# Patient Record
Sex: FEMALE | Race: WHITE
[De-identification: names, ages, dates, MRNs, and addresses within clinical notes are randomized per-mention and may not be internally consistent; named-entity substitution may affect disease eponyms.]

---

## 2023-06-13 ENCOUNTER — HOSPITAL ENCOUNTER (OUTPATIENT)
Dept: HOSPITAL 101 - ER | Age: 58
Setting detail: OBSERVATION
LOS: 1 days | Discharge: HOME | End: 2023-06-14
Payer: COMMERCIAL

## 2023-06-13 VITALS
TEMPERATURE: 98.2 F | DIASTOLIC BLOOD PRESSURE: 80 MMHG | RESPIRATION RATE: 24 BRPM | OXYGEN SATURATION: 93 % | SYSTOLIC BLOOD PRESSURE: 104 MMHG | HEART RATE: 106 BPM

## 2023-06-13 VITALS
TEMPERATURE: 99.32 F | HEART RATE: 110 BPM | DIASTOLIC BLOOD PRESSURE: 84 MMHG | SYSTOLIC BLOOD PRESSURE: 128 MMHG | RESPIRATION RATE: 22 BRPM | OXYGEN SATURATION: 89 %

## 2023-06-13 VITALS — OXYGEN SATURATION: 94 %

## 2023-06-13 VITALS — BODY MASS INDEX: 30 KG/M2 | BODY MASS INDEX: 29.6 KG/M2

## 2023-06-13 VITALS — OXYGEN SATURATION: 90 %

## 2023-06-13 DIAGNOSIS — G43.909: ICD-10-CM

## 2023-06-13 DIAGNOSIS — Z79.899: ICD-10-CM

## 2023-06-13 DIAGNOSIS — K21.9: ICD-10-CM

## 2023-06-13 DIAGNOSIS — J96.11: ICD-10-CM

## 2023-06-13 DIAGNOSIS — Z99.81: ICD-10-CM

## 2023-06-13 DIAGNOSIS — Z87.891: ICD-10-CM

## 2023-06-13 DIAGNOSIS — Z86.16: ICD-10-CM

## 2023-06-13 DIAGNOSIS — Z20.822: ICD-10-CM

## 2023-06-13 DIAGNOSIS — E11.9: ICD-10-CM

## 2023-06-13 DIAGNOSIS — J44.1: Primary | ICD-10-CM

## 2023-06-13 LAB
ADD MANUAL DIFF: NO
ALANINE AMINOTRANSFERASE: 22 U/L (ref 14–59)
ALBUMIN GLOBULIN RATIO: 0.8
ALBUMIN LEVEL: 3.3 G/DL (ref 3.4–5)
ALKALINE PHOSPHATASE: 95 U/L (ref 46–116)
ANION GAP: 8.4
ASPARTATE AMINO TRANSFERASE: 13 U/L (ref 15–37)
BLOOD UREA NITROGEN: 5 MG/DL (ref 7–18)
CALCIUM: 9.4 MG/DL (ref 8.5–10.1)
CARBON DIOXIDE: 32.2 MMOL/L (ref 21–32)
CHLORIDE: 102 MMOL/L (ref 98–107)
CO2 BLD-SCNC: 32.2 MMOL/L (ref 21–32)
ESTIMATED GFR (AFRICAN AMERICA: >60 (ref 60–?)
ESTIMATED GFR (NON-AFRICAN AME: >60 (ref 60–?)
GLOBULIN: 4.2 G/DL
GLUCOSE BLD-MCNC: 113 MG/DL (ref 74–106)
HCT VFR BLD CALC: 45 % (ref 36–48)
HEMATOCRIT: 45 % (ref 36–48)
HEMOGLOBIN: 15.5 G/DL (ref 12–16)
IMMATURE GRANULOCYTES ABS AUTO: 0.07 10^3/UL (ref 0–0.03)
IMMATURE GRANULOCYTES PCT AUTO: 0.4 % (ref 0–0.5)
LYMPHOCYTES  ABSOLUTE AUTO: 3.1 10^3/UL (ref 1.2–3.8)
MCV RBC: 92 FL (ref 81–99)
MEAN CORPUSCULAR HEMOGLOBIN: 31.7 PG (ref 26.7–34)
MEAN CORPUSCULAR HGB CONC: 34.4 G/DL (ref 29.9–35.2)
MEAN CORPUSCULAR VOLUME: 92 FL (ref 81–99)
NT PRO B TYPE NATRIURETIC PEPT: 90 PG/ML (ref ?–900)
PLATELET # BLD: 242 10^3/UL (ref 150–450)
PLATELET COUNT: 242 10^3/UL (ref 150–450)
POTASSIUM SERPLBLD-SCNC: 3.6 MMOL/L (ref 3.5–5.1)
POTASSIUM: 3.6 MMOL/L (ref 3.5–5.1)
RED BLOOD COUNT: 4.89 10^6/UL (ref 4.2–5.4)
SODIUM BLD-SCNC: 139 MMOL/L (ref 136–145)
SODIUM: 139 MMOL/L (ref 136–145)
TOTAL PROTEIN: 7.5 G/DL (ref 6.4–8.2)
WBC # BLD: 16.2 10^3/UL (ref 4–11)
WHITE BLOOD COUNT: 16.2 10^3/UL (ref 4–11)

## 2023-06-13 PROCEDURE — 82947 ASSAY GLUCOSE BLOOD QUANT: CPT

## 2023-06-13 PROCEDURE — 96376 TX/PRO/DX INJ SAME DRUG ADON: CPT

## 2023-06-13 PROCEDURE — G0378 HOSPITAL OBSERVATION PER HR: HCPCS

## 2023-06-13 PROCEDURE — 93005 ELECTROCARDIOGRAM TRACING: CPT

## 2023-06-13 PROCEDURE — 94640 AIRWAY INHALATION TREATMENT: CPT

## 2023-06-13 PROCEDURE — 83880 ASSAY OF NATRIURETIC PEPTIDE: CPT

## 2023-06-13 PROCEDURE — 96365 THER/PROPH/DIAG IV INF INIT: CPT

## 2023-06-13 PROCEDURE — 96375 TX/PRO/DX INJ NEW DRUG ADDON: CPT

## 2023-06-13 PROCEDURE — 96372 THER/PROPH/DIAG INJ SC/IM: CPT

## 2023-06-13 PROCEDURE — 87070 CULTURE OTHR SPECIMN AEROBIC: CPT

## 2023-06-13 PROCEDURE — 85025 COMPLETE CBC W/AUTO DIFF WBC: CPT

## 2023-06-13 PROCEDURE — 94761 N-INVAS EAR/PLS OXIMETRY MLT: CPT

## 2023-06-13 PROCEDURE — 80053 COMPREHEN METABOLIC PANEL: CPT

## 2023-06-13 PROCEDURE — 0202U NFCT DS 22 TRGT SARS-COV-2: CPT

## 2023-06-13 PROCEDURE — 87205 SMEAR GRAM STAIN: CPT

## 2023-06-13 PROCEDURE — 36415 COLL VENOUS BLD VENIPUNCTURE: CPT

## 2023-06-13 PROCEDURE — 87880 STREP A ASSAY W/OPTIC: CPT

## 2023-06-13 PROCEDURE — 99285 EMERGENCY DEPT VISIT HI MDM: CPT

## 2023-06-13 PROCEDURE — 84484 ASSAY OF TROPONIN QUANT: CPT

## 2023-06-13 PROCEDURE — 71045 X-RAY EXAM CHEST 1 VIEW: CPT

## 2023-06-13 PROCEDURE — 85027 COMPLETE CBC AUTOMATED: CPT

## 2023-06-13 RX ADMIN — KETOROLAC TROMETHAMINE 15 MG: 30 INJECTION, SOLUTION INTRAMUSCULAR; INTRAVENOUS at 18:15

## 2023-06-13 RX ADMIN — LEVOFLOXACIN 100 MG: 750 INJECTION, SOLUTION INTRAVENOUS at 19:16

## 2023-06-13 RX ADMIN — METHYLPREDNISOLONE SODIUM SUCCINATE 125 MG: 125 INJECTION, POWDER, FOR SOLUTION INTRAMUSCULAR; INTRAVENOUS at 18:15

## 2023-06-13 RX ADMIN — FAMOTIDINE 20 MG: 10 INJECTION INTRAVENOUS at 18:15

## 2023-06-13 RX ADMIN — IPRATROPIUM BROMIDE AND ALBUTEROL SULFATE 3 ML: .5; 2.5 SOLUTION RESPIRATORY (INHALATION) at 18:02

## 2023-06-13 RX ADMIN — IPRATROPIUM BROMIDE AND ALBUTEROL SULFATE 3 ML: .5; 2.5 SOLUTION RESPIRATORY (INHALATION) at 18:03

## 2023-06-13 RX ADMIN — KETOROLAC TROMETHAMINE 15 MG: 30 INJECTION, SOLUTION INTRAMUSCULAR; INTRAVENOUS at 20:06

## 2023-06-13 NOTE — XR_ITS
[address] [address] 
     [phone] 
  
  
Patient Name: 
JAN GAMING 
  
MRN: [id number]    YOB: 1965    Sex: F 
Assigned Patient Location: ER 
Current Patient Location: ER 
Accession/Order Number: [id number] 
Exam Date: 6/13/2023  17:50    Report Date: 6/13/2023  18:03 
  
At the request of: 
RAULITO ANDUJAR   
  
Procedure:  XR chest 1V 
  
EXAM: XR chest 1V at 1751 hours 
  
HISTORY: dyspnea 
  
COMPARISON: 4/20/2023  
  
TECHNIQUE: AP upright portable chest x-ray 
  
FINDINGS: The heart is not enlarged and the vasculature is not distended. No  
acute infiltrate, effusion or pneumothorax is identified. The osseous  
structures are grossly intact. Hardware projects over the lower cervical  
spine. 
  
IMPRESSION:  
  
No acute infiltrate or evidence of cardiac decompensation. The overall  
appearance of the chest is unchanged. 
  
  
Electronically authenticated by: BILLIE DE GUZMAN   Date: 6/13/2023  18:03

## 2023-06-13 NOTE — ED_ITS
HPI - SOB/Dyspnea    
General    
Chief Complaint: Shortness of Breath/Dyspnea    
Stated Complaint: SHORTNESS OF BREATH    
Time Seen by Provider: 06/13/23 17:40    
Source: patient    
Mode of arrival: walk-in    
Limitations: no limitations    
History of Present Illness    
HPI Narrative:     
The patient have history of asthma and COPD ,Is coming to the ER coming to the   
ER with a 4 days history of shortness of breath that is associated with greenish  
sputum cough .  The patient have no vomiting but some nausea she is denying   
diarrhea and chest pain and other complaints    
    
She was diagnosed with COVID-19 few months ago     
    
she uses 4 L NC as baseline all the time which didnt change over the last is   
that the patient mentioned that the coughing is causing her Back pain     
    
    
Review of systems otherwise negative    
    
Related Data    
                                Home Medications    
    
    
    
 Medication  Instructions  Recorded  Confirmed    
     
albuterol sulfate 90 mcg/actuation 2 inh inhalation Q4H PRN shortness 06/13/23 06/13/23    
    
aerosol inhaler of breath or wheezing      
     
budesonide 1 mg/2 mL suspension 1 mg inhalation Q12H PRN shortness 06/13/23 06/13/23    
    
for nebulization of breath      
     
budesonide-formoterol  1 inh inhalation Q12H 06/13/23 06/13/23    
    
mcg-4.5 mcg/actuation aerosol       
    
inhaler (Symbicort)       
     
butalbital-acetaminophen-caffeine 1 tab PO Q6H PRN pain 06/13/23 06/13/23    
    
50 mg-325 mg-40 mg tablet       
     
famotidine 40 mg tablet 40 mg PO BEDTIME 06/13/23 06/13/23    
     
galcanezumab-gnlm 120 mg/mL 120 mg subcut .month 06/13/23 06/13/23    
    
subcutaneous pen injector       
    
(Emgality Pen)       
     
lorazepam 0.5 mg tablet 0.5 mg PO Q12H PRN anxiety 06/13/23 06/13/23    
     
semaglutide 2 mg/dose (8 mg/3 mL) 2 mg subcut .friday 06/13/23 06/13/23    
    
subcutaneous pen injector (Ozempic)       
    
    
                                  Previous Rx's    
    
    
    
 Medication  Instructions  Recorded    
     
azithromycin 250 mg tablet See Rx Instructions PO .COMPLEX #6 06/14/23    
    
(Zithromax Z-Jose) tabs     
     
prednisone 20 mg tablet 20 mg PO BID 5 days #10 tabs 06/14/23    
     
promethazine-DM 6.25 mg-15 mg/5 mL 5 ml PO Q6H PRN cough #118 mL 06/14/23    
    
oral syrup      
    
    
    
                                    Allergies    
    
    
    
Allergy/AdvReac Type Severity Reaction Status Date / Time    
     
Penicillins Allergy Severe rash Verified 06/13/23 17:50    
     
Sulfa (Sulfonamide Allergy Severe Rash Verified 06/13/23 17:50    
    
Antibiotics)         
     
topiramate [From Topamax] AdvReac Severe thoughts Verified 06/13/23 17:50    
    
   of dieing      
     
reglan AdvReac Severe makes me Uncoded 06/13/23 17:50    
    
   mean      
    
    
    
    
Review of Systems    
    
    
ROS      
    
 Status of ROS 10 or more systems reviewed and unremarkable except as noted in   
history and below       
    
    
[facility]    
Medical History (Updated 06/18/23 @ 00:00 by )    
    
    
    
Surgical History (Updated 06/13/23 @ 17:58 by Juan Garcia)    
    
    
    
Social History    
Smoking status:  Former smoker     
Highest level of school completed/degree received:  high school graduate     
    
    
    
Exam    
Narrative    
Exam Narrative:     
Adult exam nurses notes and vital signs reviewed and patient is not hypoxic.    
    
General:  Well-appearing and in no apparent distress.      
Skin:  Warm, dry, no pallor noted.      
No rash.    
Head:  Normocephalic, atraumatic.    
Neck:  Supple, non-tender.    
Eye:  Pupils are equal, round and EOMI. No scleral icterus.    
Ears, Nose, Mouth, and Throat:  TM are clear, no nasal mucosal hypertrophy.    
Oral mucosa is moist, no posterior oropharynx erythema, uvula is mid-line    
Cardiovascular:  Regular Rate and Rhythm without murmur, gallop or rub.    
Respiratory:  No accessory muscle use or respiratory distress.    
Lungs biltaeral expiratory lung wheezes with decreased air entry at the bases .    
Chest Wall:  no tenderness    
Back: No midline thoracic or lumbar vertebral tenderness. No CVA tenderness    
Musculoskeletal:  normal ROM, no calf or popliteal tenderness, no lower   
extremity edema/swelling    
GI:  Abdomen is soft, non-distended.  Normal bowel sounds.    
No masses appreciated.    
No tenderness to palpation. No rebound, guarding, or rigidity noted.    
Neurological:  A&O x4.  No cranial nerve dysfunction observed.  No truncal   
ataxia. Moves all extremities. Sensation intact.    
Psychiatric:  Cooperative and interactive. Normal mood and affect.    
Constitutional    
    
                               Vital Signs - 24 hr    
    
    
    
 06/13/23    
17:42 06/13/23    
17:52 06/13/23    
18:05    
     
Temperature 98.2 F      
     
Pulse Rate [Monitor] 106 H      
     
Respiratory Rate 24      
     
Blood Pressure [Left Arm] 104/80 H      
     
Pulse Oximetry 93 L  94 L    
     
Oxygen Delivery Method Nasal Cannula Nasal Cannula Nasal Cannula    
     
Oxygen Delivery Flow Rate 4 4 4    
    
    
    
    
    
Course    
Vital Signs    
Vital signs:     
    
                                   Vital Signs    
    
    
    
Temperature  98.2 F   06/13/23 17:42    
     
Pulse Rate  106 H  06/13/23 17:42    
     
Respiratory Rate  24   06/13/23 17:42    
     
Blood Pressure  104/80 H  06/13/23 17:42    
     
Pulse Oximetry  93 L  06/13/23 17:42    
     
Oxygen Delivery Method  Nasal Cannula  06/13/23 17:42    
     
Oxygen Delivery Flow Rate  4   06/13/23 17:42    
    
    
                                            
    
    
    
Temperature  98.1 F   06/14/23 05:59    
     
Pulse Rate  94 H  06/14/23 11:01    
     
Respiratory Rate  20   06/14/23 05:59    
     
Blood Pressure  111/67   06/14/23 05:59    
     
Pulse Oximetry  94 L  06/14/23 11:01    
     
Oxygen Delivery Method  Nasal Cannula  06/14/23 11:32    
     
Oxygen Delivery Flow Rate  4   06/14/23 11:32    
    
    
    
    
    
MDM - SOB/Dyspnea    
MDM Narrative    
Medical decision making narrative:     
the patient have a baseline isn't 4 L of nasal cannula this time when she   
presented to us initially she was saturating ninety-four percent on her regular   
NC , After initial treatment with steroids and albuterol the patient was still   
wheezing and her clinical exam did not improve    
    
Chest x-ray showed no acute pathology but the patient  WBC is elevated and her   
BUN  elevated as well     
    
EKG sinus rhythm with no ST elevation of depression      
    
PT case discusssed with Dr Romo and will be admitted under Dr Voss     
    
    
Blnuno cx obatained and pt was started on IV levifloxacin     
    
Lab Data    
Labs:     
    
                                   Lab Results    
    
    
    
  06/13/23 Range/Units    
    
  17:45     
     
WBC  16.2 H  (4.0-11.0)  10^3/uL    
     
RBC  4.89  (4.20-5.40)  10^6/uL    
     
Hgb  15.5  (12.0-16.0)  g/dL    
     
Hct  45.0  (36.0-48.0)  %    
     
MCV  92.0  (81.0-99.0)  fL    
     
MCH  31.7  (26.7-34.0)  pg    
     
MCHC  34.4  (29.9-35.2)  g/dL    
     
RDW  12.6  (11.0-15.0)  %    
     
Plt Count  242  (150-450)  10^3/uL    
     
MPV  9.5  (9.5-13.5)  fL    
     
Neut % (Auto)  69.5  (43.0-75.0)  %    
     
Lymph % (Auto)  18.8 L  (20.5-60.0)  %    
     
Mono % (Auto)  10.1  (1.7-12.0)  %    
     
Eos % (Auto)  0.6 L  (0.9-7.0)  %    
     
Baso % (Auto)  0.6  (0.2-2.0)  %    
     
Neut # (Auto)  11.3 H  (1.4-6.5)  10^3/uL    
     
Lymph # (Auto)  3.1  (1.2-3.8)  10^3/uL    
     
Mono # (Auto)  1.6 H  (0.3-0.8)  10^3/uL    
     
Eos # (Auto)  0.1  (0.0-0.7)  10^3/uL    
     
Baso # (Auto)  0.1  (0.0-0.1)  10^3/uL    
     
Abs Immat Gran (auto)  0.07 H  (0.00-0.03)  10^3/uL    
     
Imm/Tot Granulo (auto)  0.4  (0.0-0.5)  %    
     
Sodium  139  (136-145)  mmol/L    
     
Potassium  3.6  (3.5-5.1)  mmol/L    
     
Chloride  102  ()  mmol/L    
     
Carbon Dioxide  32.2 H  (21.0-32.0)  mmol/L    
     
Anion Gap  8.4      
     
BUN  5.0 L  (7.0-18.0)  mg/dL    
     
Creatinine  0.52 L  (0.55-1.02)  mg/dL    
     
Est GFR ( Amer)  >60  (>=60)      
     
Est GFR (Non-Af Amer)  >60  (>=60)      
     
BUN/Creatinine Ratio  9.6      
     
Glucose  113 H  ()  mg/dL    
     
Calcium  9.4  (8.5-10.1)  mg/dL    
     
Total Bilirubin  0.5  (0.2-1.0)  mg/dL    
     
AST  13 L  (15-37)  U/L    
     
ALT  22  (14-59)  U/L    
     
Alkaline Phosphatase  95  ()  U/L    
     
Troponin I High Sens  <4.0 L  (4.0-51.3)  pg/mL    
     
NT-Pro-B Natriuret Pep  90.0  (<=900.0)  pg/mL    
     
Total Protein  7.5  (6.4-8.2)  g/dL    
     
Albumin  3.3 L  (3.4-5.0)  g/dL    
     
Globulin  4.2  g/dL    
     
Albumin/Globulin Ratio  0.8      
    
    
    
    
    
Discharge Plan    
Discharge    
Chief Complaint: Shortness of Breath/Dyspnea    
    
Clinical Impression:    
 Acute exacerbation of COPD with asthma, Pneumonia    
    
    
Patient Disposition: Admitted As Inpatient    
    
Time of Disposition Decision: 18:48    
    
Condition: Good    
    
Interventions:    
ED Discharge Assessment   Last Done: 06/13/23 20:26    
    
Discharge Date/Time: 06/13/23 20:11

## 2023-06-13 NOTE — ECG_ITS
[facility] 
                                        
                                       Test Date:    2023 
Pat Name:     Bev Castillo          Department:    
Patient ID:   [id number]               Room:         - 
Gender:       Female                   Technician:    
:          [id number]               Requested By: BRIGIDA VANN 
Order Number: [id number]              Reading MD:   BRIGIDA VANN 
                                 Measurements 
Intervals                              Axis           
Rate:         105                      P:            74 
MO:           152                      QRS:          77 
QRSD:         88                       T:            59 
QT:           334                                     
QTc:          395                                     
                           Interpretive Statements 
1120 Sinus tachycardia 
9140 **  abnormal rhythm ECG  ** 
No previous ECG available for comparison 
Electronically Signed On 2023 6:53:58 EDT by BRIGIDA VANN

## 2023-06-13 NOTE — ED.SOB1
HPI - SOB/Dyspnea
General
Chief Complaint: Shortness of Breath/Dyspnea
Stated Complaint: SHORTNESS OF BREATH
Time Seen by Provider: 06/13/23 17:40
Source: patient
Mode of arrival: walk-in
Limitations: no limitations
History of Present Illness
HPI Narrative: 
The patient have history of asthma and COPD ,Is coming to the ER coming to the ER with a 4 days history of shortness of breath that is associated with greenish sputum cough .  The patient have no vomiting but some nausea she is denying diarrhea and 
chest pain and other complaints

She was diagnosed with COVID-19 few months ago 

she uses 4 L NC as baseline all the time which didnt change over the last is that the patient mentioned that the coughing is causing her Back pain 


Review of systems otherwise negative

Related Data
Home Medications

 Medication  Instructions  Recorded  Confirmed
albuterol sulfate 90 mcg/actuation 2 inh inhalation Q4H PRN shortness 06/13/23 06/13/23
aerosol inhaler of breath or wheezing  
budesonide 1 mg/2 mL suspension 1 mg inhalation Q12H PRN shortness 06/13/23 06/13/23
for nebulization of breath  
budesonide-formoterol  1 inh inhalation Q12H 06/13/23 06/13/23
mcg-4.5 mcg/actuation aerosol   
inhaler (Symbicort)   
butalbital-acetaminophen-caffeine 1 tab PO Q6H PRN pain 06/13/23 06/13/23
50 mg-325 mg-40 mg tablet   
famotidine 40 mg tablet 40 mg PO BEDTIME 06/13/23 06/13/23
galcanezumab-gnlm 120 mg/mL 120 mg subcut .month 06/13/23 06/13/23
subcutaneous pen injector   
(Emgality Pen)   
lorazepam 0.5 mg tablet 0.5 mg PO Q12H PRN anxiety 06/13/23 06/13/23
semaglutide 2 mg/dose (8 mg/3 mL) 2 mg subcut .friday 06/13/23 06/13/23
subcutaneous pen injector (Ozempic)   

Previous Rx's

 Medication  Instructions  Recorded
azithromycin 250 mg tablet See Rx Instructions PO .COMPLEX #6 06/14/23
(Zithromax Z-Jose) tabs 
prednisone 20 mg tablet 20 mg PO BID 5 days #10 tabs 06/14/23
promethazine-DM 6.25 mg-15 mg/5 mL 5 ml PO Q6H PRN cough #118 mL 06/14/23
oral syrup  


Allergies

Allergy/AdvReac Type Severity Reaction Status Date / Time
Penicillins Allergy Severe rash Verified 06/13/23 17:50
Sulfa (Sulfonamide Allergy Severe Rash Verified 06/13/23 17:50
Antibiotics)     
topiramate [From Topamax] AdvReac Severe thoughts Verified 06/13/23 17:50
   of dieing  
reglan AdvReac Severe makes me Uncoded 06/13/23 17:50
   mean  



Review of Systems
ROS  
 Status of ROS 10 or more systems reviewed and unremarkable except as noted in history and below   

[facility]
Medical History (Updated 06/18/23 @ 00:00 by )



Surgical History (Updated 06/13/23 @ 17:58 by Juan Garcia)



Social History
Smoking status:  Former smoker 
Highest level of school completed/degree received:  high school graduate 



Exam
Narrative
Exam Narrative: 
Adult exam nurses notes and vital signs reviewed and patient is not hypoxic.

General:  Well-appearing and in no apparent distress.  
Skin:  Warm, dry, no pallor noted.  
No rash.
Head:  Normocephalic, atraumatic.
Neck:  Supple, non-tender.
Eye:  Pupils are equal, round and EOMI. No scleral icterus.
Ears, Nose, Mouth, and Throat:  TM are clear, no nasal mucosal hypertrophy.  Oral mucosa is moist, no posterior oropharynx erythema, uvula is mid-line
Cardiovascular:  Regular Rate and Rhythm without murmur, gallop or rub.
Respiratory:  No accessory muscle use or respiratory distress.
Lungs biltaeral expiratory lung wheezes with decreased air entry at the bases .
Chest Wall:  no tenderness
Back: No midline thoracic or lumbar vertebral tenderness. No CVA tenderness
Musculoskeletal:  normal ROM, no calf or popliteal tenderness, no lower extremity edema/swelling
GI:  Abdomen is soft, non-distended.  Normal bowel sounds.
No masses appreciated.
No tenderness to palpation. No rebound, guarding, or rigidity noted.
Neurological:  A&O x4.  No cranial nerve dysfunction observed.  No truncal ataxia. Moves all extremities. Sensation intact.
Psychiatric:  Cooperative and interactive. Normal mood and affect.
Constitutional

Vital Signs - 24 hr

 06/13/23
17:42 06/13/23
17:52 06/13/23
18:05
Temperature 98.2 F  
Pulse Rate [Monitor] 106 H  
Respiratory Rate 24  
Blood Pressure [Left Arm] 104/80 H  
Pulse Oximetry 93 L  94 L
Oxygen Delivery Method Nasal Cannula Nasal Cannula Nasal Cannula
Oxygen Delivery Flow Rate 4 4 4




Course
Vital Signs
Vital signs: 

Vital Signs

Temperature  98.2 F   06/13/23 17:42
Pulse Rate  106 H  06/13/23 17:42
Respiratory Rate  24   06/13/23 17:42
Blood Pressure  104/80 H  06/13/23 17:42
Pulse Oximetry  93 L  06/13/23 17:42
Oxygen Delivery Method  Nasal Cannula  06/13/23 17:42
Oxygen Delivery Flow Rate  4   06/13/23 17:42



Temperature  98.1 F   06/14/23 05:59
Pulse Rate  94 H  06/14/23 11:01
Respiratory Rate  20   06/14/23 05:59
Blood Pressure  111/67   06/14/23 05:59
Pulse Oximetry  94 L  06/14/23 11:01
Oxygen Delivery Method  Nasal Cannula  06/14/23 11:32
Oxygen Delivery Flow Rate  4   06/14/23 11:32




MDM - SOB/Dyspnea
MDM Narrative
Medical decision making narrative: 
the patient have a baseline isn't 4 L of nasal cannula this time when she presented to us initially she was saturating ninety-four percent on her regular NC , After initial treatment with steroids and albuterol the patient was still wheezing and her 
clinical exam did not improve

Chest x-ray showed no acute pathology but the patient  WBC is elevated and her BUN  elevated as well 

EKG sinus rhythm with no ST elevation of depression  

PT case discusssed with Dr Romo and will be admitted under Dr Voss 


Blnuno cx obatained and pt was started on IV levifloxacin 

Lab Data
Labs: 

Lab Results

  06/13/23 Range/Units
  17:45 
WBC  16.2 H  (4.0-11.0)  10^3/uL
RBC  4.89  (4.20-5.40)  10^6/uL
Hgb  15.5  (12.0-16.0)  g/dL
Hct  45.0  (36.0-48.0)  %
MCV  92.0  (81.0-99.0)  fL
MCH  31.7  (26.7-34.0)  pg
MCHC  34.4  (29.9-35.2)  g/dL
RDW  12.6  (11.0-15.0)  %
Plt Count  242  (150-450)  10^3/uL
MPV  9.5  (9.5-13.5)  fL
Neut % (Auto)  69.5  (43.0-75.0)  %
Lymph % (Auto)  18.8 L  (20.5-60.0)  %
Mono % (Auto)  10.1  (1.7-12.0)  %
Eos % (Auto)  0.6 L  (0.9-7.0)  %
Baso % (Auto)  0.6  (0.2-2.0)  %
Neut # (Auto)  11.3 H  (1.4-6.5)  10^3/uL
Lymph # (Auto)  3.1  (1.2-3.8)  10^3/uL
Mono # (Auto)  1.6 H  (0.3-0.8)  10^3/uL
Eos # (Auto)  0.1  (0.0-0.7)  10^3/uL
Baso # (Auto)  0.1  (0.0-0.1)  10^3/uL
Abs Immat Gran (auto)  0.07 H  (0.00-0.03)  10^3/uL
Imm/Tot Granulo (auto)  0.4  (0.0-0.5)  %
Sodium  139  (136-145)  mmol/L
Potassium  3.6  (3.5-5.1)  mmol/L
Chloride  102  ()  mmol/L
Carbon Dioxide  32.2 H  (21.0-32.0)  mmol/L
Anion Gap  8.4  
BUN  5.0 L  (7.0-18.0)  mg/dL
Creatinine  0.52 L  (0.55-1.02)  mg/dL
Est GFR ( Amer)  >60  (>=60)  
Est GFR (Non-Af Amer)  >60  (>=60)  
BUN/Creatinine Ratio  9.6  
Glucose  113 H  ()  mg/dL
Calcium  9.4  (8.5-10.1)  mg/dL
Total Bilirubin  0.5  (0.2-1.0)  mg/dL
AST  13 L  (15-37)  U/L
ALT  22  (14-59)  U/L
Alkaline Phosphatase  95  ()  U/L
Troponin I High Sens  <4.0 L  (4.0-51.3)  pg/mL
NT-Pro-B Natriuret Pep  90.0  (<=900.0)  pg/mL
Total Protein  7.5  (6.4-8.2)  g/dL
Albumin  3.3 L  (3.4-5.0)  g/dL
Globulin  4.2  g/dL
Albumin/Globulin Ratio  0.8  




Discharge Plan
Discharge
Chief Complaint: Shortness of Breath/Dyspnea

Clinical Impression:
 Acute exacerbation of COPD with asthma, Pneumonia


Patient Disposition: Admitted As Inpatient

Time of Disposition Decision: 18:48

Condition: Good

Interventions:
ED Discharge Assessment   Last Done: 06/13/23 20:26

Discharge Date/Time: 06/13/23 20:11

## 2023-06-14 VITALS — HEART RATE: 87 BPM | RESPIRATION RATE: 20 BRPM | OXYGEN SATURATION: 94 %

## 2023-06-14 VITALS
SYSTOLIC BLOOD PRESSURE: 111 MMHG | RESPIRATION RATE: 20 BRPM | TEMPERATURE: 98.1 F | DIASTOLIC BLOOD PRESSURE: 67 MMHG | OXYGEN SATURATION: 90 % | HEART RATE: 96 BPM

## 2023-06-14 VITALS — HEART RATE: 94 BPM | OXYGEN SATURATION: 94 %

## 2023-06-14 LAB
ADD MANUAL DIFF: NO
GLUCOSE BLD-MCNC: 288 MG/DL (ref 74–106)
HCT VFR BLD CALC: 38.3 % (ref 36–48)
HCT VFR BLD CALC: 43 % (ref 36–48)
HEMATOCRIT: 38.3 % (ref 36–48)
HEMATOCRIT: 43 % (ref 36–48)
HEMOGLOBIN: 13.7 G/DL (ref 12–16)
HEMOGLOBIN: 14.7 G/DL (ref 12–16)
IMMATURE GRANULOCYTES ABS AUTO: 0.1 10^3/UL (ref 0–0.03)
IMMATURE GRANULOCYTES PCT AUTO: 0.7 % (ref 0–0.5)
LYMPHOCYTES  ABSOLUTE AUTO: 1.3 10^3/UL (ref 1.2–3.8)
MCV RBC: 90.3 FL (ref 81–99)
MCV RBC: 92.7 FL (ref 81–99)
MEAN CORPUSCULAR HEMOGLOBIN: 31.7 PG (ref 26.7–34)
MEAN CORPUSCULAR HEMOGLOBIN: 32.3 PG (ref 26.7–34)
MEAN CORPUSCULAR HGB CONC: 34.2 G/DL (ref 29.9–35.2)
MEAN CORPUSCULAR HGB CONC: 35.8 G/DL (ref 29.9–35.2)
MEAN CORPUSCULAR VOLUME: 90.3 FL (ref 81–99)
MEAN CORPUSCULAR VOLUME: 92.7 FL (ref 81–99)
PLATELET # BLD: 214 10^3/UL (ref 150–450)
PLATELET # BLD: 231 10^3/UL (ref 150–450)
PLATELET COUNT: 214 10^3/UL (ref 150–450)
PLATELET COUNT: 231 10^3/UL (ref 150–450)
RED BLOOD COUNT: 4.24 10^6/UL (ref 4.2–5.4)
RED BLOOD COUNT: 4.64 10^6/UL (ref 4.2–5.4)
WBC # BLD: 13.7 10^3/UL (ref 4–11)
WBC # BLD: 13.9 10^3/UL (ref 4–11)
WHITE BLOOD COUNT: 13.7 10^3/UL (ref 4–11)
WHITE BLOOD COUNT: 13.9 10^3/UL (ref 4–11)

## 2023-06-14 RX ADMIN — INSULIN ASPART 0 UNIT: 100 INJECTION, SOLUTION INTRAVENOUS; SUBCUTANEOUS at 08:07

## 2023-06-14 RX ADMIN — KETOROLAC TROMETHAMINE 15 MG: 30 INJECTION, SOLUTION INTRAMUSCULAR; INTRAVENOUS at 11:37

## 2023-06-14 RX ADMIN — IPRATROPIUM BROMIDE AND ALBUTEROL SULFATE 3 ML: .5; 2.5 SOLUTION RESPIRATORY (INHALATION) at 04:40

## 2023-06-14 RX ADMIN — ACETAMINOPHEN 650 MG: 325 TABLET ORAL at 09:49

## 2023-06-14 RX ADMIN — ACETAMINOPHEN 650 MG: 325 TABLET ORAL at 03:14

## 2023-06-14 RX ADMIN — KETOROLAC TROMETHAMINE 15 MG: 30 INJECTION, SOLUTION INTRAMUSCULAR; INTRAVENOUS at 05:19

## 2023-06-14 RX ADMIN — SODIUM CHLORIDE 75 ML: 900 INJECTION, SOLUTION INTRAVENOUS at 06:46

## 2023-06-14 RX ADMIN — METHYLPREDNISOLONE SODIUM SUCCINATE 40 MG: 40 INJECTION, POWDER, FOR SOLUTION INTRAMUSCULAR; INTRAVENOUS at 09:53

## 2023-06-14 RX ADMIN — METHYLPREDNISOLONE SODIUM SUCCINATE 40 MG: 40 INJECTION, POWDER, FOR SOLUTION INTRAMUSCULAR; INTRAVENOUS at 05:19

## 2023-06-14 RX ADMIN — ENOXAPARIN SODIUM 40 MG: 40 INJECTION SUBCUTANEOUS at 08:07

## 2023-06-14 RX ADMIN — INSULIN ASPART 0 UNIT: 100 INJECTION, SOLUTION INTRAVENOUS; SUBCUTANEOUS at 11:37

## 2023-06-14 RX ADMIN — IPRATROPIUM BROMIDE AND ALBUTEROL SULFATE 3 ML: .5; 2.5 SOLUTION RESPIRATORY (INHALATION) at 10:58

## 2023-06-14 NOTE — PM.HP
H&P: HPI
History of Present Illness
Chief complaint: SHORTNESS OF BREATH
Narrative: 
HPI and hospital course:

57 y o female with hx of COPD on Home O2 4 L via NC presents with worsening SOB x 2 days with cough productive of yellow sputum. Denies fever, nausea, vomiting, sick contacts. She reports his O2 was dropping to low 80% on minimal exertion and she 
could not catch her breath last evening so came to ED for further evaluation. Patient just returned from [place] and thinks change in weather and high humidity might have caused her exacerbation She also had recent COVID about a month and needed hospital 
admission for it and thinks that has affected her lungs too. 
She received systemic steroids and inhaled bronchodilators during the course of admission along with Levaquin. She feels back to her baseline this morning and feels stable to go home. 

Admission Diagnosis
Chronic resp failure with hypoxia
COPD exacerbation
T2 DM
GERD
Migraine

Discharge diagnosis

as above

Discharge status

stable 

Review of Systems
ROS  
 Status of ROS 10 or more systems reviewed and unremarkable except as noted in history and below   

[facility]
Medical History (Updated 06/14/23 @ 12:16 by Shaikh Shanika MD)



Surgical History (Updated 06/13/23 @ 17:58 by Juan Garcia)



Social History
Smoking status:  Former smoker 
Highest level of school completed/degree received:  high school graduate 



Meds
Home Medications and Allergies
Home Medications

 Medication  Instructions  Recorded  Confirmed  Type
albuterol sulfate 90 mcg/actuation 2 inh inhalation Q4H PRN shortness 06/13/23 06/13/23 History
aerosol inhaler of breath or wheezing   
budesonide 1 mg/2 mL suspension 1 mg inhalation Q12H PRN shortness 06/13/23 06/13/23 History
for nebulization of breath   
budesonide-formoterol  1 inh inhalation Q12H 06/13/23 06/13/23 History
mcg-4.5 mcg/actuation aerosol    
inhaler (Symbicort)    
butalbital-acetaminophen-caffeine 1 tab PO Q6H PRN pain 06/13/23 06/13/23 History
50 mg-325 mg-40 mg tablet    
famotidine 40 mg tablet 40 mg PO BEDTIME 06/13/23 06/13/23 History
galcanezumab-gnlm 120 mg/mL 120 mg subcut .month 06/13/23 06/13/23 History
subcutaneous pen injector    
(Emgality Pen)    
lorazepam 0.5 mg tablet 0.5 mg PO Q12H PRN anxiety 06/13/23 06/13/23 History
semaglutide 2 mg/dose (8 mg/3 mL) 2 mg subcut .friday 06/13/23 06/13/23 History
subcutaneous pen injector (Ozempic)    
azithromycin 250 mg tablet See Rx Instructions PO .COMPLEX #6 06/14/23  Rx
(Zithromax Z-Jose) tabs   
prednisone 20 mg tablet 20 mg PO BID 5 days #10 tabs 06/14/23  Rx
promethazine-DM 6.25 mg-15 mg/5 mL 5 ml PO Q6H PRN cough #118 mL 06/14/23  Rx
oral syrup    


Allergies

Allergy/AdvReac Type Severity Reaction Status Date / Time
Penicillins Allergy Severe rash Verified 06/13/23 17:50
Sulfa (Sulfonamide Allergy Severe Rash Verified 06/13/23 17:50
Antibiotics)     
topiramate [From Topamax] AdvReac Severe thoughts Verified 06/13/23 17:50
   of dieing  
reglan AdvReac Severe makes me Uncoded 06/13/23 17:50
   mean  



Exam
Constitutional
Vital Signs - 24 hr

 06/13/23
17:42 06/13/23
17:52 06/13/23
18:05
Temperature 98.2 F  
Pulse Rate   
Pulse Rate [Monitor] 106 H  
Respiratory Rate 24  
Blood Pressure [Left Arm] 104/80 H  
Blood Pressure [Right Arm]   
Pulse Oximetry 93 L  94 L
Oxygen Delivery Method Nasal Cannula Nasal Cannula Nasal Cannula
Oxygen Delivery Flow Rate 4 4 4

 06/13/23
20:36 06/13/23
20:47 06/14/23
04:36
Temperature  99.4 F 
Pulse Rate  110 H 87
Pulse Rate [Monitor]   
Respiratory Rate  22 20
Blood Pressure [Left Arm]  128/84 H 
Blood Pressure [Right Arm]   
Pulse Oximetry 90 L 89 L 94 L
Oxygen Delivery Method Nasal Cannula Nasal Cannula Nasal Cannula
Oxygen Delivery Flow Rate 4 4 4

 06/14/23
05:59 06/14/23
11:01 06/14/23
11:32
Temperature 98.1 F  
Pulse Rate 96 H 94 H 
Pulse Rate [Monitor]   
Respiratory Rate 20  
Blood Pressure [Left Arm]   
Blood Pressure [Right Arm] 111/67  
Pulse Oximetry 90 L 94 L 
Oxygen Delivery Method Nasal Cannula Nasal Cannula Nasal Cannula
Oxygen Delivery Flow Rate 4 4 4


Documenting provider has reviewed patient's vital signs: yes
Common normals: no apparent distress and well nourished
General appearance: cooperative and comfortable
Eye
Common normals: conjunctivae normal and no scleral icterus
Respiratory
Common normals: normal respiratory effort and no use of accessory muscles
Other: 
No wheezing. Coarse breath sounds all lung fields
Cardio
Common normals: no JVD, regular rate, S1 normal heart sound and S2 normal heart sound
GI
Common normals: Normal to inspection, nondistended, normoactive bowel sounds present, soft to palpation and no hepatosplenomegaly
Extremity
Common normals: normal to inspection and full ROM
Neuro
Common normals: oriented x3, moves all extremities and no focal motor deficits
Psych
Common normals: mental status grossly normal, thought process normal, cooperative, denies homicidal ideation and denies suicidal ideation

Results
Labs
Labs: 
Short CBC

  06/13/23 06/14/23 06/14/23 Range/Units
  17:45 04:55 07:10 
WBC  16.2 H  13.9 H  13.7 H  (4.0-11.0)  10^3/uL
Hgb  15.5  14.7  13.7  (12.0-16.0)  g/dL
Hct  45.0  43.0  38.3  (36.0-48.0)  %
Plt Count  242  231  214  (150-450)  10^3/uL

BMP

  06/13/23 06/14/23
  17:45 04:55
Sodium  139 
Potassium  3.6 
Chloride  102 
Carbon Dioxide  32.2 H 
BUN  5.0 L 
Creatinine  0.52 L 
Glucose  113 H  288 H
Calcium  9.4 

Liver Function

  06/13/23 Range/Units
  17:45 
Total Bilirubin  0.5  (0.2-1.0)  mg/dL
AST  13 L  (15-37)  U/L
ALT  22  (14-59)  U/L
Alkaline Phosphatase  95  ()  U/L
Albumin  3.3 L  (3.4-5.0)  g/dL



Assessment and Plan
Assessment and Plan
(1) Acute exacerbation of COPD with asthma: 
      Assessment and Plan: 
Improved. No resp distress. On baseline O2. No PNA on CXR. 
Will d/c on oral prednisone and Azithromycin. 
(2) History of COPD: 
      Assessment and Plan: 
On Symbicort as outpatient. Outpatient f/u 
(3) History of diabetes mellitus: 
      Assessment and Plan: 
C/w Ozempic. Closely monitor FSBS while on prednisone 
(4) History of oxygen administration: 
(5) Migraine: 
      Assessment and Plan: 
On emgality. 
(6) Acute and chronic respiratory failure with hypoxia: 
      Assessment and Plan: 
Hypoxic with Pulse ox in low 80s at home.  Resolved now. Stable for discharge. 


Plan
Discharge home on oral predisone, Z pack
Patient educated on sigsn and symptoms that should prompt her to seek medial care.

## 2023-06-14 NOTE — CM.NOTE
Medicare Outpatient Observation Form discussed with pt, pt verbalizes understanding and signs paper. Original given to pt and copy placed on pt's chart.

## 2023-06-14 NOTE — P.HP_ITS
H&P: HPI    
History of Present Illness    
Chief complaint: SHORTNESS OF BREATH    
Narrative:     
HPI and hospital course:    
    
57 y o female with hx of COPD on Home O2 4 L via [place] presents with worsening SOB   
x 2 days with cough productive of yellow sputum. Denies fever, nausea, vomiting,  
sick contacts. She reports his O2 was dropping to low 80% on minimal exertion   
and she could not catch her breath last evening so came to ED for further   
evaluation. Patient just returned from [place] and thinks change in weather and high   
humidity might have caused her exacerbation She also had recent COVID about a   
month and needed hospital admission for it and thinks that has affected her   
lungs too.     
She received systemic steroids and inhaled bronchodilators during the course of   
admission along with Levaquin. She feels back to her baseline this morning and   
feels stable to go home.     
    
Admission Diagnosis    
Chronic resp failure with hypoxia    
COPD exacerbation    
T2 DM    
GERD    
Migraine    
    
Discharge diagnosis    
    
as above    
    
Discharge status    
    
stable     
    
Review of Systems    
    
    
ROS      
    
 Status of ROS 10 or more systems reviewed and unremarkable except as noted in   
history and below       
    
    
[facility]    
Medical History (Updated 06/14/23 @ 12:16 by Shaikh Shanika MD)    
    
    
    
Surgical History (Updated 06/13/23 @ 17:58 by Juan Garcia)    
    
    
    
Social History    
Smoking status:  Former smoker     
Highest level of school completed/degree received:  high school graduate     
    
    
    
Meds    
Home Medications and Allergies    
                                Home Medications    
    
    
    
 Medication  Instructions  Recorded  Confirmed  Type    
     
albuterol sulfate 90 mcg/actuation 2 inh inhalation Q4H PRN shortness 06/13/23 06/13/23 History    
    
aerosol inhaler of breath or wheezing       
     
budesonide 1 mg/2 mL suspension 1 mg inhalation Q12H PRN shortness 06/13/23 06/13/23 History    
    
for nebulization of breath       
     
budesonide-formoterol  1 inh inhalation Q12H 06/13/23 06/13/23 History    
    
mcg-4.5 mcg/actuation aerosol        
    
inhaler (Symbicort)        
     
butalbital-acetaminophen-caffeine 1 tab PO Q6H PRN pain 06/13/23 06/13/23   
History    
    
50 mg-325 mg-40 mg tablet        
     
famotidine 40 mg tablet 40 mg PO BEDTIME 06/13/23 06/13/23 History    
     
galcanezumab-gnlm 120 mg/mL 120 mg subcut .month 06/13/23 06/13/23 History    
    
subcutaneous pen injector        
    
(Emgality Pen)        
     
lorazepam 0.5 mg tablet 0.5 mg PO Q12H PRN anxiety 06/13/23 06/13/23 History    
     
semaglutide 2 mg/dose (8 mg/3 mL) 2 mg subcut .friday 06/13/23 06/13/23 History    
    
subcutaneous pen injector (Ozempic)        
     
azithromycin 250 mg tablet See Rx Instructions PO .COMPLEX #6 06/14/23  Rx    
    
(Zithromax Z-Jose) tabs       
     
prednisone 20 mg tablet 20 mg PO BID 5 days #10 tabs 06/14/23  Rx    
     
promethazine-DM 6.25 mg-15 mg/5 mL 5 ml PO Q6H PRN cough #118 mL 06/14/23  Rx    
    
oral syrup        
    
    
    
                                    Allergies    
    
    
    
Allergy/AdvReac Type Severity Reaction Status Date / Time    
     
Penicillins Allergy Severe rash Verified 06/13/23 17:50    
     
Sulfa (Sulfonamide Allergy Severe Rash Verified 06/13/23 17:50    
    
Antibiotics)         
     
topiramate [From Topamax] AdvReac Severe thoughts Verified 06/13/23 17:50    
    
   of dieing      
     
reglan AdvReac Severe makes me Uncoded 06/13/23 17:50    
    
   mean      
    
    
    
    
Exam    
Constitutional    
                               Vital Signs - 24 hr    
    
    
    
 06/13/23    
17:42 06/13/23    
17:52 06/13/23    
18:05    
     
Temperature 98.2 F      
     
Pulse Rate       
     
Pulse Rate [Monitor] 106 H      
     
Respiratory Rate 24      
     
Blood Pressure [Left Arm] 104/80 H      
     
Blood Pressure [Right Arm]       
     
Pulse Oximetry 93 L  94 L    
     
Oxygen Delivery Method Nasal Cannula Nasal Cannula Nasal Cannula    
     
Oxygen Delivery Flow Rate 4 4 4    
    
    
    
    
 06/13/23    
20:36 06/13/23    
20:47 06/14/23    
04:36    
     
Temperature  99.4 F     
     
Pulse Rate  110 H 87    
     
Pulse Rate [Monitor]       
     
Respiratory Rate  22 20    
     
Blood Pressure [Left Arm]  128/84 H     
     
Blood Pressure [Right Arm]       
     
Pulse Oximetry 90 L 89 L 94 L    
     
Oxygen Delivery Method Nasal Cannula Nasal Cannula Nasal Cannula    
     
Oxygen Delivery Flow Rate 4 4 4    
    
    
    
    
 06/14/23    
05:59 06/14/23    
11:01 06/14/23    
11:32    
     
Temperature 98.1 F      
     
Pulse Rate 96 H 94 H     
     
Pulse Rate [Monitor]       
     
Respiratory Rate 20      
     
Blood Pressure [Left Arm]       
     
Blood Pressure [Right Arm] 111/67      
     
Pulse Oximetry 90 L 94 L     
     
Oxygen Delivery Method Nasal Cannula Nasal Cannula Nasal Cannula    
     
Oxygen Delivery Flow Rate 4 4 4    
    
    
    
Documenting provider has reviewed patient's vital signs: yes    
Common normals: no apparent distress and well nourished    
General appearance: cooperative and comfortable    
Eye    
Common normals: conjunctivae normal and no scleral icterus    
Respiratory    
Common normals: normal respiratory effort and no use of accessory muscles    
Other:     
No wheezing. Coarse breath sounds all lung fields    
Cardio    
Common normals: no JVD, regular rate, S1 normal heart sound and S2 normal heart   
sound    
GI    
Common normals: Normal to inspection, nondistended, normoactive bowel sounds   
present, soft to palpation and no hepatosplenomegaly    
Extremity    
Common normals: normal to inspection and full ROM    
Neuro    
Common normals: oriented x3, moves all extremities and no focal motor deficits    
Psych    
Common normals: mental status grossly normal, thought process normal,   
cooperative, denies homicidal ideation and denies suicidal ideation    
    
Results    
Labs    
Labs:     
                                    Short CBC    
    
    
    
  06/13/23 06/14/23 06/14/23 Range/Units    
    
  17:45 04:55 07:10     
     
WBC  16.2 H  13.9 H  13.7 H  (4.0-11.0)  10^3/uL    
     
Hgb  15.5  14.7  13.7  (12.0-16.0)  g/dL    
     
Hct  45.0  43.0  38.3  (36.0-48.0)  %    
     
Plt Count  242  231  214  (150-450)  10^3/uL    
    
    
                                       BMP    
    
    
    
  06/13/23 06/14/23    
    
  17:45 04:55    
     
Sodium  139     
     
Potassium  3.6     
     
Chloride  102     
     
Carbon Dioxide  32.2 H     
     
BUN  5.0 L     
     
Creatinine  0.52 L     
     
Glucose  113 H  288 H    
     
Calcium  9.4     
    
    
                                 Liver Function    
    
    
    
  06/13/23 Range/Units    
    
  17:45     
     
Total Bilirubin  0.5  (0.2-1.0)  mg/dL    
     
AST  13 L  (15-37)  U/L    
     
ALT  22  (14-59)  U/L    
     
Alkaline Phosphatase  95  ()  U/L    
     
Albumin  3.3 L  (3.4-5.0)  g/dL    
    
    
    
    
Assessment and Plan    
Assessment and Plan    
(1) Acute exacerbation of COPD with asthma:     
      Assessment and Plan:     
Improved. No resp distress. On baseline O2. No PNA on CXR.     
Will d/c on oral prednisone and Azithromycin.     
(2) History of COPD:     
      Assessment and Plan:     
On Symbicort as outpatient. Outpatient f/u     
(3) History of diabetes mellitus:     
      Assessment and Plan:     
C/w Ozempic. Closely monitor FSBS while on prednisone     
(4) History of oxygen administration:     
(5) Migraine:     
      Assessment and Plan:     
On emgality.     
(6) Acute and chronic respiratory failure with hypoxia:     
      Assessment and Plan:     
Hypoxic with Pulse ox in low 80s at home.  Resolved now. Stable for discharge.     
    
    
Plan    
Discharge home on oral predisone, Z pack    
Patient educated on sigsn and symptoms that should prompt her to seek medial c  
are.

## 2023-06-14 NOTE — CM.NOTE
Rounds made with kelly Alaniz for discharge to home today.  Pt has home oxygen at this time and denies other home discharge needs.

## 2023-06-14 NOTE — P.PN_ITS
Progress Note: Subjective    
Subjective    
Interval history:     
pt is 57f with h/o copd, asthma,anti- trypsin ab,covid infx 2 mo ago,   
forbromyalgia, and dm ii as well as anxiety presenting with c/o dyspnea nd   
cough. reports couigh assoc with green sputum. sx presdnt 4 days. denies fever.   
uses 4L home O2 at baseline, prim at night as she reports she can typically go p  
to 5hrs w/o O2 during day. recently for past few days she has reqd O2 4L   
continuously. also using her nebs at home 2-3x/day for dyspnea ns wheezing. yest  
repoirts O 2 sat 82% on 4L after amb to bathroom therefore came to ed.  vitals   
with hr 110, 22 rr, 99.4, 89%4 L. albs noted for wbc 16.2. ekg sinus tachy ,   
105. no st or t changes. cxr nap. tx in ed with nebs, solumedrol, levaquin and   
toradol    
    
Exam    
Constitutional    
                               Vital Signs - 24 hr    
    
    
    
 06/13/23    
17:42 06/13/23    
17:52 06/13/23    
18:05    
     
Temperature 98.2 F      
     
Pulse Rate       
     
Pulse Rate [Monitor] 106 H      
     
Respiratory Rate 24      
     
Blood Pressure [Left Arm] 104/80 H      
     
Pulse Oximetry 93 L  94 L    
     
Oxygen Delivery Method Nasal Cannula Nasal Cannula Nasal Cannula    
     
Oxygen Delivery Flow Rate 4 4 4    
    
    
    
    
 06/13/23    
20:36 06/13/23    
20:47 06/14/23    
04:36    
     
Temperature  99.4 F     
     
Pulse Rate  110 H 87    
     
Pulse Rate [Monitor]       
     
Respiratory Rate  22 20    
     
Blood Pressure [Left Arm]  128/84 H     
     
Pulse Oximetry 90 L 89 L 94 L    
     
Oxygen Delivery Method Nasal Cannula Nasal Cannula Nasal Cannula    
     
Oxygen Delivery Flow Rate 4 4 4    
    
    
    
Common normals: no apparent distress, healthy appearing and well nourished    
General appearance: cooperative    
Nutritional appearance: obese    
Orientation/consciousness: Yes awake, Yes oriented to person, Yes oriented to   
place and Yes oriented to time    
HENMT    
Common normals: normocephalic and head/scalp atraumatic    
Head and scalp: normal to inspection    
Face and sinus: normal facial exam    
Eye    
Common normals: PERRL and EOMs intact bilaterally    
General eye: normal appearance of both eyes    
Conjunctiva: conjunctiva(e) normal    
Sclera: sclerae normal    
Chest    
Common normals: inspection of chest normal    
Respiratory    
Effort & inspection: tachypneic and decreased respiratory effort    
Auscultation: wheezes    
Cardio    
Common normals: regular rate and regular rhythm    
Peripheral pulses: pulses 2+ throughout    
GI    
Inspection: normal to inspection    
Auscultation: normoactive bowel sounds    
Palpation: soft    
Extremity    
Common normals: normal to inspection and full ROM    
Neuro    
Common normals: oriented x3 and CN's II-XII intact bilaterally    
Sensorium/orientation: awake and alert    
Speech: speech normal    
Psych    
Common normals: mental status grossly normal    
Appearance: grossly normal    
    
Progress Note: Objective    
Labs    
Labs:     
                                    Short CBC    
    
    
    
  06/13/23 Range/Units    
    
  17:45     
     
WBC  16.2 H  (4.0-11.0)  10^3/uL    
     
Hgb  15.5  (12.0-16.0)  g/dL    
     
Hct  45.0  (36.0-48.0)  %    
     
Plt Count  242  (150-450)  10^3/uL    
    
    
                                       BMP    
    
    
    
  06/13/23    
    
  17:45    
     
Sodium  139    
     
Potassium  3.6    
     
Chloride  102    
     
Carbon Dioxide  32.2 H    
     
BUN  5.0 L    
     
Creatinine  0.52 L    
     
Glucose  113 H    
     
Calcium  9.4    
    
    
                                 Liver Function    
    
    
    
  06/13/23 Range/Units    
    
  17:45     
     
Total Bilirubin  0.5  (0.2-1.0)  mg/dL    
     
AST  13 L  (15-37)  U/L    
     
ALT  22  (14-59)  U/L    
     
Alkaline Phosphatase  95  ()  U/L    
     
Albumin  3.3 L  (3.4-5.0)  g/dL    
    
    
    
ECG    
Prior ECG tracings: available for review    
    
Progress Note: A&P    
Assessment and Plan    
(1) Acute exacerbation of COPD with asthma:     
(2) History of COPD:     
(3) History of diabetes mellitus:     
(4) History of oxygen administration:     
(5) Rheumatoid arthritis:     
    
Plan    
copd exacerbation    
acute on chronic hypoxic resp failure    
sirs    
dm ii    
anxiety    
    
plan:    
admit m/s, inpt with tele    
cxr nap    
nebs     
resume respiratory home regimen    
iv solumedrol    
cont levaquin empirically    
prn antitussive    
chk sputum cx and urine antigens    
wean O2 as gracie    
prn tylenol    
ssi    
    
will need to reconcile home meds in am. will d/w day team    
    
full code     
lovenox for p/x    
    
Telemedicine Attestation    
Telemedicine Attestation    
I conducted this encounter from [MD] via secure live, face-to-face video   
conference with the patient, CHARGE TEST-CHARGES located at THE [facility]  
 with [rc].    
Prior to the interview, the risks and benefits of telemedicine were discussed   
with the patient and verbal consent was obtained.

## 2023-06-14 NOTE — P.PN_ITS
Exam    
Constitutional    
                               Vital Signs - 24 hr    
    
    
    
 06/13/23    
17:42 06/13/23    
17:52 06/13/23    
18:05    
     
Temperature 98.2 F      
     
Pulse Rate       
     
Pulse Rate [Monitor] 106 H      
     
Respiratory Rate 24      
     
Blood Pressure [Left Arm] 104/80 H      
     
Pulse Oximetry 93 L  94 L    
     
Oxygen Delivery Method Nasal Cannula Nasal Cannula Nasal Cannula    
     
Oxygen Delivery Flow Rate 4 4 4    
    
    
    
    
 06/13/23    
20:36 06/13/23    
20:47    
     
Temperature  99.4 F    
     
Pulse Rate  110 H    
     
Pulse Rate [Monitor]      
     
Respiratory Rate  22    
     
Blood Pressure [Left Arm]  128/84 H    
     
Pulse Oximetry 90 L 89 L    
     
Oxygen Delivery Method Nasal Cannula Nasal Cannula    
     
Oxygen Delivery Flow Rate 4 4    
    
    
    
    
Progress Note: Objective    
Labs    
Labs:     
                                    Short CBC    
    
    
    
  06/13/23 Range/Units    
    
  17:45     
     
WBC  16.2 H  (4.0-11.0)  10^3/uL    
     
Hgb  15.5  (12.0-16.0)  g/dL    
     
Hct  45.0  (36.0-48.0)  %    
     
Plt Count  242  (150-450)  10^3/uL    
    
    
                                       BMP    
    
    
    
  06/13/23    
    
  17:45    
     
Sodium  139    
     
Potassium  3.6    
     
Chloride  102    
     
Carbon Dioxide  32.2 H    
     
BUN  5.0 L    
     
Creatinine  0.52 L    
     
Glucose  113 H    
     
Calcium  9.4    
    
    
                                 Liver Function    
    
    
    
  06/13/23 Range/Units    
    
  17:45     
     
Total Bilirubin  0.5  (0.2-1.0)  mg/dL    
     
AST  13 L  (15-37)  U/L    
     
ALT  22  (14-59)  U/L    
     
Alkaline Phosphatase  95  ()  U/L    
     
Albumin  3.3 L  (3.4-5.0)  g/dL    
    
    
    
    
Telemedicine Attestation    
Telemedicine Attestation    
I conducted this encounter from [] via secure live, face-to-face video   
conference with the patient, CHARGE TEST-CHARGES located at THE [facility]  
 with [].    
Prior to the interview, the risks and benefits of telemedicine were discussed   
with the patient and verbal consent was obtained.

## 2023-06-14 NOTE — W.PM.TELEPN
Exam
Constitutional
Vital Signs - 24 hr

 06/13/23
17:42 06/13/23
17:52 06/13/23
18:05
Temperature 98.2 F  
Pulse Rate   
Pulse Rate [Monitor] 106 H  
Respiratory Rate 24  
Blood Pressure [Left Arm] 104/80 H  
Pulse Oximetry 93 L  94 L
Oxygen Delivery Method Nasal Cannula Nasal Cannula Nasal Cannula
Oxygen Delivery Flow Rate 4 4 4

 06/13/23
20:36 06/13/23
20:47
Temperature  99.4 F
Pulse Rate  110 H
Pulse Rate [Monitor]  
Respiratory Rate  22
Blood Pressure [Left Arm]  128/84 H
Pulse Oximetry 90 L 89 L
Oxygen Delivery Method Nasal Cannula Nasal Cannula
Oxygen Delivery Flow Rate 4 4



Progress Note: Objective
Labs
Labs: 
Short CBC

  06/13/23 Range/Units
  17:45 
WBC  16.2 H  (4.0-11.0)  10^3/uL
Hgb  15.5  (12.0-16.0)  g/dL
Hct  45.0  (36.0-48.0)  %
Plt Count  242  (150-450)  10^3/uL

BMP

  06/13/23
  17:45
Sodium  139
Potassium  3.6
Chloride  102
Carbon Dioxide  32.2 H
BUN  5.0 L
Creatinine  0.52 L
Glucose  113 H
Calcium  9.4

Liver Function

  06/13/23 Range/Units
  17:45 
Total Bilirubin  0.5  (0.2-1.0)  mg/dL
AST  13 L  (15-37)  U/L
ALT  22  (14-59)  U/L
Alkaline Phosphatase  95  ()  U/L
Albumin  3.3 L  (3.4-5.0)  g/dL



Telemedicine Attestation
Telemedicine Attestation
I conducted this encounter from [] via secure live, face-to-face video conference with the patient, CHARGE TEST-CHARGES located at THE [facility] with [].
Prior to the interview, the risks and benefits of telemedicine were discussed with the patient and verbal consent was obtained.

## 2023-06-14 NOTE — W.PM.TELEPN
Progress Note: Subjective
Subjective
Interval history: 
pt is 57f with h/o copd, asthma,anti- trypsin ab,covid infx 2 mo ago, forbromyalgia, and dm ii as well as anxiety presenting with c/o dyspnea nd cough. reports couigh assoc with green sputum. sx presdnt 4 days. denies fever. uses 4L home O2 at 
baseline, prim at night as she reports she can typically go p to 5hrs w/o O2 during day. recently for past few days she has reqd O2 4L continuously. also using her nebs at home 2-3x/day for dyspnea ns wheezing. yest repoirts O 2 sat 82% on 4L after 
amb to bathroom therefore came to ed.  vitals with hr 110, 22 rr, 99.4, 89%4 L. albs noted for wbc 16.2. ekg sinus tachy , 105. no st or t changes. cxr nap. tx in ed with nebs, solumedrol, levaquin and toradol

Exam
Constitutional
Vital Signs - 24 hr

 06/13/23
17:42 06/13/23
17:52 06/13/23
18:05
Temperature 98.2 F  
Pulse Rate   
Pulse Rate [Monitor] 106 H  
Respiratory Rate 24  
Blood Pressure [Left Arm] 104/80 H  
Pulse Oximetry 93 L  94 L
Oxygen Delivery Method Nasal Cannula Nasal Cannula Nasal Cannula
Oxygen Delivery Flow Rate 4 4 4

 06/13/23
20:36 06/13/23
20:47 06/14/23
04:36
Temperature  99.4 F 
Pulse Rate  110 H 87
Pulse Rate [Monitor]   
Respiratory Rate  22 20
Blood Pressure [Left Arm]  128/84 H 
Pulse Oximetry 90 L 89 L 94 L
Oxygen Delivery Method Nasal Cannula Nasal Cannula Nasal Cannula
Oxygen Delivery Flow Rate 4 4 4


Common normals: no apparent distress, healthy appearing and well nourished
General appearance: cooperative
Nutritional appearance: obese
Orientation/consciousness: Yes awake, Yes oriented to person, Yes oriented to place and Yes oriented to time
HENMT
Common normals: normocephalic and head/scalp atraumatic
Head and scalp: normal to inspection
Face and sinus: normal facial exam
Eye
Common normals: PERRL and EOMs intact bilaterally
General eye: normal appearance of both eyes
Conjunctiva: conjunctiva(e) normal
Sclera: sclerae normal
Chest
Common normals: inspection of chest normal
Respiratory
Effort & inspection: tachypneic and decreased respiratory effort
Auscultation: wheezes
Cardio
Common normals: regular rate and regular rhythm
Peripheral pulses: pulses 2+ throughout
GI
Inspection: normal to inspection
Auscultation: normoactive bowel sounds
Palpation: soft
Extremity
Common normals: normal to inspection and full ROM
Neuro
Common normals: oriented x3 and CN's II-XII intact bilaterally
Sensorium/orientation: awake and alert
Speech: speech normal
Psych
Common normals: mental status grossly normal
Appearance: grossly normal

Progress Note: Objective
Labs
Labs: 
Short CBC

  06/13/23 Range/Units
  17:45 
WBC  16.2 H  (4.0-11.0)  10^3/uL
Hgb  15.5  (12.0-16.0)  g/dL
Hct  45.0  (36.0-48.0)  %
Plt Count  242  (150-450)  10^3/uL

BMP

  06/13/23
  17:45
Sodium  139
Potassium  3.6
Chloride  102
Carbon Dioxide  32.2 H
BUN  5.0 L
Creatinine  0.52 L
Glucose  113 H
Calcium  9.4

Liver Function

  06/13/23 Range/Units
  17:45 
Total Bilirubin  0.5  (0.2-1.0)  mg/dL
AST  13 L  (15-37)  U/L
ALT  22  (14-59)  U/L
Alkaline Phosphatase  95  ()  U/L
Albumin  3.3 L  (3.4-5.0)  g/dL


ECG
Prior ECG tracings: available for review

Progress Note: A&P
Assessment and Plan
(1) Acute exacerbation of COPD with asthma: 
(2) History of COPD: 
(3) History of diabetes mellitus: 
(4) History of oxygen administration: 
(5) Rheumatoid arthritis: 

Plan
copd exacerbation
acute on chronic hypoxic resp failure
sirs
dm ii
anxiety

plan:
admit m/s, inpt with tele
cxr nap
nebs 
resume respiratory home regimen
iv solumedrol
cont levaquin empirically
prn antitussive
chk sputum cx and urine antigens
wean O2 as gracie
prn tylenol
ssi

will need to reconcile home meds in am. will d/w day team

full code 
lovenox for p/x

Telemedicine Attestation
Telemedicine Attestation
I conducted this encounter from [MD] via secure live, face-to-face video conference with the patient, CHAR[facility]GES located at [facility] with [rc].
Prior to the interview, the risks and benefits of telemedicine were discussed with the patient and verbal consent was obtained.

## 2023-06-15 NOTE — CM.DCFOLLOWU
Person spoke with: Bev

How are you feeling? Feeling much better

How is your pain? No pain

Did you understand your discharge instructions? yes

Do you have any questions about your discharge instructions? no

Were you given any prescriptions at discharge? yes

Were you able to get your prescriptions filled? yes

Do you understand how to take your medications as ordered? yes

Do you have any questions about your follow up appointment and do you plan to keep your follow up appointment? No it is already scheduled

Is there anything else that you would like to discuss? no

Questions/Comments/Concerns/Other:

## 2023-09-08 ENCOUNTER — HOSPITAL ENCOUNTER (EMERGENCY)
Age: 58
Discharge: HOME | End: 2023-09-08
Payer: COMMERCIAL

## 2023-09-08 VITALS
RESPIRATION RATE: 18 BRPM | TEMPERATURE: 97.7 F | OXYGEN SATURATION: 93 % | HEART RATE: 84 BPM | DIASTOLIC BLOOD PRESSURE: 95 MMHG | SYSTOLIC BLOOD PRESSURE: 124 MMHG

## 2023-09-08 VITALS — HEART RATE: 93 BPM | DIASTOLIC BLOOD PRESSURE: 109 MMHG | SYSTOLIC BLOOD PRESSURE: 147 MMHG

## 2023-09-08 VITALS — BODY MASS INDEX: 29.5 KG/M2

## 2023-09-08 DIAGNOSIS — R55: Primary | ICD-10-CM

## 2023-09-08 DIAGNOSIS — Z79.899: ICD-10-CM
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
## 2023-09-08 DIAGNOSIS — F17.210: ICD-10-CM

## 2023-09-08 DIAGNOSIS — T14.8XXA: ICD-10-CM

## 2023-09-08 LAB
ADD MANUAL DIFF: NO
ANION GAP: 9.7
BLOOD UREA NITROGEN: 6 MG/DL (ref 7–18)
CALCIUM: 8.8 MG/DL (ref 8.5–10.1)
CARBON DIOXIDE: 31.9 MMOL/L (ref 21–32)
CHLORIDE: 105 MMOL/L (ref 98–107)
CO2 BLD-SCNC: 31.9 MMOL/L (ref 21–32)
ESTIMATED GFR (AFRICAN AMERICA: >60 (ref 60–?)
ESTIMATED GFR (NON-AFRICAN AME: >60 (ref 60–?)
GLUCOSE BLD-MCNC: 117 MG/DL (ref 74–106)
HCT VFR BLD CALC: 42.6 % (ref 36–48)
HEMATOCRIT: 42.6 % (ref 36–48)
HEMOGLOBIN: 14.7 G/DL (ref 12–16)
IMMATURE GRANULOCYTES ABS AUTO: 0.04 10^3/UL (ref 0–0.03)
IMMATURE GRANULOCYTES PCT AUTO: 0.4 % (ref 0–0.5)
LYMPHOCYTES  ABSOLUTE AUTO: 3.9 10^3/UL (ref 1.2–3.8)
MCV RBC: 90.3 FL (ref 81–99)
MEAN CORPUSCULAR HEMOGLOBIN: 31.1 PG (ref 26.7–34)
MEAN CORPUSCULAR HGB CONC: 34.5 G/DL (ref 29.9–35.2)
MEAN CORPUSCULAR VOLUME: 90.3 FL (ref 81–99)
PLATELET # BLD: 201 10^3/UL (ref 150–450)
PLATELET COUNT: 201 10^3/UL (ref 150–450)
POTASSIUM SERPLBLD-SCNC: 3.6 MMOL/L (ref 3.5–5.1)
POTASSIUM: 3.6 MMOL/L (ref 3.5–5.1)
RED BLOOD COUNT: 4.72 10^6/UL (ref 4.2–5.4)
SODIUM BLD-SCNC: 143 MMOL/L (ref 136–145)
SODIUM: 143 MMOL/L (ref 136–145)
WBC # BLD: 10.1 10^3/UL (ref 4–11)
WHITE BLOOD COUNT: 10.1 10^3/UL (ref 4–11)

## 2023-09-08 PROCEDURE — 93005 ELECTROCARDIOGRAM TRACING: CPT

## 2023-09-08 PROCEDURE — 84484 ASSAY OF TROPONIN QUANT: CPT

## 2023-09-08 PROCEDURE — 85025 COMPLETE CBC W/AUTO DIFF WBC: CPT

## 2023-09-08 PROCEDURE — 99284 EMERGENCY DEPT VISIT MOD MDM: CPT

## 2023-09-08 PROCEDURE — 36415 COLL VENOUS BLD VENIPUNCTURE: CPT

## 2023-09-08 PROCEDURE — 80048 BASIC METABOLIC PNL TOTAL CA: CPT

## 2023-09-08 NOTE — ED.GENADUL1
HPI - General Adult
General
Chief complaint: Syncope
Stated complaint: fall chest pain
Time Seen by Provider: 09/08/23 19:18
Source: patient
Mode of arrival: walk-in
Limitations: no limitations
History of Present Illness
HPI narrative: 
the patient states that she had an episode in which she experienced some chest pressure and then passed out, falling down some stairs at home. She denied injury to the head, neck or back although on examination she does have some soft tissue 
tenderness. She complains of soft tissue tenderness to the posterior right thigh and says that some of her joints are  achy . She is able to get up and ambulate on her own at home.  she took an NSAID for pain at home around 3 PM.  No seizure 
activity or vomiting since the fall.  Blurred vision.
She has similar episode about a week ago and saw her primary care physician two days ago in the office for follow-up. She told me that she supposed to get outpatient lab testing and supposed get an outpatient Holter monitor placed but has yet to 
receive a phone call from her physician to schedule those things.
Related Data
Home Medications

 Medication  Instructions  Recorded  Confirmed
albuterol sulfate 90 mcg/actuation 2 inh inhalation Q4H PRN shortness 06/13/23 06/13/23
aerosol inhaler of breath or wheezing  
budesonide 1 mg/2 mL suspension 1 mg inhalation Q12H PRN shortness 06/13/23 06/13/23
for nebulization of breath  
budesonide-formoterol  1 inh inhalation Q12H 06/13/23 06/13/23
mcg-4.5 mcg/actuation aerosol   
inhaler (Symbicort)   
butalbital-acetaminophen-caffeine 1 tab PO Q6H PRN pain 06/13/23 06/13/23
50 mg-325 mg-40 mg tablet   
famotidine 40 mg tablet 40 mg PO BEDTIME 06/13/23 06/13/23
galcanezumab-gnlm 120 mg/mL 120 mg subcut .month 06/13/23 06/13/23
subcutaneous pen injector   
(Emgality Pen)   
lorazepam 0.5 mg tablet 0.5 mg PO Q12H PRN anxiety 06/13/23 06/13/23
semaglutide 2 mg/dose (8 mg/3 mL) 2 mg subcut .friday 06/13/23 06/13/23
subcutaneous pen injector (Ozempic)   

Previous Rx's

 Medication  Instructions  Recorded
azithromycin 250 mg tablet See Rx Instructions PO .COMPLEX #6 06/14/23
(Zithromax Z-Jose) tabs 
prednisone 20 mg tablet 20 mg PO BID 5 days #10 tabs 06/14/23
promethazine-DM 6.25 mg-15 mg/5 mL 5 ml PO Q6H PRN cough #118 mL 06/14/23
oral syrup  
methocarbamol 750 mg tablet 750 mg PO Q6H PRN pain #30 tabs 09/08/23
nabumetone 750 mg tablet 750 mg PO BID PRN pain #20 tabs 09/08/23


Allergies

Allergy/AdvReac Type Severity Reaction Status Date / Time
Penicillins Allergy Severe rash Verified 06/13/23 17:50
Sulfa (Sulfonamide Allergy Severe Rash Verified 06/13/23 17:50
Antibiotics)     
topiramate [From Topamax] AdvReac Severe thoughts Verified 06/13/23 17:50
   of dieing  
reglan AdvReac Severe makes me Uncoded 06/13/23 17:50
   mean  



PFSH
PFSH
Medical History (Updated 09/08/23 @ 20:52 by Seth Ramirez)



Surgical History (Updated 06/13/23 @ 17:58 by Juan Garcia)



Social History
Smoking status:  Current every day smoker 
Highest level of school completed/degree received:  high school graduate 



Exam
Narrative
Exam Narrative: 
Nurses notes and vital signs reviewed and patient is not hypoxic.
afebrile
General:  Well-appearing and in no apparent distress.  
Skin:  Warm, dry, no pallor noted.  
Head:  Normocephalic, atraumatic.
Neck:  Supple, no midline posterior bony cervical tenderness.
Eye:  Pupils are equal, round and EOMI. No scleral icterus.
Ears, Nose, Mouth, and Throat:  sign of facial or oral injury.
Cardiovascular:  Regular Rate and Rhythm without murmur, gallop or rub.
Respiratory:  No accessory muscle use or respiratory distress.
Lungs are clear to auscultation, no wheezing, rales or rhonchi
Chest Wall:  no tenderness
Back: No midline thoracic or lumbar vertebral tenderness. No CVA tenderness
Musculoskeletal:  normal ROM, no calf or popliteal tenderness, no lower extremity edema/swelling.  soft tissue tenderness noted to the posterior right thigh. She has some pain with active passive movement of the hips but does not have any pain with 
compression, distraction, internal or external rotation or palpation of the joint. She is able to bear weight normally. No sign of upper extremity or lower extremity  fracture
GI:  Abdomen is soft, non-distended.  Normal bowel sounds.
No masses appreciated.
No tenderness to palpation. No rebound, guarding, or rigidity noted.
Neurological:  A&O x4.  No cranial nerve dysfunction observed.  No truncal ataxia. Moves all extremities. Sensation intact.  GCS 15
Psychiatric:  Cooperative and interactive. Normal mood and affect.
Constitutional
Vital Signs, click to edit/add: 

Last Vital Signs

Temp  97.7 F   09/08/23 18:18
Pulse  86   09/08/23 19:46
Resp  18   09/08/23 18:18
BP  147/109 H  09/08/23 19:46
Pulse Ox  93 L  09/08/23 18:18
O2 Del Method  Room Air  09/08/23 18:18




Course
Vital Signs
Vital signs: 

Vital Signs

Temperature  97.7 F   09/08/23 18:18
Pulse Rate  84   09/08/23 18:18
Respiratory Rate  18   09/08/23 18:18
Blood Pressure  124/95 H  09/08/23 18:18
Pulse Oximetry  93 L  09/08/23 18:18
Oxygen Delivery Method  Room Air  09/08/23 18:18



Temperature  97.7 F   09/08/23 18:18
Pulse Rate  86   09/08/23 19:46
Respiratory Rate  18   09/08/23 18:18
Blood Pressure  147/109 H  09/08/23 19:46
Pulse Oximetry  93 L  09/08/23 18:18
Oxygen Delivery Method  Room Air  09/08/23 18:18




Medical Decision Making
[facility] Narrative
Medical decision making narrative: 
Patient was placed on cardiac monitor and EKG obtained. Blood drawn and sent for evaluation.  She was given Tylenol and Robaxin for her pain
Her injuries are all soft tissue. Blood tests were unremarkable including negative troponin. Patient is given reassurance and discharged home with recommendation to take Tylenol as needed for pain. I prescribed additional Robaxin for her.  I 
encouraged her to see her primary care physician for follow-up which she is supposed including a Holter monitor placement.

Lab Data
Lab results reviewed: Yes I reviewed the patient's lab results
Labs: 

Lab Results

  09/08/23 Range/Units
  19:44 
WBC  10.1  (4.0-11.0)  10^3/uL
RBC  4.72  (4.20-5.40)  10^6/uL
Hgb  14.7  (12.0-16.0)  g/dL
Hct  42.6  (36.0-48.0)  %
MCV  90.3  (81.0-99.0)  fL
MCH  31.1  (26.7-34.0)  pg
MCHC  34.5  (29.9-35.2)  g/dL
RDW  12.4  (11.0-15.0)  %
Plt Count  201  (150-450)  10^3/uL
MPV  9.4 L  (9.5-13.5)  fL
Neut % (Auto)  51.5  (43.0-75.0)  %
Lymph % (Auto)  38.9  (20.5-60.0)  %
Mono % (Auto)  6.8  (1.7-12.0)  %
Eos % (Auto)  1.5  (0.9-7.0)  %
Baso % (Auto)  0.9  (0.2-2.0)  %
Neut # (Auto)  5.2  (1.4-6.5)  10^3/uL
Lymph # (Auto)  3.9 H  (1.2-3.8)  10^3/uL
Mono # (Auto)  0.7  (0.3-0.8)  10^3/uL
Eos # (Auto)  0.2  (0.0-0.7)  10^3/uL
Baso # (Auto)  0.1  (0.0-0.1)  10^3/uL
Abs Immat Gran (auto)  0.04 H  (0.00-0.03)  10^3/uL
Imm/Tot Granulo (auto)  0.4  (0.0-0.5)  %
Sodium  143  (136-145)  mmol/L
Potassium  3.6  (3.5-5.1)  mmol/L
Chloride  105  ()  mmol/L
Carbon Dioxide  31.9  (21.0-32.0)  mmol/L
Anion Gap  9.7  
BUN  6.0 L  (7.0-18.0)  mg/dL
Creatinine  0.60  (0.55-1.02)  mg/dL
Est GFR ( Amer)  >60  (>=60)  
Est GFR (Non-Af Amer)  >60  (>=60)  
BUN/Creatinine Ratio  10.0  
Glucose  117 H  ()  mg/dL
Calcium  8.8  (8.5-10.1)  mg/dL
Troponin I High Sens  <4.0 L  (4.0-51.3)  pg/mL



ECG Data
Interpretation: 
EKG interpretation:  Emergency Department physician interpretation.  Normal sinus rhythm at 84bpm. Low QRS in chest leads. Normal axis, normal intervals and no ST segment elevation or depression.

Discharge Plan
Discharge
Chief Complaint: Syncope

Clinical Impression:
 Syncope, Contusion of multiple sites


Patient Disposition: Home, Self-Care

Time of Disposition Decision: 20:49

Prescriptions / Home Meds:
New
  nabumetone 750 mg tablet 
   750 mg PO BID PRN (Reason: pain) Qty: 20 0RF
  methocarbamol 750 mg tablet 
   750 mg PO Q6H PRN (Reason: pain) Qty: 30 0RF

No Action
  budesonide 1 mg/2 mL suspension for nebulization 
   1 mg inhalation Q12H PRN (Reason: shortness of breath) 
  budesonide-formoterol [Symbicort] 160-4.5 mcg/actuation HFA aerosol inhaler 
   1 inh INHALATION Q12H 
  Ozempic 2 mg/dose (8 mg/3 mL) pen injector 
   2 mg SUBCUT .friday 
  albuterol sulfate 90 mcg/actuation HFA aerosol inhaler 
   2 inh INHALATION Q4H PRN (Reason: shortness of breath or wheezing) 
  butalbital-acetaminophen-caff -40 mg tablet 
   1 tab PO Q6H PRN (Reason: pain) 
  famotidine 40 mg tablet 
   40 mg PO BEDTIME 
  Emgality Pen 120 mg/mL pen injector 
   120 mg SUBCUT .month 
  lorazepam 0.5 mg tablet 
   0.5 mg PO Q12H PRN (Reason: anxiety) 
  prednisone 20 mg tablet 
   20 mg PO BID 5 Days Qty: 10 0RF
  azithromycin [Zithromax Z-Jose] 250 mg tablet 
   See Rx Instructions  .ROUTE .COMPLEX Qty: 6 0RF
   Rx Instructions:
   For 250 mg dose pack: take 500 mg today (day 1), then 250 mg for 4 days (days 2-5)
  promethazine-DM 6.25-15 mg/5 mL syrup 
   5 ml PO Q6H PRN (Reason: cough) Qty: 118 0RF

Instructions:  Syncope (ED), Contusion in Adults (ED)

Stand Alone Forms:  Portal Instructions

Referrals:
NELIA MALONE [Primary Care Provider] - 1 week

Discharge Date/Time: 09/08/23 21:11

## 2023-09-08 NOTE — ED_ITS
HPI - General Adult    
General    
Chief complaint: Syncope    
Stated complaint: fall chest pain    
Time Seen by Provider: 09/08/23 19:18    
Source: patient    
Mode of arrival: walk-in    
Limitations: no limitations    
History of Present Illness    
HPI narrative:     
the patient states that she had an episode in which she experienced some chest   
pressure and then passed out, falling down some stairs at home. She denied   
injury to the head, neck or back although on examination she does have some soft  
tissue tenderness. She complains of soft tissue tenderness to the posterior   
right thigh and says that some of her joints are  achy . She is able to get up   
and ambulate on her own at home.  she took an NSAID for pain at home around 3   
PM.  No seizure activity or vomiting since the fall.  Blurred vision.    
She has similar episode about a week ago and saw her primary care physician two   
days ago in the office for follow-up. She told me that she supposed to get   
outpatient lab testing and supposed get an outpatient Holter monitor placed but   
has yet to receive a phone call from her physician to schedule those things.    
Related Data    
                                Home Medications    
    
    
    
 Medication  Instructions  Recorded  Confirmed    
     
albuterol sulfate 90 mcg/actuation 2 inh inhalation Q4H PRN shortness 06/13/23 06/13/23    
    
aerosol inhaler of breath or wheezing      
     
budesonide 1 mg/2 mL suspension 1 mg inhalation Q12H PRN shortness 06/13/23 06/13/23    
    
for nebulization of breath      
     
budesonide-formoterol  1 inh inhalation Q12H 06/13/23 06/13/23    
    
mcg-4.5 mcg/actuation aerosol       
    
inhaler (Symbicort)       
     
butalbital-acetaminophen-caffeine 1 tab PO Q6H PRN pain 06/13/23 06/13/23    
    
50 mg-325 mg-40 mg tablet       
     
famotidine 40 mg tablet 40 mg PO BEDTIME 06/13/23 06/13/23    
     
galcanezumab-gnlm 120 mg/mL 120 mg subcut .month 06/13/23 06/13/23    
    
subcutaneous pen injector       
    
(Emgality Pen)       
     
lorazepam 0.5 mg tablet 0.5 mg PO Q12H PRN anxiety 06/13/23 06/13/23    
     
semaglutide 2 mg/dose (8 mg/3 mL) 2 mg subcut .friday 06/13/23 06/13/23    
    
subcutaneous pen injector (Ozempic)       
    
    
                                  Previous Rx's    
    
    
    
 Medication  Instructions  Recorded    
     
azithromycin 250 mg tablet See Rx Instructions PO .COMPLEX #6 06/14/23    
    
(Zithromax Z-Jose) tabs     
     
prednisone 20 mg tablet 20 mg PO BID 5 days #10 tabs 06/14/23    
     
promethazine-DM 6.25 mg-15 mg/5 mL 5 ml PO Q6H PRN cough #118 mL 06/14/23    
    
oral syrup      
     
methocarbamol 750 mg tablet 750 mg PO Q6H PRN pain #30 tabs 09/08/23    
     
nabumetone 750 mg tablet 750 mg PO BID PRN pain #20 tabs 09/08/23    
    
    
    
                                    Allergies    
    
    
    
Allergy/AdvReac Type Severity Reaction Status Date / Time    
     
Penicillins Allergy Severe rash Verified 06/13/23 17:50    
     
Sulfa (Sulfonamide Allergy Severe Rash Verified 06/13/23 17:50    
    
Antibiotics)         
     
topiramate [From Topamax] AdvReac Severe thoughts Verified 06/13/23 17:50    
    
   of dieing      
     
reglan AdvReac Severe makes me Uncoded 06/13/23 17:50    
    
   mean      
    
    
    
    
PFSH    
PFSH    
Medical History (Updated 09/08/23 @ 20:52 by Seth Ramirez)    
    
    
    
Surgical History (Updated 06/13/23 @ 17:58 by Juan Garcia)    
    
    
    
Social History    
Smoking status:  Current every day smoker     
Highest level of school completed/degree received:  high school graduate     
    
    
    
Exam    
Narrative    
Exam Narrative:     
Nurses notes and vital signs reviewed and patient is not hypoxic.    
afebrile    
General:  Well-appearing and in no apparent distress.      
Skin:  Warm, dry, no pallor noted.      
Head:  Normocephalic, atraumatic.    
Neck:  Supple, no midline posterior bony cervical tenderness.    
Eye:  Pupils are equal, round and EOMI. No scleral icterus.    
Ears, Nose, Mouth, and Throat:  sign of facial or oral injury.    
Cardiovascular:  Regular Rate and Rhythm without murmur, gallop or rub.    
Respiratory:  No accessory muscle use or respiratory distress.    
Lungs are clear to auscultation, no wheezing, rales or rhonchi    
Chest Wall:  no tenderness    
Back: No midline thoracic or lumbar vertebral tenderness. No CVA tenderness    
Musculoskeletal:  normal ROM, no calf or popliteal tenderness, no lower   
extremity edema/swelling.  soft tissue tenderness noted to the posterior right   
thigh. She has some pain with active passive movement of the hips but does not   
have any pain with compression, distraction, internal or external rotation or   
palpation of the joint. She is able to bear weight normally. No sign of upper   
extremity or lower extremity  fracture    
GI:  Abdomen is soft, non-distended.  Normal bowel sounds.    
No masses appreciated.    
No tenderness to palpation. No rebound, guarding, or rigidity noted.    
Neurological:  A&O x4.  No cranial nerve dysfunction observed.  No truncal   
ataxia. Moves all extremities. Sensation intact.  GCS 15    
Psychiatric:  Cooperative and interactive. Normal mood and affect.    
Constitutional    
Vital Signs, click to edit/add:     
    
                                Last Vital Signs    
    
    
    
Temp  97.7 F   09/08/23 18:18    
     
Pulse  86   09/08/23 19:46    
     
Resp  18   09/08/23 18:18    
     
BP  147/109 H  09/08/23 19:46    
     
Pulse Ox  93 L  09/08/23 18:18    
     
O2 Del Method  Room Air  09/08/23 18:18    
    
    
    
    
    
Course    
Vital Signs    
Vital signs:     
    
                                   Vital Signs    
    
    
    
Temperature  97.7 F   09/08/23 18:18    
     
Pulse Rate  84   09/08/23 18:18    
     
Respiratory Rate  18   09/08/23 18:18    
     
Blood Pressure  124/95 H  09/08/23 18:18    
     
Pulse Oximetry  93 L  09/08/23 18:18    
     
Oxygen Delivery Method  Room Air  09/08/23 18:18    
    
    
                                            
    
    
    
Temperature  97.7 F   09/08/23 18:18    
     
Pulse Rate  86   09/08/23 19:46    
     
Respiratory Rate  18   09/08/23 18:18    
     
Blood Pressure  147/109 H  09/08/23 19:46    
     
Pulse Oximetry  93 L  09/08/23 18:18    
     
Oxygen Delivery Method  Room Air  09/08/23 18:18    
    
    
    
    
    
Medical Decision Making    
[facility] Narrative    
Medical decision making narrative:     
Patient was placed on cardiac monitor and EKG obtained. Blood drawn and sent for  
evaluation.  She was given Tylenol and Robaxin for her pain    
Her injuries are all soft tissue. Blood tests were unremarkable including   
negative troponin. Patient is given reassurance and discharged home with   
recommendation to take Tylenol as needed for pain. I prescribed additional   
Robaxin for her.  I encouraged her to see her primary care physician for follow-  
up which she is supposed including a Holter monitor placement.    
    
Lab Data    
Lab results reviewed: Yes I reviewed the patient's lab results    
Labs:     
    
                                   Lab Results    
    
    
    
  09/08/23 Range/Units    
    
  19:44     
     
WBC  10.1  (4.0-11.0)  10^3/uL    
     
RBC  4.72  (4.20-5.40)  10^6/uL    
     
Hgb  14.7  (12.0-16.0)  g/dL    
     
Hct  42.6  (36.0-48.0)  %    
     
MCV  90.3  (81.0-99.0)  fL    
     
MCH  31.1  (26.7-34.0)  pg    
     
MCHC  34.5  (29.9-35.2)  g/dL    
     
RDW  12.4  (11.0-15.0)  %    
     
Plt Count  201  (150-450)  10^3/uL    
     
MPV  9.4 L  (9.5-13.5)  fL    
     
Neut % (Auto)  51.5  (43.0-75.0)  %    
     
Lymph % (Auto)  38.9  (20.5-60.0)  %    
     
Mono % (Auto)  6.8  (1.7-12.0)  %    
     
Eos % (Auto)  1.5  (0.9-7.0)  %    
     
Baso % (Auto)  0.9  (0.2-2.0)  %    
     
Neut # (Auto)  5.2  (1.4-6.5)  10^3/uL    
     
Lymph # (Auto)  3.9 H  (1.2-3.8)  10^3/uL    
     
Mono # (Auto)  0.7  (0.3-0.8)  10^3/uL    
     
Eos # (Auto)  0.2  (0.0-0.7)  10^3/uL    
     
Baso # (Auto)  0.1  (0.0-0.1)  10^3/uL    
     
Abs Immat Gran (auto)  0.04 H  (0.00-0.03)  10^3/uL    
     
Imm/Tot Granulo (auto)  0.4  (0.0-0.5)  %    
     
Sodium  143  (136-145)  mmol/L    
     
Potassium  3.6  (3.5-5.1)  mmol/L    
     
Chloride  105  ()  mmol/L    
     
Carbon Dioxide  31.9  (21.0-32.0)  mmol/L    
     
Anion Gap  9.7      
     
BUN  6.0 L  (7.0-18.0)  mg/dL    
     
Creatinine  0.60  (0.55-1.02)  mg/dL    
     
Est GFR ( Amer)  >60  (>=60)      
     
Est GFR (Non-Af Amer)  >60  (>=60)      
     
BUN/Creatinine Ratio  10.0      
     
Glucose  117 H  ()  mg/dL    
     
Calcium  8.8  (8.5-10.1)  mg/dL    
     
Troponin I High Sens  <4.0 L  (4.0-51.3)  pg/mL    
    
    
    
    
ECG Data    
Interpretation:     
EKG interpretation:  Emergency Department physician interpretation.  Normal   
sinus rhythm at 84bpm. Low QRS in chest leads. Normal axis, normal intervals and  
no ST segment elevation or depression.    
    
Discharge Plan    
Discharge    
Chief Complaint: Syncope    
    
Clinical Impression:    
 Syncope, Contusion of multiple sites    
    
    
Patient Disposition: Home, Self-Care    
    
Time of Disposition Decision: 20:49    
    
Prescriptions / Home Meds:    
New    
  nabumetone 750 mg tablet     
   750 mg PO BID PRN (Reason: pain) Qty: 20 0RF    
  methocarbamol 750 mg tablet     
   750 mg PO Q6H PRN (Reason: pain) Qty: 30 0RF    
    
No Action    
  budesonide 1 mg/2 mL suspension for nebulization     
   1 mg inhalation Q12H PRN (Reason: shortness of breath)     
  budesonide-formoterol [Symbicort] 160-4.5 mcg/actuation HFA aerosol inhaler     
   1 inh INHALATION Q12H     
  Ozempic 2 mg/dose (8 mg/3 mL) pen injector     
   2 mg SUBCUT .friday     
  albuterol sulfate 90 mcg/actuation HFA aerosol inhaler     
   2 inh INHALATION Q4H PRN (Reason: shortness of breath or wheezing)     
  butalbital-acetaminophen-caff -40 mg tablet     
   1 tab PO Q6H PRN (Reason: pain)     
  famotidine 40 mg tablet     
   40 mg PO BEDTIME     
  Emgality Pen 120 mg/mL pen injector     
   120 mg SUBCUT .month     
  lorazepam 0.5 mg tablet     
   0.5 mg PO Q12H PRN (Reason: anxiety)     
  prednisone 20 mg tablet     
   20 mg PO BID 5 Days Qty: 10 0RF    
  azithromycin [Zithromax Z-Jose] 250 mg tablet     
   See Rx Instructions  .ROUTE .COMPLEX Qty: 6 0RF    
   Rx Instructions:    
   For 250 mg dose pack: take 500 mg today (day 1), then 250 mg for 4 days (days  
2-5)    
  promethazine-DM 6.25-15 mg/5 mL syrup     
   5 ml PO Q6H PRN (Reason: cough) Qty: 118 0RF    
    
Instructions:  Syncope (ED), Contusion in Adults (ED)    
    
Stand Alone Forms:  Portal Instructions    
    
Referrals:    
NELIA MALONE [Primary Care Provider] - 1 week    
    
Discharge Date/Time: 09/08/23 21:11

## 2023-09-08 NOTE — ECG_ITS
[facility] 
                                        
                                       Test Date:    2023 
Pat Name:     JAN GAMING          Department:    
Patient ID:   [id number]               Room:         - 
Gender:       Female                   Technician:    
:          [id number]               Requested By: [id number] 
Order Number: [id number]              Reading MD:   SHARON PEGUERO 
                                 Measurements 
Intervals                              Axis           
Rate:         84                       P:            60 
NV:           156                      QRS:          71 
QRSD:         86                       T:            27 
QT:           366                                     
QTc:          407                                     
                           Interpretive Statements 
1100 Sinus rhythm 
8102 Low QRS voltage in chest leads 
9120 **  atypical ECG  ** 
Compared to ECG 2023 19:02:14 
Low QRS voltage now present 
Electronically Signed On 2023 19:08:24 EDT by SHARON PEGUERO

## 2023-12-26 ENCOUNTER — HOSPITAL ENCOUNTER (EMERGENCY)
Age: 58
Discharge: HOME | End: 2023-12-26
Payer: COMMERCIAL

## 2023-12-26 VITALS
RESPIRATION RATE: 18 BRPM | TEMPERATURE: 97.52 F | SYSTOLIC BLOOD PRESSURE: 124 MMHG | DIASTOLIC BLOOD PRESSURE: 80 MMHG | OXYGEN SATURATION: 98 % | HEART RATE: 91 BPM

## 2023-12-26 VITALS — BODY MASS INDEX: 28.9 KG/M2

## 2023-12-26 VITALS — OXYGEN SATURATION: 98 %

## 2023-12-26 DIAGNOSIS — Z79.899: ICD-10-CM

## 2023-12-26 DIAGNOSIS — J44.9: ICD-10-CM

## 2023-12-26 DIAGNOSIS — Z90.49: ICD-10-CM

## 2023-12-26 DIAGNOSIS — Z98.890: ICD-10-CM

## 2023-12-26 DIAGNOSIS — M06.9: ICD-10-CM

## 2023-12-26 DIAGNOSIS — Z20.822: ICD-10-CM

## 2023-12-26 DIAGNOSIS — E88.01: ICD-10-CM

## 2023-12-26 DIAGNOSIS — Z90.710: ICD-10-CM

## 2023-12-26 DIAGNOSIS — J06.9: Primary | ICD-10-CM

## 2023-12-26 DIAGNOSIS — Z99.81: ICD-10-CM

## 2023-12-26 DIAGNOSIS — M79.7: ICD-10-CM

## 2023-12-26 DIAGNOSIS — Z86.39: ICD-10-CM

## 2023-12-26 LAB — SARS-COV-2 AG: NEGATIVE

## 2023-12-26 PROCEDURE — 87804 INFLUENZA ASSAY W/OPTIC: CPT

## 2023-12-26 PROCEDURE — 71045 X-RAY EXAM CHEST 1 VIEW: CPT

## 2023-12-26 PROCEDURE — 99284 EMERGENCY DEPT VISIT MOD MDM: CPT

## 2023-12-26 PROCEDURE — 87811 SARS-COV-2 COVID19 W/OPTIC: CPT

## 2023-12-26 PROCEDURE — 87635 SARS-COV-2 COVID-19 AMP PRB: CPT

## 2023-12-26 PROCEDURE — 94640 AIRWAY INHALATION TREATMENT: CPT

## 2023-12-26 NOTE — ED_ITS
HPI - URI/Sore Throat    
General    
Chief Complaint: Upper Respiratory Infection    
Stated Complaint: WHEEZING/ URTI    
Time Seen by Provider: 12/26/23 13:08    
Source: patient and family    
Limitations: no limitations    
History of Present Illness    
HPI Narrative:     
Patient is a 58-year-old female with a history of COPD presents to the ER for 4-  
day history of cough, congestion, sinus congestion and wheezing.  She states she  
has a history of alpha 1 antitrypsin and typically wears oxygen by nasal cannula  
at 4 L around-the-clock, she arrives to the emergency department without oxygen   
on.  She has normal oxygen saturation at time of presentation.  She denies chest  
pain.  She has had no hemoptysis or fevers.  She had a granddaughter this week   
that she was exposed to with upper respiratory illness.  She has been using   
Mucinex which seems to help produce sputum.  No vomiting or diarrhea.  Last   
breathing treatment was last night.  Patient states  I think I need antibiotics   
and steroids .    
Related Data    
                                Home Medications    
    
    
    
 Medication  Instructions  Recorded  Confirmed    
     
budesonide 1 mg/2 mL suspension 1 mg inhalation Q12H PRN shortness 06/13/23 12/26/23    
    
for nebulization of breath      
     
budesonide-formoterol  1 inh inhalation Q12H 06/13/23 12/26/23    
    
mcg-4.5 mcg/actuation aerosol       
    
inhaler (Symbicort)       
     
famotidine 40 mg tablet 40 mg PO BEDTIME 06/13/23 12/26/23    
     
galcanezumab-gnlm 120 mg/mL 120 mg subcut .month 06/13/23 12/26/23    
    
subcutaneous pen injector       
    
(Emgality Pen)       
     
semaglutide 2 mg/dose (8 mg/3 mL) 2 mg subcut .friday 06/13/23 12/26/23    
    
subcutaneous pen injector (Ozempic)       
     
rosuvastatin 10 mg tablet 10 mg PO DAILY 12/26/23 12/26/23    
    
    
                                  Previous Rx's    
    
    
    
 Medication  Instructions  Recorded    
     
promethazine-DM 6.25 mg-15 mg/5 mL 5 ml PO Q6H PRN cough #118 mL 06/14/23    
    
oral syrup      
     
doxycycline hyclate 100 mg tablet 100 mg PO BID 10 days #20 tabs 12/26/23    
     
methylprednisolone 4 mg tablets in See Rx Instructions .Route 12/26/23    
    
a dose pack (Medrol (Jose)) .COMPLEX #21 ea     
    
    
    
                                    Allergies    
    
    
    
Allergy/AdvReac Type Severity Reaction Status Date / Time    
     
Penicillins Allergy Severe rash Verified 06/13/23 17:50    
     
Sulfa (Sulfonamide Allergy Severe Rash Verified 06/13/23 17:50    
    
Antibiotics)         
     
metoclopramide [From Reglan] AdvReac Severe Irritable Verified 12/26/23 12:35    
     
topiramate [From Topamax] AdvReac Severe thoughts Verified 06/13/23 17:50    
    
   of dieing      
     
bupropion [From Wellbutrin] AdvReac Intermediate Agitated Verified 12/26/23   
12:29    
    
    
    
    
Review of Systems    
    
    
ROS      
    
 Constitutional Denies: fever or chills       
    
 Ears, nose, mouth, and throat Reports: nasal congestion; Denies: throat pain       
    
 Cardiovascular Denies: chest pain       
    
 Respiratory Reports: shortness of breath, cough and wheezing       
    
 Gastrointestinal Denies: nausea or vomiting       
    
 Musculoskeletal Denies: back pain       
    
 Integumentary/Breast Denies: rash       
    
 Neurological Denies: headache       
    
 Hematologic/Lymphatic Denies: easy bruising       
    
    
PFSH    
PFSH    
Medical History (Updated 12/26/23 @ 14:26 by ERIN Marcum)    
    
Migraine    
   ?G43.909 - Migraine, unspecified, not intractable, without status migrainosus  
   (ICD-10)    
Fibromyalgia    
   ?M79.7 - Fibromyalgia (ICD-10)    
Rheumatoid arthritis    
   ?M06.9 - Rheumatoid arthritis, unspecified (ICD-10)    
History of diabetes mellitus    
   ?Z86.39 - Personal history of other endocrine, nutritional and metabolic   
   disease (ICD-10)    
History of oxygen administration    
   ?Z99.81 - Dependence on supplemental oxygen (ICD-10)    
History of COPD    
   ?Z87.09 - Personal history of other diseases of the respiratory system (ICD-  
   10)    
    
    
Surgical History (Updated 06/13/23 @ 17:58 by Juan Garcia)    
    
History of appendectomy    
   ?Z90.49 - Acquired absence of other specified parts of digestive tract (ICD-  
   10)    
History of cholecystectomy    
   ?Z90.49 - Acquired absence of other specified parts of digestive tract (ICD-  
   10)    
History of hysterectomy    
   ?Z90.710 - Acquired absence of both cervix and uterus (ICD-10)    
History of cardiac catheterization    
   ?Z98.890 - Other specified postprocedural states (ICD-10)    
    
    
Social History (Reviewed 12/26/23 @ 13:31 by ERIN Marcum)    
Smoking status:  Current every day smoker     
Highest level of school completed/degree received:  high school graduate     
    
    
    
Exam    
Narrative    
Exam Narrative:     
Gen.: Awake, alert, in no distress    
Head: Normocephalic, atraumatic    
ENT: Moist mucous membranes, bilateral TMs bulging with no erythema or injection    
Respiratory: No respiratory distress, inspiratory and expiratory wheezing    
Cardio: Regular rate and rhythm    
Extremities: Moves extremities equally, no pedal edema    
Psych: Normal mood and affect    
Neuro: No focal neuro deficit    
Skin: Warm, dry, intact    
    
Constitutional    
Vital Signs, click to edit/add:     
    
                                Last Vital Signs    
    
    
    
Temp  97.6 F   12/26/23 12:26    
     
Pulse  91 H  12/26/23 12:26    
     
Resp  18   12/26/23 12:26    
     
BP  124/80   12/26/23 12:26    
     
Pulse Ox  98   12/26/23 13:29    
     
O2 Del Method  Room Air  12/26/23 13:29    
    
    
    
    
    
Course    
Vital Signs    
Vital signs:     
    
                                   Vital Signs    
    
    
    
Temperature  97.6 F   12/26/23 12:26    
     
Pulse Rate  91 H  12/26/23 12:26    
     
Respiratory Rate  18   12/26/23 12:26    
     
Blood Pressure  124/80   12/26/23 12:26    
     
Pulse Oximetry  98   12/26/23 12:26    
     
Oxygen Delivery Method  Room Air  12/26/23 12:26    
    
    
                                            
    
    
    
Temperature  97.6 F   12/26/23 12:26    
     
Pulse Rate  91 H  12/26/23 12:26    
     
Respiratory Rate  18   12/26/23 12:26    
     
Blood Pressure  124/80   12/26/23 12:26    
     
Pulse Oximetry  98   12/26/23 13:29    
     
Oxygen Delivery Method  Room Air  12/26/23 13:29    
    
    
    
    
    
MDM - URI/Sore Throat    
MDM Narrative    
Medical decision making narrative:     
Patient with stable vital signs, no complaints of chest pain or hemoptysis.  She  
is significantly wheezing in the ER and given breathing treatment, she maintains  
normal oxygen saturation.  Chest x-ray is negative for pneumonia and she is   
negative for COVID, negative for influenza.  Patient placed on doxycycline,   
prednisone.  She will watch her blood sugars at home.  She has albuterol   
nebulizers to continue breathing treatments.  Follow-up with PCP or   
pulmonologist and return to the ER if symptoms change or worsen.    
Medical Records    
Attestation: I reviewed the patient's medical records.    
Lab Data    
Attestation: I reviewed the patient's lab results.    
Labs:     
    
                                   Lab Results    
    
    
    
  12/26/23 12/26/23 Range/Units    
    
  13:42 13:47     
     
SARS-CoV-2 (PCR)  Negative   (NEGATIVE)      
     
Influenza Type A Ag   Negative      
     
Influenza Type B Ag   Negative      
    
    
    
    
Imaging Data    
Chest x-ray:     
      Attestation: I have reviewed the pertinent imaging results.    
    
Discharge Plan    
Discharge    
Chief Complaint: Upper Respiratory Infection    
    
Clinical Impression:    
 Upper respiratory infection, COPD (chronic obstructive pulmonary disease)    
    
    
Patient Disposition: Home, Self-Care    
    
Time of Disposition Decision: 14:26    
    
Condition: Good    
    
Prescriptions / Home Meds:    
New    
  methylprednisolone [Medrol (Jose)] 4 mg tablets,dose pack     
   See Rx Instructions  .ROUTE .COMPLEX Qty: 21 0RF    
   Rx Instructions:    
   Taper as directed    
  doxycycline hyclate 100 mg tablet     
   100 mg PO BID 10 Days Qty: 20 0RF    
    
No Action    
  rosuvastatin 10 mg tablet     
   10 mg PO DAILY     
  budesonide 1 mg/2 mL suspension for nebulization     
   1 mg inhalation Q12H PRN (Reason: shortness of breath)     
  budesonide-formoterol [Symbicort] 160-4.5 mcg/actuation HFA aerosol inhaler     
   1 inh INHALATION Q12H     
  Ozempic 2 mg/dose (8 mg/3 mL) pen injector     
   2 mg SUBCUT .friday     
  famotidine 40 mg tablet     
   40 mg PO BEDTIME     
  Emgality Pen 120 mg/mL pen injector     
   120 mg SUBCUT .month     
  promethazine-DM 6.25-15 mg/5 mL syrup     
   5 ml PO Q6H PRN (Reason: cough) Qty: 118 0RF    
    
Instructions:  Upper Respiratory Infection (ED), COPD (Chronic Obstructive   
Pulmonary Disease) (ED)    
    
Stand Alone Forms:  Portal Instructions    
    
Referrals:    
NELIA MALONE [Primary Care Provider] - 1 week    
    
Discharge Date/Time: 12/26/23 14:36

## 2023-12-26 NOTE — ED.URI1
HPI - URI/Sore Throat
General
Chief Complaint: Upper Respiratory Infection
Stated Complaint: WHEEZING/ URTI
Time Seen by Provider: 12/26/23 13:08
Source: patient and family
Limitations: no limitations
History of Present Illness
HPI Narrative: 
Patient is a 58-year-old female with a history of COPD presents to the ER for 4-day history of cough, congestion, sinus congestion and wheezing.  She states she has a history of alpha 1 antitrypsin and typically wears oxygen by nasal cannula at 4 L 
around-the-clock, she arrives to the emergency department without oxygen on.  She has normal oxygen saturation at time of presentation.  She denies chest pain.  She has had no hemoptysis or fevers.  She had a granddaughter this week that she was 
exposed to with upper respiratory illness.  She has been using Mucinex which seems to help produce sputum.  No vomiting or diarrhea.  Last breathing treatment was last night.  Patient states  I think I need antibiotics and steroids .
Related Data
Home Medications

 Medication  Instructions  Recorded  Confirmed
budesonide 1 mg/2 mL suspension 1 mg inhalation Q12H PRN shortness 06/13/23 12/26/23
for nebulization of breath  
budesonide-formoterol  1 inh inhalation Q12H 06/13/23 12/26/23
mcg-4.5 mcg/actuation aerosol   
inhaler (Symbicort)   
famotidine 40 mg tablet 40 mg PO BEDTIME 06/13/23 12/26/23
galcanezumab-gnlm 120 mg/mL 120 mg subcut .month 06/13/23 12/26/23
subcutaneous pen injector   
(Emgality Pen)   
semaglutide 2 mg/dose (8 mg/3 mL) 2 mg subcut .friday 06/13/23 12/26/23
subcutaneous pen injector (Ozempic)   
rosuvastatin 10 mg tablet 10 mg PO DAILY 12/26/23 12/26/23

Previous Rx's

 Medication  Instructions  Recorded
promethazine-DM 6.25 mg-15 mg/5 mL 5 ml PO Q6H PRN cough #118 mL 06/14/23
oral syrup  
doxycycline hyclate 100 mg tablet 100 mg PO BID 10 days #20 tabs 12/26/23
methylprednisolone 4 mg tablets in See Rx Instructions .Route 12/26/23
a dose pack (Medrol (Jose)) .COMPLEX #21 ea 


Allergies

Allergy/AdvReac Type Severity Reaction Status Date / Time
Penicillins Allergy Severe rash Verified 06/13/23 17:50
Sulfa (Sulfonamide Allergy Severe Rash Verified 06/13/23 17:50
Antibiotics)     
metoclopramide [From Reglan] AdvReac Severe Irritable Verified 12/26/23 12:35
topiramate [From Topamax] AdvReac Severe thoughts Verified 06/13/23 17:50
   of dieing  
bupropion [From Wellbutrin] AdvReac Intermediate Agitated Verified 12/26/23 12:29



Review of Systems
ROS  
 Constitutional Denies: fever or chills   
 Ears, nose, mouth, and throat Reports: nasal congestion; Denies: throat pain   
 Cardiovascular Denies: chest pain   
 Respiratory Reports: shortness of breath, cough and wheezing   
 Gastrointestinal Denies: nausea or vomiting   
 Musculoskeletal Denies: back pain   
 Integumentary/Breast Denies: rash   
 Neurological Denies: headache   
 Hematologic/Lymphatic Denies: easy bruising   

PFSH
PFSH
Medical History (Updated 12/26/23 @ 14:26 by ERIN Marcum)

Migraine
 ?G43.909 - Migraine, unspecified, not intractable, without status migrainosus (ICD-10)
Fibromyalgia
 ?M79.7 - Fibromyalgia (ICD-10)
Rheumatoid arthritis
 ?M06.9 - Rheumatoid arthritis, unspecified (ICD-10)
History of diabetes mellitus
 ?Z86.39 - Personal history of other endocrine, nutritional and metabolic disease (ICD-10)
History of oxygen administration
 ?Z99.81 - Dependence on supplemental oxygen (ICD-10)
History of COPD
 ?Z87.09 - Personal history of other diseases of the respiratory system (ICD-10)


Surgical History (Updated 06/13/23 @ 17:58 by Juan Garcia)

History of appendectomy
 ?Z90.49 - Acquired absence of other specified parts of digestive tract (ICD-10)
History of cholecystectomy
 ?Z90.49 - Acquired absence of other specified parts of digestive tract (ICD-10)
History of hysterectomy
 ?Z90.710 - Acquired absence of both cervix and uterus (ICD-10)
History of cardiac catheterization
 ?Z98.890 - Other specified postprocedural states (ICD-10)


Social History (Reviewed 12/26/23 @ 13:31 by ERIN Marcum)
Smoking status:  Current every day smoker 
Highest level of school completed/degree received:  high school graduate 



Exam
Narrative
Exam Narrative: 
Gen.: Awake, alert, in no distress
Head: Normocephalic, atraumatic
ENT: Moist mucous membranes, bilateral TMs bulging with no erythema or injection
Respiratory: No respiratory distress, inspiratory and expiratory wheezing
Cardio: Regular rate and rhythm
Extremities: Moves extremities equally, no pedal edema
Psych: Normal mood and affect
Neuro: No focal neuro deficit
Skin: Warm, dry, intact

Constitutional
Vital Signs, click to edit/add: 

Last Vital Signs

Temp  97.6 F   12/26/23 12:26
Pulse  91 H  12/26/23 12:26
Resp  18   12/26/23 12:26
BP  124/80   12/26/23 12:26
Pulse Ox  98   12/26/23 13:29
O2 Del Method  Room Air  12/26/23 13:29




Course
Vital Signs
Vital signs: 

Vital Signs

Temperature  97.6 F   12/26/23 12:26
Pulse Rate  91 H  12/26/23 12:26
Respiratory Rate  18   12/26/23 12:26
Blood Pressure  124/80   12/26/23 12:26
Pulse Oximetry  98   12/26/23 12:26
Oxygen Delivery Method  Room Air  12/26/23 12:26



Temperature  97.6 F   12/26/23 12:26
Pulse Rate  91 H  12/26/23 12:26
Respiratory Rate  18   12/26/23 12:26
Blood Pressure  124/80   12/26/23 12:26
Pulse Oximetry  98   12/26/23 13:29
Oxygen Delivery Method  Room Air  12/26/23 13:29




MDM - URI/Sore Throat
MDM Narrative
Medical decision making narrative: 
Patient with stable vital signs, no complaints of chest pain or hemoptysis.  She is significantly wheezing in the ER and given breathing treatment, she maintains normal oxygen saturation.  Chest x-ray is negative for pneumonia and she is negative 
for COVID, negative for influenza.  Patient placed on doxycycline, prednisone.  She will watch her blood sugars at home.  She has albuterol nebulizers to continue breathing treatments.  Follow-up with PCP or pulmonologist and return to the ER if 
symptoms change or worsen.
Medical Records
Attestation: I reviewed the patient's medical records.
Lab Data
Attestation: I reviewed the patient's lab results.
Labs: 

Lab Results

  12/26/23 12/26/23 Range/Units
  13:42 13:47 
SARS-CoV-2 (PCR)  Negative   (NEGATIVE)  
Influenza Type A Ag   Negative  
Influenza Type B Ag   Negative  



Imaging Data
Chest x-ray: 
      Attestation: I have reviewed the pertinent imaging results.

Discharge Plan
Discharge
Chief Complaint: Upper Respiratory Infection

Clinical Impression:
 Upper respiratory infection, COPD (chronic obstructive pulmonary disease)


Patient Disposition: Home, Self-Care

Time of Disposition Decision: 14:26

Condition: Good

Prescriptions / Home Meds:
New
  methylprednisolone [Medrol (Jose)] 4 mg tablets,dose pack 
   See Rx Instructions  .ROUTE .COMPLEX Qty: 21 0RF
   Rx Instructions:
   Taper as directed
  doxycycline hyclate 100 mg tablet 
   100 mg PO BID 10 Days Qty: 20 0RF

No Action
  rosuvastatin 10 mg tablet 
   10 mg PO DAILY 
  budesonide 1 mg/2 mL suspension for nebulization 
   1 mg inhalation Q12H PRN (Reason: shortness of breath) 
  budesonide-formoterol [Symbicort] 160-4.5 mcg/actuation HFA aerosol inhaler 
   1 inh INHALATION Q12H 
  Ozempic 2 mg/dose (8 mg/3 mL) pen injector 
   2 mg SUBCUT .friday 
  famotidine 40 mg tablet 
   40 mg PO BEDTIME 
  Emgality Pen 120 mg/mL pen injector 
   120 mg SUBCUT .month 
  promethazine-DM 6.25-15 mg/5 mL syrup 
   5 ml PO Q6H PRN (Reason: cough) Qty: 118 0RF

Instructions:  Upper Respiratory Infection (ED), COPD (Chronic Obstructive Pulmonary Disease) (ED)

Stand Alone Forms:  Portal Instructions

Referrals:
NELIA MALONE [Primary Care Provider] - 1 week

Discharge Date/Time: 12/26/23 14:36

## 2023-12-26 NOTE — XR_ITS
Pulmonary Progress Note    Subjective:   Daily Progress Note: 2022 10:10 AM    Moni Mckeon is a [de-identified]year old female PMH of COPD, diabetes, HTN, HLD, Pacemaker, AICD, HX of cardiac stents who is S/p day 3 for right hemicolectomy . Patient is alert and oriented and doing well. No respiratory distress. Review of Systems     Constitutional: Negative. HENT: Negative. Eyes: Negative. Respiratory: Negative for shortness of breath. Cardiovascular: Negative. Gastrointestinal: Positive for abdominal pain. Status post surgery bandage in place  Genitourinary: Negative. Musculoskeletal: Negative. Skin: Negative. Neurological: Negative. Psychiatric/Behavioral: Negative. Objective:     Visit Vitals  /75 (BP 1 Location: Left upper arm, BP Patient Position: At rest;Supine)   Pulse 70   Temp 97.4 °F (36.3 °C)   Resp 20   Ht 5' 1\" (1.549 m)   Wt 164 lb 14.5 oz (74.8 kg)   SpO2 97%   BMI 31.16 kg/m²      O2 Device: None (Room air)    Temp (24hrs), Av.3 °F (36.8 °C), Min:97.4 °F (36.3 °C), Max:98.9 °F (37.2 °C)      No intake/output data recorded.  1901 -  0700  In: -   Out: 1500 [Urine:1500]    Physical Exam  Constitutional:       Appearance: Normal appearance. HENT:      Head: Normocephalic and atraumatic. Nose: Nose normal.      Mouth/Throat:      Mouth: Mucous membranes are moist.   Eyes:      Pupils: Pupils are equal, round, and reactive to light. Cardiovascular:      Rate and Rhythm: Normal rate. Regular rhythm     Pulses: Normal pulses. Heart sounds: Normal heart sounds. Pulmonary:      Effort: Pulmonary effort is normal.      Breath sounds: Normal breath sounds. Abdominal:      General: Bowel sounds are present not distended     Tenderness: There is mild abdominal tenderness. Musculoskeletal:         General: Normal range of motion. Cervical back: Normal range of motion and neck supple. Skin:     General: Skin is warm.    Neurological: The [address] [address] 
     [phone] 
  
  
Patient Name: 
JAN GAMING 
  
MRN: [id number]    YOB: 1965    Sex: F 
Assigned Patient Location: ER 
Current Patient Location: ED.MAIN 
Accession/Order Number: [id number] 
Exam Date: 12/26/2023  13:25    Report Date: 12/26/2023  13:48 
  
At the request of: 
KHURRAM DSOUZA   
  
Procedure:  XR chest 1V 
  
EXAM: XR chest 1V  
  
HISTORY: Cough  
  
COMPARISON: None.  
  
TECHNIQUE: AP view of the chest. 
  
FINDINGS:  
The cardiomediastinal silhouette is normal. 
  
The lungs are clear.  
There is no pneumothorax. 
No pleural effusion is noted. 
  
The osseous structures are intact. 
  
ORDER #: [id number] XR/XR chest 1V  
IMPRESSION:   
No acute cardiopulmonary process.  
   
   
Electronically authenticated by: ZACHARY RIVERS   Date: 12/26/2023  13:48 General: No focal deficit present. Mental Status: She is alert. Psychiatric:         Mood and Affect: Mood normal.         Data Review    Recent Results (from the past 24 hour(s))   GLUCOSE, POC    Collection Time: 04/03/22 11:02 AM   Result Value Ref Range    Glucose (POC) 184 (H) 65 - 117 mg/dL    Performed by Tanika Sharp, POC    Collection Time: 04/03/22  4:33 PM   Result Value Ref Range    Glucose (POC) 159 (H) 65 - 117 mg/dL    Performed by Maru BRIONES    GLUCOSE, POC    Collection Time: 04/03/22  8:52 PM   Result Value Ref Range    Glucose (POC) 198 (H) 65 - 117 mg/dL    Performed by Rojelio Mckeon    METABOLIC PANEL, COMPREHENSIVE    Collection Time: 04/04/22  6:54 AM   Result Value Ref Range    Sodium 141 136 - 145 mmol/L    Potassium 4.3 3.5 - 5.1 mmol/L    Chloride 109 (H) 97 - 108 mmol/L    CO2 29 21 - 32 mmol/L    Anion gap 3 (L) 5 - 15 mmol/L    Glucose 127 (H) 65 - 100 mg/dL    BUN 12 6 - 20 mg/dL    Creatinine 0.57 0.55 - 1.02 mg/dL    BUN/Creatinine ratio 21 (H) 12 - 20      GFR est AA >60 >60 ml/min/1.73m2    GFR est non-AA >60 >60 ml/min/1.73m2    Calcium 8.0 (L) 8.5 - 10.1 mg/dL    Bilirubin, total 0.2 0.2 - 1.0 mg/dL    AST (SGOT) 24 15 - 37 U/L    ALT (SGPT) 10 (L) 12 - 78 U/L    Alk.  phosphatase 46 45 - 117 U/L    Protein, total 4.6 (L) 6.4 - 8.2 g/dL    Albumin 2.1 (L) 3.5 - 5.0 g/dL    Globulin 2.5 2.0 - 4.0 g/dL    A-G Ratio 0.8 (L) 1.1 - 2.2     CBC WITH AUTOMATED DIFF    Collection Time: 04/04/22  6:54 AM   Result Value Ref Range    WBC 8.4 3.6 - 11.0 K/uL    RBC 2.89 (L) 3.80 - 5.20 M/uL    HGB 9.1 (L) 11.5 - 16.0 g/dL    HCT 28.6 (L) 35.0 - 47.0 %    MCV 99.0 80.0 - 99.0 FL    MCH 31.5 26.0 - 34.0 PG    MCHC 31.8 30.0 - 36.5 g/dL    RDW 13.9 11.5 - 14.5 %    PLATELET 394 822 - 476 K/uL    MPV 11.6 8.9 - 12.9 FL    NRBC 0.0 0.0  WBC    ABSOLUTE NRBC 0.00 0.00 - 0.01 K/uL    NEUTROPHILS 66 32 - 75 %    LYMPHOCYTES 15 12 - 49 %    MONOCYTES 12 5 - 13 % EOSINOPHILS 6 0 - 7 %    BASOPHILS 1 0 - 1 %    IMMATURE GRANULOCYTES 0 0 - 0.5 %    ABS. NEUTROPHILS 5.5 1.8 - 8.0 K/UL    ABS. LYMPHOCYTES 1.3 0.8 - 3.5 K/UL    ABS. MONOCYTES 1.0 0.0 - 1.0 K/UL    ABS. EOSINOPHILS 0.5 (H) 0.0 - 0.4 K/UL    ABS. BASOPHILS 0.1 0.0 - 0.1 K/UL    ABS. IMM.  GRANS. 0.0 0.00 - 0.04 K/UL    DF AUTOMATED     GLUCOSE, POC    Collection Time: 04/04/22  8:01 AM   Result Value Ref Range    Glucose (POC) 130 (H) 65 - 117 mg/dL    Performed by Irena Caceres        Current Facility-Administered Medications   Medication Dose Route Frequency    insulin glargine (LANTUS) injection 25 Units  25 Units SubCUTAneous QHS    hydrALAZINE (APRESOLINE) 20 mg/mL injection 10 mg  10 mg IntraVENous Q4H PRN    enoxaparin (LOVENOX) injection 40 mg  40 mg SubCUTAneous Q24H    dextrose 5% - 0.45% NaCl with KCl 20 mEq/L infusion  50 mL/hr IntraVENous CONTINUOUS    ketorolac (TORADOL) injection 15 mg  15 mg IntraVENous Q6H PRN    traMADoL (ULTRAM) tablet 50 mg  50 mg Oral Q6H PRN    HYDROmorphone (DILAUDID) injection 1 mg  1 mg IntraVENous Q4H PRN    HYDROmorphone (DILAUDID) syringe 0.5 mg  0.5 mg IntraVENous Q4H PRN    glucose chewable tablet 16 g  4 Tablet Oral PRN    dextrose 10% infusion 0-250 mL  0-250 mL IntraVENous PRN    glucagon (GLUCAGEN) injection 1 mg  1 mg IntraMUSCular PRN    acetaminophen (TYLENOL) tablet 650 mg  650 mg Oral Q6H PRN    Or    acetaminophen (TYLENOL) suppository 650 mg  650 mg Rectal Q6H PRN    ondansetron (ZOFRAN ODT) tablet 4 mg  4 mg Oral Q8H PRN    Or    ondansetron (ZOFRAN) injection 4 mg  4 mg IntraVENous Q6H PRN    insulin lispro (HUMALOG) injection   SubCUTAneous AC&HS    [Held by provider] aspirin delayed-release tablet 81 mg  81 mg Oral DAILY    [Held by provider] clopidogreL (PLAVIX) tablet 75 mg  75 mg Oral DAILY    DULoxetine (CYMBALTA) capsule 30 mg  30 mg Oral DAILY    ferrous sulfate tablet 325 mg  325 mg Oral BID WITH MEALS    levothyroxine (SYNTHROID) tablet 150 mcg  150 mcg Oral ACB    metoprolol succinate (TOPROL-XL) XL tablet 25 mg  25 mg Oral DAILY    atorvastatin (LIPITOR) tablet 10 mg  10 mg Oral QHS    albuterol (PROVENTIL HFA, VENTOLIN HFA, PROAIR HFA) inhaler 2 Puff  2 Puff Inhalation Q6H PRN    oxyCODONE IR (ROXICODONE) tablet 5 mg  5 mg Oral Q4H PRN         Assessment/Plan:        1. Chronic Obstructive Pulmonary Disease without excebation  2. Partial large Bowel Obstruction  3. Status post right hemicolectomy  4. Diabetes Mellitus type 2  5. Essential Hypertension  6. Thyroid Disease   7. Urinary Tract infection       RECOMMENDATIONS/PLAN:      3 57-year-old lady no history of smoking not on any inhalers or oxygen at home, she had a history of secondhand smoke as her  used to smoke and all her children used to smoke she used to work in the form as a farmer significant history of congestive heart failure ejection fraction about 35 to 40%. She had aortic valve replaced previously also she has stent placement in the past chest x-ray no acute infiltrate or lung mass. Last showed Echocardiogram showed EF of 55 to 60% with grade 1 diastolic dysfunction. 2. Creatinine normal decrease IV fluids  3. Patient underwent right hemicolectomy doing fairly well no flatus yet  4. monitor respiratory status and O2 PRN  5.  Follow up with Pulmonary outpatient as needed for COPD management

## 2023-12-27 LAB — SARS-COV-2 NAA: DETECTED

## 2024-03-12 ENCOUNTER — HOSPITAL ENCOUNTER
Dept: HOSPITAL 101 - RAD | Age: 59
Discharge: HOME | End: 2024-03-12
Payer: COMMERCIAL

## 2024-03-12 DIAGNOSIS — R05.1: Primary | ICD-10-CM

## 2024-03-12 PROCEDURE — 71046 X-RAY EXAM CHEST 2 VIEWS: CPT

## 2024-03-12 NOTE — XR_ITS
[address] [address] 
     [phone] 
  
  
Patient Name: 
JAN GAMING 
  
MRN: [id number]    YOB: 1965    Sex: F 
Assigned Patient Location: RAD 
Current Patient Location: [facility] 
Accession/Order Number: [id number] 
Exam Date: 3/12/2024  12:35    Report Date: 3/12/2024  13:57 
  
At the request of: 
NON-STAFF  PHYSICIAN   
  
Procedure:  XR chest 2V 
  
EXAMINATION: XR chest 2V  
  
HISTORY: acute cough R05.1 
  
COMPARISON: 12/26/2023 
  
TECHNIQUE: PA and lateral 
  
FINDINGS: 
LUNGS: No significant pulmonary parenchymal abnormalities. 
VASCULATURE: No increased pulmonary vasculature.  
PLEURA: No pneumothorax, effusion, or pleural thickening.  
CARDIAC: No cardiomegaly or cardiac silhouette abnormality.  
MEDIASTINUM: No visible mass or adenopathy.  
BONES: No fracture or visible bone lesion. Cervical fusion hardware 
OTHER: Negative.  
  
ORDER #: [id number] XR/XR chest 2V  
IMPRESSION:   
   
No acute cardiopulmonary process  
   
   
Electronically authenticated by: TOMASZ CAMPOS   Date: 3/12/2024  13:57

## 2024-12-31 ENCOUNTER — HOSPITAL ENCOUNTER (EMERGENCY)
Age: 59
Discharge: HOME | End: 2024-12-31
Payer: COMMERCIAL

## 2024-12-31 VITALS
SYSTOLIC BLOOD PRESSURE: 126 MMHG | OXYGEN SATURATION: 94 % | HEART RATE: 104 BPM | DIASTOLIC BLOOD PRESSURE: 85 MMHG | TEMPERATURE: 98.1 F

## 2024-12-31 VITALS — BODY MASS INDEX: 30.1 KG/M2

## 2024-12-31 VITALS — HEART RATE: 104 BPM | OXYGEN SATURATION: 94 %

## 2024-12-31 VITALS — OXYGEN SATURATION: 94 % | HEART RATE: 98 BPM

## 2024-12-31 VITALS — OXYGEN SATURATION: 94 %

## 2024-12-31 DIAGNOSIS — F17.200: ICD-10-CM

## 2024-12-31 DIAGNOSIS — Z99.81: ICD-10-CM

## 2024-12-31 DIAGNOSIS — U07.1: Primary | ICD-10-CM

## 2024-12-31 DIAGNOSIS — Z90.710: ICD-10-CM

## 2024-12-31 DIAGNOSIS — Z90.49: ICD-10-CM

## 2024-12-31 LAB — SARS-COV-2 AG: POSITIVE

## 2024-12-31 PROCEDURE — 87811 SARS-COV-2 COVID19 W/OPTIC: CPT

## 2024-12-31 PROCEDURE — 87804 INFLUENZA ASSAY W/OPTIC: CPT

## 2024-12-31 PROCEDURE — 99284 EMERGENCY DEPT VISIT MOD MDM: CPT

## 2024-12-31 PROCEDURE — 94640 AIRWAY INHALATION TREATMENT: CPT

## 2024-12-31 PROCEDURE — 71045 X-RAY EXAM CHEST 1 VIEW: CPT

## 2024-12-31 NOTE — XR_ITS
[address] [address] 
     [phone] 
  
  
Patient Name: 
JAN GAMING 
  
MRN: [id number]    YOB: 1965    Sex: F 
Assigned Patient Location: ER 
Current Patient Location:   
Accession/Order Number: [id number] 
Exam Date: 12/31/2024  13:05    Report Date: 12/31/2024  13:51 
  
At the request of: 
LORENA BELTRE   
  
Procedure:  XR chest 1V 
  
EXAM: XR chest 1V  
  
HISTORY: Cough, short of breath 
  
COMPARISON: 12/26/2023 and earlier  
  
TECHNIQUE: AP upright chest x-ray 
  
FINDINGS: Clear lungs without infiltrate, edema or new density. Normal heart  
size and mediastinal contour for technique. No pleural effusion or  
pneumothorax. 
  
ORDER #: [id number] XR/XR chest 1V  
IMPRESSION:   
   
Stable chest x-ray, no acute findings. Clear lungs.  
   
   
Electronically authenticated by: LEDA MIGUEL   Date: 12/31/2024  13:51

## 2024-12-31 NOTE — ED.GENADUL1
HPI
HPI - General Adult
General
Chief complaint: Upper Respiratory Infection
Stated complaint: URTI COMPLAINTS
Time Seen by Provider: 12/31/24 12:41
Source: patient
Mode of arrival: walk-in
Limitations: no limitations
History of Present Illness
HPI narrative: 
59-year-old female presents for cough and shortness of breath.  She has been coughing up thick green phlegm for the past day.  No fever or hemoptysis.  She has not taken an aerosol treatment today.  She is on home oxygen.
Related Data
Home Medications

?Medication ?Instructions ?Recorded ?Confirmed
budesonide 1 mg/2 mL suspension 1 mg inhalation Q12H PRN shortness 06/13/23 12/26/23
for nebulization of breath  
budesonide-formoterol  1 inh inhalation Q12H 06/13/23 12/26/23
mcg-4.5 mcg/actuation aerosol   
inhaler (Symbicort)   
famotidine 40 mg tablet 40 mg PO BEDTIME 06/13/23 12/26/23
galcanezumab-gnlm 120 mg/mL 120 mg subcut .month 06/13/23 12/26/23
subcutaneous pen injector   
(Emgality Pen)   
semaglutide 2 mg/dose (8 mg/3 mL) 2 mg subcut .friday 06/13/23 12/26/23
subcutaneous pen injector (Ozempic)   
rosuvastatin 10 mg tablet 10 mg PO DAILY 12/26/23 12/26/23

Previous Rx's

?Medication ?Instructions ?Recorded
promethazine-DM 6.25 mg-15 mg/5 mL 5 ml PO Q6H PRN cough #118 mL 06/14/23
oral syrup  
doxycycline hyclate 100 mg tablet 100 mg PO BID 10 days #20 tabs 12/26/23
methylprednisolone 4 mg tablets in See Rx Instructions .Route 12/26/23
a dose pack (Medrol (Jose)) .COMPLEX #21 ea 
nirmatrelvir 300 mg (150 mg See Rx Instructions PO .COMPLEX 12/31/24
x2)-ritonavir 100 mg tablet,dose #30 ea 
pack (Paxlovid)  


Allergies

Allergy/AdvReac Type Severity Reaction Status Date / Time
Penicillins Allergy Severe rash Verified 06/13/23 17:50
Sulfa (Sulfonamide Allergy Severe Rash Verified 06/13/23 17:50
Antibiotics)     
metoclopramide (From Reglan) AdvReac Severe Irritable Verified 12/26/23 12:35
topiramate (From Topamax) AdvReac Severe thoughts Verified 06/13/23 17:50
   of dieing  
bupropion (From Wellbutrin) AdvReac Intermediate Agitated Verified 12/26/23 12:29



Opioid HPI
Opioid Management
Most Recent Opioid Data: 
      Last Pain Scale 5 06/14/23 13:21 06/14/23


Review of Systems
ROS  
 Narrative A ten point review of systems is negative except as noted above.   

PFSH
PFS
Medical History (Updated 12/31/24 @ 13:36 by Konstantin Iglesias MD)

Migraine
 ?G43.909 - Migraine, unspecified, not intractable, without status migrainosus (ICD-10)
Fibromyalgia
 ?M79.7 - Fibromyalgia (ICD-10)
Rheumatoid arthritis
 ?M06.9 - Rheumatoid arthritis, unspecified (ICD-10)
History of diabetes mellitus
 ?Z86.39 - Personal history of other endocrine, nutritional and metabolic disease (ICD-10)
History of oxygen administration
 ?Z99.81 - Dependence on supplemental oxygen (ICD-10)
History of COPD
 ?Z87.09 - Personal history of other diseases of the respiratory system (ICD-10)


Surgical History (Updated 06/13/23 @ 17:58 by Juan Garcia)

History of appendectomy
 ?Z90.49 - Acquired absence of other specified parts of digestive tract (ICD-10)
History of cholecystectomy
 ?Z90.49 - Acquired absence of other specified parts of digestive tract (ICD-10)
History of hysterectomy
 ?Z90.710 - Acquired absence of both cervix and uterus (ICD-10)
History of cardiac catheterization
 ?Z98.890 - Other specified postprocedural states (ICD-10)


Social History (Reviewed 12/26/23 @ 13:31 by ERIN Marcum)
Smoking status:  Current every day smoker 
Highest level of school completed/degree received:  high school graduate 



Exam
Narrative
Exam Narrative: 
Nurses note and vital signs reviewed and patient is not hypoxic.

General:  The patient appears in no apparent respiratory distress.  
Skin:  Warm, dry, no pallor noted.  There is no rash noted.
Head:  Normocephalic, atraumatic
Eye: Normal conjunctiva, no drainage
Ears, Nose, Mouth, and Throat: oral mucosa is moist. Nares patent. 
Cardiovascular:  Regular Rate and Rhythm
Respiratory: Bilateral rhonchi throughout
Back:  non-tender
GI: Soft and nontender
Musculoskeletal: The patient has no evidence of calf tenderness, no pitting edema, symmetrical pulses noted bilaterally
Neurological:  A&O, normal speech
Psychiatric:  Cooperative
Constitutional
Vital Signs, click to edit/add: 

Last Vital Signs

Temp  98.1 F   12/31/24 12:49
Pulse  98 H  12/31/24 13:10
Resp  22 H  12/31/24 12:49
BP  126/85   12/31/24 12:49
Pulse Ox  94 L  12/31/24 13:10
O2 Del Method  Nasal Cannula  12/31/24 13:10
O2 Flow Rate  4   12/31/24 13:10




Course
Vital Signs
Vital signs: 

Vital Signs

Temperature  98.1 F   12/31/24 12:49
Pulse Rate  104 H  12/31/24 12:49
Respiratory Rate  22 H  12/31/24 12:49
Blood Pressure  126/85   12/31/24 12:49
Pulse Oximetry  94 L  12/31/24 12:49
Oxygen Delivery Method  Nasal Cannula  12/31/24 12:49
Oxygen Delivery Flow Rate  4   12/31/24 12:49



Temperature  98.1 F   12/31/24 12:49
Pulse Rate  98 H  12/31/24 13:10
Respiratory Rate  22 H  12/31/24 12:49
Blood Pressure  126/85   12/31/24 12:49
Pulse Oximetry  94 L  12/31/24 13:10
Oxygen Delivery Method  Nasal Cannula  12/31/24 13:10
Oxygen Delivery Flow Rate  4   12/31/24 13:10




Medical Decision Making
MDM Narrative
Medical decision making narrative: 
COVID test is positive.  Chest x-ray my interpretation shows no infiltrates.  She is requesting Paxlovid and a prescription was provided.  Treatment diagnosis and follow-up were discussed with the patient.
Differential Diagnosis
Differential Diagnosis: COVID, influenza, pneumonia, URI
Lab Data
Lab results reviewed: Yes I reviewed the patient's lab results
Labs: 

Lab Results

  12/31/24 Range/Units
  12:53 
Influenza Type A Ag  Negative  
Influenza Type B Ag  Negative  
SARS-CoV-2 Ag (CV2AG)  Positive A  (NEGATIVE)  



Imaging Data
Chest x-ray: 
      My impression: 
No acute findings

Discharge Plan
Discharge
Chief Complaint: Upper Respiratory Infection

Clinical Impression:
 COVID-19


Patient Disposition: Home, Self-Care

Time of Disposition Decision: 13:36

Condition: Good

Mode of Transportation: Private Vehicle

Prescriptions / Home Meds:
New
  Paxlovid 300 mg (150 mg x 2)-100 mg tablets,dose pack 
   See Rx Instructions  .ROUTE .COMPLEX Qty: 30 0RF
   Rx Instructions:
   take  mg tablets of nirmatrelvir with  mg tablet of ritonavir twice daily for 5 days

No Action
  rosuvastatin 10 mg tablet 
   10 mg PO DAILY 
  methylprednisolone [Medrol (Jose)] 4 mg tablets,dose pack 
   See Rx Instructions  .ROUTE .COMPLEX Qty: 21 0RF
   Rx Instructions:
   Taper as directed
  doxycycline hyclate 100 mg tablet 
   100 mg PO BID 10 Days Qty: 20 0RF
  budesonide 1 mg/2 mL suspension for nebulization 
   1 mg inhalation Q12H PRN (Reason: shortness of breath) 
  budesonide-formoterol [Symbicort] 160-4.5 mcg/actuation HFA aerosol inhaler 
   1 inh INHALATION Q12H 
  Ozempic 2 mg/dose (8 mg/3 mL) pen injector 
   2 mg SUBCUT .friday 
  famotidine 40 mg tablet 
   40 mg PO BEDTIME 
  Emgality Pen 120 mg/mL pen injector 
   120 mg SUBCUT .month 
  promethazine-DM 6.25-15 mg/5 mL syrup 
   5 ml PO Q6H PRN (Reason: cough) Qty: 118 0RF

Print Language: English

Instructions:  COVID-19 (Coronavirus Disease 2019) (ED), COVID-19: Slow the Coronavirus Spread (ED), Face Coverings (Masks) and COVID-19 (ED)

Referrals:
NELIA MALONE [Primary Care Provider] - 1 week

## 2024-12-31 NOTE — XMS_ITS
Comprehensive CCD (C-CDA v2.1)  
  
                          Created on: 2024  
  
  
Robyn Gamingkory IGNACIO  
External Reference #: CDR,PersonID:[id number]  
: 1965  
Sex: Female  
  
Demographics  
  
  
                                        Address             [address] [place] [address]  [phone]  
   
                                        Mobile Phone        [phone]  
   
                                        Preferred Language  en  
   
                                        Marital Status      Unknown  
   
                                        Christian Affiliation Unknown  
   
                                        Race                White  
   
                                        Ethnic Group        Not  or Lati  
no  
  
  
Author  
  
  
                                        Organization        [facility]  
  
  
Care Team Providers  
  
  
                                Care Team Member Name Role            Phone  
   
                                TOMASZ BARAJAS   Unavailable     Unavailable  
   
                                TOMASZ BARAJAS   Unavailable     Unavailable  
   
                                SHERMAN MALONE Unavailable     Unavailable  
   
                                SHERMAN MALONE Unavailable     Unavailable  
   
                                NC              Unavailable     Unavailable  
   
                                TOMASZ BARAJAS   Unavailable     Unavailable  
   
                                NC              Unavailable     Unavailable  
   
                                QUENTIN BENÍTEZ Unavailable     Unavailable  
   
                                Estuardo Cooper  Unavailable     [phone]  
   
                                Dagmar Casarez      Unavailable     [phone]  
   
                                Erendira Lowry     Unavailable     [phone]  
   
                                SHERMAN MALONE Primary Care Physician Unavail  
able  
   
                                Keesha Nicolas   Unavailable     Unavailable  
   
                                Scarlett Davis    Unavailable     Unavailable  
   
                                Amanda Dominguez Unavailable     Unavailable  
   
                                DO Sherman Malone Primary Care Provider [phone]   
   
                                [facility] Emergency [facility] [phone]  
   
                                DR SHERMAN MALONE Primary Care    Unavailable  
   
                                DR BRIGIDA PRADO Admitting       Unavailable  
   
                                SOO .DR ALLRED Attending       Unavailable  
   
                                SOO .DR ALLRED Consulting      Unavailable  
   
                                DERICK RANDLE Consulting      Unavailable  
   
                                SALVATORE HARRELL Consulting      Unavailable  
   
                                GOOD ELMORE Consulting      Unavailable  
   
                                DR SHERMAN MALONE Admitting       Unavailable  
   
                                DR SHERMAN MALONE Attending       Unavailable  
   
                                DR SHERMAN MALONE Primary Care    Unavailable  
   
                                DR SHERMAN MALONE Consulting      Unavailable  
   
                                DR SHERMAN MALONE Admitting       Unavailable  
   
                                DR SHERMAN MALONE Attending       Unavailable  
   
                                DR SHERMAN MALONE Primary Care    Unavailable  
   
                                Domingo Watkins  Consulting      Unavailable  
   
                                DR SHERMAN MALONE Consulting      Unavailable  
   
                                DR SHERMAN MALONE Primary Care    Unavailable  
   
                                DR BANDAR CANELA  Admitting       Unavailable  
   
                                ANUP Mott, DR PORTILLO  Attending       Unavailable  
   
                                DR BANDAR CANELA  Consulting      Unavailable  
   
                                DR ESPERANZA CORNEJO Consulting      Unavailable  
   
                                SHILOH BOLTON   Consulting      Unavailable  
   
                                CHARLENE CAMARA  Consulting      Unavailable  
   
                                BERNARDINO ABRAMS Consulting      Unavailable  
   
                                GOOD ELMORE Consulting      Unavailable  
   
                                GOOD ELMORE Attending       Unavailable  
   
                                GOOD ELMORE Admitting       Unavailable  
   
                                DR SHERMAN MALONE Primary Care    Unavailable  
   
                                TOMASZ RAMIREZ  Consulting      Unavailable  
   
                                BERNIE TAY   Consulting      Unavailable  
   
                                MOMO, DR CLIFFORD NEGRETE Attending       Unavailabl  
e  
   
                                MOMO, DR CLIFFORD NEGRETE Admitting       Unavailabl  
e  
   
                                FAISAL, DR PICKENS Primary Care    Unavailable  
   
                                TOMASZ RAMIREZ  Consulting      Unavailable  
   
                                GAB, BILLIE    Consulting      Unavailable  
   
                                PAY ., DR PICKENS Consulting      Unavailable  
   
                                FAISAL, DR PICKENS Primary Care    Unavailable  
   
                                PAY ., DR PICKENS Admitting       Unavailable  
   
                                PAY ., DR PICKENS Attending       Unavailable  
   
                                SHILOH BOLTON   Consulting      Unavailable  
   
                                FAISAL, DR PICKENS Primary Care    Unavailable  
   
                                MYKELERELUIS ENRIQUE, DR JS ARAIS Admitting       Unavailable  
   
                                NADERER, DR JS ARIAS Attending       Unavailable  
   
                                HOY ., DR ALLRED Consulting      Unavailable  
   
                                WEST, DR TOMASZ ESPINOZA Consulting      Unavailable  
   
                                EMILE, DR JS ARIAS Consulting      Unavailable  
   
                                CINDACHNY ., ERIN PAULSON Consulting      Unavailmelvin  
e  
   
                                GAB, BILLIE    Consulting      Unavailable  
   
                                FAISAL, DR PICKENS Admitting       Unavailable  
   
                                FAISAL, DR PICKENS Attending       Unavailable  
   
                                FAISAL, DR PICKENS Primary Care    Unavailable  
   
                                Abad Singleton     Attending       Unavailable  
   
                                DO Darrian Lawrence Emergency Provider [phone]  
   
                                MD Tashi Rodriges Admit Provider  [phone]  
   
                                MD Ambrosio Davis Attending Provider [phone]  
   
                                MD Breezy Davidson Other Provider  [phone]  
   
                                DO Nickolas Palomares Emergency Provider [phone]  
   
                                MD Neeraj Terry Admit Provider  [phone]  
[phone]  
   
                                MD Neeraj Terry Attending Provider [phone]  
   
                                Sherman Malone DO Primary Care Provider [phone]  
[phone]  
   
                                Sherman Malone DO Primary Care Provider [phone]  
[phone]  
   
                                SHERMAN MALONE Primary Care    Unavailabl  
e  
   
                                ZACHARY COOPER    Referring       Unavailable  
   
                                ZACHARY OCOPER    Attending       Unavailable  
   
                                SHERMAN MLAONE Primary Care    Unavailabl  
e  
   
                                NICHOL, MOHEMERALDD Attending       Unavailable  
   
                                SHERMAN MALONE Primary Care    Unavailabl  
e  
   
                                CHAABAN, MOHAMAD Referring       Unavailable  
   
                                SHERMAN MALONE Primary Care    Unavailabl  
e  
   
                                CHAABAN, MOHAMAD Referring       Unavailable  
   
                                AMANDA DAIGLE     Attending       Unavailable  
   
                                SHERMAN MALONE Primary Care    Unavailabl  
e  
   
                                ZACHARY COOPER    Attending       Unavailable  
   
                                ZACHARY COOPER    Admitting       Unavailable  
   
                                SHERMAN MALONE Primary Care    Unavailabl  
e  
   
                                CHAABAN, MOHAMAD Attending       Unavailable  
   
                                SHERMAN MALONEAN Primary Care    Unavailabl  
e  
   
                                CHAABAN, MOHAMAD Referring       Unavailable  
   
                                SHERMAN MALONE Primary Care    Unavailabl  
e  
   
                                CHAABAN, MOHAMAD Attending       Unavailable  
   
                                SHERMAN MALONE Primary Care    Unavailabl  
e  
   
                                CHAABAN, MOHAMAD Referring       Unavailable  
   
                                ZACHARY COOPER    Attending       Unavailable  
   
                                SHERMAN MALONE Attending       Unavailable  
   
                                SHERMAN MALONE Referring       Unavailable  
   
                                SHERMAN MALONE Attending       Unavailable  
   
                                SHERMAN MALONE Referring       Unavailable  
   
                                CONCEPCION FRAZIER Attending       Unavailable  
   
                                SHERMAN MALONE Referring       Unavailable  
   
                                SHERMAN MALONE Referring       Unavailable  
   
                                ANJANA KMUAR Attending       Unavailable  
   
                                SHERMAN MALONE Referring       Unavailable  
   
                                RAFFY LUNDY Attending       Unavailable  
   
                                CONCEPCION FRAZIER Attending       Unavailable  
   
                                SHERMAN MALONE Attending       Unavailable  
   
                                SHERMAN MALONE Referring       Unavailable  
   
                                JS MOTLEY   Attending       Unavailable  
   
                                DO Sherman Malone Primary Care Provider [phone]   
   
                                DO Diogo Dent Emergency Provider [phone]  
   
                                Sherman Malone Primary Care    Unavailable  
   
                                Diogo Dent Attending       Unavailable  
   
                                Diogo Dent Admitting       Unavailable  
   
                                Sherman Malone DO Unavailable     [phone]  
   
                                Sherman Malone DO Primary Care Provider [phone]  
   
                                Sherman Malone DO Unavailable     [phone]  
  
  
  
Allergies  
  
  
                                                    Allergy   
Classification                          Reported   
Allergen(s)               Allergy Type              Date of   
Onset                     Reaction(s)               Facility  
   
                                                      
(11 sources)                            iothalamate;   
Translations:   
[Reglan]                  Drug Allergy              20  
13                                      [facility], Mean   
disposition                             [facility]   
Repository  
   
                                                      
(2 sources)                             oxytetracycline;   
Translations:   
[Effexor XR]              Drug Allergy              20  
12                        [facility]                       [facility]   
Repository  
   
                                                      
(4 sources)                             penicillin;   
Translations:   
[penicillin]              Drug Allergy              20  
17                        [facility]                       [facility]   
Repository  
   
                                                      
(4 sources)                             topiramate;   
Translations:   
[Topamax]                 Drug Allergy              20  
12                        [facility]                       [facility]   
Repository  
   
                                                      
(20 sources)                            Cephalexin;   
Translations:   
[cephalexin]              Drug Allergy              20  
24                        Unknown                   [facility]   
Repository  
   
                                                      
(8 sources)  Penicillin V Drug Allergy              Unknown      [facility]   
[facility]  
Other Phone:   
[phone]  
   
                                                      
(8 sources)                             Sulfacetamide /   
Sulfur          Drug Allergy                    Unknown         [facility]  
Other Phone:   
[phone]  
   
                                                      
(8 sources)     venlafaxine     Drug Allergy                    Seizures,   
Unknown                                 [facility]   
[facility]  
Other Phone:   
[phone]  
   
                                                      
(7 sources)               risperdal-ams             Propensity to   
adverse   
reactions                               Unknown             [facility]  
Other Phone:   
[phone]  
   
                                                      
(7 sources)                             seroquel-she says   
she had seizures                        Propensity to   
adverse   
reactions                               Unknown             [facility]   
[facility]   
[facility]  
Other Phone:   
[phone]  
   
                                                      
(17 sources)                            Metoclopramide;   
Translations:   
[metoclopramide]          Drug Allergy              20  
14                                      makes me mean,   
Agitated,   
Unknown                                 [facility]  
   
                                                      
(1 source)                              Penicillins;   
Translations:   
[penicillins]   Drug allergy                    [facility]  
   
                                        Comment on above:   difficult to breathe  
, swelling   
   
                                                      
(14 sources)                            QUEtiapine;   
Translations:   
[quetiapine]              Drug Allergy              20  
23                                      Unknown   
(qualifier   
value)                                  [facility]  
   
                                                      
(10 sources)                            risperiDONE;   
Translations:   
[risperidone]             Drug Allergy              20  
23                                      Altered mental   
status,   
Confusion,   
Unknown                                 [facility]  
   
                                                      
(18 sources)                            topiramate;   
Translations:   
[topiramate]              Drug Allergy              20  
14                                      Mental Status   
Change                                  [facility]  
   
                                        Comment on above:   RASH,   
   
                                                      
(15 sources)                            venlafaxine;   
Translations:   
[venlafaxine]             Drug Allergy              20  
14                                      Seizure   
(finding),   
Other: See   
Comments                                [facility]  
   
                                                      
(20 sources)                            Penicillins;   
Translations:   
[penicillins]                           Propensity to   
adverse   
reactions                               20  
21                                      Rash, Hives,   
Itching                                 [facility]  
   
                                                      
(4 sources)                             Sulfonamides   
(Antibiotic)                            Allergy to   
substance                               20  
23                        [facility]  
   
                                                      
(2 sources)                             Sulfonamides   
(Antibiotic)                            Drug allergy   
(disorder)                              20  
21                                                  [facility]   
Repository  
   
                                                      
(1 source)                              levoFLOXacin;   
Translations:   
[Levaquin]      Drug Allergy                                    [facility]   
Repository  
   
                                                      
(12 sources)                            Metoclopramide;   
Translations:   
[METOCLOPRAMIDE   
HCL]                      Drug Allergy              20  
14                                      Mental Status   
Change                                  [facility]  
   
                                                      
(19 sources)                            Sulfamethoxazole /   
Trimethoprim;   
Translations:   
[SULFAMETHOXAZOLE-T  
RIMETHOPRIM]              Drug Allergy              20                        Itching                   [facility]  
   
                                                      
(4 sources)                             Sulfacetamide;   
Translations:   
[sulfacetamide]           Drug Allergy              20  
24                        Hives                     [facility]  
   
                                                      
(1 source)                              VENLAFAXINE   
ANALOGUES;   
Translations:   
[VENLAFAXINE   
ANALOGUES]                              Propensity to   
adverse   
reactions to   
drug   
(disorder)                              20  
14                                                  [facility]   
[place]   
Repository  
   
                                                      
(1 source)          Cephalexin          Drug Allergy        20                                                  [facility]   
Repository  
   
                                                      
(1 source)          Metoclopramide      Drug Allergy        20                                                  [facility]   
Repository  
   
                                                      
(1 source)          QUEtiapine          Drug Allergy        20                                                  [facility]   
Repository  
   
                                                      
(1 source)          risperiDONE         Drug Allergy        20                                                  [facility]   
Repository  
   
                                                      
(1 source)          topiramate          Drug Allergy        20                                                  [facility]   
Repository  
   
                                                      
(1 source)          venlafaxine         Drug Allergy        20                                                  [facility]   
Repository  
   
                                                      
(8 sources)                             Acetaminophen /   
oxyCODONE                 Drug Allergy              10-06-20  
23                        GI intolerance            [facility]  
   
                                                      
(8 sources)         buPROPion           Drug Allergy        20  
23                                                  [facility]  
   
                                                      
(8 sources)         risperiDONE         Drug Allergy        20  
23                                                  [facility]  
   
                                                      
(8 sources)               Topiramate                Allergy to   
substance                               20  
14                                                  [facility]  
   
                                                      
(8 sources)         venlafaxine         Drug Allergy        10-17-20  
21                                                  [facility]   
Healthcare  
  
  
  
Medications  
Current Medications  
  
  
  
                      Medication Drug Class(es) Dates      Sig (Normalized) Sig   
(Original)  
   
                                                    30 ACTUAT   
fluticasone furoate   
0.2 MG/ACTUAT /   
umeclidinium 0.0625   
MG/ACTUAT /   
vilanterol 0.025   
MG/ACTUAT Dry Powder   
Inhaler [Trelegy]  
(1 source)                                                  take 1 puff(s) by   
inhalation once   
daily                                   Trelegy Ellipta   
200-62.5-25   
MCG/INH 1 puff   
Inhalation Once a   
day for 30 day(s)   
Active  
   
                                                    [id number] 200 actuat   
albuterol 0.09   
mg/actuat metered   
dose inhaler  
(20 sources)                            beta2-Adrenergic   
Agonist                                 Start:   
04-                                          Albuterol Sulfate   
Active 2 INH   
INHALATION Every   
4 hours 2024   
12:00am  
  
  
  
                                        Start: 2024   take 1 puff(s) by in  
halation   
every four hours as needed              albuterol HFA (PROVENTIL HFA,   
VENTOLIN HFA) 90 mcg/actuation   
inhaler INHALE 1 PUFF EVERY 4 HOURS   
AS NEEDED FOR WHEEZE OR FOR   
SHORTNESS OF BREATH 2024   
Active  
   
                                        Start: 2024   take 1 puff(s) by in  
halation   
every four hours for wheezing           albuterol HFA 90 mcg/act inhaler   
Indications: Moderate persistent   
reactive airway disease with acute   
exacerbation (CMS/HCC) Inhale 1   
puff every 4 (four) hours if needed   
for wheezing or shortness of breath   
18 g 2 2024 Active  
   
                                Start: 2019                 albuterol 0.08  
3% Inh Sol 3 mL 1,   
NEB, q4hr Shortness of breath or   
wheezing, Refill(s) 0 Start Date:   
19 Status: Ordered  
   
                                        Start: 10-   take 2.5 mg by inhal  
ation four   
times daily                             Albuterol Sulfate Active 2.5 MG   
INHALATION Four times daily 125   
2018 12:00am  
   
                                                                Albuterol Sulfat  
e (2.5 MG/3ML)   
0.083% 3 mL as needed Inhalation   
every 3 hrs Active  
   
                                                            take 1 puff(s) by in  
halation   
every four hours as needed              Albuterol Sulfate  (90 Base)   
MCG/ACT 1 puff as needed Inhalation   
every 4 hrs Active  
  
  
  
                                        Comment on above:   2.5 mg as needed.   
   
                                                            INHALE 1 PUFF EVERY   
4 HOURS AS NEEDED FOR WHEEZE OR FOR   
SHORTNESS OF BREATH   
   
                                                    Alum & Mag   
Hydroxide-Simeth   
200-200-20 MG/5ML  
(6 sources)                                                 take 10 mL by   
mouth four   
times daily as   
needed                                  Alum & Mag   
Hydroxide-Simeth   
200-200-20 MG/5ML 10   
mL as needed Orally   
Four times a day   
Active  
   
                                                    azelastine   
hydrochloride 0.137   
mg/actuat metered dose   
nasal spray  
(16 sources)                            Histamine-1   
Receptor Antagonist                     Start:   
  
4  
End:   
  
4                                       take 1 spray(s)   
nasal route in   
the morning                             azelastine (Astelin)   
0.1 % nasal spray   
Administer 1 spray   
into each nostril in   
the morning and 1   
spray before   
bedtime. 2024   
Discontinued  
  
  
  
                                Start: 2024                 Azelastine Act  
christian INTRANASAL 2024 12:00am  
   
                                                    Start: 2024  
End: 2024                         take 1 spray(s) nasal route   
twice daily                             azelastine 0.1% nasal spray SPRAY 1   
SPRAY INTO EACH NOSTRIL TWICE DAILY   
30 mL 2 2024 Active  
  
  
  
                                        Comment on above:   1 spray in each nost  
ril twice daily for 30 days   
   
                                                    azithromycin 250 mg oral   
tablet  
(8 sources)               Macrolide Antimicrobial   Start:   
2023                                          azithromycin 250 mg Tab   
250 mg, Oral, As   
Directed, # 6 tab(s),   
Refills(s) 0, Pharmacy:   
[facility]/pharmacy #[id number],   
172.7, cm, 23   
9:25:00 EDT,   
Height/Length Dosing,   
91.2, kg, 23   
9:25:00 EDT, Weight   
Dosing Start Date:   
23 Status: Ordered  
  
  
  
                                                    Start: 10-  
End: 10-                         take 1 dose by mouth once   
daily                                   Azithromycin Discontinued 250 MG PO   
Daily 2018 12:00am   
2018 11:48am pt states   
she took last dose today  
  
  
  
                                                    benzonatate 100   
mg oral capsule  
(1 source)                              Non-narcotic   
Antitussive                             Start:   
2020                              take 1 capsule   
by mouth three   
times daily                             Tessalon 100 mg   
Cap 100 mg = 1   
cap(s), Oral, TID,   
# 21 EA,   
Refills(s) 0,   
Pharmacy:   
[facility]   
[facility] #[id number], 68,   
cm, 20   
5:49:00 EDT,   
Height/Length   
Dosing, 108.5, kg,   
20 5:49:00   
EDT, Weight Dosing   
Start Date:   
20 Status:   
Ordered  
   
                                                    budesonide 0.25   
mg/ml inhalation   
suspension  
(20 sources)              Corticosteroid            Start:   
2024                              take 0.5 mg by   
inhalation in   
the morning                             budesonide   
(Pulmicort) 0.5   
MG/2ML nebulizer   
solution Inhale   
0.5 mg in the   
morning.   
2024 Active  
  
  
  
                                Start: 2024                 budesonide (PU  
LMICORT) 0.5 mg/2 mL nebulizer solution Use 2   
mL   
via nebulizer once daily. 30 mL 3 2024 Active  
   
                                                    Start: 2021  
End: 2024                                     budesonide (PULMICORT) 0.5 m  
g/2 mL nebulizer solution Use 2 mL   
via nebulizer once daily. 30 Vial 5 2021   
Discontinued  
  
  
  
                                        Comment on above:   Use 2 mL via nebuliz  
er once daily.   
   
                                                    cefadroxil 500 mg   
oral capsule  
(4 sources)                             Cephalosporin   
Antibacterial                           Start:   
  
4  
End:   
  
4                                       take 1 capsule   
by mouth twice   
daily                                   cefADROxil   
(DURICEF) 500 mg   
capsule Take 1   
capsule by mouth   
two times a day   
for 8 days. 16   
capsule 2024 Active  
   
                                                    codeine phosphate 2   
mg/ml / guaiFENesin   
20 mg/ml oral   
solution  
(7 sources)               Opioid Agonist            Start:   
09-  
4  
End:   
09-  
4                                       take 5 mL by   
mouth four times   
daily as needed   
for cough                               guaiFENesin-codein  
e (Robitussin-AC)   
100-10 MG/5ML   
syrup Indications:   
Acute asthmatic   
bronchitis   
(CMS/HCC) , Acute   
cough Take 5 mL by   
mouth 4 (four)   
times a day as   
needed for cough   
for up to 5 days   
180 mL 09/10/2024   
09/15/2024 Active  
  
  
  
                                        Start: 2023   take 1 mL by mouth e  
very   
four hours before mealtime              Codeine-Guaifenesin (Guaifenesin Ac)   
 mg/5 mL liquid Active 5 ML PO   
Q4H 237 5 2023 12:00am  
   
                                Start: 2023                 guaiFENesin-Co  
deine 100-10 MG/5ML 10 mL   
as needed Orally at bedtime for 15 days   
18 May, 2023 Active  
  
  
  
                                                    cyclobenzaprine   
hydrochloride 5 mg   
oral tablet  
(1 source)                Muscle Relaxant           Start:   
2020                              take 1   
tablet by   
mouth twice   
daily as   
needed for   
muscle   
spasms                                  cyclobenzaprine 5 mg   
Tab 5 mg = 1 tab(s),   
Oral, BID, PRN   
Spasm, # 6 EA,   
Refills(s) 0,   
Pharmacy: [facility]   
DRUG STORE #[id number],   
68, cm, 20   
5:49:00 EDT,   
Height/Length   
Dosing, 108.5, kg,   
20 5:49:00   
EDT, Weight Dosing   
Start Date: 20   
Status: Ordered  
   
                                                    dextromethorphan   
hydrobromide 3 mg/ml   
/ promethazine   
hydrochloride 1.25   
mg/ml oral solution  
(10 sources)                            Phenothiazine,   
Uncompetitive   
N-methyl-D-asparta  
te Receptor   
Antagonist,   
Sigma-1 Agonist                         Start:   
2024  
End: 10-                                     promethazine-dextrom  
ethorphan   
(Phenergan-DM)   
6.25-15 MG/5ML syrup   
Indications: Chronic   
cough Take 5 mL by   
mouth every 4 (four)   
hours if needed for   
cough 240 mL   
2024   
10/26/2024 Active  
  
  
  
                                        Start: 2020   take 5 mL by mouth   
every four hours for   
cough                                   dextromethorphan-promethazine 15 mg-6.25  
 mg/5   
mL Oral Syrup 5 mL 5 mL, Oral, q4hr for   
cough, 120 mL, Refill(s) 0 Start Date:   
20 Status: Ordered  
   
                                        Start: 10-   take 1 mL by mouth   
every four to six   
hours                                   Promethazine-Dm Active 5 ML PO EVERY 4-6  
   
HOURS 473 2018 12:00am  
  
  
  
                                                    doxycycline hyclate   
100 mg oral capsule  
(8 sources)               Tetracycline-class Drug   Start: 09-  
End: 2024                                     doxycycline   
(Vibramycin) 100 MG   
capsule Indications:   
Acute asthmatic   
bronchitis (CMS/HCC)   
, Wheezing , Acute   
cough , Nasal   
drainage Take 1   
capsule (100 mg) by   
mouth in the morning   
and 1 capsule (100   
mg) before bedtime.   
Do all this for 7   
days. Take with at   
least 8 ounces (large   
glass) of water, do   
not lie down for 30   
minutes after. 14   
capsule 09/10/2024   
2024 Active  
  
  
  
                                                    Start: 2023  
End: 2023                         take 100 mg by mouth twice   
daily                                   Doxycycline Hyclate Discontinued 100   
MG PO Twice daily 8  12:00am 2023 2:40pm  
  
  
  
                                                    famotidine 40 mg   
oral tablet  
(20 sources)                            Histamine-2   
Receptor   
Antagonist                Start: 2023         take 1 tablet   
by mouth once   
daily in the   
morning                                 famotidine (Pepcid)   
40 MG tablet   
Indications:   
Gastroparesis TAKE   
1 TABLET BY MOUTH   
EVERY DAY IN THE   
MORNING 30 tablet 5   
2024 Active  
   
                                        Comment on above:   Take 40 mg by mouth   
every morning.   
   
                                                    30 actuat   
fluticasone   
furoate 0.1   
mg/actuat /   
vilanterol 0.025   
mg/actuat dry   
powder inhaler  
(2 sources)                             Corticosteroid,   
beta2-Adrenergic   
Agonist             Start: 10-                       Breo Ellipta 100-25   
MCG/ACT aerosol   
powder USE 1   
INHALATION DAILY   
10/21/2024 Active  
  
  
  
                                Start: 10-                 Fluticasone Fu  
roate-Vilanterol (Breo Ellipta) 100-25 mcg/dose  
   
blister with device Active 1 INH INHALATION daily 60  12:00am  
  
  
  
                                                    12 hr guaiFENesin   
600 mg extended   
release oral   
tablet  
(6 sources)                             Start: 2023   take 2   
tablets by   
mouth twice   
daily, then   
take 1 tablet   
by mouth   
every twelve   
hours                                   Guaifenesin   
(Mucinex) 600 mg   
Tablet Extended   
Release 12hr   
Active 1200 MG PO   
Twice daily 16    
12:00am  
   
                                                    Home Oxygen  
(1 source)                      Start: 2019                 Home Oxygen 4   
L/min, Nasal   
Cannula, Daily   
Dyspnea, Refill(s)   
0, Oxygen -   
Continuous or   
Nocturnal   
Diagnosis:   
Portable 02 for   
physician visits,   
oxygen   
conservation Start   
Date: 19   
Status: Ordered  
   
                                                    Ibuprofen  
(10 sources)                            Nonsteroidal   
Anti-inflammatory   
Drug                                                        IBUPROFEN IB ORAL   
Take by mouth as   
needed. Active  
  
  
  
                                                                IBUPROFEN IB ORA  
L Take by mouth as needed. 0 Active  
  
  
  
                                                    Comment on   
above:                                  Take by mouth as needed.   
   
                                                    Insulin Aspart   
U-100 (Novolog   
Flexpen U-100   
Insulin) 100   
unit/mL (3 mL)   
Insulin Pen  
(4 sources)                                         Start:   
  
3                                       inject 1 dose by   
subcutaneous   
injection once before   
mealtime                                Insulin Aspart U-100   
(Novolog Flexpen U-100   
Insulin) 100 unit/mL (3   
mL) Insulin Pen Active   
1 sliding scale dose   
SUBCUT 3x/Day before   
meals & bedtime 3 Carole   
28th, 2023 12:00am  
   
                                                    Insulin Lispro  
(11 sources)    Insulin Analog                                  Insulin Lispro,   
Human,   
(HUMALOG) 100 unit/mL   
crtg Inject   
subcutaneously w MEALS.   
Active  
  
  
  
                                                                Insulin Lispro,   
Human, (HUMALOG) 100 unit/mL crtg Inject subcutaneously w   
MEALS.   
0 Active  
  
  
  
                                        Comment on above:   Inject subcutaneousl  
y w MEALS.   
   
                                                    loratadine 10 mg   
oral tablet  
(1 source)                                          Start:   
20                                      take 1 tablet by   
mouth once daily   
as needed for   
congestion                              Claritin 10 mg Tab   
10 mg = 1 tab(s),   
Oral, Daily, PRN   
Congestion, # 10   
tab(s), Refills(s)   
0, Pharmacy:   
[facility]   
[facility] #[id number], 68,   
cm, 20   
5:49:00 EDT,   
Height/Length   
Dosing, 108.5, kg,   
20 5:49:00   
EDT, Weight Dosing   
Start Date:   
20 Status:   
Ordered  
   
                                                    LORazepam 0.5 mg   
oral tablet  
(20 sources)              Benzodiazepine            Start:   
20                                      take 1 tablet by   
mouth once                              LORazepam (Ativan)   
0.5 MG tablet   
Indications:   
Anxiety and   
depression   
(CMS/HCC) Take 1   
tablet (0.5 mg) by   
mouth every 12   
(twelve) hours 30   
tablet 2024   
Active  
  
  
  
                                        Start: 10-   take 0.5 mg by mouth  
 three times   
daily                                   Lorazepam Active 0.5 MG PO Three   
times daily 2018   
12:00am  
   
                                                            take 1 tablet by jayy  
th every   
twenty-four hours                       LORazepam 0.5 MG 1 tablet at   
bedtime as needed Orally Once a day   
Active  
   
                                                                Ativan Active  
   
                                                                  
  
  
  
                                        Comment on above:   Take 0.5 mg by mouth  
 at bedtime as needed.   
   
                                                    NovoLog FlexPen  
(1 source)                                          Start:   
20                                                  NovoLog FlexPen See   
Instructions, 14   
unit(s) SubCutaneous   
TIDAC, Blood glucose   
Start Date: 3/6/19   
Status: Ordered  
   
                                                    nystatin 251317 unt/ml   
oral suspension  
(1 source)                Polyene Antifungal        Start:   
20                                      take 4 mL by   
mouth four   
times daily                             Nystatin [id number]   
UNIT/ML 4 mL   
Mouth/Throat Four   
times a day for 14   
days 18 May, 2023   
Active  
   
                                                    ondansetron 4 mg   
disintegrating oral   
tablet  
(4 sources)                             Serotonin-3   
Receptor   
Antagonist                              Start:   
20  
End:   
20  
24                                      take 1 tablet   
by mouth every   
eight hours as   
needed                                  ondansetron orally   
disintegrating (ZOFRAN   
ODT) 4 mg   
disintegrating tablet   
Take 1 tablet by mouth   
every 8 hours as   
needed for   
nausea/vomiting. 10   
tablet 2024   
Active  
   
                                                    oxyCODONE hydrochloride   
5 mg oral tablet  
(1 source)                Opioid Agonist            Start:   
08-  
End:   
20                                      take 1 tablet   
by mouth every   
six hours as   
needed                                  oxyCODONE IR   
(ROXICODONE) 5 mg   
immediate release   
tablet Indications:   
Acute post-operative   
pain Take 1 tablet by   
mouth every 6 hours as   
needed for up to 3   
days. 10 tablet   
2024   
Active  
   
                                                    Oxygen  
(14 sources)                                        Start:   
20                                                  Oxygen Active 0 .Route   
2024   
3:17pm As directed at   
4 liters DME [facility]   
Med.  
  
  
  
                                                    Start: 2024  
End: 2024                                     Oxygen Discontinued 0 .Route  
 2024 12:00am 2024 3:18pm As directed at 4 liters  
   
                                                                oxygen (O2) gas   
Inhale 4 L/min continuously. Active  
  
  
  
                                                    Oxygen 4 liters  
(8 sources)                                                     Oxygen 4 liters   
continuous Active  
  
  
  
                                                                  
  
  
  
                                                    OXYGEN, HOME THERAPY,  
(10 sources)                                                    OXYGEN, HOME THE  
RAPY, 4 L/min by Nasal Cannula route as   
directed. Can be off for 3-4 hours without issue. Active  
  
  
  
                                                                OXYGEN, HOME THE  
RAPY, 4 L/min by Nasal Cannula route as directed. Can be off   
for   
3-4 hours without issue. 0 Active  
  
  
  
                                        Comment on above:   4 L/min by Nasal Can  
nula route as directed. Can be off for 3-4  
   
hours without issue.   
   
                                                    Ozempic  
(8 sources)                                                     Ozempic Active  
  
  
  
                                                                  
  
  
  
                                                    Ozempic (0.25 mg or 0.5 mg   
dose)  
(1 source)                      Start: 2019                 Ozempic (0.25   
mg or 0.5 mg dose)   
0.5 mg, SubCutaneous, Monday,   
Refills(s) 0, Blood glucose Start   
Date: 19 Status: Ordered  
   
                                                    predniSONE 20 mg oral tablet  
(20 sources)                    Start: 09-                 predniSONE (De  
ltasone) 20 MG   
tablet Indications: Acute   
asthmatic bronchitis (CMS/HCC)   
Take 20 mg twice daily for 5   
days. Then take 20 mg once daily   
for 5 days. 15 tablet 09/10/2024   
Active  
  
  
  
                                                    Start: 2023  
End: 2023           take 40 mg by mouth once daily Prednisone Discontinued  
 40 MG PO   
Daily 6 3 2023 12:00am   
2023 2:41pm  
   
                                                    Start: 2023  
End: 04-                         take 3 tablets by mouth once   
daily                                   predniSONE 20 mg Tab 60 mg = 3   
tab(s), Oral, Daily, X 7 day(s), #   
21 tab(s), Refills(s) 0, Pharmacy:   
[facility]/pharmacy #[id number], 172.7, cm,   
23 9:25:00 EDT, Height/Length   
Dosing, 91.2, kg, 23 9:25:00   
EDT, Weight Dosing Start Date:   
23 Stop Date: 23 Status:   
Ordered  
   
                                                    Start: 10-  
End: 2023                                     Prednisone Discontinued 10 M  
G PO   
Daily  12:00am   
2023 3:07pm 40 mg qday   
for 4 days, then 20 mg qday for 4   
days, then 10 mg qday for 4 days,   
then stop  
   
                                                    Start: 10-  
End: 10-           take 20 mg by mouth once daily Prednisone Discontinued  
 20 MG PO   
Daily 2018 12:00am   
2018 11:54am x 5 days  
  
  
  
                                                    promethazine   
hydrochloride 25 mg   
oral tablet  
(20 sources)        Phenothiazine       Start: 2024   take 1 tablet   
by mouth every   
eight hours as   
needed for   
nausea and   
vomiting and   
nausea and   
nausea                                  promethazine   
(Phenergan) 25 MG   
tablet Indications:   
Nausea Take 1   
tablet (25 mg) by   
mouth every 8   
(eight) hours if   
needed for nausea   
or vomiting 60   
tablet 1 2024   
Active  
  
  
  
                                        Start: 2024   take 25 mg by mouth   
every six   
hours                                   Promethazine Active 25 MG PO Every 6   
hours 2024 12:00am  
   
                                                                Phenergan Active  
   
                                                                  
  
  
  
                                                    1 mg dose 1.5 ml   
semaglutide 1.34 mg/ml pen   
injector  
(11 sources)                                                inject 2 mg by subcu  
taneous   
injection every week                    semaglutide (OZEMPIC) 1   
mg/dose (2 mg/1.5 mL) pnij   
Inject 2 mg subcutaneously   
one time a week. Active  
  
  
  
                                                            inject 1 mg by subcu  
taneous injection   
every week                              semaglutide (OZEMPIC) 1 mg/dose (2 mg/1.  
5   
mL) pnij Inject 1 mg subcutaneously one   
time a week. 0 Active  
  
  
  
                                        Comment on above:   Inject 1 mg subcutan  
eously one time a week.   
   
                                                            Inject 2 mg subcutan  
eously one time a week.   
   
                                                    Semaglutide (Ozempic) 2   
mg/dose (8 mg/3 mL) pen   
injector  
(3 sources)                                         Start:   
2024                                          Semaglutide (Ozempic) 2   
mg/dose (8 mg/3 mL) pen   
injector Active 2 MG   
SUBCUT 2024   
12:00am  
   
                                                    Semaglutide, 2 MG/DOSE,   
(Ozempic, 2 MG/DOSE,) 8   
MG/3ML solution   
pen-injector  
(10 sources)                                        Start:   
2024  
End:   
2025                                          Semaglutide, 2 MG/DOSE,   
(Ozempic, 2 MG/DOSE,) 8   
MG/3ML solution   
pen-injector   
Indications: Type 2   
diabetes mellitus with   
other diabetic   
neurological   
complication (CMS/HCC)   
Inject 2 mg under the   
skin every 7 (seven)   
days 9 mL 3 2024 Active  
  
  
  
                                                    Start: 2024  
End: 2024                                     Semaglutide, 2 MG/DOSE, (Oze  
mpic, 2 MG/DOSE,) 8 MG/3ML solution  
   
pen-injector Indications: Type 2 diabetes mellitus with other   
diabetic neurological complication (CMS/HCC) Inject 2 mg under   
the skin every 7 (seven) days 9 mL 3 2024   
Discontinued (Reorder)  
   
                                                    Start: 2023  
End: 2024                                     Semaglutide, 2 MG/DOSE, (Oze  
mpic, 2 MG/DOSE,) 8 MG/3ML solution  
   
pen-injector Indications: Type 2 diabetes mellitus with other   
diabetic neurological complication (CMS/HCC) Inject 2 mg under   
the skin every 7 (seven) days. 9 mL 3 2023   
Discontinued (Reorder)  
   
                                                    Start: 2023  
End: 2024                                     Semaglutide, 2 MG/DOSE, (Oze  
mpic, 2 MG/DOSE,) 8 MG/3ML solution  
   
pen-injector Indications: Type 2 diabetes mellitus with other   
diabetic neurological complication (CMS/HCC) Inject 2 mg under   
the skin every 7 (seven) days. 9 mL 3 2023   
Active  
  
  
  
                                                    sodium chloride 0.111   
meq/ml nasal spray  
(14 sources)                            Start: 2024   take 1 spray(s) nasa  
l   
route every two hours                   Ayr 0.65 % nasal spray   
Administer 1 spray into   
affected nostril(s)   
every 2 (two) hours if   
needed 2024   
Active  
  
  
  
                                                    Start: 02-  
End: 2024                                     sodium chloride (DEEP SEA NA  
SAL) 0.65 % nasal spray Use 1 Spray  
   
in the nose as needed. 44 mL 0 02/15/2021 2024 Discontinued  
  
  
  
                                        Comment on above:   Use 1 Spray in the n  
ose as needed.   
   
                                                    traZODone   
hydrochloride 50 mg   
oral tablet  
(8 sources)                             Serotonin   
Reuptake   
Inhibitor                               Start:   
20                                      take 1-2 tablets by   
mouth once daily as   
needed for sleep                        traZODone   
(Desyrel) 50 MG   
tablet   
Indications:   
Anxiety and   
depression   
(CMS/HCC) Take 1-2   
tablets (   
mg) by mouth Daily   
as needed for   
sleep 10 tablet   
2024 Active  
   
                                                    Trelegy Ellipta   
200-62.5-25 MCG/INH  
(4 sources)                                                 take 1 puff(s) by   
inhalation once   
daily                                   Trelegy Ellipta   
200-62.5-25   
MCG/INH 1 puff   
Inhalation Once a   
day Active  
   
                                                    Tresiba FlexTouch  
(1 source)                                          Start:   
20                                      inject 60 [IU] by   
subcutaneous   
injection once daily                    Tresiba FlexTouch   
60 unit(s),   
SubCutaneous,   
Daily, Blood   
glucose Start   
Date: 3/6/19   
Status: Ordered  
  
  
  
Completed/Discontinued Medications  
  
  
  
                      Medication Drug Class(es) Dates      Sig (Normalized) Sig   
(Original)  
   
                                                    albuterol 0.833   
mg/ml / ipratropium   
bromide 0.167 mg/ml   
inhalation solution  
(15 sources)                            Anticholinergic,   
beta2-Adrenergic   
Agonist                                 Start:   
2023  
End:   
2023                              take 1 mL by   
inhalation every   
four to six hours                       Ipratropium-Albuter  
ol Discontinued 3   
ML INHALATION EVERY   
4-6 HOURS 90 2023 12:00am   
2023   
7:56pm  
  
  
  
                                                                ipratropium-albu  
terol (Duo-Neb) 0.5-2.5 mg/3 mL nebulizer solution Take 3 mL   
by   
nebulization every 6 (six) hours. Active  
  
  
  
                                                    amitriptyline   
hydrochloride 25 mg   
oral tablet  
(9 sources)                             Tricyclic   
Antidepressant                          Start:   
10-  
End: 2023                         take 25 mg   
by mouth   
once daily                              Amitriptyline   
Discontinued 25 MG   
PO Daily 2021 12:00am   
2023   
7:54pm  
  
  
  
                                                                Amitriptyline HC  
l Active  
   
                                                                  
  
  
  
                                                    Asenapine  
(7 sources)                             Atypical   
Antipsychotic                           Start: 10-  
End: 2023                         take 10 mg   
under the   
tongue twice   
daily                                   Asenapine Maleate   
Discontinued 10 MG   
SUBLINGUAL Twice   
daily 2018 12:00am 2023 7:58pm  
  
  
  
                                        Start: 10-   take 10 mg under the  
 tongue   
twice daily                             Asenapine Maleate Active 10 MG   
SUBLINGUAL Twice daily 2018 12:00am  
  
  
  
                                                    atorvastatin 20 mg   
oral tablet  
(7 sources)                             HMG-CoA   
Reductase   
Inhibitor                               Start: 10-  
End: 2023                         take 20 mg by   
mouth at   
bedtime                                 Atorvastatin   
Discontinued 20 MG   
PO Bedtime 2018 12:00am   
2023   
7:58pm  
   
                                                    120 actuat   
budesonide 0.16   
mg/actuat /   
formoterol fumarate   
0.0045 mg/actuat   
metered dose   
inhaler  
(7 sources)                             Corticosteroid,   
beta2-Adrenergic   
Agonist                                 Start: 2023  
End: 10-                                     Budesonide-Formoter  
ol (Symbicort)   
160-4.5   
mcg/actuation HFA   
aerosol inhaler   
Discontinued 1 INH   
INHALATION Twice   
daily 10.2 2023 12:00am   
2024   
2:31pm  
  
  
  
                                                take 2 puff(s) by inhalation twi  
ce daily Symbicort 160-4.5 MCG/ACT 2 puffs   
Inhalation Twice a day Active  
  
  
  
                                                    cefdinir 300 mg   
oral capsule  
(7 sources)                             Cephalosporin   
Antibacterial                           Start: 10-  
End: 10-                         take 300 mg   
by mouth   
every twelve   
hours                                   Cefdinir   
Discontinued 300   
MG PO Q12H 2018 12:00am   
2018   
11:48am x5 days  
   
                                                    furosemide 40 mg   
oral tablet  
(9 sources)               Loop Diuretic               
End: 2024                                     furosemide (LASIX)   
40 mg tablet Take   
40 mg by mouth as   
needed. 0   
2024   
Discontinued   
(Course of therapy   
completed)  
   
                                        Comment on above:   Take 40 mg by mouth   
as needed.   
   
                                                    insulin aspart,   
human  
(9 sources)               Insulin Analog              
End: 2024                                     insulin aspart   
(NOVOLOG FLEXPEN   
U-100 INSULIN   
SUBCUTANEOUS)   
Inject   
subcutaneously.   
sliding scale 0   
2024   
Discontinued   
(Course of therapy   
completed)  
  
  
  
                                                                insulin aspart (  
NOVOLOG FLEXPEN U-100 INSULIN SUBCUTANEOUS) Inject   
subcutaneously. sliding scale 0 Active  
  
  
  
                                        Comment on above:   Inject subcutaneousl  
y. sliding scale   
   
                                                    3 ml insulin   
degludec 100 unt/ml   
pen injector  
(8 sources)               Insulin Analog            Start:   
2019  
End:   
2024                                          insulin degludec   
(TRESIBA FLEXTOUCH   
U-100) 100 unit/mL (3   
mL) injection pen Inject   
subcutaneously. 0   
2019   
Discontinued (Course of   
therapy completed)  
   
                                        Comment on above:   Inject subcutaneousl  
y.   
   
                                                    Insulin Lispro   
(Humalog Kwikpen   
Insulin) 100 unit/mL   
insulin pen  
(6 sources)                                         Start:   
2023  
End:   
2023                                          Insulin Lispro (Humalog   
Kwikpen Insulin) 100   
unit/mL insulin pen   
Discontinued SUBCUT   
2023 12:00am   
2023 2:40pm   
sliding scale  
  
  
  
                                Start: 2023                 Insulin Lispro  
 (Humalog Kwikpen Insulin) 100 unit/mL insulin   
pen   
Active SUBCUT 2023 12:00am sliding scale  
  
  
  
                                                    insulin, regular,   
human 500 unt/ml   
injectable solution  
(20 sources)              Insulin                   Start: 10-  
End: 2023                         inject 0.05 mL by   
subcutaneous   
injection once daily   
at dinner                               Insulin Regular Hum   
U-500 Conc   
Discontinued 25 UNIT   
SUBCUT Daily before   
supper 2018 12:00am 2023 7:56pm pt   
reports 0.22 ml with   
breakfast, 0.10 ml   
with lunch, 0.05 ml   
with dinner using   
u-100 insulin   
syringe to draw up   
u-500 unsulin  
  
  
  
                                                    Start: 10-  
End: 2023                         inject 0.05 mL by subcutaneous   
injection once daily at dinner          Insulin Regular Hum U-500 Conc   
Discontinued 110 UNIT SUBCUT   
Daily before breakfast 2018 12:00am 2023 7:56pm pt reports 0.22 ml   
with breakfast, 0.10 ml with   
lunch, 0.05 ml with dinner using   
u-100 insulin syringe to draw up   
u-500 unsulin  
   
                                                    Start: 10-  
End: 2023                         inject 0.05 mL by subcutaneous   
injection once daily at dinner          Insulin Regular Hum U-500 Conc   
Discontinued 50 UNIT SUBCUT Daily   
before lunch 2018   
12:00am 2023 7:56pm   
pt reports 0.22 ml with   
breakfast, 0.10 ml with lunch,   
0.05 ml with dinner using u-100   
insulin syringe to draw up u-500   
unsulin  
  
  
  
                                                    ipratropium   
bromide 0.2 mg/ml   
inhalation   
solution  
(7 sources)               Anticholinergic           Start:   
10-  
End: 2023                         take 1 mL by   
inhalation four   
times daily                             Ipratropium   
Bromide   
Discontinued 2.5   
ML INHALATION   
Four times daily   
125 2018 12:00am   
2023   
7:56pm  
   
                                                    levalbuterol 2.5   
mg/ml inhalation   
solution  
(9 sources)                             beta2-Adrenergic   
Agonist                                   
End: 2024                                     Levalbuterol HCl   
(XOPENEX) 1.25   
mg/0.5 mL   
nebulizer   
solution Use 1   
Ampule via   
nebulizer every 8   
hours as needed.   
0 2024   
Discontinued   
(Course of   
therapy   
completed)  
   
                                        Comment on above:   Use 1 Ampule via neb  
ulizer every 8 hours as needed.   
   
                                                    levoFLOXacin 500   
mg oral tablet  
(11 sources)                            Quinolone   
Antimicrobial                           Start:   
2023  
End: 2024                         take 500 mg by   
mouth once   
daily                                   Levofloxacin   
Discontinued 500   
MG PO Daily 5 5   
2023   
12:00am 2024 3:11pm   
start 23 pm  
  
  
  
                                                    Start: 10-  
End: 2023                         take 750 mg by mouth once   
daily                                   Levofloxacin Discontinued 750 MG PO   
Daily 4 4 2018 12:00am   
2023 7:56pm  
  
  
  
                                                    methylPREDNISolone 32   
mg oral tablet  
(4 sources)               Corticosteroid            Start:   
2023  
End:   
04-                              take 1 dose   
by mouth   
once daily   
in the   
morning                                 Methylprednisolone   
Discontinued 32 MG PO   
Daily  12:00am 2024 3:55pm   
next dose 23 am  
   
                                                    naproxen 250 mg oral   
tablet  
(7 sources)                             Nonsteroidal   
Anti-inflammatory   
Drug                                    Start:   
10-  
End:   
2023                              take 250 mg   
by mouth   
once at   
mealtime                                Naproxen Discontinued   
250 MG PO 3x/Day with   
meals  12:00am   
2023   
7:56pm  
   
                                                    omeprazole 40 mg   
delayed release oral   
capsule  
(20 sources)                            Proton Pump   
Inhibitor                               Start:   
10-  
End:   
10-                              take 40 mg   
by mouth   
once daily                              Omeprazole   
Discontinued 40 MG PO   
Daily 2021 12:00am 2021 8:52am  
  
  
  
                                                    Start: 09-  
End: 2023           take 40 mg by mouth twice daily Omeprazole Discontinue  
d 40 MG PO  
   
Twice daily    
12:00am 2023 7:56pm  
   
                                                            take 1 capsule by mo  
uth once   
daily                                   Omeprazole 20 MG 1 capsule 30   
minutes before morning meal Orally   
Once a day Not-Taking  
  
  
  
                                                    oxymetazoline hydrochloride   
0.5   
mg/ml nasal spray  
(9 sources)                                         Start: 02-  
End: 2024                                     oxymetazoline (AFRIN,   
OXYMETAZOLINE,) 0.05 % nasal   
spray Instill 2 Sprays in the   
nose twice daily. 30 mL 0   
02/15/2021 2024   
Discontinued  
  
  
  
                                Start: 2020                 Afrin 0.05% Sp  
ray 2 spray(s), Nasal, BID Congestion, 15 mL,   
Refill(s) 0, [id number], 68, cm, 20   
5:49:00 EDT, Height/Length Dosing, 108.5, kg, 20 5:49:00   
EDT, Weight Dosing Start Date: 20 Status: Ordered  
  
  
  
                                        Comment on above:   Instill 2 Sprays in   
the nose twice daily.   
   
                                                    prazosin 1 mg oral   
capsule  
(7 sources)                             alpha-Adrenergic   
Blocker                                 Start:   
10-11-20  
18  
End:   
20                                      take 1 mg by mouth   
at bedtime                              Prazosin   
Discontinued 1 MG PO   
Bedtime 2018 12:00am   
2023   
7:56pm  
   
                                                    rosuvastatin calcium   
10 mg oral tablet  
(15 sources)                            HMG-CoA Reductase   
Inhibitor                               Start:   
20  
End:   
20                                      take 1 tablet by   
mouth once daily in   
the morning                             rosuvastatin   
(CRESTOR) 10 mg   
tablet Take 10 mg by   
mouth every morning.   
0 2024   
Discontinued (Course   
of therapy   
completed)  
   
                                        Comment on above:   Take 10 mg by mouth   
every morning.   
   
                                                    Semaglutide  
(7 sources)                                         Start:   
10-01-20  
21  
End:   
20                                      inject 1 mg by   
subcutaneous   
injection every week                    Semaglutide   
(Ozempic) 1 mg/dose   
(4 mg/3 mL) pen   
injector   
Discontinued 2 MG   
SUBCUT every week   
2021   
12:00am 2024 3:11pm   
  
  
  
  
                                        Start: 10-   inject 1 mg by subcu  
taneous   
injection every week                    Semaglutide (Ozempic) 1 mg/dose   
(4 mg/3 mL) pen injector Active 2   
MG SUBCUT every week 2021 12:00am   
   
                                        Start: 10-   inject 1 mg by subcu  
taneous   
injection every week                    Semaglutide (Ozempic) 1 mg/dose   
(4 mg/3 mL) pen injector Active 1   
MG SUBCUT every week 2021 12:00am  
  
  
  
                                                    traMADol   
hydrochloride 50 mg   
oral tablet  
(7 sources)               Opioid Agonist            Start: 10-  
End: 2023                         take 50 mg   
by mouth   
three times   
daily                                   Tramadol   
Discontinued 50 MG   
PO Three times   
daily 15 5 October   
11th, 2018 12:00am   
2023   
7:55pm  
   
                                                    24 hr divalproex   
sodium 500 mg   
extended release oral   
tablet  
(14 sources)                            Mood   
Stabilizer,   
Anti-epileptic   
Agent                                   Start: 10-  
End: 2023                         take 1000 mg   
by mouth   
once daily                              Divalproex   
Discontinued 1000   
MG PO Daily 2018 12:00am   
2023   
7:57pm  
  
  
  
                                                    Start: 10-  
End: 2023                         take 500 mg by mouth once daily   
at bedtime                              Divalproex Discontinued 500 MG PO   
Daily at bedtime 2018   
12:00am 2023 7:57pm  
  
  
  
Problems  
Active Problems  
  
  
                      Problem Classification Problem    Date       Documented Da  
te Episodic/Chronic  
   
                                                    Anxiety disorders  
(20 sources)                            Generalized anxiety   
disorder;   
Translations:   
[Posttraumatic   
stress disorder]                        Onset:   
2023                Chronic  
   
                                                    Asthma  
(20 sources)                            Mild intermittent   
asthma;   
Translations: [Mild   
intermittent asthma,   
uncomplicated]                          Onset:   
05-                10-                Chronic  
   
                                                    Chronic obstructive   
pulmonary disease and   
bronchiectasis  
(20 sources)                            Chronic obstructive   
pulmonary disease,   
unspecified;   
Translations:   
[Chronic obstructive   
lung disease]                           Onset:   
2017                                          Chronic  
   
                                                    Conditions associated   
with dizziness or   
vertigo  
(6 sources)                             Dizziness and   
giddiness;   
Translations:   
[Peripheral vertigo]                    Onset:   
2022                                          Episodic  
   
                                                    Congestive heart   
failure;   
nonhypertensive  
(11 sources)                            Congestive heart   
failure;   
Translations:   
[Diastolic heart   
failure]                                Onset:   
2022                Chronic  
   
                                                    Diabetes mellitus with   
complications  
(20 sources)                            Type 2 diabetes   
mellitus with other   
diabetic   
neurological   
complication;   
Translations: [Type   
2 diabetes mellitus   
with hyperglycemia]                     Onset:   
2022  
Resolved:   
2023                                          Chronic  
   
                                                    Diabetes mellitus   
without complication  
(20 sources)                            Type 2 diabetes   
mellitus without   
complications;   
Translations:   
[Diabetes mellitus]                     Onset:   
2017                Chronic  
   
                                                    Diseases of white   
blood cells  
(1 source)                              Elevated white blood   
cell count,   
unspecified;   
Translations:   
[ELEVATED WHITE   
BLOOD CELL COUNT   
UNS]                                    Onset:   
2022                                          Chronic  
   
                                                    Disorders of lipid   
metabolism  
(7 sources)                             Hyperlipidemia;   
Translations:   
[Hyperlipidemia,   
unspecified]                            10-          Chronic  
   
                                                    Esophageal disorders  
(17 sources)                            Gastro-esophageal   
reflux disease   
without esophagitis;   
Translations:   
[Gastroesophageal   
reflux disease]                         Onset:   
2017  
Resolved:   
2021                                          Chronic  
   
                                        Comment on above:   on and off   
   
                                                    Headache; including   
migraine  
(18 sources)                            Migraine;   
Translations:   
[Migraine,   
unspecified, not   
intractable, without   
status migrainosus]                     Onset:   
2023                Chronic  
   
                                        Comment on above:   on and off   
   
                                                    Headache; including   
migraine  
(2 sources)                             Vascular headache;   
Translations:   
[Vascular headache,   
not elsewhere   
classified]                             2024          Episodic  
   
                                                    Headache; including   
migraine  
(3 sources)                             Headache; including   
migraine;   
Translations:   
[HEADACHE   
UNSPECIFIED]                            Onset:   
2023                                            
   
                                                    Immunity disorders  
(11 sources)                            Hypogammaglobulinemi  
a; Translations:   
[Nonfamilial   
hypogammaglobulinemi  
a]                                      2014          Chronic  
   
                                                    Intestinal infection  
(1 source)                              Clostridium   
difficile diarrhea                      2017          Episodic  
   
                                        Comment on above:   Problem added second  
shanthi to positive C-Diff lab result.   
   
                                                    Mood disorders  
(20 sources)                            Bipolar disorder;   
Translations:   
[Bipolar disorder,   
unspecified]                            2018          Chronic  
   
                                                    Mycoses  
(1 source)                              Candidiasis of   
mouth; Translations:   
[Candidal   
stomatitis]                                                 Episodic  
   
                                                    Nonspecific chest pain  
(6 sources)                             Chest pain,   
unspecified;   
Translations: [Other   
chest pain]                             Onset:   
2022                                          Episodic  
   
                                                    Osteoarthritis  
(11 sources)                            Arthritis;   
Translations:   
[Unspecified   
osteoarthritis,   
unspecified site]                       2014          Chronic  
   
                                                    Other aftercare  
(1 source)                              Other long term   
(current) drug   
therapy;   
Translations: [OTH   
LONG TERM CURRENT   
DRUG THERAPY]                           Onset:   
2023                                          Episodic  
   
                                                    Other circulatory   
disease  
(1 source)      Vascular disorder                 2017      Episodic  
   
                                                    Other connective   
tissue disease  
(1 source)      Fibromyalgia                    2017      Episodic  
   
                                                    Other connective   
tissue disease  
(1 source)      Plantar fasciitis                 2015      Episodic  
   
                                        Comment on above:   LEFT FOOT   
   
                                                    Other ear and sense   
organ disorders  
(2 sources)                             Sensorineural   
hearing loss,   
bilateral;   
Translations:   
[Sensorineural   
hearing loss,   
bilateral]                              2024          Chronic  
   
                                                    Other ear and sense   
organ disorders  
(1 source)                              Bilateral tinnitus;   
Translations:   
[Tinnitus,   
bilateral]                              2024          Episodic  
   
                                                    Other ear and sense   
organ disorders  
(1 source)                              Ear pressure   
sensation;   
Translations: [Other   
specified disorders   
of ear, bilateral]                      2024          Episodic  
   
                                                    Other ear and sense   
organ disorders  
(1 source)                              Bilateral earache;   
Translations:   
[Otalgia, bilateral]                     2024          Episodic  
   
                                                    Other injuries and   
conditions due to   
external causes  
(1 source)      Injury of ankle                 2020      Episodic  
   
                                                    Other lower   
respiratory disease  
(4 sources)                             Shortness of breath;   
Translations:   
[SHORTNESS OF   
BREATH]                                 Onset:   
2023                                          Episodic  
   
                                                    Other lower   
respiratory disease  
(1 source)                              Chronic cough;   
Translations:   
[Chronic cough]                         2024          Episodic  
   
                                                    Other nervous system   
disorders  
(12 sources)                            Cervical myelopathy;   
Translations:   
[Disease of spinal   
cord, unspecified]                      Onset:   
2014                Chronic  
   
                                                    Other nervous system   
disorders  
(1 source)                              Other acute   
postprocedural pain;   
Translations: [Acute   
post-operative pain]                    Onset:   
2024                                          Episodic  
   
                                                    Other nutritional;   
endocrine; and   
metabolic disorders  
(1 source)      Obesity                         2020      Chronic  
   
                                                    Other nutritional;   
endocrine; and   
metabolic disorders  
(1 source)                              Alpha-1-antitrypsin   
deficiency;   
Translations:   
[ALPHA-1-ANTITRYPSIN   
DEFICIENCY]                             Onset:   
2023                                          Chronic  
   
                                                    Other nutritional;   
endocrine; and   
metabolic disorders  
(11 sources)                            Obese class II;   
Translations:   
[Obesity,   
unspecified]                            Onset:   
2021                Chronic  
   
                                                    Other nutritional;   
endocrine; and   
metabolic disorders  
(12 sources)                            Alpha-1-antitrypsin   
deficiency;   
Translations:   
[Alpha-1-antitrypsin   
deficiency]                             Onset:   
2023                Chronic  
   
                                                    Other nutritional;   
endocrine; and   
metabolic disorders  
(11 sources)                            Overweight;   
Translations:   
[Overweight]                            2014          Episodic  
   
                                                    Other upper   
respiratory disease  
(1 source)                              Other seasonal   
allergic rhinitis;   
Translations: [OTHER   
SEASONAL ALLERGIC   
RHINITIS]                               Onset:   
2017                                          Chronic  
   
                                                    Other upper   
respiratory disease  
(7 sources)                             Allergic rhinitis;   
Translations:   
[Allergic rhinitis,   
unspecified]                            2024          Chronic  
   
                                                    Other upper   
respiratory disease  
(1 source)                              Allergic rhinitis,   
unspecified                                                 Chronic  
   
                                                    Other upper   
respiratory disease  
(1 source)                              Acute bronchospasm;   
Translations: [ACUTE   
BRONCHOSPASM]                           Onset:   
2023                                          Episodic  
   
                                                    Other upper   
respiratory infections  
(17 sources)                            Chronic maxillary   
sinusitis;   
Translations:   
[Chronic   
pansinusitis]                           Onset:   
2023                Chronic  
   
                                                    Otitis media and   
related conditions  
(1 source)                              Dysfunction of   
bilateral eustachian   
tubes; Translations:   
[Unspecified   
Eustachian tube   
disorder, bilateral]                     2024          Episodic  
   
                                                    Pneumonia (except that   
caused by tuberculosis   
or sexually   
transmitted disease)  
(12 sources)                            Pneumonia;   
Translations:   
[Pneumonia,   
unspecified   
organism]                               Onset:   
2014                Episodic  
   
                                                    Pulmonary heart   
disease  
(8 sources)                             Pulmonary   
hypertension;   
Translations:   
[Pulmonary   
hypertension,   
unspecified]                            Onset:   
2023                Chronic  
   
                                                    Residual codes;   
unclassified  
(6 sources)                             Tobacco dependence   
syndrome;   
Translations:   
[Tobacco use]                                               Episodic  
   
                                                    Residual codes;   
unclassified  
(5 sources)                             Tobacco use;   
Translations:   
[Tobacco use   
disorder]                                                   Episodic  
   
                                                    Residual codes;   
unclassified  
(1 source)      Chronic pain                    2015      Episodic  
   
                                        Comment on above:   right hip pain   
   
                                                    Residual codes;   
unclassified  
(7 sources)                             Tobacco user;   
Translations:   
[Tobacco use]                           10-          Episodic  
   
                                                    Residual codes;   
unclassified  
(1 source)                              Acquired absence of   
other specified   
parts of digestive   
tract; Translations:   
[ACQ ABSENCE OTH   
PART DIGESTV TRACT]                     Onset:   
2023                                          Episodic  
   
                                                    Residual codes;   
unclassified  
(1 source)                              Acquired absence of   
both cervix and   
uterus;   
Translations:   
[ACQUIRED ABSENCE   
BOTH CERVIX AND   
UTERUS]                                 Onset:   
2023                                          Episodic  
   
                                                    Residual codes;   
unclassified  
(1 source)                              Past history of   
procedure;   
Translations:   
[Personal history of   
other medical   
treatment]                              2024          Episodic  
   
                                                    Respiratory failure;   
insufficiency; arrest  
(20 sources)                            Dependence on   
supplemental oxygen;   
Translations:   
[Dependence on   
supplemental oxygen]                    Onset:   
2017                Chronic  
   
                                                    Rheumatoid arthritis   
and related disease  
(20 sources)                            Rheumatoid   
arthritis;   
Translations:   
[Rheumatoid   
arthritis,   
unspecified]                            Onset:   
2022                Chronic  
   
                                                    Skin and subcutaneous   
tissue infections  
(1 source)      Infection of foot                 2019      Episodic  
   
                                        Comment on above:   left   
   
                                                    Spondylosis;   
intervertebral disc   
disorders; other back   
problems  
(20 sources)                            Prolapsed cervical   
intervertebral disc;   
Translations:   
[Cervical disc   
prolapse with   
myelopathy]                             Onset:   
2014                Chronic  
   
                                                    Substance-related   
disorders  
(20 sources)                            Nicotine dependence,   
cigarettes,   
uncomplicated;   
Translations:   
[Nicotine   
dependence]                             Onset:   
2014                Chronic  
   
                                        Comment on above:   Added secondary to d  
ocumentation in Social History.   
   
                                                    Unclassified  
(2 sources)                             Unknown /   
UNK(Unknown)                            Onset:   
2017                                            
   
                                                    Unclassified  
(1 source)                              Entire plantar   
aponeurosis (body   
structure)                              2015            
   
                                        Comment on above:   right ankle   
   
                                                    Unclassified  
(1 source)                              CONTACT W/AND (SUSP)   
EXPOS COVID-19;   
Translations:   
[CONTACT W/AND   
(SUSP) EXPOS   
COVID-19]                               Onset:   
10-                                            
   
                                                    Viral infection  
(10 sources)                            Disease caused by   
2019-nCoV;   
Translations:   
[COVID-19]                              2023          Episodic  
   
                                                    Viral infection  
(2 sources)                             Disease caused by   
2019-nCoV;   
Translations:   
[COVID-19]                              Onset:   
2023                                            
  
  
Past or Other Problems  
  
  
                      Problem Classification Problem    Date       Documented Da  
te Episodic/Chronic  
   
                                                    Acute bronchitis  
(1 source)                              Acute bronchitis,   
unspecified;   
Translations: [ACUTE   
BRONCHITIS   
UNSPECIFIED]                            Onset:   
2022                                          Episodic  
   
                                                    Administrative/social   
admission  
(9 sources)                             Encounter for issue   
of repeat   
prescription;   
Translations:   
[Patient encounter   
status]                                 Onset:   
2022                Episodic  
   
                                                    Allergic reactions  
(1 source)                              Allergy status to   
penicillin;   
Translations:   
[ALLERGY STATUS TO   
PENICILLIN]                             Onset:   
2017                                          Episodic  
   
                                                    Cardiac dysrhythmias  
(1 source)                              Tachycardia,   
unspecified;   
Translations:   
[TACHYCARDIA   
UNSPECIFIED]                            Onset:   
2022                                          Episodic  
   
                                                    E Codes: Adverse   
effects of medical   
drugs  
(1 source)                              Adverse effect of   
glucocorticoids and   
synthetic analogues,   
initial encounter;   
Translations: [ADVRS   
EFF GLUCOCORT SYN   
ANALOG INIT]                            Onset:   
2022                                          Episodic  
   
                                                    Malaise and fatigue  
(1 source)                              Other fatigue;   
Translations: [OTHER   
FATIGUE]                                Onset:   
2022                                          Episodic  
   
                                                    Nausea and vomiting  
(1 source)                              Nausea; Translations:   
[NAUSEA]                                Onset:   
2022                                          Episodic  
   
                                                    Other aftercare  
(2 sources)                             Long term (current)   
use of insulin;   
Translations: [LONG   
TERM (CURRENT) USE OF   
INSULIN]                                Onset:   
2017                                          Episodic  
   
                                                    Other aftercare  
(8 sources)                             Post-discharge   
follow-up;   
Translations:   
[Encounter for   
follow-up examination   
after completed   
treatment for   
conditions other than   
malignant neoplasm]                     Onset:   
2023  
Resolved:   
2024                Episodic  
   
                                                    Other connective   
tissue disease  
(4 sources)                             Psoas tendinitis,   
right hip;   
Translations: [PSOAS   
TENDINITIS, RIGHT   
HIP]                                    Onset:   
2017                                          Episodic  
   
                                                    Other connective   
tissue disease  
(1 source)                              Pain in left leg;   
Translations: [PAIN   
IN LEFT LEG]                            Onset:   
10-                                          Episodic  
   
                                                    Other connective   
tissue disease  
(11 sources)                            History of cervical   
spine fusion;   
Translations:   
[Arthrodesis status]                    Onset:   
2014                Episodic  
   
                                                    Other disorders of   
stomach and duodenum  
(11 sources)                            Gastroparesis   
syndrome;   
Translations:   
[Gastroparesis]                         Onset:   
2023                Episodic  
   
                                                    Other disorders of   
stomach and duodenum  
(1 source)                Gastroparesis             Onset:   
2021  
Resolved:   
2021                                          Episodic  
   
                                                    Other fractures  
(8 sources)                             History of stress   
fracture;   
Translations:   
[Personal history of   
(healed) stress   
fracture]                               Onset:   
2024                Episodic  
   
                                                    Other gastrointestinal   
disorders  
(2 sources)                             Diarrhea;   
Translations:   
[Diarrhea,   
unspecified]                                                Episodic  
   
                                                    Other injuries and   
conditions due to   
external causes  
(8 sources)                             Rupture of muscle;   
Translations: [Other   
injury of unspecified   
body region, initial   
encounter]                              Onset:   
10-  
Resolved:   
2024                Episodic  
   
                                                    Other lower   
respiratory disease  
(8 sources)                             Dyspnea on exertion;   
Translations: [Other   
forms of dyspnea]                       Onset:   
2023                Episodic  
   
                                                    Other lower   
respiratory disease  
(2 sources)                             Wheezing;   
Translations:   
[Wheezing]                              09-          Episodic  
   
                                                    Other lower   
respiratory disease  
(2 sources)                             Cough; Translations:   
[Acute cough]                           09-          Episodic  
   
                                                    Other nervous system   
disorders  
(1 source)                              Atypical facial pain;   
Translations:   
[ATYPICAL FACIAL   
PAIN]                                   Onset:   
2022                                          Episodic  
   
                                                    Other screening for   
suspected conditions   
(not mental disorders   
or infectious disease)  
(8 sources)                             Patient encounter   
status; Translations:   
[Encounter for   
screening mammogram   
for malignant   
neoplasm of breast]                     Onset:   
2024                Episodic  
   
                                                    Other skin disorders  
(1 source)                              Generalized   
hyperhidrosis;   
Translations:   
[GENERALIZED   
HYPERHIDROSIS]                          Onset:   
2022                                          Episodic  
   
                                                    Other upper   
respiratory disease  
(2 sources)                             Nasal discharge;   
Translations: [Other   
specified disorders   
of nose and nasal   
sinuses]                                09-          Episodic  
   
                                                    Phlebitis;   
thrombophlebitis and   
thromboembolism  
(20 sources)                            Deep venous   
thrombosis;   
Translations:   
[Personal history of   
other venous   
thrombosis and   
embolism]                               Onset:   
2014                Episodic  
   
                                        Comment on above:   left leg, after surg  
michelle pt stated   
   
                                                            ultrasound 14 n  
eg- per pt.   
   
                                                    Residual codes;   
unclassified  
(8 sources)                             Obstructive sleep   
apnea syndrome;   
Translations:   
[Obstructive sleep   
apnea (adult)   
(pediatric)]                            Onset:   
2023  
Resolved:   
2024                Chronic  
   
                                                    Residual codes;   
unclassified  
(9 sources)                             Insomnia;   
Translations:   
[Insomnia,   
unspecified]                            Onset:   
2023                Episodic  
   
                                                    Residual codes;   
unclassified  
(1 source)                              Personal history of   
other specified   
conditions;   
Translations:   
[PERSONAL HISTORY OTH   
SPEC CONDITION]                         Onset:   
2022                                          Episodic  
   
                                                    Residual codes;   
unclassified  
(11 sources)                            H/O: anticoagulant   
therapy;   
Translations:   
[Personal history of   
other drug therapy]                     Onset:   
2014                Episodic  
   
                                                    Residual codes;   
unclassified  
(8 sources)                             Postmenopausal state;   
Translations:   
[Asymptomatic   
menopausal state]                       Onset:   
2024                Episodic  
   
                                                    Unclassified  
(1 source)                              Post-acute COVID-19   
(disorder);   
Translations: [Post   
COVID-19 condition,   
unspecified]                                                  
  
  
  
Results  
  
  
                          Test Name    Value        Interpretation Reference   
[facility]                                   Facility  
   
                                                    Laboratory - Microbiology an  
d Antimicrobial susceptibilityon 09-   
   
                                                    SARS-CoV-2 (COVID-19)   
RNA CHRISTOS+probe Ql (Unsp   
spec)           Negative                                        [facility]  
   
                                                    No Panel Informationon 09-10  
-2024   
   
                      FLU A      Negative                         [facility]  
   
                      FLU B      Negative                         [facility]  
   
                                                                  [facility]  
   
                                                    CNOVon 2024   
   
                                        CNOV                Office Visit (OTOLMN  
)  
-----------------------  
-----------  
BEV [facility]   
([id number]) 1965 F   
[facility]*  
Date Time Provider   
Department  
24 4:00 PM   
DIANA RODRIGUEZ OTOLMN  
During your visit   
today, we recorded the   
following information   
about you:  
Florecita Adams MA   
2024 3:49 PM   
Signed  
Tobacco Use: .5   
packs/day Types:   
Cigarettes  
Was smoking cessation   
packet given? Patient   
Declined  
Was a referral   
initiated?Patient   
declined.  
Diana Rodriguez MD   
2024 4:52 PM   
Signed  
SECTION OF RHINOLOGY,   
SINUS AND SKULL BASE   
SURGERY  
Head and Neck   
[place] NOTE  
HPI: Patient is a 58   
year old Female   
presenting s/p   
Procedure: NASAL/SINUS  
ENDOSCOPY SURGICAL W/   
MAXILLARY ANTROSTOMY W/   
REMOVAL OF TISSUE FROM   
MAXILLARY  
SINUS on 24 .   
Doing well, no   
complaints has no real   
bad pain took tylenol  
for pain.  
PAST MEDICAL HISTORY  
No date: Arthritis  
No date: COPD (chronic   
obstructive pulmonary   
disease) ([facility])  
No date: Depression  
Comment: sees a psych  
No date: Diabetes ([facility])  
No date: DVT (deep   
venous thrombosis)   
([facility])  
Comment: Left lower   
extremity s/p ankle   
surgery  
No date:   
Hypogammaglobulinemia   
([facility])  
No date: On home oxygen   
therapy  
Comment: 4L NC  
No date: Overweight  
No date: Pneumonia  
No date: PONV   
(postoperative nausea   
and vomiting)  
No date: RA (rheumatoid   
arthritis) ([facility])  
PAST SURGICAL HISTORY  
No date: ANKLE LEFT OP   
SURGERY  
Comment: plantar tendon  
No date:   
CHOLECYSTECTOMY  
No date: HERNIA REPAIR   
HX  
No date: HIP RIGHT OP   
SURGERY  
Comment: tendon repair  
No date: HYSTERECTOMY   
HX  
Comment: total  
No date: KNEE RIGHT OP   
SURGERY  
Comment: tendon repair  
No date: SHOULDER   
ARTHROSCOPY/SURGERY  
Comment: bilateral   
tendon repair  
FAMILY HISTORY  
Problem Relation Age of   
Onset  
Hypertension Mother  
Arthritis Mother  
Cancer Father  
Hypertension Brother  
Social History  
Tobacco Use  
Smoking status: Every   
Day  
Current packs/day: 0.50  
Average packs/day: 0.5   
packs/day for 36.7   
years (18.3 ttl pk-yrs)  
Types: Cigarettes  
Start date:   
Smokeless tobacco:   
Never  
Vaping Use  
Vaping status: Never   
Used  
Substance Use Topics  
Alcohol use: No  
Drug use: No  
Comment: denies tx for   
drug/alcohol abuse in   
the past.  
Current Outpatient   
Medications  
Medication Sig Dispense   
Refill  
sodium chloride (AYR   
SALINE) 0.65 % nasal   
spray Use 1 Spray in   
the nose every  
2 hours as needed. 50   
mL 3  
cefADROxil (DURICEF)   
500 mg capsule Take 1   
capsule by mouth two   
times a day  
for 8 days. 16 capsule   
0  
ondansetron orally   
disintegrating (ZOFRAN   
ODT) 4 mg   
disintegrating tablet   
Take  
1 tablet by mouth every   
8 hours as needed for   
nausea/vomiting. 10   
tablet 0  
promethazine   
(PHENERGAN) 25 mg   
tablet 25 mg every 8   
hours as needed.  
azelastine 0.1% nasal   
spray SPRAY 1 SPRAY   
INTO EACH NOSTRIL TWICE   
DAILY 30 mL  
2  
albuterol (PROVENTIL)   
2.5 mg /3 mL (0.083 %)   
nebulizer solution 2.5   
mg as  
needed.  
albuterol HFA   
(PROVENTIL HFA,   
VENTOLIN HFA) 90   
mcg/actuation inhaler   
INHALE 1  
PUFF EVERY 4 HOURS AS   
NEEDED FOR WHEEZE OR   
FOR SHORTNESS OF BREATH  
famotidine (PEPCID) 40   
mg tablet Take 40 mg by   
mouth every morning.  
IBUPROFEN IB ORAL Take   
by mouth as needed.  
LORazepam (ATIVAN) 0.5   
mg Take 0.5 mg by mouth   
at bedtime as needed.  
OXYGEN, HOME THERAPY, 4   
L/min by Nasal Cannula   
route as directed. Can   
be off  
for 3-4 hours without   
issue.  
semaglutide (OZEMPIC) 1   
mg/dose (2 mg/1.5 mL)   
pnij Inject 2 mg   
subcutaneously  
one time a week.  
Insulin Lispro, Human,   
(HUMALOG) 100 unit/mL   
crtg Inject   
subcutaneously w  
MEALS. (Patient not   
taking: Reported on   
2024)  
No current   
facility-administered   
medications for this   
visit.  
ALLERGIES  
Allergen Reactions  
Effexor [Venlafaxin*   
Other: See Comments  
Seizures.  
Penicillins Rash, Hives  
difficult to breathe,   
swelling  
Bactrim [Sulfametho*   
Itching  
Reglan [Metoclopram*   
Mental Status Change  
Topamax [Topiramate]   
Mental Status Change  
ROS:  
CONSTITUTIONAL: No   
fevers, chills,   
nightsweats, unintended   
weight loss  
HEENT: No heaches, No   
nasal congestion/sinus   
symptoms, No epistaxis,   
No sense  
of smell  
EYES: No diplopia or   
blurry vision.  
PHYSICAL EXAM:  
There were no vitals   
filed for this visit.  
? General appearance:   
well developed, well   
nourished, without   
obvious  
deformities  
? Nasal exam: The   
mucosa is pink, the   
septum is Midline and   
the visible  
turbinates are No   
hypertrophied on   
anterior rhinoscopy  
? Oral cavity and   
oropharynx: the oral   
mucosa, tongue, tonsil   
area, and  
posterior pharyngeal   
mucosa are without   
lesions.  
Procedure: After   
obtaining verbal   
consent, the patient   
was sprayed with 4%  
topical lidocaine and   
Afrin. A rigid nasal   
scope was utilized to   
examine the  
patient nose  
Spangler splints were   
removed from bilateral   
nostrils. The Middle   
meatal spacers  
were removed from   
bilateral nostrils. The   
ethmoid crusting and   
clots were  
suctioned and removed   
with blaksley. The rest   
of the sinuses were   
suctioned.  
P (more content not   
included)...        Normal                                  [facility]  
   
                                                    HbA1c (Bld) [Mass fraction]o  
n 2024   
   
                                                    Interpretation and   
review of laboratory   
results         Normal                                          Count includes the [facility]  
   
                                                    Laboratory - Hematology and   
Cell countson 2024   
   
                                                    HbA1c (Bld) [Mass   
fraction]       6.3 %                                           [facility]  
   
                                                    [facility] SURGICAL PATHOLOGYon    
   
                      [facility] CASE REPORT                                  [facility]  
   
                                        Comment on above:   Surgical Pathology R  
eport Case: [id number]  
Authorizing Provider: Zachary Cooper MD Collected: 2024   
01:25 PM  
Ordering Location:  Admitting Received: 2024 02:02 PM  
Pathologist: Dee Dee Estrada MD  
Specimen: Sinus Contents, Bilateral  
  
   
   
                      [facility] CLINICAL HISTORY                                  [facility]  
   
                                        Comment on above:   Pre-op diagnosis:  
Chronic pansinusitis [J32.4]  
  
   
   
                      [facility] FINAL DIAGNOSIS                                  [facility]  
   
                                        Comment on above:   A. Bilateral sinus c  
ontents, excision:  
- Chronic sinusitis.  
- Fragments of cartilage and bone.  
Electronically signed by Dee Dee Estrada MD on 2024 at   
4:05 PM  
  
   
   
                                                    [facility] FINAL PERFORMING   
LAB                                                             [facility]  
   
                                        Comment on above:   Diagnostic interpret  
ation performed at [facility], [address] [place] [address]  CLIA# [id number]  
  
: Osmel Aquino M.D.  
  
   
   
                      F GROSS DESCRIPTION                                  [facility]  
   
                                        Comment on above:   A. Sinus Contents, B  
ilateral  
Labeled: ???Sinus contents bilateral???  
Received: Saline  
Number of tissue fragments: Multiple tan-pink,   
irregularly-shaped soft admixed with tan, irregularly-shaped,   
gritty bone fragments  
Size: 2.5 x 1.6 x 0.3 cm  
Cassette code: Entirely submitted in 2 cassettes (A2 to be   
processed after light decalcification).  
  
Gross examination performed at [facility], [address] [place] [address] CLIA#[id number]   
  
RSA 2024 3:27 PM  
  
  
   
   
                                                            Specimen Type: TISSU  
E   
SPECIMEN  
Ordering Facility:   
[facility]  
Address: [address] [place] [address]  
Original Ordering   
Provider: ZACHARY PEREZ  
   
                                                                  [facility]  
   
                                                    Ruben 2024   
   
                                        [facility]                Telephone (OTOLMN)  
-----------------------  
-----------  
BEV GAMING   
([id number]) 1965 F   
Shamar Co*  
Date Time Provider   
Department  
24 SONIA TRUJILLO OTOLMN  
During your visit   
today, we recorded the   
following information   
about you:  
Sonia Trujillo RN 2024   
5:28 PM Signed  
Klaudia, this is   
Sonia from the   
[facility]. I   
work with Dr Cooper and  
they have asked us to   
call and check in on   
you after your surgery  
Do you have a few   
minutes that I could   
ask you a few   
questions? Yes  
On a scale of 1 to 10,   
with 10 being highest,   
how do you rate your   
pain after  
the surgery? /10  
Is your pain controlled   
with the pain   
medication prescribed?   
Yes  
Have you had any sudden   
changes in your hearing   
in the past few days?   
No  
Have you had any   
nausea/vomiting in the   
past few days? No  
Have you had dizziness   
or vertigo in the past   
few days? No  
Are there any problems   
with your incision or   
do you have dressing   
concerns? NA  
Do you have swelling,   
redness or drainage at   
your incision? NA  
Have you had fevers/?   
No  
Are you able to eat and   
drink normally? Yes  
Which medications are   
you currently taking   
and how often are you   
taking it?  
1000 mg tylenol every 4   
hours routinely and an   
occasional oxycodone  
How is your activity   
level? I am sleeping a   
lot today.  
Do you have any other   
questions or concerns?   
No  
Do you have your post   
op appt? Yes  with   
both Dr Cooper and Dr Rodriguez  
Do you know how to   
reach us if you have   
any questions before   
your appointment?  
For skull base surgery   
patients (schwannoma,   
glomus, etc)  
Have there been any   
changes in your facial   
movements? No  
Are you experiencing   
any drainage from your   
wound? NA  
Are you experiencing   
any drainage from your   
ear? No  
Are you experiencing   
any drainage from your   
nose? Yes A medium   
amount of blood  
- about the same as   
last time she had this   
procedure.  
Sonia Trujillo RN  
Allergies As of Date:   
2024 Noted   
Allergy Reaction  
EFFEXOR (VENLAFAXINE   
ANALOGUES) 2014   
14 - Other: See   
Comments  
Comments: Seizures.  
PENICILLINS 2021   
2 - Rash  
4 - Hives  
Comments: difficult to   
breathe, swelling  
BACTRIM   
(SULFAMETHOXAZOLE-TRIME  
TH*2021 9 -   
Itching  
REGLAN (METOCLOPRAMIDE   
HCL) 2014 1 -   
Mental Status Change  
TOPAMAX (TOPIRAMATE)   
2014 1 - Mental   
Status Change  
Date Reviewed:   
2024  
Reviewed by: Scarlett Sharif RN - Fully   
Assessed  
Reason for Visit:  
Post Op Follow Up   
[3947]  
Prescriptions as of   
2024  
- sodium chloride (AYR   
SALINE) 0.65 % nasal   
spray  
Use 1 Spray in the nose   
every 2 hours as   
needed.  
- oxyCODONE IR   
(ROXICODONE) 5 mg   
immediate release   
tablet  
Take 1 tablet by mouth   
every 6 hours as needed   
for up to 3 days.  
- cefADROxil (DURICEF)   
500 mg capsule  
Take 1 capsule by mouth   
two times a day for 8   
days.  
- ondansetron orally   
disintegrating (ZOFRAN   
ODT) 4 mg   
disintegrating tablet  
Take 1 tablet by mouth   
every 8 hours as needed   
for nausea/vomiting.  
- promethazine   
(PHENERGAN) 25 mg   
tablet  
25 mg every 8 hours as   
needed.  
- azelastine 0.1% nasal   
spray  
SPRAY 1 SPRAY INTO EACH   
NOSTRIL TWICE DAILY  
- albuterol (PROVENTIL)   
2.5 mg /3 mL (0.083 %)   
nebulizer solution  
2.5 mg as needed.  
- albuterol HFA   
(PROVENTIL HFA,   
VENTOLIN HFA) 90   
mcg/actuation inhaler  
INHALE 1 PUFF EVERY 4   
HOURS AS NEEDED FOR   
WHEEZE OR FOR SHORTNESS   
OF BREATH  
- famotidine (PEPCID)   
40 mg tablet  
Take 40 mg by mouth   
every morning.  
- IBUPROFEN IB ORAL  
Take by mouth as   
needed.  
- LORazepam (ATIVAN)   
0.5 mg  
Take 0.5 mg by mouth at   
bedtime as needed.  
- OXYGEN, HOME THERAPY,  
4 L/min by Nasal   
Cannula route as   
directed. Can be off   
for 3-4 hours without  
issue.  
- semaglutide (OZEMPIC)   
1 mg/dose (2 mg/1.5 mL)   
pnij  
Inject 2 mg   
subcutaneously one time   
a week.  
- Insulin Lispro,   
Human, (HUMALOG) 100   
unit/mL crtg  
Inject subcutaneously w   
MEALS.  
Meds Comments as of   
2021:  
2020 Only takes   
Insuli  
Problem List As Of Date   
2024 Noted   
Resolved  
Pneumonia [J18.9]  
Arthritis [M19.90]  
Diabetes (HCC) [E11.9]  
COPD (chronic   
obstructive pulmonary   
disease) (H*  
Overweight [E66.3]  
RA (rheumatoid   
arthritis) (HCC)   
[M06.9]  
Depression [F32.A]  
Hypogammaglobulinemia   
(HCC) [D80.1]  
Displacement of   
cervical intervertebral   
disc wi*2014  
Cervical herniation   
[M50.20] 2014  
DVT of axillary vein,   
acute left (HCC)   
[I82.A12]2014  
DVT of lower extremity   
(deep venous   
thrombosis)*2014  
Cervical myelopathy   
[G95.9] 2014  
HX: anticoagulation   
[Z92.29] 2014  
S/P cervical spinal   
fusion [Z98.1]   
2014  
Smoking [F17.200]   
2014  
Class 2 obesity [E66.9]   
2021  
Anxiety [F41.9]   
2024  
Alpha-1-antitrypsin   
deficiency (HCC)   
[E88.01] 2023  
GERD (gastroesophageal   
reflux disease) [K21.9]   
2024  
Encounter Number:   
[id number]  
Encounter Status:Closed   
by SONIA TRUJILLO on 24    [facility]  
   
                                                    ANES POSTPROC EVALon   
024   
   
                                        ANES POSTPROC EVAL  HNO ID: [id number]  
Author: MARIANA NGUỄYN MD  
Service: ?  
Author Type:   
Anesthesiologist  
Type: Anesthesia   
Postprocedure   
Evaluation  
Filed: 2024 14:16  
Note Text:  
POST ANESTHESIA   
EVALUATION NOTE  
: 1965  
Procedure Summary  
Date: 24 Room /   
Location: [address] PEDIATRIC SURGERY  
Anesthesia Start: 1140   
Anesthesia Stop:  
Procedures:  
NASAL/SINUS ENDOSCOPY   
SURGICAL W/ MAXILLARY   
ANTROSTOMY W/ REMOVAL   
OF TISSUE  
FROM MAXILLARY SINUS   
(Bilateral: Sinus)  
ENDOSCOPY NASAL/SINUS   
W/ FRONTAL SINUS   
EXPLORATION (Bilateral:   
Sinus)  
ENDOSCOPY NASAL/SINUS   
W/ ETHMOIDECTOMY, TOTAL   
(Bilateral: Sinus)  
ENDOSCOPIC SEPTOPLASTY   
(Nose)  
COBLATION TURBINATES   
INTRAMURAL (Bilateral:   
Sinus)  
ENDOSCOPY NASAL/SINUS   
W/ SPHENOIDOTOMY   
(Bilateral: Sinus)  
STEREOTACTIC   
COMPUTER-ASSISTED   
(NAVIGATIONAL)   
PROCEDURE CRANIAL   
EXTRADURAL  
(Bilateral: Head)   
Diagnosis:  
Chronic pansinusitis  
(Chronic pansinusitis   
[J32.4])  
Surgeons: Zachary Cooper MD Responsible   
Provider: MARIANA Nguyễn MD  
Anesthesia Type:   
general ASA Status: 3  
Anesthesia Type:   
general  
Airway Type: ETT  
Last Vitals  
Vitals Value Taken Time  
/78 24 1402  
Temp 36.2 ?C (97.2 ?F)   
24 1402  
Pulse 92 24 1415  
Resp 18 24 1415  
SpO2 100 % 24   
1415  
Vitals shown include   
unfiled device data.  
Post Anesthesia Patient   
Status  
Patient Evaluation:   
bedside.  
Neurological Status:   
aware and responsive.  
Pulmonary Status:   
breathing comfortably   
on supplemental oxygen  
Airway Control:   
returned to baseline   
unsupported.  
Cardiovascular Status:   
stable.  
Pain Management:   
clinically adequate  
Postoperative   
Hydration: acceptable.  
Intraoperative Events:  
no significant   
anesthesia events  
Post Operative   
Nausea/Vomiting Status:   
no significant post   
operative nausea or  
vomiting  
Recommendation:   
continue current plan   
of care.  
Anesthesia Observations  
No Documentation  
SIGNATURE: MARIANA Nguyễn MD   
PATIENT NAME: Bev Gaming  
DATE: 2024   
MRN: [id number]  
TIME: 2:16 PM CSN:   
[id number]           Normal                                  [facility]  
   
                                                    ANES PRE-OPon 2024   
   
                                        ANES PRE-OP         HNO ID: [id number]  
Author: MARIANA NGUYỄN MD  
Service: ?  
Author Type:   
Anesthesiologist  
Type: Anesthesia   
Preprocedure Evaluation  
Filed: 2024 11:15  
Note Text:  
ANESTHESIOLOGY DAY OF   
SURGERY NOTE  
: 1965  
Procedure Information  
Date/Time: 24   
1015  
Procedures:  
NASAL/SINUS ENDOSCOPY   
SURGICAL W/ MAXILLARY   
ANTROSTOMY W/ REMOVAL   
OF TISSUE  
FROM MAXILLARY SINUS   
(Bilateral: Hand)  
ENDOSCOPY NASAL/SINUS   
W/ FRONTAL SINUS   
EXPLORATION (Bilateral:   
Sinus)  
ENDOSCOPY NASAL/SINUS   
W/ ETHMOIDECTOMY, TOTAL   
(Bilateral: Sinus)  
ENDOSCOPIC SEPTOPLASTY   
(Nose)  
COBLATION TURBINATES   
INTRAMURAL (Bilateral:   
Head)  
ENDOSCOPY NASAL/SINUS   
W/ SPHENOIDOTOMY   
(Bilateral: Sinus)  
STEREOTACTIC   
COMPUTER-ASSISTED   
(NAVIGATIONAL)   
PROCEDURE CRANIAL   
EXTRADURAL  
(Bilateral: Head)  
Location: [address] PEDIATRIC SURGERY  
Surgeons: Zachary Cooper MD  
Estimated body mass   
index is 30.41 kg/m? as   
calculated from the   
following:  
Height as of 24:   
172.7 cm (5' 8 ).  
Weight as of 24:   
90.7 kg (200 lb).  
Most recent hematocrit   
and potassium results:  
Hematocrit 46.4   
2021  
Potassium 4.1 2021  
Relevant Problems  
No relevant active   
problems  
I - PHYSICAL EVALUATION  
AIRWAY  
Patient intubated: No.  
Tracheostomy tube not   
present  
Mallampati: II.  
TM distance: >3 FB.  
Neck ROM: full ROM   
without neurological   
symptoms.  
Mouth opening:   
adequate.  
Short neck: no.  
Thick neck: no  
II - ANESTHESIA PLAN  
ASA Score: 3  
Anesthetic Plan:   
general  
Airway type: ETT  
NPO Status: adequate  
Beta Blocker  
Monitoring Plan  
Monitoring plan:   
standard ASA.  
Post Procedure   
Analgesic Plan  
Postoperative analgesic   
plan: multimodal   
analgesia.  
Informed Consent  
Anesthetic risks,   
benefits, alternatives,   
personnel and consent   
discussed: yes.  
Patient / Responsible   
Party agrees to   
proceed: yes  
Patient / Surrogate   
agrees to blood   
products: Yes  
Significant changes in   
the patient condition   
since the History and   
Physical, not  
otherwise documented in   
primary service   
progress note: no.  
Potential Anesthesia   
issues that may suggest   
increased risk of   
complications or  
contraindication to   
planned procedure:   
none.  
Vitals Value Taken Time  
/55 24 0939  
Pulse 90 24 0939  
Resp 18 24 0939  
Temp 36.6 ?C (97.9 ?F)   
24 0939  
SpO2 96 % 24 0939  
Facility-Administered   
Medications as of   
2024  
Medication Dose Route   
Frequency  
lidocaine (PF) 10 mg/mL   
(1 %) 1-2 mg injection   
(XYLOCAINE) 0.1-0.2 mL  
INTRADERMAL PRN  
Or  
lidocaine 1% 0.25 mL   
subcutaneous j-tip   
syringe (XYLOCAINE)   
0.25 mL  
SUBCUTANEOUS PRN  
lactated ringers iv   
infusion 5-30 mL/hr   
INTRAVENOUS CONTINUOUS  
NaCl 0.9% iv flush bag   
20 mL INTRAVENOUS PRN  
vancomycin 1.5 g in   
NaCl 0.9% 250 mL   
(VANCOCIN) 0.015   
g/kg/dose  
(Order-Specific)   
INTRAVENOUS Pre-Op Once  
Outpatient Medications   
as of 2024  
Medication Sig  
famotidine (PEPCID) 40   
mg tablet Take 40 mg by   
mouth every morning.  
LORazepam (ATIVAN) 0.5   
mg Take 0.5 mg by mouth   
at bedtime as needed.  
sodium chloride (AYR   
SALINE) 0.65 % nasal   
spray Use 1 Spray in   
the nose every 2  
hours as needed.  
promethazine   
(PHENERGAN) 25 mg   
tablet 25 mg every 8   
hours as needed.  
albuterol (PROVENTIL)   
2.5 mg /3 mL (0.083 %)   
nebulizer solution 2.5   
mg as  
needed.  
albuterol HFA   
(PROVENTIL HFA,   
VENTOLIN HFA) 90   
mcg/actuation inhaler   
INHALE 1  
PUFF EVERY 4 HOURS AS   
NEEDED FOR WHEEZE OR   
FOR SHORTNESS OF BREATH  
IBUPROFEN IB ORAL Take   
by mouth as needed.  
OXYGEN, HOME THERAPY, 4   
L/min by Nasal Cannula   
route as directed. Can   
be off  
for 3-4 hours without   
issue.  
semaglutide (OZEMPIC) 1   
mg/dose (2 mg/1.5 mL)   
pnij Inject 2 mg   
subcutaneously  
one time a week.  
Insulin Lispro, Human,   
(HUMALOG) 100 unit/mL   
crtg Inject   
subcutaneously w  
MEALS. (Patient not   
taking: Reported on   
2024)  
I have interviewed and   
examined the patient. I   
have reviewed the   
medical record  
and/or the   
pre-anesthesia   
evaluation, pertinent   
labs, and test results.  
This contains updated   
information obtained   
within 48 hours of   
Surgery/Procedure.  
SIGNATURE: MARIANA Nguyễn MD   
PATIENT NAME: Bev Gaming  
DATE: 2024   
MRN: [id number]  
TIME: 11:15 AM CSN:   
[id number]           Normal                                  [facility]  
   
                                                    BRIEF OP NOTon 2024   
   
                                        BRIEF OP NOT        HNO ID: [id number]  
Author: ROM ANDREWS MD  
Service: Otolaryngology  
Author Type: Resident  
Type: Brief Op Note  
Filed: 2024 13:40  
Note Text:  
BRIEF OP NOTE  
LOG ID: [id number]  
Surgery/Procedure Date:   
2024  
Incision/Procedure   
Start Time: 12:06 PM  
Incision   
Close/Procedure End   
Time: 1:32 PM  
Surgeons and Role:  
* Zachary Cooper MD -   
Primary  
* Rom Andrews MD -   
Resident - Assisting  
* Diana Rodriguez MD  
Pre Operative   
Diagnoses: Chronic   
pansinusitis [J32.4]  
Post Operative   
Diagnoses: Same as   
pre-op  
Procedure(s):  
Right maxillary   
antrostomy with removal   
of disease  
Lysis of left   
intranasal synechiae  
Septoplasty  
Revision bilateral   
inferior turbinate   
reduction  
Bilateral Eustachian   
tube dilation  
STEREOTACTIC   
COMPUTER-ASSISTED   
(NAVIGATIONAL)   
PROCEDURE CRANIAL   
EXTRADURAL:  
34389 (CPT?)  
Anesthesia: General  
Procedure Indications:   
Bev Gaming is an   
58 year old female with   
the above  
preoperative diagnosis   
and as such was taken   
to operating room for   
above listed  
procedure  
Operative Findings:  
See operative report   
for full findings  
Bilateral Eustachian   
tube dilation to 12   
ghislaine, one time each side   
for 2 minutes  
Septoplasty for   
posterior right   
deviation  
Spangler splints left in   
nose  
Bilateral maxillary   
antrostomies with thick   
mucus within maxillary   
sinuses  
Left intranasal   
synechiae from middle   
turbinate to lateral   
nasal wall just  
anterior to maxillary   
antrostomy  
Estimated Blood Loss:   
10ml  
Specimens:  
ID Type Source Tests   
Collected by Time   
Destination  
A : Tissue Sinus   
Contents, Bilateral   
SURGICAL PATHOLOGY   
Zachary Cooper MD  
2024 1:25 PM  
Implants: * No implants   
in log *  
Complications: None  
Drains: None  
Counts: Correct  
SIGNATURE: Rom Andrews MD   
PATIENT NAME: Bev Gaming  
DATE: 2024   
MRN: [id number]  
TIME: 1:38 PM   
PAGER/CONTACT #:   
[phone]         [facility]  
   
                                                    NURSING PROGon 2024   
   
                                        NURSING PROG        HNO ID: [id number]  
Author: SCARLETT SHARIF, CARINA  
Service: Nursing  
Author Type: Registered   
Nurse  
Type: Nursing Progress   
Note  
Filed: 2024 16:26  
Note Text:  
M23-32 Bev Gaming- pt has script   
for oxy can she have   
dose prior to d/c  
home-thanks scarlett [id number]   
[facility]  
   
                                        NURSING PROG        HNO ID: [id number]  
Author: RANDA MITCHELL, RN  
Service: Nursing  
Author Type: Registered   
Nurse  
Type: Nursing Progress   
Note  
Filed: 2024 09:54  
Note Text:  
Other: [facility] Nursing Note  
Dr. Cooper paged for   
informed consent   
 M23-31, OR 58 - K.   
Kulwant Gaming,  
patient needs informed   
consent. Roxi Black   
[id number]               [facility]  
   
                                                    OPERATIVE NOon 2024   
   
                                        OPERATIVE NO        HNO ID: [id number]  
Author: ROM ANDREWS MD  
Service: Otolaryngology  
Author Type: Resident  
Type: Operative Report  
Filed: 2024 13:43  
Note Text:  
-----------------------  
-----------  
Attestation signed by   
Zachary Cooper MD at   
2024 7:48 AM  
I was present for the   
entire procedure  
-----------------------  
-----------  
[facility] [place] [phone] or [phone]   
[facility]  
C O N F I D E N T I A L   
I N F O R M A T I O N  
-----------------------  
-----------  
-----------------------  
------------  
STANDARD [facility] DOCUMENT  
OPERATIVE REPORT   
Otolaryngology Head and   
Neck Surgery  
Name: Bev Gaming  
[facility] #: [id number]  
Date: 2024  
YOB: 1965  
Pre Operative   
Diagnoses: Bilateral   
Eustachian tube   
dysfunction  
Post Operative   
Diagnoses: Same as   
pre-op  
Procedures:  
BIlateral Eustachian   
Tube Balloon Dilation  
Surgeons and Role:  
* Zachary Cooper MD -   
Primary  
* Rom Andrews MD -   
Resident - Assisting  
* Diana Rodriguez MD  
No Additional Staff  
Anesthesia: General  
Incision/Procedure   
Start Time: 12:06 PM  
Incision   
Close/Procedure End   
Time: 12:16 PM for our   
portion  
Findings:  
Bilateral Eustachian   
tubes more open   
following dilation  
COMPLICATIONS: None  
Procedure Indications:   
Bev Gaming is an   
58 year old female who   
presented  
to the clinic with   
bilateral Eustachian   
tube dysfunction. This   
above procedure  
was described to the   
patient including   
risks, benefits,   
alternatives, and  
personnel and informed   
consent was obtained.  
Description:  
The patient was brought   
into the operating room   
and laid in a supine   
position on  
the operating room   
table. A surgical   
huddle was conducted to   
verify the patient  
and the procedure to be   
performed. General   
anesthesia was induced   
and the  
patient's airway was   
secured with an ET   
tube. A time out was   
called. 0.5% Afrin  
was sprayed in the   
bilateral nasal   
passage(s). A 0 degree   
endoscope was then  
used to examine the   
nasal cavity   
demonstrating a septal   
deviation to the right  
and bilaterally   
hypertrophied inferior   
turbinates.  
Using a 0 degree scope,   
the right eustachian   
tube torus was   
visualized and a  
Acclarent inflation   
device was   
atraumatically inserted   
until resistance was   
met  
in the cartilaginous   
tube and then inflated   
to 12atm for 2 min.   
This was then  
deflated. The torus was   
found to be expanded.  
In a similar fashion   
again using a 0 degree   
scope, the left   
eustachian tube  
torus was visualized   
and a Acclarent   
inflation device was   
atraumatically  
inserted until   
resistance was met in   
the cartilaginous tube   
and then inflated to  
12atm for 2 min.. This   
was then deflated. The   
torus was found to be   
expanded.  
The nasopharynx was   
then suctioned, and the   
nasal cavity was   
inspected for any  
bleeding and none was   
noted.  
We then turned the   
patient over to Dr. Rodriguez's team for   
bilateral endoscopic  
sinus surgery. The   
patient was turned over   
to the anesthesia team   
for awakening  
and extubation. she was   
transferred to the PACU   
in stable condition.  
Specimens:  
None from our portion  
Estimated Blood Loss: 0   
mLs  
Implants: * No implants   
in log *  
Drains: None  
Counts: Correct  
Attestation:  
Zachary Cooper MD was   
scrubbed for the entire   
procedure and performed   
it with  
assistance for Bev Andrews MD for the   
service of Zachary Cooper MD            [facility]  
   
                                        OPERATIVE NO        HNO ID: [id number]  
Author: DIANA RODRIGUEZ MD  
Service: Otolaryngology  
Author Type: Resident  
Type: Operative Report  
Filed: 2024 13:03  
Note Text:  
-----------------------  
-----------  
Attestation signed by   
Diana Rodriguez MD at   
2024 1:03 PM  
I was present and   
supervised the entire   
procedure.  
-----------------------  
-----------  
[facility] OPERATIVE/PROCEDURE   
REPORT  
LOG ID: [id number]  
SURGERY/PROCEDURE DATE:   
2024  
INCISION/PROCEDURE   
START TIME: 12:06 PM  
INCISION   
CLOSE/PROCEDURE END   
TIME: 1:32 PM  
SURGEON(S)/PROCEDURALIS  
T(S) AND ASSISTANT(S):  
Surgeons and Role:  
* Zachary Cooper MD  
* oRm Andrews MD -   
Resident - Assisting  
* Diana Rodriguez MD   
- Primary  
No Additional Staff  
ANESTHESIA: General  
[facility] OP REPORT  
Performed by: Rom Andrews MD  
Authorized by: Diana Rodriguez MD  
Sinus Surgery  
Septoplasty  
Revision bilateral   
inferior turbinate   
reduction  
Pre-Op   
Diagnosis/Indication:   
Chronic sinusitis   
without nasal polyps  
Post-Op Diagnoses: Same   
as pre-op  
OPERATIVE   
FINDINGS/ADDITIONAL   
DETAILS  
[facility] [place] [id number] [phone] or [phone]   
Formerly [facility]  
C O N F I D E N T I A L   
I N F O R M A T I O N  
-----------------------  
-----------  
-----------------------  
------------  
STANDARD [facility] DOCUMENT  
OPERATIVE REPORT  
Patient Name: Bev Gaming  
Patient MRN: [id number]  
Date of Surgery: 2024  
Surgeons and Role:  
* Rom Andrews MD -   
Resident - Assisting  
* Diana Rodriguez MD   
- Primary  
Anesthesia: General  
Procedure(s):  
1. Stereotactic image   
guided surgical   
navigation  
2. Right maxillary   
antrostomy with removal   
of disease, endoscopic  
3. Left lysis of   
intranasal synechiae   
and removal of left   
maxillary sinus  
contents  
4. Septoplasty  
5. Revision bilateral   
inferior turbinate   
reduction  
Operative Findings:  
Thick, clear mucus   
present in bilateral   
maxillary sinuses.  
Left lateral medial   
turbinate to lateral   
nasal wall just   
anterior to maxillary  
antrostomy synechiae  
Right posterior bony   
septal deviation.   
Placed spangler splints at   
end of procedure  
Procedure Narrative:  
The patient was brought   
into the operating room   
and laid in a supine   
position on  
the operating room   
table. A surgical   
huddle was conducted to   
verify the patient  
and the procedure to be   
performed. General   
anesthesia was induced   
and the  
patient's airway was   
secured with an ET   
tube. Case began with   
procedure by Dr. Cooper which is   
dictated separately.   
Once he was completed,   
patient was handed  
over to us. A time out   
was called and   
stereotactic navigation   
was registered and  
tested and used   
throughout the case.   
The nasal cavities were   
sprayed with 0.5%  
Afrin. The bilateral   
nasal cavity was   
injected with lidocaine   
1% with 1:100,000  
epinephrine.  
The bilateral inferior   
turbinate was   
outfractured laterally   
with a Travis  
elevator. Due to   
mid-septal deviation to   
the right, decision was   
made to perform  
septoplasty. Incision   
was made through septal   
mucosa anterior to the   
septal  
deviation using #15   
blade. A   
submucoperichondrial   
flap was elevated over   
the  
deviation using Florence   
elevator. Incision was   
made through septal   
cartilage  
using Morris elevator   
as the area of septal   
deviation was the bony   
septum just  
posterior to the   
osseocartilaginous   
fusion of the septum.   
Submucoperichondrial  
flap was then elevated   
on the contralateral   
side. Septal deviation   
was then  
removed using double   
action punch and   
scissors. Septal flaps   
were then natalia back  
into position and we   
were able to continue   
with right sided sinus   
surgery.  
Using a Valdez elevator   
on the right, the   
middle turbinate was   
medialized. The  
double ball probe was   
used to perform an   
uncinectomy with a   
retrograde approach.  
The probe was then used   
to cannulate the   
maxillary os and a   
large antrostomy was  
made. The edges were   
cleaned up with the   
microdebrider. Thick   
mucous was  
suctioned out of the   
maxillary sinus cavity.   
The natural ostium was   
included in  
the antrostomy; this   
was confirmed with a 30   
degree endoscope.  
On the left side, there   
was an intranasal   
synechiae immediately   
anterior to the  
previous maxillary   
antrostomy. This was   
lysed using Valdez   
elevator and the  
middle turbinate was   
then medialized. Curved   
suction was then   
introduced into  
the left maxillary   
antrostomy with removal   
of significant thick,   
clear mucus. We  
then turned towards   
revision bilateral   
inferior turbinate   
reduction. Incision  
was made in the heads   
of the bilateral   
inferior turbinates   
using 15 blade.  
Submucosal tissue was   
raised off of the   
turbinate bone using   
Florence elevator,  
starting on the right   
side. Notably this was   
very scarred from   
previous  
turbinate reduction.   
Microdebrider was then   
introduced to perform a   
submucosal  
resection. An identical   
procedure was performed   
on the left side.  
The nasopharynx was   
then jennings (more content   
not included)...    Normal                                  [facility]  
   
                                                    SURGICAL PATHOLOGYon    
   
                      CASE REPORT            Normal                [facility]  
   
                                        Comment on above:   Order Comment: Speci  
men Type: TISSUE SPECIMENOrdering   
Facility: [facility] Address: [address] [place] [address]   
   
                                                            Result Comment: Surg  
[facility] Pathology Report Case: [id number]  
Authorizing Provider: Zachary Cooper MD Collected: 2024   
01:25 PM  
Ordering Location: Admitting Received: 2024 02:02 PM  
Pathologist: Dee Dee Estrada MD  
Specimen: Sinus Contents, Bilateral   
   
                                                            Performed By: #### S  
 ####[facility] LABCLIA   
[id number] [address]   
[place]   
   
                      CLINICAL HISTORY            Normal                [facility]  
   
                                        Comment on above:   Order Comment: Speci  
men Type: TISSUE SPECIMENOrdering   
Facility: [facility] Address: [address] [place] [address]   
   
                                                            Result Comment: Pre-  
op diagnosis:  
Chronic pansinusitis [J32.4]   
   
                                                            Performed By: #### S  
 ####[facility] LABCLIA   
[id number] [place] [address] [place]   
[place]   
   
                      FINAL DIAGNOSIS            Normal                [facility]  
   
                                        Comment on above:   Order Comment: Speci  
men Type: TISSUE SPECIMENOrdering   
Facility: [facility] Address: [address] [place] [address]   
   
                                                            Result Comment: A. B  
ilateral sinus contents, excision:  
- Chronic sinusitis.  
- Fragments of cartilage and bone.  
Electronically signed by Dee Dee Estrada MD on 2024 at   
4:05 PM   
   
                                                            Performed By: #### S  
 ####[facility] LABCLIA   
[id number] [address] OF [facility]   
   
                      FINAL PERFORMING LAB            Normal                [facility]  
   
                                        Comment on above:   Order Comment: Speci  
men Type: TISSUE SPECIMENOrdering   
Facility: [facility] Address: [address] [place] [address]   
   
                                                            Result Comment: Diag  
nostic interpretation performed at   
[facility], [address] [place] [address] CLIA#   
[id number]  
: Osmel Aquino M.D.   
   
                                                            Performed By: #### S  
 ####[facility] LABCLIA   
[id number] [address] OF [facility]   
   
                      GROSS DESCRIPTION            Normal                [facility]  
   
                                        Comment on above:   Order Comment: Speci  
men Type: TISSUE SPECIMENOrdering   
Facility: [facility] Address: [address] [place] [address]   
   
                                                            Result Comment: A. S  
inus Contents, Bilateral  
Labeled: ???Sinus contents bilateral???  
Received: Saline  
Number of tissue fragments: Multiple tan-pink,   
irregularly-shaped soft admixed with tan, irregularly-shaped,   
gritty bone fragments  
Size: 2.5 x 1.6 x 0.3 cm  
Cassette code: Entirely submitted in 2 cassettes (A2 to be   
processed after light decalcification).  
Gross examination performed at [facility], [address] [place] [address] CLIA#[id number]  
RSA 2024 3:27 PM   
   
                                                            Performed By: #### S  
 ####[facility] LABCLIA   
[id number] [address]   
[place]   
   
                                                    HISTORY PHYSICALon    
   
                                        HISTORY PHYSICAL    HNO ID: [id number]  
Author: SAPPHIRE MAYORGA APRN.CNP  
Service: ?  
Author Type: Nurse   
Practitioner  
Type: H&P  
Filed: 2024 09:33  
Note Text:  
HISTORY AND PHYSICAL   
EXAMINATION  
SERVICE DATE: 2024  
SERVICE TIME: 10:51 AM  
PRIMARY CARE PHYSICIAN:   
Sherman Malone DO  
REASON FOR VISIT:  
Bev Gaming is a   
58 year old female who   
is scheduled for   
Bilateral -  
NASAL/SINUS ENDOSCOPY   
SURGICAL W/ MAXILLARY   
ANTROSTOMY W/ REMOVAL   
OF TISSUE FROM  
MAXILLARY SINUS  
Bilateral - ENDOSCOPY   
NASAL/SINUS W/ FRONTAL   
SINUS EXPLORATION  
Bilateral - ENDOSCOPY   
NASAL/SINUS W/   
ETHMOIDECTOMY, TOTAL  
ENDOSCOPIC SEPTOPLASTY  
Bilateral - COBLATION   
TURBINATES INTRAMURAL  
Bilateral - ENDOSCOPY   
NASAL/SINUS W/   
SPHENOIDOTOMY  
Bilateral -   
STEREOTACTIC   
COMPUTER-ASSISTED   
(NAVIGATIONAL)   
PROCEDURE CRANIAL  
EXTRADURAL at the   
request of Dr. Diana Rodriguez for   
consultation. My final  
recommendation will be   
communicated back to   
the requesting   
physician by way of  
shared medical record   
or letter.  
Assessment  
Patient has the   
following medical   
conditions which may   
affect ann-operative  
course:  
Diabetes ([facility])  
Assessment: Stable on   
Ozempic  
Patient given   
instructions regarding   
Ozempic  
Component 5 mo ago  
Hemoglobin A1C 6.5  
Specimen Collected:   
24 11:03 AM  
Follows with PCP  
DVT of lower extremity   
(deep venous   
thrombosis) ([facility])  
Assessment: s/p ankle   
surgery  
Completed course of   
Xarelto  
No recurrence  
Anxiety  
Assessment: Takes   
Valium as needed  
Class 2 obesity  
Assessment: Body mass   
index is 30.41 kg/m?.  
Smoking  
Assessment: Smokes 0.5   
PPD  
COPD (chronic   
obstructive pulmonary   
disease) ([facility])  
Assessment: Stable  
95% on RA  
Albuterol PRN  
On 4L O2 @ HS and   
during the day as   
needed  
Follows with Dr. Davidson @ [facility]  
Alpha-1-antitrypsin   
deficiency ([facility])  
Assessment: Follows   
with Pulmonology  
GERD (gastroesophageal   
reflux disease)  
Assessment: Controlled   
with pepcid  
[facility] Activity Status   
Index:  
METS:  
Do moderate work around   
the house, such as   
vacuuming, sweeping   
floors, or  
carrying in groceries   
(3.50 METs)  
Climb a flight of   
stairs or walk up a   
hill (5.50 METs)  
DASI Score: 9  
Patient denies any   
chest pain or undue   
shortness of breath   
with the above  
physical activity.  
STOP-Bang Score:  
Patient over 50 years   
old  
Denies snoring loudly  
Denies feeling tired,   
fatigued, or sleepy   
during the daytime  
Has not been observed   
to stop breathing or   
choking/gasping during   
sleep  
Denies having high   
blood pressure  
BMI less than or equal   
to 35 kg/m2  
Does not have a large   
neck  
Non-male patient  
STOP-Bang Score: 1  
[id number] Score:  
Age: <65  
Sex: female  
CHF history: No  
Hypertension history:   
No  
Stroke/TIA/thromboembol  
ism history: Yes  
Vascular disease   
history: No  
Diabetes history: Yes  
[id number] Score: 4  
ARISCAT Score:  
Age: 51-80  
Preoperative SpO2:   
91-95%  
Respiratory infection   
in the last month: No  
Preoperative anemia:   
Yes  
Surgical incision:   
peripheral  
Duration of surgery:   
2-3 hrs  
Emergency procedure: No  
ARISCAT Score: 38  
ANESTHESIA FINDINGS:  
Intubation History: No   
history of difficult   
intubation  
Significant Anesthesia   
Considerations:  
none  
Airway History:  
No history of difficult   
airway  
I - PHYSICAL EVALUATION  
AIRWAY  
Patient intubated: No.  
Tracheostomy tube not   
present  
Mallampati: I.  
TM distance: >3 FB.  
Neck ROM: full ROM   
without neurological   
symptoms.  
Mouth opening:   
adequate.  
Short neck: no.  
Thick neck: no  
Lip Bite Test: II  
Microretrognathia/Micro  
nagthia/Recessed Chin:   
No  
DENTAL  
Dental findings: teeth   
intact.  
Dentures, upper:   
complete.  
Dentures, lower:   
complete.  
II - ANESTHESIA PLAN  
Beta Blocker  
Monitoring Plan  
Post Procedure   
Analgesic Plan  
Prepared for surgery:  
This patient is   
optimally prepared for   
surgery.  
CONSULTS:  
Patient does not   
require consults for   
optimization at this   
time.  
The Following   
Tests/Procedures Have   
Been Initiated:  
Labs not indicated per   
[facility] protocol, EKG not   
indicated per [facility]   
protocol  
Planned Anesthetic: Per   
anesthesia choice  
Subjective  
CHIEF COMPLAINT:   
Chronic Pansinusitis  
HPI: 58 year old year   
old presents to [facility]   
for evaluation. Patient   
has been  
experiencing sinus   
issues including ear   
fullness and pressure >   
1 year. Has  
elected for surgical   
intervention.  
PAST MEDICAL HISTORY  
No date: Arthritis  
No date: COPD (chronic   
obstructive pulmonary   
disease) ([facility])  
No date: Depression  
Comment: sees a psych  
No date: Diabetes ([facility])  
No date: DVT (deep   
venous thrombosis)   
([facility])  
Comment: Left lower   
extremity s/p ankle   
surgery  
No date:   
Hypogammaglobulinemia   
([facility])  
No date: On home oxygen   
therapy  
Comment: 4L NC  
No date: Overweight  
No date: Pneumonia  
No date: PONV   
(postoperative nausea   
and vomiting)  
No date: RA (rheumatoid   
arthritis) ([facility])  
PAST SURGICAL HISTORY  
No date: ANKLE LEFT OP   
SURGERY  
Comment: plantar tendon  
No date:   
CHOLECYSTECTOMY  
No date: HERNIA REPAIR   
HX  
No date: HIP RIGHT OP   
SURGERY  
Co (more content not   
included)...        Normal                                  [facility]  
   
                                                    CNOVon 2024   
   
                                        CNOV                Office Visit (OTOLMN  
)  
-----------------------  
-----------  
BEV GAMING   
([id number]) 1965 F   
[place]*  
Date Time Provider   
Department  
6/3/24 1:00 PM ZACHARY COOPER OTOLMN  
During your visit   
today, we recorded the   
following information   
about you:  
Ariadna Lockett, RN   
6/3/2024 1:14 PM Signed  
Tobacco Use: .5   
packs/day Types:   
Cigarettes  
Was smoking cessation   
packet given? Patient   
Declined  
Was a referral   
initiated?Patient   
declined.  
Zachary Cooper MD   
6/3/2024 3:57 PM Signed  
SECTION OF OTOLOGY,   
NEUROTOLOGY AND LATERAL   
SKULL BASE SURGERY  
Department of   
Otolaryngology - Head   
and Neck Surgery  
[facility]   
[place], [facility]  
History  
History of Present   
Illness:  
Bev Gaming is a   
58 year old female who   
presents for evaluation   
of aural  
fullness.  
Reports bilateral aural   
fullness since 2023.   
She has a history of  
tympanostomy tubes for   
HBOT for chronic wound   
treatment. She has   
since had  
issues with aural   
fullness and sensation   
of hearing loss. Denies   
persistent  
otalgia, otorrhea, room   
spinning vertigo, or   
tinnitus. Denies   
foreign body in  
ear, barotrauma,   
acoustic trauma,   
history of recurrent   
ear infections. Aside  
PET, she denies   
previous ear surgery.   
She is planning for   
revision right-sided  
sinus surgery with Dr Rodriguez.  
PAST MEDICAL HISTORY  
Diagnosis Date  
Arthritis  
COPD (chronic   
obstructive pulmonary   
disease) ([facility])  
Depression  
sees a psych  
Diabetes ([facility])  
DVT (deep venous   
thrombosis) ([facility])  
Left lower extremity   
s/p ankle surgery  
Hypogammaglobulinemia   
([facility])  
On home oxygen therapy  
4L NC  
Overweight  
Pneumonia  
PONV (postoperative   
nausea and vomiting)  
RA (rheumatoid   
arthritis) ([facility])  
PAST SURGICAL HISTORY  
Procedure Laterality   
Date  
ANKLE LEFT OP SURGERY  
plantar tendon  
CHOLECYSTECTOMY  
HERNIA REPAIR HX  
HIP RIGHT OP SURGERY  
tendon repair  
HYSTERECTOMY HX  
total  
KNEE RIGHT OP SURGERY  
tendon repair  
SHOULDER   
ARTHROSCOPY/SURGERY  
bilateral tendon repair  
Current Outpatient   
Medications on File   
Prior to Visit  
Medication Sig  
azelastine 0.1% nasal   
spray SPRAY 1 SPRAY   
INTO EACH NOSTRIL TWICE   
DAILY  
albuterol HFA   
(PROVENTIL HFA,   
VENTOLIN HFA) 90   
mcg/actuation inhaler   
INHALE 1  
PUFF EVERY 4 HOURS AS   
NEEDED FOR WHEEZE OR   
FOR SHORTNESS OF BREATH  
famotidine (PEPCID) 40   
mg tablet Take 40 mg by   
mouth every morning.  
IBUPROFEN IB ORAL Take   
by mouth as needed.  
LORazepam (ATIVAN) 0.5   
mg Take 0.5 mg by mouth   
at bedtime as needed.  
semaglutide (OZEMPIC) 1   
mg/dose (2 mg/1.5 mL)   
pnij Inject 2 mg   
subcutaneously  
one time a week.  
albuterol (PROVENTIL)   
2.5 mg /3 mL (0.083 %)   
nebulizer solution 2.5   
mg as  
needed.  
rosuvastatin (CRESTOR)   
10 mg tablet Take 10 mg   
by mouth every morning.  
budesonide (PULMICORT)   
0.5 mg/2 mL nebulizer   
solution Use 2 mL via   
nebulizer  
once daily. (Patient   
not taking: Reported on   
2024)  
budesonide (PULMICORT)   
0.5 mg/2 mL nebulizer   
solution Use 2 mL via   
nebulizer  
once daily. (Patient   
not taking: Reported on   
2024)  
oxymetazoline (AFRIN,   
OXYMETAZOLINE,) 0.05 %   
nasal spray Instill 2   
Sprays in  
the nose twice daily.   
(Patient not taking:   
Reported on 2024)  
sodium chloride (DEEP   
SEA NASAL) 0.65 % nasal   
spray Use 1 Spray in   
the nose as  
needed. (Patient not   
taking: Reported on   
2024)  
insulin degludec   
(TRESIBA FLEXTOUCH   
U-100) 100 unit/mL (3   
mL) injection pen  
Inject subcutaneously.   
(Patient not taking:   
Reported on 2024)  
OXYGEN, HOME THERAPY, 4   
L/min by Nasal Cannula   
route as directed. Can   
be off  
for 3-4 hours without   
issue.  
insulin aspart (NOVOLOG   
FLEXPEN U-100 INSULIN   
SUBCUTANEOUS) Inject  
subcutaneously. sliding   
scale (Patient not   
taking: Reported on   
2024)  
Insulin Lispro, Human,   
(HUMALOG) 100 unit/mL   
crtg Inject   
subcutaneously w  
MEALS. (Patient not   
taking: Reported on   
2024)  
furosemide (LASIX) 40   
mg tablet Take 40 mg by   
mouth as needed.   
(Patient not  
taking: Reported on   
2024)  
Levalbuterol HCl   
(XOPENEX) 1.25 mg/0.5   
mL nebulizer solution   
Use 1 Ampule via  
nebulizer every 8 hours   
as needed.  
No current   
facility-administered   
medications on file   
prior to visit.  
ALLERGIES  
Allergen Reactions  
Effexor [Venlafaxin*   
Other: See Comments  
Seizures.  
Penicillins Rash, Hives  
difficult to breathe,   
swelling  
Bactrim [Sulfametho*   
Itching  
Reglan [Metoclopram*   
Mental Status Change  
Topamax [Topiramate]   
Mental Status Change  
FAMILY HISTORY  
Problem Relation Age of   
Onset  
Hypertension Mother  
Arthritis Mother  
Cancer Father  
Hypertension Brother  
Social History  
Tobacco Use  
Smoking status: Every   
Day  
Packs/day: .5  
Types: Cigarettes  
Start date:   
Smokeless tobacco:   
Never  
Vaping Use  
Vaping Use: Never used  
Substance Use Topics  
Alcohol use: No  
Drug use: No  
Comment: denies tx for   
drug/alcohol abuse in   
the past.  
Physical Exam  
Patient is alert,   
oriented  
Head examination:   
normocephalic  
Binocular microscopic   
examination of ears:  
Right ear:  
Pinna: normal  
Ex (more content not   
included)...        Normal                                  [facility]                Office Visit (CDISMN  
)  
-----------------------  
-----------  
BEV GAMING   
([id number]) 1965 MOISES Cuellar*  
Date Time Provider   
Department  
6/3/24 12:30 PM AMANDA DAIGLE CDIN  
During your visit   
today, we recorded the   
following information   
about you:  
Amanda Daigle AUD   
2024 7:45 AM Signed  
Head and Neck [place]  
AUDIOLOGIC EVALUATION   
REPORT  
Name: Bev Gaming  
[facility]#: [id number]  
Date of Service:   
6/3/2024  
YOB: 1965  
Age: 58 year old  
Referred by: Diana Rodriguez MD  
[address]  
[facility] [address]  
Referred for:   
Evaluation of suspected   
change in hearing,   
tinnitus, or balance.  
Referral documented: In   
an order in [facility]  
Patient's major   
complaints: Reduced   
hearing in both ears,   
Tinnitus in both  
ears,   
Dizziness/vertigo/imbal  
ance, Otalgia in both   
ears, Pressure/fullness   
in  
both ears  
Bev Gaming was   
seen for an initial   
audiologic evaluation.   
Medical  
history is significant   
for chronic sinusitis.   
Patient has previously   
received  
PE tubes bilaterally.   
Today patient reported   
she has been noticing   
reduced  
hearing and aural   
fullness bilaterally,   
greater in the right   
ear than the left  
ear, as well as   
intermittent otalgia   
and tinnitus in both   
ears. Patient also  
reported dizziness   
described as   
disorientation/imbalanc  
e. She denied  
room-spinning vertigo   
sensation.She reported   
history of occupation   
noise  
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   
in factory settings.   
Today patient denied   
history of  
ototoxicity. See   
procedures tab for   
audiometric results.  
IMPRESSIONS  
RIGHT EAR:   
Sensorineural hearing   
loss  
LEFT EAR: Sensorineural   
hearing loss  
AUDIOLOGIC EVALUATION  
Following is a brief   
interpretation of the   
obtained findings from   
the  
audiologic evaluation.   
Refer to the Auditory   
Test Record for   
complete  
audiometric results.   
The patient was   
counseled about the   
test findings and  
appropriate audiologic   
recommendations were   
made.  
SUMMARY: See procedures   
tab for audiometric   
results.  
OTOSCOPY  
RIGHT EAR: Otoscopic   
inspection revealed ear   
canal was clear.  
LEFT EAR: Otoscopic   
inspection revealed ear   
canal was clear.  
TYMPANOMETRY  
Description of   
procedure: This test is   
an objective evaluation   
of middle ear  
function. CPT code:   
36922  
RIGHT EAR: Normal ME   
function.  
LEFT EAR: Normal ME   
function.  
ACOUSTIC REFLEXES  
Description of   
procedure: This test is   
an objective measure of   
auditory and  
facial nerve pathways.   
CPT code: 13786, 29473  
RIGHT EAR PROBE EAR:   
(ipsi right stimulus   
ear; contralateral left   
stimulus ear):  
Acoustic Reflex Pattern   
Did not test  
Acoustic Reflex Decay   
(left stimulus ear):   
Did not test.  
LEFT EAR PROBE EAR:   
(ipsi left stimulus   
ear; contralateral   
right stimulus ear):  
Acoustic Reflex Pattern   
Did not test  
Acoustic Reflex Decay   
(right stimulus   
ear):Did not test.  
PURE TONE AUDIOMETRY   
AND SPEECH TESTING  
Description of   
procedure: This test is   
an objective evaluation   
hearing  
sensitivity via air and   
bone conduction and   
speech recognition   
testing. CPT  
code: 54158  
RIGHT EAR:  
Hearing Sensitivity:   
Mild to moderate   
sensorineural hearing   
loss.  
Word Recognition Score:   
Excellent (90%). WRS is   
consistent with hearing  
sensitivity. Words were   
presented at 80 dB HL   
is above (greater than   
or equal  
to 60 dB HL) intensity   
level for average   
conversational speech.   
The NU-6  
Ordered by Difficulty   
Word List (10 words)   
was used for testing.   
Contralateral  
masking was employed.  
LEFT EAR:  
Hearing Sensitivity:   
Mild to moderate   
sensorineural hearing   
loss.  
Word Recognition Score:   
Excellent (100%). WRS   
is consistent with   
hearing  
sensitivity. Words were   
presented at 80 dB HL   
is above (greater than   
or equal  
to 60 dB HL) intensity   
level for average   
conversational speech.   
The NU-6  
Ordered by Difficulty   
Word List (10 words)   
was used for testing.   
Contralateral  
masking was employed.  
RECOMMENDATIONS  
* Continue medical   
follow-up with Diana Rodriguez MD and Zachary Cooper MD. .  
* The patient was   
counseled regarding   
benefits/limitations of   
hearing aids and  
provided written   
educational materials.  
* Call [phone] to   
schedule an appointment   
to assess your need for   
hearing  
aids. Request a HAE   
appointment.  
* Re-evaluation as   
medically indicated or   
if a change in hearing   
is noted.  
Prakash Gutierrez,   
CCC-A  
Clinical Audiologist  
copied to: Diana Rodriguez MD  
CHAN  
Abbrev-  
iation Definition   
Degree of hearing   
sensitivity dB  
range  
WNL within normal   
limits WNL 0 - 20  
SNHL sensorineural   
hearing loss Mild 20-40  
CHL conductive hearing   
loss Moderate 40-55  
MHL mixed hearing loss   
Moderately-Severe 55-70  
WRS word recognition   
score Severe 70-90  
ME middle ear Profound   
90 +  
TM tympanic membrane  
Referring Provider:   
DIANA RODRIGUEZ   
[[id number]]  
Allergies As of Date:   
2024 Noted   
Allergy Reaction  
EFFEXOR (VENLAFAXINE   
ANALOGUES) 2014   
14 - Other: See   
Comments  
Comments: Seizures.  
PENICILLINS 2021   
2 - Rash  
4 - Hives  
Comments: difficult to   
breath (more content   
not included)...    Normal                                  [facility]  
   
                                                    HEARING TEST/AUDIOGRAMon    
   
                                                                  [facility] 2024   
   
                                        CNPN                Telephone (HNQ)  
-----------------------  
-----------  
BEV GAMING   
([id number]) 1965 F   
Shamar Co*  
Date Time Provider   
Department  
24 DIANA RODRIGUEZ   
HNQ  
During your visit   
today, we recorded the   
following information   
about you:  
Dayan Ribera 2024   
8:50 AM Signed  
Called pt to schedule   
follow up with nichol   
pt confirmed appt for   
 at 4pm.  
Allergies As of Date:   
2024 Noted   
Allergy Reaction  
EFFEXOR (VENLAFAXINE   
ANALOGUES) 2014   
14 - Other: See   
Comments  
Comments: Seizures.  
PENICILLINS 2021   
2 - Rash  
4 - Hives  
Comments: difficult to   
breathe, swelling  
BACTRIM   
(SULFAMETHOXAZOLE-TRIME  
TH*2021 9 -   
Itching  
REGLAN (METOCLOPRAMIDE   
HCL) 2014 1 -   
Mental Status Change  
TOPAMAX (TOPIRAMATE)   
2014 1 - Mental   
Status Change  
Date Reviewed:   
2024  
Reviewed by: Brynn Finch, RN - Fully   
Assessed  
Prescriptions as of   
2024  
- azelastine 0.1% nasal   
spray  
SPRAY 1 SPRAY INTO EACH   
NOSTRIL TWICE DAILY  
- albuterol (PROVENTIL)   
2.5 mg /3 mL (0.083 %)   
nebulizer solution  
2.5 mg as needed.  
- albuterol HFA   
(PROVENTIL HFA,   
VENTOLIN HFA) 90   
mcg/actuation inhaler  
INHALE 1 PUFF EVERY 4   
HOURS AS NEEDED FOR   
WHEEZE OR FOR SHORTNESS   
OF BREATH  
- famotidine (PEPCID)   
40 mg tablet  
Take 40 mg by mouth   
every morning.  
- rosuvastatin   
(CRESTOR) 10 mg tablet  
Take 10 mg by mouth   
every morning.  
- IBUPROFEN IB ORAL  
Take by mouth as   
needed.  
- budesonide   
(PULMICORT) 0.5 mg/2 mL   
nebulizer solution  
Use 2 mL via nebulizer   
once daily.  
- budesonide   
(PULMICORT) 0.5 mg/2 mL   
nebulizer solution  
Use 2 mL via nebulizer   
once daily.  
- oxymetazoline (AFRIN,   
OXYMETAZOLINE,) 0.05 %   
nasal spray  
Instill 2 Sprays in the   
nose twice daily.  
- sodium chloride (DEEP   
SEA NASAL) 0.65 % nasal   
spray  
Use 1 Spray in the nose   
as needed.  
- insulin degludec   
(TRESIBA FLEXTOUCH   
U-100) 100 unit/mL (3   
mL) injection pen  
Inject subcutaneously.  
- LORazepam (ATIVAN)   
0.5 mg  
Take 0.5 mg by mouth at   
bedtime as needed.  
- OXYGEN, HOME THERAPY,  
4 L/min by Nasal   
Cannula route as   
directed. Can be off   
for 3-4 hours without  
issue.  
- insulin aspart   
(NOVOLOG FLEXPEN U-100   
INSULIN SUBCUTANEOUS)  
Inject subcutaneously.   
sliding scale  
- semaglutide (OZEMPIC)   
1 mg/dose (2 mg/1.5 mL)   
pnij  
Inject 2 mg   
subcutaneously one time   
a week.  
- Insulin Lispro,   
Human, (HUMALOG) 100   
unit/mL crtg  
Inject subcutaneously w   
MEALS.  
- furosemide (LASIX) 40   
mg tablet  
Take 40 mg by mouth as   
needed.  
- Levalbuterol HCl   
(XOPENEX) 1.25 mg/0.5   
mL nebulizer solution  
Use 1 Ampule via   
nebulizer every 8 hours   
as needed.  
Meds Comments as of   
2021:  
2020 Only takes   
Insuli  
Problem List As Of Date   
2024 Noted   
Resolved  
Pneumonia [J18.9]  
Arthritis [M19.90]  
Diabetes (HCC) [E11.9]  
COPD (chronic   
obstructive pulmonary   
disease) (H*  
Overweight [E66.3]  
RA (rheumatoid   
arthritis) ([facility])   
[M06.9]  
Depression [F32.A]  
Hypogammaglobulinemia   
([facility]) [D80.1]  
Displacement of   
cervical intervertebral   
disc wi*2014  
Cervical herniation   
[M50.20] 2014  
DVT of axillary vein,   
acute left ([facility])   
[I82.A12]2014  
DVT of lower extremity   
(deep venous   
thrombosis)*2014  
Cervical myelopathy   
[G95.9] 2014  
HX: anticoagulation   
[Z92.29] 2014  
S/P cervical spinal   
fusion [Z98.1]   
2014  
Smoking [F17.200]   
2014  
Class 2 obesity [E66.9]   
2021  
Encounter Number:   
[id number]  
Encounter Status:Closed   
by DAYAN RIBERA on   
24              Normal                                  [facility]  
   
                                                    CNOVon 2024   
   
                                        CNOV                Office Visit (OTOLLN  
)  
-----------------------  
-----------  
BEV GAMING   
([id number]) 1965 F   
Shamar Co*  
Date Time Provider   
Department  
24 1:00 PM   
DIANA RODRIGUEZ OTRANJITH  
During your visit   
today, we recorded the   
following information   
about you:  
Temperature  
98.2 degrees  
Dayan Ibanez MA   
2024 4:02 PM   
Signed  
y  
Diana Rodriguez MD   
2024 4:02 PM   
Signed  
SECTION OF RHINOLOGY,   
SINUS AND SKULL BASE   
SURGERY  
Head and Neck   
[place] NOTE  
HPI: Patient is a 58   
year old Female   
presenting for Chronic   
pansinusitis. She  
c/o of Nasal congestion   
In the right side as   
well as pressure. She   
is also  
having headaches.  
PAST MEDICAL HISTORY  
Diagnosis Date  
Arthritis  
COPD (chronic   
obstructive pulmonary   
disease) ([facility])  
Depression  
sees a psych  
Diabetes ([facility])  
DVT (deep venous   
thrombosis) ([facility])  
Left lower extremity   
s/p ankle surgery  
Hypogammaglobulinemia   
(HCC)  
On home oxygen therapy  
4L NC  
Overweight  
Pneumonia  
PONV (postoperative   
nausea and vomiting)  
RA (rheumatoid   
arthritis) ([facility])  
PAST SURGICAL HISTORY  
Procedure Laterality   
Date  
ANKLE LEFT OP SURGERY  
plantar tendon  
CHOLECYSTECTOMY  
HERNIA REPAIR HX  
HIP RIGHT OP SURGERY  
tendon repair  
HYSTERECTOMY HX  
total  
KNEE RIGHT OP SURGERY  
tendon repair  
SHOULDER   
ARTHROSCOPY/SURGERY  
bilateral tendon repair  
FAMILY HISTORY  
Problem Relation Age of   
Onset  
Hypertension Mother  
Arthritis Mother  
Cancer Father  
Hypertension Brother  
Social History  
Tobacco Use  
Smoking status: Every   
Day  
Packs/day: .5  
Types: Cigarettes  
Start date:   
Smokeless tobacco:   
Never  
Vaping Use  
Vaping Use: Never used  
Substance Use Topics  
Alcohol use: No  
Drug use: No  
Comment: denies tx for   
drug/alcohol abuse in   
the past.  
Current Outpatient   
Medications  
Medication Sig Dispense   
Refill  
albuterol (PROVENTIL)   
2.5 mg /3 mL (0.083 %)   
nebulizer solution 2.5   
mg as  
needed.  
albuterol HFA   
(PROVENTIL HFA,   
VENTOLIN HFA) 90   
mcg/actuation inhaler   
INHALE 1  
PUFF EVERY 4 HOURS AS   
NEEDED FOR WHEEZE OR   
FOR SHORTNESS OF BREATH  
famotidine (PEPCID) 40   
mg tablet Take 40 mg by   
mouth every morning.  
rosuvastatin (CRESTOR)   
10 mg tablet Take 10 mg   
by mouth every morning.  
IBUPROFEN IB ORAL Take   
by mouth as needed.  
azelastine 0.1% nasal   
spray 1 spray in each   
nostril twice daily for   
30 days 30  
mL 4  
budesonide (PULMICORT)   
0.5 mg/2 mL nebulizer   
solution Use 2 mL via   
nebulizer  
once daily. (Patient   
not taking: Reported on   
2024) 30 Vial 5  
budesonide (PULMICORT)   
0.5 mg/2 mL nebulizer   
solution Use 2 mL via   
nebulizer  
once daily. (Patient   
not taking: Reported on   
2024) 30 Vial 5  
oxymetazoline (AFRIN,   
OXYMETAZOLINE,) 0.05 %   
nasal spray Instill 2   
Sprays in  
the nose twice daily.   
(Patient not taking:   
Reported on 2024)   
30 mL 0  
sodium chloride (DEEP   
SEA NASAL) 0.65 % nasal   
spray Use 1 Spray in   
the nose as  
needed. (Patient not   
taking: Reported on   
2024) 44 mL 0  
insulin degludec   
(TRESIBA FLEXTOUCH   
U-100) 100 unit/mL (3   
mL) injection pen  
Inject subcutaneously.   
(Patient not taking:   
Reported on 2024)  
LORazepam (ATIVAN) 0.5   
mg Take 0.5 mg by mouth   
at bedtime as needed.  
OXYGEN, HOME THERAPY, 4   
L/min by Nasal Cannula   
route as directed. Can   
be off  
for 3-4 hours without   
issue.  
insulin aspart (NOVOLOG   
FLEXPEN U-100 INSULIN   
SUBCUTANEOUS) Inject  
subcutaneously. sliding   
scale (Patient not   
taking: Reported on   
2024)  
semaglutide (OZEMPIC) 1   
mg/dose (2 mg/1.5 mL)   
pnij Inject 2 mg   
subcutaneously  
one time a week.  
Insulin Lispro, Human,   
(HUMALOG) 100 unit/mL   
crtg Inject   
subcutaneously w  
MEALS. (Patient not   
taking: Reported on   
2024)  
furosemide (LASIX) 40   
mg tablet Take 40 mg by   
mouth as needed.   
(Patient not  
taking: Reported on   
2024)  
Levalbuterol HCl   
(XOPENEX) 1.25 mg/0.5   
mL nebulizer solution   
Use 1 Ampule via  
nebulizer every 8 hours   
as needed.  
No current   
facility-administered   
medications for this   
visit.  
ALLERGIES  
Allergen Reactions  
Effexor [Venlafaxin*   
Other: See Comments  
Seizures.  
Penicillins Rash, Hives  
difficult to breathe,   
swelling  
Bactrim [Sulfametho*   
Itching  
Reglan [Metoclopram*   
Mental Status Change  
Topamax [Topiramate]   
Mental Status Change  
ROS:  
CONSTITUTIONAL: No   
fevers, chills,   
nightsweats, unintended   
weight loss  
HEENT: HEENT: Yes   
symptoms: headaches and   
sinus pressure, No   
symptoms: nasal  
congestion, epistaxis,   
sense of smell absent,   
and nasal drainage both  
EYES: No diplopia or   
blurry vision.  
PHYSICAL EXAM:  
24  
1016  
Temp: 36.8 ?C (98.2 ?F)  
General appearance:   
well developed, well   
nourished, without   
obvious deformities  
Eyes: Extra ocular   
muscles are intact, no   
diplopia on primary   
gaze  
Ears: Externally normal   
in appearance, without   
scars, lesions, or   
masses. Both  
left and right external   
auditory canals and   
tympanic membranes are   
normal  
Nasal exam: The mucosa   
is pink, the septum is   
deviated and the   
visible  
turbinates are Yes   
hypertro (more content   
not included)...    Normal                                  [facility]  
   
                                                    CT Guidance for stereotactic  
 localization of Unspecified body region-- WO   
contraston   
2024   
   
                                                                  [facility]  
   
                                                    CT SINUS STEREO WO IVCONon 0  
2024   
   
                                                    CT SINUS STEREO WO   
IVCON                                   * * *Final Report* * *  
DATE OF EXAM: 2024 7:53AM  
LN  - CT SINUS   
STEREO WO IVCON /   
ACCESSION # [id number]  
PROCEDURE REASON: Other   
chronic sinusitis  
  
* * * * Physician   
Interpretation * * * *  
RESULT: EXAMINATION: CT   
SINUS STEREO WO IVCON  
CLINICAL HISTORY:   
Chronic sinusitis  
TECHNIQUE: Spiral high   
resolution axial   
unenhanced CT images   
were  
obtained through the   
paranasal sinuses with   
sagittal, coronal  
reconstructions.  
MQ: [id number]  
CT Radiation dose:   
Integrated Dose-Length   
Product (DLP) for this   
visit =  
171 mGy*cm.  
CT Dose Reduction   
Employed: Automated   
exposure control (AEC)  
COMPARISON: Sinus CT   
2020  
RESULT:  
Post-Surgical Findings:   
Left maxillary   
antrostomy, left   
anterior  
ethmoidectomy, left   
partial turbinectomy   
and eduardo bullosa   
resection.  
Sinus Chambers:   
Paranasal mucosal   
thickening is noted;   
see below for Germain  
José Miguel score for degree   
of sinus opacification.   
0 is less than 2mm  
thickness of mucosal   
thickening, 1 is   
partial opacification,   
and 2 is  
almost complete or   
complete opacification.   
For the ostiomeatal   
complex, 0  
is patent and 2 is any   
portion occluded.  
Air Fluid Levels: There   
are air fluid   
levels/frothy   
secretions in  
the right maxillary   
sinus which may   
indicate acute   
sinusitis in the right  
clinical setting.  
High attenuation sinus   
disease: There is no   
high attenuation sinus  
disease.  
Left Frontal Sinus: 0  
Left Anterior Ethmoid   
Air Cells: 0  
Left posterior Ethmoid   
Air Cells: 0  
Left Maxillary Sinus: 1  
Left Sphenoid Sinus: 0  
Left Ostiomeatal   
Complex: 0  
LEFT Germain José Miguel Score:   
1  
Right Frontal Sinus: 0  
Right Anterior Ethmoid   
Air Cells: 0  
Right posterior Ethmoid   
Air Cells: 0  
Right Maxillary Sinus:   
2  
Right Sphenoid Sinus: 0  
Right Ostiomeatal   
Complex: 2  
RIGHT [place]   
Score: 4  
TOTAL [place] José Miguel   
Score: 5  
Superimposed retention   
cysts or polyps in the   
left maxillary antrum.  
Nasal Cavities:   
Rightward deviation the   
bony nasal septum.   
Nasal  
cavities are otherwise   
clear.  
Developmental   
Anomalies: Right eduardo   
bullosa. Resection of   
left eduardo  
bullosa.  
Other: The visualized   
mastoid air cells and   
middle ear cavities are  
clear. The soft tissues   
of the face and orbits   
are within normal   
limits  
within the limitations   
of the study.  
Localizer images: No   
additional findings.  
IMPRESSION:  
Near complete   
opacification of right   
maxillary sinus with   
findings that  
may indicate acute   
sinusitis. This is new   
from 2020.  
Postoperative changes   
from the prior   
examination as detailed   
with  
improved left maxillary   
sinus inflammatory   
changes.  
Remaining paranasal   
sinuses are clear.  
Transcribe Date/Time:   
2024 8:07A  
Dictated by: PRISCILA PARISI DO  
This examination was   
interpreted and the   
report reviewed and  
electronically signed   
by:  
PRISCILA PARISI DO on   
2024 8:14AM EST  
Thank you for allowing   
us to participate in   
the care of your   
patient.  
Should there be any   
questions regarding   
this interpretation,   
please call  
[phone].  
If you are unable to   
reach us at the number   
above,  
please feel free to   
contact [facility] at   
[phone].  
[id number] Normal                                  [facility]  
   
                                                    CNOVon 2024   
   
                                        CNOV                Office Visit (OTOLMN  
)  
-----------------------  
-----------  
BEV GAMING   
([id number]) 1965 F   
Shamar Cuellar*  
Date Time Provider   
Department  
24 4:00 PM DIANA RODRIGUEZ OTOLMN  
During your visit   
today, we recorded the   
following information   
about you:  
Brynn Finch RN   
2024 5:00 PM Signed  
Tobacco Use: .5   
packs/day Types:   
Cigarettes. Ready to   
quit: No.  
Was smoking cessation   
packet given? Patient   
Declined  
Was a referral   
initiated?Patient   
declined.  
Diana Rodriguez MD   
2024 5:00 PM Signed  
SECTION OF RHINOLOGY,   
SINUS AND SKULL BASE   
SURGERY  
Head and Neck   
[place] NOTE  
HPI: Patient is a 58   
year old female who   
presents for chronic   
pansinusitis  
follow up. She has been   
having frequent   
infections since   
December. She's been  
on antibiotics and   
steroids since   
December. She's not   
currently on anything.  
She can't take Flonase.   
She's done a netipot.   
She has not tried   
Azelastine.  
PAST MEDICAL HISTORY  
Diagnosis Date  
Arthritis  
COPD (chronic   
obstructive pulmonary   
disease) ([facility])  
Depression  
sees a psych  
Diabetes ([facility])  
DVT (deep venous   
thrombosis) ([facility])  
Left lower extremity   
s/p ankle surgery  
Hypogammaglobulinemia   
(HCC)  
On home oxygen therapy  
4L NC  
Overweight  
Pneumonia  
PONV (postoperative   
nausea and vomiting)  
RA (rheumatoid   
arthritis) ([facility])  
PAST SURGICAL HISTORY  
Procedure Laterality   
Date  
ANKLE LEFT OP SURGERY  
plantar tendon  
CHOLECYSTECTOMY  
HERNIA REPAIR HX  
HIP RIGHT OP SURGERY  
tendon repair  
HYSTERECTOMY HX  
total  
KNEE RIGHT OP SURGERY  
tendon repair  
SHOULDER   
ARTHROSCOPY/SURGERY  
bilateral tendon repair  
FAMILY HISTORY  
Problem Relation Age of   
Onset  
Hypertension Mother  
Arthritis Mother  
Cancer Father  
Hypertension Brother  
Social History  
Tobacco Use  
Smoking status: Every   
Day  
Packs/day: .5  
Types: Cigarettes  
Start date:   
Smokeless tobacco:   
Never  
Vaping Use  
Vaping Use: Never used  
Substance Use Topics  
Alcohol use: No  
Drug use: No  
Comment: denies tx for   
drug/alcohol abuse in   
the past.  
Current Outpatient   
Medications  
Medication Sig Dispense   
Refill  
albuterol (PROVENTIL)   
2.5 mg /3 mL (0.083 %)   
nebulizer solution 2.5   
mg as  
needed.  
albuterol HFA   
(PROVENTIL HFA,   
VENTOLIN HFA) 90   
mcg/actuation inhaler   
INHALE 1  
PUFF EVERY 4 HOURS AS   
NEEDED FOR WHEEZE OR   
FOR SHORTNESS OF BREATH  
famotidine (PEPCID) 40   
mg tablet Take 40 mg by   
mouth every morning.  
rosuvastatin (CRESTOR)   
10 mg tablet Take 10 mg   
by mouth every morning.  
IBUPROFEN IB ORAL Take   
by mouth as needed.  
LORazepam (ATIVAN) 0.5   
mg Take 0.5 mg by mouth   
at bedtime as needed.  
OXYGEN, HOME THERAPY, 4   
L/min by Nasal Cannula   
route as directed. Can   
be off  
for 3-4 hours without   
issue.  
semaglutide (OZEMPIC) 1   
mg/dose (2 mg/1.5 mL)   
pnij Inject 2 mg   
subcutaneously  
one time a week.  
Levalbuterol HCl   
(XOPENEX) 1.25 mg/0.5   
mL nebulizer solution   
Use 1 Ampule via  
nebulizer every 8 hours   
as needed.  
budesonide (PULMICORT)   
0.5 mg/2 mL nebulizer   
solution Use 2 mL via   
nebulizer  
once daily. (Patient   
not taking: Reported on   
2024) 30 Vial 5  
budesonide (PULMICORT)   
0.5 mg/2 mL nebulizer   
solution Use 2 mL via   
nebulizer  
once daily. (Patient   
not taking: Reported on   
2024) 30 Vial 5  
oxymetazoline (AFRIN,   
OXYMETAZOLINE,) 0.05 %   
nasal spray Instill 2   
Sprays in  
the nose twice daily.   
(Patient not taking:   
Reported on 2024)   
30 mL 0  
sodium chloride (DEEP   
SEA NASAL) 0.65 % nasal   
spray Use 1 Spray in   
the nose as  
needed. (Patient not   
taking: Reported on   
2024) 44 mL 0  
insulin degludec   
(TRESIBA FLEXTOUCH   
U-100) 100 unit/mL (3   
mL) injection pen  
Inject subcutaneously.   
(Patient not taking:   
Reported on 2024)  
insulin aspart (NOVOLOG   
FLEXPEN U-100 INSULIN   
SUBCUTANEOUS) Inject  
subcutaneously. sliding   
scale (Patient not   
taking: Reported on   
2024)  
Insulin Lispro, Human,   
(HUMALOG) 100 unit/mL   
crtg Inject   
subcutaneously w  
MEALS. (Patient not   
taking: Reported on   
2024)  
furosemide (LASIX) 40   
mg tablet Take 40 mg by   
mouth as needed.   
(Patient not  
taking: Reported on   
2024)  
No current   
facility-administered   
medications for this   
visit.  
ALLERGIES  
Allergen Reactions  
Bactrim [Sulfametho*   
Itching  
Effexor [Venlafaxin*   
Other: See Comments  
Seizures.  
Penicillins Rash, Hives  
difficult to breathe,   
swelling  
Reglan [Metoclopram*   
Mental Status Change  
Topamax [Topiramate]   
Mental Status Change  
ROS:  
CONSTITUTIONAL: No   
fevers, chills,   
nightsweats, unintended   
weight loss  
HEENT: HEENT: Yes   
symptoms: headaches,   
sinus pressure, nasal   
congestion, sense  
of smell reduced, and   
nasal drainage both, No   
symptoms: epistaxis  
EYES: No diplopia or   
blurry vision.  
PHYSICAL EXAM:  
There were no vitals   
filed for this visit.  
General appearance:   
well developed, well   
nourished, without   
obvious deformities  
Eyes: Extra ocular   
muscles are intact, no   
diplopia on primary   
gaze  
Ears: Externally normal   
in appearance, without   
scars, lesions, or   
masses. Both (more   
content not   
included)...        Normal                                  [facility]  
   
                                                    Glucose Glucometer (BldC) [M  
ass/Vol]Ordered By: Neeraj Terry on 2023   
   
                      Glucose [Mass/Vol] 245 mg/dL                        [facility]  
   
                                        Comment on above:   Random Glucose Refer  
ence Range is dependent on time and   
content of last meal. Glucose of more than 200 mg/dL in a   
nonstressed, ambulatory subject supports the diagnosis of   
Diabetes Mellitus.   
   
                                                    Gram stain for investigation  
 of transfusion reactionOrdered By: Neeraj Terry   
on   
2023   
   
                                                    Microscopic observation   
Gram stain Nom (Unsp   
spec)                                                           [facility]  
   
                                                    Troponin I.cardiac [Mass/vol  
ume] in Serum or Plasma by Detection limit <= 0.01   
ng/Ordered By: Neeraj Terry on 2023   
   
                                                    Troponin I.cardiac DL   
<= 0.01 ng/mL   
[Mass/Vol]      3.3 pg/mL                       0.0-15.0        [facility]  
   
                                                    Alanine aminotransferase [En  
zymatic activity/volume] in Serum or PlasmaOrdered   
By:   
Nickolas Palomares on 2023   
   
                                                    ALT [Catalytic   
activity/Vol]   20 U/L                          7-52            [facility]  
   
                                                    Albumin [Mass/volume] in Ser  
um or Plasma by Bromocresol green (BCG) dye binding   
methoOrdered By: Nickolas Palomares on 2023   
   
                                                    Albumin BCG dye   
[Mass/Vol]      3.7 g/dL                        3.5-5.7         [facility]  
   
                                                    Alkaline phosphatase [Enzyma  
tic activity/volume] in Serum or PlasmaOrdered By:   
Nickolas Palomares on 2023   
   
                                                    ALP [Catalytic   
activity/Vol]   54 U/L                                    [facility]  
   
                                                    Aspartate aminotransferase [  
Enzymatic activity/volume] in Serum or PlasmaOrdered  
By:   
Nickolas Palomares on 2023   
   
                                                    AST [Catalytic   
activity/Vol]   12 U/L                          13-39           [facility]  
   
                                                    Basophils Auto (Bld) [#/Vol]  
Ordered By: Nickolas Palomares on 2023   
   
                      Basophils (Bld) [#/Vol] 0.1 10*3/uL            0.0-0.2      
[facility]  
   
                                                    Basophils/100 WBC Auto (Bld)  
Ordered By: Nickolas Palomares on 2023   
   
                      Basophils/100 WBC (Bld) 0.7 %                 .          F  
[facility]  
   
                                                    Bilirubin Test strip Ql (U)O  
rdered By: Nickolas Palomares on 2023   
   
                      Bilirubin Ql (U) Negative              Negative   [facility]  
   
                                                    Bilirubin.total [Mass/volume  
] in Serum or PlasmaOrdered By: Nickolas Palomares on   
2023   
   
                      Bilirubin [Mass/Vol] 0.5 mg/dL             0.3-1.0    [facility]  
   
                                                    COVID-19 Detected/Not Detect  
edOrdered By: Nickolas Palomares on 2023   
   
                                                    SARS-CoV-2 (COVID-19)   
RNA CHRISTOS+non-probe Ql   
(Nph)           Not detected                    Not Detecte     [facility]  
   
                                        Comment on above:   This is a duplicate   
RP2.1 COVID (PCR) result to be used for   
statistical tracking purpose only.   
   
                                                    Calcium [Mass/volume] in Ser  
um or PlasmaOrdered By: Nickolas Palomares on 2023   
   
                      Calcium [Mass/Vol] 8.8 mg/dL             8.6-10.3   [facility]  
   
                                                    Carbon dioxide, total [Moles  
/volume] in Serum or PlasmaOrdered By: Nickolas Palomares   
on   
2023   
   
                      CO2 [Moles/Vol] 30.6 mmol/L            21.0-31.0  [facility]  
   
                                                    Chloride [Moles/volume] in S  
herrera or PlasmaOrdered By: Nickolas Palomares on 2023   
   
                      Chloride [Moles/Vol] 102 mmol/L                 [facility]  
   
                                                    Color Auto (U)Ordered By: Uche  
red Marielle on 2023   
   
                      Color (U)  Yellow                Yellow     [facility]  
   
                                                    Creatine kinase [Enzymatic a  
ctivity/volume] in Serum or PlasmaOrdered By: Nickolas Palomares   
on 2023   
   
                                                    CK [Catalytic   
activity/Vol]   18 U/L                                    [facility]  
   
                                                    Creatine kinase.MB [Mass/vol  
ume] in Serum or PlasmaOrdered By: Nickolas Palomares on   
2023   
   
                      CK.MB [Mass/Vol] 1.3 ng/mL             0.6-6.3    [facility]  
   
                                                    Creatinine [Mass/volume] in   
Serum or PlasmaOrdered By: Nickolas Palomares on 2023  
   
   
                      Creatinine [Mass/Vol] 0.55 mg/dL            0.60-1.20  [facility]  
   
                                                    Eosinophils Auto (Bld) [#/Vo  
l]Ordered By: Nickolas Palomares on 2023   
   
                                                    Eosinophils (Bld)   
[#/Vol]         0.1 10*3/uL                     0.0-0.45        [facility]  
   
                                                    Eosinophils/100 WBC Auto (Bl  
d)Ordered By: Nickolas Palomares on 2023   
   
                                                    Eosinophils/100 WBC   
(Bld)           0.6 %                           .               [facility]  
   
                                                    Erythrocyte distribution wid  
th Auto (RBC) [Ratio]Ordered By: Nickolas Palomares on   
2023   
   
                                                    Erythrocyte   
distribution width   
(RBC) [Ratio]   13.3 %                          11.9-15.3       [facility]  
   
                                                    Globulin Calc (S) [Mass/Vol]  
Ordered By: Nickolas Palomares on 2023   
   
                      Globulin (S) [Mass/Vol] 2.2 g/dL                         F  
[facility]  
   
                                                    Glucose [Mass/volume] in Ser  
um or PlasmaOrdered By: Nickolas Palomares on 2023   
   
                      Glucose [Mass/Vol] 184 mg/dL                  [facility]  
   
                                        Comment on above:   ADA recommended refe  
rence rangeRandom Glucose Reference Range   
is dependent on time and content of last meal. Glucose of more   
than 200 mg/dL in a nonstressed, ambulatory subject supports   
the diagnosis of Diabetes Mellitus.   
   
                                                    Hematocrit Auto (Bld) [Volum  
e fraction]Ordered By: Nickolas Palomares on 2023   
   
                                                    Hematocrit (Bld)   
[Volume fraction] 41.8 %                          34.0-46.4       [facility]  
   
                                                    Hemoglobin [Mass/volume] in   
BloodOrdered By: Nickolas Palomares on 2023   
   
                                                    Hemoglobin (Bld)   
[Mass/Vol]      14.3 g/dL                       11.8-15.4       [facility]  
   
                                                    Ketones Auto test strip (U)   
[Mass/Vol]Ordered By: Nickolas Palomares on 2023   
   
                      Ketones (U) [Mass/Vol] Negative              Negative   [facility]  
   
                                                    Leukocytes [#/volume] correc  
ken for nucleated erythrocytes in Blood by Automated  
   
counOrdered By: Nickolas Palomares on 2023   
   
                                                    WBC corrected for nucl   
RBC Auto (Bld) [#/Vol] 13.5 10*3/uL                    3.8-11.6        [facility]  
   
                                                    Lymphocytes Auto (Bld) [#/Vo  
l]Ordered By: Nickolas Palomares on 2023   
   
                                                    Lymphocytes (Bld)   
[#/Vol]         4.4 10*3/uL                     1.00-4.8        [facility]  
   
                                                    Lymphocytes/100 WBC Auto (Bl  
d)Ordered By: Nickolas Palomares on 2023   
   
                                                    Lymphocytes/100 WBC   
(Bld)           32.5 %                          .               [facility]  
   
                                                    MCH Auto (RBC) [Entitic mass  
]Ordered By: Nickolas Palomares on 2023   
   
                                                    MCH (RBC) [Entitic   
mass]           31.8 pg                         24.7-34.3       [facility]  
   
                                                    MCHC Auto (RBC) [Mass/Vol]Or  
dered By: Nickolas Palomares on 2023   
   
                      MCHC (RBC) [Mass/Vol] 34.2 g/dL             32.0-35.0  [facility]  
[facility]  
   
                                                    MCV Auto (RBC) [Entitic vol]  
Ordered By: Nickolas Palomares on 2023   
   
                      MCV (RBC) [Entitic vol] 93.2 fL                    F  
[facility]  
   
                                                    Monocyte distribution width   
[Entitic volume] in Blood by AutomatedOrdered By:   
Nickolas Palomares on 2023   
   
                                                    Monocyte distribution   
width Auto (Bld)   
[Entitic vol]   17.28 %                         0.00-20.00      [facility]  
   
                                                    Monocytes Auto (Bld) [#/Vol]  
Ordered By: Nickolas Palomares on 2023   
   
                      Monocytes (Bld) [#/Vol] 0.8 10*3/uL            0.0-0.8      
[facility]  
   
                                                    Monocytes/100 WBC Auto (Bld)  
Ordered By: Nickolas Palomares on 2023   
   
                      Monocytes/100 WBC (Bld) 6.3 %                 .          F  
[facility]  
   
                                                    Natriuretic peptide B [Mass/  
Vol]Ordered By: Nickolas Palomares on 2023   
   
                                                    Natriuretic peptide B   
(Bld) [Mass/Vol] 13.0 pg/mL                      5-100           [facility]  
   
                                                    Neutrophils Auto (Bld) [#/Vo  
l]Ordered By: Nickolas Palomares on 2023   
   
                                                    Neutrophils (Bld)   
[#/Vol]         8.1 10*3/uL                     1.8-7.7         [facility]  
   
                                                    Neutrophils/100 WBC Auto (Bl  
d)Ordered By: Nickolas Palomares on 2023   
   
                                                    Neutrophils/100 WBC   
(Bld)           59.9 %                          .               [facility]  
   
                                                    Nitrite Test strip Ql (U)Ord  
ered By: Nickolas Palomares on 2023   
   
                      Nitrite Ql (U) Negative              Negative   [facility]  
   
                                                    No Panel InformationOrdered   
By: Neeraj Terry on 2023   
   
                                                    Bedside Glucose #2   
Comment         Will notify dr/rn                                 [facility]  
   
                      Bedside Glucose Comment See comment                         
[facility]  
   
                                        Comment on above:   Glu2: Will Repeat Te  
st   
   
                                                    No Panel InformationOrdered   
By: Nickolas Palomares on 2023   
   
                                                    D-Dimer Quantitative   
(PE/DVT)        < 200 ng/mL                     0-243           [facility]  
   
                                        Comment on above:   The reference range   
for D-dimer is <243 ng/mL D-dimer   
units.D-dimer results must be used in conjunction with a   
clinicalpretest probability (PTP) assessment model for deep   
veinthrombosis (DVT) and pulmonary embolism (PE). Results   
<230ng/mL d-dimer units can be used as a negative predictor   
inpatients with low or moderate probability for DVT/PE.Results   
above the exclusion threshold of 230 ng/ml D-dimerunits for   
DVT/PE may indicate the need for furtherdiagnostic   
testing.D-Dimer can be increased in hospitalized patients due   
toco-morbid conditions.   
   
                      Estimated GFR (CKD-EPI) > 60.0 mL/Min                       
  [facility]  
   
                                                    Pharmacy Creatinine   
Clearance (Chem 133.51                                          [facility]  
   
                                                    Nucleated erythrocytes [Pres  
ence] in Blood by Automated countOrdered By: Nickolas Palomares   
on 2023   
   
                                                    Nucleated RBC Auto Ql   
(Bld)           0.1 /100{WBC}                   0-0.5           [facility]  
   
                                                    Platelet mean volume Auto (B  
ld) [Entitic vol]Ordered By: Nickolas Palomares on   
2023   
   
                                                    Platelet mean volume   
(Bld) [Entitic vol] 8.2 fL                          6.3-10.7        [facility]  
   
                                                    Platelets Auto (Bld) [#/Vol]  
Ordered By: Nickolas Palomares on 2023   
   
                      Platelets (Bld) [#/Vol] 205 10*3/uL            150-450      
[facility]  
   
                                                    Potassium [Moles/volume] in   
Serum or PlasmaOrdered By: Nickolas Palomares on 2023  
   
   
                      Potassium [Moles/Vol] 3.9 mmol/L            3.5-5.1    [facility]  
   
                                                    Protein Auto test strip (U)   
[Mass/Vol]Ordered By: Nickolas Palomares on 2023   
   
                      Protein (U) [Mass/Vol] Negative              Negative   [facility]  
   
                                                    Protein [Mass/volume] in Ser  
um or PlasmaOrdered By: Nickolas Palomares on 06-   
   
                      Protein [Mass/Vol] 5.9 g/dL              6.4-8.9    [facility]  
   
                                                    RBC Auto (Bld) [#/Vol]Ordere  
d By: Nickolas Palomares on 2023   
   
                      RBC (Bld) [#/Vol] 4.48 10*6/uL            3.60-5.00  [facility]  
   
                                                    Respiratory pathogens DNA an  
d RNA panel - Nasopharynx by CHRISTOS with non-probe   
detectionOrdered By: Nickolas Palomares on 2023   
   
                                                    Respiratory pathogens   
DNA and RNA panel   
CHRISTOS+non-probe (Nph)                                                 [facility]  
   
                                                    Serum or plasma albumin/glob  
ulin mass ratioOrdered By: Nickolas Palomares on 2023  
   
   
                                                    Albumin/Globulin [Mass   
ratio]          1.7 {ratio}                                     [facility]  
   
                                                    Serum or plasma anion gap de  
terminationOrdered By: Nickolas Palomares on 2023   
   
                      Anion gap [Moles/Vol] 9.3 mmol/L            6.0-15.0   [facility]  
   
                                                    Serum or plasma creatine kin  
ase MB (CKMB)/total creatine kinase (CK) ratio by   
calculaOrdered By: Nickolas Palomares on 2023   
   
                                                    CK.MB Calc [Catalytic   
fraction]       7.2 %                           0.00-2.50       [facility]  
   
                                                    Sodium [Moles/volume] in Ser  
um or PlasmaOrdered By: Nickolas Palomares on 2023   
   
                      Sodium [Moles/Vol] 138 mmol/L            136-145    [facility]  
   
                                                    Specific gravity Auto test s  
trip (U) [Rel density]Ordered By: Nickolas Palomares on   
2023   
   
                                                    Specific gravity (U)   
[Rel density]   1.004                           1.001-1.030     [facility]  
   
                                                    Troponin I.cardiac [Mass/vol  
ume] in Serum or Plasma by Detection limit <= 0.01   
ng/Ordered By: Nickolas Palomares on 2023   
   
                                                    Troponin I.cardiac DL   
<= 0.01 ng/mL   
[Mass/Vol]      4.0 pg/mL                       0.0-15.0        [facility]  
   
                                                    Urea nitrogen [Mass/volume]   
in Serum or PlasmaOrdered By: Nickolas Palomares on   
2023   
   
                                                    Urea nitrogen   
[Mass/Vol]      10 mg/dL                        7-25            [facility]  
   
                                                    Urine clarity by refractomet  
ry automatedOrdered By: Nickolas Palomares on 2023   
   
                                                    Clarity Refractometry   
automated (U)   Clear                           Clear           [facility]  
   
                                                    Urine glucose measurement by  
 automated test strip (mass/volume)Ordered By: Nickolas Palomares on 2023   
   
                                                    Glucose Auto test strip   
(U) [Mass/Vol]  Normal mg/dL                    Normal          [facility]  
   
                                                    Urine hemoglobin detection b  
y automated test stripOrdered By: Nickolas Palomares on   
2023   
   
                                                    Hemoglobin Auto test   
strip Ql (U)    Negative                        Negative        [facility]  
   
                                                    Urine leukocyte esterase det  
ection by automated test stripOrdered By: Nickolas Palomares on   
2023   
   
                                                    Leukocyte esterase Auto   
test strip Ql (U) Negative                        Negative        [facility]  
   
                                                    Urobilinogen Auto test strip  
 (U) [Mass/Vol]Ordered By: Nickolas Palomares on 2023  
   
   
                                                    Urobilinogen (U)   
[Mass/Vol]      Normal mg/dL                    Normal          [facility]  
   
                                                    WBC Auto (Bld) [#/Vol]Ordere  
d By: Nickolas Palomares on 2023   
   
                      WBC (Bld) [#/Vol] 13.5 10*3/uL            3.8-11.6   [facility]  
   
                                                    pH Auto test strip (U)Ordere  
d By: Nickolas Palomares on 2023   
   
                      pH (U)     5.5 [pH]              5.0-9.0    [facility]  
   
                                                    Anisocytosis LM Ql (Bld)Orde  
red By: Ambrosio Davis on 2023   
   
                      Anisocytosis Ql (Bld) Slight                           Fir  
[facility]  
   
                                                    Band form neutrophils/100 WB  
C Manual cnt (Bld)Ordered By: Ambrosio Davis on  
   
2023   
   
                                                    Band form   
neutrophils/100 WBC   
(Bld)           1 %                             0-5             [facility]  
   
                                                    Basophils Auto (Bld) [#/Vol]  
Ordered By: Ambrosio Davis on 2023   
   
                      Basophils (Bld) [#/Vol] N/A                              F  
[facility]  
   
                                                    Basophils/100 WBC Auto (Bld)  
Ordered By: Ambrosio Wheatleykpor on 2023   
   
                      Basophils/100 WBC (Bld) N/A                              F  
[facility]  
   
                                                    Calcium [Mass/volume] in Ser  
um or PlasmaOrdered By: Ambrosio Davis on   
2023   
   
                      Calcium [Mass/Vol] 8.1 mg/dL             8.6-10.3   [facility]  
   
                                                    Carbon dioxide, total [Moles  
/volume] in Serum or PlasmaOrdered By: Ambrosio Davis on 2023   
   
                      CO2 [Moles/Vol] 30.1 mmol/L            21.0-31.0  [facility]  
   
                                                    Chloride [Moles/volume] in S  
herrera or PlasmaOrdered By: Ambrosio Davis on   
2023   
   
                      Chloride [Moles/Vol] 100 mmol/L                 [facility]  
   
                                                    Creatinine [Mass/volume] in   
Serum or PlasmaOrdered By: Ambrosio Davis on   
2023   
   
                      Creatinine [Mass/Vol] 0.68 mg/dL            0.60-1.20  [facility]  
   
                                                    Eosinophils Auto (Bld) [#/Vo  
l]Ordered By: Ambrosio Davis on 2023   
   
                                                    Eosinophils (Bld)   
[#/Vol]         N/A                                             [facility]  
   
                                                    Eosinophils/100 WBC Auto (Bl  
d)Ordered By: Ambrosio Davis on 2023   
   
                                                    Eosinophils/100 WBC   
(Bld)           N/A                                             [facility]  
   
                                                    Erythrocyte distribution wid  
th Auto (RBC) [Ratio]Ordered By: Ambrosio Davis  
 on   
2023   
   
                                                    Erythrocyte   
distribution width   
(RBC) [Ratio]   12.8 %                          11.9-15.3       [facility]  
   
                                                    Glucose Glucometer (BldC) [M  
ass/Vol]Ordered By: Ambrosio Davis on   
2023   
   
                      Glucose [Mass/Vol] 347 mg/dL                        [facility]  
   
                                        Comment on above:   Random Glucose Refer  
ence Range is dependent on time and   
content of last meal. Glucose of more than 200 mg/dL in a   
nonstressed, ambulatory subject supports the diagnosis of   
Diabetes Mellitus.   
   
                                                    Glucose [Mass/volume] in Ser  
um or PlasmaOrdered By: Ambrosio Davis on   
2023   
   
                      Glucose [Mass/Vol] 331 mg/dL                  [facility]  
   
                                        Comment on above:   ADA recommended refe  
rence rangeRandom Glucose Reference Range   
is dependent on time and content of last meal. Glucose of more   
than 200 mg/dL in a nonstressed, ambulatory subject supports   
the diagnosis of Diabetes Mellitus.   
   
                                                    Glucose mean value [Mass/vol  
ume] in Blood Estimated from glycated   
hemoglobinOrdered   
By: Ambrosio Davis on 2023   
   
                                                    Average glucose   
Estimated from glycated   
hemoglobin (Bld)   
[Mass/Vol]      166 mg/dL                                       [facility]  
   
                                                    Hematocrit Auto (Bld) [Volum  
e fraction]Ordered By: Ambrosio Davis on   
2023   
   
                                                    Hematocrit (Bld)   
[Volume fraction] 43.6 %                          34.0-46.4       [facility]  
   
                                                    Hemoglobin A1c percentageOrd  
ered By: Ambrosioashish Davis on 2023   
   
                                                    HbA1c (Bld) [Mass   
fraction]       7.4 %                           4.3-5.6         [facility]  
   
                                        Comment on above:   Increased risk for d  
iabetes: 5.7 - 6.4diabetes: >6.4glycemic   
control for adults with diabetes: <7.0   
   
                                                    Hemoglobin [Mass/volume] in   
BloodOrdered By: Ambrosio Davis on 2023   
   
                                                    Hemoglobin (Bld)   
[Mass/Vol]      14.4 g/dL                       11.8-15.4       [facility]  
   
                                                    Leukocytes [#/volume] correc  
ken for nucleated erythrocytes in Blood by Automated  
   
counOrdered By: Ambrosio Davis on 2023   
   
                                                    WBC corrected for nucl   
RBC Auto (Bld) [#/Vol] 26.4 10*3/uL                    3.8-11.6        [facility]  
   
                                                    Lymphocytes Auto (Bld) [#/Vo  
l]Ordered By: Ambrosioashish Davis on 2023   
   
                                                    Lymphocytes (Bld)   
[#/Vol]         N/A                                             [facility]  
   
                                                    Lymphocytes/100 WBC Auto (Bl  
d)Ordered By: Ambrosioashish Davis on 2023   
   
                                                    Lymphocytes/100 WBC   
(Bld)           N/A                                             [facility]  
   
                                                    Lymphocytes/100 WBC Manual c  
nt (Bld)Ordered By: Ambrosio Davis on   
2023   
   
                                                    Lymphocytes/100 WBC   
(Bld)           11 %                            18-42           [facility]  
   
                                                    MCH Auto (RBC) [Entitic mass  
]Ordered By: Ambrosio Davis on 2023   
   
                                                    MCH (RBC) [Entitic   
mass]           30.9 pg                         24.7-34.3       [facility]  
   
                                                    MCHC Auto (RBC) [Mass/Vol]Or  
dered By: Ambrosio Davis on 2023   
   
                      MCHC (RBC) [Mass/Vol] 32.9 g/dL             32.0-35.0  [facility]  
   
                                                    MCV Auto (RBC) [Entitic vol]  
Ordered By: Ambrosio Davis on 2023   
   
                      MCV (RBC) [Entitic vol] 93.8 fL                    F  
[facility]  
   
                                                    Metamyelocytes/100 WBC Manua  
l cnt (Bld)Ordered By: Ambrosio Davis on   
2023   
   
                                                    Metamyelocytes/100 WBC   
(Bld)           2 %                             0-0             [facility]  
   
                                                    Microcytes LM Ql (Bld)Ordere  
d By: Ambrosio Davis on 2023   
   
                      Microcytes Ql (Bld) Slight                           [facility]  
   
                                                    Monocytes Auto (Bld) [#/Vol]  
Ordered By: Ambrosio Davis on 2023   
   
                      Monocytes (Bld) [#/Vol] N/A                              F  
[facility]  
   
                                                    Monocytes/100 WBC Auto (Bld)  
Ordered By: Ambrosio Davis on 2023   
   
                      Monocytes/100 WBC (Bld) N/A                              F  
[facility]  
   
                                                    Monocytes/100 WBC Manual cnt  
 (Bld)Ordered By: Ambrosio Davis on 2023   
   
                      Monocytes/100 WBC (Bld) 7 %                   2-11       F  
[facility]  
   
                                                    Myelocytes/100 WBC Manual cn  
t (Bld)Ordered By: Ambrosio Davis on 2023  
   
   
                                                    Myelocytes/100 WBC   
(Bld)           4 %                             0-0             [facility]  
   
                                                    Neutrophils Auto (Bld) [#/Vo  
l]Ordered By: Ambrosio Davis on 2023   
   
                                                    Neutrophils (Bld)   
[#/Vol]         N/A                                             [facility]  
   
                                                    Neutrophils/100 WBC Auto (Bl  
d)Ordered By: Ambrosio Davis on 2023   
   
                                                    Neutrophils/100 WBC   
(Bld)           N/A                                             [facility]  
   
                                                    No Panel InformationOrdered   
By: Ambrosio Davis on 2023   
   
                      Bedside Glucose Comment Glu2: cleaned meter                 
        [facility]  
   
                      Estimated GFR (CKD-EPI) > 60.0 mL/Min                       
  [facility]  
   
                                                    Pharmacy Creatinine   
Clearance (Chem 104.80                                          [facility]  
   
                                                    Nucleated erythrocytes [Pres  
ence] in Blood by Automated countOrdered By:   
Ambrosio Davis on 2023   
   
                                                    Nucleated RBC Auto Ql   
(Bld)           N/A                                             [facility]  
   
                                                    Ovalocyte detectionOrdered B  
y: Ambrosio Davis on 2023   
   
                      Ovalocytes LM Ql (Bld) Slight                           [facility]  
[facility]  
   
                                                    Platelet adequacy [Presence]  
 in Blood by Light microscopyOrdered By: Ambrosio Davis on 2023   
   
                      Platelets LM Ql (Bld) Normal                Normal     [facility]  
   
                                                    Platelet mean volume Auto (B  
ld) [Entitic vol]Ordered By: Ambrosio Davis on   
2023   
   
                                                    Platelet mean volume   
(Bld) [Entitic vol] 7.8 fL                          6.3-10.7        [facility]  
   
                                                    Platelet morphology finding   
[Identifier] in BloodOrdered By: Ambrosio Davis  
 on   
2023   
   
                                                    Platelet morphology   
finding Nom (Bld) Normal                          Normal          [facility]  
   
                                                    Platelets Auto (Bld) [#/Vol]  
Ordered By: Ambrosio Davis on 2023   
   
                      Platelets (Bld) [#/Vol] 224 10*3/uL            150-450      
[facility]  
   
                                                    Potassium [Moles/volume] in   
Serum or PlasmaOrdered By: Ambrosio Davis on   
2023   
   
                      Potassium [Moles/Vol] 4.6 mmol/L            3.5-5.1    [facility]  
   
                                                    RBC Auto (Bld) [#/Vol]Ordere  
d By: Ambrosio Davis on 2023   
   
                      RBC (Bld) [#/Vol] 4.65 10*6/uL            3.60-5.00  [facility]  
   
                                                    RBC morphologyOrdered By: Fr steff Davis on 2023   
   
                                                    RBC morphology finding   
Nom (Bld)       N/A                                             [facility]  
   
                                                    Segmented neutrophils/100 WB  
C Manual cnt (Bld)Ordered By: Ambrosio Davis on  
   
2023   
   
                                                    Segmented   
neutrophils/100 WBC   
(Bld)           75 %                            50-70           [facility]  
   
                                                    Serum or plasma anion gap de  
terminationOrdered By: Ambrosio aDvis on   
2023   
   
                      Anion gap [Moles/Vol] 9.5 mmol/L            6.0-15.0   [facility]  
   
                                                    Sodium [Moles/volume] in Ser  
um or PlasmaOrdered By: Ambrosio Davis on   
2023   
   
                      Sodium [Moles/Vol] 135 mmol/L            136-145    [facility]  
   
                                                    Urea nitrogen [Mass/volume]   
in Serum or PlasmaOrdered By: Ambrosio Davis on  
   
2023   
   
                                                    Urea nitrogen   
[Mass/Vol]      22 mg/dL                        7-25            [facility]  
   
                                                    WBC Auto (Bld) [#/Vol]Ordere  
d By: Ambrosio Davis on 2023   
   
                      WBC (Bld) [#/Vol] 26.4 10*3/uL            3.8-11.6   [facility]  
   
                                                    COVID-19 Detected/Not Detect  
edOrdered By: Tashi Rodriges on 2023   
   
                                                    SARS-CoV-2 (COVID-19)   
RNA CHRISTOS+non-probe Ql   
(Nph)           Not detected                    Not Detecte     [facility]  
   
                                        Comment on above:   This is a duplicate   
RP2.1 COVID (PCR) result to be used for   
statistical tracking purpose only.   
   
                                                    Red blood cell stomatocyte d  
etectionOrdered By: Tashi Rodriges on 2023   
   
                                                    Stomatocytes LM Ql   
(Bld)           Slight                                          [facility]  
   
                                                    Respiratory pathogens DNA an  
d RNA panel - Nasopharynx by CHRISTOS with non-probe   
detectionOrdered By: Tashi Rodriges on 2023   
   
                                                    Respiratory pathogens   
DNA and RNA panel   
CHRISTOS+non-probe (Nph)                                                 [facility]  
   
                                                    Alanine aminotransferase [En  
zymatic activity/volume] in Serum or PlasmaOrdered   
By:   
Charlene Hernandez on 2023   
   
                                                    ALT [Catalytic   
activity/Vol]   28 U/L                          7-52            [facility]  
   
                                                    Albumin [Mass/volume] in Ser  
um or Plasma by Bromocresol green (BCG) dye binding   
methoOrdered By: Charlene Hernandez on 2023   
   
                                                    Albumin BCG dye   
[Mass/Vol]      3.7 g/dL                        3.5-5.7         [facility]  
   
                                                    Alkaline phosphatase [Enzyma  
tic activity/volume] in Serum or PlasmaOrdered By:   
Charlene Hernandez on 2023   
   
                                                    ALP [Catalytic   
activity/Vol]   65 U/L                                    [facility]  
   
                                                    Aspartate aminotransferase [  
Enzymatic activity/volume] in Serum or PlasmaOrdered  
By:   
Charlene Hernandez on 2023   
   
                                                    AST [Catalytic   
activity/Vol]   12 U/L                          13-39           [facility]  
   
                                                    Basophils/100 WBC Manual cnt  
 (Bld)Ordered By: Charlene Hernandez on 2023   
   
                      Basophils/100 WBC (Bld) 0 %                   0-2        F  
[facility]  
   
                                                    Bilirubin Test strip Ql (U)O  
rdered By: Charlene Hernandez on 2023   
   
                      Bilirubin Ql (U) Negative              Negative   [facility]  
   
                                                    Bilirubin.total [Mass/volume  
] in Serum or PlasmaOrdered By: Charlene Hernandez on   
2023   
   
                      Bilirubin [Mass/Vol] 0.3 mg/dL             0.3-1.0    [facility]  
   
                                                    Color Auto (U)Ordered By: Mayelin Hernandez on 2023   
   
                      Color (U)  Yellow                Yellow     [facility]  
   
                                                    Creatine kinase [Enzymatic a  
ctivity/volume] in Serum or PlasmaOrdered By:   
Charlene Hernandez on 2023   
   
                                                    CK [Catalytic   
activity/Vol]   49 U/L                                    [facility]  
   
                                                    ED Note-Physicianon 20   
   
                                        ED Note-Physician   Basic Information  
Time Seen:  
Sukhjinder MUJICA, Rei CLEMENT   
2023 09:20  
Chief Complaint  
patient c/o SOB, COVID   
+ with hx COPD 4L NC at   
home. started on   
paxlovid and medrol   
pack on thursday  
History of Present   
Illness  
57-year-old female   
reports to the   
emergency department   
with a chief complaint   
of shortness of breath   
and testing   
COVID-positive. Reports   
that she tested   
COVID-positive on   
Thursday. Reports that   
she did go to [facility], where she was   
started on a Medrol   
Dosepak, as well as   
Paxlovid. She states   
that she does have a   
history of COPD as   
well. States that she   
does wear 4 L of oxygen   
at home normally. She   
denies being on any   
blood thinners. States   
that she has terrible   
body aches, feels like   
she gets winded very   
easily now and has been   
extremely nauseous.   
Reports that this is   
the worst does have a   
headache. She denies   
any chest pain   
associated with this.   
Reports just   
generalized body aches.   
Denies any fevers.   
States that she has had   
nausea and vomiting.   
Denies any belly pain.  
Review of Systems  
A 10 point review of   
systems is negative   
except as noted above.  
Medical and Surgical   
History: Reviewed and   
noted  
Social history: Lives   
at home  
Family History:   
Reviewed.  
Tobacco: User  
Physical Exam  
Vitals & Measurements  
T: 36.8 ?C(Oral) HR:   
91(Peripheral) RR: 20   
BP: 155/96 SpO2: 99%  
HT: 172.72 cm WT: 91.2   
kg BMI: 30.57  
General: The patient   
appears well and in no   
apparent distress.   
Patient is resting   
comfortably on bed.   
Afebrile  
Skin: Warm, dry, no   
pallor noted.  
Head: Normocephalic,   
atraumatic  
Neck: No JVD  
Eye: PERRLA, EOMI  
ENT: Moist mucus   
membranes. Pharynx pink   
moist no erythema or   
exudates. Bilateral TMs   
intact with no erythema   
or bulging  
Cardiovascular: Regular   
rate normal peripheral   
perfusion. Radial   
pulses +2 bilaterally  
Respiratory: No   
respiratory distress no   
accessory muscle use no   
obvious audible   
wheezing. Scattered   
wheezing throughout   
lung sounds   
examination.  
Chest Wall: no   
deformity  
Musculoskeletal: normal   
ROM, no deformity, no   
swelling  
GI: No obvious   
distention soft   
nontender nondistended   
no guarding rebounding   
or rigidity  
Neurological: A&O moves   
all extremities equal   
strength and symmetry  
Psychiatric:   
Cooperative and   
appropriate  
Medical Decision Making  
MEDICAL DECISION MAKING  
Number and Complexity   
of Problems  
Differential Diagnosis:   
[****]  
[facility] Data  
External documents   
reviewed: [****]  
My EKG interpretation:   
reviewed  
My CT interpretation:   
Reviewed  
My X-ray   
interpretation:   
reviewed  
My Ultrasound   
interpretation: [****]  
Decision rules/scores   
evaluated: [****]  
Discussed with: [****]  
Treatment and   
Disposition  
ED Course: 57-year-old   
female reports the   
emergency department   
with a chief complaint   
of shortness of breath   
and recent COVID   
infection. Reports that   
she did test positive   
for COVID on Thursday.   
Reports that she is   
currently on the Medrol   
Dosepak, as well as   
Paxlovid. Reports that   
she is still continuing   
to wheeze. States that   
she does have a history   
of COPD. She is   
currently on 4 L which   
is her normal baseline   
of oxygen and is not   
hypoxic. On physical   
exam she is afebrile in   
no acute distress. I do   
hear scattered wheezes   
throughout lungs on   
examination, but   
otherwise a benign   
physical exam of the   
patient. Due to this,   
we did order cardiac   
work-up. Lab work   
reviewed and noted.   
Patient has slight   
leukocytosis, which   
could be refractory to   
the recent COVID   
infection/COPD   
exacerbation. X-ray was   
negative. Patient was   
given albuterol   
treatments, as well as   
Solu-Medrol and nausea   
medicine. She was   
reevaluated, and stated   
that she still did not   
feel like she was   
breathing right, so we   
did do a CTA of her   
chest. CT of her chest   
was negative for any   
acute findings. I did   
discuss this with the   
patient. Discussed is   
likely the COVID   
infection as well as a   
COPD exacerbation   
wreaking havoc on her   
causing her shortness   
of breath. She is not   
hypoxic on her stay   
here in the emergency   
department. I discussed   
we will start her on a   
high-dose steroid, and   
start her on prednisone   
and stop the Medrol   
Dosepak. Patient will   
be also started on   
azithromycin. I   
discussed days 3-5 are   
the worst symptoms of   
COVID, and likely she   
has MS right now.   
Discussed return   
precautions to the   
patient. Follow-up with   
your primary care   
provider in 3 to 5   
days. If symptoms   
worsen, do not improve,   
or new symptoms arise   
please report back to   
emergency department   
for further evaluation.   
The patient was   
understanding and   
agreeable to plan   
moving forward.  
Shared decision making:   
[****]  
Code status: [****]  
Assessment/Plan  
COPD exacerbation   
(J44.1: Chronic   
obstructive pulmonary   
disease with (acute)   
exacerbation)  
COVID (U07.1: COVID-19)  
Orders:  
albuterol-ipratropium,   
6 mL, Soln-Inh,   
Inhalation, Once, Stop   
date 23 9:35:00   
EDT, STAT, Start date   
23 9:35:00 EDT  
albuterol-ipratropium,   
3 mL, Soln-Inh,   
Inhalation, Once, Stop   
date 23 10:54:00   
EDT, STAT, Start date   
23 10:54:00 EDT  
azithromycin, 250 mg,   
Oral, As Directed, # 6   
tab( (more content not   
included)...        Normal                                  [facility]  
   
                                        Comment on above:   Result Comment: Elec  
tronically Signed By: Rei Slaughter PA-C\.br\Date and Time Signed: 23 12:14   
EDT\.br\Electronically Co-Signed By: Abad Singleton MD\.br\Date   
and Time Co-Signed: 23 07:34 EDT   
   
                                                    Eosinophils/100 WBC Manual c  
nt (Bld)Ordered By: Charlene Hernandez on 2023   
   
                                                    Eosinophils/100 WBC   
(Bld)           0 %                             1-3             [facility]  
   
                                                    Giant platelets/100 leukocyt  
es [Ratio] in Blood by Manual countOrdered By:   
Charlene Hernandez on 2023   
   
                                                    Giant platelets/100 WBC   
Manual cnt (Bld)   
[Ratio]         2 /100{WBC}                                     [facility]  
   
                                                    Globulin Calc (S) [Mass/Vol]  
Ordered By: Charlene Hernandez on 2023   
   
                      Globulin (S) [Mass/Vol] 2.9 g/dL                         F  
[facility]  
   
                                                    Ketones Auto test strip (U)   
[Mass/Vol]Ordered By: Charlene Hernandez on 2023   
   
                      Ketones (U) [Mass/Vol] Negative              Negative   [facility]  
   
                                                    Monocyte distribution width   
[Entitic volume] in Blood by AutomatedOrdered By:   
Charlene Hernandez on 2023   
   
                                                    Monocyte distribution   
width Auto (Bld)   
[Entitic vol]   20.00 %                         0.00-20.00      [facility]  
   
                                                    Natriuretic peptide B [Mass/  
Vol]Ordered By: Charlene Hernandez on 2023   
   
                                                    Natriuretic peptide B   
(Bld) [Mass/Vol] 35.0 pg/mL                      5-100           [facility]  
   
                                                    Nitrite Test strip Ql (U)Ord  
ered By: Charlene Hernandez on 2023   
   
                      Nitrite Ql (U) Negative              Negative   [facility]  
   
                                                    Protein Auto test strip (U)   
[Mass/Vol]Ordered By: Charlene Hernandez on 2023   
   
                      Protein (U) [Mass/Vol] Negative              Negative   Fi  
[facility]  
   
                                                    Protein [Mass/volume] in Ser  
um or PlasmaOrdered By: Charlene Hernandez on 2023  
   
   
                      Protein [Mass/Vol] 6.6 g/dL              6.4-8.9    [facility]  
   
                                                    Serum or plasma albumin/glob  
ulin mass ratioOrdered By: Charlene Hernandez on   
2023   
   
                                                    Albumin/Globulin [Mass   
ratio]          1.3 {ratio}                                     [facility]  
   
                                                    Specific gravity Auto test s  
trip (U) [Rel density]Ordered By: Charlene Hernandez on   
2023   
   
                                                    Specific gravity (U)   
[Rel density]   1.006                           1.001-1.030     [facility]  
   
                                                    Troponin I.cardiac [Mass/vol  
ume] in Serum or Plasma by Detection limit <= 0.01   
ng/Ordered By: Charlene Hernandez on 2023   
   
                                                    Troponin I.cardiac DL   
<= 0.01 ng/mL   
[Mass/Vol]      6.4 pg/mL                       0.0-15.0        [facility]  
   
                                                    Urine clarity by refractomet  
ry automatedOrdered By: Charlene Hernandez on 2023  
   
   
                                                    Clarity Refractometry   
automated (U)   Clear                           Clear           [facility]  
   
                                                    Urine glucose measurement by  
 automated test strip (mass/volume)Ordered By:   
Charlene Hernandez on 2023   
   
                                                    Glucose Auto test strip   
(U) [Mass/Vol]  Normal mg/dL                    Normal          [facility]  
   
                                                    Urine hemoglobin detection b  
y automated test stripOrdered By: Charlene Hernandez on   
2023   
   
                                                    Hemoglobin Auto test   
strip Ql (U)    Negative                        Negative        [facility]  
   
                                                    Urine leukocyte esterase det  
ection by automated test stripOrdered By: Charlene Hernandez   
on 2023   
   
                                                    Leukocyte esterase Auto   
test strip Ql (U) Negative                        Negative        [facility]  
   
                                                    Urobilinogen Auto test strip  
 (U) [Mass/Vol]Ordered By: Charlene Hernandez on   
2023   
   
                                                    Urobilinogen (U)   
[Mass/Vol]      Normal mg/dL                    Normal          [facility]  
   
                                                    Variant lymphocytes/100 WBC   
Manual cnt (Bld)Ordered By: Charlene Hernandez on   
2023   
   
                                                    Variant lymphocytes/100   
WBC (Bld)       2 %                             0-12            [facility]  
   
                                                    pH Auto test strip (U)Ordere  
d By: Charlene Hernandez on 2023   
   
                      pH (U)     6.0 [pH]              5.0-9.0    [facility]  
   
                                                    Coding Summary.on 2023  
   
   
                                        Coding Summary.     [id number]  
Ww+  
PGhlYWQ+JT9HUMOcS09zqAL  
haL3tF5OUBLqQRklxMQLEKE  
eEWnCrtbWsXS2hxRFwCQUl  
IC8+YT7qNRIcEnqqsPDyu8Z  
5vUZ5L33pwq6vHIzluUV5OL  
FeVoHfdkkxc7uikOv8MRauE  
mluOyBt  
UZShvE86ALH1aY11Ux42iZO  
bnKAyo0ounUv9MyKkJKKlXC  
F4hQmkZLkme4WjATZuP46en  
DRcm3K9  
DCQhdFxvqCRbGwFuhER8hS3  
bNElhwxflx0fjjtxrGms3zs  
63jWXjq2J8lYU1K7OwnoF3A  
GJvbGQg  
RqmqcWIRhP5uotqrl4rsnta  
eGlIbFGNhYCl5GKz0AZFggY  
nkNmLlOJ14KWS4GGSlmsWsI  
2FsLWFs  
rTgeNyN1o3F1Ks9GS0QJHma  
zS9UCTGKLTFqrxXW+PC90cj  
76T9RxVnukPbi5QPAnGJO7e  
TE8uI0w  
WMXqWYwkk4H1eHM0W8SsxbT  
iei1xi7teNDAwCLtgY46dsU  
Brq6L7VXOnsSU7PUUucKvmP  
iBzaG93  
Oyc+VVPwkJxnj3XqFtmqj9r  
md8xgbOj0RxtwCSQsabTuwE  
epDFW4k6DhYo0eAQHptHV3k  
IB2iL2p  
NsMsRyS4GPnrA428EcPgsZB  
sMnilU85oQ3NibQA+PHRyPj  
s6LBHhsCtbXV1rI4VnQYCnu  
mctbGVm  
xVpyUP7iMADbirgoYSRubE6  
vELCjJ1w3FuPoDxN6WXfkW0  
AaVAQrvkafLj56eD5bTxThP  
pM7MCqr  
D9AtxhG2XXLczOHtCVffEMA  
6G09rc0N7LBGmDDKlOPU0zQ  
Y7xL0iqWpdxgdkyCUecNpng  
mVydGlj  
DIvoIYdmU455AHLqlHhkCtJ  
vZGluZyBEYXRlOiAgMDQvMj  
QvMjAyMzwvdGQ+ARLpBJM5k  
WxlPSAn  
jKRbUBrnCw2vyFowbKogSH9  
pTTDdjmltEDGyiQ3mQTWsfC  
EvhOzpII9fTOTdsaolk944J  
iAxMHB0  
MJYpwZItR2QvvK1eDhEgTWJ  
sLZYrF2BqnCQwEMkuL477LV  
ncNrN0EGGyggEuK1IxNTBmm  
WduOiB0  
n8P0Hi2Qz2RacibkK8IidAM  
hQkIxFqwsMXw1C5UnIndkpD  
I+QR66PGPoVI49JRo5AHH5s  
WxlPSdi  
KKUzB6IgbJ0qEdBfDHTlJJB  
kOyc+PHRhYmxlIHdpZHRoPS  
zcVCBbWkNlcXtuRD6xGo0jO  
GVyLWNv  
yBdpjHOmRpTnr9fdHBIbSTr  
bBQ9coPhqI4QxeSU6AZWxs2  
b4Yn99Y29nF4FcjQC+PGNvb  
YN9zOE2  
wF9gJmYaHvK0ETsiS157VyH  
pyQEkZpiar4ztc1yadDs3Vx  
X3UGYjimHeoAtsPHI9e8BpM  
s90A85a  
IHdpZHRoPSIxNSUiIHZhbGl  
jes0abT6yBi5+WTCjnWA8dF  
C3xF3jLhMwClZ8QVdzL007X  
nRvcCIv  
Lmeif6wzv2gwgOm1YcAcDEE  
axmUkxFdnCOQ5e4AmNt27G1  
RaoXarm6NoCyc5qj98uJHov  
7V0rKU3  
H2EeTSPkwmdavDMymYhhUN1  
sBLJdedgmVBNftS8jXIKuQ2  
c7FiOvPlI7NKooB1XfesH0Q  
GJvbGQg  
PZIgzTYAgL1uzmztj3zwvld  
bKbIoCLPuOEk5ZTh8WQRsfO  
lvJzNbFFD3GrN1VVD3pGIdz  
F3kyZna  
pitqrC8qNfc+JJY0iRMbhKT  
LSI6gUlqarQZ+TOMoTGO0xS  
xmKDezZEGtsP8pOMZuC5d1F  
iAwLjA1  
ZNrnB8NtbkY9NTHccOVtZNM  
ssOIKgF3kwxact6qxbfvqLr  
PvKATpGYr7NMs5MVWgkRwnV  
iBsZWZ0  
XcX6UIL0rMLnrD3hmOnmuxd  
mjU0kEqf+EcvagOxjUFQ9WF  
p7A0MgNwc9BJUbyLouMN4gi  
GFkZGlu  
Iu1asXcpaNyiTD0iVAAnakg  
sv289XnIiv9wwYQKzkRZoOL  
buQZN3E19ek7S1NNYaQPWkX  
UP1mDE0  
nQ0zsYburptctPQnxYeskqJ  
mmZfsKPoyMYmqZ941ZIMxtF  
fcInBnRIa4Z3IwKdv2XRVeu  
NvfEH1o  
aTJbAIlpRn2orRooeRpqPZ1  
wDQKjtcjof028PaEfc5igWD  
RfaANxHYowDWK7Q54vu6F9W  
CMwMDAw  
LTN8aOS6yL7zaGxnjegvtYO  
mdDsgdmVydGljYWwtYWxpZ2  
25WXFwhXutScIvyKs1X3RbS  
qh4NCNj  
sYsmOS0awFHhCJofAf4ytMc  
unLylRN6qXISzekggg795Tg  
Ubz6hxVGCufMAvCFnwCNJ3G  
30ws0U7  
MPXxBONeMNW2mDE4bD2wqCk  
nbjogbGVmdDsgdmVydGljYW  
kpPPlsE509SSNwsAcdXtNos  
GllbnQg  
OLxxVMo1L9TfRrkpwTW+PC9  
7PSMkYN47rPOuvSEok4jxvR  
f4EoIdCAKrUYV9vWeyDFtch  
3JkZXIt  
N48peKGgz5N7NJGlpDsfwNQ  
kYySigUH5fX6sUVdhxrpqy6  
tigptmHnark5ufht31vL75N  
29sIHdp  
ZHRoPSIzMCUiIHZhbGlnbj0  
pjK3aMb2+EALxgIX4qTV3nR  
0yURQsNbD7TTfyR654BlKcz  
CIvPjxj  
w2pwh1nvgVj9XoS2VXFhloU  
gxLyjGWR8p3JzLj01P36mTI  
dpZHRoPSIyMCUiIHZhbGlnb  
k8ddV2w  
Ii8+TDObrGU6wYL5bR2bEjS  
uRsD9ANaqM480WlMarWBePc  
auZ45fW9TbvOU+HQKiRrk4Z  
CBzdHls  
ZO0pvSEiZZynPx1cKUK8GbD  
mDtBlREwnV3RbADLbcjobfr  
qgwJE2JXWgSBKsyS58Fb6ph  
DogMTBw  
oYMNxS3qfqlfl2zbuvjbGaF  
jAMScAJn2QNg0DHNjoDfvXd  
EdYDO1PdN9IVI5cFJwxR8qj  
Glnbjog  
lB4eT3LqTAPfktcuVe85oQ0  
pZlFfWyT3GVciShi+R0VPUk  
vZPJCGEB9DZQAJJCYFHV60O  
G60mCAg  
v9T8tOF6A2KpVJIopdaupxn  
kyNO8AYFtTIBmqM65wSPeOA  
jiEx1zr3I3m350QGCwTATud  
E56Hn7k  
eNrdVETelTDYfQ0tzfzwg4y  
kmdzqKpFfLFSwIRr0CKr8JP  
MucCgdTnOuZGD2VgJ5XTV7a  
RTblI0s  
pOeswdeavJ4cLbh+MDkvMTg  
lGRr3ZlrmfUY+TLIcRYV2oW  
krXJvrVXYxeO2jCKJsV3i3B  
iAwLjA1  
YLigB3SbWNAilcbnJt77mO7  
sCvWnFiY3ORueY6SxerV5IY  
YcaLWwXFxuFEY8K30kv1Z0Q  
CMwMDAw  
JAJ3cOF9bZ6xvQikomiliYW  
mdDsgdmVydGljYWwtYWxpZ2  
76LPWpgAbkKtQ9CZqfCGDrY  
O37AC83  
tNZcj5V7hHK8F1XiCWQcrsd  
cpbcfsLG7UJBoKDLcwQ15yG  
BbFWpsAl0dz1W3w161XSOlS  
DUwaW47  
Za5khHtqKONvyAMEdU8sbxs  
wd7qvlqtzTfDfIPXcATw3LT  
w8PVKjwYqqToGtJID0BqJ6C  
KB0jAXm  
aP5rhFggsfmpoS5zQvx+RmV  
bZTefGZ52UT50xPVca7B4xA  
E1Z1VqTUWufvyocfbcvTM5M  
DAuMDUw  
xD68zQXoZCziUp3ls5M4l73  
9YOFfAPLewU80Te7nrLgrUK  
AifUCTtL2wulimg9vxarjxV  
zAwMDAw  
TBt6WEq4VBUoeYyrXaHxFVM  
2JyA2LXW4bBViyT2tmHxqhn  
oyeS8xZkt+KG2rpcwhjqY4L  
R63TO63  
J0DeNuhpmNRmnJJ+PHRhYmx  
lIHdpZHRoPScxMDAlJyBzdH  
ohLW7mSm0tYHNgCLJvmXvff  
HNlOiBj  
b4ttLPDyLOqbVB3eqDtqU2D  
ihLS1UDUrf6l2Hm40M43kZ8  
JvdXA+ADXxjZS6vHR9mT8gF  
zAlIiB2  
TXosE959KcVsgNGpYcowk8c  
ir0pvrEy9PmFhQEYiwcNhgO  
hjBUO2f0TlGn28H10sRYlbE  
HRoPSIy  
OAWjGJNmnVlzxh9gkV5fQx4  
+DVAimYY9lEX2hM8iQsJkMc  
V5VJjdZ000TfDzdCVwDvzwO  
46uG0Is  
dXA+TMEgLsx2WNEeaMwuIE5  
fwHByTFzqOu2pYZZ9FwBdDd  
WtCQmtX5GkVQVqkkrsrpluo  
JX5YALu  
JNEjeJ77Tn9xfVjwKm2sCBG  
zTEX4JAPgyGXfR9DvyT1fIn  
NrGHSjGMPnU7BfmWIeVSwrF  
246IGxl  
JiR5LNQygeDjR8ZbDDUxgCi  
pNiE1d8W8Jp9JgGuykZLfWX  
5eTyXjWZu8A8KaZaw4RFYkw  
NvwPG7n  
oNXbPFzsPk0fhPelwHreAK3  
dWOAhmkrgx709RzTkq8osBO  
ZxeCUrRQojDAT5I09iq2I3Z  
CMwMDAw  
IZC4zQY7cB3mwHmknxymvOH  
mdDsgdmVydGljYWwtYWxpZ2  
88DFDmgSntQjYDRuv2T9NpU  
fm6OYSt  
oHjpVV5liOXvXStaNv2itDi  
xoEiyUA8bMYYfnhctt849Ho  
Qnb1lnCXAoaMZoRWnjOVO7Z  
21nd7Z1  
RVYdRVEyPEK2jSC3sG0voMv  
nbjogbGVmdDsgdmVydGljYW  
zsMTipX110VHOurUkaLz3IB  
cd9J2Ot  
Nsz8FIRlwWbhMU2hpFWjAZn  
wMd4muSzskZxjPJ1aBCEiqh  
lza292FqJpa7zyUBKetAGwV  
GltZXM7  
U72mu4Y4EEQtEXEnWOI3lPL  
6qY8htYkmglprdGTpjDhblj  
FpcGttGKrlBRxfA663DTBtc  
DsnPlBh  
eWVyOjwvdGQ+NT89mm15E2Y  
uVfkcNng2QHXvVNL9fOT7vI  
4kCENuYDaiw8U4pRS6M0Syy  
oUpfh4a  
g5vsTDEj (more content   
not included)...    Normal                                  [facility]  
   
                                                    Alanine aminotransferase [En  
zymatic activity/volume] in Serum or PlasmaOrdered   
By:   
Ginger Bullimore on 2023   
   
                                                    ALT [Catalytic   
activity/Vol]   24 U/L                          7-52            [facility]  
   
                                                    Albumin [Mass/volume] in Ser  
um or Plasma by Bromocresol green (BCG) dye binding   
methoOrdered By: Ginger Bullimore on 2023   
   
                                                    Albumin BCG dye   
[Mass/Vol]      3.6 g/dL                        3.5-5.7         [facility]  
   
                                                    Alkaline phosphatase [Enzyma  
tic activity/volume] in Serum or PlasmaOrdered By:   
Ginger   
Bullimore on 2023   
   
                                                    ALP [Catalytic   
activity/Vol]   68 U/L                                    [facility]  
   
                                                    Anisocytosis LM Ql (Bld)Orde  
red By: Ginger Bullimore on 2023   
   
                      Anisocytosis Ql (Bld) Slight                           [facility]  
   
                                                    Aspartate aminotransferase [  
Enzymatic activity/volume] in Serum or PlasmaOrdered  
By:   
Ginger Bullimore on 2023   
   
                                                    AST [Catalytic   
activity/Vol]   14 U/L                          13-39           [facility]  
   
                                                    Band form neutrophils/100 WB  
C Manual cnt (Bld)Ordered By: Ginger Bullimore on   
2023   
   
                                                    Band form   
neutrophils/100 WBC   
(Bld)           2 %                             0-5             [facility]  
   
                                                    Basophils Auto (Bld) [#/Vol]  
Ordered By: Ginger Bullimore on 2023   
   
                      Basophils (Bld) [#/Vol] N/A                              F  
[facility]  
   
                                                    Basophils/100 WBC Auto (Bld)  
Ordered By: Ginger Bullimore on 2023   
   
                      Basophils/100 WBC (Bld) N/A                              F  
[facility]  
   
                                                    Basophils/100 WBC Manual cnt  
 (Bld)Ordered By: Ginger Bullimore on 2023   
   
                      Basophils/100 WBC (Bld) 1 %                   0-2        [facility]  
   
                                                    Bilirubin.total [Mass/volume  
] in Serum or PlasmaOrdered By: Ginger Bullimore on   
2023   
   
                      Bilirubin [Mass/Vol] 0.5 mg/dL             0.3-1.0    [facility]  
   
                                                    C reactive protein [Mass/vol  
ume] in Serum or PlasmaOrdered By: Ginger Bullimore   
on   
2023   
   
                      CRP [Mass/Vol] < 0.5 mg/dL            0.0-0.5    [facility]  
   
                                                    Calcium [Mass/volume] in Ser  
um or PlasmaOrdered By: Ginger Bullimore on   
2023   
   
                      Calcium [Mass/Vol] 8.4 mg/dL             8.6-10.3   [facility]  
   
                                                    Carbon dioxide, total [Moles  
/volume] in Serum or PlasmaOrdered By: Ginger   
Bullimore   
on 2023   
   
                      CO2 [Moles/Vol] 30.0 mmol/L            21.0-31.0  [facility]  
   
                                                    Chloride [Moles/volume] in S  
herrera or PlasmaOrdered By: Ginger Bullimore on   
2023   
   
                      Chloride [Moles/Vol] 103 mmol/L                 [facility]  
   
                                                    Creatinine [Mass/volume] in   
Serum or PlasmaOrdered By: Ginger Bullimore on   
2023   
   
                      Creatinine [Mass/Vol] 0.79 mg/dL            0.60-1.20  [facility]  
   
                                                    Eosinophils Auto (Bld) [#/Vo  
l]Ordered By: Ginger Bullimore on 2023   
   
                                                    Eosinophils (Bld)   
[#/Vol]         N/A                                             [facility]  
   
                                                    Eosinophils/100 WBC Auto (Bl  
d)Ordered By: Ginger Bullimore on 2023   
   
                                                    Eosinophils/100 WBC   
(Bld)           N/A                                             [facility]  
   
                                                    Erythrocyte distribution wid  
th Auto (RBC) [Ratio]Ordered By: Ginger Bullimore on  
   
2023   
   
                                                    Erythrocyte   
distribution width   
(RBC) [Ratio]   12.9 %                          11.9-15.3       [facility]  
   
                                                    Ferritin [Mass/volume] in Se  
rum or PlasmaOrdered By: Ginger Bullimore on   
2023   
   
                      Ferritin [Mass/Vol] 77.2 ng/mL            11.0-306.8 [facility]  
   
                                                    Giant platelets/100 leukocyt  
es [Ratio] in Blood by Manual countOrdered By:   
Abbi Carlson on 2023   
   
                                                    Giant platelets/100 WBC   
Manual cnt (Bld)   
[Ratio]         1 /100{WBC}                                     [facility]  
   
                                                    Globulin Calc (S) [Mass/Vol]  
Ordered By: Abbi Carlson on 2023   
   
                      Globulin (S) [Mass/Vol] 2.3 g/dL                         F  
[facility]  
   
                                                    Glucose [Mass/volume] in Ser  
um or PlasmaOrdered By: Abbi Carlson on   
2023   
   
                      Glucose [Mass/Vol] 300 mg/dL                  [facility]  
   
                                        Comment on above:   ADA recommended refe  
rence rangeRandom Glucose Reference Range   
is dependent on time and content of last meal. Glucose of more   
than 200 mg/dL in a nonstressed, ambulatory subject supports   
the diagnosis of Diabetes Mellitus.   
   
                                                    Hematocrit Auto (Bld) [Volum  
e fraction]Ordered By: Abbi Carlson on   
2023   
   
                                                    Hematocrit (Bld)   
[Volume fraction] 42.6 %                          34.0-46.4       [facility]  
   
                                                    Hemoglobin [Mass/volume] in   
BloodOrdered By: Abbi Carlson 2023   
   
                                                    Hemoglobin (Bld)   
[Mass/Vol]      14.5 g/dL                       11.8-15.4       [facility]  
   
                                                    Lactate dehydrogenase [Enzym  
atic activity/volume] in Serum or Plasma by Lactate   
to   
pyOrdered By: [facility]   
   
                                                    LDH Lactate to pyruvate   
reaction [Catalytic   
activity/Vol]   170 U/L                         140-271         [facility]  
   
                                                    Leukocytes [#/volume] correc  
ken for nucleated erythrocytes in Blood by Automated  
   
counOrdered By: Abbi Carlson on 2023   
   
                                                    WBC corrected for nucl   
RBC Auto (Bld) [#/Vol] 19.0 10*3/uL                    3.8-11.6        [facility]  
   
                                                    Lymphocytes Auto (Bld) [#/Vo  
l]Ordered By: Abbi Carlson on 04-   
   
                                                    Lymphocytes (Bld)   
[#/Vol]         N/A                                             [facility]  
   
                                                    Lymphocytes/100 WBC Auto (Bl  
d)Ordered By: Ginger Bullimore on 2023   
   
                                                    Lymphocytes/100 WBC   
(Bld)           N/A                                             [facility]  
   
                                                    Lymphocytes/100 WBC Manual c  
nt (Bld)Ordered By: Ginger Bullimore on 2023   
   
                                                    Lymphocytes/100 WBC   
(Bld)           4 %                             18-42           [facility]  
   
                                                    MCH Auto (RBC) [Entitic mass  
]Ordered By: Ginger Bullimore on 2023   
   
                                                    MCH (RBC) [Entitic   
mass]           31.4 pg                         24.7-34.3       [facility]  
   
                                                    MCHC Auto (RBC) [Mass/Vol]Or  
dered By: Ginger Bullimore on 2023   
   
                      MCHC (RBC) [Mass/Vol] 34.0 g/dL             32.0-35.0  [facility]  
[facility]  
   
                                                    MCV Auto (RBC) [Entitic vol]  
Ordered By: Ginger Bullimore on 2023   
   
                      MCV (RBC) [Entitic vol] 92.4 fL                    F  
[facility]  
   
                                                    Microcytes LM Ql (Bld)Ordere  
d By: Ginger Bullimore on 2023   
   
                      Microcytes Ql (Bld) Slight                           [facility]  
   
                                                    Monocyte distribution width   
[Entitic volume] in Blood by AutomatedOrdered By:   
Ginger   
Bullimore on 2023   
   
                                                    Monocyte distribution   
width Auto (Bld)   
[Entitic vol]   17.51 %                         0.00-20.00      [facility]  
   
                                                    Monocytes Auto (Bld) [#/Vol]  
Ordered By: Ginger Bullimore on 2023   
   
                      Monocytes (Bld) [#/Vol] N/A                              F  
[facility]  
   
                                                    Monocytes/100 WBC Auto (Bld)  
Ordered By: Ginger Bullimore on 2023   
   
                      Monocytes/100 WBC (Bld) N/A                              F  
[facility]  
   
                                                    Monocytes/100 WBC Manual cnt  
 (Bld)Ordered By: Ginger Bullimore on 2023   
   
                      Monocytes/100 WBC (Bld) 2 %                   2-11       F  
[facility]  
   
                                                    Neutrophils Auto (Bld) [#/Vo  
l]Ordered By: Ginger Bullimore on 2023   
   
                                                    Neutrophils (Bld)   
[#/Vol]         N/A                                             [facility]  
   
                                                    Neutrophils/100 WBC Auto (Bl  
d)Ordered By: Abbi Carlson on 2023   
   
                                                    Neutrophils/100 WBC   
(Bld)           N/A                                             [facility]  
   
                                                    No Panel InformationOrdered   
By: Abbi Carlson on 2023   
   
                                                    D-Dimer Quantitative   
(PE/DVT)        < 200 ng/mL                     0-243           [facility]  
   
                                        Comment on above:   The reference range   
for D-dimer is <243 ng/mL D-dimer   
units.D-dimer results must be used in conjunction with a   
clinicalpretest probability (PTP) assessment model for deep   
veinthrombosis (DVT) and pulmonary embolism (PE). Results   
<230ng/mL d-dimer units can be used as a negative predictor   
inpatients with low or moderate probability for DVT/PE.Results   
above the exclusion threshold of 230 ng/ml D-dimerunits for   
DVT/PE may indicate the need for furtherdiagnostic   
testing.D-Dimer can be increased in hospitalized patients due   
toco-morbid conditions.   
   
                      Estimated GFR (CKD-EPI) > 60.0 mL/Min                       
  [facility]  
   
                                                    Pharmacy Creatinine   
Clearance (Chem 94.33                                           [facility]  
   
                                                    Nucleated erythrocytes [Pres  
ence] in Blood by Automated countOrdered By: Abbi Carlson on 2023   
   
                                                    Nucleated RBC Auto Ql   
(Bld)           N/A                                             [facility]  
   
                                                    Platelet adequacy [Presence]  
 in Blood by Light microscopyOrdered By: Abbi Carlson   
on 2023   
   
                      Platelets LM Ql (Bld) Normal                Normal     [facility]  
[facility]  
   
                                                    Platelet mean volume Auto (B  
ld) [Entitic vol]Ordered By: Abbi Carlson on   
2023   
   
                                                    Platelet mean volume   
(Bld) [Entitic vol] 8.1 fL                          6.3-10.7        [facility]  
   
                                                    Platelet morphology finding   
[Identifier] in BloodOrdered By: Abbi Carlson on  
   
2023   
   
                                                    Platelet morphology   
finding Nom (Bld) N/A                                             [facility]  
   
                                                    Platelets Auto (Bld) [#/Vol]  
Ordered By: Abbi Carlson on 2023   
   
                      Platelets (Bld) [#/Vol] 188 10*3/uL            150-450      
[facility]  
   
                                                    Potassium [Moles/volume] in   
Serum or PlasmaOrdered By: Praveenaer Bullimore on   
2023   
   
                      Potassium [Moles/Vol] 4.4 mmol/L            3.5-5.1    [facility]  
   
                                                    Protein [Mass/volume] in Ser  
um or PlasmaOrdered By: Ginger Bullimore on   
2023   
   
                      Protein [Mass/Vol] 5.9 g/dL              6.4-8.9    [facility]  
   
                                                    RBC Auto (Bld) [#/Vol]Ordere  
d By: Ginger Bullimore on 2023   
   
                      RBC (Bld) [#/Vol] 4.61 10*6/uL            3.60-5.00  [facility]  
   
                                                    RBC morphologyOrdered By: Gi  
nger Bullimore on 2023   
   
                                                    RBC morphology finding   
Nom (Bld)       N/A                                             [facility]  
   
                                                    Segmented neutrophils/100 WB  
C Manual cnt (Bld)Ordered By: Ginger Bullimore on   
2023   
   
                                                    Segmented   
neutrophils/100 WBC   
(Bld)           91 %                            50-70           [facility]  
   
                                                    Serum or plasma albumin/glob  
ulin mass ratioOrdered By: Praveenaer Bullimore on   
2023   
   
                                                    Albumin/Globulin [Mass   
ratio]          1.6 {ratio}                                     [facility]  
   
                                                    Serum or plasma anion gap de  
terminationOrdered By: Ginger Bullimore on   
2023   
   
                      Anion gap [Moles/Vol] 9.4 mmol/L            6.0-15.0   [facility]  
   
                                                    Sodium [Moles/volume] in Ser  
um or PlasmaOrdered By: Praveenaer Bullimore on   
2023   
   
                      Sodium [Moles/Vol] 138 mmol/L            136-145    [facility]  
   
                                                    Troponin I.cardiac [Mass/vol  
ume] in Serum or Plasma by Detection limit <= 0.01   
ng/Ordered By: Praveenaer Bullimore on 2023   
   
                                                    Troponin I.cardiac DL   
<= 0.01 ng/mL   
[Mass/Vol]      4.7 pg/mL                       0.0-15.0        [facility]  
   
                                                    Urea nitrogen [Mass/volume]   
in Serum or PlasmaOrdered By: Praveenaer Bullimore on   
2023   
   
                                                    Urea nitrogen   
[Mass/Vol]      15 mg/dL                        7-25            [facility]  
   
                                                    WBC Auto (Bld) [#/Vol]Ordere  
d By: Abbi Myrickjonathanore on 2023   
   
                      WBC (Bld) [#/Vol] 19.0 10*3/uL            3.8-11.6   [facility]  
   
                                                    Auto Diffon 2023   
   
                      Basophils/100 WBC (Bld) 0.3 %      Normal     0.0-2.0    F  
[facility]  
   
                                        Comment on above:   Order Comment: Order  
 Added by Discern Expert.   
   
                                                            Performed By: #### 2  
[id number]   
[id number] ####  
[facility]  
[address] [place] [address]   
   
                                                    Basophils/Leukocytes   
Auto (Bld) [Pure #   
fraction]       0.1 E9/L        Normal          0.0-0.2         [facility]  
   
                                        Comment on above:   Order Comment: Order  
 Added by Discern Expert.   
   
                                                            Performed By: #### 2  
[id number]   
[id number] ####  
[facility]  
[address] [place] [address]   
   
                                                    Eosinophils/100 WBC   
(Bld)           0.0 %           Normal          0.0-8.0         [facility]  
   
                                        Comment on above:   Order Comment: Order  
 Added by Discern Expert.   
   
                                                            Performed By: #### 2  
[id number]   
[id number] ####  
[facility]  
[address] [place] [address]   
   
                                                    Eosinophils/Leukocytes   
Auto (Bld) [Pure #   
fraction]       0.0 E9/L        Normal          0.0-0.5         [facility]  
   
                                        Comment on above:   Order Comment: Order  
 Added by Discern Expert.   
   
                                                            Performed By: #### 2  
505390, 3668002, 01722700, 79337002,   
8491974, 29419173, 72698990 ####  
[facility]  
[address] [place] [address]   
   
                                                    Lymphocytes/100 WBC   
(Bld)           8.3 %           Low             14.0-50.0       [facility]  
   
                                        Comment on above:   Order Comment: Order  
 Added by Discern Expert.   
   
                                                            Performed By: #### 2  
308495, 1223322, 73745852, 48681266,   
1276332, 34324022, 50667231 ####  
[facility]  
[address] [place] [address]   
   
                                                    Lymphocytes/Leukocytes   
Auto (Bld) [Pure #   
fraction]       1.5 E9/L        Normal          1.0-4.0         [facility]  
   
                                        Comment on above:   Order Comment: Order  
 Added by Discern Expert.   
   
                                                            Performed By: #### 2  
249381, 1279180, 32232593, 02280463,   
4741998, 09218646, 88665403 ####  
[facility]  
[address] [place] [address]   
   
                      Monocytes/100 WBC (Bld) 4.7 %      Normal     4.0-14.0   [facility]  
   
                                        Comment on above:   Order Comment: Order  
 Added by Discern Expert.   
   
                                                            Performed By: #### 2  
387773, 8346563, 69868617, 86556394,   
1219340, 62704687, 59230651 ####  
[facility]  
[address] [place] [address]   
   
                                                    Monocytes/Leukocytes   
Auto (Bld) [Pure #   
fraction]       0.9 E9/L        Normal          0.2-1.0         [facility]  
   
                                        Comment on above:   Order Comment: Order  
 Added by Discern Expert.   
   
                                                            Performed By: #### 2  
031112, 5533689, 04444531, 23386642,   
4481108, 43295140, 04863174 ####  
[facility]  
[address] [place] [address]   
   
                                                    Neutrophils/100 WBC   
(Bld)           86.7 %          High            36.0-75.0       [facility]  
   
                                        Comment on above:   Order Comment: Order  
 Added by Discern Expert.   
   
                                                            Performed By: #### 2  
437458, 0604452, 99431772, 10844085,   
1804585, 33768883, 28663798 ####  
[facility]  
[address] [place] [address]   
   
                                                    Neutrophils/Leukocytes   
Auto (Bld) [Pure #   
fraction]       16.1 E9/L       High            2.0-7.5         [facility]  
   
                                        Comment on above:   Order Comment: Order  
 Added by Discern Expert.   
   
                                                            Performed By: #### 2  
853244, 7609738, 56563173, 31538639,   
1633057, 04871685, 05921929 ####  
[facility]  
[address] [place] [address]   
   
                                                    BMPon 2023   
   
                      Creatinine [Mass/Vol] 0.6 mg/dL  Normal     0.5-1.3    [facility]  
   
                                        Comment on above:   Performed By: #### 2  
891923, 9322372, 54756459, 65878553,   
0917828, 32486762, 43653569 ####  
[facility]  
[address] [place] [address]   
   
                                                    Urea nitrogen   
[Mass/Vol]      15 mg/dL        Normal          5-21            [facility]  
   
                                        Comment on above:   Performed By: #### 2  
332079, 5764436, 85977745, 84105264,   
0950243, 16432977, 77742857 ####  
[facility]  
[address] [place] [address]   
   
                                                    Urea   
nitrogen/Creatinine   
[Mass ratio]    25 No Units     High            10-20           [facility]  
   
                                        Comment on above:   Performed By: #### 2  
572831, 0457866, 71655333, 55785285,   
2274284, 46278212, 52833281 ####  
[facility]  
[address] [place] [address]   
   
                      Anion gap [Moles/Vol] 13 mmol/L  Normal     6-16       [facility]  
   
                                        Comment on above:   Performed By: #### 2  
563644, 7970745, 97571642, 41829503,   
8362169, 79537324, 06809696 ####  
[facility]  
[address] [place] [address]   
   
                      Calcium [Mass/Vol] 9.2 mg/dL  Normal     8.9-11.1   [facility]  
   
                                        Comment on above:   Performed By: #### 2  
399533, 1166392, 81760464, 23118975,   
8430571, 94661692, 92654215 ####  
[facility]  
[address] [place] [address]   
   
                      Chloride [Moles/Vol] 102 mmol/L Normal     101-111    [facility]  
[facility]  
   
                                        Comment on above:   Performed By: #### 2  
337710, 9382972, 62717658, 09188380,   
9522166, 59477202, 90347304 ####  
[facility]  
[address] [place] [address]   
   
                      CO2 [Moles/Vol] 26 mmol/L  Normal     21-31      [facility]  
   
                                        Comment on above:   Performed By: #### 2  
683242, 1168530, 30665492, 09605619,   
1732500, 56821759, 52216777 ####  
[facility]  
[address] [place] [address]   
   
                      Glucose [Mass/Vol] 216 mg/dL  High            [facility]  
   
                                        Comment on above:   Result Comment: If t  
his glucose result represents a fasting   
glucose, interpretation should refer to the following   
reference range: 55-99 mg/dL   
   
                                                            Performed By: #### 2  
432189, 9973263, 90797433, 21181093,   
5362332, 09564261, 30387040 ####  
[facility]  
[address] [place] [address]   
   
                      Potassium [Moles/Vol] 4.6 mmol/L Normal     3.5-5.3    [facility]  
   
                                        Comment on above:   Performed By: #### 2  
570240, 8777666, 33954590, 92286146,   
1792740, 27230973, 28914930 ####  
[facility]  
[address] [place] [address]   
   
                      Sodium [Moles/Vol] 136 mmol/L Normal     135-145    [facility]  
   
                                        Comment on above:   Performed By: #### 2  
148976, 3756216, 25985112, 47703800,   
6518334, 63139484, 24164143 ####  
[facility]  
[address] [place] [address]   
   
                                                    BNPon 2023   
   
                                                    Natriuretic peptide B   
(Bld) [Mass/Vol] 70 pg/mL        Normal          5-80            [facility]  
   
                                        Comment on above:   Performed By: #### 2  
294594, 2859339, 88727229, 53193416,   
2817589, 53031750, 85445565 ####  
[facility]  
[address] [place] [address]   
   
                                                    CBC w/ Auto Diffon 04-  
3   
   
                                                    Erythrocyte   
distribution width   
(RBC) [Ratio]   13.0 %          Normal          10.9-14.2       [facility]  
   
                                        Comment on above:   Performed By: #### 2  
114804, 7191405, 30676539, 20498212,   
2754526, 78734234, 69917993 ####  
[facility]  
[address] [place] [address]   
   
                                                    Hematocrit (Bld)   
[Volume fraction] 45.1 %          Normal          34.0-46.0       [facility]  
   
                                        Comment on above:   Performed By: #### 2  
912050, 8034819, 94241078, 41663017,   
3884425, 17550763, 57302310 ####  
[facility]  
[address] [place] [address]   
   
                                                    Hemoglobin (Bld)   
[Mass/Vol]      15.1 g/dL       Normal          12.0-16.0       [facility]  
   
                                        Comment on above:   Performed By: #### 2  
993222, 3942900, 40252317, 10750372,   
4222437, 30311307, 30854214 ####  
[facility]  
[address] [place] [address]   
   
                                                    MCH (RBC) [Entitic   
mass]           30.6 pg         Normal          27.0-34.0       [facility]  
   
                                        Comment on above:   Performed By: #### 2  
895447, 4573866, 10275479, 91986719,   
2379945, 37269278, 28991171 ####  
[facility]  
[address] [place] [address]   
   
                      MCHC (RBC) [Mass/Vol] 33.4 g/dL  Normal     31.4-36.0  [facility]  
   
                                        Comment on above:   Performed By: #### 2  
709059, 4614365, 07013304, 12011904,   
6831092, 74477156, 85381895 ####  
[facility]  
[address] [place] [address]   
   
                      MCV (RBC) [Entitic vol] 91.5 fL    Normal     80.0-100.0 F  
[facility]  
   
                                        Comment on above:   Performed By: #### 2  
072315, 2076084, 88145938, 02364156,   
6166495, 94918740, 33246222 ####  
[facility]  
[address] [place] [address]   
   
                                                    Platelet mean volume   
(Bld) [Entitic vol] 8.2 fL          Normal          6.4-10.8        [facility]  
   
                                        Comment on above:   Performed By: #### 2  
995677, 7863196, 45836956, 46992268,   
5186264, 65982035, 48812456 ####  
[facility]  
[address] [place] [address]   
   
                      Platelets (Bld) [#/Vol] 189.0 E9/L Normal     150.0-500.0   
[facility]  
   
                                        Comment on above:   Performed By: #### 2  
181109, 1891948, 69112937, 48415326,   
0487843, 16996910, 75858749 ####  
[facility]  
[address] [place] [address]   
   
                      RBC (Bld) [#/Vol] 4.9 E12/L  Normal     4.3-5.9    [facility]  
   
                                        Comment on above:   Performed By: #### 2  
671453, 6656651, 24598485, 44095311,   
4780615, 86065212, 14900301 ####  
[facility]  
[address] [place] [address]   
   
                                                    WBC corrected for nucl   
RBC Auto (Bld) [#/Vol] 18.6 E9/L       High            4.0-11.0        [facility]  
   
                                        Comment on above:   Performed By: #### 2  
910320, 0278690, 57271706, 30085884,   
7830924, 18413787, 94269121 ####  
[facility]  
[address]  
[place] [address]   
   
                                                    CHEMISTRYOrdered By: SYSTEM   
SYSTEM on 2023   
   
                          Anion gap [Moles/Vol] 13 mmol/L    Normal       6 - 16  
   
mEq/L                                   [facility]  
   
                          Calcium [Mass/Vol] 9.2 mg/dL    Normal       8.9 - 11.  
1   
mg/dL                                   [facility]  
   
                          Chloride [Moles/Vol] 102 mmol/L   Normal       101 - 1  
11   
mmol/L                                  [facility]  
   
                          CO2 [Moles/Vol] 26 mmol/L    Normal       21 - 31   
mmol/L                                  [facility]  
   
                          Creatinine [Mass/Vol] 0.6 mg/dL    Normal       0.5 -   
1.3   
mg/dL                                   [facility] Remisol  
   
                                                    GFR/1.73 sq M.predicted   
among blacks MDRD   
(S/P/Bld) [Vol   
rate/Area]          mL/min/1.73 m2      Normal              >=59mL/min/  
1.73 m2                                 [facility] Chem S  
   
                                                    GFR/1.73 sq M.predicted   
among non-blacks MDRD   
(S/P/Bld) [Vol   
rate/Area]          mL/min/1.73 m2      Normal              >=59mL/min/  
1.73 m2                                 [facility] Chem S  
   
                          Glucose [Mass/Vol] 216 mg/dL    High         55 - 199   
mg/dL                                   [facility]  
   
                          Potassium [Moles/Vol] 4.6 mmol/L   Normal       3.5 -   
5.3   
mmol/L                                  [facility]  
   
                          Sodium [Moles/Vol] 136 mmol/L   Normal       135 - 145  
   
mmol/L                                  [facility]  
   
                                                    Troponin I.cardiac   
[Mass/Vol]          4.80 pg/mL          Low                 10.10 -   
27.10 pg/mL                             [facility] Remisol  
   
                                                    Urea nitrogen   
[Mass/Vol]          15 mg/dL            Normal              5 - 21   
mg/dL                                   [facility] Remisol  
   
                                                    Urea   
nitrogen/Creatinine   
[Mass ratio]    25 mg/mg        High            10 - 20         [facility]  
   
                                                    CHEMISTRYOrdered By: Mandie Arambula on 2023   
   
                                                    Natriuretic peptide B   
(Bld) [Mass/Vol]    70 pg/mL            Normal              5 - 80   
pg/mL                                   [facility]   
[facility]  
   
                                                    COAGULATIONOrdered By: Soraya Hassan on 2023   
   
                          aPTT Coag (PPP) [Time] 33.5 s       Normal       25.1   
- 36.5   
second(s)                               [facility] Auto   
Coag  
   
                                                    INR Coag (PPP)   
[Relative time]           1.0 {INR}                 Invalid   
Interpretation   
Code                                                [facility] Auto   
Coag  
   
                          PT Coag (PPP) [Time] 10.8 s       Normal       9.4 - 1  
2.5   
second(s)                               [facility] Auto   
Coag  
   
                                                    CTA Cheston 2023   
   
                                        CTA Chest           Exam Date/Time:  
2023 11:25 EDT  
Reason for Exam:  
Shortness of breath   
(SOB)  
Report  
IMPRESSION: The study   
is negative for   
pulmonary emboli,   
aortic dissection, or  
aneurysm.  
There are no acute   
infiltrates or   
effusions.  
EXAM: CTA Chest  
History: Chest pain,   
SOB shortness of   
breath, difficulty   
breathing, chest pain  
Technique: Multiple   
contiguous axial images   
were obtained of the   
thorax from the  
thoracic inlet through   
the upper abdomen   
during dynamic   
administration of IV  
contrast. Sagittal and   
coronal reformats as   
well as 3-D MIP   
projection reformations  
have been obtained.  
All CT scans at this   
facility use dose   
modulation, iterative   
reconstruction, and/or  
weight based dosing   
when appropriate to   
reduce radiation dose   
to as low as reasonably  
achievable.  
Comparison:  
Findings:  
Visualized portion of   
the thyroid gland is   
within normal limits.  
There is a three-vessel   
aortic arch. No   
thoracic aortic   
aneurysm.  
Heart size is within   
normal limits. No   
significant pericardial   
effusion.  
There are no persistent   
filling defects within   
the pulmonary arteries.  
No axillary,   
mediastinal, or hilar   
lymphadenopathy.  
Esophagus is within   
normal limits.  
No pulmonary nodule or   
mass. No consolidation,   
pleural effusion, or   
pneumothorax.  
Visualized upper   
abdominal viscera   
appear within normal   
limits.  
There are no acute   
osseous changes.  
  
Report  
Ordering Provider:   
Rei Slaughter  
***** FINAL REPORT   
*****  
  
Dictated: 2023   
11:34 am Gerhard Almazan MD  
  
Signed (Electronic   
Signature): 2023   
11:34 am  
Signed by: Gerhard Almazan MD  
Transcribed by: CHIDI   
Technologist: JOHNATHAN  
Technical Comments  
GFR (mL/min/1/73m2) >60  
Contrast: Isovue 370  
Contrast amount in   
ml's: 85            Normal                                  [facility]  
   
                                                    Consent for Treatmenton    
   
                                        Consent for Treatment [id number]  
[id number]  
[id number]  
D:127               Normal                                  [facility]  
   
                                                    Discharge Instructionson    
   
                                        Discharge Instructions [id number]  
[id number]  
[id number]  
CD:127              Normal                                  [facility]  
   
                                                    ED Clinical Summaryon 2023   
   
                                        ED Clinical Summary (Inserted Image. Ashley  
ble   
to display)   
[place] [id number] [phone]  
ED Clinical Summary  
Person Information  
Name: BEV GAMING [facility] Age:   
57 Years : 1965  
Sex: Female Language:   
English PCP: SHERMAN MALONE DO  
Marital Status:    
Phone: [phone]  
MRN: [id number] Visit Id:   
FIN: [id number]  
Visit Reason: Body   
aches; Shortness of   
breath; SOB, DIFF   
BREATHING-COVID+   
Speciality: Acuity: 2  
Enc Type: Emergency Med   
Service: Emergency  
Arrival: 2023   
09:18:42 Discharge:   
2023 11:57:54 LOS:   
000 02:39  
Checkin: 2023   
09:18:42 Checkout:   
2023 11:57:54   
Dispo Type: Home   
(Routine DC)  
  
EVENTS:  
Event Name Event Status   
Request Date/Time Start   
Date/Time Complete   
Date/Time  
Arrive Complete   
2023 09:18:42   
Document Home Meds   
Request 2023   
09:18:42  
Triage Complete   
2023 09:18:42   
2023 09:25:19   
2023 09:25:19  
Bed Assign Complete   
2023 09:19:39   
Dr Exam Complete   
2023 09:19:39   
2023 09:20:28   
2023 09:20:28  
RN Exam Complete   
2023 09:19:39   
2023 09:30:21   
2023 09:30:21  
Registration Complete   
2023 09:20:28   
2023 09:28:53   
2023 09:28:53  
EKG Complete 2023   
09:24:10 2023   
09:25:44  
Patient Care Request   
2023 09:25:20  
Patient Isolation   
Request 2023   
09:25:20  
Patient Care Request   
2023 09:26:38  
Patient Isolation   
Request 2023   
09:26:38  
Reg Complete Request   
2023 09:28:53  
Reg Bed Request   
Complete 2023   
09:28:53 2023   
09:28:53 2023   
09:28:53  
Meds Admin Complete   
2023 09:36:06   
2023 10:09:37  
Pending Labs Request   
2023 09:36:06  
Lab Complete 2023   
09:36:06 2023   
10:35:43  
Patient Care Request   
2023 09:36:06  
RT Request 2023   
09:36:06  
X-Ray Complete   
2023 09:36:06   
2023 10:11:18   
2023 10:27:45  
RT Tx/ABG Complete   
2023 09:36:07   
2023 10:02:31   
2023 10:02:31  
RT Tx/ABG Complete   
2023 09:36:07   
2023 10:02:22   
2023 10:02:22  
Pending Labs Complete   
2023 10:01:44   
2023 10:01:44   
2023 10:35:45  
Lab Complete 2023   
10:01:44 2023   
10:01:44 2023   
10:35:45  
Pending Labs Complete   
2023 10:02:10   
Pending Labs Complete   
2023 10:07:48   
2023 10:07:48   
2023 10:07:57  
Lab Complete 2023   
10:07:48 2023   
10:07:48 2023   
10:07:57  
Wet Read Request   
2023 10:27:45  
Meds Admin Complete   
2023 10:54:25   
2023 10:57:43  
RT Tx/ABG Complete   
2023 10:54:25   
2023 11:18:44   
2023 11:18:44  
RT Tx/ABG Complete   
2023 10:54:25   
2023 11:18:38   
2023 11:18:38  
CT Complete 2023   
10:55:43 2023   
11:09:36 2023   
11:25:25  
Discharge Complete   
2023 11:42:57   
2023 11:58:00   
2023 11:58:00  
Transfer Complete   
2023 11:58:00   
ADDRESS:  
[address] [place] [phone]  
PHYS DOC NOTES:  
MEDICAL INFORMATION:  
Prescriptions Given:  
New Medications  
[id number], [address] [place], [place]   
[id number], [phone]  
azithromycin   
(azithromycin 250 mg   
Tab) 250 Milligram By   
Mouth As Directed.   
Refills: 0.  
predniSONE (predniSONE   
20 mg Tab) 3 Tablets By   
Mouth every day for 7   
Days. Refills: 0.  
Medications to Continue   
with No Changes  
Other Medications  
albuterol (albuterol   
0.083% Inh Sol 3 mL) 1   
Nebulized inhalation   
(aerosol) every 4 hours   
as needed Shortness of   
breath or wheezing.  
benzonatate (Tessalon   
100 mg Cap) 1 Capsules   
By Mouth 3 times a day.   
Refills: 0.  
cyclobenzaprine   
(cyclobenzaprine 5 mg   
Tab) 1 Tablets By Mouth   
2 times a day as needed   
Spasm. Refills: 0.  
dextromethorphan-promet  
hazine   
(dextromethorphan-prome  
thazine 15 mg-6.25 mg/5   
mL Oral Syrup 5 mL) 5   
Milliliter By Mouth   
every 4 hours as needed   
for cough.  
insulin aspart (NovoLog   
FlexPen) See   
Instructions. 14   
unit(s) SubCutaneous   
TIDAC.  
insulin degludec   
(Tresiba FlexTouch) 60   
Units Subcutaneous   
every day.  
loratadine (Claritin 10   
mg Tab) 1 Tablets By   
Mouth every day as   
needed Congestion.   
Refills: 0.  
lorazepam (LORazepam   
0.5 mg Tab) 1 Tablets   
By Mouth 3 times a day   
as needed as needed for   
anxiety.  
Oxygen (Home Oxygen) 4   
Liter/minute Nasal   
Cannula every day as   
needed Dyspnea. Oxygen   
- Continuous or   
Nocturnal  
Diagnosis:  
Portable 02 for   
physician visits,   
oxygen conservation.  
oxymetazoline nasal   
(Afrin 0.05% Spray) 2   
Sprays Nasal Inhalation   
2 times a day as needed   
Congestion. Refills: 0.  
semaglutide (Ozempic   
(0.25 mg or 0.5 mg   
dose)) 0.5 Milligram   
Subcutaneous Monday.  
PATIENT EDUCATION   
INFORMATION:  
Instructions:  
COVID-19; Chronic   
Obstructive Pulmonary   
Disease Exacerbation  
  
Follow up:  
With: Address: When:  
SHERMAN MALONE [address] [place] [phone]   
In 3 days 2023  
Comments:  
Follow-up with your   
primary care provider   
in 3 to 5 days. If   
symptoms worsen, do not   
improve, or new symptom   
(more content not   
included)...        Normal                                  [facility]  
   
                                                    ED Patient Summaryon   
023   
   
                                        ED Patient Summary  (Inserted Image. Ashley  
ble   
to display)   
[address] [address] [phone]  
  
Patient Discharge   
Instructions  
  
Person Information  
Name: BEV GAMING Age: 57 Years  
MRN: [id number] FIN:   
[id number]  
Arrival Date: 2023   
09:18:42  
Discharge Diagnosis:   
COPD exacerbation;   
COVID  
Primary Care Physician:   
SHERMAN MALONE DO  
  
Provider Information  
Primary Provider:  
[facility]   
Clinician:None  
The exam and treatment   
you received in the   
Emergency Department   
were for an urgent   
problem and are not   
intended as complete   
care. It is important   
that you follow up with   
a doctor, nurse   
practitioner, or   
physician?s assistant   
for ongoing care. If   
your symptoms become   
worse or you do not   
improve as expected and   
you are unable to reach   
your usual health care   
provider, you should   
return to the Emergency   
Department. We are   
available 24 hours a   
day.  
  
BEV GAMING has   
been given the   
following list of   
patient education   
materials,   
prescriptions and   
follow-up instructions:  
  
Follow-up Instructions:  
With: Address: When:  
SHERMAN MALONE [address]   
[address]   
[place], [place] [id number]   
In 3 days 2023  
Comments:  
Follow-up with your   
primary care provider   
in 3 to 5 days. If   
symptoms worsen, do not   
improve, or new   
symptoms arise please   
report back to   
emergency department   
for further evaluation.  
  
In the event that this   
physician does not   
participate in your   
insurance network,   
please consult with   
your insurance company   
to find a nearby   
participating provider.  
  
Patient Education   
Materials:  
COVID-19; Chronic   
Obstructive Pulmonary   
Disease Exacerbation  
  
A MESSAGE TO ALL   
PATIENTS REGARDING   
OPIOIDS  
  
PRESCRIPTION OPIOIDS:   
WHAT YOU NEED TO KNOW  
  
Prescription opioids   
can be used to help   
relieve   
moderate-to-severe pain   
and are often   
prescribed following a   
surgery or injury, or   
for certain health   
conditions. These   
medications can be an   
important part of the   
treatment but also come   
with serious risks. It   
is important to work   
with your healthcare   
provider to make sure   
you are getting the   
safest, most effective   
care.  
  
WHAT ARE THE RISKS AND   
SIDE EFFECTS OF OPIOID   
USE?  
Prescription opioids   
carry serious risks of   
addiction and overdose,   
especially with   
prolonged use. An   
opioid overdose, often   
marked by slowed   
breathing, can cause   
sudden death. The use   
of prescription opioids   
can have a number of   
side effects as well,   
even when taken as   
directed:  
? Tolerance?meaning you   
might need to take more   
of the medication for   
the same pain relief  
? Physical   
dependence?meaning you   
have symptoms of   
withdrawal when a   
medication is stopped  
? Increased sensitivity   
to pain  
? Constipation  
? Nausea, vomiting, and   
dry mouth  
? Sleepiness and   
dizziness  
? Confusion  
? Depression  
? Low levels of   
testosterone that can   
result in lower sex   
drive, energy, and   
strength  
? Itching and sweating  
  
RISKS ARE GREATER WITH:  
? History of drug   
misuse, substance use   
disorder, or overdose  
? Mental health   
conditions (such as   
depression or anxiety)  
? Sleep apnea  
? Older age (65 years   
and older)  
? Pregnancy  
  
Avoid alcohol while   
taking prescription   
opioids. Also, unless   
specifically advised by   
your health care   
provider, medications   
to avoid include:  
? Benzodiazepines (such   
as Xanax or Valium)  
? Muscle relaxants   
(such as Soma or   
Flexeril)  
? Hypnotics (such as   
Ambien or Lunesta)  
? Other prescription   
opioids  
  
KNOW YOUR OPTIONS  
Talk to your health   
care provider about   
ways to manage your   
pain that don?t involve   
prescription opioids.   
Some of these options   
may actually work   
better and have fewer   
risks and side effects.   
Options may include:  
? Pain relievers such   
as acetaminophen,   
ibuprofen, and naproxen  
? Some medication that   
are also used for   
depression or seizures  
? Physical therapy and   
exercise  
? Cognitive behavioral   
therapy, a   
psychological,   
goal-directed approach,   
in which patients learn   
how to modify physical,   
behavioral, and   
emotional triggers of   
pain and stress.  
  
IF YOU ARE PRESCRIBED   
OPIOIDS FOR PAIN:  
? Never take opioids in   
greater amounts or more   
often than prescribed.  
? Follow up with your   
primary health care   
provider.  
o Work together to   
create a plan on how to   
manage your pain.  
o Talk about ways to   
help manage your pain   
that don?t involve   
prescription opioids.  
o Talk about any and   
all concerns and side   
effects.  
? Help prevent misuse   
and abuse  
o Never sell or share   
prescription opioids.  
o Never use another   
person?s prescription   
opioids.  
? Store prescription   
opioids in a secure   
place and out of reach   
of others (this may   
include visitors,   
children, friends, and   
family).  
? Safely dispose of   
unused prescription   
opioids: Find your   
community drug   
take-back program or   
your pharmacy mail-back   
program, or flush them   
down the toilet,   
following guidance from   
the Food and Drug   
Administration   
(www.fda.gov/Drugs/Reso  
urcesForYou).  
? Visit   
www.cdc.gov/drugoverdos  
e to learn about the   
risks of opioids abuse   
and overdose.  
(more content not   
included)...        Normal                                  [facility]  
   
                                                    HEMATOLOGYOrdered By: SYSTEM  
 SYSTEM on 2023   
   
                      Basophils/100 WBC (Bld) 0.3 %      Normal     0.0 - 2.0 %   
[facility]   
[facility]  
   
                                                    Basophils/Leukocytes   
Auto (Bld) [Pure #   
fraction]           0.1 E9/L            Normal              0.0 - 0.2   
E9/L                                    [facility]   
[facility]  
   
                                                    Eosinophils/100 WBC   
(Bld)           0.0 %           Normal          0.0 - 8.0 %     [facility]   
[facility]  
   
                                                    Eosinophils/Leukocytes   
Auto (Bld) [Pure #   
fraction]           0.0 E9/L            Normal              0.0 - 0.5   
E9/L                                    [facility]   
[facility]  
   
                                                    Lymphocytes/100 WBC   
(Bld)               8.3 %               Low                 14.0 - 50.0   
%                                       [facility]   
[facility]  
   
                                                    Lymphocytes/Leukocytes   
Auto (Bld) [Pure #   
fraction]           1.5 E9/L            Normal              1.0 - 4.0   
E9/L                                    [facility]   
[facility]  
   
                          Monocytes/100 WBC (Bld) 4.7 %        Normal       4.0   
- 14.0   
%                                       [facility]   
[facility]  
   
                                                    Monocytes/Leukocytes   
Auto (Bld) [Pure #   
fraction]           0.9 E9/L            Normal              0.2 - 1.0   
E9/L                                    [facility]   
[facility]  
   
                                                    Neutrophils/100 WBC   
(Bld)               86.7 %              High                36.0 - 75.0   
%                                       [facility]   
[facility]  
   
                                                    Neutrophils/Leukocytes   
Auto (Bld) [Pure #   
fraction]           16.1 E9/L           High                2.0 - 7.5   
E9/L                                    [facility]   
[facility]  
   
                                                    HEMATOLOGYOrdered By: Mandie Arambula on 2023   
   
                                                    Erythrocyte   
distribution width   
(RBC) [Ratio]       13.0 %              Normal              10.9 - 14.2   
%                                       [facility]   
[facility]  
   
                                                    Hematocrit (Bld)   
[Volume fraction]   45.1 %              Normal              34.0 - 46.0   
%                                       [facility]   
[facility]  
   
                                                    Hemoglobin (Bld)   
[Mass/Vol]          15.1 g/dL           Normal              12.0 - 16.0   
gm/dL                                   [facility]   
[facility]  
   
                                                    MCH (RBC) [Entitic   
mass]               30.6 pg             Normal              27.0 - 34.0   
pg                                      [facility]   
[facility]  
   
                          MCHC (RBC) [Mass/Vol] 33.4 g/dL    Normal       31.4 -  
 36.0   
gm/dL                                   [facility]   
[facility]  
   
                          MCV (RBC) [Entitic vol] 91.5 fL      Normal       80.0  
 -   
100.0 fL                                [facility]   
[facility]  
   
                                                    Platelet mean volume   
(Bld) [Entitic vol] 8.2 fL              Normal              6.4 - 10.8   
fL                                      [facility]   
[facility]  
   
                          Platelets (Bld) [#/Vol] 189.0 E9/L   Normal       150.  
0 -   
500.0 E9/L                              [facility]   
[facility]  
   
                          RBC (Bld) [#/Vol] 4.9 E12/L    Normal       4.3 - 5.9   
E12/L                                   [facility]   
[facility]  
   
                                                    WBC corrected for nucl   
RBC Auto (Bld) [#/Vol] 18.6 E9/L           High                4.0 - 11.0   
E9/L                                    [facility]   
[facility]  
   
                                                    Monitor Recordon 2023   
   
                                        Monitor Record      [id number]  
406  
[id number]  
CD:[id number]              Normal                                  [facility]  
   
                                                    PT & PTTon 2023   
   
                      aPTT Coag (PPP) [Time] 33.5 second(s) Normal     25.1-36.5  
  [facility]  
   
                                        Comment on above:   Result Comment: Para  
meter 15 days - 4 weeks 1 - 5 months 6 -   
11 months 1 - 5 years 6 - 10 years 11 - 17 years  
PTT Mean: 35.4 (27.6-45.6) Mean: 33.5 (24.8-40.7) Mean: 32.4   
(25.1-40.7) Mean: 31.6 (24.0-39.2) Mean: 31.6 (26.9-38.7)   
Mean: 31.0 (24.6-38.4)  
  
Pediatric Reference ranges were obtained from a study by   
Lopez Hilton et al. prepared from 1437 samples obtained at 7   
different centers using the same coagulation reagent and   
instrumentation as [facility]. Currently there are no coagulation   
studies available worldwide for children birth to 14 days, and   
no normal ranges.  
Heparin therapeutic range (represented by Anti-Factor Xa   
activity of 0.2 - 0.4  
U/mL) corresponds to PTT of 56.6 - 109.0 sec.   
   
                                                            Performed By: #### 2  
[id number], [id number], [id number], [id number],   
[id number], [id number], [id number] ####  
[facility]  
[address] [place] [address]   
   
                                                    INR Coag (PPP)   
[Relative time]           1.0 {INR}                 Invalid   
Interpretation   
Code                                                [facility]  
   
                                        Comment on above:   Result Comment: INR   
results are specifically intended to   
assess patients stabilized on long-term  
Anticoagulation therapy suggested INR?s  
?Less Intensive Anticoagulation? 2.0 ? 3.0  
Conventional Range 3.0 ? 4.5   
   
                                                            Performed By: #### 2  
[id number], [id number], [id number], [id number],   
3209702, [id number], [id number] ####  
[facility]  
[address] [place] [address]   
   
                      PT Coag (PPP) [Time] 10.8 second(s) Normal     9.4-12.5     
[facility]  
   
                                        Comment on above:   Result Comment: 15 d  
ays - 4 weeks 1 - 5 months 6 -11 months   
1-5 years 6-10 years 11 -17 years  
Mean: 11.2 (9.5-12.6) Mean: 11.0 (9.7-12.8) Mean: 11.0   
(9.8-13.0) Mean: 11.3 (9.9-13.4) Mean: 11.7 (10.0-14.6) Mean:   
11.8 (10.0 - 14.1)  
Pediatric Reference ranges were obtained from a study by   
sabine Quinonez al. prepared from 1437 samples obtained at 7   
different centers using the same coagulation reagent and   
instrumentation as [facility]. Currently there are no coagulation   
studies available worldwide for children birth to 14 days, and   
no normal ranges.   
   
                                                            Performed By: #### 2  
[id number], 2130558, [id number], [id number],   
9734943, [id number], [id number] ####  
[facility]  
[address] [place] [address]   
   
                                                    Troponin 0 Hr.on 2023   
   
                                                    Troponin I.cardiac   
[Mass/Vol]      4.80 pg/mL      Low             10.10-27.10     [facility]  
   
                                        Comment on above:   Result Comment: The   
95% CI (Confidence Interval) PPV (Positive  
   
Predictive Value) for myocardial infarction in females is 38   
pg/mL, in males 51 pg/mL. The results should be used in   
conjunction with clinical conditions of myocardial infarction.  
(Access High Sensitivity Troponin I Instructions For Use,   
Kyara Bradgate, 2018)   
   
                                                            Performed By: #### 2  
[id number], [id number], [id number], [id number],   
7180004, [id number], [id number] ####  
[facility]  
[address] [place] [address]   
   
                                                    XR Chest Single Viewon    
   
                                        XR Chest Single View Exam Date/Time:  
2023 10:27 EDT  
Reason for Exam:  
Chest pain  
Report  
IMPRESSION: There are   
no acute changes.  
CLINICAL HISTORY: Chest   
pain  
EXAMINATION: XR Chest   
Single View  
COMPARISON: Chest x-ray   
from 2020  
FINDINGS:  
The cardiomediastinal   
silhouette is   
unremarkable.  
The lungs are free of   
infiltrates effusions   
or consolidations.  
There are no acute   
osseous changes.  
Ordering Provider:   
Rei Slaughter  
***** FINAL REPORT   
*****  
  
Dictated: 2023   
11:44 am Gerhard Almazan MD  
  
Signed (Electronic   
Signature): 2023   
11:44 am  
Signed by: Gerhard Almazan MD  
Transcribed by: CHIDI   
Technologist: JOHNATHAN  
Technical Comments  
Radiation Dose: Ka,r in   
mGy = na  
DAP = na            Normal                                  [facility]  
   
                                                    eGFRon 2023   
   
                                                    GFR/1.73 sq M.predicted   
among blacks MDRD   
(S/P/Bld) [Vol   
rate/Area]      mL/min/{1.73_m2} Normal          >=59            [facility]  
   
                                        Comment on above:   Order Comment: Order  
 added by Discern Expert.   
   
                                                            Result Comment: eGFR  
 is race adjusted. AA=.   
   
                                                            Performed By: #### 2  
[id number], 6282035, [id number], [id number],   
6922829, 65283086, [id number] ####  
[facility]  
[address] [place] [address]   
   
                                                    GFR/1.73 sq M.predicted   
among non-blacks MDRD   
(S/P/Bld) [Vol   
rate/Area]      mL/min/{1.73_m2} Normal          >=59            [facility]  
   
                                        Comment on above:   Order Comment: Order  
 added by Discern Expert.   
   
                                                            Result Comment:   
mary kidney disease could be indicated at   
eGFR's of less than 60 mL/min/1.73m2. Kidney failure is   
indicated at less than 15 mL/min/1.73m2.   
   
                                                            Performed By: #### 2  
[id number], 6714265, 00316262, [id number],   
3602129, [id number], [id number] ####  
[facility]  
[address] [place] [address]   
   
                                                    CBC AUTO DIFFon 2023   
   
                      BASO #     0.1 103/ul Normal     0.0-0.1    [facility]  
   
                                        Comment on above:   Performed By: #### C  
BC ####  
[facility]  
[address] [address]  
Dr. Edmund Gordon   
   
                      Basophils/100 WBC (Bld) 0.7 %      Normal     0.2-2.0    [facility]  
   
                                        Comment on above:   Performed By: #### C  
BC ####  
[facility]  
[address] [address]  
Dr. Edmund Gordon   
   
                      EO #       0.2 103/ul Normal     0.0-0.7    [facility]  
   
                                        Comment on above:   Performed By: #### C  
BC ####  
[facility]  
[address] [place] [address]  
Dr. Edmund Gordon   
   
                                                    Eosinophils/100 WBC   
(Bld)           1.7 %           Normal          0.9-7.0         [facility]  
   
                                        Comment on above:   Performed By: #### C  
BC ####  
[facility]  
[address] [place] [address]  
Dr. Edmund Gordon   
   
                                                    Erythrocyte   
distribution width   
(RBC) [Ratio]   12.5 %          Normal          11.0-15.0       [facility]  
   
                                        Comment on above:   Performed By: #### C  
BC ####  
[facility]  
[address] [place] [address]  
Dr. Edmund Gordon   
   
                                                    Hematocrit (Bld)   
[Volume fraction] 42.0 %          Normal          36.0-48.0       [facility]  
   
                                        Comment on above:   Performed By: #### C  
BC ####  
[facility]  
[address] [place] [address]  
Dr. Edmund Gordon   
   
                                                    Hemoglobin (Bld)   
[Mass/Vol]      14.3 g/dL       Normal          12.0-16.0       [facility]  
   
                                        Comment on above:   Performed By: #### C  
BC ####  
[facility]  
[address] [place] [address]  
Dr. Edmund Gordon   
   
                      IG #       0.06 10e3/ul Critically high 0.00-0.03  [facility]  
   
                                        Comment on above:   Performed By: #### C  
BC ####  
[facility]  
[address] [place] [address]  
Dr. Edmund Gordon   
   
                      IG %       0.6 %      Critically high 0.0-0.5    [facility]  
   
                                        Comment on above:   Performed By: #### C  
BC ####  
[facility]  
[address] [place] [address]  
Dr. Edmund Gordon   
   
                      LYMPH #    2.7 103/ul Normal     1.2-3.8    [facility]  
   
                                        Comment on above:   Performed By: #### C  
BC ####  
[facility]  
[address] [place] [address]  
Dr. Edmund Gordon   
   
                                                    Lymphocytes/100 WBC   
(Bld)           27.4 %          Normal          20.5-60.0       [facility]  
   
                                        Comment on above:   Performed By: #### C  
BC ####  
[facility]  
[address] [place] [address]  
Dr. Edmund Gordon   
   
                      MANUAL DIFF REQ NO         Normal                [facility]  
   
                                        Comment on above:   Performed By: #### C  
BC ####  
[facility]  
[address] [place] [address]  
Dr. Edmund Gordon   
   
                                                    MCH (RBC) [Entitic   
mass]           31.4 pg         Normal          26.7-34.0       [facility]  
   
                                        Comment on above:   Performed By: #### C  
BC ####  
[facility]  
[address] [place] [address]  
Dr. Edmund Gordon   
   
                      MCHC (RBC) [Mass/Vol] 34.0 g/dL  Normal     29.9-35.2  [facility]  
   
                                        Comment on above:   Performed By: #### C  
BC ####  
[facility]  
[address] [place] [address]  
Dr. Edmund Gordon   
   
                      MCV (RBC) [Entitic vol] 92.3 fL    Normal     81.0-99.0  [facility]  
   
                                        Comment on above:   Performed By: #### C  
BC ####  
[facility]  
[address] [place] [address]  
Dr. Edmund Gordon   
   
                      MONO #     0.8 103/ul Normal     0.3-0.8    [facility]  
   
                                        Comment on above:   Performed By: #### C  
BC ####  
[facility]  
[address] [place] [address]  
Dr. Edmund Gordon   
   
                      Monocytes/100 WBC (Bld) 8.5 %      Normal     1.7-12.0   [facility]  
   
                                        Comment on above:   Performed By: #### C  
BC ####  
[facility]  
[address] [place] [address]  
Dr. Edmund Gordon   
   
                      NEUT #     6.1 103/ul Normal     1.4-6.5    [facility]  
   
                                        Comment on above:   Performed By: #### C  
BC ####  
[facility]  
[address] [address]  
Dr. Edmund Gordon   
   
                                                    Neutrophils/100 WBC   
(Bld)           61.1 %          Normal          43.0-75.0       [facility]  
   
                                        Comment on above:   Performed By: #### C  
BC ####  
[facility]  
[address] [address]  
Dr. Edmund Gordon   
   
                                                    Platelet mean volume   
(Bld) [Entitic vol] 9.6 fL          Normal          9.5-13.5        [facility]  
   
                                        Comment on above:   Performed By: #### C  
BC ####  
[facility]  
[address] [address]  
Dr. Edmund Gordon   
   
                      PLT        168 103/ul Normal     150-450    [facility]  
   
                                        Comment on above:   Performed By: #### C  
BC ####  
[facility]  
[address] [place] [address]  
Dr. Edmund Gordon   
   
                      RBC        4.55 106/ul Normal     4.20-5.40  [facility]  
   
                                        Comment on above:   Performed By: #### C  
BC ####  
[facility]  
[address] [place] [address]  
Dr. Edmund oGrdon   
   
                      WBC        9.9 103/ul Normal     4.0-11.0   [facility]  
   
                                        Comment on above:   Performed By: #### C  
BC ####  
[facility]  
[address] [place] [address]  
Dr. Edmund Gordon   
   
                                                    PROF CHEM 8 (BAS METB)on    
   
                      Anion gap [Moles/Vol] 10.7 mmol/L Normal                [facility]  
   
                                        Comment on above:   Performed By: #### C  
MP ####  
[facility]  
[address] [place] [address]  
Dr. Edmund Gordon   
   
                      Calcium [Mass/Vol] 8.7 mg/dL  Normal     8.5-10.1   [facility]  
   
                                        Comment on above:   Performed By: #### C  
MP ####  
[facility]  
[address] [place] [address]  
Dr. Edmund Gordon   
   
                      Chloride [Moles/Vol] 104 mmol/L Normal          [facility]  
   
                                        Comment on above:   Performed By: #### C  
MP ####  
[facility]  
[address] [address]  
Dr. Edmund Gordon   
   
                      CO2 [Moles/Vol] 30.4 mmol/L Normal     21.0-32.0  [facility]  
   
                                        Comment on above:   Performed By: #### C  
MP ####  
[facility]  
[address] [address]  
Dr. Edmund Gordon   
   
                      Creatinine [Mass/Vol] 0.76 mg/dL Normal     0.55-1.02  [facility]  
   
                                        Comment on above:   Performed By: #### C  
MP ####  
[facility]  
[address] [address]  
Dr. Edmund Gordon   
   
                      EGFR-AF AMERICAN >60        Normal     >=60       [facility]  
   
                                        Comment on above:   Performed By: #### C  
MP ####  
[facility]  
[address] [address]  
Dr. Edmund Gordon   
   
                      EGFR-NON AF AMERICAN >60        Normal     >=60       [facility]  
   
                                        Comment on above:   Performed By: #### C  
MP ####  
[facility]  
[address] [address]  
Dr. Edmund Gordon   
   
                      Glucose [Mass/Vol] 122 mg/dL  Critically high      T  
[facility]  
   
                                        Comment on above:   Performed By: #### C  
MP ####  
[facility]  
[address] [address]  
Dr. Edmund Gordon   
   
                      Potassium [Moles/Vol] 4.1 mmol/L Normal     3.5-5.1    [facility]  
   
                                        Comment on above:   Performed By: #### C  
MP ####  
[facility]  
[address] [address]  
Dr. Edmund Gordon   
   
                      Sodium [Moles/Vol] 141 mmol/L Normal     136-145    [facility]  
   
                                        Comment on above:   Performed By: #### C  
MP ####  
[facility]  
[address] [place] [address]  
Dr. Edmund Gordon   
   
                                                    Urea nitrogen   
[Mass/Vol]      14.0 mg/dL      Normal          7.0-18.0        [facility]  
   
                                        Comment on above:   Performed By: #### C  
MP ####  
[facility]  
[address] [address]  
Dr. Edmund Gordon   
   
                                                    Urea   
nitrogen/Creatinine   
[Mass ratio]    18.4 mg/mg      Normal                          [facility]  
   
                                        Comment on above:   Performed By: #### C  
MP ####  
[facility]  
[address] [place] [address]  
Dr. Edmund Gordon   
   
                                                    SYMPTOMATIC COVID-19 ANTIGEN  
on 2023   
   
                      EUA Statement SEE BELOW  Normal                [facility]  
   
                                        Comment on above:   Result Comment: This  
 test has not been FDA cleared or   
approved, but has been authorized by the FDA under an   
Emergency Use Authorization (EUA) for use by authorized   
laboratories certified under CLIA that meet the requirements   
to perform moderate or high complexity testing. This test has   
been authorized only for the detection of proteins from   
SARS-CoV-2, not for any other viruses or pathogens. The   
emergency use of this test is authorized for the duration of   
the declaration that circumstances exist justifying the   
authorization of emergency use of in vitro diagnostic tests   
for detection and/or diagnosis of Covid-19 under section   
564(b)(1) of the Act, 21 U.S.C. 360bbb-3(b)(1), unless the   
declaration is terminated or authorization is revoked sooner.   
   
                                                            Performed By: #### P  
OCGLUC ####  
[facility]  
[address] [place] [address]  
Dr. Edmund Gordon   
   
                                                    SARS-CoV-2 (COVID-19)   
RNA CHRISTOS+probe Ql (Unsp   
spec)           Positive        Abnormal        NEGATIVE        [facility]  
   
                                        Comment on above:   Performed By: #### P  
OCGLUC ####  
[facility]  
[address] [place] [id number]  
Dr. Edmund Gordon   
   
                                                    XR CHEST 1 Von 2023   
   
                                        XR CHEST 1 V        EXAM: XR CHEST 1 V  
HISTORY: Cough and   
chest pain  
COMPARISON: Portable   
chest 2023  
TECHNIQUE: Portable   
chest  
FINDINGS:  
The lung parenchyma is   
free of consolidation   
or infiltrate. No   
pneumothorax or  
pleural effusion. The   
cardiac, mediastinal   
and hilar contours are   
normal. The  
visualized osseous   
structures exhibit no   
gross abnormality.  
IMPRESSION:  
No acute   
cardiopulmonary   
abnormality.  
Electronically   
authenticated by: TOMASZ RAMIREZ Date:   
2023 18:07    Normal                                  [facility]  
   
                                                    CARDIAC ZACHARY 3-6on   
3   
   
                                                    CK [Catalytic   
activity/Vol]   41 U/L          Normal                    [facility]  
   
                                        Comment on above:   Performed By: #### P  
OCGLUC ####  
[facility]  
[address] [place] [address]  
Dr. Edmund Gordon   
   
                      CK.MB [Mass/Vol] 1.01 ng/mL Normal     <=3.60     [facility]  
   
                                        Comment on above:   Performed By: #### P  
OCGLUC ####  
[facility]  
[address] [place] [address]  
Dr. Edmund Gordon   
   
                      HSTROP     <4.0       Normal     4.0-51.3   [facility]  
   
                                        Comment on above:   Result Comment: CUT-  
OFF POINTS HAVE BEEN ESTABLISHED BASED ON   
THE FOURTH UNIVERSAL DEFINITIONS OF MYOCARDIAL  
INFARCTION. THE UPPER REFERENCE LIMIT (URL) OF TROPONIN,   
DEFINED AS THE 99TH PERCENTILE OF  
cTnI DISTRIBUTION IN A REFERENCE POPULATION, HAS BEEN   
CONFIRMED AS THE DECISION THRESHOLD  
FOR MI DIAGNOSIS.   
   
                                                            Performed By: #### P  
OCGLUC ####  
[facility]  
[address] [place] [address]  
Dr. Edmund Gordon   
   
                                                    LACTATE/LACTIC ACIDon 2023   
   
                      Lactate [Moles/Vol] 0.5 mmol/L Normal     0.4-1.9    [facility]  
   
                                        Comment on above:   Performed By: #### L  
ACT ####  
[facility]  
[address] [place] [address]  
Dr. Edmund Gordon   
   
                                                    CARDIAC ZACHARY ADMITon   
023   
   
                                                    CK [Catalytic   
activity/Vol]   44 U/L          Normal                    [facility]  
   
                                        Comment on above:   Performed By: #### C  
BC ####  
[facility]  
[address] [place] [address]  
Dr. Edmund Gordon   
   
                      CK.MB [Mass/Vol] ng/mL      Normal     <=3.60     [facility]  
   
                                        Comment on above:   Performed By: #### C  
BC ####  
[facility]  
[address] [place] [address]  
Dr. Edmund Gordon   
   
                      HSTROP     <4.0       Normal     4.0-51.3   [facility]  
   
                                        Comment on above:   Result Comment: CUT-  
OFF POINTS HAVE BEEN ESTABLISHED BASED ON   
THE FOURTH UNIVERSAL DEFINITIONS OF MYOCARDIAL  
INFARCTION. THE UPPER REFERENCE LIMIT (URL) OF TROPONIN,   
DEFINED AS THE 99TH PERCENTILE OF  
cTnI DISTRIBUTION IN A REFERENCE POPULATION, HAS BEEN   
CONFIRMED AS THE DECISION THRESHOLD  
FOR MI DIAGNOSIS.   
   
                                                            Performed By: #### C  
BC ####  
[facility]  
[address] [place] [address]  
Dr. Edmund Gordon   
   
                      KENDRA        23 ng/mL   Normal     9-82       [facility]  
   
                                        Comment on above:   Performed By: #### C  
BC ####  
[facility]  
[address] [place] [address]  
Dr. Edmund Gordon   
   
                                                    CBC AUTO DIFFon 2023   
   
                      BASO #     0.1 103/ul Normal     0.0-0.1    [facility]  
   
                                        Comment on above:   Performed By: #### C  
BC ####  
[facility]  
[address] [place] [address]  
Dr. Edmund Gordon   
   
                      Basophils/100 WBC (Bld) 0.9 %      Normal     0.2-2.0    [facility]  
   
                                        Comment on above:   Performed By: #### C  
BC ####  
[facility]  
[address] [place] [address]  
Dr. Edmund Gordon   
   
                      EO #       0.1 103/ul Normal     0.0-0.7    [facility]  
   
                                        Comment on above:   Performed By: #### C  
BC ####  
[facility]  
[address] [place] [address]  
Dr. Edmund Gordon   
   
                                                    Eosinophils/100 WBC   
(Bld)           1.2 %           Normal          0.9-7.0         [facility]  
   
                                        Comment on above:   Performed By: #### C  
BC ####  
[facility]  
[address] [place] [address]  
Dr. Edmund Gordon   
   
                                                    Erythrocyte   
distribution width   
(RBC) [Ratio]   11.9 %          Normal          11.0-15.0       [facility]  
   
                                        Comment on above:   Performed By: #### C  
BC ####  
[facility]  
[address] [place] [address]  
Dr. Edmund Gordon   
   
                                                    Hematocrit (Bld)   
[Volume fraction] 42.0 %          Normal          36.0-48.0       [facility]  
   
                                        Comment on above:   Performed By: #### C  
BC ####  
[facility]  
[address] [place] [address]  
Dr. Edmund Gordon   
   
                                                    Hemoglobin (Bld)   
[Mass/Vol]      14.4 g/dL       Normal          12.0-16.0       [facility]  
   
                                        Comment on above:   Performed By: #### C  
BC ####  
[facility]  
[address] [place] [address]  
Dr. Edmund Gordon   
   
                      IG #       0.03 10e3/ul Normal     0.00-0.03  [facility]  
   
                                        Comment on above:   Performed By: #### C  
BC ####  
[facility]  
[address] [place] [address]  
Dr. Edmund Gordon   
   
                      IG %       0.3 %      Normal     0.0-0.5    [facility]  
   
                                        Comment on above:   Performed By: #### C  
BC ####  
[facility]  
[address] [place] [address]  
Dr. Edmund Gordon   
   
                      LYMPH #    3.8 103/ul Normal     1.2-3.8    [facility]  
   
                                        Comment on above:   Performed By: #### C  
BC ####  
[facility]  
[address] [place] [address]  
Dr. Edmund Gordon   
   
                                                    Lymphocytes/100 WBC   
(Bld)           39.9 %          Normal          20.5-60.0       [facility]  
   
                                        Comment on above:   Performed By: #### C  
BC ####  
[facility]  
[address] [place] [address]  
Dr. Edmund Gordon   
   
                      MANUAL DIFF REQ NO         Normal                [facility]  
   
                                        Comment on above:   Performed By: #### C  
BC ####  
[facility]  
[address] [place] [address]  
Dr. Edmund Gordon   
   
                                                    MCH (RBC) [Entitic   
mass]           31.4 pg         Normal          26.7-34.0       [facility]  
   
                                        Comment on above:   Performed By: #### C  
BC ####  
[facility]  
[address] [place] [address]  
Dr. Edmund Gordon   
   
                      MCHC (RBC) [Mass/Vol] 34.3 g/dL  Normal     29.9-35.2  [facility]  
   
                                        Comment on above:   Performed By: #### C  
BC ####  
[facility]  
[address] [place] [address]  
Dr. Edmund Gordon   
   
                      MCV (RBC) [Entitic vol] 91.5 fL    Normal     81.0-99.0  [facility]  
   
                                        Comment on above:   Performed By: #### C  
BC ####  
[facility]  
[address] [place] [address]  
Dr. Edmund Gordon   
   
                      MONO #     0.6 103/ul Normal     0.3-0.8    [facility]  
   
                                        Comment on above:   Performed By: #### C  
BC ####  
[facility]  
[address] [place] [address]  
Dr. Edmund Gordon   
   
                      Monocytes/100 WBC (Bld) 6.3 %      Normal     1.7-12.0   [facility]  
   
                                        Comment on above:   Performed By: #### C  
BC ####  
[facility]  
[address] [place] [address]  
Dr. Edmund Gordon   
   
                      NEUT #     4.9 103/ul Normal     1.4-6.5    [facility]  
   
                                        Comment on above:   Performed By: #### C  
BC ####  
[facility]  
[address] [place] [address]  
Dr. Edmund Gordon   
   
                                                    Neutrophils/100 WBC   
(Bld)           51.4 %          Normal          43.0-75.0       [facility]  
   
                                        Comment on above:   Performed By: #### C  
BC ####  
[facility]  
[address] [place] [address]  
Dr. Edmund Gordon   
   
                                                    Platelet mean volume   
(Bld) [Entitic vol] 9.7 fL          Normal          9.5-13.5        [facility]  
   
                                        Comment on above:   Performed By: #### C  
BC ####  
[facility]  
[address] [place] [address]  
Dr. Edmund Gordon   
   
                      PLT        213 103/ul Normal     150-450    [facility]  
   
                                        Comment on above:   Performed By: #### C  
BC ####  
[facility]  
[address] [place] [address]  
Dr. Edmund Gordon   
   
                      RBC        4.59 106/ul Normal     4.20-5.40  [facility]  
   
                                        Comment on above:   Performed By: #### C  
BC ####  
[facility]  
[address] [place] [address]  
Dr. Edmund Gordon   
   
                      WBC        9.5 103/ul Normal     4.0-11.0   [facility]  
   
                                        Comment on above:   Performed By: #### C  
BC ####  
[facility]  
[address] [place] [address]  
Dr. Edmund Gordon   
   
                                                    CT HEAD WO CONon 2023   
   
                                        CT HEAD WO CON      EXAMINATION: CT HEAD  
 WO   
CON  
HISTORY: HEADACHE for   
the past week.  
COMPARISON: 2022  
TECHNIQUE: CT   
examination of the head   
without IV contrast.   
Sagittal and coronal  
reconstructions were   
obtained.  
Dose reduction   
techniques were   
achieved by using   
automated exposure   
control  
and/or adjustment of mA   
and/or kV according to   
patient size and/or use   
of  
iterative   
reconstruction   
technique.  
FINDINGS: The   
ventricles are not   
enlarged, the lateral   
ventricles are   
symmetric  
and the third   
ventricles in the   
midline. The sylvian   
fissures and cortical   
sulci  
are unremarkable. There   
is no evidence of an   
intracranial   
hemorrhage, mass  
lesion or apparent   
acute infarct. No   
abnormality is seen in   
the deep white  
matter.  
The cerebellum and   
visualized brainstem   
are intact. The   
paranasal sinuses are  
well-developed with a   
small air-fluid level   
in the right maxillary   
sinus. The  
middle ears are   
aerated. The mastoid   
sinuses are clear.   
There is no apparent  
acute skull fracture.  
IMPRESSION:  
There is no evidence of   
an intracranial   
hemorrhage, mass lesion   
or apparent  
acute infarct. There is   
been interval   
development of a small   
air-fluid level  
within the right   
maxillary sinus   
indicating an element   
of acute sinusitis. The  
paranasal sinuses are   
otherwise relatively   
clear as visualized.   
There is no  
evidence of an acute   
skull fracture.  
Except for the changes   
in the paranasal   
sinuses, the overall   
appearance is  
unchanged.  
Electronically   
authenticated by: BILLIE DE GUZMAN Date: 2023   
20:12               Normal                                  [facility]  
   
                                                    D-DIMERon 2023   
   
                      D-DIMER    0.52 mg/L FEU Normal     <=0.59     [facility]  
   
                                        Comment on above:   Performed By: #### C  
BC ####  
[facility]  
[address] [place] [address]  
Dr. Edmund Gordon   
   
                      D-DIMER COMMENTS SEE BELOW  Normal                [facility]  
   
                                        Comment on above:   Result Comment: Incr  
eases in D-Dimer concentration observed   
with thromboembolic events can be variable due to   
localization, size, and age of the thrombus. Therefore, a   
thromboembolic event cannot be diagnosed with certainty on the   
basis of the reference range. D-Dimers may also be elevated   
for a variety of disorders including: advanced age, pregnancy,   
coronary disease, cancer, liver disease, infection,   
inflammation, hematoma, DIC, trauma, post-surgery, diabetes,   
thrombolytic or anticoagulant therapy, stress, and generalized   
hospitalization.   
   
                                                            Performed By: #### C  
BC ####  
[facility]  
[address] [place] [address]  
Dr. Edmund Gordon   
   
                                                    LACTATE/LACTIC ACIDon 2023   
   
                      Lactate [Moles/Vol] 1.6 mmol/L Normal     0.4-1.9    [facility]  
   
                                        Comment on above:   Performed By: #### P  
T, DDIM, PTT ####  
[facility]  
[address] [place] [address]  
Dr. Edmund Gordon   
   
                                                    PROF CHEM 8 (BAS METB)on    
   
                      Anion gap [Moles/Vol] 12.8 mmol/L Normal                [facility]  
   
                                        Comment on above:   Performed By: #### C  
BC ####  
[facility]  
[address] [place] [address]  
Dr. Edmund Gordon   
   
                      Calcium [Mass/Vol] 9.4 mg/dL  Normal     8.5-10.1   [facility]  
   
                                        Comment on above:   Performed By: #### C  
BC ####  
[facility]  
[address] [place] [address]  
Dr. Edmund Gordon   
   
                      Chloride [Moles/Vol] 103 mmol/L Normal          [facility]  
   
                                        Comment on above:   Performed By: #### C  
BC ####  
[facility]  
[address] [place] [address]  
Dr. Edmund Gordon   
   
                      CO2 [Moles/Vol] 28.7 mmol/L Normal     21.0-32.0  [facility]  
   
                                        Comment on above:   Performed By: #### C  
BC ####  
[facility]  
[address] [place] [address]  
Dr. Edmund Gordon   
   
                      Creatinine [Mass/Vol] 0.67 mg/dL Normal     0.55-1.02  [facility]  
   
                                        Comment on above:   Performed By: #### C  
BC ####  
[facility]  
[address] [place] [address]  
Dr. Edmund Gordon   
   
                      EGFR-AF AMERICAN >60        Normal     >=60       [facility]  
   
                                        Comment on above:   Performed By: #### C  
BC ####  
[facility]  
[address] [place] [address]  
Dr. Edmund Gordon   
   
                      EGFR-NON AF AMERICAN >60        Normal     >=60       [facility]  
   
                                        Comment on above:   Performed By: #### C  
BC ####  
[facility]  
[address], [place] [address]  
Dr. Edmund Gordon   
   
                      Glucose [Mass/Vol] 135 mg/dL  Critically high      [facility]  
   
                                        Comment on above:   Performed By: #### C  
BC ####  
[facility]  
[address] [address]  
Dr. Edmund Gordon   
   
                      Potassium [Moles/Vol] 3.5 mmol/L Normal     3.5-5.1    [facility]  
   
                                        Comment on above:   Performed By: #### C  
BC ####  
[facility]  
[address] [address]  
Dr. Edmund Gordon   
   
                      Sodium [Moles/Vol] 141 mmol/L Normal     136-145    [facility]  
   
                                        Comment on above:   Performed By: #### C  
BC ####  
[facility]  
[address] [address]  
Dr. Edmund Gordon   
   
                                                    Urea nitrogen   
[Mass/Vol]      10.0 mg/dL      Normal          7.0-18.0        [facility]  
   
                                        Comment on above:   Performed By: #### C  
BC ####  
[facility]  
[address] [address]  
Dr. Edmund Gordon   
   
                                                    Urea   
nitrogen/Creatinine   
[Mass ratio]    14.9 mg/mg      Normal                          [facility]  
   
                                        Comment on above:   Performed By: #### C  
BC ####  
[facility]  
[address] [address]  
Dr. Edmund Gordon   
   
                                                    XR CHEST 1 Von 2023   
   
                                        XR CHEST 1 V        EXAMINATION: XR CHES  
T 1   
V  
HISTORY: Chest pain  
COMPARISON: Portable   
chest 2022  
TECHNIQUE: Portable   
chest  
FINDINGS:  
The lung parenchyma is   
free of consolidation   
or infiltrate. No   
pneumothorax or  
pleural effusion. The   
cardiac, mediastinal   
and hilar contours are   
normal. The  
visualized osseous   
structures exhibit no   
gross abnormality.  
IMPRESSION:  
No acute   
cardiopulmonary   
abnormality.  
  
Electronically   
authenticated by: TOMASZ RAMIREZ Date:   
2023 20:11    Normal                                  [facility]  
   
                                                    XR CHEST 1 Von 2022   
   
                                        XR CHEST 1 V        EXAMINATION: XR CHES  
T 1   
V, , 2022 5:37 PM   
EST  
INDICATION:  
SHORTNESS OF BREATH  
HISTORY:  
Ordering Provider   
Reason for Exam:  
Technologist Note:  
Additional:  
COMPARISON: Chest x-ray   
dated 10/11/2022.  
TECHNIQUE: Chest x-ray:   
One view.  
FINDINGS:  
No pneumothorax,   
pleural effusion or   
focal airspace   
consolidation. Heart is  
normal in size. Fusion   
device is seen   
projecting over the   
visualized lower  
cervical spine.  
IMPRESSION:  
No acute   
cardiopulmonary   
process.  
Electronically   
authenticated by: SHILOH BOLTON Date: 2022   
18:38               Normal                                  [facility]  
   
                                                    CORTISOLon 11-   
   
                      Cortisol   17.0 ug/dL Normal                [facility]  
   
                                        Comment on above:   Result Comment: Dru  
isol AM 6.2 - 19.4  
Cortisol PM 2.3 - 11.9   
   
                                                            Performed By: #### P  
T, DDIM, PTT ####  
[facility]  
[address] [place] [address]  
Dr. Edmund Gordon   
   
                                                    CBC AUTO DIFFon 2022   
   
                      BASO #     0.1 103/ul Normal     0.0-0.1    [facility]  
   
                                        Comment on above:   Performed By: #### P  
OCGLUC ####  
[facility]  
[address] [address]  
Dr. Edmund Gordon   
   
                      Basophils/100 WBC (Bld) 0.8 %      Normal     0.2-2.0    [facility]  
   
                                        Comment on above:   Performed By: #### P  
OCGLUC ####  
[facility]  
[address] [place] [address]  
Dr. Edmund Gordon   
   
                      EO #       0.1 103/ul Normal     0.0-0.7    [facility]  
   
                                        Comment on above:   Performed By: #### P  
OCGLUC ####  
[facility]  
[address] [address]  
Dr. Edmund Gordon   
   
                                                    Eosinophils/100 WBC   
(Bld)           1.4 %           Normal          0.9-7.0         [facility]  
   
                                        Comment on above:   Performed By: #### P  
OCGLUC ####  
[facility]  
[address] [place] [address]  
Dr. Edmund Gordon   
   
                                                    Erythrocyte   
distribution width   
(RBC) [Ratio]   12.3 %          Normal          11.0-15.0       [facility]  
   
                                        Comment on above:   Performed By: #### P  
OCGLUC ####  
[facility]  
[address] [place] [address]  
Dr. Edmund Gordon   
   
                                                    Hematocrit (Bld)   
[Volume fraction] 43.2 %          Normal          36.0-48.0       [facility]  
   
                                        Comment on above:   Performed By: #### P  
OCGLUC ####  
[facility]  
[address] [place] [address]  
Dr. Edmund Gordon   
   
                                                    Hemoglobin (Bld)   
[Mass/Vol]      15.2 g/dL       Normal          12.0-16.0       [facility]  
   
                                        Comment on above:   Performed By: #### P  
OCGLUC ####  
[facility]  
[address] [place] [address]  
Dr. Edmund Gordon   
   
                      IG #       0.03 10e3/ul Normal     0.00-0.03  [facility]  
   
                                        Comment on above:   Performed By: #### P  
OCGLUC ####  
[facility]  
[address] [place] [address]  
Dr. Edmund Gordon   
   
                      IG %       0.3 %      Normal     0.0-0.5    [facility]  
   
                                        Comment on above:   Performed By: #### P  
OCGLUC ####  
[facility]  
[address] [place] [address]  
Dr. Edmund Gordon   
   
                      LYMPH #    2.9 103/ul Normal     1.2-3.8    [facility]  
   
                                        Comment on above:   Performed By: #### P  
OCGLUC ####  
[facility]  
[address] [place] [address]  
Dr. Edmund Gordon   
   
                                                    Lymphocytes/100 WBC   
(Bld)           33.3 %          Normal          20.5-60.0       [facility]  
   
                                        Comment on above:   Performed By: #### P  
OCGLUC ####  
[facility]  
[address] [place] [address]  
Dr. Edmund Gordon   
   
                      MANUAL DIFF REQ NO         Normal                [facility]  
   
                                        Comment on above:   Performed By: #### P  
OCGLUC ####  
[facility]  
[address] [place] [address]  
Dr. Edmund Gordon   
   
                                                    MCH (RBC) [Entitic   
mass]           32.1 pg         Normal          26.7-34.0       [facility]  
   
                                        Comment on above:   Performed By: #### P  
OCGLUC ####  
[facility]  
[address] [place] [address]  
Dr. Edmund Gordon   
   
                      MCHC (RBC) [Mass/Vol] 35.2 g/dL  Normal     29.9-35.2  [facility]  
   
                                        Comment on above:   Performed By: #### P  
OCGLUC ####  
[facility]  
[address] [place] [address]  
Dr. Edmund Gordon   
   
                      MCV (RBC) [Entitic vol] 91.1 fL    Normal     81.0-99.0  [facility]  
   
                                        Comment on above:   Performed By: #### P  
OCGLUC ####  
[facility]  
[address] [place] [address]  
Dr. Edmund Gordon   
   
                      MONO #     0.5 103/ul Normal     0.3-0.8    [facility]  
   
                                        Comment on above:   Performed By: #### P  
OCGLUC ####  
[facility]  
[address] [place] [address]  
Dr. Edmund Gordon   
   
                      Monocytes/100 WBC (Bld) 5.9 %      Normal     1.7-12.0   [facility]  
   
                                        Comment on above:   Performed By: #### P  
OCGLUC ####  
[facility]  
[address] [place] [address]  
Dr. Edmund Gordon   
   
                      NEUT #     5.0 103/ul Normal     1.4-6.5    [facility]  
   
                                        Comment on above:   Performed By: #### P  
OCGLUC ####  
[facility]  
[address] [place] [address]  
Dr. Edmund Gordon   
   
                                                    Neutrophils/100 WBC   
(Bld)           58.3 %          Normal          43.0-75.0       [facility]  
   
                                        Comment on above:   Performed By: #### P  
OCGLUC ####  
[facility]  
[address] [place] [address]  
Dr. Edmund Gordon   
   
                                                    Platelet mean volume   
(Bld) [Entitic vol] 9.5 fL          Normal          9.5-13.5        [facility]  
   
                                        Comment on above:   Performed By: #### P  
OCGLUC ####  
[facility]  
[address] [place] [address]  
Dr. Edmund Gordon   
   
                      PLT        204 103/ul Normal     150-450    [facility]  
   
                                        Comment on above:   Performed By: #### P  
OCGLUC ####  
[facility]  
[address] [place] [address]  
Dr. Edmund Gordon   
   
                      RBC        4.74 106/ul Normal     4.20-5.40  [facility]  
   
                                        Comment on above:   Performed By: #### P  
OCGLUC ####  
[facility]  
[address] [place] [address]  
Dr. Edmund Gordon   
   
                      WBC        8.6 103/ul Normal     4.0-11.0   [facility]  
   
                                        Comment on above:   Performed By: #### P  
OCGLUC ####  
[facility]  
[address] [place] [address]  
Dr. Edmund Gordon   
   
                                                    CRPon 2022   
   
                      CRP [Mass/Vol] mg/L       Normal     <=1.0      [facility]  
   
                                        Comment on above:   Performed By: #### P  
T, DDIM, PTT ####  
[facility]  
[address] [place] [address]  
Dr. Edmund Gordon   
   
                                                    LIPID PROFILEon 2022   
   
                      CHOL-HDL RATIO NORM SEE BELOW  Normal                [facility]  
   
                                        Comment on above:   Result Comment: 3.3   
- 4.4 LOW RISK 4.4 - 7.1 AVERAGE RISK 7.1   
- 11.0 MODERATE RISK >11.0 HIGH RISK   
   
                                                            Performed By: #### P  
T, DDIM, PTT ####  
[facility]  
[address] [place] [address]  
Dr. Edmund Gordon   
   
                      Cholesterol [Mass/Vol] 226 mg/dL  Critically high <=200     
   [facility]  
   
                                        Comment on above:   Performed By: #### P  
T, DDIM, PTT ####  
[facility]  
[address] [place] [address]  
Dr. Edmund Gordon   
   
                                                    Cholesterol in HDL   
[Mass/Vol]      64 mg/dL        Critically high 40-60           [facility]  
   
                                        Comment on above:   Performed By: #### P  
T, DDIM, PTT ####  
[facility]  
[address] [place] [address]  
Dr. Edmund Gordon   
   
                                                    Cholesterol in LDL   
[Mass/Vol]      118.0 mg/dL     Normal                          [facility]  
   
                                        Comment on above:   Performed By: #### P  
T, DDIM, PTT ####  
[facility]  
[address] [place] [address]  
Dr. Edmund Gordon   
   
                                                    Cholesterol.total/Maricruz  
sterol in HDL [Mass   
ratio]          3.5 {ratio}     Normal                          [facility]  
   
                                        Comment on above:   Performed By: #### P  
T, DDIM, PTT ####  
[facility]  
[address] [address]  
Dr. Edmund Gordon   
   
                                        HDL NORMAL          > or = 60 mg/dl - LO  
W   
CARDIOVASCULAR RISK <40   
mg/dl - HIGH   
CARDIOVASCULAR RISK Normal                                  [facility]  
   
                                        Comment on above:   Performed By: #### P  
T, DDIM, PTT ####  
[facility]  
[address] [address]  
Dr. Edmund Gordon   
   
                      LDL CALC NORMAL SEE BELOW  Normal                [facility]  
   
                                        Comment on above:   Result Comment: <100  
 mg/dl OPTIMAL 100 - 129 mg/dl NEAR OR   
ABOVE OPTIMAL 130 - 159 mg/dl BORDERLINE HIGH 160 - 189 mg/dl   
HIGH >190 mg/dl VERY HIGH   
   
                                                            Performed By: #### P  
T, DDIM, PTT ####  
[facility]  
[address] [place] [address]  
Dr. Edmund Gordon   
   
                      Triglyceride [Mass/Vol] 220 mg/dL  Critically high <=150    
    [facility]  
   
                                        Comment on above:   Performed By: #### P  
T, DDIM, PTT ####  
[facility]  
[address] [address]  
Dr. Edmund Gordon   
   
                      VLDL CALC  44.0 mg/dL Normal                [facility]  
   
                                        Comment on above:   Performed By: #### P  
T, DDIM, PTT ####  
[facility]  
[address] [place] [address]  
Dr. Edmund Gordon   
   
                                                    PROF 14(COMP METB)on   
022   
   
                      Albumin [Mass/Vol] 3.7 g/dL   Normal     3.4-5.0    [facility]  
   
                                        Comment on above:   Performed By: #### P  
T, DDIM, PTT ####  
[facility]  
[address] [place] [address]  
Dr. Edmund Gordon   
   
                                                    Albumin/Globulin [Mass   
ratio]          1.1 {ratio}     Normal                          [facility]  
   
                                        Comment on above:   Performed By: #### P  
T, DDIM, PTT ####  
[facility]  
[address] [address]  
Dr. Edmund Gordon   
   
                                                    ALP [Catalytic   
activity/Vol]   89 U/L          Normal                    [facility]  
   
                                        Comment on above:   Performed By: #### P  
T, DDIM, PTT ####  
[facility]  
[address] [place] [address]  
Dr. Edmund Gordon   
   
                                                    ALT [Catalytic   
activity/Vol]   33 U/L          Normal          14-59           [facility]  
   
                                        Comment on above:   Performed By: #### P  
T, DDIM, PTT ####  
[facility]  
[address] [place] [address]  
Dr. Edmund Gordon   
   
                      Anion gap [Moles/Vol] 7.3 mmol/L Normal                [facility]  
   
                                        Comment on above:   Performed By: #### P  
T, DDIM, PTT ####  
[facility]  
[address] [place] [address]  
Dr. Edmund Gordon   
   
                                                    AST [Catalytic   
activity/Vol]   13 U/L          Critically low  15-37           [facility]  
   
                                        Comment on above:   Performed By: #### P  
T, DDIM, PTT ####  
[facility]  
[address] [place] [address]  
Dr. Edmund Gordon   
   
                      Bilirubin [Mass/Vol] 0.4 mg/dL  Normal     0.2-1.0    [facility]  
   
                                        Comment on above:   Performed By: #### P  
T, DDIM, PTT ####  
[facility]  
[address] [place] [address]  
Dr. Edmund Gordon   
   
                      Calcium [Mass/Vol] 9.0 mg/dL  Normal     8.5-10.1   [facility]  
   
                                        Comment on above:   Performed By: #### P  
T, DDIM, PTT ####  
[facility]  
[address] [place] [address]  
Dr. Edmund Gordon   
   
                      Chloride [Moles/Vol] 103 mmol/L Normal          [facility]   
[facility]  
   
                                        Comment on above:   Performed By: #### P  
T, DDIM, PTT ####  
[facility]  
[address] [place] [address]  
Dr. Edmund Gordon   
   
                      CO2 [Moles/Vol] 31.1 mmol/L Normal     21.0-32.0  [facility]  
   
                                        Comment on above:   Performed By: #### P  
T, DDIM, PTT ####  
[facility]  
[address] [address]  
Dr. Edmund Gordon   
   
                      Creatinine [Mass/Vol] 0.60 mg/dL Normal     0.55-1.02  [facility]  
   
                                        Comment on above:   Performed By: #### P  
T, DDIM, PTT ####  
[facility]  
[address] [address]  
Dr. Edmund Gordon   
   
                      EGFR-AF AMERICAN >60        Normal     >=60       [facility]  
   
                                        Comment on above:   Performed By: #### P  
T, DDIM, PTT ####  
[facility]  
[address] [address]  
Dr. Edmund Gordon   
   
                      EGFR-NON AF AMERICAN >60        Normal     >=60       [facility]  
   
                                        Comment on above:   Performed By: #### P  
T, DDIM, PTT ####  
[facility]  
[address] [place] [address]  
Dr. Edmund Gordon   
   
                      Globulin (S) [Mass/Vol] 3.5 g/dL   Normal                [facility]  
   
                                        Comment on above:   Performed By: #### P  
T, DDIM, PTT ####  
[facility]  
[address] [address]  
Dr. Edmund Gordon   
   
                      Glucose [Mass/Vol] 116 mg/dL  Critically high      [facility]  
   
                                        Comment on above:   Performed By: #### P  
T, DDIM, PTT ####  
[facility]  
[address] [address]  
Dr. Edmund Gordon   
   
                      Potassium [Moles/Vol] 4.4 mmol/L Normal     3.5-5.1    [facility]  
   
                                        Comment on above:   Performed By: #### P  
T, DDIM, PTT ####  
[facility]  
[address] [address]  
Dr. Edmund Gordon   
   
                      Protein [Mass/Vol] 7.2 g/dL   Normal     6.4-8.2    [facility]  
   
                                        Comment on above:   Performed By: #### P  
T, DDIM, PTT ####  
[facility]  
[address] [address]  
Dr. Edmund Gordon   
   
                      Sodium [Moles/Vol] 137 mmol/L Normal     136-145    [facility]  
   
                                        Comment on above:   Performed By: #### P  
T, DDIM, PTT ####  
[facility]  
[address] [place] [address]  
Dr. Edmund Gordon   
   
                                                    Urea nitrogen   
[Mass/Vol]      13.0 mg/dL      Normal          7.0-18.0        [facility]  
   
                                        Comment on above:   Performed By: #### P  
T, DDIM, PTT ####  
[facility]  
[address] [place] [address]  
Dr. Edmund Gordon   
   
                                                    Urea   
nitrogen/Creatinine   
[Mass ratio]    21.7 mg/mg      Normal                          [facility]  
   
                                        Comment on above:   Performed By: #### P  
T, DDIM, PTT ####  
[facility]  
[address] [place] [address]  
Dr. Edmund Gordon   
   
                                                    TSHon 2022   
   
                      TSH        1.024 uIU/mL Normal     0.358-3.740 [facility]  
   
                                        Comment on above:   Performed By: #### P  
T, DDIM, PTT ####  
[facility]  
[address] [place] [address]  
Dr. Edmund Gordon   
   
                                                    BNPon 10-   
   
                                                    Natriuretic peptide B   
(Bld) [Mass/Vol] 100.0 pg/mL     Normal          <=900.0         [facility]  
   
                                        Comment on above:   Performed By: #### C  
BC ####  
[facility]  
[address] [place] [address]  
Dr. Edmund Gordon   
   
                                                    CBC AUTO DIFFon 10-   
   
                      BASO #     0.1 103/ul Normal     0.0-0.1    [facility]  
   
                                        Comment on above:   Performed By: #### P  
OCGLUC ####  
[facility]  
[address] [place] [address]  
Dr. Edmund Gordon   
   
                      Basophils/100 WBC (Bld) 0.8 %      Normal     0.2-2.0    [facility]  
   
                                        Comment on above:   Performed By: #### P  
OCGLUC ####  
[facility]  
[address] [place] [address]  
Dr. Edmund Gordon   
   
                      EO #       0.2 103/ul Normal     0.0-0.7    [facility]  
   
                                        Comment on above:   Performed By: #### P  
OCGLUC ####  
[facility]  
[address] [place] [address]  
Dr. Edmund Gordon   
   
                                                    Eosinophils/100 WBC   
(Bld)           1.6 %           Normal          0.9-7.0         [facility]  
   
                                        Comment on above:   Performed By: #### P  
OCGLUC ####  
[facility]  
[address] [place] [address]  
Dr. Edmund Gordon   
   
                                                    Erythrocyte   
distribution width   
(RBC) [Ratio]   12.0 %          Normal          11.0-15.0       [facility]  
   
                                        Comment on above:   Performed By: #### P  
OCGLUC ####  
[facility]  
[address] [place] [address]  
Dr. Edmund Gordon   
   
                                                    Hematocrit (Bld)   
[Volume fraction] 42.2 %          Normal          36.0-48.0       [facility]  
   
                                        Comment on above:   Performed By: #### P  
OCGLUC ####  
[facility]  
[address] [place] [address]  
Dr. Edmund Gordon   
   
                                                    Hemoglobin (Bld)   
[Mass/Vol]      14.7 g/dL       Normal          12.0-16.0       [facility]  
   
                                        Comment on above:   Performed By: #### P  
OCGLUC ####  
[facility]  
[address] [place] [address]  
Dr. Edmund Gordon   
   
                      IG #       0.03 10e3/ul Normal     0.00-0.03  [facility]  
   
                                        Comment on above:   Performed By: #### P  
OCGLUC ####  
[facility]  
[address] [place] [address]  
Dr. Edmund Gordon   
   
                      IG %       0.3 %      Normal     0.0-0.5    [facility]  
   
                                        Comment on above:   Performed By: #### P  
OCGLUC ####  
[facility]  
[address] [place] [address]  
Dr. Edmund Gordon   
   
                      LYMPH #    4.4 103/ul Critically high 1.2-3.8    [facility]  
   
                                        Comment on above:   Performed By: #### P  
OCGLUC ####  
[facility]  
[address] [place] [address]  
Dr. Edmund Gordon   
   
                                                    Lymphocytes/100 WBC   
(Bld)           44.5 %          Normal          20.5-60.0       [facility]  
   
                                        Comment on above:   Performed By: #### P  
OCGLUC ####  
[facility]  
[address] [address]  
Dr. Edmund Gordon   
   
                      MANUAL DIFF REQ NO         Normal                [facility]  
   
                                        Comment on above:   Performed By: #### P  
OCGLUC ####  
[facility]  
[address] [address]  
Dr. Edmund Gordon   
   
                                                    MCH (RBC) [Entitic   
mass]           31.5 pg         Normal          26.7-34.0       [facility]  
   
                                        Comment on above:   Performed By: #### P  
OCGLUC ####  
[facility]  
[address] [place] [address]  
Dr. Edmund Gordon   
   
                      MCHC (RBC) [Mass/Vol] 34.8 g/dL  Normal     29.9-35.2  [facility]  
   
                                        Comment on above:   Performed By: #### P  
OCGLUC ####  
[facility]  
[address] [place] [address]  
Dr. Edmund Gordon   
   
                      MCV (RBC) [Entitic vol] 90.6 fL    Normal     81.0-99.0  [facility]  
   
                                        Comment on above:   Performed By: #### P  
OCGLUC ####  
[facility]  
[address] [place] [address]  
Dr. Edmund Gordon   
   
                      MONO #     0.6 103/ul Normal     0.3-0.8    [facility]  
   
                                        Comment on above:   Performed By: #### P  
OCGLUC ####  
[facility]  
[address] [place] [address]  
Dr. Edmund Gordon   
   
                      Monocytes/100 WBC (Bld) 6.0 %      Normal     1.7-12.0   [facility]  
   
                                        Comment on above:   Performed By: #### P  
OCGLUC ####  
[facility]  
[address] [place] [address]  
Dr. Edmund Gordon   
   
                      NEUT #     4.6 103/ul Normal     1.4-6.5    [facility]  
   
                                        Comment on above:   Performed By: #### P  
OCGLUC ####  
[facility]  
[address] [place] [address]  
Dr. Edmund Gordon   
   
                                                    Neutrophils/100 WBC   
(Bld)           46.8 %          Normal          43.0-75.0       [facility]  
   
                                        Comment on above:   Performed By: #### P  
OCGLUC ####  
[facility]  
[address] [place] [address]  
Dr. Edmund Gordon   
   
                                                    Platelet mean volume   
(Bld) [Entitic vol] 9.4 fL          Critically low  9.5-13.5        [facility]  
   
                                        Comment on above:   Performed By: #### P  
OCGLUC ####  
[facility]  
[address] [place] [address]  
Dr. Edmund Gordon   
   
                      PLT        215 103/ul Normal     150-450    [facility]  
   
                                        Comment on above:   Performed By: #### P  
OCGLUC ####  
[facility]  
[address] [address]  
Dr. Edmund Gordon   
   
                      RBC        4.66 106/ul Normal     4.20-5.40  [facility]  
   
                                        Comment on above:   Performed By: #### P  
OCGLUC ####  
[facility]  
[address] [place] [address]  
Dr. Edmund Gordon   
   
                      WBC        9.8 103/ul Normal     4.0-11.0   [facility]  
   
                                        Comment on above:   Performed By: #### P  
OCGLUC ####  
[facility]  
[address] [place] [address]  
Dr. Edmund Gordon   
   
                                                    [facility]ov[facility]19 [facility]1  
1-2022   
   
                                                    SARS-CoV-2 (COVID-19)   
RNA CHRISTOS+probe Ql (Unsp   
spec)               Not detected        Normal              NOT   
DETECTED                                [facility]  
   
                                        Comment on above:   Result Comment: When  
 diagnostic testing is negative, the   
possibility of a false negative should be considered in  
the context of a patient's recent exposures and the presence   
of clinical signs and symptoms  
consistent with SARS-CoV-2.  
This test is not yet approved or cleared by the [place]   
FDA. When there are no FDA-approved or cleared tests   
available, and other criteria are met, FDA can make tests   
available under an emergency access mechanism called an   
Emergency Use Authorization (EUA). The EUA for this test is   
supported by the  of Health and Human Service's   
declaration that circumstances exist to justify the emergency   
use of in vitro diagnostics for the detection and/or diagnosis   
of the virus that causes COVID-19. This EUA will remain in   
effect for the duration of the COVID-19 declaration justifying   
emergency of IVDs, unless it is terminated or revoked by the   
FDA (after which the test may no longer be used).   
   
                                                            Performed By: #### P  
T, DDIM, PTT ####  
[facility]  
[address] [place] [address]  
Dr. Edmund Gordon   
   
                                                    D-DIMERon 10-   
   
                      D-DIMER    0.46 mg/L FEU Normal     <=0.59     [facility]  
   
                                        Comment on above:   Performed By: #### P  
T, DDIM, PTT ####  
[facility]  
[address] [place] [address]  
Dr. Edmund Gordon   
   
                      D-DIMER COMMENTS SEE BELOW  Normal                [facility]  
   
                                        Comment on above:   Result Comment: Incr  
eases in D-Dimer concentration observed   
with thromboembolic events can be variable due to   
localization, size, and age of the thrombus. Therefore, a   
thromboembolic event cannot be diagnosed with certainty on the   
basis of the reference range. D-Dimers may also be elevated   
for a variety of disorders including: advanced age, pregnancy,   
coronary disease, cancer, liver disease, infection,   
inflammation, hematoma, DIC, trauma, post-surgery, diabetes,   
thrombolytic or anticoagulant therapy, stress, and generalized   
hospitalization.   
   
                                                            Performed By: #### P  
T, DDIM, PTT ####  
[facility]  
[address] [place] [address]  
Dr. Edmund Gordon   
   
                                                    PROF 14(COMP METB)on 10-11-2  
022   
   
                      Albumin [Mass/Vol] 3.7 g/dL   Normal     3.4-5.0    [facility]  
   
                                        Comment on above:   Performed By: #### C  
BC ####  
[facility]  
[address] [place] [address]  
Dr. Edmund Gordon   
   
                                                    Albumin/Globulin [Mass   
ratio]          1.2 {ratio}     Normal                          [facility]  
   
                                        Comment on above:   Performed By: #### C  
BC ####  
[facility]  
[address] [place] [address]  
Dr. Edmund Gordon   
   
                                                    ALP [Catalytic   
activity/Vol]   84 U/L          Normal                    [facility]  
   
                                        Comment on above:   Performed By: #### C  
BC ####  
[facility]  
[address] [place] [address]  
Dr. Edmund Gordon   
   
                                                    ALT [Catalytic   
activity/Vol]   28 U/L          Normal          14-59           [facility]  
   
                                        Comment on above:   Performed By: #### C  
BC ####  
[facility]  
[address] [address]  
Dr. Edmund Gordon   
   
                      Anion gap [Moles/Vol] 10.7 mmol/L Normal                [facility]  
   
                                        Comment on above:   Performed By: #### C  
BC ####  
[facility]  
[address] [address]  
Dr. Edmund Gordon   
   
                                                    AST [Catalytic   
activity/Vol]   13 U/L          Critically low  15-37           [facility]  
   
                                        Comment on above:   Performed By: #### C  
BC ####  
[facility]  
[address] [address]  
Dr. Edmund Gordon   
   
                      Bilirubin [Mass/Vol] 0.3 mg/dL  Normal     0.2-1.0    [facility]  
   
                                        Comment on above:   Performed By: #### C  
BC ####  
[facility]  
[address] [place] [address]  
Dr. Edmund Gordon   
   
                      Calcium [Mass/Vol] 8.9 mg/dL  Normal     8.5-10.1   [facility]  
   
                                        Comment on above:   Performed By: #### C  
BC ####  
[facility]  
[address] [place] [address]  
Dr. Edmund Gordon   
   
                      Chloride [Moles/Vol] 104 mmol/L Normal          [facility]  
   
                                        Comment on above:   Performed By: #### C  
BC ####  
[facility]  
[address] [place] [address]  
Dr. Edmund Gordon   
   
                      CO2 [Moles/Vol] 30.0 mmol/L Normal     21.0-32.0  [facility]  
   
                                        Comment on above:   Performed By: #### C  
BC ####  
[facility]  
[address] [place] [address]  
Dr. Edmund Gordon   
   
                      Creatinine [Mass/Vol] 0.59 mg/dL Normal     0.55-1.02  [facility]  
   
                                        Comment on above:   Performed By: #### C  
BC ####  
[facility]  
[address] [place] [address]  
Dr. Edmund Gordon   
   
                      EGFR-AF AMERICAN >60        Normal     >=60       [facility]  
   
                                        Comment on above:   Performed By: #### C  
BC ####  
[facility]  
[address] [place] [address]  
Dr. Edmund Gordon   
   
                      EGFR-NON AF AMERICAN >60        Normal     >=60       [facility]  
   
                                        Comment on above:   Performed By: #### C  
BC ####  
[facility]  
[address] [address]  
Dr. Edmund Gordon   
   
                      Globulin (S) [Mass/Vol] 3.2 g/dL   Normal                [facility]  
   
                                        Comment on above:   Performed By: #### C  
BC ####  
[facility]  
[address] [address]  
Dr. Edmund Gordon   
   
                      Glucose [Mass/Vol] 114 mg/dL  Critically high      [facility]  
   
                                        Comment on above:   Performed By: #### C  
BC ####  
[facility]  
[address] [place] [address]  
Dr. Edmund Gordon   
   
                      Potassium [Moles/Vol] 3.7 mmol/L Normal     3.5-5.1    [facility]  
   
                                        Comment on above:   Performed By: #### C  
BC ####  
[facility]  
[address] [place] [address]  
Dr. Edmund Gordon   
   
                      Protein [Mass/Vol] 6.9 g/dL   Normal     6.4-8.2    [facility]  
   
                                        Comment on above:   Performed By: #### C  
BC ####  
[facility]  
[address] [place] [address]  
Dr. Edmund Gordon   
   
                      Sodium [Moles/Vol] 141 mmol/L Normal     136-145    [facility]  
   
                                        Comment on above:   Performed By: #### C  
BC ####  
[facility]  
[address] [place] [address]  
Dr. Edmund Gordon   
   
                                                    Urea nitrogen   
[Mass/Vol]      11.0 mg/dL      Normal          7.0-18.0        [facility]  
   
                                        Comment on above:   Performed By: #### C  
BC ####  
[facility]  
[address] [place] [address]  
Dr. Edmund Gordon   
   
                                                    Urea   
nitrogen/Creatinine   
[Mass ratio]    18.6 mg/mg      Normal                          [facility]  
   
                                        Comment on above:   Performed By: #### C  
BC ####  
[facility]  
[address] [place] [address]  
Dr. Edmund Gordon   
   
                                                    PROTIMEon 10-   
   
                                                    INR Coag (PPP)   
[Relative time] {INR}           Normal                          [facility]  
   
                                        Comment on above:   Performed By: #### P  
T, DDIM, PTT ####  
[facility]  
[address] [place] [address]  
Dr. Edmund Gordon   
   
                      INR GUIDELINES SEE BELOW  Normal                [facility]  
   
                                        Comment on above:   Result Comment: KELECHI  
RED INR: 2.0 - 3.0 CONDITIONS NOT LISTED   
BELOW 2.5 - 3.5 FOR PROSTHETIC HEART VALVE REPLACEMENT 2.5 -   
3.5 RECURRENT THROMBOSIS   
   
                                                            Performed By: #### P  
T, DDIM, PTT ####  
[facility]  
[address] [place] [address]  
Dr. Edmund Gordon   
   
                      PT Coag (PPP) [Time] 10.0 s     Normal     9.0-11.6   [facility]  
   
                                        Comment on above:   Performed By: #### P  
T, DDIM, PTT ####  
[facility]  
[address] [place] [address]  
Dr. Edmund Gordon   
   
                                                    PTTon 10-   
   
                      aPTT Coag (Bld) [Time] 25.0 s     Normal     22.3-36.2  [facility]  
[facility]  
   
                                        Comment on above:   Performed By: #### P  
T, DDIM, PTT ####  
[facility]  
[address] [place] [address]  
Dr. Edmund Gordon   
   
                                                    TROPONIN, HIGH SENSITIVITYon  
 10-   
   
                      HSTROP     <4.0       Normal     4.0-51.3   [facility]  
   
                                        Comment on above:   Result Comment: CUT-  
OFF POINTS HAVE BEEN ESTABLISHED BASED ON   
THE FOURTH UNIVERSAL DEFINITIONS OF MYOCARDIAL  
INFARCTION. THE UPPER REFERENCE LIMIT (URL) OF TROPONIN,   
DEFINED AS THE 99TH PERCENTILE OF  
cTnI DISTRIBUTION IN A REFERENCE POPULATION, HAS BEEN   
CONFIRMED AS THE DECISION THRESHOLD  
FOR MI DIAGNOSIS.   
   
                                                            Performed By: #### P  
OCGLUC ####  
[facility]  
[address] [place] [address]  
Dr. Edmund Gordon   
   
                      HSTROP     <4.0       Normal     4.0-51.3   [facility]  
   
                                        Comment on above:   Result Comment: CUT-  
OFF POINTS HAVE BEEN ESTABLISHED BASED ON   
THE FOURTH UNIVERSAL DEFINITIONS OF MYOCARDIAL  
INFARCTION. THE UPPER REFERENCE LIMIT (URL) OF TROPONIN,   
DEFINED AS THE 99TH PERCENTILE OF  
cTnI DISTRIBUTION IN A REFERENCE POPULATION, HAS BEEN   
CONFIRMED AS THE DECISION THRESHOLD  
FOR MI DIAGNOSIS.   
   
                                                            Performed By: #### C  
BC ####  
[facility]  
[address] [place], [place] [address]  
Dr. Edmund Gordon   
   
                                                     KATJA DOP LEG LTon 10-11-20  
22   
   
                                         KATJA DOP LEG LT   INDICATION: Pain in   
left leg [leg swelling   
x1 day, chest pain +   
shortness of  
breath  
EXAMINATION: Ultrasound   
was performed of the   
left lower extremity   
veins.  
TECHNIQUE: Gray scale,   
pulse wave, and color   
flow Doppler imaging   
was performed  
of the lower extremity   
venous system.  
COMPARISON: None.  
_______________________  
_____________________  
FINDINGS:  
There is normal   
compression,   
augmentation, and   
signal throughout the   
visualized  
deep lower extremity   
veins.  
Visualized lower   
extremity calf veins   
are patent.  
No mass or fluid   
collection.  
The visualized portion   
of the right iliac vein   
is patent.  
IMPRESSION:  
1. Negative left lower   
extremity ultrasound   
examination for DVT.  
Electronically   
authenticated by:   
CHARLENE CAMARA Date:   
2022-10-11 18:44    Normal                                  [facility]  
   
                                                    XR CHEST 1 Von 10-   
   
                                        XR CHEST 1 V        EXAMINATION: XR CHES  
T 1   
V, , 10/11/2022 5:34 PM   
EDT  
INDICATION:  
CHEST PAIN, UNSPECIFIED  
HISTORY:  
Ordering Provider   
Reason for Exam:  
Technologist Note:  
Additional:  
COMPARISON: Chest x-ray   
dated 2022.  
TECHNIQUE: Chest x-ray:   
One view.  
FINDINGS:  
No pneumothorax,   
pleural effusion or   
focal airspace   
consolidation. Heart is  
normal in size. Fusion   
device is seen   
projecting over the   
lower cervical spine.  
IMPRESSION:  
No acute   
cardiopulmonary   
process.  
Electronically   
authenticated by: SHILOH BOLTON Date: 2022-10-11   
18:11               Normal                                  [facility]  
   
                                                    XR Abdomen Single View (KUB)  
*on 2022   
   
                                                    XR Abdomen Single View   
(KUB)*                                  CLINICAL HISTORY:   
Right-sided abdominal   
pain.  
  
COMPARISON: None.  
  
RESULT:  
  
Nonspecific nondilated   
bowel gas pattern.   
Scattered feces   
throughout the colon.  
No abnormal   
calcifications.   
Surgical clips right   
upper quadrant. No   
acute osseous  
findings. Degenerative   
changes.  
  
IMPRESSION:  
  
No acute radiographic   
findings.  
  
  
Report reported and   
signed by Jamey Zelaya on 2022   
1529                Normal                                  [facility]   
[facility]  
   
                                                    XR Chest 2 Views*on 20   
   
                                        XR Chest 2 Views*   Chest 2 views.  
  
History:  
  
Persistent cough.   
Follow-up Covid.   
Shortness of breath.  
  
Comparison: None.  
  
Findings: Osseous   
structures intact.   
Cardiopericardial   
silhouette normal.   
Pulmonary  
vasculature normal.   
Lungs clear.  
  
Impression:  
  
No acute   
cardiopulmonary   
disease.  
Report reported and   
signed by Matthew Polk on 09/15/2022   
1227                Normal                                  [facility]   
Specialist  
   
                                                    CBC W MANUAL DIFFon 20   
   
                      ATYPICAL LYMPH #            Normal                [facility]  
   
                                        Comment on above:   Performed By: #### C  
BC ####  
[facility]  
[address] [place] [address]  
Dr. Edmund Gordon   
   
                      ATYPICAL LYMPH %            Normal                [facility]  
   
                                        Comment on above:   Performed By: #### C  
BC ####  
[facility]  
[address] [place] [address]  
Dr. Edmund Gordon   
   
                      BAND #     1.1 103/ul Critically high 0.0-0.3    [facility]  
   
                                        Comment on above:   Performed By: #### C  
BC ####  
[facility]  
[address] [place] [address]  
Dr. Edmund Gordon   
   
                      BAND %     4 %        Normal     0-5        [facility]  
   
                                        Comment on above:   Performed By: #### C  
BC ####  
[facility]  
[address] [place] [address]  
Dr. Edmund Gordon   
   
                      BASOM #    0.00 103/ul Normal     0.00-0.10  [facility]  
   
                                        Comment on above:   Performed By: #### C  
BC ####  
[facility]  
[address] [place] [address]  
Dr. Edmund Gordon   
   
                      BASOM %    0.0 %      Critically low 0.2-2.0    [facility]  
   
                                        Comment on above:   Performed By: #### C  
BC ####  
[facility]  
[address] [place] [address]  
Dr. Edmund Gordon   
   
                      BLAST #               Normal                [facility]  
   
                                        Comment on above:   Performed By: #### C  
BC ####  
[facility]  
[address] [place] [address]  
Dr. Edmund Gordon   
   
                      BLAST %               Normal                [facility]  
   
                                        Comment on above:   Performed By: #### C  
BC ####  
[facility]  
[address] [place] [address]  
Dr. Edmund Gordon   
   
                      CORRECTED WBC            Normal     4.0-11.0   [facility]  
   
                                        Comment on above:   Performed By: #### C  
BC ####  
[facility]  
[address] [place] [address]  
Dr. Edmund Gordon   
   
                      EOS #      0.00 103/ul Normal     0.00-0.70  [facility]  
   
                                        Comment on above:   Performed By: #### C  
BC ####  
[facility]  
[address] [place] [address]  
Dr. Edmund Gordon   
   
                      EOS%       0.0 %      Critically low 0.9-7.0    [facility]  
   
                                        Comment on above:   Performed By: #### C  
BC ####  
[facility]  
[address] [place] [address]  
Dr. Edmund Gordon   
   
                      HCT        40.2 %     Normal     36.0-48.0  [facility]  
   
                                        Comment on above:   Performed By: #### C  
BC ####  
[facility]  
[address] [place] [address]  
Dr. Edmund Gordon   
   
                      HGB        13.1 g/dl  Normal     12.0-16.0  [facility]  
   
                                        Comment on above:   Performed By: #### C  
BC ####  
[facility]  
[address] [place] [address]  
Dr. Edmund Gordon   
   
                      LYMPHM #   0.27 103/ul Critically low 1.20-3.80  [facility]  
   
                                        Comment on above:   Performed By: #### C  
BC ####  
[facility]  
[address] [place] [address]  
Dr. Edmund Gordon   
   
                      LYMPHM%    1.0 %      Critically low 20.5-60.0  [facility]  
   
                                        Comment on above:   Performed By: #### C  
BC ####  
[facility]  
[address] [place] [address]  
Dr. Edmund Gordon   
   
                      MCH        31.3 pg    Normal     26.7-34.0  [facility]  
   
                                        Comment on above:   Performed By: #### C  
BC ####  
[facility]  
[address] [place] [address]  
Dr. Edmund Gordon   
   
                      MCHC       32.6 g/dl  Normal     29.9-35.2  [facility]  
   
                                        Comment on above:   Performed By: #### C  
BC ####  
[facility]  
[address] [place] [address]  
Dr. Edmund Gordon   
   
                      MCV        95.9 fL    Normal     81.0-99.0  [facility]  
   
                                        Comment on above:   Performed By: #### C  
BC ####  
[facility]  
[address] [place] [address]  
Dr. Edmund Gordon   
   
                      METAMYELOCYTE # 0.3 103/ul Normal                [facility]  
   
                                        Comment on above:   Performed By: #### C  
BC ####  
[facility]  
[address] [place] [address]  
Dr. Edmund Gordon   
   
                      METAMYELOCYTE % 1 %        Normal                [facility]  
   
                                        Comment on above:   Performed By: #### C  
BC ####  
[facility]  
[address] [place] [address]  
Dr. Edmund Gordon   
   
                      MONOM#     1.09 103/ul Critically high 0.30-0.80  [facility]  
   
                                        Comment on above:   Performed By: #### C  
BC ####  
[facility]  
[address] [place] [address]  
Dr. Edmund Gordon   
   
                      MONOM%     4.0 %      Normal     1.7-12.0   [facility]  
   
                                        Comment on above:   Performed By: #### C  
BC ####  
[facility]  
[address] [place] [address]  
Dr. Edmund Gordon   
   
                      MPV        10.0 fL    Normal     9.5-13.5   [facility]  
   
                                        Comment on above:   Performed By: #### C  
BC ####  
[facility]  
[address] [place] [address]  
Dr. Edmund Gordon   
   
                      MYELOCYTE #            Normal                [facility]  
   
                                        Comment on above:   Performed By: #### C  
BC ####  
[facility]  
[address] [place] [address]  
Dr. Edmund Gordon   
   
                      MYELOCYTE %            Normal                [facility]  
   
                                        Comment on above:   Performed By: #### C  
BC ####  
[facility]  
[address] [place] [address]  
Dr. Edmund Gordon   
   
                      NRBC                  Normal                [facility]  
   
                                        Comment on above:   Performed By: #### C  
BC ####  
[facility]  
[address] [address]  
Dr. Edmund Gordon   
   
                      PLT        191 103/ul Normal     150-450    [facility]  
   
                                        Comment on above:   Performed By: #### C  
BC ####  
[facility]  
[address] [address]  
Dr. Edmund Gordon   
   
                      RBC        4.19 106/ul Critically low 4.20-5.40  [facility]  
   
                                        Comment on above:   Performed By: #### C  
BC ####  
[facility]  
[address] [address]  
Dr. Edmund Gordon   
   
                      RDW        13.0 %     Normal     11.0-15.0  [facility]  
   
                                        Comment on above:   Performed By: #### C  
BC ####  
[facility]  
[address] [place] [address]  
Dr. Edmund Gordon   
   
                      SEG #      24.48 103/ul Critically high 1.40-6.50  [facility]  
   
                                        Comment on above:   Performed By: #### C  
BC ####  
[facility]  
[address] [place] [address]  
Dr. Edmund Gordon   
   
                      SEG %      90.0 %     Critically high 43.0-75.0  [facility]  
   
                                        Comment on above:   Performed By: #### C  
BC ####  
[facility]  
[address] [place] [address]  
Dr. Edmund Gordon   
   
                      WBC        27.2 103/ul Critically high 4.0-11.0   [facility]  
   
                                        Comment on above:   Performed By: #### C  
BC ####  
[facility]  
[address] [place] [address]  
Dr. Edmund Gordon   
   
                                                    POINT OF CARE GLUCOSEon 05-3  
   
   
                      Glucose [Mass/Vol] 355 mg/dL  Critically high      [facility]  
   
                                        Comment on above:   Performed By: #### P  
T, DDIM, PTT ####  
[facility]  
[address] [place] [address]  
Dr. Edmund Gordon   
   
                      Glucose [Mass/Vol] 425 mg/dL  Critically high      [facility]  
   
                                        Comment on above:   Performed By: #### P  
OCGLUC ####  
[facility]  
[address] [place] [address]  
Dr. Edmund Gordon   
   
                      Glucose [Mass/Vol] 360 mg/dL  Critically high      T  
[facility]  
   
                                        Comment on above:   Performed By: #### C  
MP ####  
[facility]  
[address] [place] [address]  
Dr. Edmund Gordon   
   
                                                    PROF 14(COMP METB)on   
022   
   
                      Albumin [Mass/Vol] 3.0 g/dL   Critically low 3.4-5.0    [facility]  
   
                                        Comment on above:   Performed By: #### C  
MP ####  
[facility]  
[address] [place] [address]  
Dr. Edmund Gordon   
   
                                                    Albumin/Globulin [Mass   
ratio]          1.0 {ratio}     Normal                          [facility]  
   
                                        Comment on above:   Performed By: #### C  
MP ####  
[facility]  
[address] [place] [address]  
Dr. Edmund Gordon   
   
                                                    ALP [Catalytic   
activity/Vol]   88 U/L          Normal                    [facility]  
   
                                        Comment on above:   Performed By: #### C  
MP ####  
[facility]  
[address] [place] [address]  
Dr. Edmund Gordon   
   
                                                    ALT [Catalytic   
activity/Vol]   32 U/L          Normal          14-59           [facility]  
   
                                        Comment on above:   Performed By: #### C  
MP ####  
[facility]  
[address] [place] [address]  
Dr. Edmund Gordon   
   
                      Anion gap [Moles/Vol] 11.4 mmol/L Normal                [facility]  
   
                                        Comment on above:   Performed By: #### C  
MP ####  
[facility]  
[address] [place] [address]  
Dr. Edmund Gordon   
   
                                                    AST [Catalytic   
activity/Vol]   9 U/L           Critically low  15-37           [facility]  
   
                                        Comment on above:   Performed By: #### C  
MP ####  
[facility]  
[address] [place] [address]  
Dr. Edmund Gordon   
   
                      Bilirubin [Mass/Vol] 0.3 mg/dL  Normal     0.2-1.0    [facility]  
   
                                        Comment on above:   Performed By: #### C  
MP ####  
[facility]  
[address] [place] [address]  
Dr. Edmund Gordon   
   
                      Calcium [Mass/Vol] 8.7 mg/dL  Normal     8.5-10.1   [facility]  
   
                                        Comment on above:   Performed By: #### C  
MP ####  
[facility]  
[address] [place] [address]  
Dr. Edmund Gordon   
   
                      Chloride [Moles/Vol] 102 mmol/L Normal          [facility]  
   
                                        Comment on above:   Performed By: #### C  
MP ####  
[facility]  
[address] [place] [address]  
Dr. Edmund Gordon   
   
                      CO2 [Moles/Vol] 25.7 mmol/L Normal     21.0-32.0  [facility]  
   
                                        Comment on above:   Performed By: #### C  
MP ####  
[facility]  
[address] [place] [address]  
Dr. Edmund Gordon   
   
                      Creatinine [Mass/Vol] 0.82 mg/dL Normal     0.55-1.02  [facility]  
   
                                        Comment on above:   Performed By: #### C  
MP ####  
[facility]  
[address] [place] [address]  
Dr. Edmund Gordon   
   
                      EGFR-AF AMERICAN >60        Normal     >=60       [facility]  
   
                                        Comment on above:   Performed By: #### C  
MP ####  
[facility]  
[address] [place] [address]  
Dr. Edmund Gordon   
   
                      EGFR-NON AF AMERICAN >60        Normal     >=60       [facility]  
   
                                        Comment on above:   Performed By: #### C  
MP ####  
[facility]  
[address] [place] [address]  
Dr. Edmund Gordon   
   
                      Globulin (S) [Mass/Vol] 3.0 g/dL   Normal                [facility]  
   
                                        Comment on above:   Performed By: #### C  
MP ####  
[facility]  
[address] [place] [address]  
Dr. Edmund Gordon   
   
                      Glucose [Mass/Vol] 327 mg/dL  Critically high      [facility]  
   
                                        Comment on above:   Performed By: #### C  
MP ####  
[facility]  
[address] [place] [address]  
Dr. Edmund Gordon   
   
                      Potassium [Moles/Vol] 4.1 mmol/L Normal     3.5-5.1    [facility]  
   
                                        Comment on above:   Performed By: #### C  
MP ####  
[facility]  
[address] [place] [address]  
Dr. Edmund Gordon   
   
                      Protein [Mass/Vol] 6.0 g/dL   Critically low 6.4-8.2    Th  
[facility]  
   
                                        Comment on above:   Performed By: #### C  
MP ####  
[facility]  
[address] [place] [address]  
Dr. Edmund Gordon   
   
                      Sodium [Moles/Vol] 135 mmol/L Critically low 136-145    Th  
[facility]  
   
                                        Comment on above:   Performed By: #### C  
MP ####  
[facility]  
[address] [place] [address]  
Dr. Edmund Gordon   
   
                                                    Urea nitrogen   
[Mass/Vol]      18.0 mg/dL      Normal          7.0-18.0        [facility]  
   
                                        Comment on above:   Performed By: #### C  
MP ####  
[facility]  
[address] [place] [address]  
Dr. Edmund Gordon   
   
                                                    Urea   
nitrogen/Creatinine   
[Mass ratio]    22.0 mg/mg      Normal                          [facility]  
   
                                        Comment on above:   Performed By: #### C  
MP ####  
[facility]  
[address] [place] [address]  
Dr. Edmund Gordon   
   
                                                    BNPon 2022   
   
                                                    Natriuretic peptide B   
(Bld) [Mass/Vol] 73.0 pg/mL      Normal          <=900.0         [facility]  
   
                                        Comment on above:   Performed By: #### C  
BC ####  
[facility]  
[address] [place] [address]  
Dr. Edmund Gordon   
   
                                                    CBC AUTO DIFFon 2022   
   
                      BASO #     0.0 103/ul Normal     0.0-0.1    [facility]  
   
                                        Comment on above:   Performed By: #### C  
BC ####  
[facility]  
[address] [place] [address]  
Dr. Edmund Gordon   
   
                      Basophils/100 WBC (Bld) 0.3 %      Normal     0.2-2.0    [facility]  
   
                                        Comment on above:   Performed By: #### C  
BC ####  
[facility]  
[address] [place] [address]  
Dr. Edmund Gordon   
   
                      EO #       0.0 103/ul Normal     0.0-0.7    [facility]  
   
                                        Comment on above:   Performed By: #### C  
BC ####  
[facility]  
[address] [place] [address]  
Dr. Edmund Gordon   
   
                                                    Eosinophils/100 WBC   
(Bld)           0.0 %           Critically low  0.9-7.0         [facility]  
   
                                        Comment on above:   Performed By: #### C  
BC ####  
[facility]  
[address] [place] [address]  
Dr. Edmund Gordon   
   
                                                    Erythrocyte   
distribution width   
(RBC) [Ratio]   12.7 %          Normal          11.0-15.0       [facility]  
   
                                        Comment on above:   Performed By: #### C  
BC ####  
[facility]  
[address] [place] [address]  
Dr. Edmund Gordon   
   
                                                    Hematocrit (Bld)   
[Volume fraction] 41.7 %          Normal          36.0-48.0       [facility]  
   
                                        Comment on above:   Performed By: #### C  
BC ####  
[facility]  
[address] [place] [address]  
Dr. Edmund Gordon   
   
                                                    Hemoglobin (Bld)   
[Mass/Vol]      13.8 g/dL       Normal          12.0-16.0       [facility]  
   
                                        Comment on above:   Performed By: #### C  
BC ####  
[facility]  
[address] [place] [address]  
Dr. Edmund Gordon   
   
                      IG #       0.10 10e3/ul Critically high 0.00-0.03  [facility]  
   
                                        Comment on above:   Performed By: #### C  
BC ####  
[facility]  
[address] [place] [address]  
Dr. Emdund Gordon   
   
                      IG %       0.8 %      Critically high 0.0-0.5    [facility]  
   
                                        Comment on above:   Performed By: #### C  
BC ####  
[facility]  
[address] [place] [address]  
Dr. Edmund Gordon   
   
                      LYMPH #    0.8 103/ul Critically low 1.2-3.8    [facility]  
   
                                        Comment on above:   Performed By: #### C  
BC ####  
[facility]  
[address] [place] [address]  
Dr. Edmund Gordon   
   
                                                    Lymphocytes/100 WBC   
(Bld)           6.9 %           Critically low  20.5-60.0       [facility]  
   
                                        Comment on above:   Performed By: #### C  
BC ####  
[facility]  
[address] [place] [address]  
Dr. Edmund Gordon   
   
                      MANUAL DIFF REQ NO         Normal                [facility]  
   
                                        Comment on above:   Performed By: #### C  
BC ####  
[facility]  
[address] [place] [address]  
Dr. Edmund Gordon   
   
                                                    MCH (RBC) [Entitic   
mass]           31.8 pg         Normal          26.7-34.0       [facility]  
   
                                        Comment on above:   Performed By: #### C  
BC ####  
[facility]  
[address] [place] [address]  
Dr. Edmund Gordon   
   
                      MCHC (RBC) [Mass/Vol] 33.1 g/dL  Normal     29.9-35.2  [facility]  
   
                                        Comment on above:   Performed By: #### C  
BC ####  
[facility]  
[address] [place] [address]  
Dr. Edmund Gordon   
   
                      MCV (RBC) [Entitic vol] 96.1 fL    Normal     81.0-99.0  [facility]  
   
                                        Comment on above:   Performed By: #### C  
BC ####  
[facility]  
[address] [place] [address]  
Dr. Edmund Gordon   
   
                      MONO #     0.1 103/ul Critically low 0.3-0.8    [facility]  
   
                                        Comment on above:   Performed By: #### C  
BC ####  
[facility]  
[address] [place] [address]  
Dr. Edmund Gordon   
   
                      Monocytes/100 WBC (Bld) 0.7 %      Critically low 1.7-12.0  
   [facility]  
   
                                        Comment on above:   Performed By: #### C  
BC ####  
[facility]  
[address] [place] [address]  
Dr. Edmund Gordon   
   
                      NEUT #     10.8 103/ul Critically high 1.4-6.5    [facility]  
   
                                        Comment on above:   Performed By: #### C  
BC ####  
[facility]  
[address] [place] [address]  
Dr. Edmund Gordon   
   
                                                    Neutrophils/100 WBC   
(Bld)           91.3 %          Critically high 43.0-75.0       [facility]  
   
                                        Comment on above:   Performed By: #### C  
BC ####  
[facility]  
[address] [address]  
Dr. Edmund Gordon   
   
                                                    Platelet mean volume   
(Bld) [Entitic vol] 9.6 fL          Normal          9.5-13.5        [facility]  
   
                                        Comment on above:   Performed By: #### C  
BC ####  
[facility]  
[address] [address]  
Dr. Edmund Gordon   
   
                      PLT        185 103/ul Normal     150-450    [facility]  
   
                                        Comment on above:   Performed By: #### C  
BC ####  
[facility]  
[address] [address]  
Dr. Edmund Gordon   
   
                      RBC        4.34 106/ul Normal     4.20-5.40  [facility]  
   
                                        Comment on above:   Performed By: #### C  
BC ####  
[facility]  
[address] [place] [address]  
Dr. Edmund Gordon   
   
                      WBC        11.8 103/ul Critically high 4.0-11.0   [facility]  
   
                                        Comment on above:   Performed By: #### C  
BC ####  
[facility]  
[address] [place] [address]  
Dr. Edmund Gordon   
   
                                                    POINT OF CARE GLUCOSEon 05-3  
0-2022   
   
                      Glucose [Mass/Vol] 305 mg/dL  Critically high      [facility]  
   
                                        Comment on above:   Performed By: #### C  
BC ####  
[facility]  
[address] [place] [address]  
Dr. Edmund Gordon   
   
                      Glucose [Mass/Vol] 517 mg/dL  Critically high      [facility]  
   
                                        Comment on above:   Result Comment: Prev  
iously Confirmed   
   
                                                            Performed By: #### C  
BC ####  
[facility]  
[address] [place] [address]  
Dr. Edmund Gordon   
   
                                                    PROF 14(COMP METB)on   
022   
   
                      Albumin [Mass/Vol] 3.0 g/dL   Critically low 3.4-5.0      
[facility]  
   
                                        Comment on above:   Performed By: #### C  
BC ####  
[facility]  
[address] [place] [address]  
Dr. Edmund Gordon   
   
                                                    Albumin/Globulin [Mass   
ratio]          0.9 {ratio}     Normal                          [facility]  
   
                                        Comment on above:   Performed By: #### C  
BC ####  
[facility]  
[address] [place] [address]  
Dr. Edmund Gordon   
   
                                                    ALP [Catalytic   
activity/Vol]   85 U/L          Normal                    [facility]  
   
                                        Comment on above:   Performed By: #### C  
BC ####  
[facility]  
[address] [address]  
Dr. Edmund Gordon   
   
                                                    ALT [Catalytic   
activity/Vol]   38 U/L          Normal          14-59           [facility]  
   
                                        Comment on above:   Performed By: #### C  
BC ####  
[facility]  
[address] [address]  
Dr. Edmund Gordon   
   
                      Anion gap [Moles/Vol] 16.9 mmol/L Normal                [facility]  
   
                                        Comment on above:   Performed By: #### C  
BC ####  
[facility]  
[address] [address]  
Dr. Edmund Gordon   
   
                                                    AST [Catalytic   
activity/Vol]   13 U/L          Critically low  15-37           [facility]  
   
                                        Comment on above:   Performed By: #### C  
BC ####  
[facility]  
[address] [address]  
Dr. Edmund Gordon   
   
                      Bilirubin [Mass/Vol] 0.2 mg/dL  Normal     0.2-1.0    [facility]  
   
                                        Comment on above:   Performed By: #### C  
BC ####  
[facility]  
[address] [address]  
Dr. Edmund Gordon   
   
                      Calcium [Mass/Vol] 8.9 mg/dL  Normal     8.5-10.1   [facility]  
   
                                        Comment on above:   Performed By: #### C  
BC ####  
[facility]  
[address] [address]  
Dr. Edmund Gordon   
   
                      Chloride [Moles/Vol] 102 mmol/L Normal          [facility]  
   
                                        Comment on above:   Performed By: #### C  
BC ####  
[facility]  
[address] [address]  
Dr. Edmund Gordon   
   
                      CO2 [Moles/Vol] 24.4 mmol/L Normal     21.0-32.0  [facility]  
   
                                        Comment on above:   Performed By: #### C  
BC ####  
[facility]  
[address] [address]  
Dr. Edmund Gordon   
   
                      Creatinine [Mass/Vol] 1.03 mg/dL Critically high 0.55-1.02  
  [facility]  
   
                                        Comment on above:   Performed By: #### C  
BC ####  
[facility]  
[address] [address]  
Dr. Edmund Gordon   
   
                      EGFR-AF AMERICAN >60        Normal     >=60       [facility]  
   
                                        Comment on above:   Performed By: #### C  
BC ####  
[facility]  
[address] [address]  
Dr. Edmund Gordon   
   
                      EGFR-NON AF AMERICAN 56 mL/min/1.73m2 Critically low >=60   
      [facility]  
   
                                        Comment on above:   Performed By: #### C  
BC ####  
[facility]  
[address] [address]  
Dr. Edmund Gordon   
   
                      Globulin (S) [Mass/Vol] 3.3 g/dL   Normal                [facility]  
   
                                        Comment on above:   Performed By: #### C  
BC ####  
[facility]  
[address] [address]  
Dr. Edmund Gordon   
   
                      Glucose [Mass/Vol] 340 mg/dL  Critically high      [facility]  
   
                                        Comment on above:   Performed By: #### C  
BC ####  
[facility]  
[address] [address]  
Dr. Edmund Gordon   
   
                      Potassium [Moles/Vol] 4.3 mmol/L Normal     3.5-5.1    [facility]  
   
                                        Comment on above:   Performed By: #### C  
BC ####  
[facility]  
[address] [address]  
Dr. Edmund Gordon   
   
                      Protein [Mass/Vol] 6.3 g/dL   Critically low 6.4-8.2    [facility]  
   
                                        Comment on above:   Performed By: #### C  
BC ####  
[facility]  
[address] [address]  
Dr. Edmund Gordon   
   
                      Sodium [Moles/Vol] 139 mmol/L Normal     136-145    [facility]  
   
                                        Comment on above:   Performed By: #### C  
BC ####  
[facility]  
[address] [place] [address]  
Dr. Edmund Gordon   
   
                                                    Urea nitrogen   
[Mass/Vol]      11.0 mg/dL      Normal          7.0-18.0        [facility]  
   
                                        Comment on above:   Performed By: #### C  
BC ####  
[facility]  
[address] [place] [address]  
Dr. Edmund Gordon   
   
                                                    Urea   
nitrogen/Creatinine   
[Mass ratio]    10.7 mg/mg      Normal                          [facility]  
   
                                        Comment on above:   Performed By: #### C  
BC ####  
[facility]  
[address] [place] [address]  
Dr. Edmund Gordon   
   
                                                    CBC AUTO DIFFon 2022   
   
                      BASO #     0.1 103/ul Normal     0.0-0.1    [facility]  
   
                                        Comment on above:   Performed By: #### C  
BC ####  
[facility]  
[address] [place] [address]  
Dr. Edmund Gordon   
   
                      Basophils/100 WBC (Bld) 1.0 %      Normal     0.2-2.0    [facility]  
   
                                        Comment on above:   Performed By: #### C  
BC ####  
[facility]  
[address] [place] [address]  
Dr. Edmund Gordon   
   
                      EO #       0.2 103/ul Normal     0.0-0.7    [facility]  
   
                                        Comment on above:   Performed By: #### C  
BC ####  
[facility]  
[address] [place] [address]  
Dr. Edmund Gordon   
   
                                                    Eosinophils/100 WBC   
(Bld)           1.8 %           Normal          0.9-7.0         [facility]  
   
                                        Comment on above:   Performed By: #### C  
BC ####  
[facility]  
[address] [place] [address]  
Dr. Edmund Gordon   
   
                                                    Erythrocyte   
distribution width   
(RBC) [Ratio]   12.8 %          Normal          11.0-15.0       [facility]  
   
                                        Comment on above:   Performed By: #### C  
BC ####  
[facility]  
[address] [place] [address]  
Dr. Edmund Gordon   
   
                                                    Hematocrit (Bld)   
[Volume fraction] 44.4 %          Normal          36.0-48.0       [facility]  
   
                                        Comment on above:   Performed By: #### C  
BC ####  
[facility]  
[address] [place] [address]  
Dr. Edmund Gordon   
   
                                                    Hemoglobin (Bld)   
[Mass/Vol]      14.9 g/dL       Normal          12.0-16.0       [facility]  
   
                                        Comment on above:   Performed By: #### C  
BC ####  
[facility]  
[address] [place] [address]  
Dr. Edmund Gordon   
   
                      IG #       0.13 10e3/ul Critically high 0.00-0.03  [facility]  
   
                                        Comment on above:   Performed By: #### C  
BC ####  
[facility]  
[address] [place] [address]  
Dr. Edmund Gordon   
   
                      IG %       1.2 %      Critically high 0.0-0.5    [facility]  
   
                                        Comment on above:   Performed By: #### C  
BC ####  
[facility]  
[address] [place] [address]  
Dr. Edmund Gordon   
   
                      LYMPH #    3.6 103/ul Normal     1.2-3.8    [facility]  
   
                                        Comment on above:   Performed By: #### C  
BC ####  
[facility]  
[address] [place] [address]  
Dr. Edmund Gordon   
   
                                                    Lymphocytes/100 WBC   
(Bld)           34.1 %          Normal          20.5-60.0       [facility]  
   
                                        Comment on above:   Performed By: #### C  
BC ####  
[facility]  
[address] [place] [address]  
Dr. Edmund Gordon   
   
                      MANUAL DIFF REQ NO         Normal                [facility]  
   
                                        Comment on above:   Performed By: #### C  
BC ####  
[facility]  
[address] [place] [address]  
Dr. Edmund Gordon   
   
                                                    MCH (RBC) [Entitic   
mass]           31.8 pg         Normal          26.7-34.0       [facility]  
   
                                        Comment on above:   Performed By: #### C  
BC ####  
[facility]  
[address] [place] [address]  
Dr. Edmund Gordon   
   
                      MCHC (RBC) [Mass/Vol] 33.6 g/dL  Normal     29.9-35.2  [facility]  
 [facility]  
   
                                        Comment on above:   Performed By: #### C  
BC ####  
[facility]  
[address] [address]  
Dr. Edmund Gordon   
   
                      MCV (RBC) [Entitic vol] 94.7 fL    Normal     81.0-99.0  [facility]  
   
                                        Comment on above:   Performed By: #### C  
BC ####  
[facility]  
[address] [address]  
Dr. Edmund Gordon   
   
                      MONO #     0.7 103/ul Normal     0.3-0.8    [facility]  
   
                                        Comment on above:   Performed By: #### C  
BC ####  
[facility]  
[address] [address]  
Dr. Edmund Gordon   
   
                      Monocytes/100 WBC (Bld) 6.7 %      Normal     1.7-12.0   [facility]  
   
                                        Comment on above:   Performed By: #### C  
BC ####  
[facility]  
[address] [place] [address]  
Dr. Edmund Gordon   
   
                      NEUT #     5.9 103/ul Normal     1.4-6.5    [facility]  
   
                                        Comment on above:   Performed By: #### C  
BC ####  
[facility]  
[address] [place] [address]  
Dr. Edmund Gordon   
   
                                                    Neutrophils/100 WBC   
(Bld)           55.2 %          Normal          43.0-75.0       [facility]  
   
                                        Comment on above:   Performed By: #### C  
BC ####  
[facility]  
[address] [place] [address]  
Dr. Edmund Gordon   
   
                                                    Platelet mean volume   
(Bld) [Entitic vol] 9.4 fL          Critically low  9.5-13.5        [facility]  
   
                                        Comment on above:   Performed By: #### C  
BC ####  
[facility]  
[address] [place] [address]  
Dr. Edmund Gordon   
   
                      PLT        206 103/ul Normal     150-450    [facility]  
   
                                        Comment on above:   Performed By: #### C  
BC ####  
[facility]  
[address] [place] [address]  
Dr. Edmund Gordon   
   
                      RBC        4.69 106/ul Normal     4.20-5.40  [facility]  
   
                                        Comment on above:   Performed By: #### C  
BC ####  
[facility]  
[address] [place] [address]  
Dr. Edmund Gordon   
   
                      WBC        10.7 103/ul Normal     4.0-11.0   [facility]  
   
                                        Comment on above:   Performed By: #### C  
BC ####  
[facility]  
[address] [place] [address]  
Dr. Edmund Gordon   
   
                                                    CULTURE BLOODon 2022   
   
                                                    Microscopic examination   
of blood, culture                       Culture Observations:  
NO GROWTH AT 5 DAYS.  
Isolate 1  
[id number]            Normal                                  [facility]  
   
                                        Comment on above:   Performed By: #### P  
T, DDIM, PTT ####  
[facility]  
[address] [place] [address]  
Dr. Edmund Gordon   
   
                                                    Covid-19 PCR ([facility])on    
   
                                                    SARS-CoV-2 (COVID-19)   
RNA CHRISTOS+probe Ql ([facility]   
spec)               Not detected        Normal              NOT   
DETECTED                                [facility]  
   
                                        Comment on above:   Result Comment: When  
 diagnostic testing is negative, the   
possibility of a false negative should be considered in  
the context of a patient's recent exposures and the presence   
of clinical signs and symptoms  
consistent with SARS-CoV-2.  
This test is not yet approved or cleared by the [place]   
FDA. When there are no FDA-approved or cleared tests   
available, and other criteria are met, FDA can make tests   
available under an emergency access mechanism called an   
Emergency Use Authorization (EUA). The EUA for this test is   
supported by the [place] of Health and Human Service's   
declaration that circumstances exist to justify the emergency   
use of in vitro diagnostics for the detection and/or diagnosis   
of the virus that causes COVID-19. This EUA will remain in   
effect for the duration of the COVID-19 declaration justifying   
emergency of IVDs, unless it is terminated or revoked by the   
FDA (after which the test may no longer be used).   
   
                                                            Performed By: #### C  
BC ####  
[facility]  
[address] [place] [address]  
Dr. Edmund Gordon   
   
                                                    LACTATE/LACTIC ACIDon 2022   
   
                      Lactate [Moles/Vol] 4.0 mmol/L Critically high 0.4-1.9      
[facility]  
   
                                        Comment on above:   Performed By: #### P  
OCGLUC ####  
[facility]  
[address] [place] [address]  
Dr. Edmund Gordon   
   
                      Lactate [Moles/Vol] 2.6 mmol/L Critically high 0.4-1.9      
[facility]  
   
                                        Comment on above:   Result Comment: TEST  
 REPEAT CRITICAL RESULT VERIFIED   
   
                                                            Performed By: #### C  
MP ####  
[facility]  
[address] [place] [address]  
Dr. Edmund Gordon   
   
                      Lactate [Moles/Vol] 2.0 mmol/L Critically high 0.4-1.9      
[facility]  
   
                                        Comment on above:   Performed By: #### P  
OCGLUC ####  
[facility]  
[address] [place] [address]  
Dr. Edmund Gordon   
   
                                                    PROF 14(COMP METB)on   
022   
   
                      Albumin [Mass/Vol] 3.2 g/dL   Critically low 3.4-5.0    [facility]  
   
                                        Comment on above:   Performed By: #### C  
MP ####  
[facility]  
[address] [place] [address]  
Dr. Edmund Gordon   
   
                                                    Albumin/Globulin [Mass   
ratio]          1.0 {ratio}     Normal                          [facility]  
   
                                        Comment on above:   Performed By: #### C  
MP ####  
[facility]  
[address] [place] [address]  
Dr. Edmund Gordon   
   
                                                    ALP [Catalytic   
activity/Vol]   89 U/L          Normal                    [facility]  
   
                                        Comment on above:   Performed By: #### C  
MP ####  
[facility]  
[address] [place] [address]  
Dr. Edmund Gordon   
   
                                                    ALT [Catalytic   
activity/Vol]   35 U/L          Normal          14-59           [facility]  
   
                                        Comment on above:   Performed By: #### C  
MP ####  
[facility]  
[address] [place] [address]  
Dr. Edmund Gordon   
   
                      Anion gap [Moles/Vol] 10.2 mmol/L Normal                Th  
[facility]  
   
                                        Comment on above:   Performed By: #### C  
MP ####  
[facility]  
[address] [place] [address]  
Dr. Edmund Gordon   
   
                                                    AST [Catalytic   
activity/Vol]   15 U/L          Normal          15-37           [facility]  
   
                                        Comment on above:   Performed By: #### C  
MP ####  
[facility]  
[address] [place], [place] [id number]  
Dr. Edmund Gordon   
   
                      Bilirubin [Mass/Vol] 0.4 mg/dL  Normal     0.2-1.0    [facility]  
   
                                        Comment on above:   Performed By: #### C  
MP ####  
[facility]  
[address] [place] [address]  
Dr. Edmund Gordon   
   
                      Calcium [Mass/Vol] 8.5 mg/dL  Normal     8.5-10.1   [facility]  
   
                                        Comment on above:   Performed By: #### C  
MP ####  
[facility]  
[address] [place] [address]  
Dr. Edmund Gordon   
   
                      Chloride [Moles/Vol] 102 mmol/L Normal          [facility]  
   
                                        Comment on above:   Performed By: #### C  
MP ####  
[facility]  
[address] [place] [address]  
Dr. Edmund Gordon   
   
                      CO2 [Moles/Vol] 29.4 mmol/L Normal     21.0-32.0  [facility]  
   
                                        Comment on above:   Performed By: #### C  
MP ####  
[facility]  
[address] [place] [address]  
Dr. Edmund Gordon   
   
                      Creatinine [Mass/Vol] 0.66 mg/dL Normal     0.55-1.02  [facility]  
   
                                        Comment on above:   Performed By: #### C  
MP ####  
[facility]  
[address] [place] [address]  
Dr. Edmund Gordon   
   
                      EGFR-AF AMERICAN >60        Normal     >=60       [facility]  
   
                                        Comment on above:   Performed By: #### C  
MP ####  
[facility]  
[address] [place] [address]  
Dr. Edmund Gordon   
   
                      EGFR-NON AF AMERICAN >60        Normal     >=60       [facility]  
   
                                        Comment on above:   Performed By: #### C  
MP ####  
[facility]  
[address] [place] [address]  
Dr. Edmund Gordon   
   
                      Globulin (S) [Mass/Vol] 3.3 g/dL   Normal                T  
[facility]  
   
                                        Comment on above:   Performed By: #### C  
MP ####  
[facility]  
[address] [place] [address]  
Dr. Edmund Gordon   
   
                      Glucose [Mass/Vol] 156 mg/dL  Critically high      T  
[facility]  
   
                                        Comment on above:   Performed By: #### C  
MP ####  
[facility]  
[address] [address]  
Dr. Edmund Gordon   
   
                      Potassium [Moles/Vol] 3.6 mmol/L Normal     3.5-5.1    [facility]  
   
                                        Comment on above:   Performed By: #### C  
MP ####  
[facility]  
[address] [address]  
Dr. Edmund Gordon   
   
                      Protein [Mass/Vol] 6.5 g/dL   Normal     6.4-8.2    [facility]  
   
                                        Comment on above:   Performed By: #### C  
MP ####  
[facility]  
[address] [address]  
Dr. Edmund Gordon   
   
                      Sodium [Moles/Vol] 138 mmol/L Normal     136-145    [facility]  
   
                                        Comment on above:   Performed By: #### C  
MP ####  
[facility]  
[address] [address]  
Dr. Edmund Gordon   
   
                                                    Urea nitrogen   
[Mass/Vol]      6.0 mg/dL       Critically low  7.0-18.0        [facility]  
   
                                        Comment on above:   Performed By: #### C  
MP ####  
[facility]  
[address] [place] [address]  
Dr. Edmund Gordon   
   
                                                    Urea   
nitrogen/Creatinine   
[Mass ratio]    9.1 mg/mg       Normal                          [facility]  
   
                                        Comment on above:   Performed By: #### C  
MP ####  
[facility]  
[address] [place] [address]  
Dr. Edmund Gordon   
   
                                                    PROTIMEon 2022   
   
                                                    INR Coag (PPP)   
[Relative time] 0.95 {INR}      Normal                          [facility]  
   
                                        Comment on above:   Performed By: #### P  
T, DDIM, PTT ####  
[facility]  
[address] [place] [address]  
Dr. Edmund Gordon   
   
                      INR GUIDELINES SEE BELOW  Normal                [facility]  
   
                                        Comment on above:   Result Comment: KELECHI  
RED INR: 2.0 - 3.0 CONDITIONS NOT LISTED   
BELOW 2.5 - 3.5 FOR PROSTHETIC HEART VALVE REPLACEMENT 2.5 -   
3.5 RECURRENT THROMBOSIS   
   
                                                            Performed By: #### P  
T, DDIM, PTT ####  
[facility]  
[address] [address]  
Dr. Edmund Gordon   
   
                      PT Coag (PPP) [Time] 10.3 s     Normal     9.0-11.6   [facility]  
   
                                        Comment on above:   Performed By: #### P  
T, DDIM, PTT ####  
[facility]  
[address] [place] [address]  
Dr. Edmund Gordon   
   
                                                    XR CHEST 1 Von 2022   
   
                                        XR CHEST 1 V        CHEST X-RAY, 1 VIEW  
HISTORY: Shortness of   
breath.  
COMPARISON: 2022.  
FINDINGS: The heart,   
london, and mediastinum   
are unremarkable. The   
lungs are  
grossly clear. There   
are no pleural   
effusions. There is no   
pneumothorax.  
IMPRESSION:  
No evidence of acute   
cardiopulmonary   
disease.  
  
Electronically   
authenticated by:   
SALVATORE HARRELL Date:   
2022 15:08    Normal                                  [facility]  
   
                                                    XR CHEST 2 Von 2022   
   
                                        XR CHEST 2 V        EXAMINATION: XR CHES  
T 2   
V  
HISTORY: Chronic   
obstructive lung   
disease , cough  
COMPARISON: XR chest   
2022  
FINDINGS:  
LUNGS: Hyperexpanded   
lungs. Mild haziness   
and stranding within   
medial right lung  
base.  
VASCULATURE: No   
increased pulmonary   
vasculature.  
PLEURA: No   
pneumothorax, effusion,   
or pleural thickening.  
CARDIAC: No   
cardiomegaly or cardiac   
silhouette abnormality.  
MEDIASTINUM: No visible   
mass or adenopathy.  
BONES: No fracture or   
visible bone lesion.  
OTHER: Negative.  
IMPRESSION:  
1. Mild right lower   
lobe infiltrates versus   
atelectasis; new since   
prior study.  
2. Hyperexpanded lungs.  
Electronically   
authenticated by:   
DOMINGO WATKINS Date:   
2022 11:24    Normal                                  [facility]  
   
                                                    SPUTUM CULTUREon 05-   
   
                                                    Epithelial cells LM Ql   
(Urine sed)     Few             Normal                          [facility]  
   
                                        Comment on above:   Performed By: #### P  
T, DDIM, PTT ####  
[facility]  
[address] [address]  
Dr. Edmund Gordon   
   
                      Gram Stain Evaluation Comment    Normal                [facility]  
 [facility]  
   
                                        Comment on above:   Result Comment: This  
 specimen is of good quality and is   
acceptable for routine  
bacterial culture.   
   
                                                            Performed By: #### P  
T, DDIM, PTT ####  
[facility]  
[address] [place] [address]  
Dr. Edmund Gordon   
   
                                                    Lower Respiratory   
Culture         Final report    Normal                          [facility]  
   
                                        Comment on above:   Performed By: #### P  
T, DDIM, PTT ####  
[facility]  
[address] [place] [address]  
Dr. Edmund Gordon   
   
                      Result 1   Comment    Normal                [facility]  
   
                                        Comment on above:   Result Comment: Few   
gram positive cocci   
   
                                                            Performed By: #### P  
T, DDIM, PTT ####  
[facility]  
[address] [place] [address]  
Dr. Edmund Gordon   
   
                                                            Result Comment: Rout  
ine respiratory surinder   
   
                      Result 2   Comment    Normal                [facility]  
   
                                        Comment on above:   Result Comment: Few   
gram negative rods.   
   
                                                            Performed By: #### P  
T, DDIM, PTT ####  
[facility]  
[address] [place] [address]  
Dr. Edmund Gordon   
   
                      Result 3              Normal                [facility]  
   
                                        Comment on above:   Performed By: #### P  
T, DDIM, PTT ####  
[facility]  
[address] [place] [address]  
Dr. Edmund Gordon   
   
                      Result 4              Normal                [facility]  
   
                                        Comment on above:   Performed By: #### P  
T, DDIM, PTT ####  
[facility]  
[address] [place] [address]  
Dr. Edmund Gordon   
   
                      White Blood Cells None seen  Normal                [facility]  
   
                                        Comment on above:   Performed By: #### P  
T, DDIM, PTT ####  
[facility]  
[address] [place] [address]  
Dr. Edmund Gordon   
   
                                                    BNPon 2022   
   
                                                    Natriuretic peptide B   
(Bld) [Mass/Vol] 37.0 pg/mL      Normal          <=900.0         [facility]  
   
                                        Comment on above:   Performed By: #### P  
T, DDIM, PTT ####  
[facility]  
[address] [place] [address]  
Dr. Edmund Gordon   
   
                                                    CBC AUTO DIFFon 2022   
   
                      BASO #     0.0 103/ul Normal     0.0-0.1    [facility]  
   
                                        Comment on above:   Performed By: #### C  
BC ####  
[facility]  
[address] [place] [address]  
Dr. Edmund Gordon   
   
                      Basophils/100 WBC (Bld) 0.2 %      Normal     0.2-2.0    [facility]  
   
                                        Comment on above:   Performed By: #### C  
BC ####  
[facility]  
[address] [place] [address]  
Dr. Edmund Gordon   
   
                      EO #       0.0 103/ul Normal     0.0-0.7    [facility]  
   
                                        Comment on above:   Performed By: #### C  
BC ####  
[facility]  
[address] [place] [address]  
Dr. Edmund Gordon   
   
                                                    Eosinophils/100 WBC   
(Bld)           0.0 %           Critically low  0.9-7.0         [facility]  
   
                                        Comment on above:   Performed By: #### C  
BC ####  
[facility]  
[address] [place] [address]  
Dr. Edmund Gordon   
   
                                                    Erythrocyte   
distribution width   
(RBC) [Ratio]   12.3 %          Normal          11.0-15.0       [facility]  
   
                                        Comment on above:   Performed By: #### C  
BC ####  
[facility]  
[address] [place] [address]  
Dr. Edmund Gordon   
   
                                                    Hematocrit (Bld)   
[Volume fraction] 43.1 %          Normal          36.0-48.0       [facility]  
   
                                        Comment on above:   Performed By: #### C  
BC ####  
[facility]  
[address] [place] [address]  
Dr. Edmund Gordon   
   
                                                    Hemoglobin (Bld)   
[Mass/Vol]      14.3 g/dL       Normal          12.0-16.0       [facility]  
   
                                        Comment on above:   Performed By: #### C  
BC ####  
[facility]  
[address] [place] [address]  
Dr. Edmund Gordon   
   
                      IG #       0.05 10e3/ul Critically high 0.00-0.03  [facility]  
   
                                        Comment on above:   Performed By: #### C  
BC ####  
[facility]  
[address] [place] [address]  
Dr. Edmund Gordon   
   
                      IG %       0.5 %      Normal     0.0-0.5    [facility]  
   
                                        Comment on above:   Performed By: #### C  
BC ####  
[facility]  
[address] [place] [address]  
Dr. Edmund Gordon   
   
                      LYMPH #    1.0 103/ul Critically low 1.2-3.8    [facility]  
   
                                        Comment on above:   Performed By: #### C  
BC ####  
[facility]  
[address] [place] [address]  
Dr. Edmund Gordon   
   
                                                    Lymphocytes/100 WBC   
(Bld)           9.5 %           Critically low  20.5-60.0       [facility]  
   
                                        Comment on above:   Performed By: #### C  
BC ####  
[facility]  
[address] [place] [address]  
Dr. Edmund Gordon   
   
                      MANUAL DIFF REQ NO         Normal                [facility]  
   
                                        Comment on above:   Performed By: #### C  
BC ####  
[facility]  
[address] [place] [address]  
Dr. Edmund Gordon   
   
                                                    MCH (RBC) [Entitic   
mass]           30.9 pg         Normal          26.7-34.0       [facility]  
   
                                        Comment on above:   Performed By: #### C  
BC ####  
[facility]  
[address] [place] [address]  
Dr. Edmund Gordon   
   
                      MCHC (RBC) [Mass/Vol] 33.2 g/dL  Normal     29.9-35.2  [facility]  
   
                                        Comment on above:   Performed By: #### C  
BC ####  
[facility]  
[address] [place] [address]  
Dr. Edmund Gordon   
   
                      MCV (RBC) [Entitic vol] 93.1 fL    Normal     81.0-99.0  [facility]  
   
                                        Comment on above:   Performed By: #### C  
BC ####  
[facility]  
[address] [place] [address]  
Dr. Edmund Gordon   
   
                      MONO #     0.1 103/ul Critically low 0.3-0.8    [facility]  
   
                                        Comment on above:   Performed By: #### C  
BC ####  
[facility]  
[address] [place] [address]  
Dr. Edmund Gordon   
   
                      Monocytes/100 WBC (Bld) 0.6 %      Critically low 1.7-12.0  
   [facility]  
   
                                        Comment on above:   Performed By: #### C  
BC ####  
[facility]  
[address] [place] [address]  
Dr. Edmund Gordon   
   
                      NEUT #     9.0 103/ul Critically high 1.4-6.5    [facility]  
   
                                        Comment on above:   Performed By: #### C  
BC ####  
[facility]  
[address] [place] [address]  
Dr. Edmund Gordon   
   
                                                    Neutrophils/100 WBC   
(Bld)           89.2 %          Critically high 43.0-75.0       [facility]  
   
                                        Comment on above:   Performed By: #### C  
BC ####  
[facility]  
[address] [place] [address]  
Dr. Edmund Gordon   
   
                                                    Platelet mean volume   
(Bld) [Entitic vol] 9.8 fL          Normal          9.5-13.5        [facility]  
   
                                        Comment on above:   Performed By: #### C  
BC ####  
[facility]  
[address] [place] [address]  
Dr. Edmund Gordon   
   
                      PLT        192 103/ul Normal     150-450    [facility]  
   
                                        Comment on above:   Performed By: #### C  
BC ####  
[facility]  
[address] [place] [address]  
Dr. Edmund Gordon   
   
                      RBC        4.63 106/ul Normal     4.20-5.40  [facility]  
   
                                        Comment on above:   Performed By: #### C  
BC ####  
[facility]  
[address] [place] [address]  
Dr. Edmund Gordon   
   
                      WBC        10.1 103/ul Normal     4.0-11.0   [facility]  
   
                                        Comment on above:   Performed By: #### C  
BC ####  
[facility]  
[address] [place] [address]  
Dr. Edmund Gordon   
   
                                                    CRPon 2022   
   
                      CRP [Mass/Vol] mg/L       Normal     <=1.0      [facility]  
   
                                        Comment on above:   Performed By: #### P  
T, DDIM, PTT ####  
[facility]  
[address] [place] [address]  
Dr. Edmund Gordon   
   
                                                    PROF 14(COMP METB)on   
022   
   
                      Albumin [Mass/Vol] 3.4 g/dL   Normal     3.4-5.0    [facility]  
   
                                        Comment on above:   Performed By: #### P  
T, DDIM, PTT ####  
[facility]  
[address] [place], [place] [id number]  
Dr. Edmund Gordon   
   
                                                    Albumin/Globulin [Mass   
ratio]          1.0 {ratio}     Normal                          [facility]  
   
                                        Comment on above:   Performed By: #### P  
T, DDIM, PTT ####  
[facility]  
[address] [place] [address]  
Dr. Edmund Gordon   
   
                                                    ALP [Catalytic   
activity/Vol]   87 U/L          Normal                    [facility]  
   
                                        Comment on above:   Performed By: #### P  
T, DDIM, PTT ####  
[facility]  
[address] [place] [address]  
Dr. Edmund Gordon   
   
                                                    ALT [Catalytic   
activity/Vol]   58 U/L          Normal          14-59           [facility]  
   
                                        Comment on above:   Performed By: #### P  
T, DDIM, PTT ####  
[facility]  
[address] [place] [address]  
Dr. Edmund Gordon   
   
                      Anion gap [Moles/Vol] 13.4 mmol/L Normal                [facility]  
   
                                        Comment on above:   Performed By: #### P  
T, DDIM, PTT ####  
[facility]  
[address] [place] [address]  
Dr. Edmund Gordon   
   
                                                    AST [Catalytic   
activity/Vol]   26 U/L          Normal          15-37           [facility]  
   
                                        Comment on above:   Performed By: #### P  
T, DDIM, PTT ####  
[facility]  
[address] [place] [address]  
Dr. Edmund Gordon   
   
                      Bilirubin [Mass/Vol] 0.2 mg/dL  Normal     0.2-1.0    [facility]  
   
                                        Comment on above:   Performed By: #### P  
T, DDIM, PTT ####  
[facility]  
[address] [place] [address]  
Dr. Edmund Gordon   
   
                      Calcium [Mass/Vol] 8.4 mg/dL  Critically low 8.5-10.1   [facility]  
   
                                        Comment on above:   Performed By: #### P  
T, DDIM, PTT ####  
[facility]  
[address] [place] [address]  
Dr. Edmund Gordon   
   
                      Chloride [Moles/Vol] 100 mmol/L Normal          [facility]  
   
                                        Comment on above:   Performed By: #### P  
T, DDIM, PTT ####  
[facility]  
[address] [address]  
Dr. Edmund Gordon   
   
                      CO2 [Moles/Vol] 25.1 mmol/L Normal     21.0-32.0  [facility]  
   
                                        Comment on above:   Performed By: #### P  
T, DDIM, PTT ####  
[facility]  
[address] [address]  
Dr. Edmund Gordon   
   
                      Creatinine [Mass/Vol] 0.82 mg/dL Normal     0.55-1.02  [facility]  
   
                                        Comment on above:   Performed By: #### P  
T, DDIM, PTT ####  
[facility]  
[address] [address]  
Dr. Edmund Gordon   
   
                      EGFR-AF AMERICAN >60        Normal     >=60       [facility]  
   
                                        Comment on above:   Performed By: #### P  
T, DDIM, PTT ####  
[facility]  
[address] [address]  
Dr. Edmund Gordon   
   
                      EGFR-NON AF AMERICAN >60        Normal     >=60       [facility]  
   
                                        Comment on above:   Performed By: #### P  
T, DDIM, PTT ####  
[facility]  
[address] [address]  
Dr. Edmund Gordon   
   
                      Globulin (S) [Mass/Vol] 3.3 g/dL   Normal                [facility]  
   
                                        Comment on above:   Performed By: #### P  
T, DDIM, PTT ####  
[facility]  
[address] [address]  
Dr. Edmund Gordon   
   
                      Glucose [Mass/Vol] 309 mg/dL  Critically high      [facility]  
   
                                        Comment on above:   Performed By: #### P  
T, DDIM, PTT ####  
[facility]  
[address] [address]  
Dr. Edmund Gordon   
   
                      Potassium [Moles/Vol] 4.5 mmol/L Normal     3.5-5.1    [facility]  
   
                                        Comment on above:   Performed By: #### P  
T, DDIM, PTT ####  
[facility]  
[address] [address]  
Dr. Edmund Gordon   
   
                      Protein [Mass/Vol] 6.7 g/dL   Normal     6.4-8.2    [facility]  
   
                                        Comment on above:   Performed By: #### P  
T, DDIM, PTT ####  
[facility]  
[address] [place] [address]  
Dr. Edmund Gordon   
   
                      Sodium [Moles/Vol] 134 mmol/L Critically low 136-145    Th  
[facility]  
   
                                        Comment on above:   Performed By: #### P  
T, DDIM, PTT ####  
[facility]  
[address] [place] [address]  
Dr. Edmund Gordon   
   
                                                    Urea nitrogen   
[Mass/Vol]      19.0 mg/dL      Critically high 7.0-18.0        [facility]  
   
                                        Comment on above:   Performed By: #### P  
T, DDIM, PTT ####  
[facility]  
[address] [place] [address]  
Dr. Edmund Gordon   
   
                                                    Urea   
nitrogen/Creatinine   
[Mass ratio]    23.2 mg/mg      Normal                          [facility]  
   
                                        Comment on above:   Performed By: #### P  
T, DDIM, PTT ####  
[facility]  
[address] [place] [address]  
Dr. Edmund Gordon   
   
                                                    BNPon 2022   
   
                                                    Natriuretic peptide B   
(Bld) [Mass/Vol] 33.0 pg/mL      Normal          <=900.0         [facility]  
   
                                        Comment on above:   Performed By: #### C  
BC ####  
[facility]  
[address] [place] [address]  
Dr. Edmund Gordon   
   
                                                    CARDIAC ZACHARY 3-6on   
2   
   
                                                    CK [Catalytic   
activity/Vol]   48 U/L          Normal                    [facility]  
   
                                        Comment on above:   Performed By: #### C  
MP ####  
[facility]  
[address] [place] [address]  
Dr. Edmund Gordon   
   
                      CK.MB [Mass/Vol] 0.81 ng/mL Normal     <=3.60     [facility]  
   
                                        Comment on above:   Performed By: #### C  
MP ####  
[facility]  
[address] [place] [address]  
Dr. Edmund Gordon   
   
                      HSTROP     2.1 pg/mL  Critically low 4.0-51.3   [facility]  
   
                                        Comment on above:   Result Comment: CUT-  
OFF POINTS HAVE BEEN ESTABLISHED BASED ON   
THE FOURTH UNIVERSAL DEFINITIONS OF MYOCARDIAL  
INFARCTION. THE UPPER REFERENCE LIMIT (URL) OF TROPONIN,   
DEFINED AS THE 99TH PERCENTILE OF  
cTnI DISTRIBUTION IN A REFERENCE POPULATION, HAS BEEN   
CONFIRMED AS THE DECISION THRESHOLD  
FOR MI DIAGNOSIS.   
   
                                                            Performed By: #### C  
MP ####  
[facility]  
[address] [place] [address]  
Dr. Edmund Gordon   
   
                                                    CBC AUTO DIFFon 2022   
   
                      BASO #     0.1 103/ul Normal     0.0-0.1    [facility]  
   
                                        Comment on above:   Performed By: #### C  
BC ####  
[facility]  
[address] [place] [address]  
Dr. Edmund Gordon   
   
                      Basophils/100 WBC (Bld) 0.7 %      Normal     0.2-2.0    [facility]  
   
                                        Comment on above:   Performed By: #### C  
BC ####  
[facility]  
[address] [place] [address]  
Dr. Edmund Gordon   
   
                      EO #       0.1 103/ul Normal     0.0-0.7    [facility]  
   
                                        Comment on above:   Performed By: #### C  
BC ####  
[facility]  
[address] [place] [address]  
Dr. Edmund Gordon   
   
                                                    Eosinophils/100 WBC   
(Bld)           0.8 %           Critically low  0.9-7.0         [facility]  
   
                                        Comment on above:   Performed By: #### C  
BC ####  
[facility]  
[address] [place] [address]  
Dr. Edmund Gordon   
   
                                                    Erythrocyte   
distribution width   
(RBC) [Ratio]   12.4 %          Normal          11.0-15.0       [facility]  
   
                                        Comment on above:   Performed By: #### C  
BC ####  
[facility]  
[address] [place] [address]  
Dr. Edmund Gordon   
   
                                                    Hematocrit (Bld)   
[Volume fraction] 43.5 %          Normal          36.0-48.0       [facility]  
   
                                        Comment on above:   Performed By: #### C  
BC ####  
[facility]  
[address] [place] [address]  
Dr. Edmund Gordon   
   
                                                    Hemoglobin (Bld)   
[Mass/Vol]      15.3 g/dL       Normal          12.0-16.0       [facility]  
   
                                        Comment on above:   Performed By: #### C  
BC ####  
[facility]  
[address] [place] [address]  
Dr. Edmund Gordon   
   
                      IG #       0.05 10e3/ul Critically high 0.00-0.03  [facility]  
   
                                        Comment on above:   Performed By: #### C  
BC ####  
[facility]  
[address] [place] [address]  
Dr. Edmund Gordon   
   
                      IG %       0.5 %      Normal     0.0-0.5    [facility]  
   
                                        Comment on above:   Performed By: #### C  
BC ####  
[facility]  
[address] [place] [address]  
Dr. Edmund Gordon   
   
                      LYMPH #    3.6 103/ul Normal     1.2-3.8    [facility]  
   
                                        Comment on above:   Performed By: #### C  
BC ####  
[facility]  
[address] [place] [address]  
Dr. Edmund Gordon   
   
                                                    Lymphocytes/100 WBC   
(Bld)           33.4 %          Normal          20.5-60.0       [facility]  
   
                                        Comment on above:   Performed By: #### C  
BC ####  
[facility]  
[address] [place] [address]  
Dr. Edmund Gordon   
   
                      MANUAL DIFF REQ NO         Normal                [facility]  
   
                                        Comment on above:   Performed By: #### C  
BC ####  
[facility]  
[address] [place] [address]  
Dr. Edmund Gordon   
   
                                                    MCH (RBC) [Entitic   
mass]           32.1 pg         Normal          26.7-34.0       [facility]  
   
                                        Comment on above:   Performed By: #### C  
BC ####  
[facility]  
[address] [place] [address]  
Dr. Edmund Gordon   
   
                      MCHC (RBC) [Mass/Vol] 35.2 g/dL  Normal     29.9-35.2  [facility]  
   
                                        Comment on above:   Performed By: #### C  
BC ####  
[facility]  
[address] [place] [address]  
Dr. Edmund Gordon   
   
                      MCV (RBC) [Entitic vol] 91.4 fL    Normal     81.0-99.0  [facility]  
   
                                        Comment on above:   Performed By: #### C  
BC ####  
[facility]  
[address] [place] [address]  
Dr. Edmund Gordon   
   
                      MONO #     0.6 103/ul Normal     0.3-0.8    [facility]  
   
                                        Comment on above:   Performed By: #### C  
BC ####  
[facility]  
[address] [place] [address]  
Dr. Edmund Gordon   
   
                      Monocytes/100 WBC (Bld) 5.1 %      Normal     1.7-12.0   [facility]  
   
                                        Comment on above:   Performed By: #### C  
BC ####  
[facility]  
[address] [place] [address]  
Dr. Edmund Gordon   
   
                      NEUT #     6.4 103/ul Normal     1.4-6.5    [facility]  
   
                                        Comment on above:   Performed By: #### C  
BC ####  
[facility]  
[address] [place] [address]  
Dr. Edmund Gordon   
   
                                                    Neutrophils/100 WBC   
(Bld)           59.5 %          Normal          43.0-75.0       [facility]  
   
                                        Comment on above:   Performed By: #### C  
BC ####  
[facility]  
[address] [place] [address]  
Dr. Edmund Gordon   
   
                                                    Platelet mean volume   
(Bld) [Entitic vol] 9.6 fL          Normal          9.5-13.5        [facility]  
   
                                        Comment on above:   Performed By: #### C  
BC ####  
[facility]  
[address] [place] [address]  
Dr. Edmund Gordon   
   
                      PLT        225 103/ul Normal     150-450    [facility]  
   
                                        Comment on above:   Performed By: #### C  
BC ####  
[facility]  
[address] [place] [address]  
Dr. Edmund Gordon   
   
                      RBC        4.76 106/ul Normal     4.20-5.40  [facility]  
   
                                        Comment on above:   Performed By: #### C  
BC ####  
[facility]  
[address] [place] [address]  
Dr. Edmund Gordon   
   
                      WBC        10.7 103/ul Normal     4.0-11.0   [facility]  
   
                                        Comment on above:   Performed By: #### C  
BC ####  
[facility]  
[address] [place] [address]  
Dr. Edmund Gordon   
   
                                                    CRPon 2022   
   
                      CRP [Mass/Vol] mg/L       Normal     <=1.0      [facility]  
   
                                        Comment on above:   Performed By: #### C  
BC ####  
[facility]  
[address] [address]  
Dr. Edmund Gordon   
   
                                                    CT HEAD WO CONon 2022   
   
                                        CT HEAD WO CON      EXAMINATION: CT HEAD  
 WO   
CON  
HISTORY: HEADACHE  
COMPARISON: None.  
TECHNIQUE: CT   
examination of the head   
without IV contrast.   
Sagittal and coronal  
reconstructions were   
obtained.  
Dose reduction   
techniques were   
achieved by using   
automated exposure   
control  
and/or adjustment of mA   
and/or kV according to   
patient size and/or use   
of  
iterative   
reconstruction   
technique.  
FINDINGS: The   
ventricles are not   
enlarged, the lateral   
ventricles are   
symmetric  
and the third   
ventricles in the   
midline. The sylvian   
fissures and cortical   
sulci  
are unremarkable. There   
is no evidence of an   
intracranial   
hemorrhage, mass  
lesion or apparent   
acute infarct. No   
abnormality is seen in   
the deep white  
matter.  
Calcifications of the   
choroid plexus is   
noted. The cerebellum   
and pain stem  
appear intact. The   
paranasal sinuses are   
clear.  
IMPRESSION:  
There is no evidence of   
an intracranial   
hemorrhage, mass lesion   
or apparent  
acute infarct.  
Benign calcifications   
are present. The   
paranasal and ethmoid   
sinuses are intact.  
Electronically   
authenticated by: BILLIE DE GUZMAN Date: 2022   
15:40               Normal                                  [facility]  
   
                                                    Covid[facility]19 PCR ([facility]on    
   
                                                    SARS-CoV-2 (COVID-19)   
RNA CHRISTOS+probe Ql (Unsp   
spec)               Not detected        Normal              NOT   
DETECTED                                [facility]  
   
                                        Comment on above:   Result Comment: When  
 diagnostic testing is negative, the   
possibility of a false negative should be considered in  
the context of a patient's recent exposures and the presence   
of clinical signs and symptoms  
consistent with SARS-CoV-2.  
This test is not yet approved or cleared by the [place]   
FDA. When there are no FDA-approved or cleared tests   
available, and other criteria are met, FDA can make tests   
available under an emergency access mechanism called an   
Emergency Use Authorization (EUA). The EUA for this test is   
supported by the [place] of Health and Human Service's   
declaration that circumstances exist to justify the emergency   
use of in vitro diagnostics for the detection and/or diagnosis   
of the virus that causes COVID-19. This EUA will remain in   
effect for the duration of the COVID-19 declaration justifying   
emergency of IVDs, unless it is terminated or revoked by the   
FDA (after which the test may no longer be used).   
   
                                                            Performed By: #### P  
T, DDIM, PTT ####  
[facility]  
[address] [place] [address]  
Dr. Edmund Gordon   
   
                                                    LIPASEon 2022   
   
                                                    Lipase [Catalytic   
activity/Vol]   136.0 U/L       Normal          73.0-393.0      [facility]  
   
                                        Comment on above:   Performed By: #### C  
BC ####  
[facility]  
[address] [place] [address]  
Dr. Edmund Gordon   
   
                                                    PROF 14(COMP METB)on   
022   
   
                      Albumin [Mass/Vol] 3.6 g/dL   Normal     3.4-5.0    [facility]  
   
                                        Comment on above:   Performed By: #### C  
BC ####  
[facility]  
[address] [place] [address]  
Dr. Edmund Gordon   
   
                                                    Albumin/Globulin [Mass   
ratio]          1.1 {ratio}     Normal                          [facility]  
   
                                        Comment on above:   Performed By: #### C  
BC ####  
[facility]  
[address] [place] [address]  
Dr. Edmund Gordon   
   
                                                    ALP [Catalytic   
activity/Vol]   100 U/L         Normal                    [facility]  
   
                                        Comment on above:   Performed By: #### C  
BC ####  
[facility]  
[address] [place] [address]  
Dr. Edmund Gordon   
   
                                                    ALT [Catalytic   
activity/Vol]   33 U/L          Normal          14-59           [facility]  
   
                                        Comment on above:   Performed By: #### C  
BC ####  
[facility]  
[address] [place] [address]  
Dr. Edmund Gordon   
   
                      Anion gap [Moles/Vol] 10.2 mmol/L Normal                [facility]  
[facility]  
   
                                        Comment on above:   Performed By: #### C  
BC ####  
[facility]  
[address] [place] [address]  
Dr. Edmund Gordon   
   
                                                    AST [Catalytic   
activity/Vol]   13 U/L          Critically low  15-37           [facility]  
   
                                        Comment on above:   Performed By: #### C  
BC ####  
[facility]  
[address] [place] [address]  
Dr. Edmund Gordon   
   
                      Bilirubin [Mass/Vol] 0.3 mg/dL  Normal     0.2-1.0    [facility]  
   
                                        Comment on above:   Performed By: #### C  
BC ####  
[facility]  
[address] [address]  
Dr. Edmund Gordon   
   
                      Calcium [Mass/Vol] 8.5 mg/dL  Normal     8.5-10.1   [facility]  
   
                                        Comment on above:   Performed By: #### C  
BC ####  
[facility]  
[address] [address]  
Dr. Edmund Gordon   
   
                      Chloride [Moles/Vol] 101 mmol/L Normal          [facility]  
   
                                        Comment on above:   Performed By: #### C  
BC ####  
[facility]  
[address] [place] [address]  
Dr. Edmund Gordon   
   
                      CO2 [Moles/Vol] 28.1 mmol/L Normal     21.0-32.0  [facility]  
   
                                        Comment on above:   Performed By: #### C  
BC ####  
[facility]  
[address] [place] [address]  
Dr. Edmund Gordon   
   
                      Creatinine [Mass/Vol] 0.78 mg/dL Normal     0.55-1.02  [facility]  
   
                                        Comment on above:   Performed By: #### C  
BC ####  
[facility]  
[address] [place] [address]  
Dr. Edmund Gordon   
   
                      EGFR-AF AMERICAN >60        Normal     >=60       [facility]  
   
                                        Comment on above:   Performed By: #### C  
BC ####  
[facility]  
[address] [place] [address]  
Dr. Edmund Gordon   
   
                      EGFR-NON AF AMERICAN >60        Normal     >=60       [facility]  
   
                                        Comment on above:   Performed By: #### C  
BC ####  
[facility]  
[address] [place] [address]  
Dr. Edmund Gordon   
   
                      Globulin (S) [Mass/Vol] 3.4 g/dL   Normal                [facility]  
   
                                        Comment on above:   Performed By: #### C  
BC ####  
[facility]  
[address] [place] [address]  
Dr. Edmund Gordon   
   
                      Glucose [Mass/Vol] 263 mg/dL  Critically high      [facility]  
   
                                        Comment on above:   Performed By: #### C  
BC ####  
[facility]  
[address] [address]  
Dr. Edmund Gordon   
   
                      Potassium [Moles/Vol] 4.3 mmol/L Normal     3.5-5.1    [facility]  
   
                                        Comment on above:   Performed By: #### C  
BC ####  
[facility]  
[address] [address]  
Dr. Edmund Gordon   
   
                      Protein [Mass/Vol] 7.0 g/dL   Normal     6.4-8.2    [facility]  
   
                                        Comment on above:   Performed By: #### C  
BC ####  
[facility]  
[address] [address]  
Dr. Edmund Gordon   
   
                      Sodium [Moles/Vol] 135 mmol/L Critically low 136-145    Th  
[facility]  
   
                                        Comment on above:   Performed By: #### C  
BC ####  
[facility]  
[address] [address]  
Dr. Edmund Gordon   
   
                                                    Urea nitrogen   
[Mass/Vol]      19.0 mg/dL      Critically high 7.0-18.0        [facility]  
   
                                        Comment on above:   Performed By: #### C  
BC ####  
[facility]  
[address] [address]  
Dr. Edmund Gordon   
   
                                                    Urea   
nitrogen/Creatinine   
[Mass ratio]    24.4 mg/mg      Normal                          [facility]  
   
                                        Comment on above:   Performed By: #### C  
BC ####  
[facility]  
[address] [address]  
Dr. Edmund Gordon   
   
                                                    PROTIMEon 2022   
   
                                                    INR Coag (PPP)   
[Relative time] {INR}           Normal                          [facility]  
   
                                        Comment on above:   Performed By: #### P  
T, DDIM, PTT ####  
[facility]  
[address] [address]  
Dr. Edmund Gordon   
   
                      INR GUIDELINES SEE BELOW  Normal                [facility]  
   
                                        Comment on above:   Result Comment: KELECHI  
RED INR: 2.0 - 3.0 CONDITIONS NOT LISTED   
BELOW 2.5 - 3.5 FOR PROSTHETIC HEART VALVE REPLACEMENT 2.5 -   
3.5 RECURRENT THROMBOSIS   
   
                                                            Performed By: #### P  
T, DDIM, PTT ####  
[facility]  
[address] [address]  
Dr. Edmund Gordon   
   
                      PT Coag (PPP) [Time] 10.0 s     Normal     9.0-11.6   [facility]  
   
                                        Comment on above:   Performed By: #### P  
T, DDIM, PTT ####  
[facility]  
[address] [address]  
Dr. Edmund Gordon   
   
                                                    PTTon 2022   
   
                      aPTT Coag (Bld) [Time] 26.1 s     Normal     22.3-36.2  [facility]  
   
                                        Comment on above:   Performed By: #### P  
T, DDIM, PTT ####  
[facility]  
[address] [address]  
Dr. Edmund Gordon   
   
                                                    TROPONIN, HIGH SENSITIVITYon  
 2022   
   
                      HSTROP     <4.0       Normal     4.0-51.3   [facility]  
   
                                        Comment on above:   Result Comment: CUT-  
OFF POINTS HAVE BEEN ESTABLISHED BASED ON   
THE FOURTH UNIVERSAL DEFINITIONS OF MYOCARDIAL  
INFARCTION. THE UPPER REFERENCE LIMIT (URL) OF TROPONIN,   
DEFINED AS THE 99TH PERCENTILE OF  
cTnI DISTRIBUTION IN A REFERENCE POPULATION, HAS BEEN   
CONFIRMED AS THE DECISION THRESHOLD  
FOR MI DIAGNOSIS.   
   
                                                            Performed By: #### P  
T, DDIM, PTT ####  
[facility]  
[address] [place] [address]  
Dr. Edmund Gordon   
   
                      HSTROP     <4.0       Normal     4.0-51.3   [facility]  
   
                                        Comment on above:   Result Comment: CUT-  
OFF POINTS HAVE BEEN ESTABLISHED BASED ON   
THE FOURTH UNIVERSAL DEFINITIONS OF MYOCARDIAL  
INFARCTION. THE UPPER REFERENCE LIMIT (URL) OF TROPONIN,   
DEFINED AS THE 99TH PERCENTILE OF  
cTnI DISTRIBUTION IN A REFERENCE POPULATION, HAS BEEN   
CONFIRMED AS THE DECISION THRESHOLD  
FOR MI DIAGNOSIS.   
   
                                                            Performed By: #### C  
BC ####  
[facility]  
[address] [place] [address]  
Dr. Edmund Gordon   
   
                                                    XR CHEST 1 Von 2022   
   
                                        XR CHEST 1 V        EXAMINATION: XR CHES  
T 1   
V  
HISTORY: CHEST PAIN,   
UNSPECIFIED  
COMPARISON: 2021  
TECHNIQUE: AP portable   
erect  
FINDINGS:  
LUNGS: No significant   
pulmonary parenchymal   
abnormalities.  
VASCULATURE: No   
increased pulmonary   
vasculature.  
PLEURA: No   
pneumothorax, effusion,   
or pleural thickening.  
CARDIAC: No   
cardiomegaly or cardiac   
silhouette abnormality.  
MEDIASTINUM: No visible   
mass or adenopathy.  
BONES: No fracture or   
visible bone lesion.  
OTHER: Negative.  
IMPRESSION:  
No acute disease.  
Electronically   
authenticated by: TOMASZ CAMPOS Date: 2022   
15:44               Normal                                  [facility]  
   
                                                    ANES POSTPROC EVALon 02-15-2  
021   
   
                                        ANES POSTPROC EVAL  HNO ID: [id number]  
Author: Irene Arana  
Service: Anesthesiology  
Author Type:   
Anesthesiologist  
Type: Anesthesia   
Postprocedure   
Evaluation  
Filed: 2/15/2021 3:59   
PM  
Note Text:  
POST ANESTHESIA   
EVALUATION NOTE  
: 1965  
Procedure Summary  
Date: 02/15/21 Room /   
Location:  OR01 / [facility]   
OR  
Anesthesia Start: 1400   
Anesthesia Stop: 1550  
Procedures:  
NASAL/SINUS ENDOSCOPY   
SURGICAL W/ MAXILLARY   
ANTROSTOMY W/ REMOVAL   
OF  
TISSUE FROM MAXILLARY   
SINUS (Left Nose)  
ENDOSCOPY NASAL/SINUS   
W/ ETHMOIDECTOMY, TOTAL   
(Left Sinus)  
COBLATION TURBINATES   
INTRAMURAL (Left Nose)  
STEREOTACTIC   
COMPUTER-ASSISTED   
(NAVIGATIONAL)   
PROCEDURE CRANIAL  
EXTRADURAL (Bilateral   
Nose)  
ENDOSCOPY NASAL/SINUS   
W/ EDUARDO BULLOSA   
RESECTION (Bilateral   
Nose)  
Diagnosis:  
Chronic maxillary   
sinusitis  
(Chronic maxillary   
sinusitis [J32.0])  
Surgeons: Diana Rodriguez Responsible   
Provider: Irene Arana  
Anesthesia Type:   
general ASA Status: 4  
Anesthesia Type:   
general  
Last vitals  
Vitals Value Taken Time  
/56 02/15/21 1547  
Temp 36.6 ?C (97.9 ?F)   
02/15/21 1546  
Pulse 90 02/15/21 1558  
Resp 17 02/15/21 1558  
SpO2 97 % 02/15/21 1558  
Vitals shown include   
unvalidated device   
data.  
Post Anesthesia Patient   
Status  
Patient Evaluation:   
PACU.  
PACU/ICU Patient   
Condition: stable.  
Anticipated   
Disposition: phase 2   
then home.  
Neurological Status:   
aware and responsive.  
Pulmonary Status:   
breathing comfortably   
on room air  
Airway Control:   
returned to baseline   
unsupported.  
Cardiovascular Status:   
stable.  
Pain Management:   
clinically adequate  
Postoperative   
Hydration: acceptable.  
Intraoperative Events:  
no significant   
anesthesia events  
Post Operative   
Nausea/Vomiting Status:  
Anesthetic   
Observations:  
no significant   
anesthetic observations  
Recommendation:   
continue current plan   
of care.  
SIGNATURE: Irene Arana MD PATIENT   
NAME: Bev Gaming  
DATE: February 15, 2021   
MRN: [id number]  
TIME: 3:59 PM CSN:   
[id number]           [facility]  
   
                                                    ANES PRE-OPon 02-   
   
                                        ANES PRE-OP         HNO ID: [id number]  
Author: Irene Arana  
Service: Anesthesiology  
Author Type:   
Anesthesiologist  
Type: Anesthesia   
Preprocedure Evaluation  
Filed: 2/15/2021 1:08   
PM  
Note Text:  
ANESTHESIOLOGY DAY OF   
SURGERY NOTE  
: 1965  
Procedure(s) (LRB):  
NASAL/SINUS ENDOSCOPY   
SURGICAL W/ MAXILLARY   
ANTROSTOMY W/ REMOVAL   
OF  
TISSUE FROM MAXILLARY   
SINUS (Left)  
ENDOSCOPY NASAL/SINUS   
W/ FRONTAL SINUS   
EXPLORATION (Left)  
ENDOSCOPY NASAL/SINUS   
W/ ETHMOIDECTOMY, TOTAL   
(Left)  
ENDOSCOPIC SEPTOPLASTY   
(Left)  
COBLATION TURBINATES   
INTRAMURAL (Left)  
ENDOSCOPY NASAL/SINUS   
W/ SPHENOIDOTOMY (Left)  
STEREOTACTIC   
COMPUTER-ASSISTED   
(NAVIGATIONAL)   
PROCEDURE CRANIAL   
EXTRADURAL  
(Bilateral)  
ENDOSCOPY NASAL/SINUS   
W/ EDUARDO BULLOSA   
RESECTION (Bilateral)  
Surgeon(s):  
Diana Rodriguez  
Estimated body mass   
index is 34.21 kg/m? as   
calculated from the   
following:  
Height as of 21:   
172.7 cm (5' 8 ).  
Weight as of 21:   
102.1 kg (225 lb).  
Most recent hematocrit   
and potassium results:  
Hematocrit 46.4   
2021  
Potassium 4.1 2021  
Relevant Problems  
CARDIO  
(+) DVT of axillary   
vein, acute left (HCC)  
(+) DVT of lower   
extremity (deep venous   
thrombosis) (HCC)  
PULMONARY  
(+) COPD (chronic   
obstructive pulmonary   
disease) (HCC)  
(+) Pneumonia  
Other  
(+) Arthritis  
(+) RA (rheumatoid   
arthritis) ([facility])  
I - PHYSICAL EVALUATION  
AIRWAY  
Patient intubated: No.  
Mallampati: II.  
TM distance: >3 FB.  
Neck ROM: full ROM   
without neurological   
symptoms.  
Mouth opening:   
adequate.  
DENTAL  
Normal dental   
observations.  
Dental findings: teeth   
intact.  
Additional exam   
findings: yes.  
CARDIOVASCULAR  
Normal cardiovascular   
observations.  
Rhythm: regular  
Rate: normal  
PULMONARY  
Normal pulmonary   
observations.  
Breath sounds clear to   
auscultation.  
II - ANESTHESIA PLAN  
ASA Score: 4  
Anesthetic Plan:   
general  
Airway type: ETT  
The patient is not a   
current smoker.  
NPO Status: adequate  
Monitoring plan:   
standard ASA.   
Postoperative analgesic   
plan: multimodal  
analgesia.  
Anesthetic Risks,   
Benefits, Alternatives,   
Personnel Discussed.   
Consent  
obtained from:   
patient.Patient /   
Surrogate agrees to   
blood products: yes  
Significant changes in   
the patient condition   
since the History and  
Physical, not otherwise   
documented in primary   
service progress note:   
no.  
Potential Anesthesia   
issues that may suggest   
increased risk of  
complications or   
contraindication to   
planned procedure:   
none.  
Vitals Value Taken Time  
/64 02/15/21 1148  
Pulse 86 02/15/21 1148  
Resp 18 02/15/21 1148  
Temp 37.1 ?C (98.8 ?F)   
02/15/21 1148  
SpO2 96 % 02/15/21 1148  
Facility-Administered   
Medications as of   
2/15/2021  
Medication Dose Route   
Frequency  
- lidocaine 10 mg/mL (1   
%) 1-2 mg injection   
(XYLOCAINE) 0.1-0.2 mL  
INTRADERMAL PRN  
- lactated ringers   
infusion 5-30 mL/hr   
INTRAVENOUS CONTINUOUS  
- clindamycin iv   
piggyback 600 mg in D5W   
50 mL (CLEOCIN) 600 mg  
INTRAVENOUS Pre-Op Once  
- acetaminophen 1,000   
mg tab(s) (TYLENOL)   
1,000 mg ORAL ONCE  
- promethazine 12.5 mg   
tab(s) (PHENERGAN) 12.5   
mg ORAL Pre-Op Once  
- lactated ringers   
infusion 30 mL/hr   
INTRAVENOUS CONTINUOUS  
- lidocaine 10 mg/mL (1   
%) 1-2 mg injection   
(XYLOCAINE) 0.1-0.2 mL  
INTRADERMAL ONCE  
- [COMPLETED]   
acetaminophen 1,000 mg   
tab(s) (TYLENOL) 1,000   
mg ORAL  
Pre-Op Once  
- [COMPLETED]   
promethazine 12.5 mg   
tab(s) (PHENERGAN) 12.5   
mg ORAL Pre-Op  
Once  
- lactated ringers   
infusion 30 mL/hr   
INTRAVENOUS CONTINUOUS  
Outpatient Medications   
as of 2/15/2021  
Medication Sig  
- insulin degludec   
(TRESIBA FLEXTOUCH   
U-100) 100 unit/mL (3   
mL) injection  
pen Inject   
subcutaneously.  
- insulin aspart   
(NOVOLOG FLEXPEN U-100   
INSULIN SUBCUTANEOUS)   
Inject  
subcutaneously. sliding   
scale  
- semaglutide (OZEMPIC)   
1 mg/dose (2 mg/1.5 mL)   
pnij Inject 1 mg  
subcutaneously one time   
a week.  
- Insulin Lispro,   
Human, (HUMALOG) 100   
unit/mL crtg Inject   
subcutaneously  
w MEALS.  
- furosemide (LASIX) 40   
mg tablet Take 40 mg by   
mouth as needed.  
- Levalbuterol HCl   
(XOPENEX) 1.25 mg/0.5   
mL nebulizer solution   
Use 1  
Ampule via nebulizer   
every 8 hours as   
needed.  
I have interviewed and   
examined the patient. I   
have reviewed the   
medical  
record and/or the   
pre-anesthesia   
evaluation, pertinent   
labs, and test  
results.  
This contains updated   
information obtained   
within 48 hours of  
Surgery/Procedure.  
SIGNATURE: Irene Arana MD PATIENT   
NAME: Bev Gaming  
DATE: February 15, 2021   
MRN: [id number]  
TIME: 1:08 PM CSN:   
[id number]           [facility]  
   
                                                    NURSING PROGon 02-   
   
                                        NURSING PROG        HNO ID: [id number]  
Author: Sonia Robbins RN  
Service: Nursing  
Author Type: Registered   
Nurse  
Type: Nursing Progress   
Note  
Filed: 2/15/2021 11:13   
AM  
Note Text:  
PATIENT EDUCATION   
TOPIC: PROCEDURE /   
SURGERY: Pre-op   
Teaching:  
Logistics  
PATIENT NAME: Bev Gaming  
MRN: [id number]  
PATIENT LOCATION: [facility]   
POOL/[facility] OR POOL  
READINESS TO LEARN  
COGNITIVE ABILITY:   
Alert and oriented  
MOTIVATION TO LEARN:   
Eager  
FAMILY SUPPORT: None -   
Unavailable/disinterest  
ed  
INSTRUCTION PROVIDED   
TO: Patient  
PATIENT LEARNS BEST BY:   
Individual Instruction  
FACTORS AFFECTING   
LEARNING: None  
PHYSICAL LIMITATIONS   
AFFECTING LEARNING:   
None  
LEARNING RESPONSE  
DIAGNOSIS: ADULT: Well   
Adult  
PATIENT/FAMILY   
RESPONSE: Verbalizes   
understanding of:   
PRE-OPERATIVE  
INSTRUCTIONS-Correct   
action to take to   
follow pre-operative   
instructions  
METHOD OF INSTRUCTION:   
Individual instruction  
FOLLOW-UP PLAN:   
Complete - No need for   
follow-up  
INSTRUCTIONAL AIDS   
USED: NA  
SUPPLEMENTAL MATERIAL   
PROVIDED TO PATIENT:   
None  
REFERRAL   
(RECOMMENDATION): None  
Electronically Signed   
By: Sonia Robbins RN                  [facility]  
   
                                                    OPERATIVE NOon 02-   
   
                                        OPERATIVE NO        HNO ID: [id number]  
Author: Lele Diaz MD (Res)  
Service: Otolaryngology  
Author Type: Resident  
Type: Operative Report  
Filed: 2/15/2021 3:47   
PM  
Note Text:  
-----------------------  
-----------  
Attestation signed by   
Diana Rodriguez at   
2/15/2021 3:55 PM  
The patient was seen by   
me and examined and I   
agree with the resident   
note Dr. Diaz. I was present   
and performed the   
procedure.  
-----------------------  
-----------  
C O N F I D E N T I A L   
I N F O R M A T I O N  
-----------------------  
------  
-----------------------  
-----------------  
STANDARD [facility] DOCUMENT  
OPERATIVE REPORT  
LOG ID: [id number]  
Patient Name: Bev Gaming  
Patient MRN: [id number]  
Incision/Procedure   
Start Time: 2:42 PM  
Incision   
Close/Procedure End   
Time: 3:30 PM  
Date of Surgery:   
2/15/2021  
Surgeon(s)/Proceduralis  
t(s) and Assistant(s):  
Surgeon(s) and Role:  
* Diana Rodriguez -   
Primary  
* Lele (Geni) MD Joe   
- Resident - Assisting  
Anesthesia: General  
Procedure(s):  
1) Endoscopic left   
eduardo bullosa resectio  
2) Endoscopic left   
maxillary antrostomy  
3) Endoscopic left   
anterior ethmoidectomy   
n  
4) Bilateral inferior   
turbinate reduction  
5) Imaged guided   
surgical navigation -   
extradural  
Preoperative Diagnosis:  
1. Chronic   
Rhinosinusitis of the   
left maxillary, and   
ethmoid sinuses  
2. Left eduardo bullosa   
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  
3. Inferior turbinate   
hypertrophy bilateral  
Postoperative   
Diagnoses:  
Same as preoperative   
diagnosis  
Anesthesia:  
General  
Operative Indications:  
The patient is a 55   
year old female with a   
history of left chronic  
rhinosinusitis,   
turbinate hypertrophy   
with nasal obstruction.   
she was  
treated with maximal   
medical therapy and a   
post-treatment CT scan   
showed  
persistent disease.   
Therefore, the risks,   
benefits, and   
alternatives of  
the above procedures   
were discussed and the   
patient agreed to   
proceed.  
Operative Findings:  
Mucosal disease   
involving the the left   
maxillary and ethmoid   
sinuses.  
Inferior turbinate   
hypertrophy   
bilaterally.  
Procedure Narrative:  
The patient was brought   
into the operating room   
and laid in a supine  
position on the   
operating room table. A   
surgical huddle was   
conducted to  
verify the patient and   
the procedure to be   
performed. General   
anesthesia  
was induced and the   
patient's airway was   
secured with an ET   
tube. A time  
out was called. The   
nasal cavities were   
sprayed with 0.5%   
Afrin. The left  
eduardo bullosa was then   
injected with lidocaine   
1% with 1:100,000  
epinephrine.  
Next, image guidance   
was attached and   
registered. Once the   
image guidance  
was calibrated, the   
nasal cavities were   
examined with a 0   
degree  
endoscope.  
The patient was noted   
to have enlarged   
inferior turbinates on   
the right  
and left. The inferior   
turbinates were   
outfractured laterally   
with a  
freer elevator.  
Using a 0 degree   
endoscope, attention   
was placed to the left   
nasal cavity.  
A knife was used to   
make an incision into   
the left middle   
turbinate  
inferiorly all the way   
superomedially to   
remove the eduardo. This   
was this  
refined using a   
through-cut inferiorly   
and superiorly. The   
eduardo bullosa  
was resected opening   
the middle meatus   
adequately. The double   
ball probe  
was then used to   
anteriorly deflect the   
uncinate, and an   
uncinectomy was  
performed with a   
retrograde approach   
using a combination of   
the back-biter  
and microdebrider. The   
probe was then used to   
cannulate the maxillary   
os  
and a large antrostomy   
in continuity with the   
natural os was created.   
The  
edges were refined with   
the microdebrider. Once   
a large maxillary  
antrostomy was created,   
attention was then   
turned to the ethmoid   
air  
cells. Using a J-   
curette, the ethmoid   
bulla was entered   
inferomedially.  
Further dissection was   
carried superolaterally   
freeing the rest of the  
bulla.  
Attention was then   
placed to the inferior   
turbinates, first the   
right. A  
15 blade was used to   
make a vertical   
incision posterior to   
the head of the  
inferior turbinate down   
to the conchal bone. A   
morris elevator was   
then  
used to elevate a   
mucoperiosteal pocket   
over the medial and   
inferior  
aspect of the   
turbinate. Using the   
turbinate   
micro-debrider, a   
submucosal  
resection was completed   
and then the pocket was   
cauterized using the  
bioplar function. The   
right nasal cavity was   
suctioned, and pledgets   
were  
then removed from the   
left side, and a   
submucosal resection   
was then  
completed in the exact   
same fashion on the   
left.  
The nasopharynx was   
then suctioned and the   
nose was copiously   
irrigated  
with normal saline.   
Hemostasis was achieved   
as necessary. Nasopore   
packing  
was then deployed in   
the left.This completed   
the surgical procedure.   
The  
patient was turned over   
to the anesthesia team   
for awakening and  
extubation. she was   
transferred to the PACU   
in stable condition.  
Specimens:  
Left sinonasal contents  
Implants:  
none  
Complications:  
None  
Estimated Blood Loss:  
10 cc  
Attestation:  
The primary surgeon   
performed the surgery   
with assistance from  
resident(s).  
Dictated by Lele Diaz MD for the   
service of Dr. Rodriguez [facility]  
   
                                                    PLAN OF CAREon 02-   
   
                                        PLAN OF CARE        HNO ID: [id number]  
Author: Argenis Herndon   
([email or web address])  
Service: Pharmacy  
Author Type: ?  
Type: Plan of Care  
Filed: 2021 5:15   
PM  
Note Text:  
TECHNICIAN BEDSIDE   
DELIVERY SURVEY  
1. Patient to use   
[facility]   
Bedside Delivery - YES  
Insurance Information   
as follows:  
2. Insurance card on   
file - YES  
3. Credit card for   
payment - N/A       [facility]  
   
                                        PLAN OF CARE        HNO ID: [id number]  
Author: [email or web address] [email or web address])  
Service: Pharmacy  
Author Type: ?  
Type: Plan of Care  
Filed: 2021 5:15   
PM  
Note Text:  
Pharmacy Discharge   
Medication Service:  
This patient has   
elected to receive   
their discharge   
prescriptions through  
the [facility]   
Pharmacy Bedside   
Prescription Delivery   
program. The  
prescriptions are   
currently being   
processed. A follow-up   
note will be  
entered once the   
prescriptions have been   
filled and delivered to   
the  
patient. Please contact   
me with any questions   
or updates to the   
patient's  
discharge medications.  
Argenis Herndon ([email or web address])  
DCT Contact Info: [phone] [facility]  
   
                                        PLAN OF CARE        HNO ID: [id number]  
Author: [email or web address] [email or web address])  
Service: Pharmacy  
Author Type: ?  
Type: Plan of Care  
Filed: 2021 5:16   
PM  
Note Text:  
PHARMACY BEDSIDE   
DELIVERY SERVICE  
Patient Name: Bev Gaming  
MRN: [id number]  
The marked outpatient   
medications were filled   
and picked up at   
pharmacy.  
Medication List  
START taking these   
medications  
clindamycin 300 mg   
capsule  
Commonly known as:   
Cleocin HCL  
Take 1 capsule by mouth   
three times daily for 7   
days.  
DEEP SEA NASAL 0.65 %   
nasal spray  
Generic drug: sodium   
chloride  
Use 1 Spray in the nose   
as needed.  
HYDROcodone-acetaminoph  
en 5-325 mg per tablet  
Commonly known as:   
NORCO  
Take 1 tablet by mouth   
every 8 hours as needed   
for up to 7 days.  
Nasal Decongestant   
(Oxymetazl) 0.05 %   
nasal spray  
Generic drug:   
oxymetazoline  
Instill 2 Sprays in the   
nose twice daily.  
CONTINUE taking these   
medications  
ATIVAN 0.5 mg  
Generic drug: LORazepam  
HumaLOG U-100 Insulin   
100 unit/mL Crtg  
Generic drug: Insulin   
lispro (Human)  
LASIX 40 mg tablet  
Generic drug:   
furosemide  
NOVOLOG FLEXPEN U-100   
INSULIN SUBCUTANEOUS  
OXYGEN (HOME THERAPY)  
OZEMPIC 1 mg/dose (2   
mg/1.5 mL) Pnij  
Generic drug:   
semaglutide  
TRESIBA FLEXTOUCH U-100   
100 unit/mL (3 mL)   
injection pen  
Generic drug: insulin   
degludec  
Xopenex 1.25 mg/0.5 mL   
nebulizer solution  
Generic drug:   
levalbuterol HCl  
You might also be   
taking other   
medications not listed   
above. If you have  
questions about any of   
your other medications,   
talk to the person who  
prescribed them or your   
Primary Care Provider.  
Argenis Herndon (Pharmacy   
Tech)  
PAGER: [phone]  
February 15, 2021 5:15   
PM                  [facility]  
   
                                                    PT EDon 02-   
   
                                        PT ED               HNO ID: [id number]  
Author: Obdulia (Rn)   
CARINA Andino  
Service: Nursing  
Author Type: Registered   
Nurse  
Type: Patient Education  
Filed: 2/15/2021 6:02   
PM  
Note Text:  
PATIENT EDUCATION   
TOPIC: PROCEDURE /   
SURGERY: Post-op   
Teaching: Med  
Administration, Symptom   
Management and Wound   
Care  
SURVIVAL SKILLS:   
Fatigue Management  
Pain Management  
Safety Precautions  
LIFE STYLE CHANGES:   
Exercise, Safety   
Precautions and Sick   
Day Management  
PATIENT NAME: Bev Gaming  
MRN: [id number]  
PATIENT LOCATION: [facility]   
POOL/[facility] OR POOL  
READINESS TO LEARN  
COGNITIVE ABILITY:   
Alert and oriented  
MOTIVATION TO LEARN:   
Interested  
FAMILY SUPPORT: Unable   
to assess - Family not   
present  
INSTRUCTION PROVIDED   
TO: Patient  
PATIENT LEARNS BEST BY:   
Written Instruction -   
Hand-outs  
FACTORS AFFECTING   
LEARNING: None  
PHYSICAL LIMITATIONS   
AFFECTING LEARNING:   
None  
LEARNING RESPONSE  
DIAGNOSIS: ADULT: Well   
Adult  
PATIENT/FAMILY   
RESPONSE: Verbalizes   
understanding of:   
POST-PROCEDURE  
INSTRUCTIONS-Correct   
actions to take to   
reduce post procedure  
complications  
METHOD OF INSTRUCTION:   
Written instruction -   
handouts  
FOLLOW-UP PLAN:   
Complete - No need for   
follow-up  
INSTRUCTIONAL AIDS   
USED: NA  
SUPPLEMENTAL MATERIAL   
PROVIDED TO PATIENT:   
None  
REFERRAL   
(RECOMMENDATION): None  
Electronically Signed   
By: Obdulia Andino RN Normal                                  [facility]  
   
                                                    SURGICAL PATHOLOGYon 02-15-2  
021   
   
                                        SURGICAL PATHOLOGY  Specimen originated   
from [facility]  
Specimen #: [id number]  
Submitting Physician:   
DIANA RODRIGUEZ M.D.  
_______________________  
_____  
FINAL DIAGNOSIS  
Left sinus shavings,   
excision  
- Chronic sinusitis.  
[facility] 2021  
Den Moore M.D.  
(Electronic Signature)  
_______________________  
_______________________  
_____________________  
SPECIMEN SUBMITTED  
A: LEFT SINUS SHAVING  
  
CLINICAL DATA  
CHRONIC MAXILLARY   
SINUSITIS; LEFT   
ENDOSCOPIC SINUS   
SURGERY AND TURBINATE  
REDUCTION  
GROSS DESCRIPTION  
A. Received in formalin   
designated  left sinus   
shavings  are multiple  
pink-tan hemorrhagic   
and gelatinous   
fragments of tissue   
that aggregate to  
2.5 x 2.5 x 0.2 cm. The   
specimen is entirely   
filtered in one   
cassette.  
LYNN/todd 2021  
Gross examination   
performed at [facility], [address] [address]  
Patient ID #: [id number]  
Date of Report:   
2021  
Date of Procedure:   
2/15/2021  
Date of Receipt:   
2021  
Submitted by: DIANA RODRIGUEZ M.D.  
Location: [facility]  
Diagnostic   
interpretation   
performed at [facility], [address] [id number].   
CLIA Number: [id number] [facility]  
   
                                                    HOSPon 2020   
   
                                        HOSP                Patient:Fátima Gaming  
MRN:  
Height:5' 8 (1.727 m)  
Weight:225 lb (102.059   
kg)  
Outpatient Medications   
as of 2/15/21:  
insulin degludec   
(TRESIBA FLEXTOUCH   
U-100) 100 unit/mL (3   
mL) injection pen  
LORazepam (ATIVAN) 0.5   
mg  
OXYGEN, HOME THERAPY,  
insulin aspart (NOVOLOG   
FLEXPEN U-100 INSULIN   
SUBCUTANEOUS)  
semaglutide (OZEMPIC) 1   
mg/dose (2 mg/1.5 mL)   
pnij  
Insulin Lispro, Human,   
(HUMALOG) 100 unit/mL   
crtg  
furosemide (LASIX) 40   
mg tablet  
Levalbuterol HCl   
(XOPENEX) 1.25 mg/0.5   
mL nebulizer solution  
Admission/Clinic   
Administered   
Medications as of   
2/15/21:  
lidocaine 10 mg/mL (1   
%) 1-2 mg injection   
(XYLOCAINE)  
lactated ringers   
infusion  
clindamycin iv   
piggyback 600 mg in D5W   
50 mL (CLEOCIN)  
acetaminophen 1,000 mg   
tab(s) (TYLENOL)  
promethazine 12.5 mg   
tab(s) (PHENERGAN)  
lactated ringers   
infusion  
lidocaine 10 mg/mL (1   
%) 1-2 mg injection   
(XYLOCAINE)  
lactated ringers   
infusion  
Problem List:  
Pneumonia [J18.9]  
Arthritis [M19.90]  
Diabetes (HCC) [E11.9]  
COPD (chronic   
obstructive pulmonary   
disease) ([facility]) [J44.9]  
Overweight [E66.3]  
RA (rheumatoid   
arthritis) ([facility])   
[M06.9]  
Depression [F32.9]  
Hypogammaglobulinemia   
([facility]) [D80.1]  
Displacement of   
cervical intervertebral   
disc with myelopathy   
[M50.00]  
Cervical herniation   
[M50.20]  
DVT of axillary vein,   
acute left ([facility])   
[I82.A12]  
DVT of lower extremity   
(deep venous   
thrombosis) ([facility])   
[I82.409]  
Cervical myelopathy   
([facility]) [G95.9]  
HX: anticoagulation   
[Z92.29]  
S/P cervical spinal   
fusion [Z98.1]  
Smoking [F17.200]  
Class 2 obesity [E66.9]  
Allergies:  
Effexor [Venlafaxine   
Analogues]  
Penicillins  
Reglan [Metoclopramide   
Hcl]  
Topamax [Topiramate]  
Date Verified: 2/15/21  
Lab Values  
Lab Value Units Date   
High Low  
POTA* 4.1 mmol/L   
2021 5.1 3.7  
HEMA* 46.4 % 2021   
46.0 36.0  
Progress Notes (OTOL   
[facility]):  
Nichole Cunningham RN   
2021 3:29 PM   
Signed  
Note copied from Triage   
nurse:  
Patient calling-  
- states she has had a   
constant HA/ pressure x   
1 month - worsening .  
- HA under Left eye and   
posterior L head.  
- rating pain  8    
currently.  
- pain is worse in   
afternoon  
- has tried tylenol,   
motrin, aleve, Fioricet   
without relief.  
- denies- vision   
changes, Fever, chills.  
- is to have sinus   
surgery 2/15  
?  
- please advise.  
?  
- advised if pain is   
severe and unbearable   
she should proceed to   
ER for Eval.  
?  
- verbalized   
understanding. Nichole Cunningham RN 2021   
3:29 PM Signed  
Call returned to the   
patient. She states   
that she has a lot of   
pain and  
pressure on her left   
side of her face, nose   
and head. She is not   
able to  
breathe out of the left   
side of her nose due to   
the congestion. She   
states that  
she uses her sinus   
rinse but is not fully   
able to get a good   
stream into the  
left. She states that   
she was not able to get   
an antibiotic last time   
she was  
here due to having long   
history of C diff. She   
was asking if her   
surgery date  
could be moved up or if   
not what else could be   
done. Please advise.   
Nichole Hilliard [facility]   
2021 8:03 AM   
Signed  
Unable to accommodate   
sooner at [facility]. Please advise   
if able to  
reschedule sooner at   
[facility].  
Progress Notes ([facility]):  
Nika Shepherd RN   
2021 3:05 PM   
Signed  
Patient calling-  
- states she has had a   
constant HA/ pressure x   
1 month - worsening .  
- HA under Left eye and   
posterior L head.  
- rating pain  8    
currently.  
- pain is worse in   
afternoon  
- has tried tylenol,   
motrin, aleve, Fioricet   
without relief.  
- denies- vision   
changes, Fever, chills.  
- is to have sinus   
surgery 2/15  
- please advise.  
- advised if pain is   
severe and unbearable   
she should proceed to   
ER for Eval.  
- verbalized   
understanding.      Normal                                  [facility]  
   
                                                    CT SINUS STEREO WO IVCONon 1  
2020   
   
                                                                  [facility]  
   
                                                    HIP RIGHT 1 OR 2 VWS WITH PE  
LVISon 2017   
   
                                                    HIP RIGHT 1 OR 2 VWS   
WITH PELVIS                             [facility]   
[id number] [place]   
[phone]   
=======================  
=====Patient Name:   
BEV GAMING :   
1965Sex: FAge:   
Race: WhiteMRN:   
[id number]. Location:   
OUTPPatient Status:   
OVisit #:   
[id number] Date:   
2017 7:25:00   
AMCompleted Date:   
2017 09:17   
AMRequesting Provider:   
TOMASZ BARAJAS Attending   
Provider: TOMASZ BARAJAS   
Report Copy To: Signs &   
Symptoms: intra op   
right hipHistory:   
arthroscopyComments:   
Exam: HIP RIGHT 1 OR 2   
VWS WITH   
PELVISAccession #:   
[id number]  
=======================  
=======================  
============HIP RIGHT 1   
OR 2 VWS WITH PELVIS   
2017 9:17 AM EDT   
SIGNS AND SYMPTOMS:   
intra op right hip   
TECHNOLOGIST COMMENTS:   
intra op right hip   
arthroscopy 58 seconds   
fluoro time used Dr. Barajas QUESTION FOR THE   
RADIOLOGIST: PROTOCOL:   
AP(PA) and Lateral   
views were obtained.   
COMPARISON: None   
FINDINGS: Soft tissues:   
Bones: Joints:   
IMPRESSION:   
Documentation   
Electronically signed   
by:Nicholas Bishop.   
Transcribed by:   
[email or web address], User   
Resident:   
Electronically Signed   
by: NICHOLAS BISHOP @   
2017 09:45 AM Normal                                  The   
[facility]  
   
                                                    Operative Reporton   
7   
   
                                        Operative Report    MR#: [id number]   
[facility]   
Pt. Name: Bev Gaming Room #: 0C   
Discharge Date:   
YOB: 1965   
OPERATIVE REPORTDATE OF   
SURGERY:   
2017SURGEON:   
Tomasz Barajas M.D.PREOPERATIVE   
DIAGNOSIS: Right hip   
psoas   
tendonitis.POSTOPERATIV  
E DIAGNOSIS: Right hip   
psoas   
tendonitis.ASSISTANT:   
Dr. Gab Albrecht.ANESTHESIA:   
General.PROCEDURE   
PERFORMED: Right hip   
psoas tendon   
release.INDICATIONS:   
Bev is a   
52-year-old woman, who   
has been having   
rightinternal snapping   
hip syndrome for the   
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 She has   
triedphysical therapy.   
She has tried   
anti-inflammatories.   
She continues tohave   
pain. On physical   
examination, she had   
tightness and   
tenderness topalpation   
of the psoas tendon. We   
even tried a   
corticosteroid   
injectionand she   
continues to have pain.   
I therefore offered to   
her a right hippsoas   
tendon release. She had   
a similar procedure   
back in , which   
shedid beautifully   
from.PROCEDURE IN   
DETAIL: After   
confirmation and   
marking of the   
correctsurgical   
extremity in the   
preoperative holding   
area, the patient   
wasbrought back to the   
operating suite and   
placed in the supine   
position. Allpressure   
points were adequately   
padded. General   
endotracheal   
anesthesiawas smoothly   
induced. Preoperative   
antibiotics were   
administered. Theright   
lower extremity was   
prepped and draped in a   
sterile fashion.   
Afterobservation of a   
surgical time-out   
procedure using 2   
separate   
patientidentifiers, we   
began with the case.We   
first started with   
application of traction   
with a total traction   
time of24 minutes. We   
then introduced C-arm   
and using fluoroscopy,   
created ourstandard   
lateral and mid lateral   
portals. A 70 degree   
arthroscope wasinserted   
into the hip itself. We   
then began with a   
diagnostic   
arthroscopy.We first   
inspected all the   
cartilage surfaces in   
the acetabular labrum.   
Notears appreciated.   
There was, however,   
inflammation seen   
around the psoastendon,   
which was tight and   
inflamed. I therefore   
performed a psoas   
tendonrelease,   
releasing only the   
tendinous portion, but   
preserving the   
muscularportion of the   
psoas tendon and   
muscle.At this point,   
all instruments were   
removed and the hip was   
drained offluid. The   
portal incisions were   
closed using simple   
Steri-Strips. Asterile   
compressive wrap was   
applied. The patient   
was then   
awakened,extubated, and   
brought back to the   
PACU in stable   
condition. I   
waspresent, scrubbed,   
and actively   
participating through   
all key portions ofthe   
surgery.ESTIMATED BLOOD   
LOSS:   
Minimal.COMPLICATIONS:   
None.DISPOSITION: To   
the PACU in stable   
condition.POSTOPERATIVE   
PLAN: I will see   
Bev back in 10-14   
days' time forsuture   
removal and initiation   
of physical therapy.   
She may beweightbearing   
as tolerated.   
Nonetheless, we will   
use aspirin for   
DVTprophylaxis over the   
next 7   
days.Electronically   
Signed by:Tomasz Barajas M.D. 2017   
02:21   
P______________________  
______________Tomasz Barajas M.D.Date Dict:   
2017/09:22   
A/Tomasz Barajas M.D.Date Trans:   
2017 03:53   
[id number]  
09cc: Sherman Malone D.O. [facility], [address] [address]   Normal                                  The   
[facility]  
   
                                                    POC GLUCOSE LABon 2017  
   
   
                      Glucose mass conc 193 mg/dL  High            The   
[facility]  
   
                                        Comment on above:   Performed By: #### 8  
[id number] ####[facility] [facility] [place] [address], [facility]   
   
                      Glucose mass conc 225 mg/dL  High            The   
[facility]  
   
                                        Comment on above:   Performed By: #### 8  
[id number] ####[facility] [facility] [place] [address], [facility]   
  
  
  
Vital Signs  
  
  
                          Date Time    Vital Sign   Value        Performing   
Clinician                               Facility  
   
                                                    10-   
14:          Body height         172.72 cm           DO Sherman Malone  
Work Phone:   
[phone]                          [facility]  
   
                                                    10-   
14:                              Body mass index   
(BMI) [Ratio]             30.2 kg/m2                DO Sherman Faisal  
Work Phone:   
[phone]                          [facility]  
   
                                                    10-   
14:          Body temperature    98.2 [degF]         DO Sherman Faisal  
Work Phone:   
[phone]                          [facility]  
   
                                                    10-   
14:          Body weight         90.26 kg            DO Sherman Faisal  
Work Phone:   
[phone]                          [facility]  
   
                                                    10-   
14:                              Diastolic blood   
pressure                  77 mm[Hg]                 DO Sherman Faisal  
Work Phone:   
[phone]                          [facility]  
   
                                                    10-   
14:          Heart rate          98 /min             DO Sherman Faisal  
Work Phone:   
[phone]                          [facility]  
   
                                                    10-   
14:          Respiratory rate    20 /min             DO Sherman Malone  
Work Phone:   
[phone]                          [facility]  
   
                                                    10-   
14:                              SaO2% (BldA) [Mass   
fraction]                 95 %                      DO Sherman Malone  
Work Phone:   
[phone]                          [facility]  
   
                                                    10-   
14:                              Systolic blood   
pressure                  109 mm[Hg]                DO Sherman Faisal  
Work Phone:   
[phone]                          [facility]  
   
                                                    2024   
13:          Body height         172.72 cm           DO Sherman Faisal  
Work Phone:   
[phone]                          [facility]  
   
                                                    2024   
13:          Body weight         90.15 kg            DO Sherman Faisal  
Work Phone:   
[phone]                          [facility]  
   
                                                    2024   
13:          Body temperature    98.7 [degF]         DO Sherman Faisal  
Work Phone:   
[phone]                          [facility]  
   
                                                    2024   
13:                              Diastolic blood   
pressure                  73 mm[Hg]                 DO Sherman Faisal  
Work Phone:   
[phone]                          [facility]  
   
                                                    2024   
13:          Heart rate          87 /min             DO Sherman Malone  
Work Phone:   
[phone]                          [facility]  
   
                                                    2024   
13:          Respiratory rate    18 /min             DO Sherman Malone  
Work Phone:   
[phone]                          [facility]  
   
                                                    2024   
13:                              SaO2% (BldA) [Mass   
fraction]                 93 %                      DO Sherman Malone  
Work Phone:   
[phone]                          [facility]  
   
                                                    2024   
13:                              Systolic blood   
pressure                  152 mm[Hg]                DO Sherman Malone  
Work Phone:   
[phone]                          [facility]  
   
                                                    09-   
13:          Body height         172.7 cm            Sherman Malone DO  
Work Phone:   
[phone]                          [facility]  
   
                                                    09-   
13:                              Body mass index   
(BMI) [Ratio]             30.26 kg/m2               Sherman Malone DO  
Work Phone:   
[phone]                          [facility]  
   
                                                    09-   
13:          Body weight         90.27 kg            Sherman Malone DO  
Work Phone:   
[phone]                          [facility]  
   
                                                    09-   
13:                              Diastolic blood   
pressure                  82 mm[Hg]                 Sherman Malone DO  
Work Phone:   
[phone]                          [facility]  
   
                                                    09-   
13:          Heart rate          95 /min             Sherman Malone DO  
Work Phone:   
[phone]                          [facility]  
   
                                                    09-   
13:                              SaO2% (BldA) [Mass   
fraction]                 92 %                      Sherman Malone DO  
Work Phone:   
[phone]                          [facility]  
   
                                                    09-   
13:                              Systolic blood   
pressure                  130 mm[Hg]                Sherman Malone DO  
Work Phone:   
[phone]                          [facility]  
   
                                                    2024   
10:          Body height         172.7 cm            Concepcion Frazier   
DO  
Work Phone:   
[phone]                          [facility]  
   
                                                    2024   
10:                              Body mass index   
(BMI) [Ratio]             30.26 kg/m2               Concepcion Petznick   
DO  
Work Phone:   
[phone]                          [facility]  
   
                                                    2024   
10:          Body temperature    98.29 [degF]        Concepcion Petznick   
DO  
Work Phone:   
[phone]                          [facility]  
   
                                                    2024   
10:          Body weight         90.27 kg            Concepcion Petznick   
DO  
Work Phone:   
[phone]                          [facility]  
   
                                                    2024   
10:                              Diastolic blood   
pressure                  64 mm[Hg]                 Concepcion Petznick   
DO  
Work Phone:   
[phone]                          [facility]  
   
                                                    2024   
10:          Heart rate          78 /min             Concepcion Petznick   
DO  
Work Phone:   
[phone]                          [facility]  
   
                                                    2024   
10:                              SaO2% (BldA) [Mass   
fraction]                 96 %                      Concepcion Petznick   
DO  
Work Phone:   
[phone]                          [facility]  
   
                                                    2024   
10:                              Systolic blood   
pressure                  112 mm[Hg]                Concepcion Petznick   
DO  
Work Phone:   
[phone]                          [facility]  
   
                                                    2024   
13:          Body height         172.7 cm            [facility]  
Work Phone:   
[phone]                          [facility]  
   
                                                    2024   
13:                              Body mass index   
(BMI) [Ratio]             30.41 kg/m2               [facility]  
Work Phone:   
[phone]                          [facility]  
   
                                                    2024   
13:          Body temperature    98.01 [degF]        [facility]  
Work Phone:   
[phone]                          [facility]  
   
                                                    2024   
13:          Body weight         90.72 kg            [facility]  
Work Phone:   
[phone]                          [facility]  
   
                                                    Comment on   
above:                                  actual weight   
   
                                                    2024   
13:                              Diastolic blood   
pressure                  78 mm[Hg]                 [facility]  
Work Phone:   
[phone]                          [facility]  
   
                                                    2024   
13:          Heart rate          87 /min             [facility]  
Work Phone:   
[phone]                          [facility]  
   
                                                    2024   
13:          Respiratory rate    16 /min             [facility]  
Work Phone:   
[phone]                          [facility]  
   
                                                    2024   
13:                              SaO2% (BldA) [Mass   
fraction]                 95 %                      [facility]  
Work Phone:   
[phone]                          [facility]  
   
                                                    2024   
13:                              Systolic blood   
pressure                  118 mm[Hg]                [facility]  
Work Phone:   
[phone]                          [facility]  
   
                                                    2024   
10:          Body temperature    98.2 [degF]         Diana Rodriguez MD  
Work Phone:   
[phone]                          [facility]  
   
                                                    2024   
15:      Body height     172.72 cm                       [facility]  
   
                                                    2024   
15:                              Body mass index   
(BMI) [Ratio]       30.9 kg/m2                              [facility]  
   
                                                    2024   
15:      Body temperature 97.3 [degF]                     [facility]  
   
                                                    2024   
15:      Body weight     92.07 kg                        [facility]  
   
                                                    2024   
15:                              Diastolic blood   
pressure            86 mm[Hg]                               [facility]  
   
                                                    2024   
15:      Heart rate      94 /min                         [facility]  
   
                                                    2024   
15:      Respiratory rate 20 /min                         [facility]  
   
                                                    2024   
15:                              SaO2% (BldA) [Mass   
fraction]           96 %                                    [facility]  
   
                                                    2024   
15:                              Systolic blood   
pressure            141 mm[Hg]                              [facility]  
   
                                                    2023   
12:                              Diastolic blood   
pressure                  80 mm[Hg]                 DO Sherman Malone  
Work Phone:   
[phone]                          [facility]  
   
                                                    2023   
12:          Heart rate          102 /min            DO Sherman Malone  
Work Phone:   
[phone]                          [facility]  
   
                                                    2023   
12:                              Inhaled oxygen flow   
rate                      3.5 L/min                 DO Sherman Malone  
Work Phone:   
[phone]                          [facility]  
   
                                                    2023   
12:          Respiratory rate    18 /min             DO Sherman Malone  
Work Phone:   
[phone]                          [facility]  
   
                                                    2023   
12:                              SaO2% (BldA) [Mass   
fraction]                 97 %                      DO Sherman Malone  
Work Phone:   
[phone]                          [facility]  
   
                                                    2023   
12:                              Systolic blood   
pressure                  138 mm[Hg]                DO Sherman Malone  
Work Phone:   
[phone]                          [facility]  
   
                                                    2023   
07:          Body temperature    98.1 [degF]         DO Sherman Malone  
Work Phone:   
[phone]                          [facility]  
   
                                                    2023   
05:          Body weight         92.2 kg             DO Sherman Malone  
Work Phone:   
[phone]                          [facility]  
   
                                                    2023   
18:          Body height         172.72 cm           DO Sherman Malone  
Work Phone:   
[phone]                          [facility]  
   
                                                    2023   
16:                              Diastolic blood   
pressure                  59 mm[Hg]                 DO Sherman Malone  
Work Phone:   
[phone]                          [facility]  
   
                                                    2023   
16:          Heart rate          79 /min             DO Sherman Malone  
Work Phone:   
[phone]                          [facility]  
   
                                                    2023   
16:          Respiratory rate    16 /min             DO Sherman Malone  
Work Phone:   
[phone]                          [facility]  
   
                                                    2023   
16:                              SaO2% (BldA) [Mass   
fraction]                 100 %                     DO Sherman Malone  
Work Phone:   
[phone]                          [facility]  
   
                                                    2023   
16:                              Systolic blood   
pressure                  120 mm[Hg]                DO Sherman Malone  
Work Phone:   
[phone]                          [facility]  
   
                                                    2023   
16:                              Inhaled oxygen flow   
rate                      4 L/min                   DO Sherman Malone  
Work Phone:   
[phone]                          [facility]  
   
                                                    2023   
14:          Body temperature    97.9 [degF]         DO Sherman Malone  
Work Phone:   
[phone]                          [facility]  
   
                                                    2023   
13:          Body height         172.72 cm           DO Sherman Malone  
Work Phone:   
[phone]                          [facility]  
   
                                                    2023   
13:          Body weight         87.08 kg            DO Sherman Malone  
Work Phone:   
[phone]                          [facility]  
   
                                                    2023   
16:          Heart rate          82 /min             DO Sherman Malone  
Work Phone:   
[phone]                          [facility]  
   
                                                    2023   
16:                              Inhaled oxygen flow   
rate                      4 L/min                   DO Sherman Malone  
Work Phone:   
[phone]                          [facility]  
   
                                                    2023   
16:          Respiratory rate    20 /min             DO Sherman Malone  
Work Phone:   
[phone]                          [facility]  
   
                                                    2023   
11:          Body temperature    98 [degF]           DO Sherman Malone  
Work Phone:   
[phone]                          [facility]  
   
                                                    2023   
11:                              Diastolic blood   
pressure                  77 mm[Hg]                 DO Sherman Malone  
Work Phone:   
[phone]                          [facility]  
   
                                                    2023   
11:                              SaO2% (BldA) [Mass   
fraction]                 93 %                      DO Sherman Malone  
Work Phone:   
[phone]                          [facility]  
   
                                                    2023   
11:                              Systolic blood   
pressure                  119 mm[Hg]                DO Sherman Malone  
Work Phone:   
[phone]                          [facility]  
   
                                                    2023   
06:          Body weight         82 kg               DO Sherman Malone  
Work Phone:   
[phone]                          [facility]  
   
                                                    2023   
14:          Body height         172.72 cm           DO Sherman Malone  
Work Phone:   
[phone]                          [facility]  
   
                                                    2023   
23:                              Diastolic blood   
pressure                  67 mm[Hg]                 DO Sherman Malone  
Work Phone:   
[phone]                          [facility]  
   
                                                    2023   
23:          Heart rate          82 /min             DO Sherman Malone  
Work Phone:   
[phone]                          [facility]  
   
                                                    2023   
23:                              Inhaled oxygen flow   
rate                      4 L/min                   DO Sherman Malone  
Work Phone:   
[phone]                          [facility]  
   
                                                    2023   
23:          Respiratory rate    22 /min             DO Sherman Malone  
Work Phone:   
[phone]                          [facility]  
   
                                                    2023   
23:                              SaO2% (BldA) [Mass   
fraction]                 98 %                      DO Sherman Malone  
Work Phone:   
[phone]                          [facility]  
   
                                                    2023   
23:                              Systolic blood   
pressure                  140 mm[Hg]                DO Sherman Malone  
Work Phone:   
[phone]                          [facility]  
   
                                                    2023   
18:          Body height         172.72 cm           DO Sherman Malone  
Work Phone:   
[phone]                          [facility]  
   
                                                    2023   
18:          Body temperature    98.2 [degF]         DO Sherman Malone  
Work Phone:   
[phone]                          [facility]  
   
                                                    2023   
18:          Body weight         89.81 kg            DO Sherman Malone  
Work Phone:   
[phone]                          [facility]  
   
                                                    2023   
11:                              Diastolic blood   
pressure                  76 mm[Hg]                 Abad Singleton  
Work Phone:   
[phone]                           [facility]  
   
                                                    2023   
11:          Heart rate          80 /min             Abad Quentin  
Work Phone:   
[phone]                           [facility]  
   
                                                    2023   
11:          Mean blood pressure 95 mm[Hg]           Abad Singleton  
Work Phone:   
[phone]                           [facility]  
   
                                                    2023   
11:          Respiratory rate    15 /min             Abad Quentin  
Work Phone:   
[phone]                           [facility]  
   
                                                    2023   
11:                              SaO2% (BldA) [Mass   
fraction]                 99 %                      Abad Quentin  
Work Phone:   
[phone]                           [facility]  
   
                                                    2023   
11:                              Systolic blood   
pressure                  132 mm[Hg]                Baad Quentin  
Work Phone:   
[phone]                           [facility]  
   
                                                    2023   
10:                              Diastolic blood   
pressure                  83 mm[Hg]                 Abad Quentin  
Work Phone:   
[phone]                           [facility]  
   
                                                    2023   
10:          Heart rate          85 /min             Abad Quentin  
Work Phone:   
[phone]                           [facility]  
   
                                                    2023   
10:          Mean blood pressure 97 mm[Hg]           Abad Quentin  
Work Phone:   
[phone]                           [facility]  
   
                                                    2023   
10:          Respiratory rate    15 /min             Abad Quentin  
Work Phone:   
[phone]                           [facility]  
   
                                                    2023   
10:                              SaO2% (BldA) [Mass   
fraction]                 100 %                     Abad Quentin  
Work Phone:   
[phone]                           [facility]  
   
                                                    2023   
10:                              Systolic blood   
pressure                  124 mm[Hg]                Abad Quentin  
Work Phone:   
[phone]                           [facility]  
   
                                                    2023   
10:          Hourly Rounding                         Abad Quentin  
Work Phone:   
[phone]                           [facility]  
   
                                                    2023   
10:          Promise to Return                       Abad Quentin  
Work Phone:   
[phone]                           [facility]  
   
                                                    2023   
10:          Heart rate          83 /min             Abad Quentin  
Work Phone:   
[phone]                           [facility]  
   
                                                    2023   
10:          Respiratory rate    20 /min             Abad Quentin  
Work Phone:   
[phone]                           [facility]  
   
                                                    2023   
10:                              SaO2% (BldA) [Mass   
fraction]                 99 %                      Abad Singleton  
Work Phone:   
[phone]                           [facility]  
   
                                                    2023   
09:          Respiratory rate    19 /min             Abad Singleton  
Work Phone:   
[phone]                           [facility]  
   
                                                    2023   
09:          Body temperature    98.24 [degF]        Abad Singleton  
Work Phone:   
[phone]                           [facility]  
   
                                                    2023   
09:                              Diastolic blood   
pressure                  96 mm[Hg]                 Abad Singleton  
Work Phone:   
[phone]                           [facility]  
   
                                                    2023   
09:          Heart rate          91 /min             Abad Singleton  
Work Phone:   
[phone]                           [facility]  
   
                                                    2023   
09:          Mean blood pressure 116 mm[Hg]          Abad Singleton  
Work Phone:   
[phone]                           [facility]  
   
                                                    2023   
09:          Respiratory rate    20 /min             Abad Singleton  
Work Phone:   
[phone]                           [facility]  
   
                                                    2023   
09:                              Systolic blood   
pressure                  155 mm[Hg]                Abad Singleton  
Work Phone:   
[phone]                           [facility]  
   
                                                    2023   
12:          Body height         167.64 cm           Erendira Alen  
Other Phone:   
[phone]                           [facility]   
[employer]   
[employer]  
Other Phone:   
[phone]  
   
                                                    2023   
12:                              Body mass index   
(BMI) [Ratio]             31.95 kg/m2               Erendira Alen  
Other Phone:   
[phone]                           [facility]   
[employer]   
[employer]  
Other Phone:   
[phone]  
   
                                                    2023   
12:          Body temperature    96.5 [degF]         Erendira Alen  
Other Phone:   
[phone]                           [facility]   
[employer]   
[employer]  
Other Phone:   
[phone]  
   
                                                    2023   
12:          Body weight         89.81 kg            Erendira Alen  
Other Phone:   
[phone]                           [facility]   
[employer]   
[employer]  
Other Phone:   
[phone]  
   
                                                    2023   
12:                              Diastolic blood   
pressure                  78 mm[Hg]                 Erendira Alen  
Other Phone:   
[phone]                           [facility]   
[employer]  
Other Phone:   
[phone]  
   
                                                    2023   
12:          Respiratory rate    20 /min             Erendira Alen  
Other Phone:   
[phone]                           [facility]   
[employer]  
Other Phone:   
[phone]  
   
                                                    2023   
12:                              SaO2% (BldA) [Mass   
fraction]                 97 %                      Erendira Alen  
Other Phone:   
[phone]                           [facility]   
[employer]  
Other Phone:   
[phone]  
   
                                                    2023   
12:                              Systolic blood   
pressure                  125 mm[Hg]                Erenidra Alen  
Other Phone:   
[phone]                           [facility]   
[employer]  
Other Phone:   
[phone]  
   
                                                    2022   
11:          Body height         167.64 cm           Erendira Alne  
Other Phone:   
[phone]                           [facility]   
[employer]  
Other Phone:   
[phone]  
   
                                                    2022   
11:                              Body mass index   
(BMI) [Ratio]             33.25 kg/m2               Erendira Alen  
Other Phone:   
[phone]                           [facility]   
[employer]  
Other Phone:   
[phone]  
   
                                                    2022   
11:          Body temperature    97.4 [degF]         Erendira Alen  
Other Phone:   
[phone]                           [facility]   
[employer]  
Other Phone:   
[phone]  
   
                                                    2022   
11:          Body weight         93.44 kg            Erendira Alen  
Other Phone:   
[phone]                           [facility]   
[employer]  
Other Phone:   
[phone]  
   
                                                    2022   
11:                              Diastolic blood   
pressure                  78 mm[Hg]                 Erendira Alen  
Other Phone:   
[phone]                           [facility]   
[employer]  
Other Phone:   
[phone]  
   
                                                    2022   
11:          Respiratory rate    20 /min             Erendira Alen  
Other Phone:   
[phone]                           [facility]   
[employer]  
Other Phone:   
[phone]  
   
                                                    2022   
11:                              SaO2% (BldA) [Mass   
fraction]                 96 %                      Erendira Alen  
Other Phone:   
[phone]                           [facility]   
[employer]  
Other Phone:   
[phone]  
   
                                                    2022   
11:                              Systolic blood   
pressure                  130 mm[Hg]                Erendira Alen  
Other Phone:   
[phone]                           [facility]   
[employer]  
Other Phone:   
[phone]  
   
                                                    2021   
11:          Body height         167.64 cm           Estuardo Cooper  
Other Phone:   
[phone]                           [facility]   
[employer]  
Other Phone:   
[phone]  
   
                                                    2021   
11:                              Body mass index   
(BMI) [Ratio]             31.95 kg/m2               Estuardo Cooper  
Other Phone:   
[phone]                           [facility]   
[employer]  
Other Phone:   
[phone]  
   
                                                    2021   
11:          Body weight         89.81 kg            Estuardo Cooper  
Other Phone:   
[phone]                           [facility]   
[employer]  
Other Phone:   
[phone]  
   
                                                    2021   
11:                              Diastolic blood   
pressure                  91 mm[Hg]                 Estuardo Cooper  
Other Phone:   
[phone]                           [facility]   
[employer]  
Other Phone:   
[phone]  
   
                                                    2021   
11:                              Systolic blood   
pressure                  124 mm[Hg]                Estuardo Cooper  
Other Phone:   
[phone]                           [facility]   
[employer]   
[employer]  
Other Phone:   
[phone]  
  
  
  
Encounters  
  
  
                          Encounter Date Encounter Type Care Provider Facility  
   
                                                    Start: 2024  
End: 2024           ambulatory                Demetrice Jewel LSW  
Work Phone:   
[phone]                          [facility]  
   
                                                    Start: 10-  
End: 10-           ambulatory                DO Sherman Malone  
Work Phone:   
[phone]                          [facility]  
Work Phone:   
[phone]  
   
                                                    Start: 10-  
End: 10-                         Patient encounter   
procedure                               DO Sherman Malone  
Work Phone:   
[phone]                          [facility]   
[facility]  
Work Phone:   
[phone]  
   
                                                    Start: 2024  
End: 2024     Refsteffi Marrufo LPN      [facility]  
   
                                        Comment on above:   Chronic cough   
   
                                                    Start: 2024  
End: 2024                         Emergency department   
patient visit                           DO Sherman Malone  
Work Phone:   
[phone]                          [facility]   
Room  
Work Phone:   
[phone]  
   
                                                    Start: 09-  
End: 09-           Bamboo flowsheet          Sherman Malone DO  
Work Phone:   
[phone]                          [facility]  
   
                                                    Start: 09-  
End: 09-           Bamboo flowsheet          Sherman Malone DO  
Work Phone:   
[phone]                          [facility]  
   
                                                    Start: 09-  
End: 09-                         Office outpatient visit   
25 minutes                              Sherman ARIAS Faisal DO  
Work Phone:   
[phone]                          [facility]  
   
                                        Comment on above:   Acute asthmatic bron  
chitis (CMS/[facility]) (Primary Dx);  
Wheezing;  
Acute cough;  
Nasal drainage   
   
                                                    Start: 09-  
End: 09-     ambulatory          SHERMAN MALONE    Not Available  
   
                                                    Start: 2024  
End: 2024                         Patient encounter   
procedure                               Diana Rodriguez MD  
Work Phone:   
[phone]                          Otolaryngology  
   
                                        Comment on above:   Other chronic sinusi  
tis (Primary Dx)   
   
                                                    Start: 2024  
End: 2024           ambulatory                SHERMAN MALONE                                  Facility:[facility]  
   
                                                    Start: 2024  
End: 2024                         Office outpatient visit   
25 minutes                              Concepcion Frazier   
DO  
Work Phone:   
[phone]                          [facility]   
   
                                        Comment on above:   Type 2 diabetes pauline  
itus with other diabetic neurological   
complication (CMS/HCC)   
   
                                                    Start: 2024  
End: 2024     ambulatory          CONCEPCION FRAZIER  Not Available  
   
                                                    Start: 2024  
End: 2024           Telephone encounter       Sonia Trujillo RN                              Otolaryngology  
   
                                        Comment on above:   Post Op Follow Up   
   
                                                    Start: 2024  
End: 2024                         Clinisync Result   
Encounter                               Generic External   
Data Provider                           [facility] External Department   
Unsolicited  
   
                                                    Start: 2024  
End: 2024                         Clinisync Result   
Encounter                               Generic External   
Data Provider                           [facility] External Department   
Unsolicited  
   
                                                    Start: 2024  
End: 2024           ambulatory                SHERMAN MALONE                                  Facility:[facility]  
   
                                                    Start: 2024  
End: 2024     ambulatory          SHERMAN MALONE    Not Available  
   
                                                    Start: 2024  
End: 2024                         Admission to   
establishment                           [facility]  
Work Phone:   
[phone]                          Pre Anesthesia  
   
                                                    Start: 2024  
End: 2024           ambulatory                SHERMAN MALONE                                  Facility:[facility]  
   
                                                    Start: 2024  
End: 2024           Anesthesia consultation   [facility]  
Work Phone:   
[phone]                          Pre Anesthesia  
   
                                        Comment on above:   Pre-op evaluation (P  
rimary Dx);  
Type 2 diabetes mellitus with other specified complication, without   
long-term current use of insulin ([facility]);  
Acute deep vein thrombosis (DVT) of other vein of lower extremity,   
unspecified laterality ([facility]);  
Anxiety;  
Class 2 obesity;  
Smoking;  
Chronic obstructive pulmonary disease, unspecified COPD type ([facility]);  
Alpha-1-antitrypsin deficiency ([facility]);  
Gastroesophageal reflux disease, unspecified whether esophagitis   
present   
   
                                        Start: 2024   Encounter for other   
preprocedural   
examination               SHERMAN MALONE            [facility]  
   
                                                    Start: 2024  
End: 2024                         Preprocedural   
examination done                        [facility]  
Work Phone:   
[phone]                          [facility]  
Work Phone:   
[phone]  
   
                                                    Start: 2024  
End: 2024                         Office outpatient visit   
25 minutes                              Zachary Cooper MD  
Work Phone:   
[phone]                          Otolaryngology  
   
                                        Comment on above:   Dysfunction of both   
eustachian tubes (Primary Dx);  
History of tympanostomy tube placement;  
History of hyperbaric oxygen therapy;  
Sensorineural hearing loss, bilateral   
   
                                                    Start: 2024  
End: 2024           ambulatory                SHERMAN MALONE                                  Facility:[facility]  
   
                                                    Start: 2024  
End: 2024                         Patient encounter   
procedure                               Amanda TOMLIN  
Work Phone:   
[phone]                          Audiology  
   
                                        Comment on above:   Sensorineural hearin  
g loss, bilateral (Primary Dx);  
Tinnitus, bilateral;  
Ear pressure, bilateral;  
Otalgia of both ears;  
Dizziness   
   
                                Start: 2024 Telephone encounter Diana friend MD  
Work Phone:   
[phone]                          [facility]  
   
                                Start: 2024 Refill          Diana mercado MD  
Work Phone:   
[phone]                          Otolaryngology  
   
                                        Comment on above:   Med Change Request   
   
                                                    Start: 2024  
End: 2024                         Patient encounter   
procedure                               Diana Rodriguez MD  
Work Phone:   
[phone]                          Otolaryngology  
   
                                        Comment on above:   Chronic pansinusitis  
 (Primary Dx);  
Vascular headache   
   
                                                    Start: 2024  
End: 2024           ambulatory                SHERMAN MALONE                                  Facility:[facility]  
   
                                                    Start: 2024  
End: 2024     ambulatory                              [facility]  
Work Phone:   
[phone]  
   
                                                    Start: 2024  
End: 2024                         Patient encounter   
procedure                                           [facility]   
[facility]  
Work Phone:   
[phone]  
   
                                                    Start: 2024  
End: 2024           ambulatory                SHERMAN MALONE                                  Facility:[facility]  
   
                                                    Start: 2024  
End: 2024                         Subsequent hospital   
visit by physician                      [facility]  
Work Phone:   
[phone]                          Radiology  
   
                                        Comment on above:   Other chronic sinusi  
tis [J32.8]   
   
                                                    Start: 2024  
End: 2024           ambulatory                SHERMAN MALONE                                  Facility:[facility]  
   
                                                    Start: 2024  
End: 2024                         Patient encounter   
procedure                               Diana Rodriguez MD  
Work Phone:   
[phone]                          Otolaryngology  
   
                                        Comment on above:   Chronic pansinusitis  
 (Primary Dx);  
Other chronic sinusitis;  
Allergic rhinitis, unspecified seasonality, unspecified trigger;  
Vascular headache;  
Vertigo, peripheral, bilateral   
   
                                                    Start: 2024  
End: 2024     ambulatory          SHERMAN MALONE    Not Available  
   
                                                    Start: 2024  
End: 2024     ambulatory          CONCEPCION PEÑA KRISTIEASHISH  Not Available  
   
                                                    Start: 2024  
End: 2024     ambulatory          SHERMAN MALONE    Not Available  
   
                                        Start: 2024   Patient encounter   
procedure                 Generic Provider          [facility]  
   
                                                    Start: 2024  
End: 2024     ambulatory          SHERMAN MALONE    Not Available  
   
                                                    Start: 2023  
End: 2023     ambulatory          JS MOTLEY        Not Available  
   
                                                    Start: 2023  
End: 2023                         Evaluation and   
management of inpatient                 DO Sherman Malone  
Work Phone:   
[phone]                          [facility]   
Surg  
Work Phone:   
[phone]  
   
                                                    Start: 2023  
End: 2023           observation encounter     DO Sherman Malone  
Work Phone:   
[phone]                          [facility]  
Work Phone:   
[phone]  
   
                                                    Start: 2023  
End: 2023           ambulatory                Erendira Alen  
Other Phone:   
[phone]                           [employer]  
Other Phone:   
[phone]  
   
                          Start: 2023 Telephone encounter Erendira Alen   [facility]  
 Pulmonary Disease  
   
                                                    Start: 2023  
End: 2023                         Evaluation and   
management of inpatient                 DO Sherman Malone  
Work Phone:   
[phone]                          [facility]   
Surg  
Work Phone:   
[phone]  
   
                                                    Start: 2023  
End: 2023           ambulatory                Erendira Alen  
Other Phone:   
[phone]                           [employer]  
Other Phone:   
[phone]  
   
                          Start: 2023 Telephone encounter Erendira Alen   [facility]  
 Pulmonary Disease  
   
                                                    Start: 2023  
End: 2023                         Emergency department   
patient visit                           DO Sherman Malone  
Work Phone:   
[phone]                          [facility]   
Room  
Work Phone:   
[phone]  
   
                                                    Start: 2023  
End: 2023                         Emergency department   
patient visit             Abad Singleton                Facility:[facility]  
   
                                                    Start: 2023  
End: 2023                         Emergency department   
patient visit                           Abad Singleton  
Work Phone:   
[phone]                           [facility]  
Work Phone: [phone]  
   
                                                    Start: 2023  
End: 2023     ambulatory          GOOD CERVANTES .  Facility:[facility]  
   
                                                    Start: 2023  
End: 2023           ambulatory                Erendira Alen  
Other Phone:   
[phone]                           [employer]  
Other Phone:   
[phone]  
   
                          Start: 2023 Telephone encounter Erendira Alen   [facility]  
 Pulmonary Disease  
   
                                                    Start: 2023  
End: 2023     ambulatory          BERNIE JASVIR        Facility:  
   
                                                    Start: 2023  
End: 2023           ambulatory                Erendira Alen  
Other Phone:   
[phone]                           [employer]  
Other Phone:   
[phone]  
   
                          Start: 2023 Telephone encounter Erendira Alen   [facility]  
 Pulmonary Disease  
   
                                                    Start: 2023  
End: 2023           ambulatory                Erendira Alen  
Other Phone:   
[phone]                           [employer]  
Other Phone:   
[phone]  
   
                                        Start: 2023   Office outpatient vi  
sit   
25 minutes                Erendira Alen                [facility]  
   
                                                    Start: 2022  
End: 2022     ambulatory          DR SHERMAN HART .    Facility:[facility]  
   
                                                    Start: 2022  
End: 11-     ambulatory          DR SHERMAN MALONE   Facility:[facility]  
   
                                                    Start: 10-  
End: 10-     ambulatory          DR SHERMAN MALONE   Facility:[facility]  
   
                                                    Start: 2022  
End: 2022           ambulatory                Erendira Alen  
Other Phone:   
[phone]                           [employer]  
Other Phone:   
[phone]  
   
                                        Start: 2022   Office outpatient ne  
w   
45 minutes                Erendira Alen                [facility]  
   
                          Start: 2022 ambulatory   DR SHERMAN MALONE Facil  
ity:[facility]  
   
                                                    Start: 2022  
End: 2022                         Evaluation and   
management of inpatient   DR SHERMAN MALONE         Facility:[facility]  
   
                                                    Start: 2022  
End: 2022     ambulatory          DR SHERMAN MALONE   Facility:[facility]  
   
                                                    Start: 2022  
End: 2022     ambulatory          DR SHERMAN MALONE   Facility:[facility]  
   
                                                    Start: 2021  
End: 2021           ambulatory                Dagmar Casarez  
Other Phone:   
[phone]                           [employer]  
Other Phone:   
[phone]  
   
                          Start: 2021 Telephone encounter Dagmar Casarez    [facility]  
   
                                                    Start: 2021  
End: 2021           ambulatory                Estuardo Cooper  
Other Phone:   
[phone]                           [employer]  
Other Phone:   
[phone]  
   
                                        Start: 2021   Patient encounter   
procedure                 Estuardo Cooper             [facility] G[facility]ology  
   
                                                    Start: 2020  
End: 2020                         Subsequent hospital   
visit by physician                      [facility]  
Work Phone:   
[phone]                          Radiology  
   
                                        Comment on above:   Chronic pansinusitis  
 [J32.4]   
   
                                                    Start: 2017  
End: 2017     Ambulatory          TOMASZ BARAJAS        Facility:[facility]  
  
  
  
Procedures  
  
  
                          Date         Procedure    Procedure Detail Performing   
Clinician  
   
                          Start: 09- STATUS COVID-19/FLU              Arnold Malone DO  
Work Phone:   
[phone]  
   
                                        Start: 2024   Hemoglobin glycosyla  
ken   
a1c                                                 Concepcion Frazier   
DO  
Work Phone:   
[phone]  
   
                          Start: 2024 [facility]                
Generic External   
Data Provider  
   
                          Start: 2024 HEARING TEST/AUDIOGRAM                
Amanda Daigle SADA  
Work Phone:   
[phone]  
   
                                        Start: 2024   Ct maxillofacial w/o  
   
contrast material                                   Diana Rodriguez MD  
Work Phone:   
[phone]  
   
                                        Start: 2023   Investigation of   
transfusion reaction                                DO Sherman Malone  
Work Phone:   
[phone]  
   
                          Start: 2023 Respiratory Panel (PCR)               
 DO Sherman Malone  
Work Phone:   
[phone]  
   
                          Start: 2023 Plain chest X-ray              DO aJs Malone  
Work Phone:   
[phone]  
   
                          Start: 2023 Respiratory Panel (PCR)               
 DO Sherman Malone  
Work Phone:   
[phone]  
   
                          Start: 2023 Plain chest X-ray              DO Jas Malone  
Work Phone:   
[phone]  
   
                          Start: 2023 Plain chest X-ray              DO Jas Malone  
Work Phone:   
[phone]  
   
                                        Start: 2020   Ct maxillofacial w/o  
   
contrast material                                   Bernardino BOCANEGRA.CNP  
Work Phone:   
[phone]  
   
                          Start: 2020 Incision AND drainage              AYUSH Singleton  
Work Phone:   
[phone]  
   
                                        Comment on above:   LEFT ANKLE   
   
                          Start: 2019 Closure of skin wound              AYUSH Singleton  
Work Phone:   
[phone]  
   
                                        Comment on above:   LEFT ANKLE I & D , W  
OUND CLOSURE   
   
                                        Start: 2019   External hyperostosi  
s   
(morphologic abnormality)                           Abad Singleton  
Work Phone:   
[phone]  
   
                                        Comment on above:   removal lt. mid foot  
,manipulation   
   
                          Start: 2019 Fasciotomy of foot              Abad Singleton  
Work Phone:   
[phone]  
   
                                        Comment on above:   left   
   
                          Start: 2019 Stabilization              Abad luque  
Work Phone:   
[phone]  
   
                                        Comment on above:   left ankle   
   
                          Start: 10- Mammography               Generic Pr  
ovider  
   
                          Start: 2017 ANESTH ARTHROSCOPY OF HIP             
   QUENTIN BENÍTEZ  
   
                          Start: 2017 ARTHROSCOPY OF JOINT              DA  
VID H KARL  
   
                                        Start: 2015   lateral ankle   
stabilization right which   
is the Helder modification   
of the Key procedure.   
Endoscopic plantar   
fasciotomy right. 6                                 Abad Singleton  
Work Phone:   
[phone]  
   
                                        Comment on above:   application of below  
 knee posterior splint   
   
                          Start: 2014 Colonoscopy               Pacc 2  
Work Phone:   
[phone]  
   
                                        Start: 2014   ACDF C5-6, structrua  
l   
allograft                                           Abad Singleton  
Work Phone:   
[phone]  
   
                                        Start: 2014   lateral ankle   
stabilization, Helder   
modification of the Key   
procedure. synovectomy   
peroneal brevis and   
longus tendon 7                                     Abad Singleton  
Work Phone:   
[phone]  
   
                                        Comment on above:   application of below  
 knee posterior splint   
   
                                        Start: 2013   endoscopic plantar   
fasciotomy                                          Abad Singleton  
Work Phone:   
[phone]  
   
                          Start: 2013 right hip surgery x2 8                
Abad Singleton  
Work Phone:   
[phone]  
   
                                        Comment on above:    cut a tendon and cl  
eaned out debris    
   
                                        Start: 2012   Repair of umbilical   
hernia                                              Abad Singleton  
Work Phone:   
[phone]  
   
                                       Abdominal hysterectomy              Abad flores  
Work Phone:   
[phone]  
   
                                       Appendectomy              Abad Singleton  
Work Phone:   
[phone]  
   
                                        section              Abad Singleton  
Work Phone:   
[phone]  
   
                                       Cholecystectomy              Abad Singleton  
Work Phone:   
[phone]  
   
                                       Colonoscopy               Abad Singleton  
Work Phone:   
[phone]  
   
                                                H/O: surgery    History of   
tympanostomy tube   
placement                               Zachary Cooper MD  
Work Phone:   
[phone]  
   
                                       right knee surgery x2              Abad Stafford  
omas  
Work Phone:   
[phone]  
   
                                       Shoulder 9                Abad Singleton  
Work Phone:   
[phone]  
   
                                        Comment on above:   bilateral shoulder s  
milvia   
   
                                                            Stability of ankle   
(observable entity)                                 Abad Singleton  
Work Phone:   
[phone]  
   
                                       Tonsillectomy              Abad Singleton  
Work Phone:   
[phone]  
  
  
  
Plan of Treatment  
  
  
                          Date         Care Activity Detail       Author  
   
                                Start: 2025 Glaucoma screening Diabetes: R  
etinopathy   
Screening                               [facility]  
   
                                                    Start: 2025  
End: 2025                         Patient encounter   
procedure                               2025 10:45 AM EST   
Office Visit [facility]   
 [address]   
[address] [place]   
[address] [phone]   
Concepcion Frazier, DO   
[address]   
[place] [address]   
[phone] (Work)   
[phone] (Fax)                      [facility]   
   
                                        Start: 2025   Hemoglobin A1c   
measurement               HbA1C                     [facility]  
   
                                                    Start: 2025  
End: 2025                         Patient encounter   
procedure                               2025 11:15 AM EST   
Office Visit [facility]   
IM [address] [place]   
[address] [phone]   
Sherman Malone DO   
[address]   
[place] [address]   
[phone] (Work)   
[phone] (Fax)                      [facility]  
   
                                        Start: 2024   Hemoglobin A1c   
measurement                             Diabetes: Hemoglobin   
A1C                                     [facility]  
   
                                        Start: 2024   Screening for   
malignant neoplasm of   
colon                                               [facility]  
   
                          Start: 2024                           [facility]  
   
                                                    Start: 09-  
End: 09-                         Patient encounter   
procedure                               09/10/2024 1:30 PM EDT   
Office Visit [facility]   
IM [address]   
[address] [place]   
[address] [phone]   
Sherman Malone, DO   
[address]   
[place] [address]   
[phone] (Work)   
[phone] (Fax)   
Arrived                                 [facility]  
   
                                        Comment on above:   Arrived   
   
                          Start: 2024 Influenza vaccination              C  
[facility]  
   
                                                    Start: 2024  
End: 2024                         Patient encounter   
procedure                                           Otolaryngology  
   
                                        Comment on above:   POST OP   
   
                                        Start: 2024   Hemoglobin A1c   
measurement               HbA1C                     [facility]  
   
                                                    Start: 2024  
End: 2024                         Patient encounter   
procedure                               2024 10:45 AM EDT   
Office Visit [facility] [address]   
[address] [place]   
[address] [phone]   
Concepcion Frazier, DO   
[address]   
[place] [address]   
[phone] (Work)   
[phone] (Fax) Type   
2 diabetes mellitus   
with other diabetic   
neurological   
complication (CMS/HCC)                  [facility]   
   
                                        Comment on above:   Type 2 diabetes pauline  
itus with other diabetic neurological   
complication (CMS/HCC)   
   
                                                    Start: 2024  
End: 2024                         Abltj sof tiss inf   
turbs uni/bi supfc   
intramural                              COBLATION TURBINATES   
INTRAMURAL Chronic   
pansinusitis 2024   
10:15 AM EDT                             [facility]  
   
                                                    Start: 2024  
End: 2024                         Admission to same day   
surgery center                          2024 10:15 AM EDT   
- 2024 1:15 PM   
EDT Surgery Admitting   
[address] [place], [place] [id number]   
Zachary Cooper MD [address] [place], [place] [id number] [phone] (Work)   
[phone] (Fax)   
NASAL/SINUS ENDOSCOPY   
SURGICAL W/ MAXILLARY   
ANTROSTOMY W/ REMOVAL   
OF TISSUE FROM   
MAXILLARY SINUS                         Admitting  
   
                                        Comment on above:   NASAL/SINUS ENDOSCOP  
Y SURGICAL W/ MAXILLARY ANTROSTOMY W/   
REMOVAL OF   
TISSUE FROM MAXILLARY SINUS   
   
                                                    Start: 2024  
End: 2024                         Nasal/sinus endoscopy   
w/sphenoidotomy                         ENDOSCOPY NASAL/SINUS   
W/ SPHENOIDOTOMY   
Chronic pansinusitis   
2024 10:15 AM EDT                  [facility]  
   
                                                    Start: 2024  
End: 2024                         Nasal/sinus ndsc   
w/rmvl tiss from   
frontal sinus                           ENDOSCOPY NASAL/SINUS   
W/ FRONTAL SINUS   
EXPLORATION Chronic   
pansinusitis 2024   
10:15 AM EDT                             [facility]  
   
                                                    Start: 2024  
End: 2024                         Nasal/sinus ndsc   
w/total ethoidectomy                    ENDOSCOPY NASAL/SINUS   
W/ ETHMOIDECTOMY, TOTAL   
Chronic pansinusitis   
2024 10:15 AM EDT                  [facility]  
   
                                                    Start: 2024  
End: 2024                         Nsl/sinus ndsc max   
antrost w/rmvl tiss   
max sinus                               NASAL/SINUS ENDOSCOPY   
SURGICAL W/ MAXILLARY   
ANTROSTOMY W/ REMOVAL   
OF TISSUE FROM   
MAXILLARY SINUS Chronic   
pansinusitis 2024   
10:15 AM EDT                             [facility]  
   
                                                    Start: 2024  
End: 2024                         Septoplasty/submucous   
resecj w/wo cartilage   
grf                                     ENDOSCOPIC SEPTOPLASTY   
Chronic pansinusitis   
2024 10:15 AM EDT                  [facility]  
   
                                                    Start: 2024  
End: 2024                         Strtctc cptr asstd px   
extradural cranial                      STEREOTACTIC   
COMPUTER-ASSISTED   
(NAVIGATIONAL)   
PROCEDURE CRANIAL   
EXTRADURAL Chronic   
pansinusitis 2024   
10:15 AM EDT                             [facility]  
   
                                        Start: 2024   Subsequent hospital   
visit by physician                      2024 10:15 AM EDT   
Hospital Encounter   
Admitting [address] [place] [address]   
Zachary Cooper MD [address]   
[place] [address] [phone] (Work)   
[phone] (Fax)   
Chronic pansinusitis   
[J32.4]                                 Admitting  
   
                                        Comment on above:   Chronic pansinusitis  
 [J32.4]   
   
                                                    Start: 2024  
End: 2024                         Patient encounter   
procedure                               2024 4:00 PM EDT   
Office Visit   
Otolaryngology    
[address] [address]   
[phone] Diana Rodriguez MD [address] [place] [address]   
[phone] (Work)   
[phone] (Fax)   
Follow up                               Otolaryngology  
   
                                        Comment on above:   Follow up   
   
                                                    Start: 2024  
End: 2024                         Patient encounter   
procedure                                           Audiology  
   
                                        Comment on above:   Vertigo, peripheral,  
 bilateral [H81.393]   
   
                                        Start: 2024   Hemoglobin A1c   
measurement                             Diabetes: Hemoglobin   
A1C                                     [facility]  
   
                                        Start: 2024   Urine screening for   
protein                                 Diabetes: Urine Protein   
Screening                               [facility]  
   
                                Start: 2024 Glaucoma screening Diabetes: R  
etinopathy   
Screening                               [facility]  
   
                                        Start: 2023   Covid-19 Vaccine (3   
-   
2023-24 season)                         Covid-19 Vaccine (3 -   
2023-24 season)                         [facility]  
   
                                        Start: 2023   Covid-19 Vaccine ( season)                         Covid-19 Vaccine ( season)                         [facility]  
   
                          Start: 2023 Influenza vaccination INFLUENZA (#1)  
 [facility]  
   
                          Start: 2023 Aerobic Culture Aerobic Culture [facility]  
   
                                        Start: 2023   Microbial culture of  
   
sputum                                              [facility]  
   
                                                    Start: 2023  
End: 2023                                             [facility]  
   
                          Start: 2023 Hospital admission              [facility]  
   
                                        Start: 2023   Microbial culture of  
   
sputum                                              [facility]  
   
                          Start: 2023                           [facility]  
   
                          Start: 2023                           [facility]  
   
                          Start: 2023 Consultation              [facility]  
   
                          Start: 2023 Hospital admission              [facility]  
   
                          Start: 2023                           [facility]  
   
                          Start: 2023 Plain chest X-ray XR chest 1V portab  
le [facility]  
   
                          Start: 2023 XR Chest Single view              Fi  
[facility]  
   
                                        Start: 2021   COVID-19 VACCINE (3   
-   
Pfizer series)                          COVID-19 VACCINE (3 -   
Pfizer series)                          [facility]  
   
                                        Start: 10-   Screening for   
malignant neoplasm of   
breast                                              [facility]  
   
                                        Start: 2017   SHINGRIX VACCINE (1   
of   
2)                                      SHINGRIX VACCINE (1 of   
2)                                      [facility]  
   
                                        Start: 2015   Screening for   
malignant neoplasm of   
colon                                               [facility]  
   
                          Start: 2012 COLOGUARD (FIT-DNA) COLOGUARD (FIT-D  
NA) [facility]  
   
                          Start: 2012 Colonoscopy  COLONOSCOPY  [facility]  
   
                                        Start: 2012   COLORECTAL CANCER   
SCREENING                               COLORECTAL CANCER   
SCREENING                               [facility]  
   
                          Start: 2012 CT COLONOGRAPHY CT COLONOGRAPHY [facility]  
   
                          Start: 2012 FECAL OCCULT BLOOD FECAL OCCULT BLOO  
D [facility]  
   
                                        Start: 2012   Screening for   
malignant neoplasm of   
colon                                               [facility]  
   
                          Start: 2012 SIGMOIDOSCOPY SIGMOIDOSCOPY [facility]  
   
                          Start: 2007 Mammography  MAMMOGRAM    [facility]  
   
                          Start: 1997 HPV TESTING  HPV TESTING  [facility]  
   
                                        Start: 1997   Screening for   
malignant neoplasm of   
cervix                                              [facility]  
   
                                        Start: 1997   Zoledronic acid   
therapy                                 ALPHA-1 ANTITRYPSIN   
DEFICIENCY SCREENING                    [facility]  
   
                          Start: 1988 PAP TESTING  PAP TESTING  [facility]  
   
                                        Start: 1988   Screening for   
malignant neoplasm of   
cervix                                              [facility]  
   
                                        Start: 1986   Hepatitis B Vaccine   
(1   
of 3 - 19+ 3-dose   
series)                                 Hepatitis B Vaccine (1   
of 3 - 19+ 3-dose   
series)                                 [facility]  
   
                                        Start: 1986   Urine microalbumin   
profile                                             [facility]  
   
                                        Start: 1985   ANNUAL PCP TEAM   
CHRONIC DISEASE VISIT                   ANNUAL PCP TEAM CHRONIC   
DISEASE VISIT                           [facility]  
   
                                        Start: 1985   Hepatitis B surface   
antibody level            LDL CHOLESTEROL           [facility]  
   
                          Start: 1985 HEPATITIS C SCREENING HEPATITIS C [facility]  
   
                          Start: 1985 Hepatitis C screening Hepatitis C [facility]  
   
                          Start: 1985 HIV SCREENING HIV SCREENING [facility]  
   
                          Start: 1985 HIV screening HIV Screening [facility]  
   
                          Start: 1985 SPIROMETRY   SPIROMETRY   [facility]  
   
                                        Start: 1977   3 comp foot exam   
completed                 DIABETIC FOOT EXAM        [facility]  
   
                                        Start: 1977   Diabetic foot   
examination               Diabetic Foot Exam        [facility]  
   
                          Start: 1977 Glaucoma screening Dilated Retinal E  
xam [facility]  
   
                                Start: 1977 Hepatitis B screening URINE   
ALBUMIN:CREATININE   
RATIO                                   [facility]  
   
                                        Start: 1977   Hepatitis C antibody  
,   
confirmatory test         DILATED RETINAL EXAM      [facility]  
   
                          Start: 1973 PNEUMOCOCCAL (1 - PCV) PNEUMOCOCCAL   
(1 - PCV) [facility]  
   
                                        Start: 1973   Pneumococcal   
vaccination                             Pneumococcal Vaccine (1   
of 2 - PCV)                             [facility]  
   
                                        Start: 1972   Hemoglobin   
A1c/Hemoglobin.total   
in Blood                  HBA1C                     [facility]  
   
                                        Start: 1967   HEPATITIS B (1 of 3   
-   
3-dose series)                          HEPATITIS B (1 of 3 -   
3-dose series)                          [facility]  
   
                                        Start: 1967   Screening for   
malignant neoplasm of   
colon                                               [facility]  
   
                                                            Abltj sof tiss inf   
turbs uni/bi supfc   
intramural                              COBLATION TURBINATES   
INTRAMURAL Chronic   
pansinusitis                            [facility]  
   
                                                            Abltj sof tiss inf   
turbs uni/bi supfc   
intramural                              COBLATION TURBINATES   
INTRAMURAL Chronic   
pansinusitis                            [facility]  
   
                                       Anion gap measurement              [facility]  
   
                                                            aPTT in Platelet poo  
r   
plasma by Coagulation   
assay                                               [facility]  
   
                                                            Bacteria identified   
in   
Unspecified specimen   
by Aerobe culture                                   [facility]  
   
                                                            Basophils [#/volume]  
   
in Blood by Automated   
count                                               [facility]  
   
                                                            Basophils/100   
leukocytes in Blood by   
Automated count                                     [facility]  
   
                                                      
End: 2025                         CT Guidance for   
stereotactic   
localization of   
Unspecified body   
region-- WO contrast                    CT SINUS STEREO WO   
IVCON Radiology Routine   
Other chronic sinusitis   
1 Occurrences starting   
2024 until   
2025                              [facility]  
Work Phone:   
[phone]  
   
                                        Comment on above:   1 Occurrences starti  
ng 2024 until 2025   
   
                                                            Eosinophils/100   
leukocytes in Blood by   
Automated count                                     [facility]  
   
                                                            Erythrocyte   
distribution width   
[Ratio] by Automated   
count                                               [facility]  
   
                                                            Erythrocytes   
[#/volume] in Blood                                 [facility]  
   
                                                            Hematocrit [Volume   
Fraction] of Blood                                  [facility]  
   
                                                            Hemoglobin   
[Mass/volume] in Blood                              [facility]  
   
                                                            INR in Platelet poor  
   
plasma by Coagulation   
assay                                               [facility]  
   
                                                            Leukocytes [#/volume  
]   
corrected for   
nucleated erythrocytes   
in Blood by Automated   
coun                                                [facility]  
   
                                                            Leukocytes [#/volume  
]   
in Blood                                            [facility]  
   
                                                            Lymphocytes [#/volum  
e]   
in Blood by Automated   
count                                               [facility]  
   
                                                            Lymphocytes/100   
leukocytes in Blood by   
Automated count                                     [facility]  
   
                                                            MCH [Entitic mass] b  
y   
Automated count                                     [facility]  
   
                                                            MCHC [Mass/volume] b  
y   
Automated count                                     [facility]  
   
                                                            MCV [Entitic volume]  
   
by Automated count                                  [facility]  
   
                                                            Monocytes [#/volume]  
   
in Blood by Automated   
count                                               [facility]  
   
                                                            Monocytes/100   
leukocytes in Blood by   
Automated count                                     [facility]  
   
                                                            Nasal/sinus endoscop  
y   
w/sphenoidotomy                         ENDOSCOPY NASAL/SINUS   
W/ SPHENOIDOTOMY   
Chronic pansinusitis                    [facility]  
   
                                                            Nasal/sinus endoscop  
y   
w/sphenoidotomy                         ENDOSCOPY NASAL/SINUS   
W/ SPHENOIDOTOMY   
Chronic pansinusitis                    [facility]  
   
                                                            Nasal/sinus ndsc   
w/rmvl tiss from   
frontal sinus                           ENDOSCOPY NASAL/SINUS   
W/ FRONTAL SINUS   
EXPLORATION Chronic   
pansinusitis                            [facility]  
   
                                                            Nasal/sinus ndsc   
w/rmvl tiss from   
frontal sinus                           ENDOSCOPY NASAL/SINUS   
W/ FRONTAL SINUS   
EXPLORATION Chronic   
pansinusitis                            [facility]  
   
                                                            Nasal/sinus ndsc   
w/total ethoidectomy                    ENDOSCOPY NASAL/SINUS   
W/ ETHMOIDECTOMY, TOTAL   
Chronic pansinusitis                    [facility]  
   
                                                            Nasal/sinus ndsc   
w/total ethoidectomy                    ENDOSCOPY NASAL/SINUS   
W/ ETHMOIDECTOMY, TOTAL   
Chronic pansinusitis                    [facility]  
   
                                                            Neutrophils [#/volum  
e]   
in Blood by Automated   
count                                               [facility]  
   
                                                            Neutrophils/100   
leukocytes in Blood by   
Automated count                                     [facility]  
   
                                                            Nsl/sinus ndsc max   
antrost w/rmvl tiss   
max sinus                               NASAL/SINUS ENDOSCOPY   
SURGICAL W/ MAXILLARY   
ANTROSTOMY W/ REMOVAL   
OF TISSUE FROM   
MAXILLARY SINUS Chronic   
pansinusitis                            [facility]  
   
                                                            Nsl/sinus ndsc max   
antrost w/rmvl tiss   
max sinus                               NASAL/SINUS ENDOSCOPY   
SURGICAL W/ MAXILLARY   
ANTROSTOMY W/ REMOVAL   
OF TISSUE FROM   
MAXILLARY SINUS Chronic   
pansinusitis                            [facility]  
   
                                                            Nucleated erythrocyt  
es   
[Presence] in Blood by   
Automated count                                     [facility]  
   
                                       Patient Education              [facility]  
Work Phone:   
[phone]  
   
                                       Patient referral              [facility]  
Work Phone:   
[phone]  
   
                                                            Platelet mean volume  
   
[Entitic volume] in   
Blood by Automated   
count                                               [facility]  
   
                                                            Platelets [#/volume]  
   
in Blood                                            [facility]  
   
                                       Prothrombin time (PT)              [facility]  
[facility]  
   
                                                            Septoplasty/submucou  
s   
resecj w/wo cartilage   
grf                                     ENDOSCOPIC SEPTOPLASTY   
Chronic pansinusitis                    [facility]  
   
                                                            Septoplasty/submucou  
s   
resecj w/wo cartilage   
grf                                     ENDOSCOPIC SEPTOPLASTY   
Chronic pansinusitis                    [facility]  
   
                                                            Strtctc cptr asstd p  
x   
extradural cranial                      STEREOTACTIC   
COMPUTER-ASSISTED   
(NAVIGATIONAL)   
PROCEDURE CRANIAL   
EXTRADURAL Chronic   
pansinusitis                            [facility]  
   
                                                            Strtctc cptr asstd p  
x   
extradural cranial                      STEREOTACTIC   
COMPUTER-ASSISTED   
(NAVIGATIONAL)   
PROCEDURE CRANIAL   
EXTRADURAL Chronic   
pansinusitis                            [facility]  
c  
  
  
  
Immunizations  
  
  
                      Immunization Date Immunization Notes      [facility]  
cility  
   
                          2022   COVID-19 Pfizer              Erendira Alen  
Other Phone:   
[phone]                           [facility]  
   
                                        2021          COVID-19 Vaccine   
Pfizer - Documentation   
Purposes Only                                       Erendira Alen  
Other Phone:   
[phone]                           [facility]  
   
                                        2021          COVID-19 Vaccine   
Pfizer - Documentation   
Purposes Only                                       Erendira Alen  
Other Phone:   
[phone]                           [facility]  
   
                                        01-          influenza, seasonal,  
   
injectable                              Generic Provider    [facility]  
   
                                        01-          influenza virus   
vaccine, unspecified   
formulation                                         Diana Rodriguez MD  
Work Phone:   
[phone]                          [facility]  
   
                                                    NEGATED: Highlighted   
row has not   
occurred!2016                     influenza, seasonal,   
injectable                                          Abad Singleton  
Work Phone:   
[phone]                           [facility]  
   
                                        Comment on above:   Result Note: pt alre  
julius had this season   
  
  
  
Payers  
  
  
                          Date         Payer Category Payer        Policy ID  
   
                          2024   Self-pay                  [id number]  
[id number]  
[id number]  
   
                                        06-          [facility] Member Subscriber Plan /   
Payer (Effective   
06/15/2020-Present) Name:   
[employer] Member ID:   
[id number] Relation to   
Subscriber: Spouse Name:   
William [employer] Subscriber   
ID: [id number] YOB: 1968 Phone:   
[phone] (Home) Phone:   
[phone] (Work) Address:   
[address] [place] [address] Payer ID: Not   
on file Group ID:   
[id number] Type: Not on   
file Phone: [phone]   
Address: [facility] [address]   
[place] [address]                  [id number]  
[id number]  
   
                          06-   Unknown                     
   
                          2011   Medicare                  [id number]  
[id number]  
[id number]  
   
                          1967   Unknown                   [id number]   
[id number]  
[id number]  
   
                          1967   Unknown                   [id number]   
[id number]  
[id number]  
   
                          1967   Unknown                   [id number]   
[id number]  
[id number]  
   
                          1967   Unknown                   [id number]   
[id number]  
[id number]  
   
                          1967   Unknown                   [id number]   
[id number]  
[id number]  
   
                          1967   Unknown                   [id number]   
[id number]  
[id number]  
   
                          1967   Unknown                   [id number]   
[id number]  
[id number]  
   
                          1967   Unknown                   [id number]   
[id number]  
[id number]  
   
                          1967   Unknown                   [id number]   
[id number]  
[id number]  
   
                          1967   Unknown                   [id number]   
[id number]  
[id number]  
   
                          1967   Unknown                   [id number]   
[id number]  
[id number]  
   
                          1967   Unknown                   [id number]   
[id number]  
2.1967   Unknown                   [id number]   
[id number]  
2.1967   Unknown                   [id number]   
[id number]  
2.1967   Unknown                   [id number]   
[id number]  
2.1967   Unknown                   [id number]   
[id number]  
2.1967   Unknown                   [id number]   
[id number]  
2.1967   Unknown                   [id number]   
[id number]  
2.9  
   
                          1960   Medicare                  [id number]   
[id number]  
   
                          1960   Self-pay                  [id number]  
   
                          1960   Unknown                   [id number]  
   
                                       Medicaid     Medicaid     [id number]   
[id number]  
[id number]  
   
                                       Unknown                   [id number]   
[id number]  
[id number]  
  
  
  
Social History  
  
  
                          Date         Type         Detail       Facility  
   
                                       Sex Assigned At Birth              [facility]  
Other Phone:   
[phone]  
   
                                                    Start:   
2020  
End: 2024     Sex Assigned At Birth                     [facility]  
   
                                                    Start:   
2020                Tobacco smoking status    Heavy tobacco smoker   
(finding)                               [facility]  
   
                                                    Start:   
2023  
End: 2024                         Tobacco smoking status   
NHIS                      Smoker (finding)          [facility]  
   
                                                    Start:   
1967          Sex Assigned At Birth Female              [facility]  
   
                                                    Start:   
2023  
End: 2023                         Tobacco smoking status   
NHIS                      Ex-smoker (finding)       [facility]  
   
                                                    Start:   
1989  
End: 09-                         Tobacco smoking status   
NHIS                      Smokes tobacco daily      [facility]  
   
                                                    Start:   
1988          History of tobacco use Cigarette Smoker    [facility]  
   
                                                    Start:   
2014  
End: 2024                         Cigarettes smoked   
current (pack per day)   
- Reported                1                         [facility]  
   
                                                    Start:   
2014  
End: 09-                         Tobacco use and   
exposure                                Smokeless tobacco   
non-user                                [facility]  
   
                                                    Start:   
2020  
End: 2024           Alcohol intake            Current non-drinker of   
alcohol (finding)                       [facility]  
   
                                                    Start:   
1967          Sex Assigned At Birth Not on file         [facility]  
   
                                                    Start:   
10-  
End: 2020                         Exposure to SARS-CoV-2   
(event)                   Not sure                  [facility]  
   
                                                            National Score   
(1-100), lower number   
is lower risk             Not on file               [facility]  
   
                                                    Start:   
2024                              Tobacco smoking status   
NHIS                      Current some day smoker   [facility]  
   
                                       History of tobacco use Passive smoker [facility]  
   
                                                    Start:   
2024  
End: 09-                         Alcoholic beverage   
intake                                  Lifetime non-drinker   
(finding)                               [facility]  
   
                                                            How often to you hav  
e   
a drink containing   
alcohol?                  Never                     [facility]  
   
                                                    Start:   
2023          Education           13                  [facility]  
   
                                                    Start:   
10-                Tobacco Comment           Patient smokes 10 or   
less cigarettes/day   
after 6-30 minutes of   
waking up.Thinking   
about quitting.                         [facility]  
   
                                                    Start:   
2023                Alcohol Comment           Caffeine: >4 cups/day   
iced unsweetened tea                    [facility]  
  
  
  
Medical Equipment  
  
  
                                Procedure Code  Equipment Code  Equipment Origin  
al   
Text                                    Equipment   
Identifier                              Dates  
   
                                                                Unknown Unknown   
18 Non   
Biological Arm            FDA                       Start:   
2018  
   
                                        Comment on above:   Patient can remove t  
his monitoring system herself. Literature   
states must be removed prior to MRI, CT and diathermy treatment.   
   
                                                                Pen Needle,   
Diabetic (Bd   
Ultra-Fine Mini   
Pen Needle) 31   
gauge x 3/16    
needle                                              Start:   
2023  
   
                                                                Pen Needle,   
Diabetic (Bd   
Ultra-Fine Mini   
Pen Needle) 31   
gauge x 3/16    
needle                                              Start:   
2023  
   
                                                                Pen Needle,   
Diabetic (Bd   
Ultra-Fine Mini   
Pen Needle) 31   
gauge x 3/16    
needle                                              Start:   
2023  
   
                                                                Pen Needle,   
Diabetic (Bd   
Ultra-Fine Mini   
Pen Needle) 31   
gauge x 3/16    
needle                                              Start:   
2023  
  
  
  
Goals  
  
  
                                Date            Patient Goal    Desired Activity  
/State  
   
                                                                  
  
  
  
Functional Status  
  
  
                          Date         Assessment   Result       Facility  
   
                          2023   Functional status Patient at [facility]  
Work Phone: [phone]  
   
                                2023      Functional status Functional Sta  
tus Comment Pt   
very SOB with exertion or   
activity.                               [facility]  
Work Phone: [phone]  
   
                          2023   Functional status Patient at [facility]  
Work Phone: [phone]  
   
                          2023   Functional Status N/A          [facility]  
  
  
  
Mental Status  
  
  
                          Date         Assessment   Result       Facility  
   
                                2023      Cognitive function Cognitive Sta  
tus Patient at   
Baseline                                [facility]  
Work Phone: [phone]  
   
                                2023      Cognitive function Cognitive Sta  
tus Patient at   
Baseline                                [facility]  
Work Phone: [phone]  
  
  
  
Clinical Notes 2020 to 2024  
 TEJINDER Mann - 2024 9:18 AM TEJINDER Montalvo - 2024   
9:18 AM Eddi Lewis RN - 2024 9:18 AM TEJINDER Montalvo - 
2024 9:18 AM ESTPatient Instructions  
  
                                Note Date & Type Note            Facility  
   
                                                    2024 History of   
Present illness Narrative               Formatting of this note might be   
different from the original.  
Message from pt requesting new   
order for her Ozempic 2, stating   
she is on her last one and will   
need a new order please.  
Electronically signed by TEJINDER Mann at 2024 10:43 AM   
EST  
Formatting of this note might be   
different from the original.  
Pt updated. She is requesting we   
change the order to [facility], as [facility]   
has been having difficulty keeping   
in stock for her.  
Electronically signed by TEJINDER Mann at 2024 11:14 AM   
EST  
Formatting of this note might be   
different from the original.  
Resent order for Ozempic to [facility]   
as pt requested.  
Electronically signed by Suzanne LewisCARINA at 2024 11:40   
AM EST  
Formatting of this note might be   
different from the original.  
Pt made aware. Appreciative of the   
assistance. Pt reports she has been   
feeling well overall and denies any   
new concerns. Active listening   
offfered.  
Electronically signed by TEJINDER Mann at 11/15/2024 9:18 AM   
EST  
documented in this encounter            [facility]  
   
                                                    2024 Miscellaneous   
Notes                                   Addended by: SUZANNE LEWIS on:   
2024 11:41 AM  
  
Modules accepted: Orders  
  
  
Electronically signed by Suzanne Lewis RN at 2024 11:41   
AM EST  
documented in this encounter            [facility]  
   
                                        2024 Note     Addended by: SUZANNE SEVERINO on:   
2024 11:41 AM  
  
Modules accepted: Orders  
  
  
Electronically signed by Suzanne Lewis RN at 2024 11:41   
AM EST  
                                        [facility]  
   
                                                    2024 Telephone   
encounter Note                          Formatting of this note might be   
different from the original.  
Loaded to send to [facility]  
Electronically signed by Mary Marrufo LPN at 2024 11:28 AM   
EDT  
                                        [facility]  
   
                                                    2024 Miscellaneous   
Notes                                   Formatting of this note might be   
different from the original.  
Loaded to send to [facility]  
Electronically signed by Mary Marrufo LPN at 2024 11:28 AM   
EDT  
documented in this encounter            [facility]  
   
                                                    09- History of   
Present illness Narrative               Formatting of this note is   
different from the original.  
Images from the original note were   
not included.  
  
Bev Gaming is a 58 y.o.   
female presents with chief   
complaint of Cough (Patient   
presents today for cough, headache,   
and wheezing, SOB, and nasal   
drainage. She had nose surgery last   
week and can't test for Covid.   
Yesterday she was coughing until   
she vomited. She got custody of 3   
grandkids last week and the baby is   
starting to cough as well. She   
denies fever and chills. )  
  
HPI:  
  
I have reviewed and reconciled the   
history and medication list with   
the patient today.  
  
PAST MEDICAL HISTORY:  
Past Medical History:  
Diagnosis Date  
Acute reaction to stress  
Unspecified  
Ankle contracture, left  
Bipolar 1 disorder, depressed   
(CMS/HCC)  
Bipolar disorder, unspecified   
(CMS/[facility])  
Calculus of kidney  
Calculus of kidney  
Chest pain 2022  
Chronic airway obstruction   
(CMS/[facility])  
not elsewhere classified  
Chronic pain  
Chronic ulcer of left foot with fat   
layer exposed (CMS/[facility])  
Congestive heart failure (CHF)   
(CMS/[facility])  
unspecified  
COPD (chronic obstructive pulmonary   
disease) (CMS/[facility])  
-  
Diabetes mellitus (CMS/[facility])  
without mention of complication,   
type II or unspecified type, not   
stated as uncontrolled  
DM (diabetes mellitus) (CMS/[facility])  
DVT (deep venous thrombosis)   
(CMS/[facility])  
Edema  
Endometriosis  
Esophageal reflux  
Gastroesophageal reflux disease  
Hernia, umbilical  
History of Clostridium difficile   
infection  
History of medical problems  
DX Refugio. lower leg edema,SOB,body   
aches. [-8-15]  
Knee torn cartilage, right  
Moderate persistent reactive airway   
disease without complication   
(CMS/[facility])  
Mononeuritis of lower limb,   
unspecified laterality  
Nondependent tobacco use disorder  
Obesity, unspecified  
Obstructive sleep apnea (adult)   
(pediatric)  
BOB (obstructive sleep apnea)  
Personality disorder, unspecified   
(CMS/[facility])  
Personality disorder, unspecified   
(CMS/[facility])  
Plantar fascial fibromatosis  
Pneumonia of right lower lobe due   
to infectious organism  
Post traumatic stress disorder   
(CMS/[facility]) 2002  
Rotator cuff tear, right  
Spinal stenosis in cervical region  
Spinal stenosis in cervical region  
TIA (transient ischemic attack)  
Type II or unspecified type   
diabetes mellitus with neurological   
manifestations, not stated as   
uncontrolled(250.60) (CMS/[facility])  
  
  
SURGICAL HISTORY:  
Past Surgical History:  
Procedure Laterality Date  
AMB EPIDURAL STEROID INJECTION   
2015  
Injection lower back,per Dr. Daigle,[place]  
CERVICAL FUSION 2014  
 SECTION, LOW TRANSVERSE  
x3  
CHOLECYSTECTOMY  
CYSTOSCOPY 2014  
EGD 2010  
HERNIA REPAIR 2012  
Abdominal Hernia Surgery  
HIP SURGERY Right  
Dr Shoen  
HYSTERECTOMY  
KNEE SURGERY  
arthroscopy x 2;Disease:Torn   
[place]age Right Knee  
LOWER ENDOSCOPY 2014  
per Dr. Wooten.Dx Polyp and   
Diverticulosos.  
OTHER SURGICAL HISTORY   
STAH SO not sure which side / appi  
OTHER SURGICAL HISTORY  
Individual Psychotherapy and med   
mgt;Disease:Post-traumatic stress   
disorder  
ROTATOR CUFF REPAIR Right  
SHOULDER SURGERY  
L shoulder BALTAZAR DAP  
SINUS SURGERY 2021  
@[facility] by Dr. Navarro  
SINUS SURGERY 2024  
  
  
SOCIAL HISTORY:  
Social History  
  
Tobacco Use  
Smoking status: Every Day  
Current packs/day: 1.00  
Average packs/day: 1 pack/day for   
35.7 years (35.7 ttl pk-yrs)  
Types: Cigarettes  
Start date: 1989  
Passive exposure: Past  
Smokeless tobacco: Never  
Tobacco comments:  
Patient smokes 10 or less   
cigarettes/day after 6-30 minutes   
of waking up.  
Thinking about quitting.  
Substance Use Topics  
Alcohol use: Never  
Comment: Caffeine: >4 cups/day iced   
unsweetened tea  
Drug use: Never  
  
Depression: Not at risk (2024)  
PHQ-2  
PHQ-2 Score: 0  
  
  
FAMILY HISTORY:  
Family History  
Problem Relation Name Age of Onset  
Thyroid disease Mother  
Arthritis Mother  
Hypertension Mother  
Cancer Father  
Leukemia Maternal Grandfather  
Drug abuse Son  
  
  
MEDICATIONS:  
Current Outpatient Medications  
Medication Instructions  
albuterol HFA 90 mcg/act inhaler 1   
puff, Inhalation, Every 4 hours PRN  
albuterol 2.5 mg, Nebulization, 4   
times daily RT  
Ayr 0.65 % nasal spray 1 spray,   
Nasal, Every 2 hour PRN  
budesonide (PULMICORT) 0.5 mg,   
Inhalation, Daily RT  
famotidine (PEPCID) 40 mg, Oral,   
Every morning  
ipratropium-albuterol (Duo-Neb)   
0.5-2.5 mg/3 mL nebulizer solution   
3 mL, Nebulization, Every 6 hours  
LORazepam (ATIVAN) 0.5 mg, Oral,   
Every 12 hours  
oxygen (O2) 4 L/min, Inhalation,   
Continuous  
Ozempic (2 MG/DOSE) 2 mg,   
Subcutaneous, Every 7 days  
promethazine (PHENERGAN) 25 mg,   
Oral, Every 8 hours PRN  
rosuvastatin (CRESTOR) 10 mg, Oral,   
Daily  
traZODone (DESYREL)  mg,   
Oral, Daily PRN  
  
  
ALLERGIES:  
Allergies  
Allergen Reactions  
Penicillins Itching, Hives and Rash  
Other Reaction(s): hives  
  
difficult to breathe, swelling  
Metoclopramide  
Other Reaction(s): agitation, makes   
me mean, Mental Status Change  
  
Other Reaction(s): Mental Status   
Change  
  
Other Reaction(s): Agitated  
Topiramate  
Other Reaction(s): makes me mean,   
rash,fantacize about dying  
  
RASH,  
  
Other Reaction(s): Mental Status   
Change  
  
Other Reaction(s): Hallucinating  
Bupropion  
Other Reaction(s): Unknown  
Cephalexin Unknown  
Percocet [Oxycodone-Acetaminophen]   
GI intolerance  
Quetiapine  
Other Reaction(s): seizures,   
Unknown  
Risperidone  
Other Reaction(s): Altered mental   
status  
  
Other Reaction(s): Confusion  
Sulfamethoxazole-Trimethoprim  
Other Reaction(s): itching  
Venlafaxine Hcl  
Other Reaction(s): wet bed, fell,   
probable seizure  
  
  
REVIEW OF SYMPTOMS:  
  
The ROS was neg. No chest pain or   
dyspnea. Denies any GI issues.   
Weight stable. No swellng. Denies   
any issues with meds.  
  
PHYSICAL EXAM:  
  
Visit Vitals  
/82  
Pulse 95  
Ht 5' 8   
Wt 199 lb  
SpO2 92%  
BMI 30.26 kg/m  
Smoking Status Every Day  
BSA 2.08 m  
  
BP Readings from Last 3 Encounters:  
09/10/24 130/82  
24 112/64  
24 128/88  
  
Wt Readings from Last 3 Encounters:  
09/10/24 199 lb  
24 199 lb  
24 198 lb  
  
Physical exam- No focal neuro   
signs. No enlarged Lymph Nodes, no   
thyromegaly/ nodules, HRRR, LCTA,   
benign ABD exam w/ no bruits.   
Carotid pulse wnl, no bruits.   
Pulses intact and palp in all   
extremities, no edema  
  
ASSESSMENT AND PLAN:  
  
Assessment/Plan  
Problem List Items Addressed This   
Visit  
None  
Visit Diagnoses  
  
Acute asthmatic bronchitis   
(CMS/HCC) - Primary  
Wheezing  
Relevant Orders  
STATUS COVID-19/FLU  
Acute cough  
Relevant Orders  
STATUS COVID-19/FLU  
Nasal drainage  
Relevant Orders  
STATUS COVID-19/FLU  
  
  
  
She has seen today with cough and   
shortness of breath. Oxygen   
saturation of drop below 90 at home   
and she s on supplemental oxygen   
PRN. She had sinus surgery about   
two weeks ago at the [facility]. She has a lot of nasal   
congestion pressure but her main   
complaints for the last two days   
she s been wheezy and coughing. She   
has a history of significant Asthma   
with reactive, airway disease and   
recurrent bronchial infections.   
Today she s got bilateral   
inspiratory expiratory wheeze. She   
s got cough but no sputum of any   
color at this time. Oxygen   
saturation 92% vital signs are   
stable. I m going to put her on   
doxycycline 100 twice a day for a   
week and give her prednisone 40 mg   
for five days and then 20 mg for   
five days. She also get some   
codeine cough syrup that she can   
use PRN for the next week. If she s   
not better in 2 to 3 days, she ll   
call and will move ahead with chest   
extra and further work out.she has   
an appointment for follow up with   
me a couple months routine.  
  
This note was Dictated and not   
read.  
  
  
Electronically signed by Sherman Malone DO at 2024 9:03 AM   
EDT  
documented in this encounter            [facility]  
   
                                                    2024 History of   
Present illness Narrative               Formatting of this note is   
different from the original.  
Images from the original note were   
not included.  
  
  
  
SECTION OF RHINOLOGY, SINUS AND   
SKULL BASE SURGERY  
Head and Neck [place] NOTE  
  
HPI: Patient is a 58 year old   
Female presenting s/p Procedure:   
NASAL/SINUS ENDOSCOPY SURGICAL W/   
MAXILLARY ANTROSTOMY W/ REMOVAL OF   
TISSUE FROM MAXILLARY SINUS on   
24 . Doing well, no complaints   
has no real bad pain took tylenol   
for pain.  
  
PAST MEDICAL HISTORY  
No date: Arthritis  
No date: COPD (chronic obstructive   
pulmonary disease) ([facility])  
No date: Depression  
Comment: sees a psych  
No date: Diabetes ([facility])  
No date: DVT (deep venous   
thrombosis) ([facility])  
Comment: Left lower extremity s/p   
ankle surgery  
No date: Hypogammaglobulinemia   
([facility])  
No date: On home oxygen therapy  
Comment: 4L NC  
No date: Overweight  
No date: Pneumonia  
No date: PONV (postoperative nausea   
and vomiting)  
No date: RA (rheumatoid arthritis)   
([facility])  
  
PAST SURGICAL HISTORY  
No date: ANKLE LEFT OP SURGERY  
Comment: plantar tendon  
No date: CHOLECYSTECTOMY  
No date: HERNIA REPAIR HX  
No date: HIP RIGHT OP SURGERY  
Comment: tendon repair  
No date: HYSTERECTOMY HX  
Comment: total  
No date: KNEE RIGHT OP SURGERY  
Comment: tendon repair  
No date: SHOULDER   
ARTHROSCOPY/SURGERY  
Comment: bilateral tendon repair  
  
FAMILY HISTORY  
Problem Relation Age of Onset  
Hypertension Mother  
Arthritis Mother  
Cancer Father  
Hypertension Brother  
  
  
Social History  
  
Tobacco Use  
Smoking status: Every Day  
Current packs/day: 0.50  
Average packs/day: 0.5 packs/day   
for 36.7 years (18.3 ttl pk-yrs)  
Types: Cigarettes  
Start date:   
Smokeless tobacco: Never  
Vaping Use  
Vaping status: Never Used  
Substance Use Topics  
Alcohol use: No  
Drug use: No  
Comment: denies tx for drug/alcohol   
abuse in the past.  
  
  
Current Outpatient Medications  
Medication Sig Dispense Refill  
sodium chloride (AYR SALINE) 0.65 %   
nasal spray Use 1 Spray in the nose   
every 2 hours as needed. 50 mL 3  
cefADROxil (DURICEF) 500 mg capsule   
Take 1 capsule by mouth two times a   
day for 8 days. 16 capsule 0  
ondansetron orally disintegrating   
(ZOFRAN ODT) 4 mg disintegrating   
tablet Take 1 tablet by mouth every   
8 hours as needed for   
nausea/vomiting. 10 tablet 0  
promethazine (PHENERGAN) 25 mg   
tablet 25 mg every 8 hours as   
needed.  
azelastine 0.1% nasal spray SPRAY 1   
SPRAY INTO EACH NOSTRIL TWICE DAILY   
30 mL 2  
albuterol (PROVENTIL) 2.5 mg /3 mL   
(0.083 %) nebulizer solution 2.5 mg   
as needed.  
albuterol HFA (PROVENTIL HFA,   
VENTOLIN HFA) 90 mcg/actuation   
inhaler INHALE 1 PUFF EVERY 4 HOURS   
AS NEEDED FOR WHEEZE OR FOR   
SHORTNESS OF BREATH  
famotidine (PEPCID) 40 mg tablet   
Take 40 mg by mouth every morning.  
IBUPROFEN IB ORAL Take by mouth as   
needed.  
LORazepam (ATIVAN) 0.5 mg Take 0.5   
mg by mouth at bedtime as needed.  
OXYGEN, HOME THERAPY, 4 L/min by   
Nasal Cannula route as directed.   
Can be off for 3-4 hours without   
issue.  
semaglutide (OZEMPIC) 1 mg/dose (2   
mg/1.5 mL) pnij Inject 2 mg   
subcutaneously one time a week.  
Insulin Lispro, Human, (HUMALOG)   
100 unit/mL crtg Inject   
subcutaneously w MEALS. (Patient   
not taking: Reported on 2024)  
  
No current facility-administered   
medications for this visit.  
  
  
ALLERGIES  
Allergen Reactions  
Effexor [Venlafaxin* Other: See   
Comments  
Seizures.  
Penicillins Rash, Hives  
difficult to breathe, swelling  
Bactrim [Sulfametho* Itching  
Reglan [Metoclopram* Mental Status   
Change  
Topamax [Topiramate] Mental Status   
Change  
  
ROS:  
CONSTITUTIONAL: No fevers, chills,   
nightsweats, unintended weight loss  
HEENT: No heaches, No nasal   
congestion/sinus symptoms, No   
epistaxis, No sense of smell  
EYES: No diplopia or blurry vision.  
  
PHYSICAL EXAM:  
  
There were no vitals filed for this   
visit.  
  
General appearance: well developed,   
well nourished, without obvious   
deformities  
Nasal exam: The mucosa is pink, the   
septum is Midline and the visible   
turbinates are No hypertrophied on   
anterior rhinoscopy  
Oral cavity and oropharynx: the   
oral mucosa, tongue, tonsil area,   
and posterior pharyngeal mucosa are   
without lesions.  
  
Procedure: After obtaining verbal   
consent, the patient was sprayed   
with 4% topical lidocaine and   
Afrin. A rigid nasal scope was   
utilized to examine the patient   
nose  
  
Spangler splints were removed from   
bilateral nostrils. The Middle   
meatal spacers were removed from   
bilateral nostrils. The ethmoid   
crusting and clots were suctioned   
and removed with blaksley. The rest   
of the sinuses were suctioned.  
  
Patient tolerated the procedure   
well with no complications.  
  
Radiology: None  
  
ASSESSMENT:  
1. Other chronic sinusitis - ICD9:   
473.8, ICD10: J32.8  
  
  
Bev Gaming is a 58 year old   
Female presenting s/p Procedure:   
NASAL/SINUS ENDOSCOPY SURGICAL W/   
MAXILLARY ANTROSTOMY W/ REMOVAL OF   
TISSUE FROM MAXILLARY SINUS on   
24 . Doing well, no complaints   
has no real bad pain took tylenol   
for pain. Discussed Pathology   
report showed Bilateral sinus   
contents, excision,Chronic   
sinusitis ,Fragments of cartilage   
and bone. Splints were removed and   
The ethmoid crusting and clots were   
suctioned and removed with   
blaksley. The rest of the sinuses   
were suctioned. She will start   
Budesonide and return in 4 weeks  
  
PLAN:  
Start Budesonide 0.5ml/2 mL   
nebulizer solution Rx sent  
Return for follow up in 4 weeks  
  
Add Budesonide 0.5 mg to the hiram   
med irrigation 8 ounces  
Use distilled water or bottled   
water with salt packet as well  
  
The patient is seen and examined by   
Dr. Diana Rodriguez and the   
following reflects his/her service.   
Scribed by Florecita Sorensen  
  
Provider Attestation: I, Dr.Mohamad Rodriguez, personally performed the   
services described in this   
documentation. All medical record   
entries made by the   
NP/PA/scribe/resident/fellow were   
at my direction and in my presence.   
I have reviewed the chart and agree   
that the record reflects my   
personal performance and is   
accurate and complete.  
  
  
Electronically signed by Diana Rodriguez MD at 2024 4:52 PM   
EDT  
documented in this encounter            [facility]  
   
                                        2024 Note     HNO ID: [id number]  
Author: DIANA RODRIGUEZ MD  
Service: ?  
Author Type: Physician  
Type: Progress Notes  
Filed: 2024 16:52  
Note Text:  
SECTION OF RHINOLOGY, SINUS AND   
SKULL BASE SURGERY  
Head and Neck [place] NOTE  
HPI: Patient is a 58 year old   
Female presenting s/p Procedure:   
NASAL/SINUS  
ENDOSCOPY SURGICAL W/ MAXILLARY   
ANTROSTOMY W/ REMOVAL OF TISSUE   
FROM MAXILLARY  
SINUS on 24 . Doing well, no   
complaints has no real bad pain   
took tylenol  
for pain.  
PAST MEDICAL HISTORY  
No date: Arthritis  
No date: COPD (chronic obstructive   
pulmonary disease) ([facility])  
No date: Depression  
Comment: sees a psych  
No date: Diabetes ([facility])  
No date: DVT (deep venous   
thrombosis) ([facility])  
Comment: Left lower extremity s/p   
ankle surgery  
No date: Hypogammaglobulinemia   
([facility])  
No date: On home oxygen therapy  
Comment: 4L NC  
No date: Overweight  
No date: Pneumonia  
No date: PONV (postoperative nausea   
and vomiting)  
No date: RA (rheumatoid arthritis)   
([facility])  
PAST SURGICAL HISTORY  
No date: ANKLE LEFT OP SURGERY  
Comment: plantar tendon  
No date: CHOLECYSTECTOMY  
No date: HERNIA REPAIR HX  
No date: HIP RIGHT OP SURGERY  
Comment: tendon repair  
No date: HYSTERECTOMY HX  
Comment: total  
No date: KNEE RIGHT OP SURGERY  
Comment: tendon repair  
No date: SHOULDER   
ARTHROSCOPY/SURGERY  
Comment: bilateral tendon repair  
FAMILY HISTORY  
Problem Relation Age of Onset  
Hypertension Mother  
Arthritis Mother  
Cancer Father  
Hypertension Brother  
Social History  
Tobacco Use  
Smoking status: Every Day  
Current packs/day: 0.50  
Average packs/day: 0.5 packs/day   
for 36.7 years (18.3 ttl pk-yrs)  
Types: Cigarettes  
Start date:   
Smokeless tobacco: Never  
Vaping Use  
Vaping status: Never Used  
Substance Use Topics  
Alcohol use: No  
Drug use: No  
Comment: denies tx for drug/alcohol   
abuse in the past.  
Current Outpatient Medications  
Medication Sig Dispense Refill  
sodium chloride (AYR SALINE) 0.65 %   
nasal spray Use 1 Spray in the nose   
every 2  
hours as needed. 50 mL 3  
cefADROxil (DURICEF) 500 mg capsule   
Take 1 capsule by mouth two times a   
day for  
8 days. 16 capsule 0  
ondansetron orally disintegrating   
(ZOFRAN ODT) 4 mg disintegrating   
tablet Take  
1 tablet by mouth every 8 hours as   
needed for nausea/vomiting. 10   
tablet 0  
promethazine (PHENERGAN) 25 mg   
tablet 25 mg every 8 hours as   
needed.  
azelastine 0.1% nasal spray SPRAY 1   
SPRAY INTO EACH NOSTRIL TWICE DAILY   
30 mL 2  
albuterol (PROVENTIL) 2.5 mg /3 mL   
(0.083 %) nebulizer solution 2.5 mg   
as  
needed.  
albuterol HFA (PROVENTIL HFA,   
VENTOLIN HFA) 90 mcg/actuation   
inhaler INHALE 1  
PUFF EVERY 4 HOURS AS NEEDED FOR   
WHEEZE OR FOR SHORTNESS OF BREATH  
famotidine (PEPCID) 40 mg tablet   
Take 40 mg by mouth every morning.  
IBUPROFEN IB ORAL Take by mouth as   
needed.  
LORazepam (ATIVAN) 0.5 mg Take 0.5   
mg by mouth at bedtime as needed.  
OXYGEN, HOME THERAPY, 4 L/min by   
Nasal Cannula route as directed.   
Can be off  
for 3-4 hours without issue.  
semaglutide (OZEMPIC) 1 mg/dose (2   
mg/1.5 mL) pnij Inject 2 mg   
subcutaneously  
one time a week.  
Insulin Lispro, Human, (HUMALOG)   
100 unit/mL crtg Inject   
subcutaneously w  
MEALS. (Patient not taking:   
Reported on 2024)  
No current facility-administered   
medications for this visit.  
ALLERGIES  
Allergen Reactions  
Effexor [Venlafaxin* Other: See   
Comments  
Seizures.  
Penicillins Rash, Hives  
difficult to breathe, swelling  
Bactrim [Sulfametho* Itching  
Reglan [Metoclopram* Mental Status   
Change  
Topamax [Topiramate] Mental Status   
Change  
ROS:  
CONSTITUTIONAL: No fevers, chills,   
nightsweats, unintended weight loss  
HEENT: No heaches, No nasal   
congestion/sinus symptoms, No   
epistaxis, No sense  
of smell  
EYES: No diplopia or blurry vision.  
PHYSICAL EXAM:  
There were no vitals filed for this   
visit.  
? General appearance: well   
developed, well nourished, without   
obvious  
deformities  
? Nasal exam: The mucosa is pink,   
the septum is Midline and the   
visible  
turbinates are No hypertrophied on   
anterior rhinoscopy  
? Oral cavity and oropharynx: the   
oral mucosa, tongue, tonsil area,   
and  
posterior pharyngeal mucosa are   
without lesions.  
Procedure: After obtaining verbal   
consent, the patient was sprayed   
with 4%  
topical lidocaine and Afrin. A   
rigid nasal scope was utilized to   
examine the  
patient nose  
Spangler splints were removed from   
bilateral nostrils. The Middle   
meatal spacers  
were removed from bilateral   
nostrils. The ethmoid crusting and   
clots were  
suctioned and removed with   
blaksley. The rest of the sinuses   
were suctioned.  
Patient tolerated the procedure   
well with no complications.  
Radiology: None  
ASSESSMENT:  
1. Other chronic sinusitis - ICD9:   
473.8, ICD10: J32.8  
Bev Gaming is a 58 year old   
Female presenting s/p Procedure:   
NASAL/SINUS  
ENDOSCOPY SURGICAL W/ MAXILLARY   
ANTROSTOMY W/ REMOVAL OF TISSUE   
FROM MAXILLARY  
SINUS on 24 . Doing well, no   
complaints has no real bad pain   
took tylenol  
for pa (more content not   
included)...                            [facility]  
   
                                        2024 Nurse Note Formatting of this  
 note might be   
different from the original.  
  
Tobacco Use: .5 packs/day Types:   
Cigarettes  
  
Was smoking cessation packet given?   
Patient Declined  
  
Was a referral initiated?Patient   
declined.  
  
Electronically signed by Florecita Adams MA at 2024 3:49 PM   
EDT  
                                        [facility]  
   
                                        2024 Nurse Note Formatting of this  
 note might be   
different from the original.  
  
Tobacco Use: .5 packs/day Types:   
Cigarettes  
  
Was smoking cessation packet given?   
Patient Declined  
  
Was a referral initiated?Patient   
declined.  
  
Electronically signed by Florecita Adams MA at 2024 3:49 PM   
EDT  
documented in this encounter            [facility]  
   
                                                    2024 History of   
Present illness Narrative               Associated Problem(s): Type 2   
diabetes mellitus with other   
diabetic neurological complication   
(CMS/[facility])  
Formatting of this note might be   
different from the original.  
During the appointment today all   
pertinent labs, imaging, health   
maintenance, and glucose readings   
were reviewed. Encouraged to check   
blood glucose throughout the day   
with some fasting and some PP   
readings. They are to bring their   
glucose meter/cgm in to all   
appointments.  
All of the patients questions,   
treatment options, and current care   
plan and goals were discussed. A   
copy of this along with pertinent   
instructions were given to the   
patient at the end of the   
appointment. The patient voices   
understanding of all of this and is   
to call in between appointments if   
they have any problems or   
questions.  
Bev Gaming is doing very   
well and encouraged on this. , Will   
stay on current medications.  
  
Electronically signed by Concepcion Frazier DO at 2024 11:07 AM   
EDT  
Formatting of this note is   
different from the original.  
Images from the original note were   
not included.  
Bev Gaming is a 58 y.o.   
female presents with chief   
complaint of Diabetes  
  
HPI:  
Diabetes Mellitus Follow-up:  
Bev Gaming is here for   
follow-up evaluation of diabetes   
mellitus. The initial diagnosis of   
diabetes was made around   
Diabetes complications: peripheral   
neuropathy  
She has not been checking her blood   
glucose at all  
  
Last A1c: 6.5 (24)  
Last eye exam: 2023  
  
Current concerns include:  
She has not been checking her bg   
levels at all. States she has been   
doing well  
Diet: limits sugars and carbs,   
smaller portions  
Drinks: unsweetened tea, water,   
diet dr camarena  
Exercise: none  
Hypoglycemia: Unknown  
  
Had sinus surgery and fixed   
deviated septum.  
She was told to hold the ozmepic.   
She didn't take ozempic last   
Wednesday or yesterday due to the   
surgery. She is asking when she can   
restart it.  
  
  
  
SUBJECTIVE:  
  
PROBLEM LIST SOCIAL ALLERGIES:  
Patient Active Problem List  
Diagnosis  
Alpha-1-antitrypsin deficiency   
(CMS/[facility])  
Cluster headache syndrome  
COPD with asthma (CMS/[facility])  
Diastolic heart failure (CMS/[facility])  
Gastroparesis  
Insomnia  
Intractable chronic migraine   
without aura and without status   
migrainosus (CMS/[facility])  
Anxiety and depression (CMS/[facility])  
Obsessive-compulsive disorder   
(CMS/[facility])  
PTSD (post-traumatic stress   
disorder) (CMS/[facility])  
On home oxygen therapy  
RA (rheumatoid arthritis) (CMS/[facility])  
Pulmonary HTN (CMS/[facility])  
Current smoker  
FERNANDES (dyspnea on exertion)  
Post viral RAD (reactive airway   
disease) (CMS/[facility])  
Type 2 diabetes mellitus with other   
diabetic neurological complication   
(CMS/[facility])  
Medicare annual wellness visit,   
subsequent  
ACP (advance care planning)  
Encounter for screening mammogram   
for malignant neoplasm of breast  
Postmenopausal  
History of stress fracture  
Reactive airway disease with acute   
exacerbation (CMS/[facility])  
Chronic pansinusitis  
Social History  
  
Tobacco Use  
Smoking status: Every Day  
Current packs/day: 1.00  
Average packs/day: 1 pack/day for   
35.7 years (35.7 ttl pk-yrs)  
Types: Cigarettes  
Start date: 1989  
Passive exposure: Past  
Smokeless tobacco: Never  
Tobacco comments:  
Patient smokes 10 or less   
cigarettes/day after 6-30 minutes   
of waking up.  
Thinking about quitting.  
Substance Use Topics  
Alcohol use: Never  
Comment: Caffeine: >4 cups/day iced   
unsweetened tea  
Drug use: Never  
  
Allergies  
Allergen Reactions  
Penicillins Itching, Hives and Rash  
Other Reaction(s): hives  
  
difficult to breathe, swelling  
Metoclopramide  
Other Reaction(s): agitation, makes   
me mean, Mental Status Change  
  
Other Reaction(s): Mental Status   
Change  
  
Other Reaction(s): Agitated  
Topiramate  
Other Reaction(s): makes me mean,   
rash,fantacize about dying  
  
RASH,  
  
Other Reaction(s): Mental Status   
Change  
  
Other Reaction(s): Hallucinating  
Bupropion  
Other Reaction(s): Unknown  
Cephalexin Unknown  
Percocet [Oxycodone-Acetaminophen]   
GI intolerance  
Quetiapine  
Other Reaction(s): seizures,   
Unknown  
Risperidone  
Other Reaction(s): Altered mental   
status  
  
Other Reaction(s): Confusion  
Sulfamethoxazole-Trimethoprim  
Other Reaction(s): itching  
Venlafaxine Hcl  
Other Reaction(s): wet bed, fell,   
probable seizure  
  
  
Synopsis SmartLink 2024  
10:52 2024  
23:59  
Antidiabetic medications  
Insulin Lispro correction 1:30 >   
150 mg/dl (max daily 50 units)   
Subcutaneous for 30 days (100   
UNIT/ML SOPN) -Discontinued   
(Therapy comp)  
No sig  
Semaglutide 2 mg q7 days SC (8   
MG/3ML SOPN) 2 mg q7 days SC (8   
MG/3ML SOPN)  
No sig  
Labs  
MHPT A1C 6.3  
  
Outpatient prescription  
Medication marked as long-term  
Patient-reported medication  
  
REVIEW OF SYMPTOMS:  
Review of Systems  
Constitutional: Negative for   
appetite change, fatigue and   
unexpected weight change.  
Eyes: Negative for visual   
disturbance.  
Respiratory: Negative for cough,   
shortness of breath and wheezing.  
Cardiovascular: Negative for chest   
pain, palpitations and leg   
swelling.  
Neurological: Positive for   
numbness.  
Endocrine: Negative for polydipsia,   
polyphagia and polyuria.  
  
OBJECTIVE:  
  
2024  
10:32 AM 2024  
2:00 PM 3/1/2024  
10:26 AM  
Vitals  
BMI 30.26 kg/m2 30.11 kg/m2 31.32   
kg/m2  
Systolic 112 128 108  
Diastolic 64 88 80  
Heart Rate 78 87 93  
Temp 98.3 F 97.4 F  
Height (in) 5' 8  5' 8   
Weight (lb) 199 198 206  
Visit Report Report Report Report  
  
Physical Exam  
Constitutional:  
General: She is not in acute   
distress.  
Appearance: Normal appearance.  
Cardiovascular:  
Rate and Rhythm: Normal rate and   
regular rhythm.  
Heart sounds: No murmur heard.  
No friction rub. No gallop.  
Pulmonary:  
Breath sounds: Normal breath   
sounds. No wheezing, rhonchi or   
rales.  
Musculoskeletal:  
General: No swelling.  
Neurological:  
Mental Status: She is alert.  
  
  
ASSESSMENT AND PLAN:  
  
Problem List Items Addressed This   
Visit  
  
Type 2 diabetes mellitus with other   
diabetic neurological complication   
(CMS/HCC)  
During the appointment today all   
pertinent labs, imaging, health   
maintenance, and glucose readings   
were reviewed. Encouraged to check   
blood glucose throughout the day   
with some fasting and some PP   
readings. They are to bring their   
glucose meter/cgm in to all   
appointments.  
All of the patients questions,   
treatment options, and current care   
plan and goals were discussed. A   
copy of this along with pertinent   
instructions were given to the   
patient at the end of the   
appointment. The patient voices   
understanding of all of this and is   
to call in between appointments if   
they have any problems or   
questions.  
Bev Gaming is doing very   
well and encouraged on this. , Will   
stay on current medications.  
  
  
  
Relevant Orders  
POCT glycosylated hemoglobin (Hb   
A1C) docked device (Completed)  
  
Follow up in about 6 months (around   
2025) for Recheck.  
Patient's Medications  
New Prescriptions  
No medications on file  
Previous Medications  
ALBUTEROL (2.5 MG/3ML) 0.083%   
NEBULIZER SOLUTION Take 2.5 mg by   
nebulization in the morning and 2.5   
mg at noon and 2.5 mg in the   
evening and 2.5 mg before bedtime.  
ALBUTEROL HFA 90 MCG/ACT INHALER   
Inhale 1 puff every 4 (four) hours   
if needed for wheezing or shortness   
of breath  
FAMOTIDINE (PEPCID) 40 MG TABLET   
TAKE 1 TABLET BY MOUTH EVERY DAY IN   
THE MORNING  
IPRATROPIUM-ALBUTEROL (DUO-NEB)   
0.5-2.5 MG/3 ML NEBULIZER SOLUTION   
Take 3 mL by nebulization every 6   
(six) hours.  
LORAZEPAM (ATIVAN) 0.5 MG TABLET   
Take 1 tablet (0.5 mg) by mouth   
every 12 (twelve) hours  
OXYGEN (O2) GAS Inhale 4 L/min   
continuously.  
PROMETHAZINE (PHENERGAN) 25 MG   
TABLET Take 1 tablet (25 mg) by   
mouth every 8 (eight) hours if   
needed for nausea or vomiting  
ROSUVASTATIN (CRESTOR) 10 MG TABLET   
Take 1 tablet (10 mg) by mouth in   
the morning.  
SEMAGLUTIDE, 2 MG/DOSE, (OZEMPIC, 2   
MG/DOSE,) 8 MG/3ML SOLUTION   
PEN-INJECTOR Inject 2 mg under the   
skin every 7 (seven) days.  
TRAZODONE (DESYREL) 50 MG TABLET   
Take 1-2 tablets ( mg) by   
mouth Daily as needed for sleep  
Modified Medications  
No medications on file  
Discontinued Medications  
AZELASTINE (ASTELIN) 0.1 % NASAL   
SPRAY Administer 1 spray into each   
nostril in the morning and 1 spray   
before bedtime.  
CEFADROXIL (DURICEF) 500 MG CAPSULE   
Take 500 mg by mouth in the morning   
and 500 mg in the evening.  
ONDANSETRON ODT (ZOFRAN-ODT) 4 MG   
DISINTEGRATING TABLET Take 4 mg by   
mouth every 8 (eight) hours if   
needed  
  
I have reviewed and reconciled the   
history and medication list with   
the patient today.  
  
  
  
  
Electronically signed by Concepcion Frazier DO at 2024 11:08 AM   
EDT  
documented in this encounter            [facility]  
   
                                                    2024 Telephone   
encounter Note                          Formatting of this note might be   
different from the original.  
Klaudia, this is Sonia from the   
[facility]. I work with Dr Cooper and they have asked us to   
call and check in on you after your   
surgery  
  
  
  
Do you have a few minutes that I   
could ask you a few questions? Yes  
  
  
  
On a scale of 1 to 10, with 10   
being highest, how do you rate your   
pain after the surgery? 4/10  
  
  
  
Is your pain controlled with the   
pain medication prescribed? Yes  
  
  
  
Have you had any sudden changes in   
your hearing in the past few days?   
No  
  
  
  
Have you had any nausea/vomiting in   
the past few days? No  
  
  
  
Have you had dizziness or vertigo   
in the past few days? No  
  
  
  
Are there any problems with your   
incision or do you have dressing   
concerns? NA  
  
  
  
Do you have swelling, redness or   
drainage at your incision? NA  
  
  
  
Have you had fevers/? No  
  
  
  
Are you able to eat and drink   
normally? Yes  
  
  
  
Which medications are you currently   
taking and how often are you taking   
it? 1000 mg tylenol every 4 hours   
routinely and an occasional   
oxycodone  
  
How is your activity level? I am   
sleeping a lot today.  
  
  
  
Do you have any other questions or   
concerns? No  
  
  
  
Do you have your post op appt? Yes   
 with both Dr Cooper and Dr Rodriguez  
  
  
  
Do you know how to reach us if you   
have any questions before your   
appointment?  
  
  
  
  
  
For skull base surgery patients   
(schwannoma, glomus, etc)  
  
  
  
Have there been any changes in your   
facial movements? No  
  
  
  
Are you experiencing any drainage   
from your wound? NA  
  
  
  
Are you experiencing any drainage   
from your ear? No  
  
  
  
Are you experiencing any drainage   
from your nose? Yes A medium amount   
of blood - about the same as last   
time she had this procedure.  
  
Sonia Trujillo RN  
  
Electronically signed by Sonia Trujillo RN at 2024   
5:28 PM EDT  
                                        [facility]  
   
                                                    2024 Miscellaneous   
Notes                                   Formatting of this note might be   
different from the original.  
camille Rudolph is Sonia from the   
[facility]. I work with Dr Cooper and they have asked us to   
call and check in on you after your   
surgery  
  
  
  
Do you have a few minutes that I   
could ask you a few questions? Yes  
  
  
  
On a scale of 1 to 10, with 10   
being highest, how do you rate your   
pain after the surgery? 4/10  
  
  
  
Is your pain controlled with the   
pain medication prescribed? Yes  
  
  
  
Have you had any sudden changes in   
your hearing in the past few days?   
No  
  
  
  
Have you had any nausea/vomiting in   
the past few days? No  
  
  
  
Have you had dizziness or vertigo   
in the past few days? No  
  
  
  
Are there any problems with your   
incision or do you have dressing   
concerns? NA  
  
  
  
Do you have swelling, redness or   
drainage at your incision? NA  
  
  
  
Have you had fevers/? No  
  
  
  
Are you able to eat and drink   
normally? Yes  
  
  
  
Which medications are you currently   
taking and how often are you taking   
it? 1000 mg tylenol every 4 hours   
routinely and an occasional   
oxycodone  
  
How is your activity level? I am   
sleeping a lot today.  
  
  
  
Do you have any other questions or   
concerns? No  
  
  
  
Do you have your post op appt? Yes   
 with both Dr Cooper and Dr Rodriguez  
  
  
  
Do you know how to reach us if you   
have any questions before your   
appointment?  
  
  
  
  
  
For skull base surgery patients   
(schwannoma, glomus, etc)  
  
  
  
Have there been any changes in your   
facial movements? No  
  
  
  
Are you experiencing any drainage   
from your wound? NA  
  
  
  
Are you experiencing any drainage   
from your ear? No  
  
  
  
Are you experiencing any drainage   
from your nose? Yes A medium amount   
of blood - about the same as last   
time she had this procedure.  
  
Sonia Trujillo RN  
  
Electronically signed by Sonia Trujillo RN at 2024   
5:28 PM EDT  
documented in this encounter            [facility]  
   
                                        2024 Note     HNO ID: [id number]  
Author: MITRA MARKS SRNA  
Service: ?  
Author Type: Student  
Type: Anesthesia Procedure Notes  
Filed: 2024 12:19  
Note Text:  
ANESTHESIOLOGY PROCEDURE NOTE  
Airway  
General Information  
Procedure Start Time/Medication   
Administration: 2024 11:49 AM  
Procedure End Time: 2024 11:49   
PM  
Patient location during procedure:   
OR  
Timeout Performed Pre-procedure:   
timeout performed  
Consent Obtained: Yes  
Patient identity confirmed: arm   
band, care team member and patient  
Staffing  
Anesthesiologist: MARIANA Nguyễn MD  
SRNA: Mitra Marks SRNA  
Performed by: BEBE  
Indications and Patient Condition  
Indications for airway management:   
anesthesia and airway protection  
Preoxygenated: yes  
anesthesia circuit  
Patient position: sniffing and ramp  
Method: asleep  
Cricoid Pressure: Yes  
Manual In-Line Stabilization: No  
Difficult Mask: No  
Airway Accessory: oral airway  
Final Airway Details  
Final airway type: endotracheal   
airway  
Final Endotracheal Airway: ETT  
Successful intubation technique:   
direct laryngoscopy  
Devices used: intubating stylet  
Endotracheal tube insertion site:   
oral  
Blade: Malou  
Blade size: #3  
ETT size (mm): 7.0  
Measured from: gums  
Measurement (cm): 22  
Placement verified by: capnometry  
Cormack-Lehane Classification:   
grade IIa - partial view of glottis  
Number of attempts at approach: 1  
Airway trauma: oral cavity  
Failed airway: no  
Unrecognized esophageal intubation:   
no  
Airway not difficult  
SIGNATURE: BEBE Venegas   
PATIENT NAME: Bev Gaming  
DATE: 2024 MRN: [id number]  
TIME: 12:16 PM CSN: [id number]           [facility]  
   
                                                    2024 History and   
physical note                           Formatting of this note is   
different from the original.  
Images from the original note were   
not included.  
HISTORY AND PHYSICAL EXAMINATION  
  
SERVICE DATE: 2024  
SERVICE TIME: 10:51 AM  
  
  
PRIMARY CARE PHYSICIAN: Sherman Malone DO  
  
  
REASON FOR VISIT:  
Bev Gaming is a 58 year old   
female who is scheduled for   
Bilateral - NASAL/SINUS ENDOSCOPY   
SURGICAL W/ MAXILLARY ANTROSTOMY W/   
REMOVAL OF TISSUE FROM MAXILLARY   
SINUS  
Bilateral - ENDOSCOPY NASAL/SINUS   
W/ FRONTAL SINUS EXPLORATION  
Bilateral - ENDOSCOPY NASAL/SINUS   
W/ ETHMOIDECTOMY, TOTAL  
ENDOSCOPIC SEPTOPLASTY  
Bilateral - COBLATION TURBINATES   
INTRAMURAL  
Bilateral - ENDOSCOPY NASAL/SINUS   
W/ SPHENOIDOTOMY  
Bilateral - STEREOTACTIC   
COMPUTER-ASSISTED (NAVIGATIONAL)   
PROCEDURE CRANIAL EXTRADURAL at the   
request of Dr. Diana Rodriguez for   
consultation. My final   
recommendation will be communicated   
back to the requesting physician by   
way of shared medical record or   
letter.  
  
Assessment  
Patient has the following medical   
conditions which may affect   
ann-operative course:  
  
Diabetes ([facility])  
Assessment: Stable on Ozempic  
Patient given instructions   
regarding Ozempic  
Component 5 mo ago  
Hemoglobin A1C 6.5  
Specimen Collected: 24 11:03   
AM  
  
Follows with PCP  
  
DVT of lower extremity (deep venous   
thrombosis) ([facility])  
Assessment: s/p ankle surgery  
Completed course of Xarelto  
No recurrence  
  
Anxiety  
Assessment: Takes Valium as needed  
  
Class 2 obesity  
Assessment: Body mass index is   
30.41 kg/m .  
  
Smoking  
Assessment: Smokes 0.5 PPD  
  
COPD (chronic obstructive pulmonary   
disease) ([facility])  
Assessment: Stable  
95% on RA  
Albuterol PRN  
On 4L O2 @ HS and during the day as   
needed  
Follows with Dr. Davidson @   
[facility]  
  
Alpha-1-antitrypsin deficiency   
([facility])  
Assessment: Follows with   
Pulmonology  
  
GERD (gastroesophageal reflux   
disease)  
Assessment: Controlled with pepcid  
  
[facility] Activity Status Index:  
METS:  
Do moderate work around the house,   
such as vacuuming, sweeping floors,   
or carrying in groceries (3.50   
METs)  
Climb a flight of stairs or walk up   
a hill (5.50 METs)  
DASI Score: 9  
Patient denies any chest pain or   
undue shortness of breath with the   
above physical activity.  
  
STOP-Bang Score:  
Patient over 50 years old  
Denies snoring loudly  
Denies feeling tired, fatigued, or   
sleepy during the daytime  
Has not been observed to stop   
breathing or choking/gasping during   
sleep  
Denies having high blood pressure  
BMI less than or equal to 35 kg/m^2  
Does not have a large neck  
Non-male patient  
STOP-Bang Score: 1  
  
[id number] Score:  
Age: <65  
Sex: female  
CHF history: No  
Hypertension history: No  
Stroke/TIA/thromboembolism history:   
Yes  
Vascular disease history: No  
Diabetes history: Yes  
[id number] Score: 4  
  
ARISCAT Score:  
Age: 51-80  
Preoperative SpO2: 91-95%  
Respiratory infection in the last   
month: No  
Preoperative anemia: Yes  
Surgical incision: peripheral  
Duration of surgery: 2-3 hrs  
Emergency procedure: No  
ARISCAT Score: 38  
  
ANESTHESIA FINDINGS:  
Intubation History: No history of   
difficult intubation  
Significant Anesthesia   
Considerations:  
none  
  
Airway History:  
No history of difficult airway  
  
I - PHYSICAL EVALUATION  
AIRWAY  
Patient intubated: No.  
Tracheostomy tube not present  
Mallampati: I.  
TM distance: >3 FB.  
Neck ROM: full ROM without   
neurological symptoms.  
Mouth opening: adequate.  
Short neck: no.  
Thick neck: no  
Lip Bite Test: II  
Microretrognathia/Micronagthia/Rece  
ssed Chin: No  
  
DENTAL  
Dental findings: teeth intact.  
Dentures, upper: complete.  
Dentures, lower: complete.  
  
II - ANESTHESIA PLAN  
  
Beta Blocker  
Monitoring Plan  
Post Procedure Analgesic Plan  
  
  
  
Prepared for surgery:  
This patient is optimally prepared   
for surgery.  
  
CONSULTS:  
Patient does not require consults   
for optimization at this time.  
  
The Following Tests/Procedures Have   
Been Initiated:  
Labs not indicated per [facility]   
protocol, EKG not indicated per   
[facility] protocol  
  
Planned Anesthetic: Per anesthesia   
choice  
  
Subjective  
CHIEF COMPLAINT: Chronic   
Pansinusitis  
HPI: 58 year old year old presents   
to [facility] for evaluation. Patient has   
been experiencing sinus issues   
including ear fullness and pressure   
> 1 year. Has elected for surgical   
intervention.  
  
PAST MEDICAL HISTORY  
No date: Arthritis  
No date: COPD (chronic obstructive   
pulmonary disease) ([facility])  
No date: Depression  
Comment: sees a psych  
No date: Diabetes ([facility])  
No date: DVT (deep venous   
thrombosis) ([facility])  
Comment: Left lower extremity s/p   
ankle surgery  
No date: Hypogammaglobulinemia   
([facility])  
No date: On home oxygen therapy  
Comment: 4L NC  
No date: Overweight  
No date: Pneumonia  
No date: PONV (postoperative nausea   
and vomiting)  
No date: RA (rheumatoid arthritis)   
([facility])  
PAST SURGICAL HISTORY  
No date: ANKLE LEFT OP SURGERY  
Comment: plantar tendon  
No date: CHOLECYSTECTOMY  
No date: HERNIA REPAIR HX  
No date: HIP RIGHT OP SURGERY  
Comment: tendon repair  
No date: HYSTERECTOMY HX  
Comment: total  
No date: KNEE RIGHT OP SURGERY  
Comment: tendon repair  
No date: SHOULDER   
ARTHROSCOPY/SURGERY  
Comment: bilateral tendon repair  
FAMILY HISTORY  
Problem Relation Age of Onset  
Hypertension Mother  
Arthritis Mother  
Cancer Father  
Hypertension Brother  
  
SOCIAL HISTORY:  
Social History  
  
Tobacco Use  
Smoking status: Every Day  
Packs/day: .5  
Types: Cigarettes  
Start date:   
Smokeless tobacco: Never  
Vaping Use  
Vaping Use: Never used  
Substance Use Topics  
Alcohol use: No  
Drug use: No  
Comment: denies tx for drug/alcohol   
abuse in the past.  
  
Prior to Admission medications as   
of 24 1334  
Medication Sig Last Dose Taking  
promethazine (PHENERGAN) 25 mg   
tablet 25 mg every 8 hours as   
needed. Yes  
azelastine 0.1% nasal spray SPRAY 1   
SPRAY INTO EACH NOSTRIL TWICE DAILY   
Yes  
albuterol (PROVENTIL) 2.5 mg /3 mL   
(0.083 %) nebulizer solution 2.5 mg   
as needed. Yes  
albuterol HFA (PROVENTIL HFA,   
VENTOLIN HFA) 90 mcg/actuation   
inhaler INHALE 1 PUFF EVERY 4 HOURS   
AS NEEDED FOR WHEEZE OR FOR   
SHORTNESS OF BREATH Yes  
famotidine (PEPCID) 40 mg tablet   
Take 40 mg by mouth every morning.   
Yes  
IBUPROFEN IB ORAL Take by mouth as   
needed. Yes  
LORazepam (ATIVAN) 0.5 mg Take 0.5   
mg by mouth at bedtime as needed.   
Yes  
OXYGEN, HOME THERAPY, 4 L/min by   
Nasal Cannula route as directed.   
Can be off for 3-4 hours without   
issue. Yes  
semaglutide (OZEMPIC) 1 mg/dose (2   
mg/1.5 mL) pnij Inject 2 mg   
subcutaneously one time a week. Yes  
Insulin Lispro, Human, (HUMALOG)   
100 unit/mL crtg Inject   
subcutaneously w MEALS.  
Patient not taking: Reported on   
2024  
  
Medication Comments documented by   
Lulu Gregg LPN on 2021 at   
1221.  
2020 Only takes Insuli  
  
ALLERGIES  
Allergen Reactions  
Effexor [Venlafaxin* Other: See   
Comments  
Seizures.  
Penicillins Rash, Hives  
difficult to breathe, swelling  
Bactrim [Sulfametho* Itching  
Reglan [Metoclopram* Mental Status   
Change  
Topamax [Topiramate] Mental Status   
Change  
  
Covid Immunization Dates  
  
Overdue - Covid-19 Vaccine ( season) Overdue since   
2022 Outside Immunization:   
COVID-19 Pfizer  
2021 Outside Immunization:   
COVID-19, mRNA, LNP-S, PF, 30   
mcg/0.3 mL dose  
2021 Outside Immunization:   
COVID-19, mRNA, LNP-S, PF, 30   
mcg/0.3 mL dose  
  
  
  
  
  
  
REVIEW OF SYSTEMS:  
PAIN ASSESSMENT: Pain  
Pain Level: 4  
Pain Location: Face  
Description: Pressure, Aching  
Duration Amount of Time: 8  
Duration Units: Months  
Frequency: Continuous  
Intervention/Comfort measure: Cold,   
Heat, Medication  
Comments: nothing helps anymore per   
patient  
  
General: No weight loss, malaise or   
fevers.  
Neuro: No history of TIA's, stroke,   
CNS tumor, impaired sensorium,   
hemiplegia, paraplegia or   
quadraplegia. No neurological   
symptoms or problems.  
Respiratory: Negative for   
Bronchitis, Wheezing  
+COPD  
+Home O2L 4L  
+Alpha 1  
+Smokes- 1 PPD  
Cardiovascular: Negative for Recent   
MI, Angina, Chest Pain, PVD, DVT/PE  
+H/o DVT s/p ankle sx (course of   
xarelto completed)  
GI: Negative for Nausea, Vomiting,   
Abdominal pain  
+GERD  
: Negative for dysuria,   
incontinence, hematuria, and   
hesitancy  
GYN: Negative for abnormal vaginal   
bleeding, abnormal vaginal   
discharge.  
Pregnancy: Denies, No LMP recorded.   
Patient has had a hysterectomy.  
Endocrine: +DM2 Ozempic  
Hematology: No history of bleeding   
or clotting disorder. Pt is not   
taking anti-coagulation or platelet   
medications. No history of   
hematological symptoms or problems.  
Oncology: No history of CA   
metastasis, chemo within 30 days,   
or radiotherapy within 90 days. Has   
not lost 10% of body wt in 6   
months. No history of oncological   
symptoms or problems.  
Psych: Anxiety  
Musculoskeletal: Negative for joint   
pain or swelling, back pain or   
muscle pain.  
Skin: Negative for lesions, rash   
and itching.  
  
  
Objective  
PHYSICAL EXAM:  
VITALS:  
/78   Pulse 87   Temp (Src)   
98 (Oral)   Resp 16   Ht 5' 8    
(1.73m)   Wt 200 lb (90.7kg)   SpO2   
95%   BMI 30.42 kg/(m^2).  
  
  
  
General: Alert and oriented  
Skin: Normal color, no rash, no   
lesions.  
HEENT: EOM, pupils equal, round and   
reactive.  
Cardiovascular: Normal S1 & S2, no   
rubs, murmurs or gallops. No JVD.   
Pulse regular.  
Lungs: Wheezes- exp  
Abdomen: Soft, non-tender, no   
rigidity.  
Extremities: No deformity, no edema   
or tenderness, no joint swelling or   
clubbing.  
Neurological: Normal cognition and   
motor skills.  
Pulses: Carotid and radial pulses   
normal +2.  
  
Diagnostic tests reviewed for   
today's visit:  
  
No new labs or tests  
  
Instructions Given to Patient:  
Instructions located in the after   
visit summary.  
Patient given verbal and written   
preop instructions and voices   
comprehension and compliance.  
  
SIGNATURE: Sapphire Mayorga APRN.CNP   
PATIENT NAME: Bev Gaming  
DATE: 2024 MRN: [id number]  
TIME: 10:02 AM  
  
  
Electronically signed by Sapphire aMyorga APRN.CNP at 2024   
12:20 PM EDT  
                                        [facility]  
   
                                                    2024 History and   
physical note                           Formatting of this note is   
different from the original.  
Images from the original note were   
not included.  
HISTORY AND PHYSICAL EXAMINATION  
  
SERVICE DATE: 2024  
SERVICE TIME: 10:51 AM  
  
  
PRIMARY CARE PHYSICIAN: Sherman Malone DO  
  
  
REASON FOR VISIT:  
Bev Gaming is a 58 year old   
female who is scheduled for   
Bilateral - NASAL/SINUS ENDOSCOPY   
SURGICAL W/ MAXILLARY ANTROSTOMY W/   
REMOVAL OF TISSUE FROM MAXILLARY   
SINUS  
Bilateral - ENDOSCOPY NASAL/SINUS   
W/ FRONTAL SINUS EXPLORATION  
Bilateral - ENDOSCOPY NASAL/SINUS   
W/ ETHMOIDECTOMY, TOTAL  
ENDOSCOPIC SEPTOPLASTY  
Bilateral - COBLATION TURBINATES   
INTRAMURAL  
Bilateral - ENDOSCOPY NASAL/SINUS   
W/ SPHENOIDOTOMY  
Bilateral - STEREOTACTIC   
COMPUTER-ASSISTED (NAVIGATIONAL)   
PROCEDURE CRANIAL EXTRADURAL at the   
request of Dr. Diana Rodriguez for   
consultation. My final   
recommendation will be communicated   
back to the requesting physician by   
way of shared medical record or   
letter.  
  
Assessment  
Patient has the following medical   
conditions which may affect   
ann-operative course:  
  
Diabetes ([facility])  
Assessment: Stable on Ozempic  
Patient given instructions   
regarding Ozempic  
Component 5 mo ago  
Hemoglobin A1C 6.5  
Specimen Collected: 24 11:03   
AM  
  
Follows with PCP  
  
DVT of lower extremity (deep venous   
thrombosis) ([facility])  
Assessment: s/p ankle surgery  
Completed course of Xarelto  
No recurrence  
  
Anxiety  
Assessment: Takes Valium as needed  
  
Class 2 obesity  
Assessment: Body mass index is   
30.41 kg/m .  
  
Smoking  
Assessment: Smokes 0.5 PPD  
  
COPD (chronic obstructive pulmonary   
disease) ([facility])  
Assessment: Stable  
95% on RA  
Albuterol PRN  
On 4L O2 @ HS and during the day as   
needed  
Follows with Dr. Davidson @   
[facility]  
  
Alpha-1-antitrypsin deficiency   
(HCC)  
Assessment: Follows with   
Pulmonology  
  
GERD (gastroesophageal reflux   
disease)  
Assessment: Controlled with pepcid  
  
[facility] Activity Status Index:  
METS:  
Do moderate work around the house,   
such as vacuuming, sweeping floors,   
or carrying in groceries (3.50   
METs)  
Climb a flight of stairs or walk up   
a hill (5.50 METs)  
DASI Score: 9  
Patient denies any chest pain or   
undue shortness of breath with the   
above physical activity.  
  
STOP-Bang Score:  
Patient over 50 years old  
Denies snoring loudly  
Denies feeling tired, fatigued, or   
sleepy during the daytime  
Has not been observed to stop   
breathing or choking/gasping during   
sleep  
Denies having high blood pressure  
BMI less than or equal to 35 kg/m^2  
Does not have a large neck  
Non-male patient  
STOP-Bang Score: 1  
  
[id number] Score:  
Age: <65  
Sex: female  
CHF history: No  
Hypertension history: No  
Stroke/TIA/thromboembolism history:   
Yes  
Vascular disease history: No  
Diabetes history: Yes  
[id number] Score: 4  
  
ARISCAT Score:  
Age: 51-80  
Preoperative SpO2: 91-95%  
Respiratory infection in the last   
month: No  
Preoperative anemia: Yes  
Surgical incision: peripheral  
Duration of surgery: 2-3 hrs  
Emergency procedure: No  
ARISCAT Score: 38  
  
ANESTHESIA FINDINGS:  
Intubation History: No history of   
difficult intubation  
Significant Anesthesia   
Considerations:  
none  
  
Airway History:  
No history of difficult airway  
  
I - PHYSICAL EVALUATION  
AIRWAY  
Patient intubated: No.  
Tracheostomy tube not present  
Mallampati: I.  
TM distance: >3 FB.  
Neck ROM: full ROM without   
neurological symptoms.  
Mouth opening: adequate.  
Short neck: no.  
Thick neck: no  
Lip Bite Test: II  
Microretrognathia/Micronagthia/Rece  
ssed Chin: No  
  
DENTAL  
Dental findings: teeth intact.  
Dentures, upper: complete.  
Dentures, lower: complete.  
  
II - ANESTHESIA PLAN  
  
Beta Blocker  
Monitoring Plan  
Post Procedure Analgesic Plan  
  
  
  
Prepared for surgery:  
This patient is optimally prepared   
for surgery.  
  
CONSULTS:  
Patient does not require consults   
for optimization at this time.  
  
The Following Tests/Procedures Have   
Been Initiated:  
Labs not indicated per [facility]   
protocol, EKG not indicated per   
[facility] protocol  
  
Planned Anesthetic: Per anesthesia   
choice  
  
Subjective  
CHIEF COMPLAINT: Chronic   
Pansinusitis  
HPI: 58 year old year old presents   
to [facility] for evaluation. Patient has   
been experiencing sinus issues   
including ear fullness and pressure   
> 1 year. Has elected for surgical   
intervention.  
  
PAST MEDICAL HISTORY  
No date: Arthritis  
No date: COPD (chronic obstructive   
pulmonary disease) ([facility])  
No date: Depression  
Comment: sees a psych  
No date: Diabetes ([facility])  
No date: DVT (deep venous   
thrombosis) ([facility])  
Comment: Left lower extremity s/p   
ankle surgery  
No date: Hypogammaglobulinemia   
([facility])  
No date: On home oxygen therapy  
Comment: 4L NC  
No date: Overweight  
No date: Pneumonia  
No date: PONV (postoperative nausea   
and vomiting)  
No date: RA (rheumatoid arthritis)   
([facility])  
PAST SURGICAL HISTORY  
No date: ANKLE LEFT OP SURGERY  
Comment: plantar tendon  
No date: CHOLECYSTECTOMY  
No date: HERNIA REPAIR HX  
No date: HIP RIGHT OP SURGERY  
Comment: tendon repair  
No date: HYSTERECTOMY HX  
Comment: total  
No date: KNEE RIGHT OP SURGERY  
Comment: tendon repair  
No date: SHOULDER   
ARTHROSCOPY/SURGERY  
Comment: bilateral tendon repair  
FAMILY HISTORY  
Problem Relation Age of Onset  
Hypertension Mother  
Arthritis Mother  
Cancer Father  
Hypertension Brother  
  
SOCIAL HISTORY:  
Social History  
  
Tobacco Use  
Smoking status: Every Day  
Packs/day: .5  
Types: Cigarettes  
Start date:   
Smokeless tobacco: Never  
Vaping Use  
Vaping Use: Never used  
Substance Use Topics  
Alcohol use: No  
Drug use: No  
Comment: denies tx for drug/alcohol   
abuse in the past.  
  
Prior to Admission medications as   
of 24 1334  
Medication Sig Last Dose Taking  
promethazine (PHENERGAN) 25 mg   
tablet 25 mg every 8 hours as   
needed. Yes  
azelastine 0.1% nasal spray SPRAY 1   
SPRAY INTO EACH NOSTRIL TWICE DAILY   
Yes  
albuterol (PROVENTIL) 2.5 mg /3 mL   
(0.083 %) nebulizer solution 2.5 mg   
as needed. Yes  
albuterol HFA (PROVENTIL HFA,   
VENTOLIN HFA) 90 mcg/actuation   
inhaler INHALE 1 PUFF EVERY 4 HOURS   
AS NEEDED FOR WHEEZE OR FOR   
SHORTNESS OF BREATH Yes  
famotidine (PEPCID) 40 mg tablet   
Take 40 mg by mouth every morning.   
Yes  
IBUPROFEN IB ORAL Take by mouth as   
needed. Yes  
LORazepam (ATIVAN) 0.5 mg Take 0.5   
mg by mouth at bedtime as needed.   
Yes  
OXYGEN, HOME THERAPY, 4 L/min by   
Nasal Cannula route as directed.   
Can be off for 3-4 hours without   
issue. Yes  
semaglutide (OZEMPIC) 1 mg/dose (2   
mg/1.5 mL) pnij Inject 2 mg   
subcutaneously one time a week. Yes  
Insulin Lispro, Human, (HUMALOG)   
100 unit/mL crtg Inject   
subcutaneously w MEALS.  
Patient not taking: Reported on   
2024  
  
Medication Comments documented by   
Lulu Gregg LPN on 2021 at   
1221.  
2020 Only takes Insuli  
  
ALLERGIES  
Allergen Reactions  
Effexor [Venlafaxin* Other: See   
Comments  
Seizures.  
Penicillins Rash, Hives  
difficult to breathe, swelling  
Bactrim [Sulfametho* Itching  
Reglan [Metoclopram* Mental Status   
Change  
Topamax [Topiramate] Mental Status   
Change  
  
Covid Immunization Dates  
  
Overdue - Covid-19 Vaccine (2023-24 season) Overdue since   
2022 Outside Immunization:   
COVID-19 Pfizer  
2021 Outside Immunization:   
COVID-19, mRNA, LNP-S, PF, 30   
mcg/0.3 mL dose  
2021 Outside Immunization:   
COVID-19, mRNA, LNP-S, PF, 30   
mcg/0.3 mL dose  
  
  
  
  
  
  
REVIEW OF SYSTEMS:  
PAIN ASSESSMENT: Pain  
Pain Level: 4  
Pain Location: Face  
Description: Pressure, Aching  
Duration Amount of Time: 8  
Duration Units: Months  
Frequency: Continuous  
Intervention/Comfort measure: Cold,   
Heat, Medication  
Comments: nothing helps anymore per   
patient  
  
General: No weight loss, malaise or   
fevers.  
Neuro: No history of TIA's, stroke,   
CNS tumor, impaired sensorium,   
hemiplegia, paraplegia or   
quadraplegia. No neurological   
symptoms or problems.  
Respiratory: Negative for   
Bronchitis, Wheezing  
+COPD  
+Home O2L 4L  
+Alpha 1  
+Smokes- 1 PPD  
Cardiovascular: Negative for Recent   
MI, Angina, Chest Pain, PVD, DVT/PE  
+H/o DVT s/p ankle sx (course of   
xarelto completed)  
GI: Negative for Nausea, Vomiting,   
Abdominal pain  
+GERD  
: Negative for dysuria,   
incontinence, hematuria, and   
hesitancy  
GYN: Negative for abnormal vaginal   
bleeding, abnormal vaginal   
discharge.  
Pregnancy: Denies, No LMP recorded.   
Patient has had a hysterectomy.  
Endocrine: +DM2 Ozempic  
Hematology: No history of bleeding   
or clotting disorder. Pt is not   
taking anti-coagulation or platelet   
medications. No history of   
hematological symptoms or problems.  
Oncology: No history of CA   
metastasis, chemo within 30 days,   
or radiotherapy within 90 days. Has   
not lost 10% of body wt in 6   
months. No history of oncological   
symptoms or problems.  
Psych: Anxiety  
Musculoskeletal: Negative for joint   
pain or swelling, back pain or   
muscle pain.  
Skin: Negative for lesions, rash   
and itching.  
  
  
Objective  
PHYSICAL EXAM:  
VITALS:  
/78   Pulse 87   Temp (Src)   
98 (Oral)   Resp 16   Ht 5' 8    
(1.73m)   Wt 200 lb (90.7kg)   SpO2   
95%   BMI 30.42 kg/(m^2).  
  
  
  
General: Alert and oriented  
Skin: Normal color, no rash, no   
lesions.  
HEENT: EOM, pupils equal, round and   
reactive.  
Cardiovascular: Normal S1 & S2, no   
rubs, murmurs or gallops. No JVD.   
Pulse regular.  
Lungs: Wheezes- exp  
Abdomen: Soft, non-tender, no   
rigidity.  
Extremities: No deformity, no edema   
or tenderness, no joint swelling or   
clubbing.  
Neurological: Normal cognition and   
motor skills.  
Pulses: Carotid and radial pulses   
normal +2.  
  
Diagnostic tests reviewed for   
today's visit:  
  
No new labs or tests  
  
Instructions Given to Patient:  
Instructions located in the after   
visit summary.  
Patient given verbal and written   
preop instructions and voices   
comprehension and compliance.  
  
SIGNATURE: Sapphire Mayorga APRN.CNP   
PATIENT NAME: Bev Gaming  
DATE: 2024 MRN: [id number]  
TIME: 10:02 AM  
  
  
Electronically signed by Sapphire Mayorga APRN.CNP at 2024   
12:20 PM EDT  
documented in this encounter            [facility]  
   
                                        2024 Instructions   
  
  
Sapphire Mayroga APRN.CNP -   
2024 10:02 AM EDTFormatting   
of this note is different from the   
original.  
PATIENT PREOPERATIVE INSTRUCTIONS  
  
You Surgeon has scheduled you for   
your procedure at this surgery   
center:  
[facility] Scheduling Office:   
[phone] --[address] [place], [place] [address].  
Please read below carefully for   
your personalized instructions.  
  
Dietary Restrictions:  
- No solid food after midnight.  
- You may have 12 ounces of clear   
liquids (water, clear juices such   
as apple juice or gatorade,   
carbonated beverages, clear tea,   
black coffee, jello) until 2 hours   
before scheduled arrival at   
facility.  
  
Medications:  
Unless instructed differently   
below, stay on all of your   
medications until your surgery.  
Approved medications to take the   
morning of surgery with a sip of   
water: Albuterol & Pepcid  
  
Preoperative Instructions for   
Patient's with Diabetes Mellitus  
Oral/ Injectable Medication   
Instructions - Semaglutide   
(Ozempic) - please HOLD 7 DAYS   
PRIOR TO SURGERY  
  
Last dose prior to 2024   
procedure: 2024  
  
If you start any new medications   
after today's visit, please contact   
the surgeon's office.  
  
Blood Thinning Medications:  
- Stop NSAIDS (Ibuprofen, Advil,   
Aleve, Motrin, Celebrex, Mobic,   
etc.) 7 days before surgery, as   
directed by your surgeon.  
- Stop Aspirin 7 days before   
surgery, as directed by your   
surgeon.  
- Stop Vitamin E, ALL   
multi-vitamins, herbals and dietary   
supplements 7 days before surgery.  
- You may take Tylenol   
(Acetaminophen) or any of your pain   
medications that do not contain   
aspirin or NSAIDS as needed.  
  
Important Reminders:  
- If you are prescribed inhalers   
for breathing, continue using them.  
  
- Candy, mints, and tobacco   
products are NOT permitted the   
morning of surgery.  
- Hearing aids, dentures and   
glasses may be worn the morning of   
surgery.  
- NO jewelry, body piercings,   
makeup, hairpins or contacts are to   
be worn the day of surgery.  
  
If you develop symptoms such as a   
fever, cold, or flu, or have other   
changes to your health within TWO   
DAYS of scheduled surgery or the   
morning of surgery, please contact   
the surgery center above.  
  
Personal Belongings:  
-Please have photo ID and insurance   
cards.  
-If you do not have a copy of   
advance directives on file with us,   
please bring a copy with you on the   
day of surgery.  
- Leave ALL valuables and money at   
home or with family members.  
  
For Outpatient Procedures:  
- YOU MUST HAVE A RESPONSIBLE   
 TAKE YOU HOME. A    
OR  CANNOT BE MADE A   
RESPONSIBLE .  
- We recommend that a responsible   
person stays with you overnight to   
take care of you.  
- You cannot stay in a hotel alone   
after outpatient surgery. You will   
not be permitted to have your   
surgery, if you do not have someone   
to take care of you.  
  
Arrival Time for Surgery:  
- To obtain your arrival time for   
surgery, call your physician's   
office the day before your surgery.  
- If your surgery is scheduled for   
Monday, call the Friday before.   
Your surgeon s  will tell   
you what time to call the office.  
- If you have not reached the   
departmental  by 5 P.M.,   
call [phone] after 5 P.M. the   
day before your surgery.  
Please be aware that emergency   
situations arise, which may delay   
or change your surgical time. If   
this happens, we will notify you as   
soon as possible and regret any   
inconvenience.  
  
If you already have an Advance   
Directive, please fax a copy to   
[phone] or email to   
[email or web address] for it to   
be added to your chart. If you do   
not have an Advance Directive, you   
can find the appropriate form and   
more information at   
[email or web address]. We   
recommend that you complete the   
Advance Directive form found on the   
website and bring it with you the   
day of your surgery. It can be   
witnessed and scanned into your   
chart that day.  
  
SAHRA Kruger.CNP  
Electronically signed by Sapphire Mayorga APRN.CNP at 2024   
1:42 PM EDT  
  
documented in this encounter            [facility]  
   
                                        2024 Note     HNO ID: [id number]  
Author: ZACHARY COOPER MD  
Service: ?  
Author Type: Physician  
Type: Progress Notes  
Filed: 2024 15:57  
Note Text:  
SECTION OF OTOLOGY, NEUROTOLOGY AND   
LATERAL SKULL BASE SURGERY  
Department of Otolaryngology - Head   
and Neck Surgery  
[facility] [place],   
[facility]  
History  
History of Present Illness:  
Bev Gaming is a 58 year old   
female who presents for evaluation   
of aural  
fullness.  
Reports bilateral aural fullness   
since 2023. She has a history of  
tympanostomy tubes for HBOT for   
chronic wound treatment. She has   
since had  
issues with aural fullness and   
sensation of hearing loss. Denies   
persistent  
otalgia, otorrhea, room spinning   
vertigo, or tinnitus. Denies   
foreign body in  
ear, barotrauma, acoustic trauma,   
history of recurrent ear   
infections. Aside  
PET, she denies previous ear   
surgery. She is planning for   
revision right-sided  
sinus surgery with Dr Rodriguez.  
PAST MEDICAL HISTORY  
Diagnosis Date  
Arthritis  
COPD (chronic obstructive pulmonary   
disease) (HCC)  
Depression  
sees a psych  
Diabetes (HCC)  
DVT (deep venous thrombosis) ([facility])  
Left lower extremity s/p ankle   
surgery  
Hypogammaglobulinemia ([facility])  
On home oxygen therapy  
4L NC  
Overweight  
Pneumonia  
PONV (postoperative nausea and   
vomiting)  
RA (rheumatoid arthritis) ([facility])  
PAST SURGICAL HISTORY  
Procedure Laterality Date  
ANKLE LEFT OP SURGERY  
plantar tendon  
CHOLECYSTECTOMY  
HERNIA REPAIR HX  
HIP RIGHT OP SURGERY  
tendon repair  
HYSTERECTOMY HX  
total  
KNEE RIGHT OP SURGERY  
tendon repair  
SHOULDER ARTHROSCOPY/SURGERY  
bilateral tendon repair  
Current Outpatient Medications on   
File Prior to Visit  
Medication Sig  
azelastine 0.1% nasal spray SPRAY 1   
SPRAY INTO EACH NOSTRIL TWICE DAILY  
albuterol HFA (PROVENTIL HFA,   
VENTOLIN HFA) 90 mcg/actuation   
inhaler INHALE 1  
PUFF EVERY 4 HOURS AS NEEDED FOR   
WHEEZE OR FOR SHORTNESS OF BREATH  
famotidine (PEPCID) 40 mg tablet   
Take 40 mg by mouth every morning.  
IBUPROFEN IB ORAL Take by mouth as   
needed.  
LORazepam (ATIVAN) 0.5 mg Take 0.5   
mg by mouth at bedtime as needed.  
semaglutide (OZEMPIC) 1 mg/dose (2   
mg/1.5 mL) pnij Inject 2 mg   
subcutaneously  
one time a week.  
albuterol (PROVENTIL) 2.5 mg /3 mL   
(0.083 %) nebulizer solution 2.5 mg   
as  
needed.  
rosuvastatin (CRESTOR) 10 mg tablet   
Take 10 mg by mouth every morning.  
budesonide (PULMICORT) 0.5 mg/2 mL   
nebulizer solution Use 2 mL via   
nebulizer  
once daily. (Patient not taking:   
Reported on 2024)  
budesonide (PULMICORT) 0.5 mg/2 mL   
nebulizer solution Use 2 mL via   
nebulizer  
once daily. (Patient not taking:   
Reported on 2024)  
oxymetazoline (AFRIN,   
OXYMETAZOLINE,) 0.05 % nasal spray   
Instill 2 Sprays in  
the nose twice daily. (Patient not   
taking: Reported on 2024)  
sodium chloride (DEEP SEA NASAL)   
0.65 % nasal spray Use 1 Spray in   
the nose as  
needed. (Patient not taking:   
Reported on 2024)  
insulin degludec (TRESIBA FLEXTOUCH   
U-100) 100 unit/mL (3 mL) injection   
pen  
Inject subcutaneously. (Patient not   
taking: Reported on 2024)  
OXYGEN, HOME THERAPY, 4 L/min by   
Nasal Cannula route as directed.   
Can be off  
for 3-4 hours without issue.  
insulin aspart (NOVOLOG FLEXPEN   
U-100 INSULIN SUBCUTANEOUS) Inject  
subcutaneously. sliding scale   
(Patient not taking: Reported on   
2024)  
Insulin Lispro, Human, (HUMALOG)   
100 unit/mL crtg Inject   
subcutaneously w  
MEALS. (Patient not taking:   
Reported on 2024)  
furosemide (LASIX) 40 mg tablet   
Take 40 mg by mouth as needed.   
(Patient not  
taking: Reported on 2024)  
Levalbuterol HCl (XOPENEX) 1.25   
mg/0.5 mL nebulizer solution Use 1   
Ampule via  
nebulizer every 8 hours as needed.  
No current facility-administered   
medications on file prior to visit.  
ALLERGIES  
Allergen Reactions  
Effexor [Venlafaxin* Other: See   
Comments  
Seizures.  
Penicillins Rash, Hives  
difficult to breathe, swelling  
Bactrim [Sulfametho* Itching  
Reglan [Metoclopram* Mental Status   
Change  
Topamax [Topiramate] Mental Status   
Change  
FAMILY HISTORY  
Problem Relation Age of Onset  
Hypertension Mother  
Arthritis Mother  
Cancer Father  
Hypertension Brother  
Social History  
Tobacco Use  
Smoking status: Every Day  
Packs/day: .5  
Types: Cigarettes  
Start date:   
Smokeless tobacco: Never  
Vaping Use  
Vaping Use: Never used  
Substance Use Topics  
Alcohol use: No  
Drug use: No  
Comment: denies tx for drug/alcohol   
abuse in the past.  
Physical Exam  
Patient is alert, oriented  
Head examination: normocephalic  
Binocular microscopic examination   
of ears:  
Right ear:  
Pinna: normal  
External canal : patent, single   
strand of hair removed  
Tympanic membrane :normal, only   
limited movement with valsalva  
Left ear:  
Pinna: normal  
External canal : patent  
Tympanic membrane :normal, no   
movement with valsalva  
Tuning [place] Examination  
[place] midline  
Right ear Rinne AC>BC  
Left ear Rinne AC>BC  
Vestibular examination:  
Romberg Stable  
Sharpened Romberg Stable  
Fuku (more content not included)...     [facility]  
   
                                                    2024 History of   
Present illness Narrative               Formatting of this note is   
different from the original.  
Images from the original note were   
not included.  
  
  
SECTION OF OTOLOGY, NEUROTOLOGY AND   
LATERAL SKULL BASE SURGERY  
Department of Otolaryngology - Head   
and Neck Surgery  
[facility] [place],   
[facility]  
  
History  
  
History of Present Illness:  
Bev Gaming is a 58 year old   
female who presents for evaluation   
of aural fullness.  
  
Reports bilateral aural fullness   
since 2023. She has a history of   
tympanostomy tubes for HBOT for   
chronic wound treatment. She has   
since had issues with aural   
fullness and sensation of hearing   
loss. Denies persistent otalgia,   
otorrhea, room spinning vertigo, or   
tinnitus. Denies foreign body in   
ear, barotrauma, acoustic trauma,   
history of recurrent ear   
infections. Aside PET, she denies   
previous ear surgery. She is   
planning for revision right-sided   
sinus surgery with Dr Rodriguez.  
  
PAST MEDICAL HISTORY  
Diagnosis Date  
Arthritis  
COPD (chronic obstructive pulmonary   
disease) ([facility])  
Depression  
sees a psych  
Diabetes ([facility])  
DVT (deep venous thrombosis) ([facility])  
Left lower extremity s/p ankle   
surgery  
Hypogammaglobulinemia ([facility])  
On home oxygen therapy  
4L NC  
Overweight  
Pneumonia  
PONV (postoperative nausea and   
vomiting)  
RA (rheumatoid arthritis) ([facility])  
  
  
PAST SURGICAL HISTORY  
Procedure Laterality Date  
ANKLE LEFT OP SURGERY  
plantar tendon  
CHOLECYSTECTOMY  
HERNIA REPAIR HX  
HIP RIGHT OP SURGERY  
tendon repair  
HYSTERECTOMY HX  
total  
KNEE RIGHT OP SURGERY  
tendon repair  
SHOULDER ARTHROSCOPY/SURGERY  
bilateral tendon repair  
  
Current Outpatient Medications on   
File Prior to Visit  
Medication Sig  
azelastine 0.1% nasal spray SPRAY 1   
SPRAY INTO EACH NOSTRIL TWICE DAILY  
albuterol HFA (PROVENTIL HFA,   
VENTOLIN HFA) 90 mcg/actuation   
inhaler INHALE 1 PUFF EVERY 4 HOURS   
AS NEEDED FOR WHEEZE OR FOR   
SHORTNESS OF BREATH  
famotidine (PEPCID) 40 mg tablet   
Take 40 mg by mouth every morning.  
IBUPROFEN IB ORAL Take by mouth as   
needed.  
LORazepam (ATIVAN) 0.5 mg Take 0.5   
mg by mouth at bedtime as needed.  
semaglutide (OZEMPIC) 1 mg/dose (2   
mg/1.5 mL) pnij Inject 2 mg   
subcutaneously one time a week.  
albuterol (PROVENTIL) 2.5 mg /3 mL   
(0.083 %) nebulizer solution 2.5 mg   
as needed.  
rosuvastatin (CRESTOR) 10 mg tablet   
Take 10 mg by mouth every morning.  
budesonide (PULMICORT) 0.5 mg/2 mL   
nebulizer solution Use 2 mL via   
nebulizer once daily. (Patient not   
taking: Reported on 2024)  
budesonide (PULMICORT) 0.5 mg/2 mL   
nebulizer solution Use 2 mL via   
nebulizer once daily. (Patient not   
taking: Reported on 2024)  
oxymetazoline (AFRIN,   
OXYMETAZOLINE,) 0.05 % nasal spray   
Instill 2 Sprays in the nose twice   
daily. (Patient not taking:   
Reported on 2024)  
sodium chloride (DEEP SEA NASAL)   
0.65 % nasal spray Use 1 Spray in   
the nose as needed. (Patient not   
taking: Reported on 2024)  
insulin degludec (TRESIBA FLEXTOUCH   
U-100) 100 unit/mL (3 mL) injection   
pen Inject subcutaneously. (Patient   
not taking: Reported on 2024)  
OXYGEN, HOME THERAPY, 4 L/min by   
Nasal Cannula route as directed.   
Can be off for 3-4 hours without   
issue.  
insulin aspart (NOVOLOG FLEXPEN   
U-100 INSULIN SUBCUTANEOUS) Inject   
subcutaneously. sliding scale   
(Patient not taking: Reported on   
2024)  
Insulin Lispro, Human, (HUMALOG)   
100 unit/mL crtg Inject   
subcutaneously w MEALS. (Patient   
not taking: Reported on 2024)  
furosemide (LASIX) 40 mg tablet   
Take 40 mg by mouth as needed.   
(Patient not taking: Reported on   
2024)  
Levalbuterol HCl (XOPENEX) 1.25   
mg/0.5 mL nebulizer solution Use 1   
Ampule via nebulizer every 8 hours   
as needed.  
  
No current facility-administered   
medications on file prior to visit.  
  
ALLERGIES  
Allergen Reactions  
Effexor [Venlafaxin* Other: See   
Comments  
Seizures.  
Penicillins Rash, Hives  
difficult to breathe, swelling  
Bactrim [Sulfametho* Itching  
Reglan [Metoclopram* Mental Status   
Change  
Topamax [Topiramate] Mental Status   
Change  
  
FAMILY HISTORY  
Problem Relation Age of Onset  
Hypertension Mother  
Arthritis Mother  
Cancer Father  
Hypertension Brother  
  
Social History  
  
Tobacco Use  
Smoking status: Every Day  
Packs/day: .5  
Types: Cigarettes  
Start date:   
Smokeless tobacco: Never  
Vaping Use  
Vaping Use: Never used  
Substance Use Topics  
Alcohol use: No  
Drug use: No  
Comment: denies tx for drug/alcohol   
abuse in the past.  
  
Physical Exam  
  
Patient is alert, oriented  
  
Head examination: normocephalic  
  
Binocular microscopic examination   
of ears:  
  
Right ear:  
Pinna: normal  
External canal : patent, single   
strand of hair removed  
Tympanic membrane :normal, only   
limited movement with valsalva  
  
Left ear:  
Pinna: normal  
External canal : patent  
Tympanic membrane :normal, no   
movement with valsalva  
  
Tuning [place] Examination  
  
[place] midline  
Right ear Rinne AC>BC  
Left ear Rinne AC>BC  
  
Vestibular examination:  
Romberg Stable  
Sharpened Romberg Stable  
Fukuda stepping test Did not   
perform  
Nystagmus: None  
[place] and Hallpike maneuver: Did not   
perform  
Walking: Normal  
  
CN VII: Face is symmetric with   
normal eye closure and smile.  
  
Test Results  
  
Audiogram & Tympanogram (personally   
reviewed)  
  
6/3/2024  
  
WRS  
  
Tympanometry  
  
Vestibular testing  
None  
  
Imaging (personally reviewed)  
CT sinus (2024) - limited view   
of middle and external ears is   
unremarkable, no evidence of middle   
ear effusion  
  
Assessment and Plan  
  
Bev Gaming's history,   
physical examination, and   
diagnostic studies are consistent   
with the following diagnoses and   
associated treatment plan of care.  
  
Dysfunction of both eustachian   
tubes  
  
History of tympanostomy tube   
placement  
  
History of hyperbaric oxygen   
therapy  
  
Sensorineural hearing loss,   
bilateral  
  
Patient presents bilateral   
eustachian tube dysfunction in the   
setting of prior hyperbaric oxygen   
therapy and prior tympanostomy   
tubes. She is planning to undergo   
revision sinus surgery with Dr. Rodriguez and would like to consider   
management and coordinated fashion.   
Although her tympanometry appears   
normal, she does demonstrate   
evidence of limited middle ear   
insufflation on examination. We   
discussed both medical and surgical   
options. Risks, benefits,   
alternatives and personnel of   
bilateral eustachian tube dilation   
were discussed in detail with the   
patient. Risks including   
anesthesia, bleeding, infection,   
need for repeat dilation were   
discussed. Patient agrees to   
proceed. We will discuss timing   
with Dr Rodriguez and plan to   
coordinate with either myself or   
any of my partners/comprehensive   
otolaryngologists. Of note, she   
also has mild bilateral   
sensorineural hearing loss but   
compensating well with good WRS   
scores. Will plan to follow   
clinically and repeating audiograms   
as needed should she note   
worsening.  
  
- Plan for surgery for bilateral ET   
dilation  
- Return to clinic on as needed   
basis  
  
I have answered all of her   
questions.  
  
Signed by:  
  
Js Cooper MD, FACS  
Section Head, Otology-Neurotology  
Medical Director, Hearing Implant   
Program  
[facility] [place]  
[facility]  
  
Medical Decision Making:  
  
Problems:  
Moderate: New problem with   
uncertain prognosis  
Data:  
Unique test result(s) reviewed: 2  
Risk:  
Moderate: Decision on minor surgery   
w/ risk factors  
  
Medical Decision Making Level: 4 -   
Moderate  
  
  
Electronically signed by Zachary Cooper MD at 2024 3:57 PM EDT  
documented in this encounter            [facility]  
   
                                        2024 Nurse Note Formatting of this  
 note might be   
different from the original.  
  
Tobacco Use: .5 packs/day Types:   
Cigarettes  
  
Was smoking cessation packet given?   
Patient Declined  
  
Was a referral initiated?Patient   
declined.  
  
Electronically signed by Ariadna Lockett RN at 2024 1:14 PM   
EDT  
                                        [facility]  
   
                                        2024 Nurse Note Formatting of this  
 note might be   
different from the original.  
  
Tobacco Use: .5 packs/day Types:   
Cigarettes  
  
Was smoking cessation packet given?   
Patient Declined  
  
Was a referral initiated?Patient   
declined.  
  
Electronically signed by Ariadna Lockett RN at 2024 1:14 PM   
EDT  
documented in this encounter            [facility]  
   
                                                    2024 History of   
Present illness Narrative               Formatting of this note is   
different from the original.  
[facility] [place]  
AUDIOLOGIC EVALUATION REPORT  
  
Name: Bev Gaming  
[facility]#: [id number]  
Date of Service: 6/3/2024  
YOB: 1965  
Age: 58 year old  
  
Referred by: Diana Rodriguez MD  
[address] [facility] [place] [address]  
  
Referred for: Evaluation of   
suspected change in hearing,   
tinnitus, or balance.  
  
Referral documented: In an order in   
[facility]  
  
Patient's major complaints: Reduced   
hearing in both ears, Tinnitus in   
both ears,   
Dizziness/vertigo/imbalance,   
Otalgia in both ears,   
Pressure/fullness in both ears  
  
Bev Gaming was seen for an   
initial audiologic evaluation.   
Medical history is significant for   
chronic sinusitis. Patient has   
previously received PE tubes   
bilaterally. Today patient reported   
she has been noticing reduced   
hearing and aural fullness   
bilaterally, greater in the right   
ear than the left ear, as well as   
intermittent otalgia and tinnitus   
in both ears. Patient also reported   
dizziness described as   
disorientation/imbalance. She   
denied room-spinning vertigo   
sensation.She reported history of   
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   
working in factory settings. Today   
patient denied history of   
ototoxicity. See procedures tab for   
audiometric results.  
  
IMPRESSIONS  
  
RIGHT EAR: Sensorineural hearing   
loss  
LEFT EAR: Sensorineural hearing   
loss  
  
AUDIOLOGIC EVALUATION  
  
Following is a brief interpretation   
of the obtained findings from the   
audiologic evaluation. Refer to the   
Auditory Test Record for complete   
audiometric results. The patient   
was counseled about the test   
findings and appropriate audiologic   
recommendations were made.  
  
SUMMARY: See procedures tab for   
audiometric results.  
  
OTOSCOPY  
  
RIGHT EAR: Otoscopic inspection   
revealed ear canal was clear.  
LEFT EAR: Otoscopic inspection   
revealed ear canal was clear.  
  
TYMPANOMETRY  
Description of procedure: This test   
is an objective evaluation of   
middle ear function. CPT code:   
46000  
  
RIGHT EAR: Normal ME function.  
LEFT EAR: Normal ME function.  
  
ACOUSTIC REFLEXES  
Description of procedure: This test   
is an objective measure of auditory   
and facial nerve pathways. CPT   
code: 10031, 78479  
  
RIGHT EAR PROBE EAR: (ipsi right   
stimulus ear; contralateral left   
stimulus ear):  
Acoustic Reflex Pattern Did not   
test  
Acoustic Reflex Decay (left   
stimulus ear): Did not test.  
  
LEFT EAR PROBE EAR: (ipsi left   
stimulus ear; contralateral right   
stimulus ear):  
Acoustic Reflex Pattern Did not   
test  
Acoustic Reflex Decay (right   
stimulus ear):Did not test.  
  
PURE TONE AUDIOMETRY AND SPEECH   
TESTING  
Description of procedure: This test   
is an objective evaluation hearing   
sensitivity via air and bone   
conduction and speech recognition   
testing. CPT code: 84885  
  
RIGHT EAR:  
Hearing Sensitivity: Mild to   
moderate sensorineural hearing   
loss.  
Word Recognition Score: Excellent   
(90%). WRS is consistent with   
hearing sensitivity. Words were   
presented at 80 dB HL is above   
(greater than or equal to 60 dB HL)   
intensity level for average   
conversational speech. The NU-6   
Ordered by Difficulty Word List (10   
words) was used for testing.   
Contralateral masking was employed.  
  
LEFT EAR:  
Hearing Sensitivity: Mild to   
moderate sensorineural hearing   
loss.  
Word Recognition Score: Excellent   
(100%). WRS is consistent with   
hearing sensitivity. Words were   
presented at 80 dB HL is above   
(greater than or equal to 60 dB HL)   
intensity level for average   
conversational speech. The NU-6   
Ordered by Difficulty Word List (10   
words) was used for testing.   
Contralateral masking was employed.  
  
RECOMMENDATIONS  
  
* Continue medical follow-up with   
Diana Rodriguez MD and Zachary Cooper MD. .  
* The patient was counseled   
regarding benefits/limitations of   
hearing aids and provided written   
educational materials.  
* Call [phone] to schedule an   
appointment to assess your need for   
hearing aids. Request a HAE   
appointment.  
* Re-evaluation as medically   
indicated or if a change in hearing   
is noted.  
  
Prakash Gutierrez, CCC-A  
Clinical Audiologist  
  
copied to: Diana Rodriguez MD  
  
CHAN  
Abbrev-  
iation Definition Degree of hearing   
sensitivity dB  
range  
WNL within normal limits WNL 0 - 20  
SNHL sensorineural hearing loss   
Mild 20-40  
CHL conductive hearing loss   
Moderate 40-55  
MHL mixed hearing loss   
Moderately-Severe 55-70  
WRS word recognition score Severe   
70-90  
ME middle ear Profound 90 +  
TM tympanic membrane  
  
  
Electronically signed by Amanda Daigle AUD at 2024 7:45 AM   
EDT  
documented in this encounter            [facility]  
   
                                        2024 Note     HNO ID: [id number]  
Author: AMANDA DAIGLE AUD  
Service: ?  
Author Type: Audiologist  
Type: Progress Notes  
Filed: 2024 07:45  
Note Text:  
[facility] [place]  
AUDIOLOGIC EVALUATION REPORT  
Name: Bev Gaming  
[facility]#: [id number]  
Date of Service: 6/3/2024  
YOB: 1965  
Age: 58 year old  
Referred by: Diana Rodriguez MD  
[address] [facility] [place] [address]  
Referred for: Evaluation of   
suspected change in hearing,   
tinnitus, or balance.  
Referral documented: In an order in   
[facility]  
Patient's major complaints: Reduced   
hearing in both ears, Tinnitus in   
both ears,  
Dizziness/vertigo/imbalance,   
Otalgia in both ears,   
Pressure/fullness in both  
ears  
Bev Gaming was seen for an   
initial audiologic evaluation.   
Medical history  
is significant for chronic   
sinusitis. Patient has previously   
received PE tubes  
bilaterally. Today patient reported   
she has been noticing reduced   
hearing and  
aural fullness bilaterally, greater   
in the right ear than the left ear,   
as well  
as intermittent otalgia and   
tinnitus in both ears. Patient also   
reported  
dizziness described as   
disorientation/imbalance. She   
denied room-spinning  
vertigo sensation.She reported   
history of occupation noise   
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  
working in factory settings. Today   
patient denied history of   
ototoxicity. See  
procedures tab for audiometric   
results.  
IMPRESSIONS  
RIGHT EAR: Sensorineural hearing   
loss  
LEFT EAR: Sensorineural hearing   
loss  
AUDIOLOGIC EVALUATION  
Following is a brief interpretation   
of the obtained findings from the   
audiologic  
evaluation. Refer to the Auditory   
Test Record for complete   
audiometric results.  
The patient was counseled about the   
test findings and appropriate   
audiologic  
recommendations were made.  
SUMMARY: See procedures tab for   
audiometric results.  
OTOSCOPY  
RIGHT EAR: Otoscopic inspection   
revealed ear canal was clear.  
LEFT EAR: Otoscopic inspection   
revealed ear canal was clear.  
TYMPANOMETRY  
Description of procedure: This test   
is an objective evaluation of   
middle ear  
function. CPT code: 25117  
RIGHT EAR: Normal ME function.  
LEFT EAR: Normal ME function.  
ACOUSTIC REFLEXES  
Description of procedure: This test   
is an objective measure of auditory   
and  
facial nerve pathways. CPT code:   
84473, 87368  
RIGHT EAR PROBE EAR: (ipsi right   
stimulus ear; contralateral left   
stimulus ear):  
Acoustic Reflex Pattern Did not   
test  
Acoustic Reflex Decay (left   
stimulus ear): Did not test.  
LEFT EAR PROBE EAR: (ipsi left   
stimulus ear; contralateral right   
stimulus ear):  
Acoustic Reflex Pattern Did not   
test  
Acoustic Reflex Decay (right   
stimulus ear):Did not test.  
PURE TONE AUDIOMETRY AND SPEECH   
TESTING  
Description of procedure: This test   
is an objective evaluation hearing  
sensitivity via air and bone   
conduction and speech recognition   
testing. CPT  
code: 76399  
RIGHT EAR:  
Hearing Sensitivity: Mild to   
moderate sensorineural hearing   
loss.  
Word Recognition Score: Excellent   
(90%). WRS is consistent with   
hearing  
sensitivity. Words were presented   
at 80 dB HL is above (greater than   
or equal  
to 60 dB HL) intensity level for   
average conversational speech. The   
NU-6  
Ordered by Difficulty Word List (10   
words) was used for testing.   
Contralateral  
masking was employed.  
LEFT EAR:  
Hearing Sensitivity: Mild to   
moderate sensorineural hearing   
loss.  
Word Recognition Score: Excellent   
(100%). WRS is consistent with   
hearing  
sensitivity. Words were presented   
at 80 dB HL is above (greater than   
or equal  
to 60 dB HL) intensity level for   
average conversational speech. The   
NU-6  
Ordered by Difficulty Word List (10   
words) was used for testing.   
Contralateral  
masking was employed.  
RECOMMENDATIONS  
* Continue medical follow-up with   
Diana Rodriguez MD and Zachary Cooper MD. .  
* The patient was counseled   
regarding benefits/limitations of   
hearing aids and  
provided written educational   
materials.  
* Call [phone] to schedule an   
appointment to assess your need for   
hearing  
aids. Request a HAE appointment.  
* Re-evaluation as medically   
indicated or if a change in hearing   
is noted.  
Prakash Gutierrez, CCC-A  
Clinical Audiologist  
copied to: Diana Rodriguez MD  
CHAN  
Abbrev-  
iation Definition Degree of hearing   
sensitivity dB  
range  
WNL within normal limits WNL 0 - 20  
SNHL sensorineural hearing loss   
Mild 20-40  
CHL conductive hearing loss   
Moderate 40-55  
MHL mixed hearing loss   
Moderately-Severe 55-70  
WRS word recognition score Severe   
70-90  
ME middle ear Profound 90 +  
TM tympanic membrane                    [facility]  
   
                                                    2024 Telephone   
encounter Note                          Formatting of this note might be   
different from the original.  
Called pt to schedule follow up   
with nichol, pt confirmed appt for   
 at 4pm.  
Electronically signed by Dayan Ribera at 2024 8:50 AM EDT  
                                        [facility]  
   
                                                    2024 Miscellaneous   
Notes                                   Formatting of this note might be   
different from the original.  
Called pt to schedule follow up   
with nichol, pt confirmed appt for   
 at 4pm.  
Electronically signed by Dayan Ribera at 2024 8:50 AM EDT  
documented in this encounter            [facility]  
   
                                        2024 Note     HNO ID: [id number]  
Author: DIANA RODRIGUEZ MD  
Service: ?  
Author Type: Physician  
Type: Progress Notes  
Filed: 2024 16:02  
Note Text:  
SECTION OF RHINOLOGY, SINUS AND   
SKULL BASE SURGERY  
[facility] [place], [facility] NOTE  
HPI: Patient is a 58 year old   
Female presenting for Chronic   
pansinusitis. She  
c/o of Nasal congestion In the   
right side as well as pressure. She   
is also  
having headaches.  
PAST MEDICAL HISTORY  
Diagnosis Date  
Arthritis  
COPD (chronic obstructive pulmonary   
disease) ([facility])  
Depression  
sees a psych  
Diabetes ([facility])  
DVT (deep venous thrombosis) ([facility])  
Left lower extremity s/p ankle   
surgery  
Hypogammaglobulinemia ([facility])  
On home oxygen therapy  
4L NC  
Overweight  
Pneumonia  
PONV (postoperative nausea and   
vomiting)  
RA (rheumatoid arthritis) ([facility])  
PAST SURGICAL HISTORY  
Procedure Laterality Date  
ANKLE LEFT OP SURGERY  
plantar tendon  
CHOLECYSTECTOMY  
HERNIA REPAIR HX  
HIP RIGHT OP SURGERY  
tendon repair  
HYSTERECTOMY HX  
total  
KNEE RIGHT OP SURGERY  
tendon repair  
SHOULDER ARTHROSCOPY/SURGERY  
bilateral tendon repair  
FAMILY HISTORY  
Problem Relation Age of Onset  
Hypertension Mother  
Arthritis Mother  
Cancer Father  
Hypertension Brother  
Social History  
Tobacco Use  
Smoking status: Every Day  
Packs/day: .5  
Types: Cigarettes  
Start date:   
Smokeless tobacco: Never  
Vaping Use  
Vaping Use: Never used  
Substance Use Topics  
Alcohol use: No  
Drug use: No  
Comment: denies tx for drug/alcohol   
abuse in the past.  
Current Outpatient Medications  
Medication Sig Dispense Refill  
albuterol (PROVENTIL) 2.5 mg /3 mL   
(0.083 %) nebulizer solution 2.5 mg   
as  
needed.  
albuterol HFA (PROVENTIL HFA,   
VENTOLIN HFA) 90 mcg/actuation   
inhaler INHALE 1  
PUFF EVERY 4 HOURS AS NEEDED FOR   
WHEEZE OR FOR SHORTNESS OF BREATH  
famotidine (PEPCID) 40 mg tablet   
Take 40 mg by mouth every morning.  
rosuvastatin (CRESTOR) 10 mg tablet   
Take 10 mg by mouth every morning.  
IBUPROFEN IB ORAL Take by mouth as   
needed.  
azelastine 0.1% nasal spray 1 spray   
in each nostril twice daily for 30   
days 30  
mL 4  
budesonide (PULMICORT) 0.5 mg/2 mL   
nebulizer solution Use 2 mL via   
nebulizer  
once daily. (Patient not taking:   
Reported on 2024) 30 Vial 5  
budesonide (PULMICORT) 0.5 mg/2 mL   
nebulizer solution Use 2 mL via   
nebulizer  
once daily. (Patient not taking:   
Reported on 2024) 30 Vial 5  
oxymetazoline (AFRIN,   
OXYMETAZOLINE,) 0.05 % nasal spray   
Instill 2 Sprays in  
the nose twice daily. (Patient not   
taking: Reported on 2024) 30 mL   
0  
sodium chloride (DEEP SEA NASAL)   
0.65 % nasal spray Use 1 Spray in   
the nose as  
needed. (Patient not taking:   
Reported on 2024) 44 mL 0  
insulin degludec (TRESIBA FLEXTOUCH   
U-100) 100 unit/mL (3 mL) injection   
pen  
Inject subcutaneously. (Patient not   
taking: Reported on 2024)  
LORazepam (ATIVAN) 0.5 mg Take 0.5   
mg by mouth at bedtime as needed.  
OXYGEN, HOME THERAPY, 4 L/min by   
Nasal Cannula route as directed.   
Can be off  
for 3-4 hours without issue.  
insulin aspart (NOVOLOG FLEXPEN   
U-100 INSULIN SUBCUTANEOUS) Inject  
subcutaneously. sliding scale   
(Patient not taking: Reported on   
2024)  
semaglutide (OZEMPIC) 1 mg/dose (2   
mg/1.5 mL) pnij Inject 2 mg   
subcutaneously  
one time a week.  
Insulin Lispro, Human, (HUMALOG)   
100 unit/mL crtg Inject   
subcutaneously w  
MEALS. (Patient not taking:   
Reported on 2024)  
furosemide (LASIX) 40 mg tablet   
Take 40 mg by mouth as needed.   
(Patient not  
taking: Reported on 2024)  
Levalbuterol HCl (XOPENEX) 1.25   
mg/0.5 mL nebulizer solution Use 1   
Ampule via  
nebulizer every 8 hours as needed.  
No current facility-administered   
medications for this visit.  
ALLERGIES  
Allergen Reactions  
Effexor [Venlafaxin* Other: See   
Comments  
Seizures.  
Penicillins Rash, Hives  
difficult to breathe, swelling  
Bactrim [Sulfametho* Itching  
Reglan [Metoclopram* Mental Status   
Change  
Topamax [Topiramate] Mental Status   
Change  
ROS:  
CONSTITUTIONAL: No fevers, chills,   
nightsweats, unintended weight loss  
HEENT: HEENT: Yes symptoms:   
headaches and sinus pressure, No   
symptoms: nasal  
congestion, epistaxis, sense of   
smell absent, and nasal drainage   
both  
EYES: No diplopia or blurry vision.  
PHYSICAL EXAM:  
24  
1016  
Temp: 36.8 ?C (98.2 ?F)  
General appearance: well developed,   
well nourished, without obvious   
deformities  
Eyes: Extra ocular muscles are   
intact, no diplopia on primary gaze  
Ears: Externally normal in   
appearance, without scars, lesions,   
or masses. Both  
left and right external auditory   
canals and tympanic membranes are   
normal  
Nasal exam: The mucosa is pink, the   
septum is deviated and the visible  
turbinates are Yes hypertrophied on   
anterior rhinoscopy, nasal polyps   
(0 L, 0 R)  
Oral cavity and oropharynx: the   
oral mucosa, tongue, tonsil area,   
and posterior  
pharyngeal mucosa are without   
lesions. Floor of mouth is soft   
without edema.  
None  
UPSIT:None  
No results found for:  REVW ,   
 BMPNOTE ,  ALRG5  (more content   
not included)...                        [facility]  
   
                                                    2024 History of   
Present illness Narrative               Formatting of this note is   
different from the original.  
Images from the original note were   
not included.  
  
SECTION OF RHINOLOGY, SINUS AND   
SKULL BASE SURGERY  
Head and Neck [place] NOTE  
  
HPI: Patient is a 58 year old   
Female presenting for Chronic   
pansinusitis. She c/o of Nasal   
congestion In the right side as   
well as pressure. She is also   
having headaches.  
  
PAST MEDICAL HISTORY  
Diagnosis Date  
Arthritis  
COPD (chronic obstructive pulmonary   
disease) ([facility])  
Depression  
sees a psych  
Diabetes ([facility])  
DVT (deep venous thrombosis) ([facility])  
Left lower extremity s/p ankle   
surgery  
Hypogammaglobulinemia ([facility])  
On home oxygen therapy  
4L NC  
Overweight  
Pneumonia  
PONV (postoperative nausea and   
vomiting)  
RA (rheumatoid arthritis) ([facility])  
  
  
PAST SURGICAL HISTORY  
Procedure Laterality Date  
ANKLE LEFT OP SURGERY  
plantar tendon  
CHOLECYSTECTOMY  
HERNIA REPAIR HX  
HIP RIGHT OP SURGERY  
tendon repair  
HYSTERECTOMY HX  
total  
KNEE RIGHT OP SURGERY  
tendon repair  
SHOULDER ARTHROSCOPY/SURGERY  
bilateral tendon repair  
  
FAMILY HISTORY  
Problem Relation Age of Onset  
Hypertension Mother  
Arthritis Mother  
Cancer Father  
Hypertension Brother  
  
  
Social History  
  
Tobacco Use  
Smoking status: Every Day  
Packs/day: .5  
Types: Cigarettes  
Start date:   
Smokeless tobacco: Never  
Vaping Use  
Vaping Use: Never used  
Substance Use Topics  
Alcohol use: No  
Drug use: No  
Comment: denies tx for drug/alcohol   
abuse in the past.  
  
  
Current Outpatient Medications  
Medication Sig Dispense Refill  
albuterol (PROVENTIL) 2.5 mg /3 mL   
(0.083 %) nebulizer solution 2.5 mg   
as needed.  
albuterol HFA (PROVENTIL HFA,   
VENTOLIN HFA) 90 mcg/actuation   
inhaler INHALE 1 PUFF EVERY 4 HOURS   
AS NEEDED FOR WHEEZE OR FOR   
SHORTNESS OF BREATH  
famotidine (PEPCID) 40 mg tablet   
Take 40 mg by mouth every morning.  
rosuvastatin (CRESTOR) 10 mg tablet   
Take 10 mg by mouth every morning.  
IBUPROFEN IB ORAL Take by mouth as   
needed.  
azelastine 0.1% nasal spray 1 spray   
in each nostril twice daily for 30   
days 30 mL 4  
budesonide (PULMICORT) 0.5 mg/2 mL   
nebulizer solution Use 2 mL via   
nebulizer once daily. (Patient not   
taking: Reported on 2024) 30   
Vial 5  
budesonide (PULMICORT) 0.5 mg/2 mL   
nebulizer solution Use 2 mL via   
nebulizer once daily. (Patient not   
taking: Reported on 2024) 30   
Vial 5  
oxymetazoline (AFRIN,   
OXYMETAZOLINE,) 0.05 % nasal spray   
Instill 2 Sprays in the nose twice   
daily. (Patient not taking:   
Reported on 2024) 30 mL 0  
sodium chloride (DEEP SEA NASAL)   
0.65 % nasal spray Use 1 Spray in   
the nose as needed. (Patient not   
taking: Reported on 2024) 44 mL   
0  
insulin degludec (TRESIBA FLEXTOUCH   
U-100) 100 unit/mL (3 mL) injection   
pen Inject subcutaneously. (Patient   
not taking: Reported on 2024)  
LORazepam (ATIVAN) 0.5 mg Take 0.5   
mg by mouth at bedtime as needed.  
OXYGEN, HOME THERAPY, 4 L/min by   
Nasal Cannula route as directed.   
Can be off for 3-4 hours without   
issue.  
insulin aspart (NOVOLOG FLEXPEN   
U-100 INSULIN SUBCUTANEOUS) Inject   
subcutaneously. sliding scale   
(Patient not taking: Reported on   
2024)  
semaglutide (OZEMPIC) 1 mg/dose (2   
mg/1.5 mL) pnij Inject 2 mg   
subcutaneously one time a week.  
Insulin Lispro, Human, (HUMALOG)   
100 unit/mL crtg Inject   
subcutaneously w MEALS. (Patient   
not taking: Reported on 2024)  
furosemide (LASIX) 40 mg tablet   
Take 40 mg by mouth as needed.   
(Patient not taking: Reported on   
2024)  
Levalbuterol HCl (XOPENEX) 1.25   
mg/0.5 mL nebulizer solution Use 1   
Ampule via nebulizer every 8 hours   
as needed.  
  
No current facility-administered   
medications for this visit.  
  
  
ALLERGIES  
Allergen Reactions  
Effexor [Venlafaxin* Other: See   
Comments  
Seizures.  
Penicillins Rash, Hives  
difficult to breathe, swelling  
Bactrim [Sulfametho* Itching  
Reglan [Metoclopram* Mental Status   
Change  
Topamax [Topiramate] Mental Status   
Change  
  
ROS:  
CONSTITUTIONAL: No fevers, chills,   
nightsweats, unintended weight loss  
  
HEENT: HEENT: Yes symptoms:   
headaches and sinus pressure, No   
symptoms: nasal congestion,   
epistaxis, sense of smell absent,   
and nasal drainage both  
EYES: No diplopia or blurry vision.  
  
PHYSICAL EXAM:  
  
24  
1016  
Temp: 36.8 C (98.2 F)  
  
General appearance: well developed,   
well nourished, without obvious   
deformities  
Eyes: Extra ocular muscles are   
intact, no diplopia on primary gaze  
Ears: Externally normal in   
appearance, without scars, lesions,   
or masses. Both left and right   
external auditory canals and   
tympanic membranes are normal  
Nasal exam: The mucosa is pink, the   
septum is deviated and the visible   
turbinates are Yes hypertrophied on   
anterior rhinoscopy, nasal polyps   
(0 L, 0 R)  
  
Oral cavity and oropharynx: the   
oral mucosa, tongue, tonsil area,   
and posterior pharyngeal mucosa are   
without lesions. Floor of mouth is   
soft without edema.  
  
None  
  
UPSIT:None  
  
No results found for:  REVW ,   
 BMPNOTE ,  ALRG5   
Last CT Sinus - Impression Only  
  
CT SINUS STEREO WO IVCON Exam End:   
2024 7:53 AM (Final result)  
Impression: IMPRESSION:  
  
Near complete opacification of   
right maxillary sinus with findings   
that  
may indicate acute sinusitis. This   
is new from 2020.  
  
Postoperative changes from the   
prior examination as detailed with  
improved left maxillary sinus   
inflammatory changes.  
  
Remaining paranasal sinuses are   
clear.  
...  
  
  
  
  
  
  
ASSESSMENT/PLAN:  
1. Chronic pansinusitis - ICD9:   
473.8, ICD10: J32.4 (primary   
diagnosis)  
2. Vascular headache - ICD9: 784.0,   
ICD10: G44.1  
  
PLAN:  
CONSULT TO HEADACHE CLINIC ORDER  
SURGICAL REQUEST - ELECTIVE   
(2020) ORDER  
VIRTUAL CONSULT TO [facility] ORDER  
FOLLOW UP AFTER SURGERY  
  
Bev Gaming is a 58 year old   
Female presented for Chronic   
pansinusitis. She c/o of Nasal   
congestion In the right side as   
well as pressure. She is also   
having headaches.  
  
We discussed treatment options   
including medical management   
continuing vs surgery. He elected   
to proceed with surgery and the   
risks of the surgery include but   
are not limited to the ones below:  
The specific risks for FESS   
(functional endoscopic Sinus   
Surgery) were discussed with the   
patient and that included  
  
- Bleeding. Bleeding can either   
happen during surgery or after   
surgery. Bleeding can either be   
minor or major. Minor bleeding may   
require packing. Major bleed in   
severe cases, as in the case of   
injury to a major artery (carotid   
or internal maxillary), may require   
embolization and/or surgery. A   
blood transfusion may be necessary   
depending on the amount of blood   
lost.  
- Brain injury. CSF (brain / spinal   
fluid) leak, meningitis or brain   
abscess may occur. In addition,   
pneumocephalus (air in the brain)   
can occur. These usually, if   
detected during surgery, will be   
fixed immediately. If detected   
after surgery, surgery will usually   
be needed; however, you will be   
placed on antibiotics as well as   
repair may be scheduled and/or   
placement of lumbar drain.   
Neurosurgical expertise and   
consultation may be needed.  
- Infection of the sinuses and   
requires antibiotic therapy, very   
rarely fungal infection may occur   
which may require debridement.  
- Altered taste and smell which may   
be permanent.  
- Scar tissue may grow inside the   
nose which may need sinus drainage   
requiring further surger  
- Hole in the partition inside the   
nose (Septum) when septoplasty is   
performed. This does not usually   
cause any problems. Sometimes it   
may cause whistling, crusting or   
bleeding and may require further   
surgery to close the hole. This may   
cause disfigurement/change in the   
shape of the nose.  
- Numbnes front two teeth if it was   
done and that can last few months,   
and may be persistent  
-Orbit injury that could lead to   
blindness  
  
The patient was offered a   
surgery/procedure at a [facility] facility. The   
surgeon/proceduralist and patient   
have discussed in detail the risk   
of exposure to and/or potential   
harm posed by the COVID-19 virus   
with having a surgery/procedure at   
this time versus the risk of   
delaying the surgery/procedure. It   
is not possible to know either the   
risk of delaying the surgery or   
procedure or chance of getting an   
infection with perfect accuracy,   
but a joint decision was made   
between the patient and the   
surgeon/proceduralist to proceed at   
this time with the scheduled   
surgery/procedure as indicated on   
the consent form.  
  
Time away from work: The average   
time is 1 week with some patients   
going back before and some patients   
going back afterwards. If your job   
requires lifting, then you will   
need 2 weeks off from work.  
What to expect: Headache, nasal   
pain, eye pain, pain as well as   
tooth pain and bleeding.  
Activity: No strenuous activity as   
well as no heavy lifting for 3   
weeks after surgery, followed by   
reduced activity for a total of 6   
weeks.  
Diet: Once you are not nauseated  
Medications:  
Pain: You will be given   
prescriptions for narcotic. You   
need to avoid driving while on   
these medications as well as   
working and making decisions. Avoid   
NSAIDs like Ibuprofen or Aspirin   
because they will make you bleed.  
Nausea: You may be Zofran for   
nausea. For the first 24 hours use   
every 6 hours for nausea. This is   
to be used as needed  
Stool softeners: Take over the   
counter medications for   
constipation since your narcotic   
may cause you be to be constipated   
and straining may lead to bleeding.  
Antibiotics: You will be given   
antibiotics for 1-2 weeks  
  
Nasal care following surgery:  
  
Day 1 Expect intermittent bleeding,   
use Afrin.  
Day 2 If bleeding stops, start   
using [place] nasal spray 3 times   
daily  
Day 3 Same as above  
Day 4-10 [place] nasal spray  
Day 11-till 6 weeks Nasal saline   
irrigation twice daily ([facility])  
  
If you have sleep apnea, and you   
have CPAP, you can start using it   
once bleeding stops AND at least   
after 7 days after surgery  
  
The Do's and Do Not's of   
postoperative sinus care:  
  
DO:  
DO take the pain medication   
prescribed by your doctor.  
Tylenol may be taken instead of   
your pain medication (if it is   
sufficient to control your pain).   
Avoid taking Tylenol in addition to   
pain medication as your medicine   
already has Tylenol as one of its   
components.  
DO take the antibiotics prescribed  
DO make sure you have a follow-up   
appointment after 10 days  
DO call to make or confirm your   
appointment  
DO cough and sneeze with your mouth   
open.  
DO eat a regular diet  
DO take your pain medication before   
your first postoperative   
appointment.  
  
DO NOT:  
DO NOT perform any heavy lifting   
(nothing greater than 15 lbs),   
bending,  
straining.  
DO NOT blow your nose or pick at   
your nose for at least 2 weeks  
DO NOT take aspirin or aspirin   
containing medications (Advil,   
Motrin, or any other NSAIDS)  
DO NOT fly without your doctor's   
clearance for 3-5 days after   
surgery  
  
Call US Immediately If You Develop:  
1. Change in vision  
2. Increased swelling around the   
eyes  
3. Neck stiffness, deep head pain   
or progressively worsening headache  
4. Continued Nausea or Vomiting  
5. Bright red blood that lasts more   
than ten minutes or causes choking  
6. Fever over 101 degrees  
  
If any issues and during regular   
business hours, please call   
[phone]  
  
If after hours, call the ENT doctor   
on call. [phone]  
  
  
No data to display  
  
  
  
  
Provider Attestation: I, Dr.Mohamad Rodriguez, personally performed the   
services described in this   
documentation. All medical record   
entries made by the   
scribe/resident/fellow were at my   
direction and in my presence. I   
have reviewed the chart and agree   
that the record reflects my   
personal performance and is   
accurate and complete.  
  
  
Electronically signed by Diana Rodriguez MD at 2024 4:02 PM   
EDT  
Formatting of this note might be   
different from the original.  
y  
Electronically signed by Dayan Ibanez MA at 2024 4:02 PM   
EDT  
documented in this encounter            [facility]  
   
                                        2024 Note     HNO ID: [id number]  
Author: DAYAN IBANEZ MA  
Service: ?  
Author Type: Medical Assistant  
Type: Progress Notes  
Filed: 2024 16:02  
Note Text:  
y                                       [facility]  
   
                                                    2024 History of   
Present illness Narrative               Formatting of this note might be   
different from the original.  
Radiology Service Progress Note  
  
PATIENT NAME: Bev Gaming  
MRN: [id number]  
  
DATE OF SERVICE: 2024  
TIME: 7:44 AM  
PATIENT IDENTITY VERIFICATION   
COMPLETED USING TWO (2)   
IDENTIFIERS: Name and Date of Birth   
confirmed by patient verbally and   
Name and Date of Birth confirmed by   
identification band.  
FALL SCREENING: Has the patient had   
2 falls in the last year or 1 fall   
with injury or currently using an   
Ambulatory Assistive Device   
(Walker, Cane, Wheelchair,   
Crutches, etc.)? No  
PATIENT GENDER DATA: Female.   
Pregnancy status: Pregnant: No   
Breastfeeding status: NO.  
PATIENT RELEVANT IMPLANT DATA   
REVIEWED: Not Applicable  
PATIENT PRESENTS WITH AN   
IMPLANTABLE OR ATTACHED MEDICAL   
DEVICE: No  
  
RADIOLOGY DEPARTMENT: CT; Exam(s)   
Completed: Sinus  
  
PERIPHERAL IV DATA: Not applicable  
  
SIGNED BY: RT Ashley(LUIS ENRIQUE)  
2024 7:44 AM  
  
  
Electronically signed by Anjana Proctor RT(R) at 2024 7:47   
AM EDT  
documented in this encounter            [facility]  
   
                                        2024 Note     HNO ID: [id number]  
Author: ANJANA PROCTOR RT(LUIS ENRIQUE)  
Service: Radiology  
Author Type: Technologist  
Type: Progress Notes  
Filed: 2024 07:47  
Note Text:  
Radiology Service Progress Note  
PATIENT NAME: Bev Gaming  
MRN: [id number]  
DATE OF SERVICE: 2024  
TIME: 7:44 AM  
PATIENT IDENTITY VERIFICATION   
COMPLETED USING TWO (2)   
IDENTIFIERS: Name and  
Date of Birth confirmed by patient   
verbally and Name and Date of Birth   
confirmed  
by identification band.  
FALL SCREENING: Has the patient had   
2 falls in the last year or 1 fall   
with  
injury or currently using an   
Ambulatory Assistive Device   
(Walker, Cane,  
Wheelchair, Crutches, etc.)? No  
PATIENT GENDER DATA: Female.   
Pregnancy status: Pregnant: No   
Breastfeeding  
status: NO.  
PATIENT RELEVANT IMPLANT DATA   
REVIEWED: Not Applicable  
PATIENT PRESENTS WITH AN   
IMPLANTABLE OR ATTACHED MEDICAL   
DEVICE: No  
RADIOLOGY DEPARTMENT: CT; Exam(s)   
Completed: Sinus  
PERIPHERAL IV DATA: Not applicable  
SIGNED BY: Anjana Proctor RT(R)  
2024 7:44 AM                  [facility]  
   
                                        2024 Note     HNO ID: [id number]  
Author: DIANA RODRIGUEZ MD  
Service: ?  
Author Type: Physician  
Type: Progress Notes  
Filed: 2024 17:00  
Note Text:  
SECTION OF RHINOLOGY, SINUS AND   
SKULL BASE SURGERY  
Head and Neck [place] NOTE  
HPI: Patient is a 58 year old   
female who presents for chronic   
pansinusitis  
follow up. She has been having   
frequent infections since December.   
She's been on  
antibiotics and steroids since   
December. She's not currently on   
anything. She  
can't take Flonase. She's done a   
netipot. She has not tried   
Azelastine.  
PAST MEDICAL HISTORY  
Diagnosis Date  
Arthritis  
COPD (chronic obstructive pulmonary   
disease) ([facility])  
Depression  
sees a psych  
Diabetes ([facility])  
DVT (deep venous thrombosis) ([facility])  
Left lower extremity s/p ankle   
surgery  
Hypogammaglobulinemia ([facility])  
On home oxygen therapy  
4L NC  
Overweight  
Pneumonia  
PONV (postoperative nausea and   
vomiting)  
RA (rheumatoid arthritis) ([facility])  
PAST SURGICAL HISTORY  
Procedure Laterality Date  
ANKLE LEFT OP SURGERY  
plantar tendon  
CHOLECYSTECTOMY  
HERNIA REPAIR HX  
HIP RIGHT OP SURGERY  
tendon repair  
HYSTERECTOMY HX  
total  
KNEE RIGHT OP SURGERY  
tendon repair  
SHOULDER ARTHROSCOPY/SURGERY  
bilateral tendon repair  
FAMILY HISTORY  
Problem Relation Age of Onset  
Hypertension Mother  
Arthritis Mother  
Cancer Father  
Hypertension Brother  
Social History  
Tobacco Use  
Smoking status: Every Day  
Packs/day: .5  
Types: Cigarettes  
Start date:   
Smokeless tobacco: Never  
Vaping Use  
Vaping Use: Never used  
Substance Use Topics  
Alcohol use: No  
Drug use: No  
Comment: denies tx for drug/alcohol   
abuse in the past.  
Current Outpatient Medications  
Medication Sig Dispense Refill  
albuterol (PROVENTIL) 2.5 mg /3 mL   
(0.083 %) nebulizer solution 2.5 mg   
as  
needed.  
albuterol HFA (PROVENTIL HFA,   
VENTOLIN HFA) 90 mcg/actuation   
inhaler INHALE 1  
PUFF EVERY 4 HOURS AS NEEDED FOR   
WHEEZE OR FOR SHORTNESS OF BREATH  
famotidine (PEPCID) 40 mg tablet   
Take 40 mg by mouth every morning.  
rosuvastatin (CRESTOR) 10 mg tablet   
Take 10 mg by mouth every morning.  
IBUPROFEN IB ORAL Take by mouth as   
needed.  
LORazepam (ATIVAN) 0.5 mg Take 0.5   
mg by mouth at bedtime as needed.  
OXYGEN, HOME THERAPY, 4 L/min by   
Nasal Cannula route as directed.   
Can be off  
for 3-4 hours without issue.  
semaglutide (OZEMPIC) 1 mg/dose (2   
mg/1.5 mL) pnij Inject 2 mg   
subcutaneously  
one time a week.  
Levalbuterol HCl (XOPENEX) 1.25   
mg/0.5 mL nebulizer solution Use 1   
Ampule via  
nebulizer every 8 hours as needed.  
budesonide (PULMICORT) 0.5 mg/2 mL   
nebulizer solution Use 2 mL via   
nebulizer  
once daily. (Patient not taking:   
Reported on 2024) 30 Vial 5  
budesonide (PULMICORT) 0.5 mg/2 mL   
nebulizer solution Use 2 mL via   
nebulizer  
once daily. (Patient not taking:   
Reported on 2024) 30 Vial 5  
oxymetazoline (AFRIN,   
OXYMETAZOLINE,) 0.05 % nasal spray   
Instill 2 Sprays in  
the nose twice daily. (Patient not   
taking: Reported on 2024) 30 mL   
0  
sodium chloride (DEEP SEA NASAL)   
0.65 % nasal spray Use 1 Spray in   
the nose as  
needed. (Patient not taking:   
Reported on 2024) 44 mL 0  
insulin degludec (TRESIBA FLEXTOUCH   
U-100) 100 unit/mL (3 mL) injection   
pen  
Inject subcutaneously. (Patient not   
taking: Reported on 2024)  
insulin aspart (NOVOLOG FLEXPEN   
U-100 INSULIN SUBCUTANEOUS) Inject  
subcutaneously. sliding scale   
(Patient not taking: Reported on   
2024)  
Insulin Lispro, Human, (HUMALOG)   
100 unit/mL crtg Inject   
subcutaneously w  
MEALS. (Patient not taking:   
Reported on 2024)  
furosemide (LASIX) 40 mg tablet   
Take 40 mg by mouth as needed.   
(Patient not  
taking: Reported on 2024)  
No current facility-administered   
medications for this visit.  
ALLERGIES  
Allergen Reactions  
Bactrim [Sulfametho* Itching  
Effexor [Venlafaxin* Other: See   
Comments  
Seizures.  
Penicillins Rash, Hives  
difficult to breathe, swelling  
Reglan [Metoclopram* Mental Status   
Change  
Topamax [Topiramate] Mental Status   
Change  
ROS:  
CONSTITUTIONAL: No fevers, chills,   
nightsweats, unintended weight loss  
HEENT: HEENT: Yes symptoms:   
headaches, sinus pressure, nasal   
congestion, sense  
of smell reduced, and nasal   
drainage both, No symptoms:   
epistaxis  
EYES: No diplopia or blurry vision.  
PHYSICAL EXAM:  
There were no vitals filed for this   
visit.  
General appearance: well developed,   
well nourished, without obvious   
deformities  
Eyes: Extra ocular muscles are   
intact, no diplopia on primary gaze  
Ears: Externally normal in   
appearance, without scars, lesions,   
or masses. Both  
left and right external auditory   
canals and tympanic membranes are   
normal  
Nasal exam: The mucosa is pink, the   
septum is No deviated, and the   
visible  
turbinates are Yes hypertrophied on   
anterior rhinoscopy, nasal polyps   
(0 L, 0 R)  
Oral cavity and oropharynx: the   
oral mucosa, tongue, tonsil area,   
and posterior  
pharyngeal mucosa are without   
lesions. Floor of mouth is soft   
without edema.  
Procedure: Diagn (more content not   
included)...                            [facility]  
   
                                                    2024 History of   
Present illness Narrative               Formatting of this note is   
different from the original.  
Images from the original note were   
not included.  
  
SECTION OF RHINOLOGY, SINUS AND   
SKULL BASE SURGERY  
Head and Neck [place] NOTE  
  
HPI: Patient is a 58 year old   
female who presents for chronic   
pansinusitis follow up. She has   
been having frequent infections   
since December. She's been on   
antibiotics and steroids since   
December. She's not currently on   
anything. She can't take Flonase.   
She's done a netipot. She has not   
tried Azelastine.  
  
PAST MEDICAL HISTORY  
Diagnosis Date  
Arthritis  
COPD (chronic obstructive pulmonary   
disease) ([facility])  
Depression  
sees a psych  
Diabetes ([facility])  
DVT (deep venous thrombosis) ([facility])  
Left lower extremity s/p ankle   
surgery  
Hypogammaglobulinemia ([facility])  
On home oxygen therapy  
4L NC  
Overweight  
Pneumonia  
PONV (postoperative nausea and   
vomiting)  
RA (rheumatoid arthritis) ([facility])  
  
  
PAST SURGICAL HISTORY  
Procedure Laterality Date  
ANKLE LEFT OP SURGERY  
plantar tendon  
CHOLECYSTECTOMY  
HERNIA REPAIR HX  
HIP RIGHT OP SURGERY  
tendon repair  
HYSTERECTOMY HX  
total  
KNEE RIGHT OP SURGERY  
tendon repair  
SHOULDER ARTHROSCOPY/SURGERY  
bilateral tendon repair  
  
FAMILY HISTORY  
Problem Relation Age of Onset  
Hypertension Mother  
Arthritis Mother  
Cancer Father  
Hypertension Brother  
  
  
Social History  
  
Tobacco Use  
Smoking status: Every Day  
Packs/day: .5  
Types: Cigarettes  
Start date:   
Smokeless tobacco: Never  
Vaping Use  
Vaping Use: Never used  
Substance Use Topics  
Alcohol use: No  
Drug use: No  
Comment: denies tx for drug/alcohol   
abuse in the past.  
  
  
Current Outpatient Medications  
Medication Sig Dispense Refill  
albuterol (PROVENTIL) 2.5 mg /3 mL   
(0.083 %) nebulizer solution 2.5 mg   
as needed.  
albuterol HFA (PROVENTIL HFA,   
VENTOLIN HFA) 90 mcg/actuation   
inhaler INHALE 1 PUFF EVERY 4 HOURS   
AS NEEDED FOR WHEEZE OR FOR   
SHORTNESS OF BREATH  
famotidine (PEPCID) 40 mg tablet   
Take 40 mg by mouth every morning.  
rosuvastatin (CRESTOR) 10 mg tablet   
Take 10 mg by mouth every morning.  
IBUPROFEN IB ORAL Take by mouth as   
needed.  
LORazepam (ATIVAN) 0.5 mg Take 0.5   
mg by mouth at bedtime as needed.  
OXYGEN, HOME THERAPY, 4 L/min by   
Nasal Cannula route as directed.   
Can be off for 3-4 hours without   
issue.  
semaglutide (OZEMPIC) 1 mg/dose (2   
mg/1.5 mL) pnij Inject 2 mg   
subcutaneously one time a week.  
Levalbuterol HCl (XOPENEX) 1.25   
mg/0.5 mL nebulizer solution Use 1   
Ampule via nebulizer every 8 hours   
as needed.  
budesonide (PULMICORT) 0.5 mg/2 mL   
nebulizer solution Use 2 mL via   
nebulizer once daily. (Patient not   
taking: Reported on 2024) 30   
Vial 5  
budesonide (PULMICORT) 0.5 mg/2 mL   
nebulizer solution Use 2 mL via   
nebulizer once daily. (Patient not   
taking: Reported on 2024) 30   
Vial 5  
oxymetazoline (AFRIN,   
OXYMETAZOLINE,) 0.05 % nasal spray   
Instill 2 Sprays in the nose twice   
daily. (Patient not taking:   
Reported on 2024) 30 mL 0  
sodium chloride (DEEP SEA NASAL)   
0.65 % nasal spray Use 1 Spray in   
the nose as needed. (Patient not   
taking: Reported on 2024) 44 mL   
0  
insulin degludec (TRESIBA FLEXTOUCH   
U-100) 100 unit/mL (3 mL) injection   
pen Inject subcutaneously. (Patient   
not taking: Reported on 2024)  
insulin aspart (NOVOLOG FLEXPEN   
U-100 INSULIN SUBCUTANEOUS) Inject   
subcutaneously. sliding scale   
(Patient not taking: Reported on   
2024)  
Insulin Lispro, Human, (HUMALOG)   
100 unit/mL crtg Inject   
subcutaneously w MEALS. (Patient   
not taking: Reported on 2024)  
furosemide (LASIX) 40 mg tablet   
Take 40 mg by mouth as needed.   
(Patient not taking: Reported on   
2024)  
  
No current facility-administered   
medications for this visit.  
  
  
ALLERGIES  
Allergen Reactions  
Bactrim [Sulfametho* Itching  
Effexor [Venlafaxin* Other: See   
Comments  
Seizures.  
Penicillins Rash, Hives  
difficult to breathe, swelling  
Reglan [Metoclopram* Mental Status   
Change  
Topamax [Topiramate] Mental Status   
Change  
  
ROS:  
CONSTITUTIONAL: No fevers, chills,   
nightsweats, unintended weight loss  
  
HEENT: HEENT: Yes symptoms:   
headaches, sinus pressure, nasal   
congestion, sense of smell reduced,   
and nasal drainage both, No   
symptoms: epistaxis  
EYES: No diplopia or blurry vision.  
  
PHYSICAL EXAM:  
  
There were no vitals filed for this   
visit.  
  
General appearance: well developed,   
well nourished, without obvious   
deformities  
Eyes: Extra ocular muscles are   
intact, no diplopia on primary gaze  
Ears: Externally normal in   
appearance, without scars, lesions,   
or masses. Both left and right   
external auditory canals and   
tympanic membranes are normal  
Nasal exam: The mucosa is pink, the   
septum is No deviated, and the   
visible turbinates are Yes   
hypertrophied on anterior   
rhinoscopy, nasal polyps (0 L, 0 R)  
Oral cavity and oropharynx: the   
oral mucosa, tongue, tonsil area,   
and posterior pharyngeal mucosa are   
without lesions. Floor of mouth is   
soft without edema.  
  
Procedure: Diagnostic rigid nasal   
endoscopy  
Consent: verbal consent obtained  
Surgeon: Diana Rodriguez  
Anesthesia: The patient was sprayed   
with 4% topical lidocaine and   
Afrin. A rigid nasal scope was   
utilized to examine the patient   
nose  
Findings: A 30-degree rigid   
endoscope was passed through the   
patient's bilateral nares. The   
first pass was along the floor of   
the nose to the nasopharynx. The   
second pass was to the area of the   
middle meatus. The third pass was   
to the sphenoethmoidal recess.  
Septum: The septum is midline, no   
perforations.  
R/L Nasal cavity  
No nasal polyps (0 L, 0 R)  
Right nasal cavity:  
Inferior turbinate: Hypertrophied  
Inferior meatus: Clear, no   
discharge, no polyps/masses/lesions  
Middle meatus: Clear, no discharge,   
no polyps/masses/lesions  
Middle turbinate: Normal in size  
Sphenoethmoidal recess: Clear, no   
discharge, no polyps/masses/lesions  
Left nasal cavity:  
Inferior turbinate: Hypertrophied  
Inferior meatus: Clear, no   
discharge, no polyps/masses/lesions  
Middle meatus: Clear, no discharge,   
no polyps/masses/lesions  
Middle turbinate: Normal in size  
Sphenoethmoidal recess: Clear, no   
discharge, no polyps/masses/lesions  
Nasopharynx: Clear, no discharge,   
no masses/lesions  
  
UPSIT:None  
  
No results found for:  REVW ,   
 BMPNOTE ,  ALRG5   
Last CT Sinus - Impression Only  
  
CT SINUS STEREO WO IVCON Exam End:   
2020 12:19 PM (Final result)  
Impression: IMPRESSION:  
  
Chronic left maxillary sinusitis.   
Paranasal sinuses are otherwise   
well  
aerated.  
  
Transcribe Date/Time: 2020   
1:24P  
  
Dictated by: GAB MANLEY MD...  
  
  
  
  
  
  
ASSESSMENT/PLAN:  
1. Chronic pansinusitis - ICD9:   
473.8, ICD10: J32.4 (primary   
diagnosis)  
  
2. Other chronic sinusitis - ICD9:   
473.8, ICD10: J32.8  
  
- CT SINUS STEREO WO IVCON  
  
3. Allergic rhinitis, unspecified   
seasonality, unspecified trigger -   
ICD9: 477.9, ICD10: J30.9  
  
- CONSULT TO ALLERGY/IMMUNOLOGY  
  
4. Vascular headache - ICD9: 784.0,   
ICD10: G44.1  
  
- CONSULT TO HEADACHE CLINIC  
  
5. Vertigo, peripheral, bilateral -   
ICD9: 386.10, ICD10: H81.393  
  
- CONSULT TO ENT  
  
  
No data to display  
  
  
  
  
Scribe Attestation:  
By signing my name below, I,   
Gabbie Mccauley, attest that   
this documentation has been   
prepared under the direction and in   
the presence of Dr. Diana Rodriguez MD.  
Electronically Signed: Ade Richardson, 2024   
4:12 PM  
  
Provider Attestation: I, Dr.Mohamad Rodriguez, personally performed the   
services described in this   
documentation. All medical record   
entries made by the   
scribe/resident/fellow were at my   
direction and in my presence. I   
have reviewed the chart and agree   
that the record reflects my   
personal performance and is   
accurate and complete.  
  
  
Electronically signed by Diana Rodriguez MD at 2024 5:00 PM   
EDT  
Formatting of this note might be   
different from the original.  
  
Tobacco Use: .5 packs/day Types:   
Cigarettes. Ready to quit: No.  
  
Was smoking cessation packet given?   
Patient Declined  
  
Was a referral initiated?Patient   
declined.  
Electronically signed by Brynn Finch RN at 2024 5:00 PM   
EDT  
documented in this encounter            [facility]  
   
                                        2024 Note     HNO ID: [id number]  
Author: BRYNN FINCH RN  
Service: ?  
Author Type: Registered Nurse  
Type: Progress Notes  
Filed: 2024 17:00  
Note Text:  
Tobacco Use: .5 packs/day Types:   
Cigarettes. Ready to quit: No.  
Was smoking cessation packet given?   
Patient Declined  
Was a referral initiated?Patient   
declined.                               [facility]  
  
  
  
                                        2023 Progress note   
  
  
  
   
                                           
   
                                        Note Date/Time      2023   
9:54am  
   
                                                    [facility]  
ENTER  
[address] [place] [address]  
  
Hospitalist Progress Note  
Signed  
  
Patient: Bev Gaming MR#: [id number]  
[id number]  
: 1965 Acct:[id number]  
  
Age/Sex: 57 / F Adm Date:   
3  
Loc:  Room: [address] [place] [address] Type: ADM INOo  
Attending Dr: Neeraj Terry MD  
  
Copies to: ~  
  
  
Date of Service:  
2023  
  
  
Subjective  
Subjective  
Narrative:  
Patient is feeling better today. Still having a   
cough, with minimal sputum production.  
Lungs with minimal wheezes bilaterally, no crackles  
Heart is regular  
Abdomen soft  
Neurological nonfocal  
  
Assessment and plan  
  
1.COPD exacerbation, failure of prior treatment   
course with azithromycin.  
Continue bronchodilators, inhaled and systemic   
steroids.  
Continue Levaquin day 2.  
Sputum cultures pending  
Mucinex changed to Robitussin-AC  
  
2. Chest pain. I believe this is musculoskeletal in   
nature related to her cough. Continue telemetry.   
Troponin is negative.  
  
DVT prophylaxis Xarelto  
  
Possible later today discharge home if feeling   
better.  
  
Continue management of further chronic illnesses with   
home regimen  
  
COPD and chronic hypoxic respiratory failure on   
oxygen 4 L nasal cannula  
Alpha-1 antitrypsin deficiency  
Tobacco abuse  
History of DVT  
Diabetes mellitus type 2  
Dyslipidemia  
  
  
Exam  
Physical Exam  
Vital Signs:  
  
  
  
  
Temp Pulse Resp BP Pulse Ox O2 Del Method O2 Flow   
Rate  
  
98.1 F 88 16 108/72 96 Nasal Cannula 3.5  
  
23 07:52 23 07:52 23 07:52 23   
07:52 23 07:52 23 07:55 23 07:55  
  
  
  
  
Objective  
Lab Results  
  
23 15:23  
  
23 15:23  
  
Microbiology Results  
Microbiology  
  
23 15:45 Nasopharyngeal Respiratory Panel (PCR)   
- Final  
  
  
  
Meds  
Allergies and Active Meds  
Allergies  
  
Sulfa (Sulfonamide Antibiotics) Allergy (Verified   
23 14:38)  
Hives  
metoclopramide [From Reglan] Adverse Reaction   
(Verified 23 14:38)  
Agitated  
Penicillins Adverse Reaction (Verified 23   
14:38)  
Hives  
risperidone [From Risperdal] Adverse Reaction   
(Verified 23 14:38)  
Confusion  
topiramate [From Topamax] Adverse Reaction (Verified   
23 14:39)  
Hallucinating  
  
  
Active Meds:  
Active Medications  
  
  
  
Generic Name Dose Route Start Last Admin  
  
Trade Name Freq PRN Reason Stop Dose Admin  
  
Acetaminophen 1,000 mg 23 17:24  
  
Acetaminophen 500 Mg Tablet PO 24 17:23  
  
Q6H PRN  
  
Fever or Pain  
  
Albuterol/Ipratropium 3 ml 23 20:00 23   
07:33  
  
Ipratropium/Albuterol 0.5-3 Mg 3 Ml Ampul.Neb   
INHALATION 24 19:59 3 ml  
  
QID.RESP AYDEE Administration  
  
Budesonide/Formoterol Fumarate 1 puff 23 21:00   
23 07:33  
  
Budesonide/Formoterol 160-4.5 Mcg 60 Puff/6 Gm   
Hfa.Aer.Ad INHALATION 24 20:59 1 puff  
  
BID AYDEE Administration  
  
Famotidine 40 mg 23 22:00 23 22:10  
  
Famotidine 20 Mg Tablet PO 24 21:59 40 mg  
  
QHS AYDEE Administration  
  
Guaifenesin 1,200 mg 23 21:00 23 09:00  
  
Guaifenesin 600 Mg Tab.Er.12h PO 24 20:59 1,200   
mg  
  
BID AYDEE Administration  
  
Levofloxacin 750 mg in 150 mls @ 100 mls/hr 23   
17:30 23 18:33  
  
Levaquin  mls/hr  
  
Q24H AYDEE Administration  
  
Insulin Aspart 0 units 23 17:00 23 07:59  
  
Insulin Aspart 300 Units/3 Ml Insuln.Pen SUBCUT   
24 16:59 2 units  
  
TID.WM.HS.2A AYDEE Administration  
  
Protocol  
  
Lorazepam 0.5 mg 23 16:49  
  
Lorazepam 0.5 Mg Tablet PO 23 16:48  
  
TID PRN  
  
Anxiety  
  
Methylprednisolone 32 mg 23 09:00 23   
09:00  
  
Methylprednisolone 16 Mg Tablet PO 24 08:59 32   
mg  
  
QAM AYDEE Administration  
  
Naproxen 500 mg 23 17:24 23 20:41  
  
Naproxen 500 Mg Tablet PO 24 17:23 500 mg  
  
BID.WITH.MEALS PRN Administration  
  
Pain  
  
Promethazine HCl 12.5 mg 23 22:03 23   
22:10  
  
Promethazine 12.5 Mg Tablet PO 24 22:02 12.5 mg  
  
Q4H PRN Administration  
  
Nausea And Vomiting  
  
Rivaroxaban 10 mg 23 09:00 23 08:59  
  
Rivaroxaban 10 Mg Tablet PO 24 08:59 10 mg  
  
DAILY AYDEE Administration  
  
Sodium Chloride 0 ml 23 15:34 23 17:11  
  
Sodium Chloride 0.9 % 10 Ml Syringe IV-PUSH 24   
15:33 10 ml  
  
PRN PRN Administration  
  
Flush  
  
Sodium Chloride 10 ml 23 17:30 23 09:01  
  
Sodium Chloride 0.9 % 10 Ml Syringe IV-PUSH 24   
17:29 10 ml  
  
Q8H AYDEE Administration  
  
  
  
  
  
Documented By: Neeraj Terry MD 23 [id number]  
Signed By: <Electronically signed by Neeraj Terry MD>  
23 0956  
   
  
[facility]  
Work Phone: [phone]- History and physical note  
  
  
  
  
                                        Author              Neeraj Terry  
[facility]  
2023 5:24pm  
   
                                        Note Date/Time      2023 4:53  
pm  
   
                                                    [facility]  
ENTER  
[address] [place] [address]  
  
Hospitalist H&P  
Signed  
  
Patient: Bev Gaming MR#: [id number]  
[id number]  
: 1965 Acct:[id number]  
  
Age/Sex: 57 / F Adm Date:   
3  
Loc: ER Room: Type: [facility] ER  
Attending Dr:  
  
Copies to: MD Nickolas Mccarty DO Jeffrey A Garman, DO~  
  
  
HPI  
DATE OF EXAMINATION: 23  
HISTORY OF PRESENT ILLNESS:  
Patient is a 57-year-old female, with known history of severe COPD, and chronic   
hypoxic respiratory failure on 4 L nasal cannula at home. She has not been 
smoking in   
about 6 months. Couple months ago, she had COVID, and it took her quite a while 
to   
recover. About 2 weeks ago, upon returning from [place], she had dyspnea,
  
productive cough, and she was admitted to [facility] where she was kept   
overnight. Apparently she had a COPD exacerbation, and she was discharged home 
with   
5-day therapy with azithromycin and prednisone. Her thick greenish sputum 
improved,   
being no longer green, rather yellow and frothy at times. However shortness of 
breath   
did not get any better. She has been having some chest pressure and right upper   
quadrant pain, mostly pleuritic in nature, exacerbated by deep breathing and 
cough.   
Today she came to the emergency department with these complaints.  
  
Past medical history  
  
COPD and chronic hypoxic respiratory failure on oxygen 4 L nasal cannula  
Alpha-1 antitrypsin deficiency  
Tobacco abuse  
History of DVT  
Diabetes mellitus type 2  
Dyslipidemia  
  
Family history cancer thyroid asthma  
  
10 point review of systems negative except as noted above  
  
Physical exam  
  
Patient was seen in the emergency department  
  
Patient appears comfortable, in no distress.  
  
Skin is normally colored, no icterus, cyanosis or edema noted. Capillary refill 
is   
normal.  
  
Joints are without any effusion.  
  
Abdomen is soft, benign, no rebound or rigidity. No organomegaly. Bowel sounds   
present.  
  
Heart regular, no gallop, rub or JVD. Peripheral pulses present bilaterally. 
Chest is   
tender to palpation, mostly over the left anterior aspect.  
  
Lungs are with expiratory wheezes bilaterally, some rhonchi scattered. No 
crackles.  
  
HENT normal  
  
Neurological: Patient is awake. Cognition is normal. Cranial nerves are intact. 
Power   
is symmetric all extremities, with no focal motor deficit identified on a 
cursory   
exam.  
  
Psych: affect is normal.  
  
EKG Labs imaging reviewed  
  
Assessment and plan  
  
1. COPD exacerbation, failure of prior treatment course with azithromycin.  
We will obtain a sputum culture to identify other pathogens that might have not 
been   
covered. Admit to the hospital. Continue bronchodilators, inhaled and systemic   
steroids, Mucinex. We will administer levofloxacin for the time being. Adjust   
antimicrobial treatment based on the results of sputum cultures if positive. 
Noted   
that she received doxycycline in the ED  
  
2. Chest pain. I believe this is musculoskeletal in nature related to her cough.
We   
will keep on telemetry and repeat troponins tomorrow.  
  
DVT prophylaxis Xarelto  
Continue management of further chronic illnesses with home regimen  
  
COPD and chronic hypoxic respiratory failure on oxygen 4 L nasal cannula  
Alpha-1 antitrypsin deficiency  
Tobacco abuse  
History of DVT  
Diabetes mellitus type 2  
Dyslipidemia  
  
  
[facility]  
Medical History (Updated 23 @ 15:30 by Nickolas Palomares DO)  
  
Fgvsq-1-axnhmnvdpre deficiency  
COPD (chronic obstructive pulmonary disease)  
Diabetes  
Neck pain with history of cervical spinal surgery  
with hardware and cadaver bone  
  
Surgical History (Reviewed 23 @ 13:34 by Gabbie Thorne RN)  
  
History of ankle surgery  
bilat  
History of appendectomy  
History of colectomy  
History of hysterectomy  
  
Family History (Updated 10/01/21 @ 07:17 by Anjana Tovar RN)  
Mother Hypertension  
Asthma  
Brother Hypertension  
Father Lung cancer  
  
Social History  
Smoking Status: Former smoker  
Tobacco Type: cigarettes  
Substance Use Type: None  
  
Meds  
Medications and Allergies  
Allergies  
  
Sulfa (Sulfonamide Antibiotics) Allergy (Verified 23 14:38)  
Hives  
metoclopramide [From Reglan] Adverse Reaction (Verified 23 14:38)  
Agitated  
Penicillins Adverse Reaction (Verified 23 14:38)  
Hives  
risperidone [From Risperdal] Adverse Reaction (Verified 23 14:38)  
Confusion  
topiramate [From Topamax] Adverse Reaction (Verified 23 14:39)  
Hallucinating  
  
  
Home Medications  
  
lorazepam 0.5 mg tablet 0.5 mg PO TID PRN Anxiety 10/08/18 [History Confirmed   
23]  
albuterol sulfate 2.5 mg/3 mL (0.083 %) solution for nebulization 2.5 mg (3 mL)   
inhalation QID #125 vials 10/11/18 [Rx Confirmed 23]  
promethazine-DM 6.25 mg-15 mg/5 mL oral syrup 5 ml PO Q4-6H PRN cough #473 mL   
10/11/18 [Rx Confirmed 23]  
semaglutide 1 mg/dose (4 mg/3 mL) subcutaneous pen injector (Ozempic) 2 mg 
subcut   
QWEEK 10/01/21 [History Confirmed 23]  
famotidine 40 mg tablet 40 mg PO QHS 23 [History Confirmed 23]  
budesonide-formoterol  mcg-4.5 mcg/actuation aerosol inhaler (Symbicort) 
1 inh   
inhalation BID #10.2 grams 23 [Rx Confirmed 23]  
guaifenesin 600 mg tablet, extended release 12 hr (Mucinex) 1,200 mg PO BID 4 
days   
#16 tabs 23 [Rx Confirmed 23]  
  
  
  
Exam  
Physical Exam  
Vital Signs:  
  
  
  
  
Temp Pulse Resp BP Pulse Ox O2 Del Method O2 Flow Rate  
  
97.9 F 79 16 120/59 L 100 Simple Mask 4  
  
23 14:33 23 16:04 23 16:04 23 16:04 23 16:04 
23   
16:04 23 16:00  
  
  
  
  
Results  
Lab Results  
Labs:  
Laboratory Last Values  
  
  
  
Corrected WBC 13.5 X10E3/uL (3.8-11.6) H 23 15:23  
  
Uncorrected WBC Count 13.5 x10E3/uL (3.8-11.6) H 23 15:23  
  
RBC 4.48 X10E6/uL (3.60-5.00) 23 15:23  
  
Hgb 14.3 g/dL (11.8-15.4) 23 15:  
  
Hct 41.8 % (34.0-46.4) 23 15:  
  
MCV 93.2 fl () 23 15:  
  
MCH 31.8 pg (24.7-34.3) 23 15:  
  
MCHC 34.2 g/dL (32.0-35.0) 23 15:  
  
RDW 13.3 % (11.9-15.3) 23 15:  
  
Plt Count 205 x10E3/uL (150-450) 23 15:  
  
MPV 8.2 fl (6.3-10.7) 23 15:  
  
Neut % (Auto) 59.9 % (.) 23 15:  
  
Lymph % (Auto) 32.5 % (.) 23 15:  
  
Mono % (Auto) 6.3 % (.) 23 15:  
  
Eos % (Auto) 0.6 % (.) 06/27/23 15:  
  
Baso % (Auto) 0.7 % (.) 06/27/23 15:  
  
Nucleat RBC Rel Count 0.1 /100 WBC (0-0.5) 06/27/23 15:  
  
Neut # (Auto) 8.1 x10E3/uL (1.8-7.7) H 23 15:  
  
Lymph # (Auto) 4.4 x10E3/uL (1.00-4.8) 23 15:  
  
Mono # (Auto) 0.8 x10E3/uL (0.0-0.8) 23 15:  
  
Eos # (Auto) 0.1 x10E3/uL (0.0-0.45) 06/27/23 15:  
  
Baso # (Auto) 0.1 x10E3/uL (0.0-0.2) 23 15:  
  
Monocyte Dist Width 17.28 % (0.00-20.00) 23 15:  
  
D-Dimer Quant (PE/DVT) < 200 ng/mL (0-243) 23 15:23  
  
PHA Creatinine Clear 133.51 23 15:23  
  
Sodium 138 mmol/L (136-145) 23 15:23  
  
Potassium 3.9 mmol/L (3.5-5.1) 23 15:23  
  
Chloride 102 mmol/L () 23 15:23  
  
Carbon Dioxide 30.6 mmol/L (21.0-31.0) 23 15:23  
  
Anion Gap 9.3 mEq/L (6.0-15.0) 23 15:23  
  
BUN 10 mg/dL (7-25) 23 15:23  
  
Creatinine 0.55 mg/dL (0.60-1.20) L 23 15:23  
  
Est GFR (CKD-EPI) > 60.0 mL/Min 23 15:23  
  
Glucose 184 mg/dL () H 23 15:23  
  
Calcium 8.8 mg/dL (8.6-10.3) 23 15:23  
  
Total Bilirubin 0.5 mg/dl (0.3-1.0) 23 15:23  
  
AST 12 U/L (13-39) L 23 15:23  
  
ALT 20 U/L (7-52) 23 15:23  
  
Alkaline Phosphatase 54 U/L () 23 15:23  
  
Total Creatine Kinase 18 U/L () L 23 15:23  
  
CK-MB (CK-2) 1.3 ng/mL (0.6-6.3) 23 15:23  
  
CK-MB (CK-2) Rel Index 7.2 % (0.00-2.50) H 23 15:23  
  
Troponin I High Sens 4.0 pg/mL (0.0-15.0) 23 15:23  
  
B-Natriuretic Peptide 13.0 pg/mL (5-100) 23 15:23  
  
Total Protein 5.9 gm/dL (6.4-8.9) L 23 15:23  
  
Albumin 3.7 gm/dL (3.5-5.7) 23 15:23  
  
Globulin 2.2 gm/dL 23 15:23  
  
Albumin/Globulin Ratio 1.7 23 15:23  
  
Urine Color Yellow (Yellow) 23 14:20  
  
Urine Appearance Clear (Clear) 23 14:20  
  
Urine pH 5.5 (5.0-9.0) 23 14:20  
  
Ur Specific Gravity 1.004 (1.001-1.030) 23 14:20  
  
Urine Protein Negative mg/dL (Negative) 23 14:20  
  
Urine Glucose (UA) Normal mg/dL (Normal) 23 14:20  
  
Urine Ketones Negative (Negative) 23 14:20  
  
Urine Occult Blood Negative (Negative) 23 14:20  
  
Urine Nitrite Negative (Negative) 23 14:20  
  
Urine Bilirubin Negative (Negative) 23 14:20  
  
Urine Urobilinogen Normal mg/dL (Normal) 23 14:20  
  
Ur Leukocyte Esterase Negative (Negative) 23 14:20  
  
COVID-19 Clin Com Not detected (Not Detecte) 23 15:45  
  
  
  
Microbiology Results  
Micro:  
Microbiology - Results from entire visit  
  
23 15:45 Nasopharyngeal Respiratory Panel (PCR) - Final  
  
  
  
Assessment & Plan  
IP vs OBS Justification  
Based on differential dx, clinical care plan, and risk of adverse events, if   
untreated, in my clinical judgement this patient requires an acute care setting   
as:OBSERVATION because of an expectation of an under 2 midnight stay.  
Estimated length of stay (# of days): 1  
  
  
Documented By: Neeraj Terry MD 23  
Signed By: <Electronically signed by Neeraj Terry MD>  
23 [id number]  
   
  
[facility]  
Work Phone: [phone]- Progress note  
  
  
  
  
                                        Author              Rizwan Westfall  
[facility]  
2023 1:43pm  
   
                                        Note Date/Time      2023 1:4  
3pm  
   
                                                    [facility]  
ENTER  
[address] [place] [address]  
  
Pulmonology Progress Note  
Signed  
  
Patient: Bev Gaming MR#: [id number]  
[id number]  
: 1965 Acct:[id number]  
  
Age/Sex: 57 / F Adm Date:   
3  
Loc: 3T Room: [address] [place] [address] Type: ADM IN  
Attending Dr: Ambrosio Davis MD  
  
Copies to: ~  
  
  
Date of Service:  
2023  
  
  
Subjective  
Subjective  
Narrative:  
No events overnight. Doing well on 4 L nasal cannula which is baseline O2   
concentration at home.  
Repeat COVID-19 swab test yesterday negative  
Denies chest pain. Denies abdominal pain, still coughing but not able to bring 
up   
phlegm.  
  
Exam  
Physical Exam  
Vital Signs:  
  
  
  
  
Temp Pulse Resp BP Pulse Ox O2 Del Method O2 Flow Rate  
  
98.0 F 79 20 119/77 93 L Nasal Cannula 4  
  
23 11:39 23 13:03 23 13:03 23 11:39 23 11:39 
23   
11:39 23 11:39  
  
  
  
Narrative:  
General appearance: No acute distress, alert and oriented x3.  
Eyes: PERRLA, EOMI.  
HEENT: The external ears and nose appear grossly normal.  
Cardiovascular: Regular rate and rhythm, no murmurs rubs gallops. Pulses are 
2+and   
symmetric throughout.  
Neck: trachea is midline  
Respiratory: Normal work of breathing. Prolonged expiratory phase with bilateral
end   
expiratory wheezes.  
Gastrointestinal: Abdomen obese, soft, nontender abdomen. No distention. No 
rebound,   
guarding, organomegaly noted.  
Skin: No rash  
Musculoskeletal: No clubbing  
Neurological exam: Moves all extremities without difficulty. Alert and oriented 
x3.   
No focal neurological deficits.  
  
  
Objective  
Intake and Output  
I&O - Last 24 Hours:  
Intake & Output  
  
  
  
23  
  
23:59 07:59 15:59  
  
Intake Total 225 / 1375 275 / 275  
  
Balance 225 / 1375 275 / 275  
  
Weight 82 kg  
  
  
  
Labs  
  
23 05:19  
  
23 05:19  
  
  
Assessment/Plan  
Assessment/Plan  
(1) Chronic respiratory failure with hypoxia, on home O2 therapy:  
(2) COPD exacerbation:  
(3) COVID-19:  
  
Plan  
  
Patient with nicotine dependence, severe chronic obstructive pulmonary disease,   
advanced hypoxemic respiratory failure on 4 L nasal cannula at home who was 
diagnosed   
with COVID-19 a week ago . she received Paxlovid as an outpatient. Patient does 
not   
qualify for remdesivir.  
Repeat COVID-19 swab test yesterday negative.  
Discontinue dexamethasone, start oral prednisone.  
O2 via regular nasal cannula, SPO2 currently mid 90s on baseline home O2 4 
L/min.  
Switch to oral doxycycline  
High-dose Mucinex  
Nebulized bronchodilators and budesonide  
  
Needs to quit smoking, she was counseled.  
  
Discussed with nursing staff.  
  
  
  
  
Documented By: Rizwan Westfall MD 23 134  
0  
Signed By: <Electronically signed by Rizwan Westfall MD>  
23 1343  
   
  
[facility]  
Work Phone: [phone]- Consult note  
  
  
  
  
                                        Author              Rizwan Westfall  
[facility]  
2023 2:34pm  
   
                                        Note Date/Time      2023 1:0  
1pm  
   
                                                    [facility]  
ENTER  
[address] [place] [address]  
  
Pulmonology Consult Note  
Signed  
  
Patient: Bev Gaming MR#: [id number]  
[id number]  
: 1965 Acct:[id number]  
  
Age/Sex: 57 / F Adm Date:   
3  
Loc:  Room: [address] [place] [address] Type: ADM IN  
Attending Dr: Ambrosio Davis MD  
  
Copies to: MD Sherman Delcid DO Joseph Parenteau, DO, GENI Westfall MD~  
  
  
HPI  
Date/Time of Consultation:  
Date of Service: 2023  
Time of Service: 08:37  
  
Consulting Provider: Rizwan Westfall  
Requesting Provider: Ambrosio Davis  
Reason for Consult:  
COVID-19 positivity, increased oxygen demand  
History of Present Illness  
History of present illness:  
Ms. Gaming is a 57 year old female with past medical history of tobacco use 
disorder,   
COPD, diabetes, hyperlipidemia who presented to the hospital yesterdaynight for   
evaluation of increasing oxygen demand. She uses home oxygen, 4 L 
intermittently. She   
is also recently COVID-positive, was diagnosed with the [facility] emergency room 
1   
week prior. Since then, she has completed course of Paxlovid. Since that time, 
she   
has been progressively more dyspneic with her normal activities and using her 
home   
ambulatory O2 monitor she is seen herself dipped down into the 70s on multiple   
occasions. This is abnormal for her. She also complains of diffuse myalgias over
the   
course of the week. She has a persistent cough intermittently productive for   
green/yellow sputum, no hemoptysis. She states that the days between her most 
recent   
ER visit and admission to this hospital her sputum became much thicker and 
harder to   
clear and her dyspnea got much worse. On intake chest x-ray was benign but 
patient   
had significant dyspnea with hypoxia on room air prompting admission to the   
hospitalist service.  
  
  
Review of Systems  
Review of Systems  
Review of systems:  
Please see HPI for further details regarding review of symptoms. Review of 
symptoms   
should be considered negative unless otherwise noted in HPI.  
  
PMFSH  
Vaccinated for COVID-19?: Yes  
Medical History (Updated 23 @ 20:08 by Darrian Lawrence DO)  
  
COPD (chronic obstructive pulmonary disease)  
Diabetes  
Neck pain with history of cervical spinal surgery  
with hardware and cadaver bone  
  
Surgical History (Reviewed 23 @ 18:06 by Alysa Marquez RN)  
  
History of ankle surgery  
bilat  
History of appendectomy  
History of colectomy  
History of hysterectomy  
  
Family History (Updated 10/01/21 @ 07:17 by Anjana Tovar RN)  
Mother Hypertension  
Asthma  
Brother Hypertension  
Father Lung cancer  
  
Social History  
Smoking Status: Current every day smoker  
Tobacco Type: cigarettes  
Substance Use Type: None  
  
Meds  
Medications and Allergies  
Allergies  
  
Sulfa (Sulfonamide Antibiotics) Allergy (Verified 23 18:06)  
Hives  
metoclopramide [From Reglan] Adverse Reaction (Verified 23 18:06)  
Agitated  
Penicillins Adverse Reaction (Verified 23 18:06)  
Hives  
risperidone [From Risperdal] Adverse Reaction (Verified 23 18:06)  
Confusion  
  
  
Home Medications  
  
lorazepam 0.5 mg tablet 0.5 mg PO TID PRN Anxiety 10/08/18 [History Confirmed   
23]  
albuterol sulfate 2.5 mg/3 mL (0.083 %) solution for nebulization 2.5 mg (3 mL)   
inhalation QID #125 vials 10/11/18 [Rx Confirmed 23]  
prednisone 10 mg tablet 10 mg PO DAILY #28 tabs 10/11/18 [Rx Confirmed 23]  
promethazine-DM 6.25 mg-15 mg/5 mL oral syrup 5 ml PO Q4-6H PRN cough #473 mL   
10/11/18 [Rx Confirmed 23]  
semaglutide 1 mg/dose (4 mg/3 mL) subcutaneous pen injector (Ozempic) 2 mg 
subcut   
QWEEK 10/01/21 [History Confirmed 23]  
famotidine 40 mg tablet 40 mg PO QHS 23 [History Confirmed 23]  
insulin lispro 100 unit/mL subcutaneous pen (Humalog KwikPen (U-100) Insulin) 
subcut   
23 [History Confirmed 23]  
  
  
  
Exam  
Physical Exam  
Vital Signs:  
  
  
  
  
Temp Pulse Resp BP Pulse Ox O2 Del Method O2 Flow Rate  
  
98.0 F 80 20 128/84 96 Nasal Cannula 4  
  
23 04:43 23 08:12 23 08:12 23 04:43 23 04:43 
23   
08:00 23 08:00  
  
  
  
Narrative:  
General appearance: No acute distress, alert and oriented x3.  
Eyes: PERRLA, EOMI. No conjunctival injection. No nystagmus.  
HEENT: The external ears and nose appear grossly normal.  
Cardiovascular: Regular rate and rhythm, no murmurs rubs gallops. Pulses are 
2+and   
symmetric throughout.  
Neck: trachea is midline  
Respiratory: On nasal cannula oxygen. Coarse breath sounds throughout with 
scattered   
expiratory wheezes.  
Gastrointestinal: Soft, nontender abdomen. No distention. No rebound,   
guarding,organomegaly noted.  
Neurological exam: Moves all extremities without difficulty. Alert and oriented 
x3.   
No focal neurological deficits.  
Musculoskeletal: No obvious deformity. No obvious effusion. No obvious lower   
extremity edema.  
  
Results  
Intake and Output  
I&O - Last 24 Hours:  
Intake & Output  
  
  
  
23  
  
23:59 07:59 15:59  
  
Intake Total 50 / 50 450 / 450  
  
Balance 50 / 50 450 / 450  
  
Weight 92.95 kg 81.7 kg  
  
  
  
Labs  
  
23 05:23  
  
23 05:23  
  
Microbiology  
Micro:  
  
  
  
  
23 01:38 Respiratory Panel (PCR) - Final  
  
Nasopharyngeal  
  
  
  
  
Assessment/Plan  
(1) Chronic respiratory failure with hypoxia, on home O2 therapy:  
(2) COPD exacerbation:  
(3) COVID-19:  
  
Plan  
Consultation on a 57-year-old female with COPD, tobacco use disorder, 
hyperlipidemia,   
diabetes. In brief, she has been to multiple emergency rooms over the last few 
days   
for evaluation of respiratory symptoms in the context of COVID-19 diagnosis 1 
week   
prior to evaluation. We are consulted for increased oxygen demand and evaluation
for   
remdesivir therapy. From our standpoint, with a relatively benign chest x-ray, 
use of   
what is baseline oxygen for her, and thefact that her COVID-19 positivity was   
diagnosed 1 week prior to admission remdesivir would serve limited utility. We 
will   
treat this more like a COPD exacerbation and start the patient on steroids.  
  
  
Attending:  
  
Patient was seen and examined by myself, discussed with resident, agree with 
above   
consult note.  
Patient with nicotine dependence, severe chronic obstructive pulmonary disease,   
advanced hypoxemic respiratory failure on 4 L nasal cannula at home who was 
diagnosed   
with COVID-19 a week ago and has visited over 4 ERs in the area since then was   
eventually admitted to [facility] for COPD exacerbation 
and   
COVID-19 infection. I did review chest x-ray, no evidence of pneumonia.  
Patient was diagnosed with COVID-19 a week ago and there is no evidence of 
pneumonia   
on chest x-ray, so there is no indication for remdesivir.  
Treat with dexamethasone 6 mg daily for total 10 days.  
O2 via regular nasal cannula, SPO2 currently mid 90s on baseline home O2 4 
L/min.  
May change doxycycline to by mouth(patient complains of burning at site of   
doxycycline infusion)  
Inhaled corticosteroids, albuterol neb treatments, and symptomatic treatment 
forcough   
and pain.  
Needs to quit smoking, she was counseled.  
  
Discussed with nursing staff.  
  
  
  
  
Documented By: Rizwan Westfall MD 23 083  
7  
Signed By: <Electronically signed by Rizwan Westfall MD>  
23 1434  
<Electronically signed by DO GENI Parekh>  
23 1301  
   
  
[facility]  
Work Phone: [phone]- Progress note  
  
  
  
  
                                        Author              Ambrosio Davis  
[facility]  
2023 1:46pm  
   
                                        Note Date/Time      2023 1:1  
0pm  
   
                                                    [facility]  
ENTER  
[address] [place] [address]  
  
Hospitalist Progress Note  
Signed  
  
Patient: Bev Gaming MR#: [id number]  
[id number]  
: 1965 Acct:[id number]  
  
Age/Sex: 57 / F Adm Date:   
3  
Loc: 3T Room: [address] [place] [address] Type: ADM IN  
Attending Dr: Ambrosio Davis MD  
  
Copies to: ~  
  
  
Date of Service:  
2023  
  
  
Subjective  
Subjective  
Narrative:  
Patient has persistent cough and shortness of breath symptoms that she says are 
not   
improved since being admitted to the hospital yesterday.  
  
Exam  
Physical Exam  
Vital Signs:  
  
  
  
  
Temp Pulse Resp BP Pulse Ox O2 Del Method O2 Flow Rate  
  
98.5 F 85 20 122/74 97 Nasal Cannula 4  
  
23 08:54 23 12:04 23 12:04 23 08:54 23 08:54 
23   
08:54 23 08:54  
  
  
  
Narrative:  
  
Constitutional: Middle-aged WF, resting in bed comfortably  
HEENT: Moist mucous membranes, neck supple  
Cardiovascular: RRR, no M/R/G, normal S1 and S2, no JVD  
Respiratory: Rhonchi noted throughout all lung fields.  
GI: Soft, NTND, normoactive bowel sounds  
: Deferred  
Neuro: AAO x3, no focal deficits. CN III-XII grossly intact, Strength 5/5 
throughout  
Extremities: No clubbing, cyanosis or edema  
Psych: Patient calm, cooperative and conversant  
  
Objective  
Lab Results  
  
23 05:23  
  
23 05:23  
  
Microbiology Results  
Microbiology  
  
23 01:38 Nasopharyngeal Respiratory Panel (PCR) - Final  
  
  
  
Meds  
Allergies and Active Meds  
Allergies  
  
Sulfa (Sulfonamide Antibiotics) Allergy (Verified 23 18:06)  
Hives  
metoclopramide [From Reglan] Adverse Reaction (Verified 23 18:06)  
Agitated  
Penicillins Adverse Reaction (Verified 23 18:06)  
Hives  
risperidone [From Risperdal] Adverse Reaction (Verified 23 18:06)  
Confusion  
  
  
Active Meds:  
Active Medications  
  
  
  
Generic Name Dose Route Start Last Admin  
  
Trade Name Freq PRN Reason Stop Dose Admin  
  
Acetaminophen 650 mg 23 22:31  
  
Acetaminophen 325 Mg Tablet PO 24 22:30  
  
Q6HR PRN  
  
Pain Scale 1 - 3 or fever  
  
Albuterol/Ipratropium 3 ml 23 08:00 23 12:04  
  
Ipratropium/Albuterol 0.5-3 Mg 3 Ml Ampul.Neb INHALATION 24 07:59 3 ml  
  
QID.RESP AYDEE Administration  
  
Dexamethasone Sodium Phosphate 6 mg 23 09:00  
  
Dexamethasone Sod Phosphate 4 Mg/Ml Vial IV-PUSH 23 09:01  
  
DAILY AYDEE  
  
Enoxaparin Sodium 40 mg 23 10:00 23 10:38  
  
Enoxaparin 40 Mg/0.4 Ml Syringe SUBCUT 24 09:59 Not Given  
  
DAILY@10 AYDEE  
  
Famotidine 40 mg 23 22:00  
  
Famotidine 20 Mg Tablet PO 24 21:59  
  
QHS AYDEE  
  
Guaifenesin/Dextromethorphan 10 ml 23 23:00 23 08:54  
  
Guaif/Dextromethorphan Syrup 10 Ml Udc PO 24 22:59 10 ml  
  
Q8H AYDEE Administration  
  
Doxycycline Hyclate 100 mg in 100 mls @ 100 mls/hr 23 00:30 23 01:53  
  
Doxy 100  mls/hr  
  
Q12H AYDEE Administration  
  
Insulin Aspart 0 units 23 08:00 23 11:40  
  
Insulin Aspart 300 Units/3 Ml Insuln.Pen SUBCUT 24 07:59 9 units  
  
TID.WM.HS AYDEE Administration  
  
Protocol  
  
Ketorolac Tromethamine 15 mg 23 02:13 23 09:01  
  
Ketorolac Tromethamine 15 Mg/Ml Vial IV-PUSH 23 02:12 15 mg  
  
Q6H PRN Administration  
  
Pain  
  
Lorazepam 0.5 mg 23 22:37  
  
Lorazepam 0.5 Mg Tablet PO 10/24/23 22:36  
  
TID PRN  
  
Anxiety  
  
Non-Formulary Medication 2 mg 23 09:00  
  
Semaglutide [Ozempic] SUBCUT 24 08:59  
  
QWEEK AYDEE  
  
Ondansetron HCl 4 mg 23 22:31  
  
Ondansetron 4 Mg/2 Ml Vial IV-PUSH 24 22:30  
  
Q8H PRN  
  
Nausea And Vomiting  
  
Potassium Chloride 40 meq 23 22:31  
  
Potassium Chloride Er 20 Meq Tab.Er.Prt PO 24 22:30  
  
DAILY PRN  
  
Hypokalemia  
  
  
  
  
A&P - Hospitalist  
Assessment/Plan  
(1) COPD exacerbation:  
(2) COVID-19:  
(3) Tobacco abuse disorder:  
(4) Diabetes mellitus:  
(5) Bipolar disorder:  
(6) Chronic respiratory failure with hypoxia, on home O2 therapy:  
  
Plan  
  
COVID-19 Infection  
COPD Exacerbation  
Acute on Chronic Respiratory Failure with Hypoxia  
Patient notes that she continues to have severe respiratory symptoms with 
shortness   
of breath and dry cough. She had 4 ED visits prior to admission yesterday. She   
intermittently uses 4 L nasal cannula oxygen, but now is requiring 4 L at all 
times.   
She does continue to smoke. We will treat as COPD exacerbation.  
However she has worsening respiratory symptoms though saturations are at 
baseline and   
has failed outpatient treatment  
Labs showed leukocytosis probably from steroids with normal kidney functions  
-Ordered COVID-19 PCR  
-Appreciate pulmonary assessment  
-Start IV steroids, breathing treatments, antibiotics, cough suppressants  
-Supplemental oxygen as needed  
-Voltaren gel for chest wall pain  
-She has finished a course of Paxlovid; would hold on further antiviral therapy 
at   
this time  
-Monitor POC glucoses closely in the setting of steroid administration  
Continue home meds  
DVT prophylaxis  
  
Diabetes mellitus type 2 with hyperglycemia  
Patient has been placed on steroid therapy for COPD exacerbation.  
-IV steroids per pulmonary  
-Start scheduled Lantus and scheduled AC aspart as well as corrective scale 
insulin  
  
Bipolar Disorder  
GERD  
  
CODE STATUS: Full Code  
  
  
Documented By: Ambrosio Davis MD   
3 2060  
Signed By: <Electronically signed by Ambrosio Davis MD>  
23 1346  
   
  
[facility]  
Work Phone: [phone]- History and physical note  
  
  
  
  
                                        Author              [facility]  
2023 10:56pm  
   
                                        Note Date/Time      2023 10:  
40pm  
   
                                                    [facility]  
ENTER  
[address] [place] [address]  
  
Hospitalist H&P  
Signed  
  
Patient: Bev Gaming MR#: M  
[id number]  
: 1965 Acct:[id number]  
  
Age/Sex: 57 / F Adm Date:   
3  
Loc: 3T Room: 5P0542-4 Type: ADM IN  
Attending Dr: Tashi Rodriges MD  
  
Copies to: MD Sherman Negron,~  
  
  
HPI  
DATE OF EXAMINATION: 23  
CHIEF COMPLAINT: sob  
HISTORY OF PRESENT ILLNESS:  
Patient is a 57-year-old lady past medical history of chronic respiratory 
failure on   
4 L home oxygen, asthma, smoker, diabetes, bipolar disorder presentedto the 
emergency   
department complaining of shortness of breath. States that about a week ago she 
went   
to [facility] ED because of shortness of breath and productive cough, was 
diagnosed   
with COVID at the time, she was sent home on steroids and  
Paxlovid. Couple days later she continued to have symptoms she decided to go 
[facility] emergency department, she was given oral antibiotics and recommended to   
continue steroids and Paxlovid. States that despite taking antibiotics and 
steroids   
and having finished the course of the extremity she continues to have shortness 
of   
breath with minimal exertion and is gasping for breath. States that her 
saturations   
goes down to low 80s with minimal exertion which is totally new to her. She got   
concerned and called her lung doctor who recommended to come to the emergency   
department. States that she vomited twice because of intractable cough but 
denies any   
nausea, vomiting currently. Denies any fever or chills. Denies any chest pain,   
palpitations. States that she has cut down on smoking, currently smoking 2 to 3   
cigarettes a day despite symptoms. Denies any other recreational drug use.  
  
Review of Systems  
Review of Systems  
All other systems reviewed & are negative unless noted below or in HPI  
  
PMFSH  
Vaccinated for COVID-19?: Yes  
Medical History (Updated 23 @ 20:08 by Darrian Lawrence DO)  
  
COPD (chronic obstructive pulmonary disease)  
Diabetes  
Neck pain with history of cervical spinal surgery  
with hardware and cadaver bone  
  
Surgical History (Reviewed 23 @ 18:06 by Alysa Marquez RN)  
  
History of ankle surgery  
bilat  
History of appendectomy  
History of colectomy  
History of hysterectomy  
  
Family History (Updated 10/01/21 @ 07:17 by Anjana Tovar RN)  
Mother Hypertension  
Asthma  
Brother Hypertension  
Father Lung cancer  
  
Social History  
Smoking Status: Current every day smoker  
Tobacco Type: cigarettes  
Substance Use Type: None  
  
Meds  
Medications and Allergies  
Allergies  
  
Sulfa (Sulfonamide Antibiotics) Allergy (Verified 23 18:06)  
Hives  
metoclopramide [From Reglan] Adverse Reaction (Verified 23 18:06)  
Agitated  
Penicillins Adverse Reaction (Verified 23 18:06)  
Hives  
risperidone [From Risperdal] Adverse Reaction (Verified 23 18:06)  
Confusion  
  
  
Home Medications  
  
lorazepam 0.5 mg tablet 0.5 mg PO TID PRN Anxiety 10/08/18 [History Confirmed   
23]  
albuterol sulfate 2.5 mg/3 mL (0.083 %) solution for nebulization 2.5 mg (3 mL)   
inhalation QID #125 vials 10/11/18 [Rx Confirmed 23]  
prednisone 10 mg tablet 10 mg PO DAILY #28 tabs 10/11/18 [Rx Confirmed 23]  
promethazine-DM 6.25 mg-15 mg/5 mL oral syrup 5 ml PO Q4-6H PRN cough #473 mL   
10/11/18 [Rx Confirmed 23]  
semaglutide 1 mg/dose (4 mg/3 mL) subcutaneous pen injector (Ozempic) 2 mg 
subcut   
QWEEK 10/01/21 [History Confirmed 23]  
famotidine 40 mg tablet 40 mg PO QHS 23 [History Confirmed 23]  
insulin lispro 100 unit/mL subcutaneous pen (Humalog KwikPen (U-100) Insulin) 
subcut   
23 [History Confirmed 23]  
  
  
  
Exam  
Physical Exam  
Vital Signs:  
  
  
  
  
Temp Pulse Resp BP Pulse Ox O2 Del Method O2 Flow Rate  
  
99.3 F H 92 H 22 105/55 L 94 L Nasal Cannula 4  
  
23 18:08 23 21:45 23 21:45 23 21:45 23 21:45 
23   
21:45 23 21:45  
  
  
  
Narrative:  
General: Awake, alert, oriented x3 not in acute distress  
HEENT: Normocephalic, atraumatic, PERRLA, normal mucosa  
Cardiovascular: Regular rate and rhythm , S1-S2 heard, no murmurs or gallops  
Lungs: Expiratory and inspiratory wheezing heard on auscultation  
Gastrointestinal: Soft, nontender, bowel sounds heard  
Extremities: No edema  
Neurological: no sensory or motor deficit  
Skin: Dry and warm, no rashes or lesions  
Psych: Normal mood and affect  
  
  
Results  
Lab Results  
Labs:  
Laboratory Last Values  
  
  
  
Corrected WBC 21.5 X10E3/uL (3.8-11.6) H 23 19:05  
  
Uncorrected WBC Count 21.5 x10E3/uL (3.8-11.6) H 23 19:05  
  
RBC 4.99 X10E6/uL (3.60-5.00) 23 19:05  
  
Hgb 15.8 g/dL (11.8-15.4) H 23 19:05  
  
Hct 46.1 % (34.0-46.4) 23 19:05  
  
MCV 92.4 fl () 23 19:05  
  
MCH 31.6 pg (24.7-34.3) 23 19:05  
  
MCHC 34.2 g/dL (32.0-35.0) 23 19:05  
  
RDW 12.8 % (11.9-15.3) 23 19:05  
  
Plt Count 242 x10E3/uL (150-450) 23 19:05  
  
MPV 7.9 fl (6.3-10.7) 23 19:05  
  
Neut % (Auto) N/A 23 19:05  
  
Lymph % (Auto) N/A 23 19:05  
  
Mono % (Auto) N/A 23 19:05  
  
Eos % (Auto) N/A 23 19:05  
  
Baso % (Auto) N/A 23 19:05  
  
Nucleat RBC Rel Count N/A 23 19:05  
  
Neut # (Auto) N/A 23 19:05  
  
Lymph # (Auto) N/A 23 19:05  
  
Mono # (Auto) N/A 23 19:05  
  
Eos # (Auto) N/A 23 19:05  
  
Baso # (Auto) N/A 23 19:05  
  
Lymphocytes % 15 % (18-42) L 23 19:05  
  
Monocytes % 7 % (2-11) 23 19:05  
  
Eosinophils % 0 % (1-3) L 23 19:05  
  
Basophils % 0 % (0-2) 23 19:05  
  
Segmented Neutrophils 76 % (50-70) H 23 19:05  
  
Monocyte Dist Width 20.00 % (0.00-20.00) 23 19:05  
  
Reactive Lymphocytes 2 % (0-12) 23 19:05  
  
Platelet Estimate Normal (Normal) 23 19:05  
  
Giant Platelets 2 /100 WBC 23 19:05  
  
Plt Morphology Comment N/A 23 19:05  
  
RBC Morphology Normal (Normal) 23 19:05  
  
PHA Creatinine Clear 106.74 23 19:05  
  
Sodium 136 mmol/L (136-145) 23 19:05  
  
Potassium 4.5 mmol/L (3.5-5.1) 23 19:05  
  
Chloride 97 mmol/L () L 23 19:05  
  
Carbon Dioxide 30.7 mmol/L (21.0-31.0) 23 19:05  
  
Anion Gap 12.8 mEq/L (6.0-15.0) 23 19:05  
  
BUN 17 mg/dL (7-25) 23 19:05  
  
Creatinine 0.71 mg/dL (0.60-1.20) 23 19:05  
  
Est GFR (CKD-EPI) > 60.0 mL/Min 23 19:05  
  
Glucose 276 mg/dL () H 23 19:05  
  
Calcium 9.0 mg/dL (8.6-10.3) 23 19:05  
  
Total Bilirubin 0.3 mg/dl (0.3-1.0) 23 19:05  
  
AST 12 U/L (13-39) L 23 19:05  
  
ALT 28 U/L (7-52) 23 19:05  
  
Alkaline Phosphatase 65 U/L () 23 19:05  
  
Total Creatine Kinase 49 U/L () 23 19:05  
  
Troponin I High Sens 6.4 pg/mL (0.0-15.0) 23 19:05  
  
B-Natriuretic Peptide 35.0 pg/mL (5-100) 23 19:05  
  
Total Protein 6.6 gm/dL (6.4-8.9) 23 19:05  
  
Albumin 3.7 gm/dL (3.5-5.7) 23 19:05  
  
Globulin 2.9 gm/dL 23 19:05  
  
Albumin/Globulin Ratio 1.3 23 19:05  
  
Urine Color Yellow (Yellow) 23 18:17  
  
Urine Appearance Clear (Clear) 23 18:17  
  
Urine pH 6.0 (5.0-9.0) 23 18:17  
  
Ur Specific Gravity 1.006 (1.001-1.030) 23 18:17  
  
Urine Protein Negative mg/dL (Negative) 23 18:17  
  
Urine Glucose (UA) Normal mg/dL (Normal) 23 18:17  
  
Urine Ketones Negative (Negative) 23 18:17  
  
Urine Occult Blood Negative (Negative) 23 18:17  
  
Urine Nitrite Negative (Negative) 23 18:17  
  
Urine Bilirubin Negative (Negative) 23 18:17  
  
Urine Urobilinogen Normal mg/dL (Normal) 23 18:17  
  
Ur Leukocyte Esterase Negative (Negative) 23 18:17  
  
  
  
  
A&P - Hospitalist  
Assessment/Plan  
(1) COPD exacerbation:  
(2) COVID-19:  
(3) Tobacco abuse disorder:  
(4) Diabetes mellitus:  
(5) Bipolar disorder:  
(6) Chronic respiratory failure with hypoxia, on home O2 therapy:  
  
Plan  
Patient will be admitted to floor for further evaluation and management  
She is currently saturating in mid 90s on 4 L nasal cannula which is her 
baseline  
However she has worsening respiratory symptoms though saturations are at 
baseline and   
has failed outpatient treatment  
Labs showed leukocytosis probably from steroids with normal kidney functions  
Ordered COVID-19 PCR  
Consult pulmonary  
Start IV steroids, breathing treatments, antibiotics, cough suppressants  
Supplemental oxygen as needed  
She has finished a course of Paxlovid. Will defer remdesivir therapy to 
pulmonary  
Insulin sliding scale, Accu-Chek  
Continue home meds  
DVT prophylaxis  
CODE STATUS full code  
  
  
Documented By: Tashi Rodriges MD 23 [id number]  
Signed By: <Electronically signed by Tashi Rodriges MD>  
23 0629  
   
  
[facility]  
Work Phone: [phone]- NoteInfectious Disease  
COVID-19  
COVID-19, or coronavirus disease 2019, is an infection that is caused by a new 
(novel) coronavirus called SARS-CoV-2. COVID-19 can cause many symptoms. In some
people, the virus may not cause any symptoms. In others, it may cause mild or 
severe symptoms. Some people with severe infection develop severe disease.  
What are the causes?  
(Inserted Image. Unable to display)  
This illness is caused by a virus. The virus may be in the air as tiny specks of
fluid (aerosols) or droplets, or it may be on surfaces. You may catch the virus 
by:  
? Breathing in droplets from an infected person. Droplets can be spread by a 
person breathing, speaking, singing, coughing, or sneezing.  
? Touching something, like a table or a doorknob, that has virus on it (is 
contaminated) and then touching your mouth, nose, or eyes.  
What increases the risk?  
Risk for infection:  
You are more likely to get infected with the COVID-19 virus if:  
? You are within 6 ft (1.8 m) of a person with COVID-19 for 15 minutes or 
longer.  
? You are providing care for a person who is infected with COVID-19.  
? You are in close personal contact with other people. Close personal contact 
includes hugging, kissing, or sharing eating or drinking utensils.  
Risk for serious illness caused by COVID-19:  
You are more likely to get seriously ill from the COVID-19 virus if:  
? You have cancer.  
? You have a long-term (chronic) disease, such as:  
? Chronic lung disease. This includes pulmonary embolism, chronic obstructive 
pulmonary disease, and cystic fibrosis.  
? Long-term disease that lowers your body's ability to fight infection 
(immunocompromise).  
? Serious cardiac conditions, such as heart failure, coronary artery disease, or
cardiomyopathy.  
? Diabetes.  
? Chronic kidney disease.  
? Liver diseases. These include cirrhosis, nonalcoholic fatty liver disease, 
alcoholic liver disease, or autoimmune hepatitis.  
? You have obesity.  
? You are pregnant or were recently pregnant.  
? You have sickle cell disease.  
What are the signs or symptoms?  
Symptoms of this condition can range from mild to severe. Symptoms may appear 
any time from 2 to 14days after being exposed to the virus. They include:  
? Fever or chills.  
? Shortness of breath or trouble breathing.  
? Feeling tired or very tired.  
? Headaches, body aches, or muscle aches.  
? Runny or stuffy nose, sneezing, coughing, or sore throat.  
? New loss of taste or smell. This is rare.  
Some people may also have stomach problems, such as nausea, vomiting, or 
diarrhea.  
Other people may not have any symptoms of COVID-19.  
How is this diagnosed?  
(Inserted Image. Unable to display)  
This condition may be diagnosed by testing samples to check for the COVID-19 
virus. The most commontests are the PCR test and the antigen test. Tests may be 
done in the lab or at home. They include:  
? Using a swab to take a sample of fluid from the back of your nose and throat 
(nasopharyngeal fluid), from your nose, or from your throat.  
? Testing a sample of saliva from your mouth.  
? Testing a sample of coughed-up mucus from your lungs (sputum).  
How is this treated?  
Treatment for COVID-19 infection depends on the severity of the condition.  
? Mild symptoms can be managed at home with rest, fluids, and over-the-counter 
medicines.  
? Serious symptoms may be treated in a hospital intensive care unit (ICU). 
Treatment in the ICU mayinclude:  
? Supplemental oxygen. Extra oxygen is given through a tube in the nose, a face 
mask, or a jimenez.  
? Medicines. These may include:  
? Antivirals, such as monoclonal antibodies. These help your body fight off 
certain viruses that can cause disease.  
? Anti-inflammatories, such as corticosteroids. These reduce inflammation and 
suppress the immune system.  
? Antithrombotics. These prevent or treat blood clots, if they develop.  
? Convalescent plasma. This helps boost your immune system, if you have an 
underlying immunosuppressive condition or are getting immunosuppressive 
treatments.  
? Prone positioning. This means you will lie on your stomach. This helps oxygen 
to get into your lungs.  
? Infection control measures.  
If you are at risk for more serious illness caused by COVID-19, your health care
provider may prescribe two long-acting monoclonal antibodies, given together 
every 6 months.  
How is this prevented?  
To protect yourself:  
? Use preventive medicine (pre-exposure prophylaxis). You may get pre-exposure 
prophylaxis if you have moderate or severe immunocompromise.  
? Get vaccinated. Anyone 6 months old or older who meets guidelines can get a 
COVID-19 vaccine or vaccine series. This includes people who are pregnant or 
making breast milk (lactating).  
? Get an added dose of COVID-19 vaccine after your first vaccine or vaccine 
series if you have moderate to severe immunocompromise. This applies if you have
had a solid organ transplant or have been diagnosed with an immunocompromising 
condition.  
? You should (more content not included)...[facility]04-
Hospital Discharge instructions  
  
Patient Education  
  
  
  
2023 11:58:00  
  
  
  
COVID-19  
  
  
  
COVID-19  
COVID-19, or coronavirus disease 2019, is an infection that is caused by a new 
(novel) coronavirus called SARS-CoV-2. COVID-19 can cause many symptoms. In some
people, the virus may not cause any symptoms. In others, it may cause mild or 
severe symptoms. Some people with severe infection develop severe disease.  
What are the causes?  
This illness is caused by a virus. The virus may be in the air as tiny specks of
fluid (aerosols) or droplets, or it may be on surfaces. You may catch the virus 
by:  
Breathing in droplets from an infected person. Droplets can be spread by a 
person breathing, speaking, singing, coughing, or sneezing.  
Touching something, like a table or a doorknob, that has virus on it (is 
contaminated) and then touching your mouth, nose, or eyes.  
What increases the risk?  
Risk for infection:  
You are more likely to get infected with the COVID-19 virus if:  
You are within 6 ft (1.8 m) of a person with COVID-19 for 15 minutes or longer.  
You are providing care for a person who is infected with COVID-19.  
You are in close personal contact with other people. Close personal contact 
includes hugging, kissing, or sharing eating or drinking utensils.  
Risk for serious illness caused by COVID-19:  
You are more likely to get seriously ill from the COVID-19 virus if:  
You have cancer.  
You have a long-term (chronic) disease, such as:  
?Chronic lung disease. This includes pulmonary embolism, chronic obstructive 
pulmonary disease, andcystic fibrosis.  
?Long-term disease that lowers your body's ability to fight infection 
(immunocompromise).  
?Serious cardiac conditions, such as heart failure, coronary artery disease, or 
cardiomyopathy.  
?Diabetes.  
?Chronic kidney disease.  
?Liver diseases. These include cirrhosis, nonalcoholic fatty liver disease, 
alcoholic liver disease, or autoimmune hepatitis.  
You have obesity.  
You are pregnant or were recently pregnant.  
You have sickle cell disease.  
What are the signs or symptoms?  
Symptoms of this condition can range from mild to severe. Symptoms may appear 
any time from 2 to 14days after being exposed to the virus. They include:  
Fever or chills.  
Shortness of breath or trouble breathing.  
Feeling tired or very tired.  
Headaches, body aches, or muscle aches.  
Runny or stuffy nose, sneezing, coughing, or sore throat.  
New loss of taste or smell. This is rare.  
Some people may also have stomach problems, such as nausea, vomiting, or 
diarrhea.  
Other people may not have any symptoms of COVID-19.  
How is this diagnosed?  
This condition may be diagnosed by testing samples to check for the COVID-19 
virus. The most commontests are the PCR test and the antigen test. Tests may be 
done in the lab or at home. They include:  
Using a swab to take a sample of fluid from the back of your nose and throat 
(nasopharyngeal fluid), from your nose, or from your throat.  
Testing a sample of saliva from your mouth.  
Testing a sample of coughed-up mucus from your lungs (sputum).  
How is this treated?  
Treatment for COVID-19 infection depends on the severity of the condition.  
Mild symptoms can be managed at home with rest, fluids, and over-the-counter 
medicines.  
Serious symptoms may be treated in a hospital intensive care unit (ICU). 
Treatment in the ICU may include:  
?Supplemental oxygen. Extra oxygen is given through a tube in the nose, a face 
mask, or a jimenez.  
?Medicines. These may include:  
?Antivirals, such as monoclonal antibodies. These help your body fight off 
certain viruses that cancause disease.  
?Anti-inflammatories, such as corticosteroids. These reduce inflammation and 
suppress the immune system.  
?Antithrombotics. These prevent or treat blood clots, if they develop.  
?Convalescent plasma. This helps boost your immune system, if you have an 
underlying immunosuppressive condition or are getting immunosuppressive 
treatments.  
?Prone positioning. This means you will lie on your stomach. This helps oxygen 
to get into your lungs.  
?Infection control measures.  
If you are at risk for more serious illness caused by COVID-19, your health care
provider may prescribe two long-acting monoclonal antibodies, given together 
every 6 months.  
How is this prevented?  
To protect yourself:  
Use preventive medicine (pre-exposure prophylaxis). You may get pre-exposure 
prophylaxis if you have moderate or severe immunocompromise.  
Get vaccinated. Anyone 6 months old or older who meets guidelines can get a 
COVID-19 vaccine or vaccine series. This includes people who are pregnant or 
making breast milk (lactating).  
Get an added dose of COVID-19 vaccine after your first vaccine or vaccine series
if you have moderate to severe immunocompromise. This applies if you have had a 
solid organ transplant or have been diagnosed with an immunocompromising 
condition.  
?You should get the added dose 4 weeks after you got the first COVID-19 vaccine 
or vaccine series.  
?If you get an mRNA vaccine, you will need a 3-dose primary series.  
?If you get the J&J/Sara vaccine, you will need a 2-dose primary series, with
the second dose being an mRNA vaccine.  
Talk to your health care provider about getting experimental monoclonal 
antibodies. This treatment is approved under emergency use authorization to 
prevent severe illness before or after being exposed to the COVID-19 virus. You 
may be given monoclonal antibodies if:  
?You have moderate or severe immunocompromise. This includes treatments that 
lower your immune response. People with immunocompromise may not develop 
protection against COVID-19 when they are vaccinated.  
?You cannot be vaccinated. You may not get a vaccine if you have a severe 
allergic reaction to the vaccine or its components.  
?You are not fully vaccinated.  
?You are in a facility where COVID-19 is present and:  
?Are in close contact with a person who is infected with the COVID-19 virus.  
?Are at high risk of being exposed to the COVID-19 virus.  
?You are at risk of illness from new variants of the COVID-19 virus.  
To protect others:  
If you have symptoms of COVID-19, take steps to prevent the virus from spreading
to others.  
Stay home. Leave your house only to get medical care. Do not use public transit,
if possible.  
Do not travel while you are sick.  
Wash your hands often with soap and water for at least 20 seconds. If soap and 
water are not available, use alcohol-based hand .  
Make sure that all people in your household wash their hands well and often.  
Cough or sneeze into a tissue or your sleeve or elbow. Do not cough or sneeze 
into your hand or into the air.  
Where to find more information  
Centers for Disease Control and Prevention: www.cdc.gov/coronavirus  
World Health Organization: www.who.int/health-topics/coronavirus  
Get help right away if:  
You have trouble breathing.  
You have pain or pressure in your chest.  
You are confused.  
You have bluish lips and fingernails.  
You have trouble waking from sleep.  
You have symptoms that get worse.  
These symptoms may be an emergency. Get help right away. Call 911.  
Do not wait to see if the symptoms will go away.  
Do not drive yourself to the hospital.  
Summary  
COVID-19 is an infection that is caused by a new coronavirus.  
Sometimes, there are no symptoms. Other times, symptoms range from mild to 
severe. Some people witha severe COVID-19 infection develop severe disease.  
The virus that causes COVID-19 can spread from person to person through droplets
or aerosols from breathing, speaking, singing, coughing, or sneezing.  
Mild symptoms of COVID-19 can be managed at home with rest, fluids, and 
over-the-counter medicines.  
This information is not intended to replace advice given to you by your health 
care provider. Make sure you discuss any questions you have with your health 
care provider.  
Document Revised: 2022 Document Reviewed: 2022  
[employer] Patient Education  [employer]  
  
  
  
  
2023 11:58:00  
  
  
  
Chronic Obstructive Pulmonary Disease Exacerbation  
  
  
  
Chronic Obstructive Pulmonary Disease Exacerbation  
Chronic obstructive pulmonary disease (COPD) is a long-term (chronic) condition 
that affects the lungs. COPD is a general term that can be used to describe many
different lung problems that cause lung inflammation and limit airflow, 
including chronic bronchitis and emphysema. COPD exacerbations areepisodes when 
breathing symptoms flare up, become much worse, and require extra treatment.  
COPD exacerbations are usually caused by infections. Without treatment, COPD 
exacerbations can be severe and even life threatening. Frequent COPD 
exacerbations can cause further damage to the lungs.  
What are the causes?  
This condition may be caused by:  
Respiratory infections, including viral and bacterial infections.  
Exposure to smoke.  
Exposure to air pollution, chemical fumes, or dust.  
Things that can cause an allergic reaction (allergens).  
Not taking your usual COPD medicines as directed.  
Underlying medical problems, such as congestive heart failure or infections not 
involving the lungs.  
In many cases, the cause of this condition is not known.  
What increases the risk?  
The following factors may make you more likely to develop this condition:  
Smoking cigarettes.  
Being an older adult.  
Having frequent prior COPD exacerbations.  
What are the signs or symptoms?  
Symptoms of this condition include:  
Increased coughing.  
Increased production of mucus from your lungs.  
Increased wheezing and shortness of breath.  
Rapid or labored breathing.  
Chest tightness.  
Less energy than usual.  
Sleep disruption from symptoms.  
Confusion  
Increased sleepiness.  
Often, these symptoms happen or get worse even with the use of medicines.  
How is this diagnosed?  
This condition is diagnosed based on:  
Your medical history.  
A physical exam.  
You may also have tests, including:  
A chest X-ray.  
Blood tests.  
Lung (pulmonary) function tests.  
How is this treated?  
Treatment for this condition depends on the severity and cause of the symptoms. 
You may need to be admitted to a hospital for treatment. Some of the treatments 
commonly used to treat COPD exacerbations are:  
Antibiotic medicines. These may be used for severe exacerbations caused by a 
lung infection, such as pneumonia.  
Bronchodilators. These are inhaled medicines that expand the air passages and 
allow increased airflow. They may make your breathing more comfortable.  
Steroid medicines. These act to reduce inflammation in the airways. They may be 
given with an inhaler, taken by mouth, or given through an IV tube inserted into
one of your veins.  
Supplemental oxygen therapy.  
Airway clearing techniques, such as noninvasive ventilation (NIV) and positive 
expiratory pressure (PEP). These provide respiratory support through a mask or 
other noninvasive device. An example of this would be using a continuous 
positive airway pressure (CPAP) machine to improve delivery of oxygen into your 
lungs.  
Follow these instructions at home:  
Medicines  
Take over-the-counter and prescription medicines only as told by your health 
care provider.  
It is important to use correct technique with inhaled medicines.  
If you were prescribed an antibiotic medicine or oral steroid, take it as told 
by your health care provider. Do not stop taking the medicine even if you start 
to feel better.  
Lifestyle  
Do not use any products that contain nicotine or tobacco. These products include
cigarettes, chewing tobacco, and vaping devices, such as e-cigarettes. If you 
need help quitting, ask your health careprovider.  
Eat a healthy diet.  
Exercise regularly.  
Get enough sleep. Most adults need 7 or more hours per night.  
Avoid exposure to all substances that irritate the airway, especially tobacco 
smoke.  
Regularly wash your hands with soap and water for at least 20 seconds. If soap 
and water are not available, use hand . This may help prevent you from 
getting infections.  
During flu season, avoid enclosed spaces that are crowded with people.  
General instructions  
Drink enough fluid to keep your urine pale yellow, unless you have a medical 
condition that requires fluid restriction.  
Use a cool mist vaporizer. This humidifies the air and makes it easier for you 
to clear your chest when you cough.  
If you have a home nebulizer and oxygen, continue to use them as told by your 
health care provider.  
Keep all follow-up visits. This is important.  
How is this prevented?  
Stay up-to-date on pneumococcal and flu (influenza) vaccines. A flu shot is 
recommended every year to help prevent exacerbations.  
Quitting smoking is very important in preventing COPD from getting worse and in 
preventing exacerbations from happening as often.  
Follow all instructions for pulmonary rehabilitation after a recent 
exacerbation. This can help prevent future exacerbations.  
Work with your health care provider to develop and follow an action plan. This 
tells you what stepsto take when you experience certain symptoms.  
Contact a health care provider if:  
You have a worsening of your regular COPD symptoms.  
Get help right away if:  
You have worsening shortness of breath, even when resting.  
You have trouble talking.  
You have severe chest pain.  
You cough up blood.  
You have a fever.  
You have weakness, vomit repeatedly, or faint.  
You feel confused.  
You are not able to sleep because of your symptoms.  
You have trouble doing daily activities.  
These symptoms may represent a serious problem that is an emergency. Do not wait
to see if the symptoms will go away. Get medical help right away. Call your 
local emergency services (911 in the [place] Do not drive yourself to the 
hospital.  
Summary  
COPD exacerbations are episodes when breathing symptoms become much worse and 
require extra treatment above your normal treatment.  
Exacerbations can be severe and even life threatening. Frequent COPD 
exacerbations can cause further damage to your lungs.  
COPD exacerbations are usually triggered by infections such as the flu, colds, 
and even pneumonia.  
Treatment for this condition depends on the severity and cause of the symptoms. 
You may need to be admitted to a hospital for treatment.  
Quitting smoking is very important to prevent COPD from getting worse and to 
prevent exacerbations from happening as often.  
This information is not intended to replace advice given to you by your health 
care provider. Make sure you discuss any questions you have with your health 
care provider.  
Document Revised: 10/26/2021 Document Reviewed: 10/26/2021  
[employer] Patient Education  [employer]  
  
  
  
  
Follow Up Care  
  
  
  
2023 09:19:26  
  
  
  
With:SHERMAN MALONE  
Address:  
[address] [place] [id number]  
  
When:2023 11:42:49  
Comments:Follow-up with your primary care provider in 3 to 5 days. If symptoms 
worsen, do not improve, or new symptoms arise please report back to emergency 
department for further evaluation.  
  
[facility]- Evaluation note*   
  
                      Encounter Date Diagnosis  Assessment Notes Treatment Notes  
 Treatment Clinical   
Notes  
   
                                                Asthma with COPD   
(ICD-10 - J44.9)                                              
  
  
[facility]  
Other Phone: [phone]- Evaluation note*   
  
                      Encounter Date Diagnosis  Assessment Notes Treatment Notes  
 Treatment Clinical   
Notes  
   
                                                Asthma with COPD   
(ICD-10 - J44.9)                                              
   
                                                GERD   
(gastroesophageal   
reflux disease)   
(ICD-10 - K21.9)                                              
   
                                                Tobacco abuse   
(ICD-10 - Z72.0)                                              
  
  
[facility]  
Other Phone: [phone]- Evaluation note*   
  
                      Encounter Date Diagnosis  Assessment Notes Treatment Notes  
 Treatment   
Clinical Notes  
   
                                        29 Sep, 2022        Asthma with COPD   
(ICD-10 - J44.9)                                              
   
                                        29 Sep, 2022        Rhinitis, allergic   
(ICD-10 - J30.9)                                    use your saline   
nasal spray  
Recommed using   
flonase when PND   
worsens  
                                          
   
                                        29 Sep, 2022        GERD   
(gastroesophageal   
reflux disease)   
(ICD-10 - K21.9)                                              
   
                                        29 Sep, 2022        Tobacco abuse   
(ICD-10 - Z72.0)                                    stop smoking  
                                          
  
  
[facility]  
Other Phone: [phone]- Miscellaneous Notes* [facility] - 
  Corina Burris (Ct) - 2020 1:20 PM ESTFormatting of this note 
  might be different from the original.  
Radiology Service Progress Note  
  
PATIENT NAME: Bev Gaming  
MRN: [id number]  
  
DATE OF SERVICE: 2020  
TIME: 12:17 PM  
PATIENT IDENTITY VERIFICATION COMPLETED USING TWO (2) IDENTIFIERS: Name and Date
of Birth confirmedby patient verbally.  
FALL SCREENING: Has the patient had 2 falls in the last year or 1 fall with 
injury or currently using an Ambulatory Assistive Device (Walker, Cane, 
Wheelchair, Crutches, etc.)? No  
PATIENT GENDER DATA: Female. Pregnancy status: Pregnant: No Breastfeeding 
status: NO.  
PATIENT RELEVANT IMPLANT DATA REVIEWED: Not Applicable  
  
RADIOLOGY DEPARTMENT: CT; Exam(s) Completed: Sinus  
  
PERIPHERAL IV DATA: Not applicable  
  
SIGNED BY: FERNANDO Plasencia  
2020 12:17 PM  
  
  
Electronically signed by Corina Burris (Ct) at 2020 12:18 PM EST  
  
documented in this encounterMercy HospitalDischarge summary  
  
  
  
  
                                        Author              Ambrosio Davis  
[facility]  
2023 5:27pm  
   
                                        Note Date/Time      2023 5:2  
7pm  
   
                                                    [place] [address]  
  
Discharge Summary  
Signed  
  
Patient: Bev Gaming MR#: [id number]  
[id number]  
: 1965 Acct:[id number]  
  
Age/Sex: 57 / F Adm Date:   
3  
Loc:  Room: [address] [place] [address]  
  
Attending Dr: Ambrosio Davis MD  
  
Copies to: MD Sherman Delcid,DO~  
  
  
Providers  
Date of Discharge: 23  
Discharging Provider: Ambrosio Davis  
Primary Care Provider: Sherman Malone  
Consults:  
  
  
23 22:31  
Consult to Pulmonology Routine  
  
  
  
  
Discharge Diagnosis  
(1) Chronic respiratory failure with hypoxia, on home O2 therapy:  
(2) COPD exacerbation:  
(3) COVID-19:  
(4) Acute and chronic respiratory failure with hypoxia:  
Final Diagnosis  
Final Discharge Diagnosis:  
As above  
  
Summary  
Hospital Course  
Hospital course:  
Ms. Gaming is a 58yo F with PMH of chronic respiratory failure on 4 L nasal 
cannula,   
asthma, tobacco abuse, diabetes mellitus type 2, bipolar disorder who presented 
to   
the emergency department with complaints of dyspnea. Patient was diagnosed with   
COVID-19 infection in the weeks prior to hospitalization. Her primary care 
physician   
prescribed her steroids and Paxlovid. She had persistentsymptoms and decided to   
present to the [facility] emergency department, who recommended that she 
continue   
steroids and Paxlovid at home. She continues to have shortness of breath upon 
minimal   
exertion despite this treatment. She presented to 2 other emergency departments 
prior   
to coming here for further management. Patient was concerned and called her   
pulmonologist, who recommendedshe come to the emergency department again if she 
was   
not improving in her symptoms. Patient also continues to smoke, she states, a 
few   
cigarettes daily. She was started on steroid therapy and was given aerosol 
treatments   
4 times daily. Patient had been managing her diabetes well at home with A1c of 
7.4.   
Shedid have hyperglycemia while inpatient here. Patient will continue a total 5-
day   
course of steroid treatment for her COPD exacerbation.  
  
35 minutes spent coordinating the discharge of this patient  
Time Spent with Patient  
Time spent providing/coordinating discharge services (# min): 35  
  
Diagnostic Studies  
Completed and Pending Studies  
Labs on day of discharge:  
  
  
23 16:25: POC Glucose 347, POC Glucose Comment Glu2: cleaned meter  
23 06:48: POC Glucose 299  
23 05:19: PHA Creatinine Clear 104.80, Sodium 135 L, Potassium 4.6, 
Chloride   
100, Carbon Dioxide 30.1, Anion Gap 9.5, BUN 22, Creatinine 0.68, Est GFR (CKD-
EPI) >   
60.0, Glucose 331 H, Calcium 8.1 L  
23 05:19: Corrected WBC 26.4 H, Uncorrected WBC Count 26.4 H, RBC 4.65, 
Hgb   
14.4, Hct 43.6, MCV 93.8, MCH 30.9, MCHC 32.9, RDW 12.8, Plt Count 224, MPV 7.8,
Neut   
% (Auto) N/A, Lymph % (Auto) N/A, Mono % (Auto) N/A, Eos % (Auto) N/A,Baso % 
(Auto)   
N/A, Nucleat RBC Rel Count N/A, Neut # (Auto) N/A, Lymph # (Auto) N/A, Mono # 
(Auto)   
N/A, Eos # (Auto) N/A, Baso # (Auto) N/A, Band Neutrophils % 1, Lymphocytes % 11
L,   
Monocytes % 7, Metamyelocytes % 2 H, Myelocytes % 4 H, Segmented Neutrophils 75 
H,   
Platelet Estimate Normal, Plt Morphology Comment Normal, RBC Morphology N/A,   
Anisocytosis Slight, Microcytosis Slight, OvalocytesSlight  
23 05:19: Estimat Average Glucose 166, Hemoglobin A1c 7.4 H  
23 20:20: POC Glucose 333  
  
  
  
  
Exam  
Physical Exam  
Vital Signs:  
  
  
  
  
Temp Pulse Resp BP Pulse Ox O2 Del Method O2 Flow Rate  
  
98.0 F 79 20 119/77 93 L Nasal Cannula 4  
  
23 11:39 23 13:03 23 13:03 23 11:39 23 11:39 
23   
11:39 23 11:39  
  
  
  
Narrative:  
  
Constitutional: Middle-aged WF, resting in bed comfortably  
HEENT: Moist mucous membranes, neck supple  
Cardiovascular: RRR, no M/R/G, normal S1 and S2, no JVD  
Respiratory: Rhonchi noted throughout all lung fields.  
GI: Soft, NTND, normoactive bowel sounds  
: Deferred  
Neuro: AAO x3, no focal deficits. CN III-XII grossly intact, Strength 5/5 
throughout  
Extremities: No clubbing, cyanosis or edema  
Psych: Patient calm, cooperative and conversant  
  
Discharge Plan  
Discharge Plan  
Patient Disposition: Home  
  
Activity: No Activity Restriction  
  
Diet: Carb Count  
  
Additional Instructions:  
You have tested positive for Covid-19, please see attached instructions and 
follow up   
with the Health Department for further instructions.  
  
Continue home oxygen.  
  
[place] [facility] [place]  
  
Prescriptions:  
New  
doxycycline hyclate 100 mg Tablet  
100 mg PO BID 4 Days Qty: 8 0RF  
guaifenesin [Mucinex] 600 mg Tablet Extended Release 12hr  
1,200 mg PO BID 4 Days Qty: 16 0RF  
prednisone 20 mg Tablet  
40 mg PO DAILY 3 Days Qty: 6 0RF  
budesonide-formoterol [Symbicort] 160-4.5 mcg/actuation HFA aerosol inhaler  
1 inh inhalation BID Qty: 10.2 3RF  
  
Continued  
lorazepam 0.5 mg tablet  
0.5 mg PO TID PRN (Reason: Anxiety)  
Patient Comments:  
7 day supply, filled 10/6/18  
promethazine-DM 6.25-15 mg/5 mL syrup  
5 ml PO Q4-6H PRN (Reason: cough) Qty: 473 0RF  
albuterol sulfate 2.5 mg /3 mL (0.083 %) solution for nebulization  
2.5 mg INHALATION QID Qty: 125 3RF  
famotidine 40 mg tablet  
40 mg PO QHS  
insulin lispro [Humalog KwikPen Insulin] 100 unit/mL insulin pen  
SUBCUT  
Rx Instructions:  
sliding scale  
Ozempic 1 mg/dose (4 mg/3 mL) pen injector  
2 mg SUBCUT QWEEK  
Patient Comments:  
INJECT UNDER THE SKIN 1 MG EVERY WEEK AS DIRECTED  
Rx Instructions:  
  
  
Discontinued  
prednisone 10 mg tablet  
10 mg PO DAILY Qty: 28 0RF  
Patient Comments:  
pt has 2 more doses  
Rx Instructions:  
40 mg qday for 4 days, then 20 mg qday for 4 days, then 10 mg qday for 4 days, 
then   
stop  
  
Follow Up:  
Erendira Lowry APRN, ACNP-BC [Nurse Practitioner] - (Call office on Monday to 
schedule   
follow-up with Pulmonology. )  
Sherman Malone DO [Primary Care Provider] - (Call office on Monday to   
schedulefollow-up with your Primary Care Provider in 3-5 days. )  
  
  
  
Documented By: Ambrosio Davis MD   
3 1712  
Signed By: <Electronically signed by Ambrosio Davis MD>  
23 1727  
   
  
[facility] [employer]  
Work Phone: [phone] + Plan note  
  
No data available for this section  
  
[facility]  
Other Phone: [phone] noteNo InformationNort Coast Professional 
[facility]por[facility]  
Other Phone: [phone] noteNo assessment information available
[facility]  
Work Phone: [phone] note*   
  
                          Diagnosis    Onset Date   Resolution   Status  
   
                          COPD exacerbation                           acute  
   
                          COVID-19                               acute  
   
                          Bipolar disorder                           chronic  
   
                                                    Chronic respiratory failure   
with hypoxia, on home O2   
therapy                                                     chronic  
   
                          Diabetes mellitus                           chronic  
   
                          Tobacco abuse disorder                           chron  
ic  
  
  
[facility]  
Work Phone: [phone] note*   
  
                          Diagnosis    Onset Date   Resolution   Status  
   
                          COPD exacerbation                           acute  
   
                          COVID-19                               acute  
   
                          Bipolar disorder                           chronic  
   
                                                    Chronic respiratory failure   
with hypoxia, on home O2   
therapy                                                     chronic  
   
                          Diabetes mellitus                           chronic  
   
                          Tobacco abuse disorder                           chron  
ic  
   
                                                    Acute exacerbation of chroni  
c obstructive airways   
disease                                                     acute  
  
  
[facility]  
Work Phone: [phone] note*   
  
                                                    Diagnosis  
   
                                                      
  
  
Chronic pansinusitis  
  
  
Other chronic sinusitis  
  
documented in this encounter  
[facility]   
  
                                                    Diagnosis  
   
                                                      
  
  
Chronic pansinusitis- Primary  
  
  
Other chronic sinusitis  
   
                                                      
  
  
Other chronic sinusitis  
   
                                                      
  
  
Allergic rhinitis, unspecified seasonality, unspecified trigger  
   
                                                      
  
  
Vascular headache  
  
  
Headache  
   
                                                      
  
  
Vertigo, peripheral, bilateral  
  
documented in this encounter  
[facility]   
  
                                                    Diagnosis  
   
                                                      
  
  
Other chronic sinusitis  
  
documented in this encounter  
[facility]   
  
                          Diagnosis    Onset Date   Resolution   Status  
   
                          Asthma with COPD                           acute  
   
                          COPD (chronic obstructive pulmonary disease)            
                 acute  
   
                                                    Chronic respiratory failure   
with hypoxia, on home O2   
therapy                                                     [facility]  
Work Phone: [phone] note*   
  
                                                    Diagnosis  
   
                                                      
  
  
Chronic pansinusitis- Primary  
  
  
Other chronic sinusitis  
   
                                                      
  
  
Vascular headache  
  
  
Headache  
  
documented in this encounter  
[facility]   
  
                                                    Diagnosis  
   
                                                      
  
  
Dysfunction of both eustachian tubes- Primary  
  
  
Dysfunction of Eustachian tube  
   
                                                      
  
  
History of tympanostomy tube placement  
   
                                                      
  
  
History of hyperbaric oxygen therapy  
   
                                                      
  
  
Sensorineural hearing loss, bilateral  
  
documented in this encounter  
[facility]   
  
                                                    Diagnosis  
   
                                                      
  
  
Sensorineural hearing loss, bilateral- Primary  
   
                                                      
  
  
Tinnitus, bilateral  
  
  
Unspecified tinnitus  
   
                                                      
  
  
Ear pressure, bilateral  
   
                                                      
  
  
Otalgia of both ears  
  
  
Otalgia, unspecified  
   
                                                      
  
  
Dizziness  
  
  
Dizziness and giddiness  
  
documented in this encounter  
[facility]   
  
                                                    Diagnosis  
   
                                                      
  
  
Pre-op evaluation- Primary  
  
  
Preoperative examination, unspecified  
   
                                                      
  
  
Type 2 diabetes mellitus with other specified complication, without long-term 
current   
use of insulin (HCC)  
   
                                                      
  
  
Acute deep vein thrombosis (DVT) of other vein of lower extremity, unspecified   
laterality (HCC)  
   
                                                      
  
  
Anxiety  
  
  
Anxiety state, unspecified  
   
                                                      
  
  
Class 2 obesity  
   
                                                      
  
  
Smoking  
  
  
Tobacco use disorder  
   
                                                      
  
  
Chronic obstructive pulmonary disease, unspecified COPD type (HCC)  
   
                                                      
  
  
Alpha-1-antitrypsin deficiency (HCC)  
  
  
Alpha-1-antitrypsin deficiency  
   
                                                      
  
  
Gastroesophageal reflux disease, unspecified whether esophagitis present  
   
                                                      
  
  
Chronic pansinusitis  
  
  
Other chronic sinusitis  
  
  
* Assessment & Plan Note - Sapphire Mayorga APRN.CNP - 2024 12:18 PM EDT
  Associated Problem(s): GERD (gastroesophageal reflux disease)  
Formatting of this note might be different from the original.  
Assessment: Controlled with pepcid  
  
Electronically signed by Sapphire Mayorga APRN.CNP at 2024 12:18 PM EDT  
  
* Assessment & Plan Note - Sapphire Mayorga APRN.CNP - 2024 12:17 PM EDT
  Associated Problem(s): Alpha-1-antitrypsin deficiency (HCC)  
Formatting of this note might be different from the original.  
Assessment: Follows with Pulmonology  
  
Electronically signed by Sapphire Mayorga APRN.CNP at 2024 12:17 PM EDT  
  
* Assessment & Plan Note - Sapphire Mayorga APRN.CNP - 2024 12:16 PM EDT
  Associated Problem(s): COPD (chronic obstructive pulmonary disease) (HCC)  
Formatting of this note might be different from the original.  
Assessment: Stable  
95% on RA  
Albuterol PRN  
On 4L O2 @ HS and during the day as needed  
Follows with Dr. Davidson @ [facility]  
  
Electronically signed by Sapphire Mayorga APRN.CNP at 2024 12:16 PM EDT  
  
* Assessment & Plan Note - Sapphire Mayorga APRN.CNP - 2024 12:12 PM EDT
  Associated Problem(s): Smoking  
Formatting of this note might be different from the original.  
Assessment: Smokes 0.5 PPD  
  
Electronically signed by Sapphire Mayorga APRN.CNP at 2024 12:12 PM EDT  
  
* Assessment & Plan Note - Sapphire Mayorga APRN.CNP - 2024 11:57 AM EDT
  Associated Problem(s): Class 2 obesity  
Formatting of this note might be different from the original.  
Assessment: Body mass index is 30.41 kg/m .  
  
  
Electronically signed by Sapphire Mayorga APRN.CNP at 2024 11:57 AM EDT  
  
* Assessment & Plan Note - Sapphire Mayorga APRN.CNP - 2024 11:57 AM EDT
  Associated Problem(s): Anxiety  
Formatting of this note might be different from the original.  
Assessment: Takes Valium as needed  
  
Electronically signed by Sapphire Mayorga APRN.CNP at 2024 11:57 AM EDT  
  
* Assessment & Plan Note - Sapphire Mayorga APRN.CNP - 2024 11:55 AM EDT
  Associated Problem(s): DVT of lower extremity (deep venous thrombosis) (HCC)  
Formatting of this note might be different from the original.  
Assessment: s/p ankle surgery  
Completed course of Xarelto  
No recurrence  
  
Electronically signed by Sapphire Mayorga APRN.CNP at 2024 11:55 AM EDT  
  
* Assessment & Plan Note - Sapphire Mayorga APRN.CNP - 2024 1:32 PM EDT
  Associated Problem(s): Diabetes (HCC)  
Formatting of this note is different from the original.  
Assessment: Stable on Ozempic  
Patient given instructions regarding Ozempic  
Component 5 mo ago  
Hemoglobin A1C 6.5  
Specimen Collected: 24 11:03 AM  
  
Follows with PCP  
Electronically signed by Sapphire Mayorga APRN.CNP at 2024 1:32 PM EDT  
  
documented in this encounter  
[facility]   
  
                                                    Diagnosis  
   
                                                      
  
  
Preop examination- Primary  
  
  
Preoperative examination, unspecified  
   
                                                      
  
  
Chronic maxillary sinusitis  
   
                                                      
  
  
Chronic obstructive pulmonary disease, unspecified COPD type (HCC)  
   
                                                      
  
  
Controlled type 2 diabetes mellitus without complication, unspecified whether 
long   
term insulin use (HCC)  
   
                                                      
  
  
Rheumatoid arthritis involving multiple sites with positive rheumatoid factor 
(HCC)  
   
                                                      
  
  
Hypogammaglobulinemia (HCC)  
  
  
Hypogammaglobulinaemia, unspecified  
   
                                                      
  
  
History of DVT (deep vein thrombosis)  
  
  
Personal history of venous thrombosis and embolism  
   
                                                      
  
  
Smoker  
  
  
Tobacco use disorder  
   
                                                      
  
  
Gastroesophageal reflux disease, unspecified whether esophagitis present  
   
                                                      
  
  
Pneumonia due to infectious organism, unspecified laterality, unspecified part 
of   
lung  
   
                                                      
  
  
Smoking  
  
  
Tobacco use disorder  
   
                                                      
  
  
DVT of axillary vein, acute left (HCC)  
  
  
Acute venous embolism and thrombosis of axillary veins  
   
                                                      
  
  
Class 2 obesity  
   
                                                      
  
  
Pre-op evaluation- Primary  
  
  
Preoperative examination, unspecified  
   
                                                      
  
  
Type 2 diabetes mellitus with other specified complication, without long-term 
current   
use of insulin (HCC)  
   
                                                      
  
  
Acute deep vein thrombosis (DVT) of other vein of lower extremity, unspecified   
laterality (HCC)  
   
                                                      
  
  
Anxiety  
  
  
Anxiety state, unspecified  
   
                                                      
  
  
Class 2 obesity  
   
                                                      
  
  
Smoking  
  
  
Tobacco use disorder  
   
                                                      
  
  
Chronic obstructive pulmonary disease, unspecified COPD type (HCC)  
   
                                                      
  
  
Alpha-1-antitrypsin deficiency (HCC)  
  
  
Alpha-1-antitrypsin deficiency  
   
                                                      
  
  
Gastroesophageal reflux disease, unspecified whether esophagitis present  
   
                                                      
  
  
Other chronic sinusitis- Primary  
  
documented [facility] this encounter  
[facility]   
  
                                                    Diagnosis  
   
                                                      
  
  
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  
   
                                                      
  
  
Type 2 diabetes mellitus with other diabetic neurological complication 
(CMS/HCC)-   
Primary  
   
                                                      
  
  
COPD with asthma (CMS/HCC)  
  
documented in this encounter  
[facility]   
  
                                                    Diagnosis  
   
                                                      
  
  
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  
  
documented in this encounter  
[facility]   
  
                                                    Diagnosis  
   
                                                      
  
  
Acute asthmatic bronchitis (CMS/HCC)- Primary  
  
  
Unspecified asthma, with exacerbation  
   
                                                      
  
  
Wheezing  
   
                                                      
  
  
Acute cough  
   
                                                      
  
  
Nasal drainage  
  
documented in this encounter  
[facility]   
  
                                                    Diagnosis  
   
                                                      
  
  
Chronic cough  
  
  
Cough  
  
documented in this encounter  
[facility]  
  
  
  
  
                                        Author              Neeraj Terry  
[facility]  
2023 5:24pm  
   
                                        Note Date/Time      2023 4:53  
pm  
   
                                                    [facility]  
ENTER  
[address] [place] [address]  
  
Hospitalist H&P  
Signed  
  
Patient: Bev Gaming MR#: [id number]  
[id number]  
: 1965 Acct:[id number]  
  
Age/Sex: 57 / F Adm Date:   
3  
Loc: ER Room: Type: [facility] ER  
Attending Dr:  
  
Copies to: MD Nickolas Mccarty DO Jeffrey A Garman, DO~  
  
  
HPI  
DATE OF EXAMINATION: 23  
HISTORY OF PRESENT ILLNESS:  
Patient is a 57-year-old female, with known history of severe COPD, and chronic   
hypoxic respiratory failure on 4 L nasal cannula at home. She has not been 
smoking in   
about 6 months. Couple months ago, she had COVID, and it took her quite a while 
to   
recover. About 2 weeks ago, upon returning from [place], she had dyspnea,
  
productive cough, and she was admitted to [facility] where she was kept   
overnight. Apparently she had a COPD exacerbation, and she was discharged home 
with   
5-day therapy with azithromycin and prednisone. Her thick greenish sputum 
improved,   
being no longer green, rather yellow and frothy at times. However shortness of 
breath   
did not get any better. She has been having some chest pressure and right upper   
quadrant pain, mostly pleuritic in nature, exacerbated by deep breathing and 
cough.   
Today she came to the emergency department with these complaints.  
  
Past medical history  
  
COPD and chronic hypoxic respiratory failure on oxygen 4 L nasal cannula  
Alpha-1 antitrypsin deficiency  
Tobacco abuse  
History of DVT  
Diabetes mellitus type 2  
Dyslipidemia  
  
Family history cancer thyroid asthma  
  
10 point review of systems negative except as noted above  
  
Physical exam  
  
Patient was seen in the emergency department  
  
Patient appears comfortable, in no distress.  
  
Skin is normally colored, no icterus, cyanosis or edema noted. Capillary refill 
is   
normal.  
  
Joints are without any effusion.  
  
Abdomen is soft, benign, no rebound or rigidity. No organomegaly. Bowel sounds   
present.  
  
Heart regular, no gallop, rub or JVD. Peripheral pulses present bilaterally. 
Chest is   
tender to palpation, mostly over the left anterior aspect.  
  
Lungs are with expiratory wheezes bilaterally, some rhonchi scattered. No 
crackles.  
  
HENT normal  
  
Neurological: Patient is awake. Cognition is normal. Cranial nerves are intact. 
Power   
is symmetric all extremities, with no focal motor deficit identified on a 
cursory   
exam.  
  
Psych: affect is normal.  
  
EKG Labs imaging reviewed  
  
Assessment and plan  
  
1. COPD exacerbation, failure of prior treatment course with azithromycin.  
We will obtain a sputum culture to identify other pathogens that might have not 
been   
covered. Admit to the hospital. Continue bronchodilators, inhaled and systemic   
steroids, Mucinex. We will administer levofloxacin for the time being. Adjust   
antimicrobial treatment based on the results of sputum cultures if positive. 
Noted   
that she received doxycycline in the ED  
  
2. Chest pain. I believe this is musculoskeletal in nature related to her cough.
We   
will keep on telemetry and repeat troponins tomorrow.  
  
DVT prophylaxis Xarelto  
Continue management of further chronic illnesses with home regimen  
  
COPD and chronic hypoxic respiratory failure on oxygen 4 L nasal cannula  
Alpha-1 antitrypsin deficiency  
Tobacco abuse  
History of DVT  
Diabetes mellitus type 2  
Dyslipidemia  
  
  
[facility]  
Medical History (Updated 23 @ 15:30 by Nickolas Palomares DO)  
  
Ytrqs-7-afbwlufhbyt deficiency  
COPD (chronic obstructive pulmonary disease)  
Diabetes  
Neck pain with history of cervical spinal surgery  
with hardware and cadaver bone  
  
Surgical History (Reviewed 23 @ 13:34 by Gabbie Thorne RN)  
  
History of ankle surgery  
bilat  
History of appendectomy  
History of colectomy  
History of hysterectomy  
  
Family History (Updated 10/01/21 @ 07:17 by Anjana Tovar RN)  
Mother Hypertension  
Asthma  
Brother Hypertension  
Father Lung cancer  
  
Social History  
Smoking Status: Former smoker  
Tobacco Type: cigarettes  
Substance Use Type: None  
  
Meds  
Medications and Allergies  
Allergies  
  
Sulfa (Sulfonamide Antibiotics) Allergy (Verified 23 14:38)  
Hives  
metoclopramide [From Reglan] Adverse Reaction (Verified 23 14:38)  
Agitated  
Penicillins Adverse Reaction (Verified 23 14:38)  
Hives  
risperidone [From Risperdal] Adverse Reaction (Verified 23 14:38)  
Confusion  
topiramate [From Topamax] Adverse Reaction (Verified 23 14:39)  
Hallucinating  
  
  
Home Medications  
  
lorazepam 0.5 mg tablet 0.5 mg PO TID PRN Anxiety 10/08/18 [History Confirmed   
23]  
albuterol sulfate 2.5 mg/3 mL (0.083 %) solution for nebulization 2.5 mg (3 mL)   
inhalation QID #125 vials 10/11/18 [Rx Confirmed 23]  
promethazine-DM 6.25 mg-15 mg/5 mL oral syrup 5 ml PO Q4-6H PRN cough #473 mL   
10/11/18 [Rx Confirmed 23]  
semaglutide 1 mg/dose (4 mg/3 mL) subcutaneous pen injector (Ozempic) 2 mg 
subcut   
QWEEK 10/01/21 [History Confirmed 23]  
famotidine 40 mg tablet 40 mg PO QHS 23 [History Confirmed 23]  
budesonide-formoterol  mcg-4.5 mcg/actuation aerosol inhaler (Symbicort) 
1 inh   
inhalation BID #10.2 grams 23 [Rx Confirmed 23]  
guaifenesin 600 mg tablet, extended release 12 hr (Mucinex) 1,200 mg PO BID 4 
days   
#16 tabs 23 [Rx Confirmed 23]  
  
  
  
Exam  
Physical Exam  
Vital Signs:  
  
  
  
  
Temp Pulse Resp BP Pulse Ox O2 Del Method O2 Flow Rate  
  
97.9 F 79 16 120/59 L 100 Simple Mask 4  
  
23 14:33 23 16:04 23 16:04 23 16:04 23 16:04 
23   
16:04 23 16:00  
  
  
  
  
Results  
Lab Results  
Labs:  
Laboratory Last Values  
  
  
  
Corrected WBC 13.5 X10E3/uL (3.8-11.6) H 23 15:23  
  
Uncorrected WBC Count 13.5 x10E3/uL (3.8-11.6) H 23 15:23  
  
RBC 4.48 X10E6/uL (3.60-5.00) 23 15:23  
  
Hgb 14.3 g/dL (11.8-15.4) 23 15:23  
  
Hct 41.8 % (34.0-46.4) 23 15:23  
  
MCV 93.2 fl () 23 15:23  
  
MCH 31.8 pg (24.7-34.3) 23 15:  
  
MCHC 34.2 g/dL (32.0-35.0) 23 15:23  
  
RDW 13.3 % (11.9-15.3) 23 15:23  
  
Plt Count 205 x10E3/uL (150-450) 23 15:23  
  
MPV 8.2 fl (6.3-10.7) 23 15:23  
  
Neut % (Auto) 59.9 % (.) 23 15:23  
  
Lymph % (Auto) 32.5 % (.) 23 15:23  
  
Mono % (Auto) 6.3 % (.) 23 15:23  
  
Eos % (Auto) 0.6 % (.) 23 15:23  
  
Baso % (Auto) 0.7 % (.) 23 15:23  
  
Nucleat RBC Rel Count 0.1 /100 WBC (0-0.5) 23 15:  
  
Neut # (Auto) 8.1 x10E3/uL (1.8-7.7) H 23 15:23  
  
Lymph # (Auto) 4.4 x10E3/uL (1.00-4.8) 23 15:23  
  
Mono # (Auto) 0.8 x10E3/uL (0.0-0.8) 23 15:23  
  
Eos # (Auto) 0.1 x10E3/uL (0.0-0.45) 23 15:  
  
Baso # (Auto) 0.1 x10E3/uL (0.0-0.2) 23 15:23  
  
Monocyte Dist Width 17.28 % (0.00-20.00) 23 15:23  
  
D-Dimer Quant (PE/DVT) < 200 ng/mL (0-243) 23 15:23  
  
PHA Creatinine Clear 133.51 23 15:23  
  
Sodium 138 mmol/L (136-145) 23 15:23  
  
Potassium 3.9 mmol/L (3.5-5.1) 23 15:  
  
Chloride 102 mmol/L () 23 15:  
  
Carbon Dioxide 30.6 mmol/L (21.0-31.0) 23 15:  
  
Anion Gap 9.3 mEq/L (6.0-15.0) 23 15:23  
  
BUN 10 mg/dL (7-25) 23 15:  
  
Creatinine 0.55 mg/dL (0.60-1.20) L 23 15:23  
  
Est GFR (CKD-EPI) > 60.0 mL/Min 23 15:  
  
Glucose 184 mg/dL () H 23 15:  
  
Calcium 8.8 mg/dL (8.6-10.3) 23 15:23  
  
Total Bilirubin 0.5 mg/dl (0.3-1.0) 23 15:23  
  
AST 12 U/L (13-39) L 23 15:23  
  
ALT 20 U/L (7-52) 23 15:23  
  
Alkaline Phosphatase 54 U/L () 23 15:23  
  
Total Creatine Kinase 18 U/L () L 23 15:23  
  
CK-MB (CK-2) 1.3 ng/mL (0.6-6.3) 23 15:  
  
CK-MB (CK-2) Rel Index 7.2 % (0.00-2.50) H 23 15:23  
  
Troponin I High Sens 4.0 pg/mL (0.0-15.0) 23 15:23  
  
B-Natriuretic Peptide 13.0 pg/mL (5-100) 23 15:23  
  
Total Protein 5.9 gm/dL (6.4-8.9) L 23 15:  
  
Albumin 3.7 gm/dL (3.5-5.7) 23 15:23  
  
Globulin 2.2 gm/dL 23 15:23  
  
Albumin/Globulin Ratio 1.7 23 15:23  
  
Urine Color Yellow (Yellow) 23 14:20  
  
Urine Appearance Clear (Clear) 23 14:20  
  
Urine pH 5.5 (5.0-9.0) 23 14:20  
  
Ur Specific Gravity 1.004 (1.001-1.030) 23 14:20  
  
Urine Protein Negative mg/dL (Negative) 23 14:20  
  
Urine Glucose (UA) Normal mg/dL (Normal) 23 14:20  
  
Urine Ketones Negative (Negative) 23 14:20  
  
Urine Occult Blood Negative (Negative) 23 14:20  
  
Urine Nitrite Negative (Negative) 23 14:20  
  
Urine Bilirubin Negative (Negative) 23 14:20  
  
Urine Urobilinogen Normal mg/dL (Normal) 23 14:20  
  
Ur Leukocyte Esterase Negative (Negative) 23 14:20  
  
COVID-19 Clin Com Not detected (Not Detecte) 23 15:45  
  
  
  
Microbiology Results  
Micro:  
Microbiology - Results from entire visit  
  
23 15:45 Nasopharyngeal Respiratory Panel (PCR) - Final  
  
  
  
Assessment & Plan  
IP vs OBS Justification  
Based on differential dx, clinical care plan, and risk of adverse events, if   
untreated, in my clinical judgement this patient requires an acute care setting   
as:OBSERVATION because of an expectation of an under 2 midnight stay.  
Estimated length of stay (# of days): 1  
  
  
Documented By: Neeraj Terry MD 23 6319  
Signed By: <Electronically signed by Neeraj Terry MD>  
23 1724  
   
  
[facility]  
Work Phone: [phone] general Narrative - [facility]orted[facility] 
[employer]  
Other Phone: [phone] general Narrative - Reported*   
  
                                Type            Description     Date  
   
                                Medical History Asthmatic Bronchitis   
   
                                Medical History anxiety           
   
                                Medical History post-traumatic stress disorder   
   
                                Medical History Arthritis         
   
                                Medical History type II diabetes   
   
                                Medical History COPD              
   
                                Medical History C DIFF            
   
                                Medical History Gastroparesis     
   
                                Surgical History C section x 3   ,  
   
                                Surgical History hysterectomy      
   
                                Surgical History cholecystectomy   
   
                                Surgical History D&C               
   
                                Surgical History rotator cuff tear repair - Rt/l  
t Rotator cuff   
   
                                Surgical History knee arthroscopy - X 2   
   
                                Surgical History RT hip surgery/tendon   
   
                                Surgical History LT ankle stablization   
   
                                Surgical History hernia repair     
   
                                Hospitalization History Multiple - Lung   
  
  
[employer] [facility] [employer] [facility]  
Other Phone: [phone] general Narrative - Reported*   
  
                                Type            Description     Date  
   
                                Medical History Asthmatic Bronchitis   
   
                                Medical History anxiety           
   
                                Medical History post-traumatic stress disorder   
   
                                Medical History Arthritis         
   
                                Medical History type II diabetes   
   
                                Medical History COPD - moderate   
   
                                Medical History C DIFF            
   
                                Medical History Gastroparesis     
   
                                Medical History Congestive Heart Failure   
   
                                Medical History diabetes mallitus   
   
                                Surgical History C section x 3   ,  
   
                                Surgical History hysterectomy      
   
                                Surgical History cholecystectomy   
   
                                Surgical History D&C               
   
                                Surgical History rotator cuff tear repair - Rt/l  
t Rotator cuff   
   
                                Surgical History knee arthroscopy - X 2   
   
                                Surgical History RT hip surgery/tendon   
   
                                Surgical History LT ankle stablization   
   
                                Surgical History hernia repair     
   
                                Hospitalization History Multiple - Lung   
   
                                Hospitalization History COVID           ,  
  
  
[facility]  
Other Phone: [phone] general Narrative - Reported*   
  
                                Type            Description     Date  
   
                                Medical History Asthmatic Bronchitis   
   
                                Medical History anxiety           
   
                                Medical History post-traumatic stress disorder   
   
                                Medical History Arthritis         
   
                                Medical History type II diabetes   
   
                                Medical History COPD - moderate   
   
                                Medical History C DIFF            
   
                                Medical History Gastroparesis     
   
                                Medical History Congestive Heart Failure   
   
                                Medical History diabetes mallitus   
   
                                Medical History Esophageal reflux   
   
                                Medical History Covid 2022 and 2023   
   
                                Surgical History C section x 3   ,  
   
                                Surgical History hysterectomy      
   
                                Surgical History cholecystectomy   
   
                                Surgical History D&C               
   
                                Surgical History rotator cuff tear repair - Rt/l  
t Rotator cuff   
   
                                Surgical History knee arthroscopy - X 2   
   
                                Surgical History RT hip surgery/tendon   
   
                                Surgical History LT ankle stablization   
   
                                Surgical History hernia repair     
   
                                Hospitalization History Multiple - Lung   
   
                                Hospitalization History COVID           ,  
2  
   
                                [place] [facility] [place]  
  
  
[employer]  
Other Phone: [phone] Discharge instructions  
Additional Instructions  
Continue the current medications that you are prescribed for COVID  
May use the DuoNeb aerosol treatment every 6 hours as needed for wheezing cough 
shortness of breath  
Make sure you using your oxygen at home when you are short of breath and limit 
excessive activity in  
the house while you are short of breath  
Follow-up with your family doctor or pulmonologist  
Return to the ER for worsening shortness of breath low oxygen even on your home 
oxygen chest pain  
vomiting or any other concerns[facility]  
Work Phone: [phone] Discharge instructions  
Additional Instructions  
You have tested positive for Covid-19, please see attached instructions and 
follow up with the  
Health Department for further instructions.  
  
Continue home oxygen.[facility]  
Work Phone: [phone] Discharge instructions  
Additional Instructions  
Continue to check your glucose levels before meals and at bedtime. Keep a record
of these and take  
it with you to your follow-up appointment.  
  
Continue oxygen as per chronic orders.[facility]  
Work Phone: [phone] note  
  
No data available for this section  
  
[facility] for visit Narrative* Diagnostic Procedure 
  Only (Routine) - Closed  
  
                          Specialty    Diagnoses / Procedures Referred By Contac  
t Referred To Contact  
   
                                                    Radiology / RADIO CT   
SCAN [facility]                             
  
  
Diagnoses  
  
  
Chronic pansinusitis  
  
  
CT SINUS STEREO WO MONTEZ  
  
  
  
Procedures  
  
  
CT MAXLFCL AREA C-MATRL  
  
  
CT WO SINUS STEREO 400   
no precert required   
[id number]                                
  
  
Bernardino Manley,   
APRN.CNP  
  
  
[address]   
[place] [address]  
  
  
Phone: [phone]  
  
  
Fax: [phone]                         
  
  
Radio Ct Scan [facility]   
[place]  
  
  
[address] [place] [address]  
  
  
Phone: [phone]  
  
  
  
                Referral ID Status  Reason  Start Date Expiration Date Visits Re  
quested Visits   
Authorized  
   
                [id number] Closed          2020 1       1  
  
  
  
[facility] for visit Narrative* Diagnostic Procedure Only (Routine) 
  - Closed  
  
                          Specialty    Diagnoses / Procedures Referred By Contac  
t Referred To Contact  
   
                                                    Radiology / RADIO CT   
SCAN [facility]                             
  
  
Diagnoses  
  
  
Other peripheral vertigo,   
bilateral  
  
  
Vertigo, peripheral,   
bilateral [H81.393]  
  
  
  
Procedures  
  
  
CT MAXILLOFACIAL W/O   
CONTRAST MATERIAL  
  
  
CT WO SINUS STEREO 400                    
  
  
Chaaban, Mohamad, MD  
  
  
[address]  
  
  
[place] [address]  
  
  
Phone: [phone]  
  
  
Fax: [phone]                         
  
  
Radio Ct Scan [facility]   
[place]  
  
  
[address] [place] [address]  
  
  
Phone: [phone]  
  
  
  
                Referral ID Status  Reason  Start Date Expiration Date Visits Re  
quested Visits   
Authorized  
   
                [id number] Closed          2024 1       1  
  
  
  
[facility]  
  
Summary Purpose  
  
  
                                                      
  
  
  
Family History  
  
  
                          Relationship Condition    Age at Onset Recorded Date/T  
malorie  
   
                          Not Specified Hypertension Unknown        
   
                                       Asthma       Unknown        
   
                          brother      Hypertension Unknown        
   
                          father       Malignant neoplasm of lung Unknown        
  
  
  
                          Relationship Condition    Age at Onset Recorded Date/T  
malorie  
   
                          Not Specified Hypertension Unknown        
   
                                       Asthma       Unknown        
   
                                       Migraine headache Unknown        
   
                          brother      Hypertension Unknown        
   
                          father       Malignant neoplasm Unknown        
   
                                            Unknown        
   
                                       Family history of other condition Unknown  
        
   
                          natural son  Asthma       Unknown        
  
  
  
                          Relationship Condition    Age at Onset Recorded Date/T  
malorie  
   
                          mother       Hypertension Unknown        
   
                                       Asthma       Unknown        
   
                                       Migraine headache Unknown        
   
                          brother      Hypertension Unknown        
   
                          father       Malignant neoplasm Unknown        
   
                                            Unknown        
   
                                       Family history of other condition Unknown  
        
   
                          son          Asthma       Unknown        
  
  
  
Advance Directives  
  
  
                                Advance Directive Response        Recorded Date/  
Time  
   
                                Advance Directives No              2018 4:07pm  
  
  
  
                                Date Activated  Date Inactivated Comments  
   
                                2024 11:45 AM                   
  
  
  
Procedure Findings  
  
  
                                                    Note  
   
                                                    HNO ID: [id number] Author: MARIANA Willis) Natasha Service: Anesthesiology Author   
Type:   
Nurse Anesthetist Type: Anesthesia Procedure Notes Filed: 2/15/2021 2:44 PM Note
   
Text: ANESTHESIOLOGY PROCEDURE NOTE Airway General Information Procedure Start   
Time/Medication Administration: 2/15/2021 2:10 PM Patient location during 
procedure:   
OR Timeout Performed Pre-procedure: timeout performed Consent Obtained: Yes 
Patient   
identity confirmed: arm band, care team member and patient Staffing 
Anesthesiologist:   
Irene Arana CRNA: Kendall Willis) Natasha Performed by: CRNA Indications and 
Patient   
Condition Preoxygenated: yes Patient position: sniffing Manual In-Line 
Stabilization:   
No Difficult Mask: No Indications for airway management: anesthesia anesthesia   
circuit Method: asleep Cricoid Pressure: No Final Airway Details Final airway 
type:   
endotracheal airway Final Endotracheal Airway: ETT Cuffed: yes Successful 
intubation   
technique: direct laryngoscopy Endotracheal tube insertion site: oral Blade: Ma 
(more   
content not included)...  
  
  
  
                                                    Note  
   
                                                    HNO ID: [id number] Author: MOISES parada (Geni) MD Joe Service: Otolaryngology Author   
Type:   
Resident Type: Brief Op Note Filed: 2/15/2021 3:37 PM Note Text: BRIEF OPERATIVE
 /   
PROCEDURE NOTE LOG ID: [id number] SURGERY/PROCEDURE DATE: 2/15/2021 
INCISION/PROCEDURE   
START TIME: 2:42 PM INCISION CLOSE/PROCEDURE END TIME: 3:30 PM   
SURGEON(S)/PROCEDURALIST(S) AND ASSISTANT(S): Surgeon(s) and Role: * Diana Rodriguez   
- Primary * Lele Diaz MD (Res) - Resident - Assisting No Additional Staff   
SURGERY/PROCEDURE(S): 1) Endoscopic left maxillary antrostomy 2) Endoscopic left
   
anterior ethmoidectomy 3) Endoscopic left eduardo bullosa resection 4) Bilateral   
inferior turbinate reduction 5) Stereotactic CT navigation, extra-dural 
ANESTHESIA:   
General FINDINGS: see operative report ESTIMATED BLOOD LOSS: 10 mls SPECIMENS: 
left   
sinonasal contents COMPLICATIONS: None PRE-OP/PRE-PROCEDURE DIAGNOSIS: left 
chronic   
sinusitis, nasal obstruction POST-OP/POST-PROCEDURE DIAGNOSIS: Same as Preop   
SIGNATURE: Lele Diaz MD ZANA (more content not included)...  
  
  
  
                                                    Note  
   
                                                    HNO ID: [id number] Author: MARIANA Kaur (Crna) Service: Anesthesiology Author   
Type:   
Nurse Anesthetist Type: Anesthesia Procedure Notes Filed: 2/15/2021 3:49 PM Note
   
Text: ANESTHESIOLOGY PROCEDURE NOTE PIV General Information Procedure Start   
Time/Medication Administration: 2/15/2021 2:20 PM Patient Location: OR Staffing   
Anesthesiologist: Irene Arana CRNA: Kendall Kaur (Crna) Performed by: CRNA   
Preparation Sterility Preparation: surgical cap used, mask used, skin prep agent
   
completely dried prior to procedure Site Prep: Chloraprep Procedure Details   
Indication: need for IV access Needle Size/Type: 20 gauge angiocath Orientation:
 Left   
Location: Other (wrist) Imaging Guidance Used: No SIGNATURE: Kendall Kaur APRN.CRNA PATIENT NAME: Bev Gaming DATE: February 15, 2021 MRN: [id number] 
TIME:   
3:48 PM CSN: [id number]  
  
  
  
Chief Complaint and Reason for Visit  
  
  
                                        Chief Complaint     covid+, wheezy-sent   
by   
  
  
  
                                        Chief Complaint     covid+, wheezy-sent   
by   
Trouble Breathing (C19+)  
   
                                        Reason for Visit    COPD exacerbation  
COVID-19  
Bipolar disorder  
Chronic respiratory failure with hypoxia, on home O2 therapy  
Diabetes mellitus  
Tobacco abuse disorder  
  
  
  
                                        Chief Complaint     covid+, wheezy-sent   
by   
Trouble Breathing (C19+)  
chest pain, cant breathe  
   
                                        Reason for Visit    COPD exacerbation  
COVID-19  
Bipolar disorder  
Chronic respiratory failure with hypoxia, on home O2 therapy  
Diabetes mellitus  
Tobacco abuse disorder  
Acute exacerbation of chronic obstructive airways disease  
  
  
  
                                        Chief Complaint     Dr. Malone called fo  
r an appt for pt  
   
                                        Reason for Visit    Asthma with COPD  
COPD (chronic obstructive pulmonary disease)  
Chronic respiratory failure with hypoxia, on home O2 therapy  
  
  
  
                                        Chief Complaint     oxygen low, cough, w  
heezing  
  
  
  
                                        Chief Complaint     oxygen low, cough, w  
heezing  
6 mo f/u Asthma w COPD  
   
                                        Reason for Visit    Asthma with COPD  
COPD (chronic obstructive pulmonary disease)  
Chronic respiratory failure with hypoxia, on home O2 therapy  
  
  
  
Reason for Referral  
  
  
                          Specialty    Diagnoses / Procedures Referred By Contac  
t Referred To Contact  
   
                                                              
  
  
Procedures  
  
  
HEARING TEST/AUDIOGRAM  
  
  
COMPRE AUDIOMETRY THRESHOLD   
EVAL SP RECOGNIJ                          
  
  
[facility]  
  
  
 [place] [address]  
  
  
Phone: [phone]  
  
  
Fax: [phone]                         
  
  
[facility]  
  
  
[address] [place] [address]  
  
  
  
                          Referral ID  Status       Reason       Start   
Date                                    Expiration   
Date                                    Visits   
Requested                               Visits   
Authorized  
   
                                        [id number]            Pending   
Review                                    
  
  
Auto-Generat  
ed Referral     6/3/2024        2025        1               1  
  
  
  
                          Specialty    Diagnoses / Procedures Referred By Contac  
t Referred To Contact  
   
                                        Ent - Otolaryngology   
  
  
Diagnoses  
  
  
Vertigo, peripheral,   
bilateral  
  
  
  
Procedures  
  
  
CONSULT TO ENT  
  
  
OFFICE/OUTPATIENT [facility] 60 MINUTES                       
  
  
Diana Rodriguez MD  
  
  
[address] [facility] [facility] [facility] [place] [address]  
  
  
Phone: [phone]  
  
  
Fax: [phone]                         
  
  
  
  
  
                          Referral ID  Status       Reason       Start   
Date                                    Expiration   
Date                                    Visits   
Requested                               Visits   
Authorized  
   
                                [id number]        Authorized        
  
  
PCP Requested   
Referral        2024        1               1  
  
  
  
                          Specialty    Diagnoses / Procedures Referred By Contac  
t Referred To Contact  
   
                                                              
  
  
Diagnoses  
  
  
Vascular headache  
  
  
  
Procedures  
  
  
CONSULT TO HEADACHE CLINIC  
  
  
OFFICE/OUTPATIENT [facility] 60 MINUTES                            
  
  
Diana Rodriguez MD  
  
  
[address] [place] [address]  
  
  
Phone: [phone]  
  
  
Fax: [phone]                         
  
  
  
  
  
                          Referral ID  Status       Reason       Start   
Date                                    Expiration   
Date                                    Visits   
Requested                               Visits   
Authorized  
   
                                [id number]        Authorized        
  
  
PCP Requested   
Referral        2024        1               1  
  
  
  
                          Specialty    Diagnoses / Procedures Referred By Contac  
t Referred To Contact  
   
                                        Allergy               
  
  
Diagnoses  
  
  
Allergic rhinitis,   
unspecified seasonality,   
unspecified trigger  
  
  
  
Procedures  
  
  
CONSULT TO ALLERGY/IMMUNOLOGY  
  
  
OFFICE/OUTPATIENT [facility] 60 MINUTES                            
  
  
Diana Rodriguez MD  
  
  
[address] [place] [address]  
  
  
Phone: [phone]  
  
  
Fax: [phone]                         
  
  
  
  
  
                          Referral ID  Status       Reason       Start   
Date                                    Expiration   
Date                                    Visits   
Requested                               Visits   
Authorized  
   
                                [id number]        Authorized        
  
  
PCP Requested   
Referral        2024        1               1  
  
  
  
Additional Source Comments  
  
  
  
                                                    INFORMATION SOURCE (unrecogn  
ized section and content)  
   
                                          
  
  
  
                                        DATE CREATED        AUTHOR  
   
                                2018                      [facility]  
  
  
  
                                DATE CREATED    AUTHOR          AUTHOR'S ORGANIZ  
ATION  
   
                                2021                      [facility]  
  
  
  
                                DATE CREATED    AUTHOR          AUTHOR'S ORGANIZ  
ATION  
   
                                2022                      [facility]  
dical Specialist  
  
  
  
                                DATE CREATED    AUTHOR          AUTHOR'S ORGANIZ  
ATION  
   
                                2023                      [facility]  
[facility]ital  
  
  
  
                                DATE CREATED    AUTHOR          AUTHOR'S ORGANIZ  
ATION  
   
                                2023                      [facility]  
  
  
  
                                DATE CREATED    AUTHOR          AUTHOR'S ORGANIZ  
ATION  
   
                                2024                      [facility]  
  
  
  
                                DATE CREATED    AUTHOR          AUTHOR'S ORGANIZ  
ATION  
   
                                2024                      [facility]  
dical Specialists [facility]  
  
  
  
                                DATE CREATED    AUTHOR          AUTHOR'S ORGANIZ  
ATION  
   
                                2024                      [facility]  
ysician Group  
  
  
  
  
  
                                                    REASON FOR VISIT (unrecogniz  
ed section and content)  
   
                                          
  
  
  
                                        Reason              Comments  
   
                                        New Patient         Recurrent sinus infe  
ctions / has seen Dr Rodriguez in past  
  
  
  
                                        Reason              Comments  
   
                                        Follow Up             
  
  
  
                                        Reason              Comments  
   
                                        Med Change Request    
  
  
  
                                        Reason              Comments  
   
                                        Ear Problem         Evaluation for PE be  
fore sinus surgery  
  
  
  
                          Specialty    Diagnoses / Procedures Referred By Contac  
t Referred To Contact  
   
                                        Ent - Otolaryngology   
  
  
Diagnoses  
  
  
Vertigo, peripheral,   
bilateral  
  
  
  
Procedures  
  
  
CONSULT TO ENT  
  
  
OFFICE/OUTPATIENT [facility] 60 MINUTES                       
  
  
Diana Rodriguez MD  
  
  
 [place] [address]  
  
  
Phone: [phone]  
  
  
Fax: [phone]                         
  
  
  
  
  
                    Referral ID Status    Reason    Start Date Expiration Date V  
isits   
Requested                               Visits   
Authorized  
   
                                [id number]        Closed            
  
  
PCP Requested   
Referral        2024        1               1  
  
  
  
                                        Reason              Comments  
   
                                        Hearing Loss          
  
  
  
                          Specialty    Diagnoses / Procedures Referred By Contac  
t Referred To Contact  
   
                                        Ent - Otolaryngology   
  
  
Diagnoses  
  
  
Vertigo, peripheral,   
bilateral  
  
  
  
Procedures  
  
  
CONSULT TO ENT  
  
  
OFFICE/OUTPATIENT [facility] 60 MINUTES                       
  
  
Diana Rodriguez MD  
  
  
[address]  
  
  
[place] [address]  
  
  
Phone: [phone]  
  
  
Fax: [phone]                         
  
  
  
  
  
                                        Reason              Comments  
   
                                        Anesthesia Consult    
  
  
  
                                        Reason              Comments  
   
                                        Post Op Follow Up     
  
  
  
                                        Reason              Comments  
   
                                        Surgical Followup     
  
  
  
                                        Reason              Comments  
   
                                        Diabetes              
  
  
  
                                        Reason              Comments  
   
                                        Cough               Patient presents tod  
ay for cough, headache, and wheezing, SOB, and nasal   
drainage. She had nose surgery last week and can't test for Covid. Yesterday   
she was coughing until she vomited. She got custody of 3 grandkids last week   
and the baby is starting to cough as well. She denies fever and chills.  
  
  
  
                                Reason          Onset Date      Comments  
   
                                Med Refill      2024        
  
  
  
  
  
                                                    Patient Care team informatio  
n (unrecognized section and content)  
   
                                          
  
  
  
                                                    Team Status: Active   
   
                          Member       Role         Status       Dates  
   
                          Sherman Malone DO Primary Care Provider Active        
   
  
  
  
                                                    Team Status: Inactive   
   
                          Member       Role         Status       Dates  
   
                          Sherman Malone DO Primary Care Provider Active        
   
   
                          Abbi Carlson [facility] Emergency Provider Active   
        
  
  
  
                                                    Team Status: Inactive   
   
                          Member       Role         Status       Dates  
   
                          Sherman Malone DO Primary Care Provider Active        
   
   
                          Darrian Lawrence DO Emergency Provider Active         
   
                          Tashi Rodriges MD Admit Provider Active         
   
                          Ambrosio Davis MD Attending Provider Active    
       
   
                          Breezy Davidson MD Other Provider Active       
    
  
  
  
                                                    Team Status: Active   
   
                          Member       Role         Status       Dates  
   
                          Sherman Malone DO Primary Care Provider Active        
   
   
                          Nickolas Palomares , DO Emergency Provider Active         
   
                          Neeraj Terry MD Admit Provider, Attending Provider  
 Active         
  
  
  
                                                    Team Status: Inactive   
   
                          Member       Role         Status       Dates  
   
                          Sherman Malone DO Primary Care Provider Active        
   
   
                          Nickolas Palomares , DO Emergency Provider Active         
   
                          Neeraj Terry MD Admit Provider, Attending Provider  
 Active         
  
  
  
                      Team Member Relationship Specialty  Start Date End Date  
   
                                                      
  
  
Sherman Malone DO  
  
  
NPI: [id number]  
  
  
[address]  
  
  
[address]  
  
  
[place] [address]  
  
  
[phone] (Work)  
  
  
[phone] (Fax) PCP - General   Internal Medicine 14           
  
  
  
                      Team Member Relationship Specialty  Start Date End Date  
   
                                                      
  
  
Sherman Malone DO  
  
  
NPI: [id number]  
  
  
[address]  
  
  
[address]  
  
  
[place] [address]  
  
  
[phone] (Work)  
  
  
[phone] (Fax) PCP - General   Internal Medicine 14           
  
  
  
                      Team Member Relationship Specialty  Start Date End Date  
   
                                                      
  
  
Sherman Malone DO  
  
  
NPI: [id number]  
  
  
[address]  
  
  
[address]  
  
  
[place] [address]  
  
  
[phone] (Work)  
  
  
[phone] (Fax) PCP - General   Internal Medicine 14           
  
  
  
                                                    Team Status: Inactive   
   
                          Member       Role         Status       Dates  
   
                          Sherman Malone DO Primary Care Provider Active        
 Start: 2024  
End: 2024  
   
                          SAHRA Cisneros ACNP-BC Attending Provider Active     
    Start: 2024  
End: 2024  
   
                          DME                       Active       Start:   
End: 2024  
  
  
  
                      Team Member Relationship Specialty  Start Date End Date  
   
                                                      
  
  
Sherman Malone DO  
  
  
NPI: [id number]  
  
  
[address]  
  
  
[address]  
  
  
[place] [address]  
  
  
[phone] (Work)  
  
  
[phone] (Fax) PCP - General   Internal Medicine 14           
  
  
  
                      Team Member Relationship Specialty  Start Date End Date  
   
                                                      
  
  
Sherman Malone DO  
  
  
NPI: [id number]  
  
  
[address]  
  
  
[address]  
  
  
[place] [address]  
  
  
[phone] (Work)  
  
  
[phone] (Fax) PCP - General   Internal Medicine 14           
  
  
  
                      Team Member Relationship Specialty  Start Date End Date  
   
                                                      
  
  
Sherman Malone DO  
  
  
NPI: [id number]  
  
  
[address]  
  
  
[address]  
  
  
[place] [address]  
  
  
[phone] (Work)  
  
  
[phone] (Fax) PCP - General   Internal Medicine 14           
  
  
  
                      Team Member Relationship Specialty  Start Date End Date  
   
                                                      
  
  
Sherman Malone DO  
  
  
NPI: [id number]  
  
  
[address]  
  
  
[address]  
  
  
[place] [address]  
  
  
[phone] (Work)  
  
  
[phone] (Fax) PCP - General   Internal Medicine 14           
  
  
  
                      Team Member Relationship Specialty  Start Date End Date  
   
                                                      
  
  
Sherman Malone DO  
  
  
NPI: [id number]  
  
  
[address]  
  
  
[address]  
  
  
[place] [address]  
  
  
[phone] (Work)  
  
  
[phone] (Fax) PCP - General   Internal Medicine 14           
  
  
  
                      Team Member Relationship Specialty  Start Date End Date  
   
                                                      
  
  
Sherman Malone DO  
  
  
NPI: [id number]  
  
  
[address]  
  
  
[address]  
  
  
[place] [address]  
  
  
[phone] (Work)  
  
  
[phone] (Fax) PCP - General   Internal Medicine 14           
  
  
  
                      Team Member Relationship Specialty  Start Date End Date  
   
                                                      
  
  
Sherman Malone DO  
  
  
NPI: [id number]  
  
  
[address]  
  
  
[address]  
  
  
[place] [address]  
  
  
[phone] (Work)  
  
  
[phone] (Fax) PCP - General   Internal Medicine 14           
  
  
  
                      Team Member Relationship Specialty  Start Date End Date  
   
                                                      
  
  
Sherman Malone DO  
  
  
NPI: [id number]  
  
  
[address]  
  
  
[address]  
  
  
[place] [address]  
  
  
[phone] (Work)  
  
  
[phone] (Fax) PCP - General   Internal Medicine 14           
  
  
  
                                                    Team Status: Inactive   
   
                          Member       Role         Status       Dates  
   
                          Sherman Malone DO Primary Care Provider Active        
 Start: 2024  
End: 2024  
   
                          Diogo Dent DO Emergency Provider Active       St  
art: 2024  
End: 2024  
  
  
  
                                                    Team Status: Inactive   
   
                          Member       Role         Status       Dates  
   
                          Sherman Malone DO Primary Care Provider Active        
 Start: 2024  
End: 2024  
   
                          SAHRA Cisneros [facility] Attending Provider Active     
    Start: 2024  
End: 2024  
  
  
  
                      Team Member Relationship Specialty  Start Date End Date  
   
                                                      
  
  
Sherman Malone DO  
  
  
NPI: [id number]  
  
  
[address]  
  
  
[place] [address]  
  
  
[phone] (Work)  
  
  
[phone] (Fax) [facility]            
   
                                                      
  
  
Sherman Malone DO  
  
  
NPI: [id number]  
  
  
[address]  
  
  
[place] [address]  
  
  
[phone] (Work)  
  
  
[phone] (Fax) PCP - General   Internal Medicine 23            
   
                                                      
  
  
Sherman Malone DO  
  
  
NPI: [id number]  
  
  
[address]  
  
  
[place] [address]  
  
  
[phone] (Work)  
  
  
[phone] (Fax) PCP - [facility]                 24            
  
  
  
                      Team Member Relationship Specialty  Start Date End Date  
   
                                                      
  
  
Sherman Malone DO  
  
  
NPI: [id number]  
  
  
[address]  
  
  
[place] [address]  
  
  
[phone] (Work)  
  
  
[phone] (Fax) PCP - [facility]                 21            
   
                                                      
  
  
Sherman Malone DO  
  
  
NPI: [id number]  
  
  
[address]  
  
  
[place] [address]  
  
  
[phone] (Work)  
  
  
[phone] (Fax) PCP - General   Internal Medicine 23            
   
                                                      
  
  
Sherman Malone, DO  
  
  
NPI: [id number]  
  
  
[address]  
  
  
[place] [address]  
  
  
[phone] (Work)  
  
  
[phone] (Fax) PCP - [facility]                 24            
  
  
  
                      Team Member Relationship Specialty  Start Date End Date  
   
                                                      
  
  
Sherman Malone DO  
  
  
NPI: [id number]  
  
  
[address]  
  
  
[place] [address]  
  
  
[phone] (Work)  
  
  
[phone] (Fax) PCP - [facility] [facility]                 21            
   
                                                      
  
  
Sherman Malone,   
  
  
NPI: [id number]  
  
  
[address]  
  
  
[place] [address]  
  
  
[phone] (Work)  
  
  
[phone] (Fax) PCP - General   Internal Medicine 23            
   
                                                      
  
  
Sherman Malone,   
  
  
NPI: [id number]  
  
  
[address]  
  
  
[place] [address]  
  
  
[phone] (Work)  
  
  
[phone] (Fax) PCP - [facility]                 24            
  
  
  
                      Team Member Relationship Specialty  Start Date End Date  
   
                                                      
  
  
Sherman Malone DO  
  
  
NPI: [id number]  
  
  
[address]  
  
  
[place] [address]  
  
  
[phone] (Work)  
  
  
[phone] (Fax) PCP - [facility] [facility]                 21            
   
                                                      
  
  
Sherman Malone DO  
  
  
NPI: [id number]  
  
  
[address]  
  
  
[place] [address]  
  
  
[phone] (Work)  
  
  
[phone] (Fax) PCP - General   Internal Medicine 23            
   
                                                      
  
  
Sherman Malone DO  
  
  
NPI: [id number]  
  
  
[address]  
  
  
[place] [address]  
  
  
[phone] (Work)  
  
  
[phone] (Fax) PCP - [facility]                 24            
  
  
  
                      Team Member Relationship Specialty  Start Date End Date  
   
                                                      
  
  
Sherman Malone DO  
  
  
NPI: [id number]  
  
  
[address]  
  
  
[place] [address]  
  
  
[phone] (Work)  
  
  
[phone] (Fax) PCP - [facility] [facility]                 21            
   
                                                      
  
  
Sherman Malone DO  
  
  
NPI: [id number]  
  
  
[address]  
  
  
[place] [address]  
  
  
[phone] (Work)  
  
  
[phone] (Fax) PCP - General   Internal Medicine 23            
   
                                                      
  
  
Sherman Malone DO  
  
  
NPI: [id number]  
  
  
[address]  
  
  
[place] [address]  
  
  
[phone] (Work)  
  
  
[phone] (Fax) PCP - [facility]                 24            
  
  
  
                      Team Member Relationship Specialty  Start Date End Date  
   
                                                      
  
  
Sherman Malone DO  
  
  
NPI: [id number]  
  
  
[address]  
  
  
[place] [address]  
  
  
[phone] (Work)  
  
  
[phone] (Fax) PCP - [facility] [facility]                 21            
   
                                                      
  
  
Sherman Malone DO  
  
  
NPI: [id number]  
  
  
[address]  
  
  
[place] [address]  
  
  
[phone] (Work)  
  
  
[phone] (Fax) PCP - General   Internal Medicine 23            
   
                                                      
  
  
Sherman Malone DO  
  
  
NPI: [id number]  
  
  
[address]  
  
  
[place] [address]  
  
  
[phone] (Work)  
  
  
[phone] (Fax) PCP - [facility]                 24            
  
  
  
  
  
                                                    Goals (unrecognized section   
and content)  
   
                                                    Goals may be documented in a  
n alternate section  
  
  
  
  
                                                    Source Comments (unrecognize  
d section and content)  
   
                                                    In the event this informatio  
n is protected by the [facility] Confidentiality of   
Alcohol   
and Drug Abuse Patient Records regulations: This information has been disclosed 
to   
you from records protected by Federal confidentiality rules (42 CFR Part 2). The
   
Federal rules prohibit you from making any further disclosure of this 
information   
unless further disclosure is expressly permitted by the written consent of the 
person   
to whom it pertains or as otherwise permitted by 42 CFR Part 2. A general   
authorization for the release of medical or other information is NOT sufficient 
for   
this purpose. The Federal rules restrict any use of the information to 
criminally   
investigate or prosecute any alcohol or drug abuse patient.[facility] HospitalIn 
the   
event this information is protected by the Federal Confidentiality of Alcohol 
and   
Drug Abuse Patient Records regulations: This information has been disclosed to 
you   
from records protected by Federal confidentiality rules (42 CFR Part 2). The 
Federal   
rules prohibit you from making any further disclosure of this information unless
   
further disclosure is expressly permitted by the written consent of the person 
to   
whom it pertains or as otherwise permitted by 42 CFR Part 2. A general 
authorization   
for the release of medical or other information is NOT sufficient for this 
purpose.   
The Federal rules restrict any use of the information to criminally investigate 
or   
prosecute any alcohol or drug abuse patient.[facility] HospitalIn the event this   
information is protected by the Federal Confidentiality of Alcohol and Drug 
Abuse   
Patient Records regulations: This information has been disclosed to you from 
records   
protected by Federal confidentiality rules (42 CFR Part 2). The Federal rules   
prohibit you from making any further disclosure of this information unless 
further   
disclosure is expressly permitted by the written consent of the person to whom 
it   
pertains or as otherwise permitted by 42 CFR Part 2. A general authorization for
 the   
release of medical or other information is NOT sufficient for this purpose. The   
Federal rules restrict any use of the information to criminally investigate or   
prosecute any alcohol or drug abuse patient.[facility] HospitalIn the event this   
information is protected by the Federal Confidentiality of Alcohol and Drug 
Abuse   
Patient Records regulations: This information has been disclosed to you from 
records   
protected by Federal confidentiality rules (42 CFR Part 2). The Federal rules   
prohibit you from making any further disclosure of this information unless 
further   
disclosure is expressly permitted by the written consent of the person to whom 
it   
pertains or as otherwise permitted by 42 CFR Part 2. A general authorization for
 the   
release of medical or other information is NOT sufficient for this purpose. The   
Federal rules restrict any use of the information to criminally investigate or   
prosecute any alcohol or drug abuse patient.[facility] Hospital[facility] the event this   
information is protected by the Federal Confidentiality of Alcohol and Drug 
Abuse   
Patient Records regulations: This information has been disclosed to you from 
records   
protected by Federal confidentiality rules (42 CFR Part 2). The Federal rules   
prohibit you from making any further disclosure of this information unless 
further   
disclosure is expressly permitted by the written consent of the person to whom 
it   
pertains or as otherwise permitted by 42 CFR Part 2. A general authorization for
 the   
release of medical or other information is NOT sufficient for this purpose. The   
Federal rules restrict any use of the information to criminally investigate or   
prosecute any alcohol or drug abuse patient.[facility] HospitalIn the event this   
information is protected by the Federal Confidentiality of Alcohol and Drug 
Abuse   
Patient Records regulations: This information has been disclosed to you from 
records   
protected by Federal confidentiality rules (42 CFR Part 2). The Federal rules   
prohibit you from making any further disclosure of this information unless 
further   
disclosure is expressly permitted by the written consent of the person to whom 
it   
pertains or as otherwise permitted by 42 CFR Part 2. A general authorization for
 the   
release of medical or other information is NOT sufficient for this purpose. The   
Federal rules restrict any use of the information to criminally investigate or   
prosecute any alcohol or drug abuse patient.[facility] HospitalIn the event this   
information is protected by the Federal Confidentiality of Alcohol and Drug 
Abuse   
Patient Records regulations: This information has been disclosed to you from 
records   
protected by Federal confidentiality rules (42 CFR Part 2). The Federal rules   
prohibit you from making any further disclosure of this information unless 
further   
disclosure is expressly permitted by the written consent of the person to whom 
it   
pertains or as otherwise permitted by 42 CFR Part 2. A general authorization for
 the   
release of medical or other information is NOT sufficient for this purpose. The   
Federal rules restrict any use of the information to criminally investigate or   
prosecute any alcohol or drug abuse patient.[facility] HospitalIn the event this   
information is protected by the Federal Confidentiality of Alcohol and Drug 
Abuse   
Patient Records regulations: This information has been disclosed to you from 
records   
protected by Federal confidentiality rules (42 CFR Part 2). The Federal rules   
prohibit you from making any further disclosure of this information unless 
further   
disclosure is expressly permitted by the written consent of the person to whom 
it   
pertains or as otherwise permitted by 42 CFR Part 2. A general authorization for
 the   
release of medical or other information is NOT sufficient for this purpose. The   
Federal rules restrict any use of the information to criminally investigate or   
prosecute any alcohol or drug abuse patient.[facility] HospitalIn the event this   
information is protected by the Federal Confidentiality of Alcohol and Drug 
Abuse   
Patient Records regulations: This information has been disclosed to you from 
records   
protected by Federal confidentiality rules (42 CFR Part 2). The Federal rules   
prohibit you from making any further disclosure of this information unless 
further   
disclosure is expressly permitted by the written consent of the person to whom 
it   
pertains or as otherwise permitted by 42 CFR Part 2. A general authorization for
 the   
release of medical or other information is NOT sufficient for this purpose. The   
Federal rules restrict any use of the information to criminally investigate or   
prosecute any alcohol or drug abuse patient.[facility] HospitalIn the event this   
information is protected by the Federal Confidentiality of Alcohol and Drug 
Abuse   
Patient Records regulations: This information has been disclosed to you from 
records   
protected by Federal confidentiality rules (42 CFR Part 2). The Federal rules   
prohibit you from making any further disclosure of this information unless 
further   
disclosure is expressly permitted by the written consent of the person to whom 
it   
pertains or as otherwise permitted by 42 CFR Part 2. A general authorization for
 the   
release of medical or other information is NOT sufficient for this purpose. The   
Federal rules restrict any use of the information to criminally investigate or   
prosecute any alcohol or drug abuse patient.[facility] HospitalIn the event this   
information is protected by the Federal Confidentiality of Alcohol and Drug 
Abuse   
Patient Records regulations: This information has been disclosed to you from 
records   
protected by Federal confidentiality rules (42 CFR Part 2). The Federal rules   
prohibit you from making any further disclosure of this information unless 
further   
disclosure is expressly permitted by the written consent of the person to whom 
it   
pertains or as otherwise permitted by 42 CFR Part 2. A general authorization for
 the   
release of medical or other information is NOT sufficient for this purpose. The   
Federal rules restrict any use of the information to criminally investigate or   
prosecute any alcohol or drug abuse patient.[facility]  
  
FOR RECORDS PERTAINING TO PATIENTS WHO ARE OR HAVE BEEN ENROLLED IN A CHEMICAL 
DEPENDENCY/SUBSTANCEABUSE PROGRAM, SOME INFORMATION MAY BE OMITTED. This 
clinical summary was aggregated from multiple sources. Caution should be 
exercised in using it in the provision of clinical care. This summary normalizes
 information from multiple sources, and as a consequence, information in this 
document may materially change the coding, format and clinical context of 
patient data. In addition, data may be omitted in some cases. CLINICAL DECISIONS
 SHOULD BE BASED ON THE PRIMARY CLINICAL RECORDS. [facility]. provides
 no warranty or guarantee of the accuracy or completeness of information in this
 document.

## 2025-03-22 ENCOUNTER — HOSPITAL ENCOUNTER (EMERGENCY)
Age: 60
Discharge: HOME | End: 2025-03-22
Payer: COMMERCIAL

## 2025-03-22 VITALS — OXYGEN SATURATION: 95 % | HEART RATE: 88 BPM

## 2025-03-22 VITALS — HEART RATE: 87 BPM | OXYGEN SATURATION: 95 % | SYSTOLIC BLOOD PRESSURE: 130 MMHG | DIASTOLIC BLOOD PRESSURE: 84 MMHG

## 2025-03-22 VITALS
DIASTOLIC BLOOD PRESSURE: 86 MMHG | HEART RATE: 118 BPM | SYSTOLIC BLOOD PRESSURE: 152 MMHG | OXYGEN SATURATION: 93 % | TEMPERATURE: 97.8 F

## 2025-03-22 VITALS — OXYGEN SATURATION: 95 %

## 2025-03-22 VITALS — BODY MASS INDEX: 34.5 KG/M2

## 2025-03-22 DIAGNOSIS — Z91.81: ICD-10-CM

## 2025-03-22 DIAGNOSIS — Z90.710: ICD-10-CM

## 2025-03-22 DIAGNOSIS — Z99.81: ICD-10-CM

## 2025-03-22 DIAGNOSIS — S70.11XA: ICD-10-CM

## 2025-03-22 DIAGNOSIS — S70.01XA: ICD-10-CM

## 2025-03-22 DIAGNOSIS — Z90.49: ICD-10-CM
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
## 2025-03-22 DIAGNOSIS — F17.200: ICD-10-CM

## 2025-03-22 DIAGNOSIS — R07.9: Primary | ICD-10-CM

## 2025-03-22 DIAGNOSIS — R55: ICD-10-CM

## 2025-03-22 DIAGNOSIS — J44.9: ICD-10-CM

## 2025-03-22 LAB
ADD MANUAL DIFF: NO
ANION GAP: 8.4
BLOOD UREA NITROGEN: 14 MG/DL (ref 7–18)
CALCIUM: 9.8 MG/DL (ref 8.5–10.1)
CARBON DIOXIDE: 31.8 MMOL/L (ref 21–32)
CHLORIDE: 101 MMOL/L (ref 98–107)
CO2 BLD-SCNC: 31.8 MMOL/L (ref 21–32)
ESTIMATED GFR (AFRICAN AMERICA: >60
ESTIMATED GFR (NON-AFRICAN AME: >60
GLUCOSE BLD-MCNC: 195 MG/DL (ref 74–106)
HCT VFR BLD CALC: 48.2 % (ref 36–48)
HEMATOCRIT: 48.2 % (ref 36–48)
HEMOGLOBIN: 16.8 G/DL (ref 12–16)
IMMATURE GRANULOCYTES ABS AUTO: 0.07 10^3/UL (ref 0–0.03)
IMMATURE GRANULOCYTES PCT AUTO: 0.5 % (ref 0–0.5)
LYMPHOCYTES  ABSOLUTE AUTO: 3.9 10^3/UL (ref 1.2–3.8)
MCV RBC: 92.5 FL (ref 81–99)
MEAN CORPUSCULAR HEMOGLOBIN: 32.2 PG (ref 26.7–34)
MEAN CORPUSCULAR HGB CONC: 34.9 G/DL (ref 29.9–35.2)
MEAN CORPUSCULAR VOLUME: 92.5 FL (ref 81–99)
PLATELET # BLD: 228 10^3/UL (ref 150–450)
PLATELET COUNT: 228 10^3/UL (ref 150–450)
POTASSIUM SERPLBLD-SCNC: 4.2 MMOL/L (ref 3.5–5.1)
POTASSIUM: 4.2 MMOL/L (ref 3.5–5.1)
RED BLOOD COUNT: 5.21 10^6/UL (ref 4.2–5.4)
SODIUM BLD-SCNC: 137 MMOL/L (ref 136–145)
SODIUM: 137 MMOL/L (ref 136–145)
WBC # BLD: 13 10^3/UL (ref 4–11)
WHITE BLOOD COUNT: 13 10^3/UL (ref 4–11)

## 2025-03-22 PROCEDURE — 72125 CT NECK SPINE W/O DYE: CPT

## 2025-03-22 PROCEDURE — 73502 X-RAY EXAM HIP UNI 2-3 VIEWS: CPT

## 2025-03-22 PROCEDURE — 73552 X-RAY EXAM OF FEMUR 2/>: CPT

## 2025-03-22 PROCEDURE — 83874 ASSAY OF MYOGLOBIN: CPT

## 2025-03-22 PROCEDURE — 80048 BASIC METABOLIC PNL TOTAL CA: CPT

## 2025-03-22 PROCEDURE — 99285 EMERGENCY DEPT VISIT HI MDM: CPT

## 2025-03-22 PROCEDURE — 71045 X-RAY EXAM CHEST 1 VIEW: CPT

## 2025-03-22 PROCEDURE — 96374 THER/PROPH/DIAG INJ IV PUSH: CPT

## 2025-03-22 PROCEDURE — 36415 COLL VENOUS BLD VENIPUNCTURE: CPT

## 2025-03-22 PROCEDURE — 85025 COMPLETE CBC W/AUTO DIFF WBC: CPT

## 2025-03-22 PROCEDURE — 84484 ASSAY OF TROPONIN QUANT: CPT

## 2025-03-22 PROCEDURE — 93005 ELECTROCARDIOGRAM TRACING: CPT

## 2025-03-22 PROCEDURE — 85378 FIBRIN DEGRADE SEMIQUANT: CPT

## 2025-03-22 PROCEDURE — 70450 CT HEAD/BRAIN W/O DYE: CPT

## 2025-03-22 NOTE — ED_ITS
HPI    
HPI - General Adult    
General    
Chief complaint: Extremity Injury, Lower    
Stated complaint: FELL, LOWER RIGHT EXTREMITY PAIN    
Time Seen by Provider: 03/22/25 18:45    
Source: patient    
Mode of arrival: walk-in    
History of Present Illness    
HPI narrative:     
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 still smoking. States walking outdoors about 10   
days ago and fell. Was able to get up. No significant injury. Over past 2 weeks   
has been experiencing cramping sensation across her chest on and off. Yesterday   
standing on ledge at top of stairs. [place] like something punched her in her   
chest. She woke up on the floor at the bottom of 10 stairs. Does not recall   
falling down the stairs. Did strike her head and injured her right hip and   
femur. Main complaint is pain of the hip and femur. No weakness or numbness.   
Denies neck pain, chest or abdominal pain.     
Related Data    
                                Home Medications    
    
    
    
?Medication ?Instructions ?Recorded ?Confirmed    
     
budesonide 1 mg/2 mL suspension 1 mg inhalation Q12H PRN shortness 06/13/23 03/22/25    
    
for nebulization of breath      
     
budesonide-formoterol  1 inh inhalation Q12H 06/13/23 03/22/25    
    
mcg-4.5 mcg/actuation aerosol       
    
inhaler (Symbicort)       
     
famotidine 40 mg tablet 40 mg PO BEDTIME 06/13/23 03/22/25    
     
galcanezumab-gnlm 120 mg/mL 120 mg subcut .month 06/13/23 03/22/25    
    
subcutaneous pen injector       
    
(Emgality Pen)       
     
semaglutide 2 mg/dose (8 mg/3 mL) 2 mg subcut .friday 06/13/23 03/22/25    
    
subcutaneous pen injector (Ozempic)       
     
rosuvastatin 10 mg tablet 10 mg PO DAILY 12/26/23 03/22/25    
     
lorazepam 0.5 mg tablet mg PO Q6H PRN agitation 03/22/25     
    
    
                                  Previous Rx's    
    
    
    
?Medication ?Instructions ?Recorded    
     
promethazine-DM 6.25 mg-15 mg/5 mL 5 ml PO Q6H PRN cough #118 mL 06/14/23    
    
oral syrup      
     
methylprednisolone 4 mg tablets in See Rx Instructions .Route 12/26/23    
    
a dose pack (Medrol (Jose)) .COMPLEX #21 ea     
     
nirmatrelvir 300 mg (150 mg See Rx Instructions PO .COMPLEX 12/31/24    
    
x2)-ritonavir 100 mg tablet,dose #30 ea     
    
pack (Paxlovid)      
    
    
    
                                    Allergies    
    
    
    
Allergy/AdvReac Type Severity Reaction Status Date / Time    
     
Penicillins Allergy Severe rash Verified 06/13/23 17:50    
     
Sulfa (Sulfonamide Allergy Severe Rash Verified 06/13/23 17:50    
    
Antibiotics)         
     
metoclopramide (From Reglan) AdvReac Severe Irritable Verified 12/26/23 12:35    
     
topiramate (From Topamax) AdvReac Severe thoughts Verified 06/13/23 17:50    
    
   of dieing      
     
bupropion (From Wellbutrin) AdvReac Intermediate Agitated Verified 12/26/23   
12:29    
    
    
    
    
Opioid HPI    
Opioid Management    
Most Recent Opioid Data:     
    
    
                Last Pain Scale 5               06/14/23 13:21  06/14/23    
    
    
    
Review of Systems    
    
    
ROS      
    
 Status of ROS                          10 or more systems reviewed and unremark    
able except as noted in     
history and below       
    
    
[facility]    
Medical History (Updated 03/22/25 @ 22:48 by New Topete MD)    
    
Migraine    
   ?G43.909 - Migraine, unspecified, not intractable, without status migrainosus  
   (ICD-10)    
Fibromyalgia    
   ?M79.7 - Fibromyalgia (ICD-10)    
Rheumatoid arthritis    
   ?M06.9 - Rheumatoid arthritis, unspecified (ICD-10)    
History of diabetes mellitus    
   ?Z86.39 - Personal history of other endocrine, nutritional and metabolic   
   disease (ICD-10)    
History of oxygen administration    
   ?Z99.81 - Dependence on supplemental oxygen (ICD-10)    
History of COPD    
   ?Z87.09 - Personal history of other diseases of the respiratory system (ICD-  
   10)    
    
    
Surgical History (Updated 06/13/23 @ 17:58 by Juan Garcia)    
    
History of appendectomy    
   ?Z90.49 - Acquired absence of other specified parts of digestive tract (ICD-  
   10)    
History of cholecystectomy    
   ?Z90.49 - Acquired absence of other specified parts of digestive tract (ICD-  
   10)    
History of hysterectomy    
   ?Z90.710 - Acquired absence of both cervix and uterus (ICD-10)    
History of cardiac catheterization    
   ?Z98.890 - Other specified postprocedural states (ICD-10)    
    
    
Social History (Reviewed 12/26/23 @ 13:31 by ERIN Marcum)    
Smoking status:  Current every day smoker     
Highest level of school completed/degree received:  high school graduate     
Little interest or pleasure in doing things:  not at all     
Feeling down, depressed, or hopeless:  not at all     
    
    
    
Exam    
Constitutional    
Vital Signs, click to edit/add:     
    
                                Last Vital Signs    
    
    
    
Temp  97.8 F   03/22/25 18:48    
     
Pulse  88   03/22/25 21:12    
     
Resp  16   03/22/25 21:12    
     
BP  130/84   03/22/25 20:45    
     
Pulse Ox  95   03/22/25 21:12    
     
O2 Del Method  Nasal Cannula  03/22/25 19:25    
     
O2 Flow Rate  2   03/22/25 21:12    
    
    
    
    
Common normals: no apparent distress, average body habitus, oriented x3, no   
limitations, healthy appearing, alert and well nourished    
[facility]    
Other:     
mild tenderness left occipital scalp. No obvious hematoma    
Eye    
Common normals: EOMs intact bilaterally    
Neck & C-Spine    
Common normals: full ROM    
Chest    
Common normals: inspection of chest normal    
Respiratory    
Common normals: normal respiratory effort, no retractions, no use of accessory   
muscles and clear to auscultation bilaterally    
Cardio    
Common normals: regular rate, regular rhythm, S1 normal heart sound and S2   
normal heart sound    
GI    
Common normals: Normal to inspection, nondistended, normoactive bowel sounds   
present, soft to palpation and non-tender    
Extremity    
Other:     
tenderness right hip and femur    
Neuro    
Common normals: oriented x3, CN's II-XII intact bilaterally, moves all   
extremities and no focal motor deficits    
Psych    
Appearance: grossly normal    
    
Course    
Vital Signs    
Vital signs:     
    
                                   Vital Signs    
    
    
    
Temperature  97.8 F   03/22/25 18:48    
     
Pulse Rate  118 H  03/22/25 18:48    
     
Respiratory Rate  18   03/22/25 18:48    
     
Blood Pressure  152/86 H  03/22/25 18:48    
     
Pulse Oximetry  93 L  03/22/25 18:48    
     
Oxygen Delivery Method  Room Air  03/22/25 18:48    
    
    
                                            
    
    
    
Temperature  97.8 F   03/22/25 18:48    
     
Pulse Rate  88   03/22/25 21:12    
     
Respiratory Rate  16   03/22/25 21:12    
     
Blood Pressure  130/84   03/22/25 20:45    
     
Pulse Oximetry  95   03/22/25 21:12    
     
Oxygen Delivery Method  Nasal Cannula  03/22/25 19:25    
     
Oxygen Delivery Flow Rate  2   03/22/25 21:12    
    
    
    
    
    
Medical Decision Making    
MDM Narrative    
Medical decision making narrative:     
patient presents because of pain of her right hip and femur after fall   
yesterday. She describes cramping sensation band across her lower chest for the   
past few weeks that would only last few seconds. Yesterday she was standing at   
the top of her ledge and experienced acute chest pain like she was punched in   
the chest. She woke up at the bottom of 10 stairs. Does not remember how she got  
down the stairs. Did have mild headache but main complaint was pain of the right  
hip and hematoma contusion right femur. No weakness or numbness. She does have   
02 dependent COPD. CT brain and C-spine neg. xrays of hip and femur neg. d-dimer  
neg. EKG WNL.  serial troponins neg. Discussed option of admission to continue   
workup of her syncopal episode and she declined and preferred to follow up with   
her PCP    
Lab Data    
Labs:     
    
                                   Lab Results    
    
    
    
  03/22/25 03/22/25 Range/Units    
    
  20:00 22:07     
     
WBC  13.0 H   (4.0-11.0)  10^3/uL    
     
RBC  5.21   (4.20-5.40)  10^6/uL    
     
Hgb  16.8 H   (12.0-16.0)  g/dL    
     
Hct  48.2 H   (36.0-48.0)  %    
     
MCV  92.5   (81.0-99.0)  fL    
     
MCH  32.2   (26.7-34.0)  pg    
     
MCHC  34.9   (29.9-35.2)  g/dL    
     
RDW  12.1   (11.0-15.0)  %    
     
Plt Count  228   (150-450)  10^3/uL    
     
MPV  9.7   (9.5-13.5)  fL    
     
Neut % (Auto)  60.4   (43.0-75.0)  %    
     
Lymph % (Auto)  30.3   (20.5-60.0)  %    
     
Mono % (Auto)  6.8   (1.7-12.0)  %    
     
Eos % (Auto)  1.2   (0.9-7.0)  %    
     
Baso % (Auto)  0.8   (0.2-2.0)  %    
     
Neut # (Auto)  7.8 H   (1.4-6.5)  10^3/uL    
     
Lymph # (Auto)  3.9 H   (1.2-3.8)  10^3/uL    
     
Mono # (Auto)  0.9 H   (0.3-0.8)  10^3/uL    
     
Eos # (Auto)  0.2   (0.0-0.7)  10^3/uL    
     
Baso # (Auto)  0.1   (0.0-0.1)  10^3/uL    
     
Abs Immat Gran (auto)  0.07 H   (0.00-0.03)  10^3/uL    
     
Imm/Tot Granulo (auto)  0.5   (0.0-0.5)  %    
     
D-Dimer  0.59   (<=0.59)  mg/L FEU    
     
Sodium  137   (136-145)  mmol/L    
     
Potassium  4.2   (3.5-5.1)  mmol/L    
     
Chloride  101   ()  mmol/L    
     
Carbon Dioxide  31.8   (21.0-32.0)  mmol/L    
     
Anion Gap  8.4       
     
BUN  14.0   (7.0-18.0)  mg/dL    
     
Creatinine  0.89   (0.55-1.02)  mg/dL    
     
Est GFR ( Amer)  >60   (>=60 mL/min/1.73m^2)      
     
Est GFR (Non-Af Amer)  >60   (>=60 mL/min/1.73m^2)      
     
BUN/Creatinine Ratio  15.7       
     
Glucose  195 H   ()  mg/dL    
     
Calcium  9.8   (8.5-10.1)  mg/dL    
     
Myoglobin  22   (9-82)  ng/mL    
     
Troponin I High Sens  <4.0 L  <4.0 L  (4.0-51.3)  pg/mL    
    
    
    
    
    
Discharge Plan    
Discharge    
Chief Complaint: Extremity Injury, Lower    
    
Clinical Impression:    
 Contusion of hip, right, Syncope, Contusion of lateral condyle of right femur,   
Chest pain    
    
    
Patient Disposition: Home, Self-Care    
    
Prescriptions / Home Meds:    
No Action    
  rosuvastatin 10 mg tablet     
   10 mg PO DAILY     
  methylprednisolone [Medrol (Jose)] 4 mg tablets,dose pack     
   See Rx Instructions  .ROUTE .COMPLEX Qty: 21 0RF    
   Rx Instructions:    
   Taper as directed    
  lorazepam 0.5 mg tablet     
     PO Q6H PRN (Reason: agitation)     
  budesonide 1 mg/2 mL suspension for nebulization     
   1 mg inhalation Q12H PRN (Reason: shortness of breath)     
  budesonide-formoterol [Symbicort] 160-4.5 mcg/actuation HFA aerosol inhaler     
   1 inh INHALATION Q12H     
  Ozempic 2 mg/dose (8 mg/3 mL) pen injector     
   2 mg SUBCUT .friday     
  famotidine 40 mg tablet     
   40 mg PO BEDTIME     
  Emgality Pen 120 mg/mL pen injector     
   120 mg SUBCUT .month     
  promethazine-DM 6.25-15 mg/5 mL syrup     
   5 ml PO Q6H PRN (Reason: cough) Qty: 118 0RF    
  Paxlovid 300 mg (150 mg x 2)-100 mg tablets,dose pack     
   See Rx Instructions  .ROUTE .COMPLEX Qty: 30 0RF    
   Rx Instructions:    
   take  mg tablets of nirmatrelvir with  mg tablet of ritonavir   
twice daily for 5 days    
    
Print Language: English    
    
Instructions:  Chest Pain (ED), Syncope (ED), Hip Contusion (ED)    
    
Additional Instructions:    
follow up with your doctor for recheck in next 2-3 days.    
    
Referrals:    
NELIA MALONE [Primary Care Provider] - 1 week

## 2025-03-22 NOTE — PC.NURSE
pt reports wearing home O2 upon arrival at 4L. Pt not wearing Home oxygen and pulse ox 92-93% on ra. This nurse asked pt if she would like the oxygen while in ER and pt states she would. Oxygen placed at 2L and will monitor O2 sats.

## 2025-03-22 NOTE — XMS_ITS
Comprehensive CCD (C-CDA v2.1)  
  
                           Created on: 2025  
  
  
JonathanBev  
External Reference #: CDR,PersonID:[id number]  
: 1965  
Sex: Female  
  
Demographics  
  
  
                                        Address             [address] [place]  [address]  
   
                                        Mobile Phone        [phone]  
   
                                        Preferred Language  en  
   
                                        Marital Status        
   
                                        Confucianist Affiliation Unknown  
   
                                        Race                White  
   
                                        Ethnic Group        Not  or Lati  
no  
  
  
Author  
  
  
                                        Organization        [facility]  
  
  
Care Team Providers  
  
  
                                Care Team Member Name Role            Phone  
   
                                TOMAZS BARAJAS   Unavailable     Unavailable  
   
                                TOMASZ BARAJAS   Unavailable     Unavailable  
   
                                SHERMAN MALONE Unavailable     Unavailable  
   
                                SHERMAN MALONE Unavailable     Unavailable  
   
                                OR              Unavailable     Unavailable  
   
                                TOMASZ BARAJAS   Unavailable     Unavailable  
   
                                OR              Unavailable     Unavailable  
   
                                QUENTIN BENÍTEZ Unavailable     Unavailable  
   
                                Estuardo Cooper  Unavailable     [phone]  
   
                                Dagmar Casarez      Unavailable     [phone]  
   
                                Erendira Lowry     Unavailable     [phone]  
   
                                SHERMAN MALONE Primary Care Physician Unavail  
able  
   
                                Keesha Nicolas   Unavailable     Unavailable  
   
                                Scarlett Davis    Unavailable     Unavailable  
   
                                Amanda Dominguez Unavailable     Unavailable  
   
                                DO Sherman Malone Primary Care Provider [phone]   
   
                                [phone]  
   
                                DR SHERMAN MALONE Primary Care    Unavailable  
   
                                DR BRIGIDA PRADO Admitting       Unavailable  
   
                                SOO .DR ALLRED Attending       Unavailable  
   
                                SOO .DR ALLRED Consulting      Unavailable  
   
                                DERICK RANDLE Consulting      Unavailable  
   
                                SALVATORE HARRELL Consulting      Unavailable  
   
                                GOOD ELMORE Consulting      Unavailable  
   
                                DR SHERMAN MALONE Admitting       Unavailable  
   
                                DR SHERMAN MALONE Attending       Unavailable  
   
                                DR SHERMAN MALONE Primary Care    Unavailable  
   
                                DR SHERMAN MALONE Consulting      Unavailable  
   
                                DR SHERMAN MALONE Admitting       Unavailable  
   
                                DR SHERMAN MALONE Attending       Unavailable  
   
                                DR SHERMAN MALONE Primary Care    Unavailable  
   
                                Domingo Watkins  Consulting      Unavailable  
   
                                DR SHERMAN MALONE Consulting      Unavailable  
   
                                DR SHERMAN MALONE Primary Care    Unavailable  
   
                                DR BANDAR CANELA  Admitting       Unavailable  
   
                                ANUP Mott, DR PORTILLO  Attending       Unavailable  
   
                                DR BANDAR CANELA  Consulting      Unavailable  
   
                                DR ESPERANZA CORNEJO Consulting      Unavailable  
   
                                SHILOH BOLTON   Consulting      Unavailable  
   
                                CHARLENE CAMARA  Consulting      Unavailable  
   
                                BERNARDINO ABRAMS Consulting      Unavailable  
   
                                GOOD ELMORE Consulting      Unavailable  
   
                                GOOD ELMORE Attending       Unavailable  
   
                                GOOD ELMORE Admitting       Unavailable  
   
                                DR SHERMAN MALONE Primary Care    Unavailable  
   
                                TOMASZ RAMIREZ  Consulting      Unavailable  
   
                                BERNIE TAY   Consulting      Unavailable  
   
                                MOMO, DR CLIFFORD NEGRETE Attending       Unavailmelvin BARR, DR CLIFFORD NEGRETE Admitting       Unavailmelvin MALONE, DR PICKENS Primary Care    Unavailable  
   
                                TOMASZ RAMIREZ  Consulting      Unavailable  
   
                                GAB, BILLIE    Consulting      Unavailable  
   
                                PAY ., DR PICKENS Consulting      Unavailable  
   
                                FAISAL, DR PICKENS Primary Care    Unavailable  
   
                                PAY ., DR PICKENS Admitting       Unavailable  
   
                                PAY ., DR PICKENS Attending       Unavailable  
   
                                SHILOH BOLTON   Consulting      Unavailable  
   
                                FAISAL, DR PICKENS Primary Care    Unavailable  
   
                                EMILE, DR JS ARIAS Admitting       Unavailable  
   
                                NADERELUIS ENRIQUE, DR JS ARIAS Attending       Unavailable  
   
                                HOY ., DR ALLRED Consulting      Unavailable  
   
                                WEST, DR TOMASZ ESPINOZA Consulting      Unavailable  
   
                                EMILE, DR JS ARIAS Consulting      Unavailable  
   
                                GRECHNY ., ERIN PAULSON Consulting      Unavailmelvin  
e  
   
                                GAB, BILLIE    Consulting      Unavailable  
   
                                FAISAL, DR PICKENS Admitting       Unavailable  
   
                                FAISAL, DR PICKENS Attending       Unavailable  
   
                                FAISAL, DR PICKENS Primary Care    Unavailable  
   
                                Abad Singleton     Attending       Unavailable  
   
                                DO Darrian Lawrence Emergency Provider [phone]  
   
                                MD Tashi Rodriges Admit Provider  [phone]  
   
                                MD Ambrosio Davis Attending Provider [phone]  
[phone]  
   
                                MD Breezy Davidson Other Provider  [phone]  
   
                                DO Nickolas Palomares Emergency Provider [phone]  
   
                                MD Neeraj Terry Admit Provider  [phone]  
0  
   
                                MD Neeraj Terry Attending Provider [phone]  
   
                                Sherman Malone DO Primary Care Provider [phone]  
[phone]  
   
                                Sherman Malone DO Primary Care Provider [phone]  
[phone]  
   
                                SHERMAN MALONE Primary Care    UnavailZACHARY Horne    Referring       Unavailable  
   
                                ZACHARY COOPER    Attending       Unavailable  
   
                                SHERMAN MALONE Primary Care    Unavailabl  
e  
   
                                NICHOL, MOHAMAD Attending       Unavailable  
   
                                SHERMAN MALONE Primary Care    Unavailabl  
e  
   
                                CHAABAN, MOHAMAD Referring       Unavailable  
   
                                SHERMAN MALONE Primary Care    Unavailabl  
e  
   
                                CHAABAN, MOHAMAD Referring       Unavailable  
   
                                AMANDA DAIGLE     Attending       Unavailable  
   
                                SHERMAN MALONE Primary Care    Unavailabl  
e  
   
                                ZACHARY COOPER    Attending       Unavailable  
   
                                ZACHARY COOPER    Admitting       Unavailable  
   
                                SHERMAN MALONE Primary Care    Unavailabl  
e  
   
                                CHAABAN, MOHAMAD Attending       Unavailable  
   
                                SHERMAN MALONE MAXIME Primary Care    Unavailabl  
e  
   
                                CHAABAN, MOHAMAD Referring       Unavailable  
   
                                SHERMAN MALONE Primary Care    Unavailabl  
e  
   
                                CHAABAN, MOHAMAD Attending       Unavailable  
   
                                SHERMAN MALONE MAXIME Primary Care    Unavailabl  
e  
   
                                CHAABAN, MOHAMAD Referring       Unavailable  
   
                                ZACHARY COOPER    Attending       Unavailable  
   
                                DO Faisal Sherman Primary Care Provider [phone]   
   
                                TupaDO Diogo Emergency Provider [phone]  
   
                                Sherman Malone DO Unavailable     [phone]  
   
                                Sherman Malone DO Primary Care Provider [phone]  
   
                                Sherman Malone DO A Unavailable     [phone]  
   
                                Faisal MURILLO Sherman Primary Care Provider [phone]   
   
                                Quentin Cuenca MD Emergency Provider [phone]  
   
                                Charlene Montoya DO Admit Provider  [phone]  
0  
   
                                Charlene Montoya DO Attending Provider [phone]  
   
                                Jordon Vásquez MD Attending Provider [phone]  
0  
   
                                Sherman Malone Primary Care    Unavailable  
   
                                Diogo Dent Admitting       Unavailable  
   
                                Diogo Dent Attending       Unavailable  
   
                                Jordon Vásquez    Attending       Unavailable  
   
                                Charlene Montoya Admitting       Unavailable  
   
                                Sherman Malone Primary Care    Unavailable  
   
                                ANJANA BOWMAN Attending       Unavailable  
   
                                ANJANA BOWMAN Referring       Unavailable  
   
                                SHERAMN MALONE Attending       Unavailable  
   
                                CONCEPCION FRAZIER Attending       Unavailable  
   
                                SHERMAN MALONE Referring       Unavailable  
   
                                RAFFY LUNDY Attending       Unavailable  
   
                                CONCEPCION FRAZIER Attending       Unavailable  
   
                                SHERMAN MALONE Attending       Unavailable  
   
                                FAISAL SHERMAN A Referring       Unavailable  
   
                                FAISAL SHERMAN A Attending       Unavailable  
   
                                FAISAL SHERMAN A Referring       Unavailable  
   
                                Sherman Malone DO Primary Care Provider [phone]   
   
                                Quentin Cuenca MD Emergency Provider [phone]  
   
                                Charlene Montoya DO Admit Provider  [phone]  
0  
   
                                Jordon Vásquez MD Attending Provider [phone]  
[phone]  
  
  
  
Allergies  
  
  
                                                    Allergy   
Classification                          Reported   
Allergen(s)               Allergy Type              Date of   
Onset                     Reaction(s)               Facility  
   
                                                      
(11 sources)                            iothalamate;   
Translations:   
[Reglan]                  Drug Allergy              20  
13                                      AOF, Mean   
disposition                             [facility]   
Repository  
   
                                                      
(2 sources)                             oxytetracycline;   
Translations:   
[Effexor XR]              Drug Allergy              20  
12                        AOF                       [facility]   
Repository  
   
                                                      
(4 sources)                             penicillin;   
Translations:   
[penicillin]              Drug Allergy              20  
17                        AOF                       [facility]   
Repository  
   
                                                      
(4 sources)                             topiramate;   
Translations:   
[Topamax]                 Drug Allergy              20  
12                        AOF                       [facility]   
Repository  
   
                                                      
(20 sources)                            Cephalexin;   
Translations:   
[cephalexin]              Drug Allergy              20  
24                        Unknown                   [facility]   
Repository  
   
                                                      
(8 sources)  Penicillin V Drug Allergy              Unknown      [facility]  
Other Phone:   
[phone]  
   
                                                      
(8 sources)                             Sulfacetamide /   
Sulfur          Drug Allergy                    Unknown         [facility]  
Other Phone:   
[phone]  
   
                                                      
(8 sources)     venlafaxine     Drug Allergy                    Seizures,   
Unknown                                 [facility]  
Other Phone:   
[phone]  
   
                                                      
(7 sources)               risperdal-ams             Propensity to   
adverse   
reactions                               Unknown             [facility]  
Other Phone:   
[phone]  
   
                                                      
(7 sources)                             seroquel-she says   
she had seizures                        Propensity to   
adverse   
reactions                               Unknown             [facility]  
Other Phone:   
[phone]  
   
                                                      
(20 sources)                            Metoclopramide;   
Translations:   
[metoclopramide]          Drug Allergy              20  
14                                      makes me mean,   
Agitated,   
Unknown                                 [facility]  
   
                                                      
(1 source)                              Penicillins;   
Translations:   
[penicillins]   Drug allergy                    hives           [facility]  
   
                                        Comment on above:   difficult to breathe  
, swelling   
   
                                                      
(20 sources)                            QUEtiapine;   
Translations:   
[quetiapine]              Drug Allergy              20  
23                                      Unknown   
(qualifier   
value)                                  [facility]  
   
                                                      
(13 sources)                            risperiDONE;   
Translations:   
[risperidone]             Drug Allergy              20  
23                                      Altered mental   
status,   
Confusion,   
Unknown                                 [facility]  
   
                                                      
(20 sources)                            topiramate;   
Translations:   
[topiramate]              Drug Allergy              20  
14                                      Mental Status   
Change                                  [facility]  
   
                                        Comment on above:   RASH,   
   
                                                      
(18 sources)                            venlafaxine;   
Translations:   
[venlafaxine]             Drug Allergy              20  
14                                      Seizure   
(finding),   
Other: See   
Comments                                [facility]  
   
                                                      
(20 sources)                            Penicillins;   
Translations:   
[penicillins]                           Propensity to   
adverse   
reactions                               20  
21                                      Rash, Hives,   
Itching                                 [facility]  
   
                                                      
(4 sources)                             Sulfonamides   
(Antibiotic)                            Allergy to   
substance                               20  
23                        [facility]  
   
                                                      
(2 sources)                             Sulfonamides   
(Antibiotic)                            Drug allergy   
(disorder)                              20  
21                                                  [facility]   
Repository  
   
                                                      
(1 source)                              levoFLOXacin;   
Translations:   
[Levaquin]      Drug Allergy                                    [facility]   
Repository  
   
                                                      
(12 sources)                            Metoclopramide;   
Translations:   
[METOCLOPRAMIDE   
HCL]                      Drug Allergy              20  
14                                      Mental Status   
Change                                  [facility]  
   
                                                      
(20 sources)                            Sulfamethoxazole /   
Trimethoprim;   
Translations:   
[SULFAMETHOXAZOLE-T  
RIMETHOPRIM]              Drug Allergy              20  
21                        Itching                   [facility]  
   
                                                      
(7 sources)                             Sulfacetamide;   
Translations:   
[sulfacetamide]           Drug Allergy              20  
24                        [facility]  
   
                                                      
(1 source)                              VENLAFAXINE   
ANALOGUES;   
Translations:   
[VENLAFAXINE   
ANALOGUES]                              Propensity to   
adverse   
reactions to   
drug   
(disorder)                              20  
14                                                  [facility]   
Repository  
   
                                                      
(20 sources)                            Acetaminophen /   
oxyCODONE                 Drug Allergy              10-06-20  
23                        GI intolerance            [facility]  
   
                                                      
(20 sources)        buPROPion           Drug Allergy        20  
23                                                  [facility]  
   
                                                      
(20 sources)        risperiDONE         Drug Allergy        20  
23                                                  [facility]  
   
                                                      
(20 sources)              Topiramate                Allergy to   
substance                               20  
14                                                  [facility]  
   
                                                      
(20 sources)        venlafaxine         Drug Allergy        10-17-20  
21                                                  [facility]  
   
                                                      
(1 source)          Cephalexin          Drug Allergy        10-21-20  
24                                                  [facility]   
Repository  
   
                                                      
(1 source)          Metoclopramide      Drug Allergy        10-21-20  
24                                                  [facility]   
Repository  
   
                                                      
(1 source)          QUEtiapine          Drug Allergy        10-21-20  
24                                                  [facility]   
Repository  
   
                                                      
(1 source)          risperiDONE         Drug Allergy        10-21-20  
24                                                  [facility]   
Repository  
   
                                                      
(1 source)          topiramate          Drug Allergy        10-21-20  
24                                                  [facility]   
Repository  
   
                                                      
(1 source)          venlafaxine         Drug Allergy        10-21-20  
24                                                  [facility]   
Repository  
  
  
  
Medications  
Current Medications  
  
  
  
                      Medication Drug Class(es) Dates      Sig (Normalized) Sig   
(Original)  
   
                                                    30 ACTUAT fluticasone   
furoate 0.2 MG/ACTUAT   
/ umeclidinium 0.0625   
MG/ACTUAT /   
vilanterol 0.025   
MG/ACTUAT Dry Powder   
Inhaler [Trelegy]  
(1 source)                                                  take 1 puff(s) by   
inhalation once   
daily                                   Trelegy Ellipta   
200-62.5-25   
MCG/INH 1 puff   
Inhalation Once a   
day for 30 day(s)   
Active  
   
                                                    acetaminophen 325 mg   
/ HYDROcodone   
bitartrate 5 mg oral   
tablet  
(2 sources)               Opioid Agonist            Start:   
2025  
End:   
2025                              take 1 tablet by   
mouth every six   
hours for pain                          HYDROcodone-aceta  
minophen (Norco)   
5-325 MG tablet   
Indications:   
Right hip pain ,   
Status post fall   
, Acute bilateral   
low back pain   
without sciatica   
Take 1 tablet by   
mouth every 6   
(six) hours if   
needed for   
moderate pain for   
up to 5 days 20   
tablet 2025 Active  
   
                                                    [id number] 200 actuat   
albuterol 0.09   
mg/actuat metered   
dose inhaler  
(20 sources)                            beta2-Adrenergic   
Agonist                                 Start:   
04-                                          Albuterol Sulfate   
90 mcg/actuation   
HFA aerosol   
inhaler Active 2   
INH INHALATION   
Every 4 hours as   
needed for   
shortness of   
breath or   
wheezing 2024   
12:00am  
  
  
  
                                        Start: 2024   take 1 puff(s) by in  
halation   
every four hours as needed              albuterol HFA (PROVENTIL HFA,   
VENTOLIN HFA) 90 mcg/actuation   
inhaler INHALE 1 PUFF EVERY 4 HOURS   
AS NEEDED FOR WHEEZE OR FOR   
SHORTNESS OF BREATH 2024   
Active  
   
                                        Start: 2024   take 1 puff(s) by in  
halation   
every four hours for wheezing           albuterol HFA 90 mcg/act inhaler   
Indications: Moderate persistent   
reactive airway disease with acute   
exacerbation (CMS/HCC) Inhale 1   
puff every 4 (four) hours if needed   
for wheezing or shortness of breath   
18 g 2 2024 Active  
   
                                Start: 2019                 albuterol 0.08  
3% Inh Sol 3 mL 1,   
NEB, q4hr Shortness of breath or   
wheezing, Refill(s) 0 Start Date:   
11/14/19 Status: Ordered  
   
                                                    Start: 10-  
End: 2025                         take 2.5 mg by inhalation four   
times daily as needed for   
wheezing                                Albuterol Sulfate 2.5 mg /3 mL   
(0.083 %) solution for nebulization   
Active 2.5 MG INHALATION Four times   
daily as needed for shortness of   
breath or wheezing 2025 1:00am  
   
                                                                Albuterol Sulfat  
e (2.5 MG/3ML)   
0.083% 3 mL as needed Inhalation   
every 3 hrs Active  
   
                                                            take 1 puff(s) by in  
halation   
every four hours as needed              Albuterol Sulfate  (90 Base)   
MCG/ACT 1 puff as needed Inhalation   
every 4 hrs Active  
  
  
  
                                        Comment on above:   2.5 mg as needed.   
   
                                                            INHALE 1 PUFF EVERY   
4 HOURS AS NEEDED FOR WHEEZE OR FOR   
SHORTNESS OF BREATH   
   
                                                    Alum & Mag   
Hydroxide-Simeth   
200-200-20 MG/5ML  
(6 sources)                                                 take 10 mL by   
mouth four   
times daily as   
needed                                  Alum & Mag   
Hydroxide-Simeth   
200-200-20 MG/5ML   
10 mL as needed   
Orally Four times a   
day Active  
   
                                                    amLODIPine 5 mg oral   
tablet  
(4 sources)                             Dihydropyridine Calcium   
Channel Blocker                         Start:   
20  
End:   
20                                      take 1 tablet   
by mouth once   
daily                                   Amlodipine 5 mg   
Tablet Active 5 MG   
PO Daily 60 60   
2025   
1:00am  
   
                                                    amoxicillin 875 mg /   
clavulanate 125 mg   
oral tablet  
(1 source)                              Penicillin-class   
Antibacterial                           Start:   
20  
End:   
20                                      take 1 tablet   
by mouth in   
the morning                             amoxicillin-clavula  
earle (Augmentin)   
875-125 MG tablet   
Indications: Acute   
cough , Walking   
pneumonia , Asthma   
exacerbation with   
COPD (chronic   
obstructive   
pulmonary disease)   
(CMS/[facility]) Take 1   
tablet (875 mg) by   
mouth in the   
morning and 1   
tablet (875 mg)   
before bedtime. Do   
all this for 10   
days. 20 tablet   
2025 Active  
   
                                                    azelastine   
hydrochloride 0.137   
mg/actuat metered   
dose nasal spray  
(19 sources)                            Histamine-1 Receptor   
Antagonist                              Start:   
20  
End:   
20                                      take 1   
spray(s) nasal   
route in the   
morning                                 azelastine   
(Astelin) 0.1 %   
nasal spray   
Administer 1 spray   
into each nostril   
in the morning and   
1 spray before   
bedtime. 2024   
Discontinued  
  
  
  
                                                    Start: 2024  
End: 2025                                     Azelastine 137 mcg (0.1 %)   
aerosol,spray Discontinued INTRANASAL   
2024 12:00am 2025 3:33am  
   
                                                    Start: 2024  
End: 2024                         take 1 spray(s) nasal route   
twice daily                             azelastine 0.1% nasal spray SPRAY 1   
SPRAY INTO EACH NOSTRIL TWICE DAILY   
30 mL 2 2024 Active  
  
  
  
                                        Comment on above:   1 spray in each nost  
ril twice daily for 30 days   
   
                                                    benzonatate 200 mg   
oral capsule  
(8 sources)                             Non-narcotic   
Antitussive                             Start:   
  
End:   
                                       take 1 capsule   
by mouth three   
times daily as   
needed for cough                        benzonatate   
(Tessalon) 200 MG   
capsule Indications:   
Acute cough Take 1   
capsule (200 mg) by   
mouth 3 (three)   
times a day as   
needed for cough Do   
not crush or chew.   
20 capsule   
2025   
Discontinued   
(Therapy completed)  
  
  
  
                                        Start: 2020   take 1 capsule by mo  
uth three   
times daily                             Tessalon 100 mg Cap 100 mg = 1   
cap(s), Oral, TID, # 21 EA,   
Refills(s) 0, Pharmacy: [facility]   
[facility] #[id number], 68, cm, 20   
5:49:00 EDT, Height/Length Dosing,   
108.5, kg, 20 5:49:00 EDT,   
Weight Dosing Start Date: 20   
Status: Ordered  
  
  
  
                                                    budesonide 0.25   
mg/ml inhalation   
suspension  
(20 sources)              Corticosteroid            Start: 2024  
End: 2025                         take 0.5 mg by   
inhalation in the   
morning                                 budesonide   
(Pulmicort) 0.5   
MG/2ML nebulizer   
solution Inhale 0.5   
mg in the morning.   
2024   
Discontinued   
(Therapy completed)  
  
  
  
                                Start: 2024                 budesonide (PU  
LMICORT) 0.5 mg/2 mL nebulizer solution Use 2   
mL   
via nebulizer once daily. 30 mL 3 2024 Active  
   
                                                    Start: 2021  
End: 2024                                     budesonide (PULMICORT) 0.5 m  
g/2 mL nebulizer solution Use 2 mL   
via nebulizer once daily. 30 Vial 5 2021   
Discontinued  
  
  
  
                                        Comment on above:   Use 2 mL via nebuliz  
er once daily.   
   
                                                    cefadroxil 500 mg   
oral capsule  
(4 sources)                             Cephalosporin   
Antibacterial                           Start:   
20  
End:   
20  
24                                      take 1 capsule   
by mouth twice   
daily                                   cefADROxil (DURICEF)   
500 mg capsule Take   
1 capsule by mouth   
two times a day for   
8 days. 16 capsule   
2024 Active  
   
                                                    codeine phosphate 2   
mg/ml / guaiFENesin   
20 mg/ml oral   
solution  
(20 sources)              Opioid Agonist            Start:   
20  
End:   
20                                                  guaiFENesin-codeine   
(Robitussin-AC)   
100-10 MG/5ML syrup   
Indications: Acute   
cough Take 5-10 mL   
by mouth 3 (three)   
times a day as   
needed for cough 180   
mL 2025   
Discontinued   
(Therapy completed)  
  
  
  
                                Start: 2025                 guaiFENesin-co  
deine (Robitussin-AC)   
100-10 MG/5ML syrup Indications: Acute   
cough Take 5-10 mL by mouth 3 (three)   
times a day as needed for cough 180 mL   
2025 Active  
   
                                Start: 2025                 guaiFENesin-co  
deine (Robitussin-AC)   
100-10 MG/5ML syrup Indications: Acute   
cough Take 5-10 mL by mouth 3 (three)   
times a day as needed for cough 180 mL   
2025 Active  
   
                                                    Start: 2025  
End: 2025                         take 5 mL by mouth four   
times daily as needed for   
cough                                   guaiFENesin-codeine (Robitussin-AC)   
100-10 MG/5ML syrup Indications: Acute   
cough Take 5 mL by mouth 4 (four) times   
a day as needed for cough for up to 5   
days 180 mL 2025 Active  
   
                                                    Start: 09-  
End: 09-                         take 5 mL by mouth four   
times daily as needed for   
cough                                   guaiFENesin-codeine (Robitussin-AC)   
100-10 MG/5ML syrup Indications: Acute   
asthmatic bronchitis (CMS/HCC) , Acute   
cough Take 5 mL by mouth 4 (four) times   
a day as needed for cough for up to 5   
days 180 mL 09/10/2024 09/15/2024 Active  
   
                                                    Start: 2023  
End: 10-                         take 1 mL by mouth every   
four hours as needed for   
cough                                   Codeine-Guaifenesin (Guaifenesin Ac)   
 mg/5 mL liquid Discontinued 5 ML   
PO Q4H as needed for cough 237 5 2023 12:00am 2024   
4:25pm  
   
                                Start: 2023                 guaiFENesin-Co  
deine 100-10 MG/5ML 10 mL   
as needed Orally at bedtime for 15 days   
18 May, 2023 Active  
  
  
  
                                                    cyclobenzaprine   
hydrochloride 5 mg   
oral tablet  
(1 source)                Muscle Relaxant           Start:   
2020                              take 1   
tablet by   
mouth twice   
daily as   
needed for   
muscle   
spasms                                  cyclobenzaprine 5 mg   
Tab 5 mg = 1 tab(s),   
Oral, BID, PRN   
Spasm, # 6 EA,   
Refills(s) 0,   
Pharmacy: [facility]   
[facility] #[id number],   
68, cm, 20   
5:49:00 EDT,   
Height/Length   
Dosing, 108.5, kg,   
20 5:49:00   
EDT, Weight Dosing   
Start Date: 20   
Status: Ordered  
   
                                                    dextromethorphan   
hydrobromide 3 mg/ml   
/ promethazine   
hydrochloride 1.25   
mg/ml oral solution  
(20 sources)                            Phenothiazine,   
Uncompetitive   
N-methyl-D-asparta  
te Receptor   
Antagonist,   
Sigma-1 Agonist                         Start:   
2025  
End: 2025                                     promethazine-dextrom  
ethorphan   
(Phenergan-DM)   
6.25-15 MG/5ML syrup   
Indications: Chronic   
cough Take 5 mL by   
mouth every 4 (four)   
hours if needed for   
cough 180 mL 1   
2025 Active  
  
  
  
                                                    Start: 10-  
End: 2025                         take 1 mL by mouth every six   
hours as needed for cough               Promethazine-Dm 6.25-15 mg/5 mL syrup   
Discontinued 5 ML PO Every 6 hours as   
needed for cough 280 14 2024 4:22pm 2025 3:34am  
   
                                                    Start: 2024  
End: 10-                                     promethazine-dextromethorpha  
n   
(Phenergan-DM) 6.25-15 MG/5ML syrup   
Indications: Chronic cough Take 5 mL   
by mouth every 4 (four) hours if   
needed for cough 240 mL 2024   
10/26/2024 Active  
   
                                        Start: 2020   take 5 mL by mouth e  
very four   
hours for cough                         dextromethorphan-promethazine 15   
mg-6.25 mg/5 mL Oral Syrup 5 mL 5 mL,   
Oral, q4hr for cough, 120 mL,   
Refill(s) 0 Start Date: 20   
Status: Ordered  
   
                                                    Start: 10-  
End: 10-                         take 1 mL by mouth every four   
to six hours as needed for   
cough                                   Promethazine-Dm 6.25-15 mg/5 mL syrup   
Discontinued 5 ML PO EVERY 4-6 HOURS   
as needed for cough 473 2018 12:00am 2024 4:25pm  
  
  
  
                                                    doxycycline hyclate   
100 mg oral capsule  
(17 sources)              Tetracycline-class Drug   Start: 2025  
End: 2025                                     doxycycline   
(Vibramycin) 100 MG   
capsule Indications:   
Acute cough Take 1   
capsule (100 mg) by   
mouth in the morning   
and 1 capsule (100   
mg) before bedtime.   
Do all this for 10   
days. Take with at   
least 8 ounces (large   
glass) of water, do   
not lie down for 30   
minutes after. 20   
capsule 2025 Active  
  
  
  
                                                    Start: 2025  
End: 2025                                     doxycycline (Vibramycin) 100  
 MG   
capsule Indications: Walking   
pneumonia Take 1 capsule (100 mg) by   
mouth in the morning and 1 capsule   
(100 mg) before bedtime. Do all this   
for 7 days. Take with at least 8   
ounces (large glass) of water, do   
not lie down for 30 minutes after.   
14 capsule 2025   
Active  
   
                                                    Start: 09-  
End: 2024                                     doxycycline (Vibramycin) 100  
 MG   
capsule Indications: Acute asthmatic   
bronchitis (CMS/HCC) , Wheezing ,   
Acute cough , Nasal drainage Take 1   
capsule (100 mg) by mouth in the   
morning and 1 capsule (100 mg)   
before bedtime. Do all this for 7   
days. Take with at least 8 ounces   
(large glass) of water, do not lie   
down for 30 minutes after. 14   
capsule 09/10/2024 2024 Active  
   
                                                    Start: 2023  
End: 2023                         take 1 tablet by mouth twice   
daily                                   Doxycycline Hyclate 100 mg Tablet   
Discontinued 100 MG PO Twice daily 8   
 12:00am 2023 2:40pm  
  
  
  
                                                    famotidine 40 mg   
oral tablet  
(20 sources)                            Histamine-2   
Receptor   
Antagonist                              Start: 2023  
End: 2025                         take 1 tablet   
by mouth once   
daily at   
bedtime                                 Famotidine 40 mg   
tablet Active 40   
MG PO Daily at   
bedtime 2023   
12:00am  
   
                                        Comment on above:   Take 40 mg by mouth   
every morning.   
   
                                                    30 actuat   
fluticasone furoate   
0.1 mg/actuat /   
vilanterol 0.025   
mg/actuat dry   
powder inhaler  
(18 sources)                            Corticosteroid,   
beta2-Adrenergic   
Agonist             Start: 10-                       Breo Ellipta   
100-25 MCG/ACT   
aerosol powder   
USE 1 INHALATION   
DAILY 10/21/2024   
Active  
  
  
  
                                Start: 10-                 Fluticasone Fu  
roate-Vilanterol (Breo Ellipta) 100-25 mcg/dose  
   
blister with device Active 1 INH INHALATION daily 60  12:00am  
  
  
  
                                                    12 hr guaiFENesin   
600 mg extended   
release oral   
tablet  
(9 sources)                             Start: 2023   take 2   
tablets by   
mouth twice   
daily, then   
take 1 tablet   
by mouth   
every twelve   
hours                                   Guaifenesin   
(Mucinex) 600 mg   
Tablet Extended   
Release 12hr   
Active 1200 MG PO   
Twice daily 16    
12:00am  
   
                                                    Home Oxygen  
(1 source)                      Start: 2019                 Home Oxygen 4   
L/min, Nasal   
Cannula, Daily   
Dyspnea, Refill(s)   
0, Oxygen -   
Continuous or   
Nocturnal   
Diagnosis:   
Portable 02 for   
physician visits,   
oxygen   
conservation Start   
Date: 19   
Status: Ordered  
   
                                                    Ibuprofen  
(10 sources)                            Nonsteroidal   
Anti-inflammatory   
Drug                                                        IBUPROFEN IB ORAL   
Take by mouth as   
needed. Active  
  
  
  
                                                                IBUPROFEN IB ORA  
L Take by mouth as needed. 0 Active  
  
  
  
                                        Comment on above:   Take by mouth as nee  
ded.   
   
                                                    Insulin Aspart U-100   
100 unit/mL (3 mL)   
Insulin Pen  
(2 sources)                                         Start:   
2025                                          Insulin Aspart U-100 100   
unit/mL (3 mL) Insulin   
Pen Active 0 UNIT SUBCUT   
3X/Day with meals and   
bedtime 3  1:00am Please   
contact the information   
source for Protocol   
details.  
  
  
  
                                Start: 2025                 Insulin Aspart  
 U-100 100 unit/mL (3 mL) Insulin Pen Active 0   
UNIT   
SUBCUT 3X/Day with meals and bedtime 3    
12:00am Please contact the information source for Protocol   
details.  
  
  
  
                                                    3 ml insulin lispro 100   
unt/ml pen injector  
(20 sources)    Insulin Analog  Start: 2025                 insulin lispro  
 (HumaLOG   
KWIKPEN) 100 UNIT/ML   
injection Indications: Type   
2 diabetes mellitus with   
other diabetic neurological   
complication (CMS/HCC)   
Correction 1:50 > 150 mg/dl   
(max daily 30 units) 15 mL   
3 2025 Active  
  
  
  
                                                                Insulin Lispro,   
Human, (HUMALOG) 100 unit/mL crtg Inject subcutaneously w   
MEALS.   
Active  
   
                                                                Insulin Lispro,   
Human, (HUMALOG) 100 unit/mL crtg Inject subcutaneously w   
MEALS.   
0 Active  
  
  
  
                                        Comment on above:   Inject subcutaneousl  
y w MEALS.   
   
                                                    loratadine 10 mg   
oral tablet  
(1 source)                                          Start:   
20                                      take 1 tablet by   
mouth once daily   
as needed for   
congestion                              Claritin 10 mg Tab   
10 mg = 1 tab(s),   
Oral, Daily, PRN   
Congestion, # 10   
tab(s), Refills(s)   
0, Pharmacy:   
[facility]   
[facility]ORE #[id number], 68,   
cm, 20   
5:49:00 EDT,   
Height/Length   
Dosing, 108.5, kg,   
20 5:49:00   
EDT, Weight Dosing   
Start Date:   
20 Status:   
Ordered  
   
                                                    LORazepam 0.5 mg   
oral tablet  
(20 sources)              Benzodiazepine            Start:   
20                                      take 1 tablet by   
mouth once                              LORazepam (Ativan)   
0.5 MG tablet   
Indications:   
Anxiety and   
depression   
(CMS/HCC) Take 1   
tablet (0.5 mg) by   
mouth every 12   
(twelve) hours 30   
tablet 2024   
Active  
  
  
  
                                        Start: 10-   take 1 tablet by jayy  
th three   
times daily as needed for anxiety       Lorazepam 0.5 mg tablet Active 0.5   
MG PO Three times daily as needed   
for Anxiety 2018   
12:00am  
   
                                                            take 1 tablet by jayy  
th every   
twenty-four hours                       LORazepam 0.5 MG 1 tablet at   
bedtime as needed Orally Once a   
day Active  
   
                                                                Ativan Active  
   
                                                                  
  
  
  
                                        Comment on above:   Take 0.5 mg by mouth  
 at bedtime as needed.   
   
                                                    NovoLog FlexPen  
(1 source)                                          Start:   
20                                                  NovoLog FlexPen See   
Instructions, 14   
unit(s) SubCutaneous   
TIDAC, Blood glucose   
Start Date: 3/6/19   
Status: Ordered  
   
                                                    nystatin 642205 unt/ml   
oral suspension  
(1 source)                Polyene Antifungal        Start:   
20                                      take 4 mL by   
mouth four   
times daily                             Nystatin 204622   
UNIT/ML 4 mL   
Mouth/Throat Four   
times a day for 14   
days 18 May, 2023   
Active  
   
                                                    ondansetron 4 mg   
disintegrating oral   
tablet  
(4 sources)                             Serotonin-3   
Receptor   
Antagonist                              Start:   
20  
End:   
20                                      take 1 tablet   
by mouth every   
eight hours as   
needed                                  ondansetron orally   
disintegrating (ZOFRAN   
ODT) 4 mg   
disintegrating tablet   
Take 1 tablet by mouth   
every 8 hours as   
needed for   
nausea/vomiting. 10   
tablet 2024   
Active  
   
                                                    oxyCODONE hydrochloride   
5 mg oral tablet  
(1 source)                Opioid Agonist            Start:   
20  
End:   
20                                      take 1 tablet   
by mouth every   
six hours as   
needed                                  oxyCODONE IR   
(ROXICODONE) 5 mg   
immediate release   
tablet Indications:   
Acute post-operative   
pain Take 1 tablet by   
mouth every 6 hours as   
needed for up to 3   
days. 10 tablet   
2024   
Active  
   
                                                    Oxygen  
(20 sources)                                        Start:   
20                                                  Oxygen Active 0 .Route   
2024   
3:17pm As directed at   
4 liters [facility]   
Med.  
  
  
  
                                                    Start: 2024  
End: 2024                                     Oxygen Discontinued 0 .Route  
 2024 12:00am 2024 3:18pm As directed at 4 liters  
   
                                                                oxygen (O2) gas   
Inhale 4 L/min continuously. Active  
  
  
  
                                                    Oxygen 4 liters  
(8 sources)                                                     Oxygen 4 liters   
continuous Active  
  
  
  
                                                                  
  
  
  
                                                    Oxygen unit  
(6 sources)                     Start: 2024                 Oxygen unit Ac  
tive 0 .Route 2024   
3:17pm As directed at 4 liters [facility].  
  
  
  
                                Start: 2024                 Oxygen unit Ac  
tive 0 .Route 2024 2:17pm As   
directed   
at 4 liters [facility].  
   
                                                    Start: 2024  
End: 2024                                     Oxygen unit Discontinued 0 .  
Route 2024 12:00am   
2024 3:18pm As directed at 4 liters  
   
                                                    Start: 2024  
End: 2024                                     Oxygen unit Discontinued 0 .  
Route 2024 11:00pm   
2024 2:18pm As directed at 4 liters  
  
  
  
                                                    OXYGEN, HOME THERAPY,  
(10 sources)                                                    OXYGEN, HOME THE  
RAPY, 4 L/min by Nasal Cannula route as   
directed. Can be off for 3-4 hours without issue. Active  
  
  
  
                                                                OXYGEN, HOME THE  
RAPY, 4 L/min by Nasal Cannula route as directed. Can be off   
for   
3-4 hours without issue. 0 Active  
  
  
  
                                        Comment on above:   4 L/min by Nasal Can  
nula route as directed. Can be off for 3-4  
   
hours without issue.   
   
                                                    Ozempic  
(8 sources)                                                     Ozempic Active  
  
  
  
                                                                  
  
  
  
                                                    Ozempic (0.25 mg or   
0.5 mg dose)  
(1 source)                      Start: 2019                 Ozempic (0.25   
mg or 0.5   
mg dose) 0.5 mg,   
SubCutaneous, Monday,   
Refills(s) 0, Blood   
glucose Start Date:   
19 Status: Ordered  
   
                                                    predniSONE 10 mg oral   
tablet  
(20 sources)                                        Start: 2025  
End: 2025                         take 6 tablets by   
mouth once daily,   
then take 5 tablets   
by mouth once   
daily, then take 4   
tablets by mouth   
once daily, then   
take 3 tablets by   
mouth once daily,   
then take 2 tablets   
by mouth once   
daily, then take 1   
tablet by mouth   
once daily                              predniSONE (Deltasone)   
10 MG tablet   
Indications: Asthma   
exacerbation with COPD   
(chronic obstructive   
pulmonary disease)   
(CMS/Formerly [facility]) Take 6 tablets   
(60 mg) by mouth Daily   
for 2 days, THEN 5   
tablets (50 mg) Daily   
for 2 days, THEN 4   
tablets (40 mg) Daily   
for 2 days, THEN 3   
tablets (30 mg) Daily   
for 2 days, THEN 2   
tablets (20 mg) Daily   
for 2 days, THEN 1   
tablet (10 mg) Daily for   
2 days. 42 tablet   
2025   
Active  
  
  
  
                                                    Start: 2025  
End: 2025                                     predniSONE (Deltasone) 10 MG  
 tablet   
Indications: Acute cough , Walking   
pneumonia , Asthma exacerbation with   
COPD (chronic obstructive pulmonary   
disease) (CMS/Formerly [facility]) Take 1 tablet (10   
mg) by mouth Daily 4 tabs x 3 days,   
3 tabs x 3 days, 2 tabs x 3 days, 1   
tab x 3 days 30 tablet 2025 Discontinued (Therapy   
completed)  
   
                                                    Start: 09-  
End: 2025                                     predniSONE (Deltasone) 20 MG  
 tablet   
Indications: Acute asthmatic   
bronchitis (CMS/Formerly [facility]) Take 20 mg   
twice daily for 5 days. Then take 20   
mg once daily for 5 days. 15 tablet   
09/10/2024 2025 Discontinued   
(Therapy completed)  
   
                                                    Start: 2023  
End: 2023                         take 2 tablets by mouth once   
daily                                   Prednisone 20 mg Tablet Discontinued   
40 MG PO Daily 6 3 April 29th, 2023   
12:00am 2023 2:41pm  
   
                                                    Start: 2023  
End: 2023           take 40 mg by mouth once daily Prednisone Discontinued  
 40 MG PO   
Daily 6 3 April 29th, 2023 12:00am   
2023 2:41pm  
   
                                                    Start: 2023  
End: 2023                         take 3 tablets by mouth once   
daily                                   predniSONE 20 mg Tab 60 mg = 3   
tab(s), Oral, Daily, X 7 day(s), #   
21 tab(s), Refills(s) 0, Pharmacy:   
[facility]/pharmacy #[id number], 172.7, cm,   
23 9:25:00 EDT, Height/Length   
Dosing, 91.2, kg, 23 9:25:00   
EDT, Weight Dosing Start Date:   
23 Stop Date: 23 Status:   
Ordered  
   
                                                    Start: 10-  
End: 2023                                     Prednisone 10 mg tablet Disc  
ontinued   
10 MG PO Daily    
12:00am 2023 3:07pm 40   
mg qday for 4 days, then 20 mg qday   
for 4 days, then 10 mg qday for 4   
days, then stop  
   
                                                    Start: 10-  
End: 2023                                     Prednisone Discontinued 10 M  
G PO   
Daily  12:00am   
2023 3:07pm 40 mg qday   
for 4 days, then 20 mg qday for 4   
days, then 10 mg qday for 4 days,   
then stop  
   
                                                    Start: 10-  
End: 10-                         take 1 tablet by mouth once   
daily                                   Prednisone 20 mg tablet Discontinued   
20 MG PO Daily 2018   
12:00am 2018 11:54am x   
5 days  
  
  
  
                                                    promethazine   
hydrochloride 25 mg   
oral tablet  
(20 sources)        Phenothiazine       Start: 2024   take 1 tablet   
by mouth every   
eight hours as   
needed for   
nausea and   
vomiting and   
nausea and   
nausea                                  promethazine   
(Phenergan) 25 MG   
tablet Indications:   
Nausea Take 1   
tablet (25 mg) by   
mouth every 8   
(eight) hours if   
needed for nausea   
or vomiting 60   
tablet 1 2024   
Active  
  
  
  
                                                    Start: 2024  
End: 2025                         take 1 tablet by mouth every   
six hours as needed                     Promethazine 25 mg tablet   
Discontinued 25 MG PO Every 6 hours   
as needed 2024 12:00am   
2025 3:33am  
   
                                                                Phenergan Active  
   
                                                                  
  
  
  
                                                    1 mg dose 1.5 ml   
semaglutide 1.34 mg/ml pen   
injector  
(11 sources)                                                inject 2 mg by subcu  
taneous   
injection every week                    semaglutide (OZEMPIC) 1   
mg/dose (2 mg/1.5 mL) pnij   
Inject 2 mg subcutaneously   
one time a week. Active  
  
  
  
                                                            inject 1 mg by subcu  
taneous injection   
every week                              semaglutide (OZEMPIC) 1 mg/dose (2 mg/1.  
5   
mL) pnij Inject 1 mg subcutaneously one   
time a week. 0 Active  
  
  
  
                                        Comment on above:   Inject 1 mg subcutan  
eously one time a week.   
   
                                                            Inject 2 mg subcutan  
eously one time a week.   
   
                                                    Semaglutide   
(Ozempic) 2 mg/dose   
(8 mg/3 mL) pen   
injector  
(6 sources)                                         Start:   
                                     inject 2 mg by   
subcutaneous   
injection every   
week                                    Semaglutide   
(Ozempic) 2   
mg/dose (8 mg/3   
mL) pen injector   
Active 2 MG SUBCUT   
.weekly 2024 12:00am  
  
  
  
                                        Start: 2024   inject 2 mg by subcu  
taneous   
injection every week                    Semaglutide (Ozempic) 2 mg/dose   
(8 mg/3 mL) pen injector Active 2   
MG SUBCUT .weekly 2024 11:00pm  
   
                                Start: 2024                 Semaglutide (O  
zempic) 2 mg/dose   
(8 mg/3 mL) pen injector Active 2   
MG SUBCUT 2024   
12:00am  
  
  
  
                                                    Semaglutide, 2 MG/DOSE,   
(Ozempic, 2 MG/DOSE,) 8 MG/3ML   
solution pen-injector  
(20 sources)                                        Start: 2024  
End: 2025                                     Semaglutide, 2 MG/DOSE,   
(Ozempic, 2 MG/DOSE,) 8 MG/3ML   
solution pen-injector   
Indications: Type 2 diabetes   
mellitus with other diabetic   
neurological complication   
(CMS/HCC) Inject 2 mg under the   
skin every 7 (seven) days 9 mL   
3 2024 Active  
  
  
  
                                                    Start: 2024  
End: 2024                                     Semaglutide, 2 MG/DOSE, (Oze  
mpic, 2 MG/DOSE,) 8 MG/3ML solution  
   
pen-injector Indications: Type 2 diabetes mellitus with other   
diabetic neurological complication (CMS/HCC) Inject 2 mg under   
the skin every 7 (seven) days 9 mL 3 2024   
Discontinued (Reorder)  
   
                                                    Start: 2023  
End: 2024                                     Semaglutide, 2 MG/DOSE, (Oze  
mpic, 2 MG/DOSE,) 8 MG/3ML solution  
   
pen-injector Indications: Type 2 diabetes mellitus with other   
diabetic neurological complication (CMS/HCC) Inject 2 mg under   
the skin every 7 (seven) days. 9 mL 3 2023   
Discontinued (Reorder)  
   
                                                    Start: 2023  
End: 2024                                     Semaglutide, 2 MG/DOSE, (Oze  
mpic, 2 MG/DOSE,) 8 MG/3ML solution  
   
pen-injector Indications: Type 2 diabetes mellitus with other   
diabetic neurological complication (CMS/HCC) Inject 2 mg under   
the skin every 7 (seven) days. 9 mL 3 2023   
Active  
  
  
  
                                                    Trelegy Ellipta   
200-62.5-25 MCG/INH  
(4 sources)                                                 take 1 puff(s) by   
inhalation once daily                   Trelegy Ellipta   
200-62.5-25 MCG/INH 1   
puff Inhalation Once a   
day Active  
   
                                                    Tresiba FlexTouch  
(1 source)                                          Start:   
2019                              inject 60 [IU] by   
subcutaneous injection   
once daily                              Tresiba FlexTouch 60   
unit(s), SubCutaneous,   
Daily, Blood glucose   
Start Date: 3/6/19   
Status: Ordered  
  
  
  
Completed/Discontinued Medications  
  
  
  
                      Medication Drug Class(es) Dates      Sig (Normalized) Sig   
(Original)  
   
                                                    albuterol 0.833   
mg/ml / ipratropium   
bromide 0.167 mg/ml   
inhalation solution  
(20 sources)                            Anticholinergic,   
beta2-Adrenergic   
Agonist                                 Start:   
2023  
End:   
2023                              take 1 mL by   
inhalation every   
four to six hours   
as needed for   
wheezing                                Ipratropium-Albuter  
ol 0.5 mg-3 mg(2.5   
mg base)/3 mL   
solution for   
nebulization   
Discontinued 3 ML   
INHALATION EVERY   
4-6 HOURS as needed   
for shortness of   
breath or wheezing   
   
12:00am 2023 7:56pm  
  
  
  
                                                                ipratropium-albu  
terol (Duo-Neb) 0.5-2.5 mg/3 mL nebulizer solution Take 3 mL   
by   
nebulization every 6 (six) hours. Active  
  
  
  
                                                    amitriptyline   
hydrochloride 25 mg   
oral tablet  
(12 sources)                            Tricyclic   
Antidepressant                          Start:   
10-  
End: 2023                         take 1   
tablet by   
mouth once   
daily                                   Amitriptyline 25   
mg tablet   
Discontinued 25 MG   
PO Daily 2021 12:00am   
2023   
7:54pm  
  
  
  
                                                                Amitriptyline HC  
l Active  
   
                                                                  
  
  
  
                                                    Asenapine  
(7 sources)                             Atypical   
Antipsychotic                           Start: 10-  
End: 2023                         take 10 mg   
under the   
tongue twice   
daily                                   Asenapine Maleate   
Discontinued 10 MG   
SUBLINGUAL Twice   
daily 2018 12:00am 2023 7:58pm  
  
  
  
                                        Start: 10-   take 10 mg under the  
 tongue   
twice daily                             Asenapine Maleate Active 10 MG   
SUBLINGUAL Twice daily 2018 12:00am  
  
  
  
                                                    Asenapine Maleate 10 mg   
tablet, sublingual  
(3 sources)                                         Start: 10-  
End: 2023                         take 1 tablet   
under the tongue   
twice daily                             Asenapine Maleate 10 mg   
tablet, sublingual   
Discontinued 10 MG   
SUBLINGUAL Twice daily   
2018   
12:00am 2023   
7:58pm  
  
  
  
                                                    Start: 10-  
End: 2023                         take 1 tablet under the tongue   
twice daily                             Asenapine Maleate 10 mg tablet,   
sublingual Discontinued 10 MG   
SUBLINGUAL Twice daily 2018 11:00pm 2023 6:58pm  
  
  
  
                                                    atorvastatin 20 mg   
oral tablet  
(10 sources)                            HMG-CoA Reductase   
Inhibitor                               Start:   
10-  
End: 2023                         take 1 tablet   
by mouth at   
bedtime                                 Atorvastatin 20 mg   
tablet   
Discontinued 20 MG   
PO Bedtime 2018 12:00am   
2023   
7:58pm  
   
                                                    azithromycin 250 mg   
oral tablet  
(13 sources)                            Macrolide   
Antimicrobial                           Start:   
2025  
End: 2025                         take 2   
tablets by   
mouth once   
daily                                   Azithromycin 250   
mg Tablet   
Discontinued 500   
MG PO Daily 4 2   
2025   
1:00am 2025 9:51am  
  
  
  
                                Start: 2023                 azithromycin 2  
50 mg Tab 250 mg, Oral,   
As Directed, # 6 tab(s), Refills(s)   
0, Pharmacy: [facility]/pharmacy #[id number],   
172.7, cm, 23 9:25:00 EDT,   
Height/Length Dosing, 91.2, kg,   
23 9:25:00 EDT, Weight Dosing   
Start Date: 23 Status: Ordered  
   
                                                    Start: 10-  
End: 10-                         take 1 tablet by mouth once   
daily                                   Azithromycin 250 mg tablet   
Discontinued 250 MG PO Daily 2018 12:00am 2018   
11:48am pt states she took last dose   
today  
  
  
  
                                                    120 actuat   
budesonide 0.16   
mg/actuat /   
formoterol   
fumarate 0.0045   
mg/actuat metered   
dose inhaler  
(10 sources)                            Corticosteroid,   
beta2-Adrenergic   
Agonist                                 Start:   
2023  
End: 10-                                     Budesonide-Formoterol   
(Symbicort) 160-4.5   
mcg/actuation HFA aerosol   
inhaler Discontinued 1 INH   
INHALATION Twice daily 10.2   
2023 12:00am   
2024 2:31pm  
  
  
  
                                                take 2 puff(s) by inhalation twi  
ce daily Symbicort 160-4.5 MCG/ACT 2 puffs   
Inhalation Twice a day Active  
  
  
  
                                                    cefdinir 300 mg   
oral capsule  
(10 sources)                            Cephalosporin   
Antibacterial                           Start:   
10-  
End: 10-                         take 1 capsule   
by mouth every   
twelve hours                            Cefdinir 300 mg   
capsule   
Discontinued 300   
MG PO Q12H   
2018   
12:00am 2018   
11:48am x5 days  
   
                                                    dexamethasone 6 mg   
oral tablet  
(4 sources)               Corticosteroid            Start:   
2025  
End: 2025                         take 1 tablet by   
mouth once daily                        Dexamethasone 6   
mg Tablet   
Discontinued 6 MG   
PO Daily 7 7   
2025   
1:00am 2025 9:52am  
   
                                                    furosemide 40 mg   
oral tablet  
(9 sources)               Loop Diuretic               
End: 2024                                     furosemide   
(LASIX) 40 mg   
tablet Take 40 mg   
by mouth as   
needed. 0   
2024   
Discontinued   
(Course of   
therapy   
completed)  
   
                                        Comment on above:   Take 40 mg by mouth   
as needed.   
   
                                                    Insulin Aspart   
U-100 (Novolog   
Flexpen U-100   
Insulin) 100   
unit/mL (3 mL)   
Insulin Pen  
(7 sources)                                         Start:   
2023  
End: 2025                         inject 1 dose by   
subcutaneous   
injection once   
before mealtime                         Insulin Aspart   
U-100 (Novolog   
Flexpen U-100   
Insulin) 100   
unit/mL (3 mL)   
Insulin Pen   
Discontinued 1   
sliding scale   
dose SUBCUT   
3x/Day before   
meals & bedtime 3   
2023   
12:00am 2025 3:35am  
  
  
  
                                                    Start: 2023  
End: 2025                         inject 1 dose by subcutaneous   
injection once before mealtime          Insulin Aspart U-100 (Novolog   
Flexpen U-100 Insulin) 100   
unit/mL (3 mL) Insulin Pen   
Discontinued 1 sliding scale dose   
SUBCUT 3x/Day before meals &   
bedtime 3 Carole 27th, 2023 11:00pm   
2025 2:35am  
   
                                        Start: 2023   inject 1 dose by sub  
cutaneous   
injection once before mealtime          Insulin Aspart U-100 (Novolog   
Flexpen U-100 Insulin) 100   
unit/mL (3 mL) Insulin Pen Active   
1 sliding scale dose SUBCUT   
3x/Day before meals & bedtime 3   
2023 12:00am  
  
  
  
                                                    insulin aspart, human  
(9 sources)               Insulin Analog              
End: 2024                                     insulin aspart (NOVOLOG   
FLEXPEN U-100 INSULIN   
SUBCUTANEOUS) Inject   
subcutaneously. sliding   
scale 0 2024   
Discontinued (Course of   
therapy completed)  
  
  
  
                                                                insulin aspart (  
NOVOLOG FLEXPEN U-100 INSULIN SUBCUTANEOUS) Inject   
subcutaneously. sliding scale 0 Active  
  
  
  
                                        Comment on above:   Inject subcutaneousl  
y. sliding scale   
   
                                                    3 ml insulin   
degludec 100 unt/ml   
pen injector  
(8 sources)               Insulin Analog            Start:   
2019  
End:   
2024                                          insulin degludec   
(TRESIBA FLEXTOUCH   
U-100) 100 unit/mL (3   
mL) injection pen Inject   
subcutaneously. 0   
2019   
Discontinued (Course of   
therapy completed)  
   
                                        Comment on above:   Inject subcutaneousl  
y.   
   
                                                    Insulin Lispro   
(Humalog Kwikpen   
Insulin) 100 unit/mL   
insulin pen  
(9 sources)                                         Start:   
2023  
End:   
2023                                          Insulin Lispro (Humalog   
Kwikpen Insulin) 100   
unit/mL insulin pen   
Discontinued SUBCUT   
2023 11:00pm   
2023 1:40pm   
sliding scale  
  
  
  
                                                    Start: 2023  
End: 2023                                     Insulin Lispro (Humalog Kwik  
pen Insulin) 100 unit/mL insulin   
pen   
Discontinued SUBCUT 2023 12:00am 2023   
2:40pm sliding scale  
   
                                Start: 2023                 Insulin Lispro  
 (Humalog Kwikpen Insulin) 100 unit/mL insulin   
pen   
Active SUBCUT 2023 12:00am sliding scale  
  
  
  
                                                    insulin, regular,   
human 500 unt/ml   
injectable solution  
(20 sources)              Insulin                   Start: 10-  
End: 2023                         inject 0.05 mL by   
subcutaneous   
injection once daily   
at dinner                               Insulin Regular Hum   
U-500 Conc 500   
unit/mL solution   
Discontinued 25 UNIT   
SUBCUT Daily before   
supper 2018 12:00am 2023 7:56pm pt   
reports 0.22 ml with   
breakfast, 0.10 ml   
with lunch, 0.05 ml   
with dinner using   
u-100 insulin   
syringe to draw up   
u-500 unsulin  
  
  
  
                                                    Start: 10-  
End: 2023                         inject 0.05 mL by subcutaneous   
injection once daily at dinner          Insulin Regular Hum U-500 Conc   
500 unit/mL solution Discontinued   
110 UNIT SUBCUT Daily before   
breakfast 2018   
12:00am 2023 7:56pm   
pt reports 0.22 ml with   
breakfast, 0.10 ml with lunch,   
0.05 ml with dinner using u-100   
insulin syringe to draw up u-500   
unsulin  
   
                                                    Start: 10-  
End: 2023                         inject 0.05 mL by subcutaneous   
injection once daily at dinner          Insulin Regular Hum U-500 Conc   
500 unit/mL solution Discontinued   
50 UNIT SUBCUT Daily before lunch   
2018 12:00am 2023 7:56pm pt reports 0.22   
ml with breakfast, 0.10 ml with   
lunch, 0.05 ml with dinner using   
u-100 insulin syringe to draw up   
u-500 unsulin  
  
  
  
                                                    ipratropium   
bromide 0.2 mg/ml   
inhalation   
solution  
(10 sources)              Anticholinergic           Start: 10-  
End: 2023                         take 1 mL by   
inhalation four   
times daily                             Ipratropium   
Bromide 0.02 %   
solution   
Discontinued 2.5   
ML INHALATION Four   
times daily    
12:00am 2023 7:56pm  
  
  
  
                                                    Start: 10-  
End: 2023                         take 1 mL by inhalation four   
times daily                             Ipratropium Bromide Discontinued   
2.5 ML INHALATION Four times daily   
 12:00am   
2023 7:56pm  
  
  
  
                                                    levalbuterol 2.5   
mg/ml inhalation   
solution  
(9 sources)                             beta2-Adrenergic   
Agonist                                   
End: 2024                                     Levalbuterol HCl   
(XOPENEX) 1.25   
mg/0.5 mL   
nebulizer   
solution Use 1   
Ampule via   
nebulizer every 8   
hours as needed.   
0 2024   
Discontinued   
(Course of   
therapy   
completed)  
   
                                        Comment on above:   Use 1 Ampule via neb  
ulizer every 8 hours as needed.   
   
                                                    levoFLOXacin 500   
mg oral tablet  
(17 sources)                            Quinolone   
Antimicrobial                           Start:   
2023  
End: 2024                         take 1   
tablet by   
mouth once   
daily                                   Levofloxacin 500   
mg tablet   
Discontinued 500   
MG PO Daily 5 5   
2023   
12:00am 2024 3:11pm   
start 23 pm  
  
  
  
                                                    Start: 10-  
End: 2023                         take 1 tablet by mouth once   
daily                                   Levofloxacin 750 mg Tablet   
Discontinued 750 MG PO Daily 4  12:00am 2023 7:56pm  
  
  
  
                                                    methylPREDNISolone 32   
mg oral tablet  
(7 sources)               Corticosteroid            Start:   
2023  
End:   
04-                              take 1   
tablet by   
mouth once   
daily in   
the morning                             Methylprednisolone 32   
mg tablet   
Discontinued 32 MG PO   
Daily  12:00am 2024 3:55pm   
next dose 23 am  
   
                                                    naproxen 250 mg oral   
tablet  
(10 sources)                            Nonsteroidal   
Anti-inflammatory   
Drug                                    Start:   
10-  
End:   
2023                              take 1   
tablet by   
mouth once   
at mealtime                             Naproxen 250 mg   
Tablet Discontinued   
250 MG PO 3x/Day with   
meals  12:00am   
2023   
7:56pm  
   
                                                    Nirmatrelvir-Ritonavir   
(Paxlovid) 300 mg (150   
mg x 2)-100 mg   
tablets,dose pack  
(5 sources)                                         Start:   
2025  
End:   
2025                              take 1   
tablet by   
mouth once   
daily                                   Nirmatrelvir-Ritonavi  
r (Paxlovid) 300 mg   
(150 mg x 2)-100 mg   
tablets,dose pack   
Discontinued 1 TAB PO   
Daily 2025 1:00am 2025 9:52am  
  
  
  
                                                    Start: 2025  
End: 2025                                     Nirmatrelvir-Ritonavir (Paxl  
ovid) 300 mg   
(150 mg x 2)-100 mg tablets,dose pack   
Discontinued 0 PO .COMPLEX 2025 1:00am 2025 9:52am take   
 mg tablets of nirmatrelvir with   
 mg tablet of ritonavir twice   
daily for 5 days  
   
                                Start: 2025                 Nirmatrelvir-R  
itonavir (Paxlovid) 300 mg   
(150 mg x 2)-100 mg tablets,dose pack   
Active 0 PO .COMPLEX 2025   
12:00am take  mg tablets of   
nirmatrelvir with  mg tablet of   
ritonavir twice daily for 5 days  
   
                                        Start: 2025   take 1 tablet by [facility]   
once daily                              Nirmatrelvir-Ritonavir (Paxlovid) 300 mg  
   
(150 mg x 2)-100 mg tablets,dose pack   
Active 1 TAB PO Daily 2025   
12:00am  
  
  
  
                                                    omeprazole 40 mg   
delayed release   
oral capsule  
(20 sources)                            Proton Pump   
Inhibitor                               Start: 10-  
End: 10-                         take 1 capsule   
by mouth once   
daily                                   Omeprazole 40 mg   
capsule,delayed   
release(DR/EC)   
Discontinued 40 MG PO   
Daily 2021 12:00am 2021 8:52am  
  
  
  
                                                    Start: 09-  
End: 2023                         take 1 capsule by mouth twice   
daily                                   Omeprazole 40 mg capsule,delayed   
release(DR/EC) Discontinued 40 MG PO   
Twice daily    
12:00am 2023 7:56pm  
   
                                                            take 1 capsule by mo  
uth once   
daily                                   Omeprazole 20 MG 1 capsule 30 minutes   
before morning meal Orally Once a day   
Not-Taking  
  
  
  
                                                    oxymetazoline hydrochloride   
0.5   
mg/ml nasal spray  
(9 sources)                                         Start: 02-  
End: 2024                                     oxymetazoline (AFRIN,   
OXYMETAZOLINE,) 0.05 % nasal   
spray Instill 2 Sprays in the   
nose twice daily. 30 mL 0   
02/15/2021 2024   
Discontinued  
  
  
  
                                Start: 2020                 Afrin 0.05% Sp  
ray 2 spray(s), Nasal, BID Congestion, 15 mL,   
Refill(s) 0, [facility] #[id number], 68, cm, 20   
5:49:00 EDT, Height/Length Dosing, 108.5, kg, 20 5:49:00   
EDT, Weight Dosing Start Date: 20 Status: Ordered  
  
  
  
                                        Comment on above:   Instill 2 Sprays in   
the nose twice daily.   
   
                                                    prazosin 1 mg oral   
capsule  
(10 sources)                            alpha-Adrenergic   
Blocker                                 Start:   
10-11-20  
18  
End:   
20                                      take 1 capsule by   
mouth at bedtime                        Prazosin 1 mg   
capsule Discontinued   
1 MG PO Bedtime   
2018   
12:00am 2023 7:56pm  
   
                                                    rosuvastatin calcium   
10 mg oral tablet  
(18 sources)                            HMG-CoA Reductase   
Inhibitor                               Start:   
20  
End:   
20                                      take 1 tablet by   
mouth in the morning                    rosuvastatin   
(Crestor) 10 MG   
tablet Indications:   
Type 2 diabetes   
mellitus with other   
diabetic   
neurological   
complication   
(CMS/HCC) Take 1   
tablet (10 mg) by   
mouth in the   
morning. 30 tablet 5   
2023   
Discontinued   
(Therapy completed)  
   
                                        Comment on above:   Take 10 mg by mouth   
every morning.   
   
                                                    Semaglutide  
(10 sources)                                        Start:   
10-01-20  
21  
End:   
20                                      inject 1 mg by   
subcutaneous   
injection every week                    Semaglutide   
(Ozempic) 1 mg/dose   
(4 mg/3 mL) pen   
injector   
Discontinued 2 MG   
SUBCUT every week   
2021   
11:00pm 2024 2:11pm   
  
  
  
  
                                                    Start: 10-  
End: 2024                         inject 1 mg by subcutaneous   
injection every week                    Semaglutide (Ozempic) 1 mg/dose   
(4 mg/3 mL) pen injector   
Discontinued 2 MG SUBCUT every   
week 2021 12:00am   
2024 3:11pm   
   
                                        Start: 10-   inject 1 mg by subcu  
taneous   
injection every week                    Semaglutide (Ozempic) 1 mg/dose   
(4 mg/3 mL) pen injector Active 2   
MG SUBCUT every week 2021 12:00am   
   
                                        Start: 10-   inject 1 mg by subcu  
taneous   
injection every week                    Semaglutide (Ozempic) 1 mg/dose   
(4 mg/3 mL) pen injector Active 1   
MG SUBCUT every week 2021 12:00am  
  
  
  
                                                    sodium chloride 0.111   
meq/ml nasal spray  
(17 sources)                                        Start: 2024  
End: 2025                         take 1 spray(s)   
nasal route every   
two hours                               Ayr 0.65 % nasal spray   
Administer 1 spray into   
affected nostril(s)   
every 2 (two) hours if   
needed 2024 Discontinued   
(Therapy completed)  
  
  
  
                                                    Start: 02-  
End: 2024                                     sodium chloride (DEEP SEA NA  
SAL) 0.65 % nasal spray Use 1 Spray  
   
in the nose as needed. 44 mL 0 02/15/2021 2024 Discontinued  
  
  
  
                                        Comment on above:   Use 1 Spray in the n  
ose as needed.   
   
                                                    traMADol hydrochloride   
50 mg oral tablet  
(10 sources)              Opioid Agonist            Start:   
10-  
8  
End:   
  
3                                       take 1 tablet by   
mouth three   
times daily as   
needed for pain                         Tramadol 50 mg   
Tablet Discontinued   
50 MG PO Three times   
daily as needed for   
severe pain 15 5   
2018   
12:00am 2023 7:55pm  
   
                                                    traZODone   
hydrochloride 50 mg   
oral tablet  
(11 sources)                            Serotonin Reuptake   
Inhibitor                               Start:   
  
4  
End:   
  
5                                       take 1-2 tablets   
by mouth once   
daily as needed   
for sleep                               traZODone (Desyrel)   
50 MG tablet   
Indications: Anxiety   
and depression   
(CMS/HCC) Take 1-2   
tablets ( mg)   
by mouth Daily as   
needed for sleep 10   
tablet 2024   
Discontinued   
(Therapy completed)  
   
                                                    24 hr divalproex   
sodium 500 mg extended   
release oral tablet  
(20 sources)                            Mood Stabilizer,   
Anti-epileptic   
Agent                                   Start:   
10-  
8  
End:   
  
3                                                   Divalproex 500 mg   
tablet extended   
release 24 hr   
Discontinued 1000 MG   
PO Daily 2018 12:00am   
2023   
7:57pm  
  
  
  
                                                    Start: 10-  
End: 2023                         take 1 tablet by mouth once   
daily at bedtime                        Divalproex 500 mg tablet extended   
release 24 hr Discontinued 500 MG PO   
Daily at bedtime 2018   
12:00am 2023 7:57pm  
   
                                                    Start: 10-  
End: 2023                         take 1000 mg by mouth once   
daily                                   Divalproex Discontinued 1000 MG PO   
Daily 2018 12:00am   
2023 7:57pm  
  
  
  
Problems  
Active Problems  
  
  
                      Problem Classification Problem    Date       Documented Da  
te Episodic/Chronic  
   
                                                    Anxiety disorders  
(20 sources)                            Generalized anxiety   
disorder;   
Translations:   
[Posttraumatic   
stress disorder]                        Onset:   
2023                Chronic  
   
                                                    Asthma  
(20 sources)                            Mild intermittent   
asthma;   
Translations: [Mild   
intermittent asthma,   
uncomplicated]                          Onset:   
05-                10-                Chronic  
   
                                        Comment on above:   Problem List clean-u  
p per request of Phys. EHR Cmte   
   
                                                    Chronic obstructive   
pulmonary disease and   
bronchiectasis  
(20 sources)                            Chronic obstructive   
pulmonary disease,   
unspecified;   
Translations:   
[Chronic obstructive   
lung disease]                           Onset:   
2017                                          Chronic  
   
                                        Comment on above:   Problem List clean-u  
p per request of Phys. EHR Cmte   
   
                                                    Conditions associated   
with dizziness or   
vertigo  
(6 sources)                             Dizziness and   
giddiness;   
Translations:   
[Peripheral vertigo]                    Onset:   
2022                                          Episodic  
   
                                                    Congestive heart   
failure;   
nonhypertensive  
(20 sources)                            Congestive heart   
failure;   
Translations:   
[Diastolic heart   
failure]                                Onset:   
2022                Chronic  
   
                                                    Diabetes mellitus with   
complications  
(20 sources)                            Type 2 diabetes   
mellitus with other   
diabetic   
neurological   
complication;   
Translations: [Type   
2 diabetes mellitus   
with hyperglycemia]                     Onset:   
2022  
Resolved:   
2023                                          Chronic  
   
                                                    Diabetes mellitus   
without complication  
(20 sources)                            Type 2 diabetes   
mellitus without   
complications;   
Translations:   
[Diabetes mellitus]                     Onset:   
2017                Chronic  
   
                                                    Diseases of white   
blood cells  
(1 source)                              Elevated white blood   
cell count,   
unspecified;   
Translations:   
[ELEVATED WHITE   
BLOOD CELL COUNT   
UNS]                                    Onset:   
2022                                          Chronic  
   
                                                    Disorders of lipid   
metabolism  
(10 sources)                            Hyperlipidemia;   
Translations:   
[Hyperlipidemia,   
unspecified]                            10-          Chronic  
   
                                                    Esophageal disorders  
(17 sources)                            Gastro-esophageal   
reflux disease   
without esophagitis;   
Translations:   
[Gastroesophageal   
reflux disease]                         Onset:   
2017  
Resolved:   
2021                                          Chronic  
   
                                        Comment on above:   on and off   
   
                                                    Headache; including   
migraine  
(20 sources)                            Migraine;   
Translations:   
[Migraine,   
unspecified, not   
intractable, without   
status migrainosus]                     Onset:   
2023                Chronic  
   
                                        Comment on above:   on and off   
   
                                                    Headache; including   
migraine  
(2 sources)                             Vascular headache;   
Translations:   
[Vascular headache,   
not elsewhere   
classified]                             2024          Episodic  
   
                                                    Headache; including   
migraine  
(3 sources)                             Headache; including   
migraine;   
Translations:   
[HEADACHE   
UNSPECIFIED]                            Onset:   
2023                                            
   
                                                    Immunity disorders  
(11 sources)                            Hypogammaglobulinemi  
a; Translations:   
[Nonfamilial   
hypogammaglobulinemi  
a]                                      2014          Chronic  
   
                                                    Intestinal infection  
(1 source)                              Clostridium   
difficile diarrhea                      2017          Episodic  
   
                                        Comment on above:   Problem added second  
shanthi to positive C-Diff lab result.   
   
                                                    Mood disorders  
(20 sources)                            Bipolar disorder;   
Translations:   
[Bipolar disorder,   
unspecified]                            2018          Chronic  
   
                                                    Mycoses  
(1 source)                              Candidiasis of   
mouth; Translations:   
[Candidal   
stomatitis]                                                 Episodic  
   
                                                    Nonspecific chest pain  
(6 sources)                             Chest pain,   
unspecified;   
Translations: [Other   
chest pain]                             Onset:   
2022                                          Episodic  
   
                                                    Osteoarthritis  
(11 sources)                            Arthritis;   
Translations:   
[Unspecified   
osteoarthritis,   
unspecified site]                       2014          Chronic  
   
                                                    Other aftercare  
(1 source)                              Other long term   
(current) drug   
therapy;   
Translations: [OTH   
LONG TERM CURRENT   
DRUG THERAPY]                           Onset:   
2023                                          Episodic  
   
                                                    Other circulatory   
disease  
(1 source)      Vascular disorder                 2017      Episodic  
   
                                                    Other connective   
tissue disease  
(1 source)      Fibromyalgia                    2017      Episodic  
   
                                                    Other connective   
tissue disease  
(1 source)      Plantar fasciitis                 2015      Episodic  
   
                                        Comment on above:   LEFT FOOT   
   
                                                    Other ear and sense   
organ disorders  
(2 sources)                             Sensorineural   
hearing loss,   
bilateral;   
Translations:   
[Sensorineural   
hearing loss,   
bilateral]                              2024          Chronic  
   
                                                    Other ear and sense   
organ disorders  
(1 source)                              Bilateral tinnitus;   
Translations:   
[Tinnitus,   
bilateral]                              2024          Episodic  
   
                                                    Other ear and sense   
organ disorders  
(1 source)                              Ear pressure   
sensation;   
Translations: [Other   
specified disorders   
of ear, bilateral]                      2024          Episodic  
   
                                                    Other ear and sense   
organ disorders  
(1 source)                              Bilateral earache;   
Translations:   
[Otalgia, bilateral]                     2024          Episodic  
   
                                                    Other infections;   
including parasitic  
(1 source)                              Personal history of   
other infectious and   
parasitic diseases;   
Translations:   
[History of   
COVID-19]                               2025          Episodic  
   
                                        Comment on above:   2025   
   
                                                    Other injuries and   
conditions due to   
external causes  
(1 source)      Injury of ankle                 2020      Episodic  
   
                                                    Other injuries and   
conditions due to   
external causes  
(2 sources)                             History of fall;   
Translations:   
[History of falling]                     2025          Episodic  
   
                                                    Other lower   
respiratory disease  
(3 sources)                             Chronic cough;   
Translations:   
[Chronic cough]                         2024          Episodic  
   
                                                    Other lower   
respiratory disease  
(7 sources)                             Cough; Translations:   
[Acute cough]                           2025          Episodic  
   
                                                    Other nervous system   
disorders  
(12 sources)                            Cervical myelopathy;   
Translations:   
[Disease of spinal   
cord, unspecified]                      Onset:   
2014                Chronic  
   
                                                    Other nervous system   
disorders  
(1 source)                              Other acute   
postprocedural pain;   
Translations: [Acute   
post-operative pain]                    Onset:   
2024                                          Episodic  
   
                                                    Other non-traumatic   
joint disorders  
(2 sources)                             Hip pain;   
Translations: [Pain   
in right hip]                           2025          Episodic  
   
                                                    Other nutritional;   
endocrine; and   
metabolic disorders  
(1 source)      Obesity                         2020      Chronic  
   
                                                    Other nutritional;   
endocrine; and   
metabolic disorders  
(1 source)                              Alpha-1-antitrypsin   
deficiency;   
Translations:   
[ALPHA-1-ANTITRYPSIN   
DEFICIENCY]                             Onset:   
2023                                          Chronic  
   
                                                    Other nutritional;   
endocrine; and   
metabolic disorders  
(11 sources)                            Obese class II;   
Translations:   
[Obesity,   
unspecified]                            Onset:   
2021                Chronic  
   
                                                    Other nutritional;   
endocrine; and   
metabolic disorders  
(20 sources)                            Alpha-1-antitrypsin   
deficiency;   
Translations:   
[Alpha-1-antitrypsin   
deficiency]                             Onset:   
2023                Chronic  
   
                                                    Other nutritional;   
endocrine; and   
metabolic disorders  
(11 sources)                            Overweight;   
Translations:   
[Overweight]                            2014          Episodic  
   
                                                    Other screening for   
suspected conditions   
(not mental disorders   
or infectious disease)  
(20 sources)                            Patient encounter   
status;   
Translations:   
[Encounter for   
screening mammogram   
for malignant   
neoplasm of breast]                     Onset:   
2024                Episodic  
   
                                                    Other upper   
respiratory disease  
(1 source)                              Other seasonal   
allergic rhinitis;   
Translations: [OTHER   
SEASONAL ALLERGIC   
RHINITIS]                               Onset:   
2017                                          Chronic  
   
                                                    Other upper   
respiratory disease  
(7 sources)                             Allergic rhinitis;   
Translations:   
[Allergic rhinitis,   
unspecified]                            2024          Chronic  
   
                                                    Other upper   
respiratory disease  
(1 source)                              Allergic rhinitis,   
unspecified                                                 Chronic  
   
                                                    Other upper   
respiratory disease  
(1 source)                              Acute bronchospasm;   
Translations: [ACUTE   
BRONCHOSPASM]                           Onset:   
2023                                          Episodic  
   
                                                    Other upper   
respiratory infections  
(20 sources)                            Chronic maxillary   
sinusitis;   
Translations:   
[Chronic   
pansinusitis]                           Onset:   
2023                Chronic  
   
                                                    Otitis media and   
related conditions  
(1 source)                              Dysfunction of   
bilateral eustachian   
tubes; Translations:   
[Unspecified   
Eustachian tube   
disorder, bilateral]                     2024          Episodic  
   
                                                    Pneumonia (except that   
caused by tuberculosis   
or sexually   
transmitted disease)  
(16 sources)                            Pneumonia;   
Translations:   
[Pneumonia,   
unspecified   
organism]                               Onset:   
2014                Episodic  
   
                                                    Pulmonary heart   
disease  
(20 sources)                            Pulmonary   
hypertension;   
Translations:   
[Pulmonary   
hypertension,   
unspecified]                            Onset:   
2023                Chronic  
   
                                                    Residual codes;   
unclassified  
(6 sources)                             Tobacco dependence   
syndrome;   
Translations:   
[Tobacco use]                                               Episodic  
   
                                                    Residual codes;   
unclassified  
(8 sources)                             Tobacco use;   
Translations:   
[Tobacco use   
disorder]                                                   Episodic  
   
                                                    Residual codes;   
unclassified  
(1 source)      Chronic pain                    2015      Episodic  
   
                                        Comment on above:   right hip pain   
   
                                                    Residual codes;   
unclassified  
(10 sources)                            Tobacco user;   
Translations:   
[Tobacco use]                           10-          Episodic  
   
                                                    Residual codes;   
unclassified  
(1 source)                              Acquired absence of   
other specified   
parts of digestive   
tract; Translations:   
[ACQ ABSENCE OTH   
PART DIGESTV TRACT]                     Onset:   
2023                                          Episodic  
   
                                                    Residual codes;   
unclassified  
(1 source)                              Acquired absence of   
both cervix and   
uterus;   
Translations:   
[ACQUIRED ABSENCE   
BOTH CERVIX AND   
UTERUS]                                 Onset:   
2023                                          Episodic  
   
                                                    Residual codes;   
unclassified  
(1 source)                              Past history of   
procedure;   
Translations:   
[Personal history of   
other medical   
treatment]                              2024          Episodic  
   
                                                    Respiratory failure;   
insufficiency; arrest  
(20 sources)                            Dependence on   
supplemental oxygen;   
Translations:   
[Dependence on   
supplemental oxygen]                    Onset:   
2017                Chronic  
   
                                        Comment on above:   Problem List clean-u  
p per request of Phys. EHR Cmte   
   
                                                    Rheumatoid arthritis   
and related disease  
(20 sources)                            Rheumatoid   
arthritis;   
Translations:   
[Rheumatoid   
arthritis,   
unspecified]                            Onset:   
2022                Chronic  
   
                                                    Skin and subcutaneous   
tissue infections  
(1 source)      Infection of foot                 2019      Episodic  
   
                                        Comment on above:   left   
   
                                                    Spondylosis;   
intervertebral disc   
disorders; other back   
problems  
(20 sources)                            Prolapsed cervical   
intervertebral disc;   
Translations:   
[Cervical disc   
prolapse with   
myelopathy]                             Onset:   
2014                Chronic  
   
                                                    Spondylosis;   
intervertebral disc   
disorders; other back   
problems  
(2 sources)                             Acute low back pain;   
Translations: [Acute   
bilateral low back   
pain without   
sciatica]                               2025          Episodic  
   
                                                    Substance-related   
disorders  
(20 sources)                            Nicotine dependence,   
cigarettes,   
uncomplicated;   
Translations:   
[Nicotine   
dependence]                             Onset:   
06-                09-                Chronic  
   
                                        Comment on above:   Added secondary to d  
ocumentation in Social History.   
   
                                                    Unclassified  
(2 sources)                             Unknown /   
UNK(Unknown)                            Onset:   
2017                                            
   
                                                    Unclassified  
(1 source)                              Entire plantar   
aponeurosis (body   
structure)                              2015            
   
                                        Comment on above:   right ankle   
   
                                                    Unclassified  
(1 source)                              CONTACT W/AND (SUSP)   
EXPOS COVID-19;   
Translations:   
[CONTACT W/AND   
(SUSP) EXPOS   
COVID-19]                               Onset:   
10-                                            
   
                                                    Viral infection  
(19 sources)                            Disease caused by   
nCoV;   
Translations:   
[COVID-19]                              2023          Episodic  
   
                                        Comment on above:   Problem List clean-u  
p per request of Phys. EHR Cmte   
   
                                                    Viral infection  
(3 sources)                             Disease caused by   
nCoV;   
Translations:   
[COVID-19]                              Onset:   
2023                                            
  
  
Past or Other Problems  
  
  
                      Problem Classification Problem    Date       Documented Da  
te Episodic/Chronic  
   
                                                    Acute bronchitis  
(1 source)                              Acute bronchitis,   
unspecified;   
Translations: [ACUTE   
BRONCHITIS   
UNSPECIFIED]                            Onset:   
2022                                          Episodic  
   
                                                    Administrative/social   
admission  
(20 sources)                            Encounter for issue   
of repeat   
prescription;   
Translations:   
[Patient encounter   
status]                                 Onset:   
2022  
Resolved:   
2025                Episodic  
   
                                                    Allergic reactions  
(1 source)                              Allergy status to   
penicillin;   
Translations:   
[ALLERGY STATUS TO   
PENICILLIN]                             Onset:   
2017                                          Episodic  
   
                                                    Cardiac dysrhythmias  
(1 source)                              Tachycardia,   
unspecified;   
Translations:   
[TACHYCARDIA   
UNSPECIFIED]                            Onset:   
2022                                          Episodic  
   
                                                    E Codes: Adverse   
effects of medical   
drugs  
(1 source)                              Adverse effect of   
glucocorticoids and   
synthetic analogues,   
initial encounter;   
Translations: [ADVRS   
EFF GLUCOCORT SYN   
ANALOG INIT]                            Onset:   
2022                                          Episodic  
   
                                                    Malaise and fatigue  
(1 source)                              Other fatigue;   
Translations: [OTHER   
FATIGUE]                                Onset:   
2022                                          Episodic  
   
                                                    Nausea and vomiting  
(1 source)                              Nausea; Translations:   
[NAUSEA]                                Onset:   
2022                                          Episodic  
   
                                                    Other aftercare  
(2 sources)                             Long term (current)   
use of insulin;   
Translations: [LONG   
TERM (CURRENT) USE OF   
INSULIN]                                Onset:   
2017                                          Episodic  
   
                                                    Other aftercare  
(20 sources)                            Post-discharge   
follow-up;   
Translations:   
[Encounter for   
follow-up examination   
after completed   
treatment for   
conditions other than   
malignant neoplasm]                     Onset:   
2023  
Resolved:   
2024                Episodic  
   
                                                    Other connective   
tissue disease  
(4 sources)                             Psoas tendinitis,   
right hip;   
Translations: [PSOAS   
TENDINITIS, RIGHT   
HIP]                                    Onset:   
2017                                          Episodic  
   
                                                    Other connective   
tissue disease  
(1 source)                              Pain in left leg;   
Translations: [PAIN   
IN LEFT LEG]                            Onset:   
10-                                          Episodic  
   
                                                    Other connective   
tissue disease  
(11 sources)                            History of cervical   
spine fusion;   
Translations:   
[Arthrodesis status]                    Onset:   
2014                Episodic  
   
                                                    Other disorders of   
stomach and duodenum  
(20 sources)                            Gastroparesis   
syndrome;   
Translations:   
[Gastroparesis]                         Onset:   
2023                Episodic  
   
                                                    Other disorders of   
stomach and duodenum  
(1 source)                Gastroparesis             Onset:   
2021  
Resolved:   
2021                                          Episodic  
   
                                                    Other fractures  
(20 sources)                            History of stress   
fracture;   
Translations:   
[Personal history of   
(healed) stress   
fracture]                               Onset:   
2024                Episodic  
   
                                                    Other gastrointestinal   
disorders  
(2 sources)                             Diarrhea;   
Translations:   
[Diarrhea,   
unspecified]                                                Episodic  
   
                                                    Other injuries and   
conditions due to   
external causes  
(20 sources)                            Rupture of muscle;   
Translations: [Other   
injury of unspecified   
body region, initial   
encounter]                              Onset:   
10-  
Resolved:   
2024                Episodic  
   
                                                    Other lower   
respiratory disease  
(4 sources)                             Shortness of breath;   
Translations:   
[SHORTNESS OF BREATH]                   Onset:   
2023                                          Episodic  
   
                                                    Other lower   
respiratory disease  
(20 sources)                            Dyspnea on exertion;   
Translations: [Other   
forms of dyspnea]                       Onset:   
2023                Episodic  
   
                                                    Other lower   
respiratory disease  
(2 sources)                             Wheezing;   
Translations:   
[Wheezing]                              09-          Episodic  
   
                                                    Other lower   
respiratory disease  
(2 sources)                             Cough; Translations:   
[Acute cough]                           09-          Episodic  
   
                                                    Other nervous system   
disorders  
(1 source)                              Atypical facial pain;   
Translations:   
[ATYPICAL FACIAL   
PAIN]                                   Onset:   
2022                                          Episodic  
   
                                                    Other skin disorders  
(1 source)                              Generalized   
hyperhidrosis;   
Translations:   
[GENERALIZED   
HYPERHIDROSIS]                          Onset:   
2022                                          Episodic  
   
                                                    Other upper   
respiratory disease  
(2 sources)                             Nasal discharge;   
Translations: [Other   
specified disorders   
of nose and nasal   
sinuses]                                09-          Episodic  
   
                                                    Phlebitis;   
thrombophlebitis and   
thromboembolism  
(20 sources)                            Deep venous   
thrombosis;   
Translations:   
[Personal history of   
other venous   
thrombosis and   
embolism]                               Onset:   
2014                Episodic  
   
                                        Comment on above:   left leg, after surg  
michelle pt stated   
   
                                                            ultrasound 14 n  
eg- per pt.   
   
                                                    Residual codes;   
unclassified  
(20 sources)                            Obstructive sleep   
apnea syndrome;   
Translations:   
[Obstructive sleep   
apnea (adult)   
(pediatric)]                            Onset:   
2023  
Resolved:   
2024                Chronic  
   
                                                    Residual codes;   
unclassified  
(20 sources)                            Insomnia;   
Translations:   
[Insomnia,   
unspecified]                            Onset:   
2023                Episodic  
   
                                                    Residual codes;   
unclassified  
(1 source)                              Personal history of   
other specified   
conditions;   
Translations:   
[PERSONAL HISTORY OTH   
SPEC CONDITION]                         Onset:   
2022                                          Episodic  
   
                                                    Residual codes;   
unclassified  
(11 sources)                            H/O: anticoagulant   
therapy;   
Translations:   
[Personal history of   
other drug therapy]                     Onset:   
2014                Episodic  
   
                                                    Residual codes;   
unclassified  
(20 sources)                            Postmenopausal state;   
Translations:   
[Asymptomatic   
menopausal state]                       Onset:   
2024                Episodic  
   
                                                    Unclassified  
(1 source)                              Post-acute COVID-19   
(disorder);   
Translations: [Post   
COVID-19 condition,   
unspecified]                                                  
  
  
  
Results  
  
  
                          Test Name    Value        Interpretation Reference   
Range                                   Facility  
   
                                                    XR CHEST 2 VIEWSon    
   
                                        XR CHEST 2 VIEWS    XR CHEST 2 VIEWS  
Reason for exam: Cough   
for one month  
Technique: PA and   
lateral view  
Findings:  
The heart size is   
normal. The pulmonary   
vascularity is   
unremarkable. The lungs   
are fully expanded and   
clear. No pleural   
abnormalities are seen.   
No evidence of   
mediastinal or hilar   
enlargement. The   
osseous structures are   
intact. No soft tissue   
abnormalities are seen.  
IMPRESSION:  
  
No evidence of active   
cardiopulmonary   
disease.  
Dictated on: 2025   
9:19 AM  
This report has been   
electronically signed   
and approved by the   
interpreting   
Radiologist.        Normal                                  Not   
Available  
   
                                                    XR HIP 2 OR 3 VW RIGHTon    
   
                                        XR HIP 2 OR 3 VW RIGHT XR HIP 2 OR 3 VW   
RIGHT  
Reason for exam: Recent   
fall, right posterior   
hip pain  
Views: 3  
Findings: The alignment   
is normal. No fracture,   
dislocation or other   
acute pathology is   
demonstrated. There is   
mild degenerative right   
superior hip joint   
space narrowing. No   
soft tissue   
abnormalities are seen.  
Impression: Mild right   
hip joint   
osteoarthritis. No   
acute findings.  
Dictated on: 2025   
9:18 AM  
This report has been   
electronically signed   
and approved by the   
interpreting   
Radiologist.        Normal                                  Not   
Available  
   
                                                    XR LUMBAR SPINE 2-3 VIEWSon   
2025   
   
                                                    XR LUMBAR SPINE 2-3   
VIEWS                                   XR LUMBAR SPINE 2-3   
VIEWS  
Reason for exam: Recent   
fall, back pain  
Views: 2  
Findings: The alignment   
is notable for 3 mm   
anterolisthesis at   
L3-4. Vertebral body   
height is normal at   
each level. No fracture   
or bone destruction is   
evident. There is disc   
space narrowing and   
spurring at L3-4. Facet   
joint sclerosis is   
present at L3-4, L4-5   
and L5-S1.  
Impression: Spondylosis   
and mild   
spondylolisthesis with   
facet arthrosis at   
L3-4. Facet arthrosis   
also noted at L4-5 and   
L5-S1. No acute   
findings.  
Dictated on: 2025   
9:17 AM  
This report has been   
electronically signed   
and approved by the   
interpreting   
Radiologist.        Normal                                  Not   
Available  
   
                                                    Alanine aminotransferase [En  
zymatic activity/volume] in Serum or PlasmaOrdered   
By:   
Jordon Vásquez on 2025   
   
                                                    ALT [Catalytic   
activity/Vol]                           Alanine   
aminotransferase   
[Enzymatic   
activity/volume] in   
Serum or Plasma                         7-52                [facility]  
   
                                                    Albumin [Mass/volume] in Ser  
um or Plasma by Bromocresol green (BCG) dye binding   
methoOrdered By: Jordon Vásquez on 2025   
   
                                                    Albumin BCG dye   
[Mass/Vol]                              Albumin [Mass/volume]   
in Serum or Plasma by   
Bromocresol green (BCG)   
dye binding metho                       3.5-5.7             [facility]  
   
                                                    Alkaline phosphatase [Enzyma  
tic activity/volume] in Serum or PlasmaOrdered By:   
Jordon Vásquez on 2025   
   
                                                    ALP [Catalytic   
activity/Vol]                           Alkaline phosphatase   
[Enzymatic   
activity/volume] in   
Serum or Plasma                                       [facility]  
   
                                                    Aspartate aminotransferase [  
Enzymatic activity/volume] in Serum or PlasmaOrdered  
By:   
Jordon Vásquez on 2025   
   
                                                    AST [Catalytic   
activity/Vol]                           Aspartate   
aminotransferase   
[Enzymatic   
activity/volume] in   
Serum or Plasma     Low                 13-39               [facility]  
   
                                                    Basophils Auto (Bld) [#/Vol]  
Ordered By: Jordon Vásquez on 2025   
   
                                        Basophils (Bld) [#/Vol] Automated basoph  
il   
count                                                       [facility]  
   
                                                    Basophils/100 WBC Auto (Bld)  
Ordered By: Jordon Vásquez on 2025   
   
                      Basophils/100 WBC (Bld) Automated basophil %                
         [facility]  
   
                                                    Bilirubin.total [Mass/volume  
] in Serum or PlasmaOrdered By: Jordon Vásquez on   
2025   
   
                                        Bilirubin [Mass/Vol] Bilirubin.total   
[Mass/volume] in Serum   
or Plasma                               0.3-1.0             [facility]  
   
                                                    Calcium [Mass/volume] in Ser  
um or PlasmaOrdered By: Jordon Vásquez on 2025   
   
                                        Calcium [Mass/Vol]  Calcium [Mass/volume  
]   
in Serum or Plasma                      8.6-10.3            [facility]  
   
                                                    Carbon dioxide, total [Moles  
/volume] in Serum or PlasmaOrdered By: Jordon Vásquez   
on   
2025   
   
                                        CO2 [Moles/Vol]     Carbon dioxide, tota  
l   
[Moles/volume] in Serum   
or Plasma           High                21.0-31.0           [facility]  
   
                                                    Chloride [Moles/volume] in S  
herrera or PlasmaOrdered By: Jordon Vásquez on 2025   
   
                                        Chloride [Moles/Vol] Chloride [Moles/vol  
ume]   
in Serum or Plasma  Low                               [facility]  
   
                                                    Comprehensive Metabolic Pane  
devora 2025   
   
                      Albumin [Mass/Vol] 3.5 g/dL   Normal     3.5-5.7    [facility]   
[facility]   
[facility]   
[facility]  
   
                                        Comment on above:   Performed By: #### C  
MARLENA PHOS, CBC ####  
[facility]  
[address]   
   
                                                    Albumin/Globulin [Mass   
ratio]          1.3 {ratio}     Normal                          [facility]   
[facility]   
[facility]   
[facility]  
   
                                        Comment on above:   Performed By: #### C  
MP PHOS, CBC ####  
[facility]  
[address]   
   
                                                    ALP [Catalytic   
activity/Vol]   72 U/L          Normal                    [facility]   
[facility]   
Phys[facility]   
[facility]  
   
                                        Comment on above:   Performed By: #### C  
MP, PHOS, CBC ####  
[facility]  
[address]   
   
                                                    ALT [Catalytic   
activity/Vol]   17 U/L          Normal          7-52            [facility]   
[facility]   
Physician   
Group  
   
                                        Comment on above:   Performed By: #### C  
MARLENA PHOS, CBC ####  
[facility]  
[address]   
   
                      Anion gap [Moles/Vol] 8.9 mmol/L Normal     6.0-15.0   The  
   
[facility]   
Physician   
Group  
   
                                        Comment on above:   Performed By: #### C  
MARLENA PHOS, CBC ####  
[facility]  
[address]   
   
                                                    AST [Catalytic   
activity/Vol]   11 U/L          Low             13-39           [facility]   
[facility]   
Phys[facility]   
Group  
   
                                        Comment on above:   Performed By: #### C  
MARLENA PHOS, CBC ####  
[address]   
   
                      Bilirubin [Mass/Vol] 0.3 mg/dL  Normal     0.3-1.0    [facility]   
[facility]   
Phys[facility]   
Group  
   
                                        Comment on above:   Performed By: #### C  
MARLENA PHOS, CBC ####  
[address]   
   
                      Calcium [Mass/Vol] 8.7 mg/dL  Normal     8.6-10.3   [facility]   
[facility]   
Physician   
Group  
   
                                        Comment on above:   Performed By: #### C  
MARLENA PHOS, CBC ####  
[place] [address] [place]   
   
                      Chloride [Moles/Vol] 97 mmol/L  Low             [facility]   
[facility]   
Physician   
Group  
   
                                        Comment on above:   Performed By: #### C  
MARLENA PHOS, CBC ####  
[place] [address] [place]   
   
                      CO2 [Moles/Vol] 35.8 mmol/L High       21.0-31.0  [facility]   
[facility]   
Phys[facility]   
Group  
   
                                        Comment on above:   Performed By: #### C  
MP PHOS, CBC ####  
[facility]  
[address] [place] [address] [place]   
   
                      Creatinine [Mass/Vol] 0.58 mg/dL Low        0.60-1.20  The  
   
[facility]   
Phys[facility]   
Group  
   
                                        Comment on above:   Performed By: #### C  
MP PHOS, CBC ####  
[place] [address] [place]   
   
                                                    Creatinine Clr Calc   
Pharmacy        128.50          Normal                          [facility]   
[facility]   
[facility]   
[facility]  
   
                                        Comment on above:   Result Comment: PERF  
ORMED BY:  
[place] [address]  
[phone]  
PATHOLOGIST MEDICAL DIRECTOR  
RIZWAN GONZALEZ M.D.   
   
                                                            Performed By: #### C  
MARLENA PHOS, CBC ####  
[address]   
   
                                                    GFR/1.73 sq M.predicted   
MDRD (S/P/Bld) [Vol   
rate/Area]      mL/min/{1.73_m2} Normal                          [facility]   
[facility]   
Physician   
Group  
   
                                        Comment on above:   Performed By: #### C  
MARLENA PHOS, CBC ####  
[address]   
   
                      Globulin (S) [Mass/Vol] 2.6 g/dL   Normal                T  
he   
[facility]   
Physician   
Group  
   
                                        Comment on above:   Performed By: #### C  
MARLENA PHOS, CBC ####  
[address]   
   
                      Glucose [Mass/Vol] 280 mg/dL  High            [facility]   
[facility]   
Physician   
Group  
   
                                        Comment on above:   Result Comment: Rand  
om Glucose Reference Range is dependent on  
   
time and  
content of last meal. Glucose of more than 200 mg/dL in a  
nonstressed, ambulatory subject supports the diagnosis of  
Diabetes Mellitus.  
ADA recommended reference range   
   
                                                            Performed By: #### C  
MARLENA PHOS, CBC ####  
[address]   
   
                      Potassium [Moles/Vol] 4.7 mmol/L Normal     3.5-5.1    The  
   
[facility]   
Physician   
Group  
   
                                        Comment on above:   Performed By: #### C  
MARLENA PHOS, CBC ####  
[place] [address] [place]   
   
                      Protein [Mass/Vol] 6.1 g/dL   Low        6.4-8.9    [facility]   
[facility]   
Physician   
Group  
   
                                        Comment on above:   Performed By: #### C  
MARLENA PHOS, CBC ####  
[address]   
   
                      Sodium [Moles/Vol] 137 mmol/L Normal     136-145    [facility]   
[facility]   
Physician   
Group  
   
                                        Comment on above:   Performed By: #### C  
MARLENA PHOS, CBC ####  
[facility]  
[address]   
   
                                                    Urea nitrogen   
[Mass/Vol]      20 mg/dL        Normal          7-25            [facility]   
[facility]   
Physician   
Group  
   
                                        Comment on above:   Performed By: #### C  
MP PHOS, CBC ####  
[facility]  
[address]   
   
                                                    Creatinine [Mass/volume] in   
Serum or PlasmaOrdered By: Jordon Vásquez on 2025  
   
   
                                        Creatinine [Mass/Vol] Creatinine   
[Mass/volume] in Serum   
or Plasma           Low                 0.60-1.20           [facility]  
   
                                                    Diff and CBCon 2025   
   
                                                    Erythrocyte   
distribution width   
(RBC) [Ratio]   12.1 %          Normal          11.9-15.3       [facility]   
[facility]   
Physician   
Group  
   
                                        Comment on above:   Performed By: #### C  
MARLENA PHOS, CBC ####  
[address]   
   
                                                    Hematocrit (Bld)   
[Volume fraction] 40.6 %          Normal          34.0-46.4       [facility]   
[facility]   
Physician   
Group  
   
                                        Comment on above:   Performed By: #### C  
MARLENA PHOS, CBC ####  
[address]   
   
                                                    Hemoglobin (Bld)   
[Mass/Vol]      13.8 g/dL       Normal          11.8-15.4       [facility]   
[facility]   
Physician   
Group  
   
                                        Comment on above:   Performed By: #### C  
MP PHOS, CBC ####  
[place] [address] [place]   
   
                                                    Lymphocytes/100 WBC   
(Bld)           5 %             Low             18-42           [facility]   
[facility]   
Physician   
Group  
   
                                        Comment on above:   Performed By: #### C  
MP PHOS, CBC ####  
[address]   
   
                                                    MCH (RBC) [Entitic   
mass]           31.6 pg         Normal          24.7-34.3       [facility]   
[facility]   
Physician   
Group  
   
                                        Comment on above:   Performed By: #### C  
MP PHOS, CBC ####  
[place] [address] [place]   
   
                      MCV (RBC) [Entitic vol] 93.2 fL    Normal          T  
he   
[facility]   
Physician   
Group  
   
                                        Comment on above:   Performed By: #### C  
MP, PHOS, CBC ####  
[address]   
   
                                                    Mean Corpuscular HGB   
Conc            33.9 g/dL       Normal          32.0-35.0       [facility]   
[facility]   
Physician   
Group  
   
                                        Comment on above:   Performed By: #### C  
MP, PHOS, CBC ####  
[address]   
   
                      Metamyelocytes 1 %        High       0-0        [facility]   
[facility]   
Physician   
Group  
   
                                        Comment on above:   Performed By: #### C  
MP, PHOS, CBC ####  
[address]   
   
                      Monocytes/100 WBC (Bld) 5 %        Normal     2-11       T  
he   
[facility]   
Physician   
Group  
   
                                        Comment on above:   Performed By: #### C  
MP, PHOS, CBC ####  
[address]   
   
                      Myelocytes 3 %        High       0-0        [facility]   
[facility]   
Physician   
Group  
   
                                        Comment on above:   Performed By: #### C  
MP, PHOS, CBC ####  
[address]   
   
                      Platelet Estimate Normal     Normal     Normal     [facility]   
[facility]   
Physician   
Group  
   
                                        Comment on above:   Performed By: #### C  
MP PHOS, CBC ####  
[address]   
   
                                                    Platelet mean volume   
(Bld) [Entitic vol] 7.6 fL          Normal          6.3-10.7        The   
[facility]   
Physician   
Group  
   
                                        Comment on above:   Performed By: #### C  
MP, PHOS, CBC ####  
[address]   
   
                      Platelet Morphology Normal     Normal     Normal     [facility]   
[facility]   
Physician   
Group  
   
                                        Comment on above:   Result Comment: PERF  
ORMED BY:  
[place] [address]  
[phone]  
PATHOLOGIST MEDICAL DIRECTOR  
RIZWAN GONZALEZ M.D.   
   
                                                            Performed By: #### C  
MP, PHOS, CBC ####  
[address]   
   
                      Platelets (Bld) [#/Vol] 234 10*3/uL Normal     150-450      
[facility]   
[facility]   
Physician   
Group  
   
                                        Comment on above:   Performed By: #### C  
MP, PHOS, CBC ####  
[facility]  
[address]   
   
                      RBC (Bld) [#/Vol] 4.36 10*6/uL Normal     3.60-5.00  [facility]   
[facility]   
Physician   
Group  
   
                                        Comment on above:   Performed By: #### C  
MP, PHOS, CBC ####  
[facility]  
[address]   
   
                                                    RBC morphology finding   
Nom (Bld)       Normal          Normal          Normal          [facility]   
[facility]   
Physician   
Group  
   
                                        Comment on above:   Performed By: #### C  
MP, PHOS, CBC ####  
[facility]  
[address]   
   
                                                    Segmented   
neutrophils/100 WBC   
(Bld)           87 %            High            50-70           [facility]   
[facility]   
Physician   
Group  
   
                                        Comment on above:   Performed By: #### C  
MP, PHOS, CBC ####  
[facility]  
[address]   
   
                      WBC (Bld) [#/Vol] 17.6 10*3/uL High       3.8-11.6   [facility]   
[facility]   
Physician   
Group  
   
                                        Comment on above:   Performed By: #### C  
MP, PHOS, CBC ####  
[facility]  
[address] [place] [address]   
   
                                                    Eosinophils Auto (Bld) [#/Vo  
l]Ordered By: Jordon Vásquez on 2025   
   
                                                    Eosinophils (Bld)   
[#/Vol]                                 Automated eosinophil   
count                                                       [facility]  
   
                                                    Eosinophils/100 WBC Auto (Bl  
d)Ordered By: Jordon Vásquez on 2025   
   
                                                    Eosinophils/100 WBC   
(Bld)           Automated eosinophil %                                 [facility]  
   
                                                    Erythrocyte distribution wid  
th Auto (RBC) [Ratio]Ordered By: Jordon Vásquez on   
2025   
   
                                                    Erythrocyte   
distribution width   
(RBC) [Ratio]                           Erythrocyte   
distribution width   
[Ratio] by Automated   
count                                   11.9-15.3           [facility]  
   
                                                    Erythrocyte morphology findi  
ng [Identifier] in BloodOrdered By: Jordon Vásquez on   
2025   
   
                                                    RBC morphology finding   
Nom (Bld)       RBC morphology                  Normal          [facility]  
   
                                                    Globulin Calc (S) [Mass/Vol]  
Ordered By: Jordon Vásquez on 2025   
   
                                        Globulin (S) [Mass/Vol] Serum globulin   
measurement by   
calculation   
(mass/volume)                                               [facility]  
   
                                                    Glucose [Mass/volume] in Ser  
um or PlasmaOrdered By: Jordon Vásquez on 2025   
   
                                        Glucose [Mass/Vol]  Glucose [Mass/volume  
]   
in Serum or Plasma  High                              [facility]  
   
                                        Comment on above:   ADA recommended refe  
rence rangeRandom Glucose Reference Range   
is dependent on time and content of last meal. Glucose of more   
than 200 mg/dL in a nonstressed, ambulatory subject supports   
the diagnosis of Diabetes Mellitus.   
   
                                                    Hematocrit Auto (Bld) [Volum  
e fraction]Ordered By: Jordon Vásquez on 2025   
   
                                                    Hematocrit (Bld)   
[Volume fraction]                       Hematocrit [Volume   
Fraction] of Blood by   
Automated count                         34.0-46.4           [facility]  
   
                                                    Hemoglobin [Mass/volume] in   
BloodOrdered By: Jordon Vásquez on 2025   
   
                                                    Hemoglobin (Bld)   
[Mass/Vol]                              Hemoglobin   
[Mass/volume] in Blood                     11.8-15.4           [facility]  
   
                                                    Leukocytes [#/volume] correc  
ken for nucleated erythrocytes in Blood by Automated  
   
counOrdered By: Jordon Vásquez on 2025   
   
                                                    WBC corrected for nucl   
RBC Auto (Bld) [#/Vol]                  Leukocytes [#/volume]   
corrected for nucleated   
erythrocytes in Blood   
by Automated coun   High                3.8-11.6            [facility]  
   
                                                    Lymphocytes Auto (Bld) [#/Vo  
l]Ordered By: Jordon Vásquez on 2025   
   
                                                    Lymphocytes (Bld)   
[#/Vol]                                 Lymphocytes [#/volume]   
in Blood by Automated   
count                                                       [facility]  
   
                                                    Lymphocytes/100 WBC Auto (Bl  
d)Ordered By: Jordon Vásquez on 2025   
   
                                                    Lymphocytes/100 WBC   
(Bld)                                   Lymphocytes/100   
leukocytes in Blood by   
Automated count                                             [facility]  
   
                                                    Lymphocytes/100 WBC Manual c  
nt (Bld)Ordered By: Jordon Vásquez on 2025   
   
                                                    Lymphocytes/100 WBC   
(Bld)                                   Lymphocytes/100   
leukocytes in Blood by   
Manual count        Low                 18-42               [facility]  
   
                                                    MCH Auto (RBC) [Entitic mass  
]Ordered By: Jordon Vásquez on 2025   
   
                                                    MCH (RBC) [Entitic   
mass]                                   MCH [Entitic mass] by   
Automated count                         24.7-34.3           [facility]  
   
                                                    MCHC Auto (RBC) [Mass/Vol]Or  
dered By: Jordon Vásquez on 2025   
   
                                        MCHC (RBC) [Mass/Vol] MCHC [Mass/volume]  
 by   
Automated count                         32.0-35.0           [facility]  
   
                                                    MCV Auto (RBC) [Entitic vol]  
Ordered By: Jordon Vásquez on 2025   
   
                                        MCV (RBC) [Entitic vol] MCV [Entitic vol  
ume] by   
Automated count                                       [facility]  
   
                                                    Metamyelocytes/100 WBC Manua  
l cnt (Bld)Ordered By: Jordon Vásquez on 2025   
   
                                                    Metamyelocytes/100 WBC   
(Bld)                                   Metamyelocytes/100   
leukocytes in Blood by   
Manual count        High                0-0                 [facility]  
   
                                                    Monocytes Auto (Bld) [#/Vol]  
Ordered By: Jordon Vásquez on 2025   
   
                                        Monocytes (Bld) [#/Vol] Automated blood   
monocyte count                                              [facility]  
   
                                                    Monocytes/100 WBC Auto (Bld)  
Ordered By: Jordon Vásquez on 2025   
   
                      Monocytes/100 WBC (Bld) Automated monocyte %                
         [facility]  
   
                                                    Monocytes/100 WBC Manual cnt  
 (Bld)Ordered By: Jordon Vásquez on 2025   
   
                                        Monocytes/100 WBC (Bld) Monocytes/100   
leukocytes in Blood by   
Manual count                            2-11                [facility]  
   
                                                    Myelocytes/100 WBC Manual cn  
t (Bld)Ordered By: Jordon Vásquez on 2025   
   
                                                    Myelocytes/100 WBC   
(Bld)                                   Myelocytes/100   
leukocytes in Blood by   
Manual count        High                0-0                 [facility]  
   
                                                    Neutrophils Auto (Bld) [#/Vo  
l]Ordered By: Jordon Vásquez on 2025   
   
                                                    Neutrophils (Bld)   
[#/Vol]                                 Neutrophils [#/volume]   
in Blood by Automated   
count                                                       [facility]  
   
                                                    Neutrophils/100 WBC Auto (Bl  
d)Ordered By: Jordon Vásquez on 2025   
   
                                                    Neutrophils/100 WBC   
(Bld)           Automated neutrophil %                                 [facility]  
   
                                                    No Panel InformationOrdered   
By: Jordon Vásquez on 2025   
   
                      Estimated GFR (CKD-EPI) > 60.0 mL/Min                       
  [facility]  
   
                                                    Pharmacy Creatinine   
Clearance (Chem 128.50                                          [facility]  
   
                                                    Nucleated erythrocytes [Pres  
ence] in Blood by Automated countOrdered By: Jordon Vásquez   
on 2025   
   
                                                    Nucleated RBC Auto Ql   
(Bld)                                   Nucleated erythrocytes   
[Presence] in Blood by   
Automated count                                             [facility]  
   
                                                    Platelet adequacy [Presence]  
 in Blood by Light microscopyOrdered By: Jordon Vásquez  
 on   
2025   
   
                                        Platelets LM Ql (Bld) Platelet adequacy   
[Presence] in Blood by   
Light microscopy                        Normal              [facility]  
   
                                                    Platelet mean volume Auto (B  
ld) [Entitic vol]Ordered By: Jordon Vásquez on   
2025   
   
                                                    Platelet mean volume   
(Bld) [Entitic vol]                     Platelet mean volume   
[Entitic volume] in   
Blood by Automated   
count                                   6.3-10.7            [facility]  
   
                                                    Platelet morphology finding   
[Identifier] in BloodOrdered By: Jordon Vásquez on   
2025   
   
                                                    Platelet morphology   
finding Nom (Bld)                       Platelet morphology   
finding [Identifier] in   
Blood                                   Normal              [facility]  
   
                                                    Platelets Auto (Bld) [#/Vol]  
Ordered By: Jordon Vásquez on 2025   
   
                                        Platelets (Bld) [#/Vol] Platelets [#/vol  
ume] in   
Blood by Automated   
count                                   150-450             [facility]  
   
                                                    Potassium [Moles/volume] in   
Serum or PlasmaOrdered By: Jordon Vásquez on 2025  
   
   
                                        Potassium [Moles/Vol] Potassium   
[Moles/volume] in Serum   
or Plasma                               3.5-5.1             [facility]  
   
                                                    Protein [Mass/volume] in Ser  
um or PlasmaOrdered By: Jordon Vásquez on 2025   
   
                                        Protein [Mass/Vol]  Protein [Mass/volume  
]   
in Serum or Plasma  Low                 6.4-8.9             [facility]  
   
                                                    RBC Auto (Bld) [#/Vol]Ordere  
d By: Jordon Vásquez on 2025   
   
                                        RBC (Bld) [#/Vol]   Erythrocytes [#/volu  
me]   
in Blood by Automated   
count                                   3.60-5.00           [facility]  
   
                                                    Segmented neutrophils/100 WB  
C Manual cnt (Bld)Ordered By: Jordon Vásquez on   
2025   
   
                                                    Segmented   
neutrophils/100 WBC   
(Bld)                                   Manual blood segmented   
neutrophils/100   
leukocytes          High                50-70               [facility]  
   
                                                    Serum or plasma albumin/glob  
ulin mass ratioOrdered By: Jordon Vásuqez on 2025  
   
   
                                                    Albumin/Globulin [Mass   
ratio]                                  Serum or plasma   
albumin/globulin mass   
ratio                                                       [facility]  
   
                                                    Serum or plasma anion gap de  
terminationOrdered By: Jordon Vásquez on 2025   
   
                                        Anion gap [Moles/Vol] Serum or plasma an  
ion   
gap determination                       6.0-15.0            [facility]  
   
                                                    Sodium [Moles/volume] in Ser  
um or PlasmaOrdered By: Jordon Vásquez on 2025   
   
                                        Sodium [Moles/Vol]  Sodium [Moles/volume  
]   
in Serum or Plasma                      136-145             [facility]  
   
                                                    Urea nitrogen [Mass/volume]   
in Serum or PlasmaOrdered By: Jordon Vásquez on   
2025   
   
                                                    Urea nitrogen   
[Mass/Vol]                              Urea nitrogen   
[Mass/volume] in Serum   
or Plasma                               7-25                [facility]  
   
                                                    WBC Auto (Bld) [#/Vol]Ordere  
d By: Jordon Vásquez on 2025   
   
                                        WBC (Bld) [#/Vol]   Leukocytes [#/volume  
]   
in Blood by Automated   
count               High                3.8-11.6            [facility]  
   
                                                    Complete Blood Count Auto Di  
ffon 2025   
   
                      Basophils (Bld) [#/Vol] 0.0 10*3/uL Normal     0.0-0.2      
[facility]   
[facility]   
Physician   
Group  
   
                                        Comment on above:   Result Comment: PERF  
ORMED BY:  
[place] [address]  
[phone]  
PATHOLOGIST MEDICAL DIRECTOR  
RIZWAN GONZALEZ M.D.   
   
                                                            Performed By: #### C  
MP PHOS, CBC ####  
[address]   
   
                      Basophils/100 WBC (Bld) 0.1 %      Normal     .          T  
[facility]   
[facility]   
Physician   
Group  
   
                                        Comment on above:   Performed By: #### C  
MP, PHOS, CBC ####  
[address]   
   
                                                    Eosinophils (Bld)   
[#/Vol]         0.0 10*3/uL     Normal          0.0-0.45        [facility]   
[facility]   
Physician   
Group  
   
                                        Comment on above:   Performed By: #### C  
MP, PHOS, CBC ####  
[address]   
   
                                                    Eosinophils/100 WBC   
(Bld)           0.0 %           Normal          .               [facility]   
[facility]   
Physician   
Group  
   
                                        Comment on above:   Performed By: #### C  
MP, PHOS, CBC ####  
[address]   
   
                                                    Erythrocyte   
distribution width   
(RBC) [Ratio]   12.2 %          Normal          11.9-15.3       [facility]   
[facility]   
Physician   
Group  
   
                                        Comment on above:   Performed By: #### C  
MP, PHOS, CBC ####  
[address]   
   
                                                    Hematocrit (Bld)   
[Volume fraction] 38.4 %          Normal          34.0-46.4       [facility]   
[facility]   
Physician   
Group  
   
                                        Comment on above:   Performed By: #### C  
MP, PHOS, CBC ####  
[address]   
   
                                                    Hemoglobin (Bld)   
[Mass/Vol]      13.1 g/dL       Normal          11.8-15.4       [facility]   
[facility]   
Physician   
Group  
   
                                        Comment on above:   Performed By: #### C  
MP, PHOS, CBC ####  
[address]   
   
                                                    Lymphocytes (Bld)   
[#/Vol]         1.3 10*3/uL     Normal          1.00-4.8        [facility]   
[facility]   
Physician   
Group  
   
                                        Comment on above:   Performed By: #### C  
MP, PHOS, CBC ####  
[address]   
   
                                                    Lymphocytes/100 WBC   
(Bld)           7.1 %           Normal          .               [facility]   
[facility]   
Physician   
Group  
   
                                        Comment on above:   Performed By: #### C  
MP, PHOS, CBC ####  
[address]   
   
                                                    MCH (RBC) [Entitic   
mass]           31.8 pg         Normal          24.7-34.3       [facility]   
[facility]   
Physician   
Group  
   
                                        Comment on above:   Performed By: #### C  
MP, PHOS, CBC ####  
[address]   
   
                      MCV (RBC) [Entitic vol] 93.0 fL    Normal          T  
he   
[facility]   
Physician   
Group  
   
                                        Comment on above:   Performed By: #### C  
MP, PHOS, CBC ####  
[address]   
   
                                                    Mean Corpuscular HGB   
Conc            34.2 g/dL       Normal          32.0-35.0       The   
[facility]   
Physician   
Group  
   
                                        Comment on above:   Performed By: #### C  
MP, PHOS, CBC ####  
[address]   
   
                      Monocytes (Bld) [#/Vol] 0.5 10*3/uL Normal     0.0-0.8      
The   
[facility]   
Physician   
Group  
   
                                        Comment on above:   Performed By: #### C  
MP, PHOS, CBC ####  
[address]   
   
                      Monocytes/100 WBC (Bld) 2.9 %      Normal     .          T  
[facility]   
Physician   
Group  
   
                                        Comment on above:   Performed By: #### C  
MP, PHOS, CBC ####  
[address]   
   
                                                    Neutrophils (Bld)   
[#/Vol]         16.5 10*3/uL    High            1.8-7.7         The   
[facility]   
Physician   
Group  
   
                                        Comment on above:   Performed By: #### C  
MP, PHOS, CBC ####  
[address]   
   
                                                    Neutrophils/100 WBC   
(Bld)           89.9 %          Normal          .               The   
[facility]   
Physician   
Group  
   
                                        Comment on above:   Performed By: #### C  
MP, PHOS, CBC ####  
[address]   
   
                      NRBC%      0.0 /100{WBC} Normal     0-0.5      The   
[facility]   
Physician   
Group  
   
                                        Comment on above:   Performed By: #### C  
MP, PHOS, CBC ####  
[address]   
   
                                                    Platelet mean volume   
(Bld) [Entitic vol] 7.9 fL          Normal          6.3-10.7        The   
[facility]   
Physician   
Group  
   
                                        Comment on above:   Performed By: #### C  
MP, PHOS, CBC ####  
[place] [address] [place]   
   
                      Platelets (Bld) [#/Vol] 236 10*3/uL Normal     150-450      
The   
[facility]   
Physician   
Group  
   
                                        Comment on above:   Performed By: #### C  
MARLENA PHOS, CBC ####  
[address]   
   
                      RBC (Bld) [#/Vol] 4.13 10*6/uL Normal     3.60-5.00  The   
[facility]   
Physician   
Group  
   
                                        Comment on above:   Performed By: #### C  
MARLENA PHOS, CBC ####  
[address]   
   
                      WBC (Bld) [#/Vol] 18.3 10*3/uL High       3.8-11.6   The   
[facility]   
Physician   
Group  
   
                                        Comment on above:   Performed By: #### C  
MARLENA PHOS, CBC ####  
[address]   
   
                                                    Comprehensive Metabolic Pane  
devora 2025   
   
                      Albumin [Mass/Vol] 3.4 g/dL   Low        3.5-5.7    The   
[facility]   
Physician   
Group  
   
                                        Comment on above:   Performed By: #### C  
MARLENA PHOS, CBC ####  
[address]   
   
                                                    Albumin/Globulin [Mass   
ratio]          1.5 {ratio}     Normal                          The   
[facility]   
Physician   
Group  
   
                                        Comment on above:   Performed By: #### C  
MARLENA PHOS, CBC ####  
[address]   
   
                                                    ALP [Catalytic   
activity/Vol]   73 U/L          Normal                    The   
[facility]   
Physician   
Group  
   
                                        Comment on above:   Performed By: #### C  
MARLENA PHOS, CBC ####  
[address]   
   
                                                    ALT [Catalytic   
activity/Vol]   13 U/L          Normal          7-52            The   
[facility]   
Physician   
Group  
   
                                        Comment on above:   Performed By: #### C  
MP PHOS, CBC ####  
[address]   
   
                      Anion gap [Moles/Vol] 10.0 mmol/L Normal     6.0-15.0     
e   
[facility]   
Physician   
Group  
   
                                        Comment on above:   Performed By: #### C  
MP PHOS, CBC ####  
[address]   
   
                                                    AST [Catalytic   
activity/Vol]   8 U/L           Low             13-39           The   
[facility]   
Physician   
Group  
   
                                        Comment on above:   Performed By: #### C  
MP, PHOS, CBC ####  
[address]   
   
                      Bilirubin [Mass/Vol] 0.3 mg/dL  Normal     0.3-1.0    The   
[facility]   
Physician   
Group  
   
                                        Comment on above:   Performed By: #### C  
ISAURA MENDIOLAS, CBC ####  
[address]   
   
                      Calcium [Mass/Vol] 8.9 mg/dL  Normal     8.6-10.3   The   
[facility]   
Physician   
Group  
   
                                        Comment on above:   Performed By: #### C  
MP, PHOS, CBC ####  
[address]   
   
                      Chloride [Moles/Vol] 98 mmol/L  Normal          The   
[facility]   
Physician   
Group  
   
                                        Comment on above:   Performed By: #### C  
ISAURA MENDIOLAS, CBC ####  
[address]   
   
                      CO2 [Moles/Vol] 34.5 mmol/L High       21.0-31.0  The   
[facility]   
Physician   
Group  
   
                                        Comment on above:   Performed By: #### C  
MP, PHOS, CBC ####  
[address]   
   
                      Creatinine [Mass/Vol] 0.70 mg/dL Normal     0.60-1.20  The  
   
[facility]   
Physician   
Group  
   
                                        Comment on above:   Performed By: #### C  
MP, PHOS, CBC ####  
[place] [address] [place]   
   
                                                    Creatinine Clr Calc   
Pharmacy        106.19          Normal                          The   
[facility]   
Physician   
Group  
   
                                        Comment on above:   Result Comment: PERF  
ORMED BY:  
[place] [address]  
[phone]  
PATHOLOGIST MEDICAL DIRECTOR  
RIZWAN GONZALEZ M.D.   
   
                                                            Performed By: #### C  
MP, PHOS, CBC ####  
[place] [address] [place]   
   
                                                    GFR/1.73 sq M.predicted   
MDRD (S/P/Bld) [Vol   
rate/Area]      mL/min/{1.73_m2} Normal                          The   
[facility]   
Physician   
Group  
   
                                        Comment on above:   Performed By: #### C  
MP, PHOS, CBC ####  
[facility]  
[address]   
   
                      Globulin (S) [Mass/Vol] 2.2 g/dL   Normal                T  
he   
[facility]   
Physician   
Group  
   
                                        Comment on above:   Performed By: #### C  
MARLENA PHOS, CBC ####  
[facility]  
[address]   
   
                      Glucose [Mass/Vol] 292 mg/dL  High            The   
[facility]   
Physician   
Group  
   
                                        Comment on above:   Result Comment: Rand  
om Glucose Reference Range is dependent on  
   
time and  
content of last meal. Glucose of more than 200 mg/dL in a  
nonstressed, ambulatory subject supports the diagnosis of  
Diabetes Mellitus.  
ADA recommended reference range   
   
                                                            Performed By: #### C  
ISAURA MENDIOLAS, CBC ####  
[facility]  
[address]   
   
                      Potassium [Moles/Vol] 5.5 mmol/L High       3.5-5.1    The  
   
[facility]   
Physician   
Group  
   
                                        Comment on above:   Performed By: #### C  
MP, PHOS, CBC ####  
[facility]  
[address] [place] [address] [place]   
   
                      Protein [Mass/Vol] 5.6 g/dL   Low        6.4-8.9    The   
[facility]   
Physician   
Group  
   
                                        Comment on above:   Performed By: #### C  
ISAURA MENDIOLAS, CBC ####  
[facility]  
[address] [place] [address] [place]   
   
                      Sodium [Moles/Vol] 137 mmol/L Normal     136-145    The   
[facility]   
Physician   
Group  
   
                                        Comment on above:   Performed By: #### C  
ISAURA MENDIOLAS, CBC ####  
[facility]  
[address] [place] [id number] [place]   
   
                                                    Urea nitrogen   
[Mass/Vol]      28 mg/dL        High            7-25            The   
[facility]   
Physician   
Group  
   
                                        Comment on above:   Performed By: #### C  
MARLENA PHOS, CBC ####  
[facility]  
[address]   
   
                                                    Glucose Glucometer (BldC) [M  
ass/Vol]Ordered By: Jordon Vásquez on 2025   
   
                                        Glucose [Mass/Vol]  Capillary blood gluc  
ose   
measurement by   
glucometer   
(mass/volume)                                               [facility]  
   
                                        Comment on above:   Random Glucose Refer  
ence Range is dependent on time and   
content of last meal. Glucose of more than 200 mg/dL in a   
nonstressed, ambulatory subject supports the diagnosis of   
Diabetes Mellitus.   
   
                                                    Glucose Poct Glucometerson 0  
2025   
   
                      Glucose [Mass/Vol] 369 mg/dL  Normal                The   
[facility]   
Physician   
Group  
   
                                        Comment on above:   Result Comment: Rand  
om Glucose Reference Range is dependent on  
   
time and  
content of last meal. Glucose of more than 200 mg/dL in a  
nonstressed, ambulatory subject supports the diagnosis of  
Diabetes Mellitus.  
PERFORMED BY:  
[place] [address]  
[phone]  
PATHOLOGIST MEDICAL DIRECTOR  
RIZWAN GONZALEZ M.D.   
   
                                                            Performed By: #### C  
MP, PHOS, CBC ####  
[address]   
   
                      Glucose [Mass/Vol] 339 mg/dL  Normal                The   
[facility]   
Physician   
Group  
   
                                        Comment on above:   Result Comment: Rand  
om Glucose Reference Range is dependent on  
   
time and  
content of last meal. Glucose of more than 200 mg/dL in a  
nonstressed, ambulatory subject supports the diagnosis of  
Diabetes Mellitus.  
PERFORMED BY:  
[place] [address]  
[phone]  
PATHOLOGIST MEDICAL DIRECTOR  
RIZWAN GONZALEZ M.D.   
   
                                                            Performed By: #### C  
ISAURA MENDIOLAS, CBC ####  
[address]   
   
                      Glucose [Mass/Vol] 360 mg/dL  Normal                The   
[facility]   
Physician   
Group  
   
                                        Comment on above:   Result Comment: Rand  
om Glucose Reference Range is dependent on  
   
time and  
content of last meal. Glucose of more than 200 mg/dL in a  
nonstressed, ambulatory subject supports the diagnosis of  
Diabetes Mellitus.  
PERFORMED BY:  
[place] [id number]  
[phone]  
PATHOLOGIST MEDICAL DIRECTOR  
RIZWAN GONZALEZ M.D.   
   
                                                            Performed By: #### C  
MARLENA PHOS, CBC ####  
[address]   
   
                                                    Basic Metabolic Panelon 01-0  
   
   
                      Anion gap [Moles/Vol] 11.3 mmol/L Normal     6.0-15.0   Th  
e   
[facility]   
Physician   
Group  
   
                                        Comment on above:   Performed By: #### C  
MP, PHOS, CBC ####  
[address]   
   
                      Calcium [Mass/Vol] 9.3 mg/dL  Normal     8.6-10.3   The   
[facility]   
Physician   
Group  
   
                                        Comment on above:   Performed By: #### C  
MP, PHOS, CBC ####  
[address]   
   
                      Chloride [Moles/Vol] 95 mmol/L  Low             The   
[facility]   
Physician   
Group  
   
                                        Comment on above:   Performed By: #### C  
MP, PHOS, CBC ####  
[address]   
   
                      CO2 [Moles/Vol] 32.3 mmol/L High       21.0-31.0  The   
[facility]   
Physician   
Group  
   
                                        Comment on above:   Performed By: #### C  
MP, PHOS, CBC ####  
[address]   
   
                      Creatinine [Mass/Vol] 0.71 mg/dL Normal     0.60-1.20  The  
   
[facility]   
Physician   
Group  
   
                                        Comment on above:   Performed By: #### C  
MP, PHOS, CBC ####  
[address]   
   
                                                    Creatinine Clr Calc   
Pharmacy        104.69          Normal                          The   
[facility]   
Physician   
Group  
   
                                        Comment on above:   Result Comment: PERF  
ORMED BY:  
[place] [address]  
[phone]  
PATHOLOGIST MEDICAL DIRECTOR  
RIZWAN GONZALEZ M.D.   
   
                                                            Performed By: #### C  
MP, PHOS, CBC ####  
[address]   
   
                                                    GFR/1.73 sq M.predicted   
MDRD (S/P/Bld) [Vol   
rate/Area]      mL/min/{1.73_m2} Normal                          The   
[facility]   
Physician   
Group  
   
                                        Comment on above:   Performed By: #### C  
MP, PHOS, CBC ####  
[address]   
   
                      Glucose [Mass/Vol] 323 mg/dL  High            The   
[facility]   
Physician   
Group  
   
                                        Comment on above:   Result Comment: Rand  
om Glucose Reference Range is dependent on  
   
time and  
content of last meal. Glucose of more than 200 mg/dL in a  
nonstressed, ambulatory subject supports the diagnosis of  
Diabetes Mellitus.  
ADA recommended reference range   
   
                                                            Performed By: #### C  
MP, PHOS, CBC ####  
[address]   
   
                      Potassium [Moles/Vol] 4.6 mmol/L Normal     3.5-5.1    The  
   
[facility]   
Physician   
Group  
   
                                        Comment on above:   Result Comment: Hemo  
lysis is present at a level that could   
interfere with  
the result.  
Contact lab if redraw is required   
   
                                                            Performed By: #### C  
MP, PHOS, CBC ####  
[address]   
   
                      Sodium [Moles/Vol] 134 mmol/L Low        136-145    The   
[facility]   
Physician   
Group  
   
                                        Comment on above:   Performed By: #### C  
MP, PHOS, CBC ####  
[address]   
   
                                                    Urea nitrogen   
[Mass/Vol]      23 mg/dL        Normal          7-25            The   
[facility]   
Physician   
Group  
   
                                        Comment on above:   Performed By: #### C  
MP, PHOS, CBC ####  
[address]   
   
                                                    Complete Blood Count Auto Di  
ffon 2025   
   
                      Basophils (Bld) [#/Vol] 0.1 10*3/uL Normal     0.0-0.2      
The   
[facility]   
Physician   
Group  
   
                                        Comment on above:   Result Comment: PERF  
ORMED BY:  
[place] [address]  
[phone]  
PATHOLOGIST MEDICAL DIRECTOR  
RIZWAN GONZALEZ M.D.   
   
                                                            Performed By: #### C  
MP, PHOS, CBC ####  
[address]   
   
                      Basophils/100 WBC (Bld) 0.3 %      Normal     .          T  
he   
[facility]   
Physician   
Group  
   
                                        Comment on above:   Performed By: #### C  
MP, PHOS, CBC ####  
[address] [address] [place]   
   
                                                    Eosinophils (Bld)   
[#/Vol]         0.0 10*3/uL     Normal          0.0-0.45        The   
[facility]   
Physician   
Group  
   
                                        Comment on above:   Performed By: #### C  
ISAURA MENDIOLAS, CBC ####  
[address]   
   
                                                    Eosinophils/100 WBC   
(Bld)           0.2 %           Normal          .               The   
[facility]   
Physician   
Group  
   
                                        Comment on above:   Performed By: #### C  
ISAURA MENDIOLAS, CBC ####  
[address]   
   
                                                    Erythrocyte   
distribution width   
(RBC) [Ratio]   12.0 %          Normal          11.9-15.3       The   
[facility]   
Physician   
Group  
   
                                        Comment on above:   Performed By: #### C  
MP, PHOS, CBC ####  
[address]   
   
                                                    Hematocrit (Bld)   
[Volume fraction] 39.9 %          Normal          34.0-46.4       The   
[facility]   
Physician   
Group  
   
                                        Comment on above:   Performed By: #### C  
ISAURA MENDIOLAS, CBC ####  
[address]   
   
                                                    Hemoglobin (Bld)   
[Mass/Vol]      13.5 g/dL       Normal          11.8-15.4       The   
[facility]   
Physician   
Group  
   
                                        Comment on above:   Performed By: #### C  
MP, PHOS, CBC ####  
[address]   
   
                                                    Lymphocytes (Bld)   
[#/Vol]         1.2 10*3/uL     Normal          1.00-4.8        The   
[facility]   
Physician   
Group  
   
                                        Comment on above:   Performed By: #### C  
ISAURA MENDIOLAS, CBC ####  
[address]   
   
                                                    Lymphocytes/100 WBC   
(Bld)           7.6 %           Normal          .               The   
[facility]   
Physician   
Group  
   
                                        Comment on above:   Performed By: #### C  
MARLENA PHOS, CBC ####  
[address]   
   
                                                    MCH (RBC) [Entitic   
mass]           31.7 pg         Normal          24.7-34.3       The   
[facility]   
Physician   
Group  
   
                                        Comment on above:   Performed By: #### C  
MARLENA PHOS, CBC ####  
[address]   
   
                      MCV (RBC) [Entitic vol] 93.9 fL    Normal          T  
[facility]   
Physician   
Group  
   
                                        Comment on above:   Performed By: #### C  
MP, PHOS, CBC ####  
[address]   
   
                                                    Mean Corpuscular HGB   
Conc            33.8 g/dL       Normal          32.0-35.0       The   
[facility]   
Physician   
Group  
   
                                        Comment on above:   Performed By: #### C  
MP, PHOS, CBC ####  
[address]   
   
                      Monocytes (Bld) [#/Vol] 0.3 10*3/uL Normal     0.0-0.8      
The   
[facility]   
Physician   
Group  
   
                                        Comment on above:   Performed By: #### C  
MP, PHOS, CBC ####  
[address]   
   
                      Monocytes/100 WBC (Bld) 2.2 %      Normal     .          T  
[facility] [place]   
Physician   
Group  
   
                                        Comment on above:   Performed By: #### C  
MP, PHOS, CBC ####  
[address]   
   
                                                    Neutrophils (Bld)   
[#/Vol]         14.0 10*3/uL    High            1.8-7.7         The   
[facility]   
Physician   
Group  
   
                                        Comment on above:   Performed By: #### C  
MP, PHOS, CBC ####  
[address]   
   
                                                    Neutrophils/100 WBC   
(Bld)           89.7 %          Normal          .               The   
[facility]   
Physician   
Group  
   
                                        Comment on above:   Performed By: #### C  
MP, PHOS, CBC ####  
[address]   
   
                      NRBC%      0.0 /100{WBC} Normal     0-0.5      The   
[facility]   
Physician   
Group  
   
                                        Comment on above:   Performed By: #### C  
MP, PHOS, CBC ####  
[address]   
   
                                                    Platelet mean volume   
(Bld) [Entitic vol] 7.6 fL          Normal          6.3-10.7        The   
[facility]   
Physician   
Group  
   
                                        Comment on above:   Performed By: #### C  
MARLENA PHOS, CBC ####  
[address]   
   
                      Platelets (Bld) [#/Vol] 215 10*3/uL Normal     150-450      
The   
[facility]   
Physician   
Group  
   
                                        Comment on above:   Performed By: #### C  
MARLENA PHOS, CBC ####  
[address]   
   
                      RBC (Bld) [#/Vol] 4.25 10*6/uL Normal     3.60-5.00  The   
[facility]   
Physician   
Group  
   
                                        Comment on above:   Performed By: #### C  
MARLENA PHOS, CBC ####  
[address]   
   
                      WBC (Bld) [#/Vol] 15.6 10*3/uL High       3.8-11.6   The   
[facility]   
Physician   
Group  
   
                                        Comment on above:   Performed By: #### C  
MARLENA PHOS, CBC ####  
[address]   
   
                                                    Comprehensive Metabolic Pane  
devora 2025   
   
                      Albumin [Mass/Vol] 3.5 g/dL   Normal     3.5-5.7    The   
[facility]   
Physician   
Group  
   
                                        Comment on above:   Performed By: #### C  
MARLENA PHOS, CBC ####  
[address]   
   
                                                    Albumin/Globulin [Mass   
ratio]          1.3 {ratio}     Normal                          The   
[facility]   
Physician   
Group  
   
                                        Comment on above:   Performed By: #### C  
MARLENA PHOS, CBC ####  
[address]   
   
                                                    ALP [Catalytic   
activity/Vol]   71 U/L          Normal                    The   
[facility]   
Physician   
Group  
   
                                        Comment on above:   Performed By: #### C  
MP PHOS, CBC ####  
[address]   
   
                                                    ALT [Catalytic   
activity/Vol]   11 U/L          Normal          7-52            The   
[facility]   
Physician   
Group  
   
                                        Comment on above:   Performed By: #### C  
MP PHOS, CBC ####  
[address]   
   
                      Anion gap [Moles/Vol] 13.1 mmol/L Normal     6.0-15.0   Th  
e   
[facility]   
Physician   
Group  
   
                                        Comment on above:   Performed By: #### C  
MP, PHOS, CBC ####  
[address]   
   
                                                    AST [Catalytic   
activity/Vol]   10 U/L          Low             13-39           The   
[facility]   
Physician   
Group  
   
                                        Comment on above:   Performed By: #### C  
MP, PHOS, CBC ####  
[address]   
   
                      Bilirubin [Mass/Vol] 0.3 mg/dL  Normal     0.3-1.0    The   
[facility]   
Physician   
Group  
   
                                        Comment on above:   Performed By: #### C  
MP, PHOS, CBC ####  
[address]   
   
                      Calcium [Mass/Vol] 8.9 mg/dL  Normal     8.6-10.3   The   
[facility]   
Physician   
Group  
   
                                        Comment on above:   Performed By: #### C  
MP, PHOS, CBC ####  
[address]   
   
                      Chloride [Moles/Vol] 102 mmol/L Normal          The   
[facility]   
Physician   
Group  
   
                                        Comment on above:   Performed By: #### C  
MP, PHOS, CBC ####  
[address]   
   
                      CO2 [Moles/Vol] 27.2 mmol/L Normal     21.0-31.0  The   
[facility]   
Physician   
Group  
   
                                        Comment on above:   Performed By: #### C  
MP, PHOS, CBC ####  
[address]   
   
                      Creatinine [Mass/Vol] 0.54 mg/dL Low        0.60-1.20  The  
   
[facility]   
Physician   
Group  
   
                                        Comment on above:   Performed By: #### C  
MP, PHOS, CBC ####  
[place] [address] [place]   
   
                                                    Creatinine Clr Calc   
Pharmacy        137.80          Normal                          The   
[facility]   
Physician   
Group  
   
                                        Comment on above:   Performed By: #### C  
MP, PHOS, CBC ####  
[place] [address] [place]   
   
                                                    GFR/1.73 sq M.predicted   
MDRD (S/P/Bld) [Vol   
rate/Area]      mL/min/{1.73_m2} Normal                          The   
[facility]   
Physician   
Group  
   
                                        Comment on above:   Performed By: #### C  
MP, PHOS, CBC ####  
[address]   
   
                      Globulin (S) [Mass/Vol] 2.6 g/dL   Normal                T  
he   
[facility]   
Physician   
Group  
   
                                        Comment on above:   Performed By: #### C  
MP, PHOS, CBC ####  
[address]   
   
                      Glucose [Mass/Vol] 250 mg/dL  High            The   
[facility]   
Physician   
Group  
   
                                        Comment on above:   Result Comment: Rand  
om Glucose Reference Range is dependent on  
   
time and  
content of last meal. Glucose of more than 200 mg/dL in a  
nonstressed, ambulatory subject supports the diagnosis of  
Diabetes Mellitus.  
ADA recommended reference range   
   
                                                            Performed By: #### C  
MP, PHOS, CBC ####  
[address]   
   
                      Potassium [Moles/Vol] 5.3 mmol/L High       3.5-5.1    The  
   
[facility]   
Physician   
Group  
   
                                        Comment on above:   Result Comment: Hemo  
lysis is present at a level that could   
interfere with  
the result.  
Contact lab if redraw is required   
   
                                                            Performed By: #### C  
MP, PHOS, CBC ####  
[address]   
   
                      Protein [Mass/Vol] 6.1 g/dL   Low        6.4-8.9    The   
[facility]   
Physician   
Group  
   
                                        Comment on above:   Performed By: #### C  
MP, PHOS, CBC ####  
[address]   
   
                      Sodium [Moles/Vol] 137 mmol/L Normal     136-145    The   
[facility]   
Physician   
Group  
   
                                        Comment on above:   Performed By: #### C  
MP, PHOS, CBC ####  
[address]   
   
                                                    Urea nitrogen   
[Mass/Vol]      19 mg/dL        Normal          7-25            The   
[facility]   
Physician   
Group  
   
                                        Comment on above:   Performed By: #### C  
MP, PHOS, CBC ####  
[address] [place] [address] [facility]   
   
                                                    Glucose Poct Glucometerson 0  
2025   
   
                      Glucose [Mass/Vol] 342 mg/dL  Normal                The   
[facility]   
Physician   
Group  
   
                                        Comment on above:   Result Comment: Rand  
 Glucose Reference Range is dependent on  
   
time and  
content of last meal. Glucose of more than 200 mg/dL in a  
nonstressed, ambulatory subject supports the diagnosis of  
Diabetes Mellitus.  
PERFORMED BY:  
[place] [address]  
[phone]  
PATHOLOGIST MEDICAL DIRECTOR  
RIZWAN GONZALEZ M.D.   
   
                                                            Performed By: #### C  
MARLENA PHOS, CBC ####  
[address]   
   
                      Glucose [Mass/Vol] 302 mg/dL  Normal                The   
[facility]   
Physician   
Group  
   
                                        Comment on above:   Result Comment: Rand  
 Glucose Reference Range is dependent on  
   
time and  
content of last meal. Glucose of more than 200 mg/dL in a  
nonstressed, ambulatory subject supports the diagnosis of  
Diabetes Mellitus.  
PERFORMED BY:  
[place] [address]  
[phone]  
PATHOLOGIST MEDICAL DIRECTOR  
RIZWAN GONZALEZ M.D.   
   
                                                            Performed By: #### G  
LULS ####  
Point of Care testing  
,   
   
                      Glucose [Mass/Vol] 325 mg/dL  Normal                The   
[facility]   
Physician   
Group  
   
                                        Comment on above:   Result Comment: Rand  
 Glucose Reference Range is dependent on  
   
time and  
content of last meal. Glucose of more than 200 mg/dL in a  
nonstressed, ambulatory subject supports the diagnosis of  
Diabetes Mellitus.  
PERFORMED BY:  
[place] [id number]  
[phone]  
PATHOLOGIST MEDICAL DIRECTOR  
RIZWAN GONZALEZ M.D.   
   
                                                            Performed By: #### C  
MARLENA PHOS, CBC ####  
[address]   
   
                      Glucose [Mass/Vol] 273 mg/dL  Normal                The   
[facility]   
Physician   
Group  
   
                                        Comment on above:   Result Comment: Rand  
om Glucose Reference Range is dependent on  
   
time and  
content of last meal. Glucose of more than 200 mg/dL in a  
nonstressed, ambulatory subject supports the diagnosis of  
Diabetes Mellitus.  
PERFORMED BY:  
[address] [place] [address]  
[phone]  
PATHOLOGIST MEDICAL DIRECTOR  
RIZWAN GONZALEZ M.D.   
   
                                                            Performed By: #### C  
MP, PHOS, CBC ####  
[address]   
   
                                                    Phosphate [Mass/volume] in S  
herrera or PlasmaOrdered By: Jordon Vásquez on 2025   
   
                                        Phosphate [Mass/Vol] Phosphate [Mass/vol  
ume]   
in Serum or Plasma                      2.5-4.5             [facility]  
   
                                                    Phosphoruson 2025   
   
                      Phosphate [Mass/Vol] 3.9 mg/dL  Normal     2.5-4.5    The   
[facility]   
Physician   
Group  
   
                                        Comment on above:   Result Comment: PERF  
ORMED BY:  
[place] [address]  
[phone]  
PATHOLOGIST MEDICAL DIRECTOR  
RIZWAN GONZALEZ M.D.   
   
                                                            Performed By: #### C  
MP, PHOS, CBC ####  
[address]   
   
                                                    A1C with Estimated Average G  
karlan 2025   
   
                      Glucose [Mass/Vol] 143 mg/dL  Normal                The   
[facility]   
Physician   
Group  
   
                                        Comment on above:   Result Comment: PERF  
ORMED BY:  
[place] [address]  
[phone]  
PATHOLOGIST MEDICAL DIRECTOR  
RIZWAN GONZALEZ M.D.   
   
                                                            Performed By: #### C  
MP, PHOS, CBC ####  
[address]   
   
                                                    HbA1c (Bld) [Mass   
fraction]       6.6 %           High            4.3-5.6         The   
[facility]   
Physician   
Group  
   
                                        Comment on above:   Result Comment: Incr  
eased risk for diabetes: 5.7 - 6.4  
diabetes: >6.4  
glycemic control for adults with diabetes: <7.0   
   
                                                            Performed By: #### C  
MP, PHOS, CBC ####  
[address]   
   
                                                    Basic Metabolic Panelon    
   
                      Anion gap [Moles/Vol] 12.5 mmol/L Normal     6.0-15.0   Th  
e   
[facility]   
Physician   
Group  
   
                                        Comment on above:   Performed By: #### C  
MP, PHOS, CBC ####  
[facility]  
[address] [place] [address] [place]   
   
                      Calcium [Mass/Vol] 9.1 mg/dL  Normal     8.6-10.3   The   
[facility]   
Physician   
Group  
   
                                        Comment on above:   Performed By: #### C  
MP, PHOS, CBC ####  
[facility]  
[address] [place] [address] [place]   
   
                      Chloride [Moles/Vol] 99 mmol/L  Normal          The   
[facility]   
Physician   
Group  
   
                                        Comment on above:   Performed By: #### C  
MP, PHOS, CBC ####  
[facility]  
[address] [place] [address] [place]   
   
                      CO2 [Moles/Vol] 31.0 mmol/L Normal     21.0-31.0  The   
[facility]   
Physician   
Group  
   
                                        Comment on above:   Performed By: #### C  
MP, PHOS, CBC ####  
[facility]  
[address] [place] [address] [place]   
   
                      Creatinine [Mass/Vol] 0.70 mg/dL Normal     0.60-1.20  The  
   
[facility]   
Physician   
Group  
   
                                        Comment on above:   Performed By: #### C  
MP PHOS, CBC ####  
[facility]  
[address] [place] [address] [place]   
   
                                                    Creatinine Clr Calc   
Pharmacy        106.30          Normal                          The   
[facility]   
Physician   
Group  
   
                                        Comment on above:   Performed By: #### C  
MP, PHOS, CBC ####  
[facility]  
[address] [place] [address] [place]   
   
                                                    GFR/1.73 sq M.predicted   
MDRD (S/P/Bld) [Vol   
rate/Area]      mL/min/{1.73_m2} Normal                          The   
[facility]   
Physician   
Group  
   
                                        Comment on above:   Performed By: #### C  
MP, PHOS, CBC ####  
[facility]  
[address] [place] [address] [place]   
   
                      Glucose [Mass/Vol] 267 mg/dL  High            The   
[facility]   
Physician   
Group  
   
                                        Comment on above:   Result Comment: Rand  
 Glucose Reference Range is dependent on  
   
time and  
content of last meal. Glucose of more than 200 mg/dL in a  
nonstressed, ambulatory subject supports the diagnosis of  
Diabetes Mellitus.  
ADA recommended reference range   
   
                                                            Performed By: #### C  
MP, PHOS, CBC ####  
[address]   
   
                      Potassium [Moles/Vol] 4.5 mmol/L Normal     3.5-5.1    The  
   
[facility]   
Physician   
Group  
   
                                        Comment on above:   Result Comment: Hemo  
lysis is present at a level that could   
interfere with  
the result.  
Contact lab if redraw is required   
   
                                                            Performed By: #### C  
MP PHOS, CBC ####  
[address]   
   
                      Sodium [Moles/Vol] 138 mmol/L Normal     136-145    The   
[facility]   
Physician   
Group  
   
                                        Comment on above:   Performed By: #### C  
MP PHOS, CBC ####  
[address]   
   
                                                    Urea nitrogen   
[Mass/Vol]      15 mg/dL        Normal          7-25            The   
[facility]   
Physician   
Group  
   
                                        Comment on above:   Performed By: #### C  
MP PHOS, CBC ####  
[address]   
   
                                                    Blood estimated average gluc  
ose determination by estimation from glycated   
hemoglobinOrdered By: Lidia Sorensen on 2025   
   
                                                    Average glucose   
Estimated from glycated   
hemoglobin (Bld)   
[Mass/Vol]                              Glucose mean value   
[Mass/volume] in Blood   
Estimated from glycated   
hemoglobin                                                  [facility]  
   
                                                    Complete Blood Count Auto Di  
ffon 2025   
   
                      Basophils (Bld) [#/Vol] 0.0 10*3/uL Normal     0.0-0.2      
The   
[facility]   
Physician   
Group  
   
                                        Comment on above:   Result Comment: PERF  
ORMED BY:  
[place] [address]  
[phone]  
PATHOLOGIST MEDICAL DIRECTOR  
RIZWAN GONZALEZ M.D.   
   
                                                            Performed By: #### C  
MP PHOS, CBC ####  
[address]   
   
                      Basophils/100 WBC (Bld) 0.3 %      Normal     .          T  
he   
[facility]   
Physician   
Group  
   
                                        Comment on above:   Performed By: #### C  
MP, PHOS, CBC ####  
[address]   
   
                                                    Eosinophils (Bld)   
[#/Vol]         0.0 10*3/uL     Normal          0.0-0.45        The   
[facility]   
Physician   
Group  
   
                                        Comment on above:   Performed By: #### C  
MP, PHOS, CBC ####  
[address]   
   
                                                    Eosinophils/100 WBC   
(Bld)           0.0 %           Normal          .               The   
[facility]   
Physician   
Group  
   
                                        Comment on above:   Performed By: #### C  
MP, PHOS, CBC ####  
[address]   
   
                                                    Erythrocyte   
distribution width   
(RBC) [Ratio]   12.2 %          Normal          11.9-15.3       The   
[facility]   
Physician   
Group  
   
                                        Comment on above:   Performed By: #### C  
MP, PHOS, CBC ####  
[address]   
   
                                                    Hematocrit (Bld)   
[Volume fraction] 42.2 %          Normal          34.0-46.4       The   
[facility]   
Physician   
Group  
   
                                        Comment on above:   Performed By: #### C  
MP, PHOS, CBC ####  
[address]   
   
                                                    Hemoglobin (Bld)   
[Mass/Vol]      14.5 g/dL       Normal          11.8-15.4       The   
[facility]   
Physician   
Group  
   
                                        Comment on above:   Performed By: #### C  
MP PHOS, CBC ####  
[address]   
   
                                                    Lymphocytes (Bld)   
[#/Vol]         0.7 10*3/uL     Low             1.00-4.8        The   
[facility]   
Physician   
Group  
   
                                        Comment on above:   Performed By: #### C  
MP, PHOS, CBC ####  
[address]   
   
                                                    Lymphocytes/100 WBC   
(Bld)           9.5 %           Normal          .               The   
[facility]   
Physician   
Group  
   
                                        Comment on above:   Performed By: #### C  
MP, PHOS, CBC ####  
[address]   
   
                                                    MCH (RBC) [Entitic   
mass]           32.1 pg         Normal          24.7-34.3       The   
[facility]   
Physician   
Group  
   
                                        Comment on above:   Performed By: #### C  
MP, PHOS, CBC ####  
[address]   
   
                      MCV (RBC) [Entitic vol] 93.5 fL    Normal          T  
[facility]   
Physician   
Group  
   
                                        Comment on above:   Performed By: #### C  
MP, PHOS, CBC ####  
[address]   
   
                                                    Mean Corpuscular HGB   
Conc            34.3 g/dL       Normal          32.0-35.0       The   
[facility]   
Physician   
Group  
   
                                        Comment on above:   Performed By: #### C  
MP, PHOS, CBC ####  
[address]   
   
                      Monocytes (Bld) [#/Vol] 0.1 10*3/uL Normal     0.0-0.8      
The   
[facility]   
Physician   
Group  
   
                                        Comment on above:   Performed By: #### C  
MP, PHOS, CBC ####  
[address]   
   
                      Monocytes/100 WBC (Bld) 1.1 %      Normal     .          T  
[facility]   
Physician   
Group  
   
                                        Comment on above:   Performed By: #### C  
MP, PHOS, CBC ####  
[address]   
   
                                                    Neutrophils (Bld)   
[#/Vol]         6.4 10*3/uL     Normal          1.8-7.7         The   
[facility]   
Physician   
Group  
   
                                        Comment on above:   Performed By: #### C  
MP, PHOS, CBC ####  
[address]   
   
                                                    Neutrophils/100 WBC   
(Bld)           89.1 %          Normal          .               The   
[facility]   
Physician   
Group  
   
                                        Comment on above:   Performed By: #### C  
MP, PHOS, CBC ####  
[address]   
   
                      NRBC%      0.1 /100{WBC} Normal     0-0.5      The   
[facility]   
Physician   
Group  
   
                                        Comment on above:   Performed By: #### C  
MP, PHOS, CBC ####  
[address]   
   
                                                    Platelet mean volume   
(Bld) [Entitic vol] 7.4 fL          Normal          6.3-10.7        The   
[facility]   
Physician   
Group  
   
                                        Comment on above:   Performed By: #### C  
MP, PHOS, CBC ####  
[address]   
   
                      Platelets (Bld) [#/Vol] 191 10*3/uL Normal     150-450      
The   
[facility]   
Physician   
Group  
   
                                        Comment on above:   Performed By: #### C  
ISAURA MENDIOLAS, CBC ####  
[address]   
   
                      RBC (Bld) [#/Vol] 4.52 10*6/uL Normal     3.60-5.00  The   
[facility]   
Physician   
Group  
   
                                        Comment on above:   Performed By: #### C  
MARLENA PHOS, CBC ####  
[address]   
   
                      WBC (Bld) [#/Vol] 7.2 10*3/uL Normal     3.8-11.6   The   
[facility]   
Physician   
Group  
   
                                        Comment on above:   Performed By: #### C  
ISAURA MENDIOLAS, CBC ####  
[address]   
   
                                                    Glucose Poct Glucometerson 0  
2025   
   
                      Glucose [Mass/Vol] 366 mg/dL  Normal                The   
[facility]   
Physician   
Group  
   
                                        Comment on above:   Result Comment: Rand  
 Glucose Reference Range is dependent on  
   
time and  
content of last meal. Glucose of more than 200 mg/dL in a  
nonstressed, ambulatory subject supports the diagnosis of  
Diabetes Mellitus.  
PERFORMED BY:  
[place] [address]  
[phone]  
PATHOLOGIST MEDICAL DIRECTOR  
RIZWAN GONZALEZ M.D.   
   
                                                            Performed By: #### C  
MP, PHOS, CBC ####  
[address]   
   
                      Glucose [Mass/Vol] 302 mg/dL  Normal                The   
[facility]   
Physician   
Group  
   
                                        Comment on above:   Result Comment: Rand  
 Glucose Reference Range is dependent on  
   
time and  
content of last meal. Glucose of more than 200 mg/dL in a  
nonstressed, ambulatory subject supports the diagnosis of  
Diabetes Mellitus.  
PERFORMED BY:  
[place] [address]  
[phone]  
PATHOLOGIST MEDICAL DIRECTOR  
RIZWAN GONZALEZ M.D.   
   
                                                            Performed By: #### C  
MARLENA PHOS, CBC ####  
[address]  
[place] [address] [facility]   
   
                      Glucose [Mass/Vol] 265 mg/dL  Normal                The   
[facility]   
Physician   
Group  
   
                                        Comment on above:   Result Comment: Rand  
om Glucose Reference Range is dependent on  
   
time and  
content of last meal. Glucose of more than 200 mg/dL in a  
nonstressed, ambulatory subject supports the diagnosis of  
Diabetes Mellitus.  
PERFORMED BY:  
[place] [address]  
[phone]  
PATHOLOGIST MEDICAL DIRECTOR  
RIZWAN GONZALEZ M.D.   
   
                                                            Performed By: #### G  
LULS ####  
Point of Care testing  
,   
   
                      Glucose [Mass/Vol] 269 mg/dL  Normal                The   
[facility]   
Physician   
Group  
   
                                        Comment on above:   Result Comment: Rand  
om Glucose Reference Range is dependent on  
   
time and  
content of last meal. Glucose of more than 200 mg/dL in a  
nonstressed, ambulatory subject supports the diagnosis of  
Diabetes Mellitus.  
PERFORMED BY:  
[place] [address]  
[phone]  
PATHOLOGIST MEDICAL DIRECTOR  
RIZWAN GONZALEZ M.D.   
   
                                                            Performed By: #### G  
LULS ####  
Point of Care testing  
,   
   
                                                    Hemoglobin A1c/Hemoglobin.to  
david in BloodOrdered By: Lidia Sorensen on 2025   
   
                                                    HbA1c (Bld) [Mass   
fraction]                               Hemoglobin A1c   
percentage          High                4.3-5.6             [facility]  
   
                                        Comment on above:   Increased risk for d  
iabetes: 5.7 - 6.4diabetes: >6.4glycemic   
control for adults with diabetes: <7.0   
   
                                                    Magnesiumon 2025   
   
                      Magnesium [Mass/Vol] 1.9 mg/dL  Normal     1.9-2.7    [facility]   
[facility]   
Physician   
Group  
   
                                        Comment on above:   Result Comment: PERF  
ORMED BY:  
[address] [address]  
[phone]  
PATHOLOGIST MEDICAL DIRECTOR  
RIZWAN GONZALEZ M.D.   
   
                                                            Performed By: #### C  
MP, PHOS, CBC ####  
[facility]  
[address] [place] [address] [facility]   
   
                                                    Magnesium [Mass/volume] in S  
herrera or PlasmaOrdered By: Lidia Sorensen on 2025   
   
                                        Magnesium [Mass/Vol] Magnesium [Mass/vol  
ume]   
in Serum or Plasma                      1.9-2.7             [facility]  
   
                                                    Alanine aminotransferase [En  
zymatic activity/volume] in Serum or PlasmaOrdered   
By:   
Quentin Cuenca on 2025   
   
                                                    ALT [Catalytic   
activity/Vol]                           Alanine   
aminotransferase   
[Enzymatic   
activity/volume] in   
Serum or Plasma                         7-52                [facility]  
   
                                                    Albumin [Mass/volume] in Ser  
um or Plasma by Bromocresol green (BCG) dye binding   
methoOrdered By: Quentin Cuenca on 2025   
   
                                                    Albumin BCG dye   
[Mass/Vol]                              Albumin [Mass/volume]   
in Serum or Plasma by   
Bromocresol green (BCG)   
dye binding metho                       3.5-5.7             [facility]  
   
                                                    Alkaline phosphatase [Enzyma  
tic activity/volume] in Serum or PlasmaOrdered By:   
Quentin Cuenca on 2025   
   
                                                    ALP [Catalytic   
activity/Vol]                           Alkaline phosphatase   
[Enzymatic   
activity/volume] in   
Serum or Plasma                                       [facility]  
   
                                                    Appearance of UrineOrdered B  
y: Quentin Cuenca on 2025   
   
                      Appearance (U) Urine appearance            Clear      [facility]  
   
                                                    Aspartate aminotransferase [  
Enzymatic activity/volume] in Serum or PlasmaOrdered  
By:   
Quentin Cuenca on 2025   
   
                                                    AST [Catalytic   
activity/Vol]                           Aspartate   
aminotransferase   
[Enzymatic   
activity/volume] in   
Serum or Plasma                         13-39               [facility]  
   
                                                    B-Type Natriuretic Peptideon  
 2025   
   
                                                    Natriuretic peptide B   
(Bld) [Mass/Vol] 41.0 pg/mL      Normal          5-100           [facility]   
[facility]   
Physician   
Group  
   
                                        Comment on above:   Result Comment: PERF  
ORMED BY:  
[place] [address]  
[phone]  
PATHOLOGIST MEDICAL DIRECTOR  
RIZWAN GONZALEZ M.D.   
   
                                                            Performed By: #### H  
S TROP, CBC, CMP, BNP, CK ####  
[address]   
   
                                                    Bacteria [Presence] in Urine  
 by AutomatedOrdered By: Quentin Cuenca on 2025  
   
   
                                        Bacteria Auto Ql (U) Bacteria [Presence]  
 in   
Urine by Automated  High                None Seen           [facility]  
   
                                                    Basophils Auto (Bld) [#/Vol]  
Ordered By: Quentin Cuenca on 2025   
   
                                        Basophils (Bld) [#/Vol] Automated basoph  
il   
count                                   0.0-0.2             [facility]  
   
                                                    Basophils/100 WBC Auto (Bld)  
Ordered By: Quentin Cuenca on 2025   
   
                      Basophils/100 WBC (Bld) Automated basophil %            .   
         [facility]  
   
                                                    Bilirubin Test strip Ql (U)O  
rdered By: Quentin Cuenca on 2025   
   
                                        Bilirubin Ql (U)    Bilirubin.total   
[Presence] in Urine by   
Test strip                              Negative            [facility]  
   
                                                    Bilirubin.total [Mass/volume  
] in Serum or PlasmaOrdered By: Quentin Cuenca on   
2025   
   
                                        Bilirubin [Mass/Vol] Bilirubin.total   
[Mass/volume] in Serum   
or Plasma                               0.3-1.0             [facility]  
   
                                                    Calcium [Mass/volume] in Ser  
um or PlasmaOrdered By: Quentin Cuenca on 2025   
   
                                        Calcium [Mass/Vol]  Calcium [Mass/volume  
]   
in Serum or Plasma                      8.6-10.3            [facility]  
   
                                                    Carbon dioxide, total [Moles  
/volume] in Serum or PlasmaOrdered By: Quentin Cuenca  
 on   
2025   
   
                                        CO2 [Moles/Vol]     Carbon dioxide, tota  
l   
[Moles/volume] in Serum   
or Plasma           High                21.0-31.0           [facility]  
   
                                                    Chloride [Moles/volume] in S  
herrera or PlasmaOrdered By: Quentin Cuenca on   
2025   
   
                                        Chloride [Moles/Vol] Chloride [Moles/vol  
ume]   
in Serum or Plasma                                    [facility]  
   
                                                    Color Auto (U)Ordered By: Rivera Cuenca on 2025   
   
                      Color (U)  Color of Urine by Auto            Yellow     [facility]  
[facility]  
   
                                                    Complete Blood Count Auto Di  
ffon 2025   
   
                      Basophils (Bld) [#/Vol] 0.1 10*3/uL Normal     0.0-0.2      
[facility]   
[facility]   
Physician   
Group  
   
                                        Comment on above:   Result Comment: PERF  
ORMED BY:  
[place] [address]  
[phone]  
PATHOLOGIST MEDICAL DIRECTOR  
RIZWAN GONZALEZ M.D.   
   
                                                            Performed By: #### H  
S TROP, CBC, CMP, BNP, CK ####  
[address]   
   
                      Basophils/100 WBC (Bld) 0.7 %      Normal     .          T  
kevin   
[facility]   
Physician   
Group  
   
                                        Comment on above:   Performed By: #### H  
S TROP, CBC, CMP, BNP, CK ####  
[address]   
   
                                                    Eosinophils (Bld)   
[#/Vol]         0.3 10*3/uL     Normal          0.0-0.45        [facility]   
[facility]   
Physician   
Group  
   
                                        Comment on above:   Performed By: #### H  
S TROP, CBC, CMP, BNP, CK ####  
[address]   
   
                                                    Eosinophils/100 WBC   
(Bld)           2.9 %           Normal          .               [facility]   
[facility]   
Physician   
Group  
   
                                        Comment on above:   Performed By: #### H  
S TROP, CBC, CMP, BNP, CK ####  
[address]   
   
                                                    Erythrocyte   
distribution width   
(RBC) [Ratio]   12.4 %          Normal          11.9-15.3       [facility]   
[facility]   
Physician   
Group  
   
                                        Comment on above:   Performed By: #### H  
S TROP, CBC, CMP, BNP, CK ####  
[address]   
   
                                                    Hematocrit (Bld)   
[Volume fraction] 41.9 %          Normal          34.0-46.4       [facility]   
[facility]   
Physician   
Group  
   
                                        Comment on above:   Performed By: #### H  
S TROP, CBC, CMP, BNP, CK ####  
[address]   
   
                                                    Hemoglobin (Bld)   
[Mass/Vol]      14.5 g/dL       Normal          11.8-15.4       [facility]   
[facility]   
Physician   
Group  
   
                                        Comment on above:   Performed By: #### H  
S TROP, CBC, CMP, BNP, CK ####  
[address]   
   
                                                    Lymphocytes (Bld)   
[#/Vol]         2.9 10*3/uL     Normal          1.00-4.8        [facility]   
[facility]   
Physician   
Group  
   
                                        Comment on above:   Performed By: #### H  
S TROP, CBC, CMP, BNP, CK ####  
[address]   
   
                                                    Lymphocytes/100 WBC   
(Bld)           32.6 %          Normal          .               [facility]   
[facility]   
Physician   
Group  
   
                                        Comment on above:   Performed By: #### H  
S TROP, CBC, CMP, BNP, CK ####  
[address]   
   
                                                    MCH (RBC) [Entitic   
mass]           32.0 pg         Normal          24.7-34.3       [facility]   
[facility]   
Physician   
Group  
   
                                        Comment on above:   Performed By: #### H  
S TROP, CBC, CMP, BNP, CK ####  
[address]   
   
                      MCV (RBC) [Entitic vol] 92.5 fL    Normal          T  
[facility]   
Physician   
Group  
   
                                        Comment on above:   Performed By: #### H  
S TROP, CBC, CMP, BNP, CK ####  
[address]   
   
                                                    Mean Corpuscular HGB   
Conc            34.6 g/dL       Normal          32.0-35.0       [facility]   
[facility]   
Physician   
Group  
   
                                        Comment on above:   Performed By: #### H  
S TROP, CBC, CMP, BNP, CK ####  
[address]   
   
                      Monocytes (Bld) [#/Vol] 0.7 10*3/uL Normal     0.0-0.8      
[facility]   
[facility]   
Physician   
Group  
   
                                        Comment on above:   Performed By: #### H  
S TROP, CBC, CMP, BNP, CK ####  
[address]   
   
                      Monocytes/100 WBC (Bld) 18.64 %    Normal     0.00-20.00 [facility]   
Physician   
Group  
   
                                        Comment on above:   Performed By: #### H  
S TROP, CBC, CMP, BNP, CK ####  
[address]   
   
                      Monocytes/100 WBC (Bld) 8.4 %      Normal     .          T  
[facility]   
Physician   
Group  
   
                                        Comment on above:   Performed By: #### H  
S TROP, CBC, CMP, BNP, CK ####  
[address]   
   
                                                    Neutrophils (Bld)   
[#/Vol]         5.0 10*3/uL     Normal          1.8-7.7         [facility]   
[facility]   
Physician   
Group  
   
                                        Comment on above:   Performed By: #### H  
S TROP, CBC, CMP, BNP, CK ####  
[address]   
   
                                                    Neutrophils/100 WBC   
(Bld)           55.4 %          Normal          .               [facility]   
[facility]   
Physician   
Group  
   
                                        Comment on above:   Performed By: #### H  
S TROP, CBC, CMP, BNP, CK ####  
[address]   
   
                      NRBC%      0.0 /100{WBC} Normal     0-0.5      [facility]   
[facility]   
Physician   
Group  
   
                                        Comment on above:   Performed By: #### H  
S TROP, CBC, CMP, BNP, CK ####  
[address]   
   
                                                    Platelet mean volume   
(Bld) [Entitic vol] 7.4 fL          Normal          6.3-10.7        [facility]   
[facility]   
Physician   
Group  
   
                                        Comment on above:   Performed By: #### H  
S TROP, CBC, CMP, BNP, CK ####  
[address]   
   
                      Platelets (Bld) [#/Vol] 233 10*3/uL Normal     150-450      
[facility]   
[facility]   
Physician   
Group  
   
                                        Comment on above:   Performed By: #### H  
S TROP, CBC, CMP, BNP, CK ####  
[address]   
   
                      RBC (Bld) [#/Vol] 4.53 10*6/uL Normal     3.60-5.00  [facility]   
[facility]   
Physician   
Group  
   
                                        Comment on above:   Performed By: #### H  
S TROP, CBC, CMP, BNP, CK ####  
[address]   
   
                      WBC (Bld) [#/Vol] 8.9 10*3/uL Normal     3.8-11.6   [facility]   
[facility]   
Physician   
Group  
   
                                        Comment on above:   Performed By: #### H  
S TROP, CBC, CMP, BNP, CK ####  
[address]   
   
                                                    Comprehensive Metabolic Pane  
devora 2025   
   
                      Albumin [Mass/Vol] 3.7 g/dL   Normal     3.5-5.7    [facility]   
[facility]   
Physician   
Group  
   
                                        Comment on above:   Performed By: #### H  
S TROP, CBC, CMP, BNP, CK ####  
[address]   
   
                                                    Albumin/Globulin [Mass   
ratio]          1.3 {ratio}     Normal                          [facility]   
[facility]   
Physician   
Group  
   
                                        Comment on above:   Performed By: #### H  
S TROP, CBC, CMP, BNP, CK ####  
[address]   
   
                                                    ALP [Catalytic   
activity/Vol]   84 U/L          Normal                    [facility]   
[facility]   
Physician   
Group  
   
                                        Comment on above:   Performed By: #### H  
S TROP, CBC, CMP, BNP, CK ####  
[address]   
   
                                                    ALT [Catalytic   
activity/Vol]   16 U/L          Normal          7-52            [facility]   
[facility]   
Physician   
Group  
   
                                        Comment on above:   Performed By: #### H  
S TROP, CBC, CMP, BNP, CK ####  
[address]   
   
                      Anion gap [Moles/Vol] 9.5 mmol/L Normal     6.0-15.0   The  
   
[facility]   
Physician   
Group  
   
                                        Comment on above:   Performed By: #### H  
S TROP, CBC, CMP, BNP, CK ####  
[address]   
   
                                                    AST [Catalytic   
activity/Vol]   13 U/L          Normal          13-39           [facility]   
[facility]   
Physician   
Group  
   
                                        Comment on above:   Performed By: #### H  
S TROP, CBC, CMP, BNP, CK ####  
[address]   
   
                      Bilirubin [Mass/Vol] 0.4 mg/dL  Normal     0.3-1.0    [facility]   
[facility]   
Physician   
Group  
   
                                        Comment on above:   Performed By: #### H  
S TROP, CBC, CMP, BNP, CK ####  
[address]   
   
                      Calcium [Mass/Vol] 9.4 mg/dL  Normal     8.6-10.3   [facility]   
[facility]   
Physician   
Group  
   
                                        Comment on above:   Performed By: #### H  
S TROP, CBC, CMP, BNP, CK ####  
[address]   
   
                      Chloride [Moles/Vol] 100 mmol/L Normal          [facility]   
[facility]   
Physician   
Group  
   
                                        Comment on above:   Performed By: #### H  
S TROP, CBC, CMP, BNP, CK ####  
[address]   
   
                      CO2 [Moles/Vol] 34.5 mmol/L High       21.0-31.0  [facility]   
[facility]   
Physician   
Group  
   
                                        Comment on above:   Performed By: #### H  
S TROP, CBC, CMP, BNP, CK ####  
[address]   
   
                      Creatinine [Mass/Vol] 0.69 mg/dL Normal     0.60-1.20  The  
   
[facility]   
Physician   
Group  
   
                                        Comment on above:   Performed By: #### H  
S TROP, CBC, CMP, BNP, CK ####  
[address]   
   
                                                    Creatinine Clr Calc   
Pharmacy        105.46          Normal                          [facility]   
[facility]   
Physician   
Group  
   
                                        Comment on above:   Result Comment: PERF  
ORMED BY:  
[place] [address]  
[phone]  
PATHOLOGIST MEDICAL DIRECTOR  
RIZWAN GONZALEZ M.D.   
   
                                                            Performed By: #### H  
S TROP, CBC, CMP, BNP, CK ####  
[address]   
   
                                                    GFR/1.73 sq M.predicted   
MDRD (S/P/Bld) [Vol   
rate/Area]      mL/min/{1.73_m2} Normal                          [facility]   
[facility]   
Physician   
Group  
   
                                        Comment on above:   Performed By: #### H  
S TROP, CBC, CMP, BNP, CK ####  
[address]   
   
                      Globulin (S) [Mass/Vol] 2.9 g/dL   Normal                T  
he   
[facility]   
Physician   
Group  
   
                                        Comment on above:   Performed By: #### H  
S TROP, CBC, CMP, BNP, CK ####  
[address]   
   
                      Glucose [Mass/Vol] 202 mg/dL  High            [facility]   
[facility]   
Physician   
Group  
   
                                        Comment on above:   Result Comment: Rand  
 Glucose Reference Range is dependent on  
   
time and  
content of last meal. Glucose of more than 200 mg/dL in a  
nonstressed, ambulatory subject supports the diagnosis of  
Diabetes Mellitus.  
ADA recommended reference range   
   
                                                            Performed By: #### H  
S TROP, CBC, CMP, BNP, CK ####  
[address]   
   
                      Potassium [Moles/Vol] 4.0 mmol/L Normal     3.5-5.1    The  
   
[facility]   
Physician   
Group  
   
                                        Comment on above:   Performed By: #### H  
S TROP, CBC, CMP, BNP, CK ####  
[address]   
   
                      Protein [Mass/Vol] 6.6 g/dL   Normal     6.4-8.9    [facility]   
[facility]   
Physician   
Group  
   
                                        Comment on above:   Performed By: #### H  
S TROP, CBC, CMP, BNP, CK ####  
[address]   
   
                      Sodium [Moles/Vol] 140 mmol/L Normal     136-145    [facility]   
[facility]   
Physician   
Group  
   
                                        Comment on above:   Performed By: #### H  
S TROP, CBC, CMP, BNP, CK ####  
[address]   
   
                                                    Urea nitrogen   
[Mass/Vol]      14 mg/dL        Normal          7-25            The   
[facility]   
Physician   
Group  
   
                                        Comment on above:   Performed By: #### H  
S TROP, CBC, CMP, BNP, CK ####  
[address]   
   
                                                    Creatine Kinaseon 2025  
   
   
                                                    CK [Catalytic   
activity/Vol]   42 U/L          Normal                    [facility]   
[facility]   
Physician   
Group  
   
                                        Comment on above:   Performed By: #### H  
S TROP, CBC, CMP, BNP, CK ####  
[address]   
   
                                                    Creatine kinase [Enzymatic a  
ctivity/volume] in Serum or PlasmaOrdered By: Quentin Cuenca on 2025   
   
                                                    CK [Catalytic   
activity/Vol]                           Creatine kinase   
[Enzymatic   
activity/volume] in   
Serum or Plasma                                       [facility]  
   
                                                    Creatinine [Mass/volume] in   
Serum or PlasmaOrdered By: Quentin Cuenca on   
2025   
   
                                        Creatinine [Mass/Vol] Creatinine   
[Mass/volume] in Serum   
or Plasma                               0.60-1.20           [facility]  
   
                                                    Dipstick and Microscopicon 0  
2025   
   
                      Appearance (U) Clear      Normal     Clear      [facility]   
[facility]   
Physician   
Group  
   
                                        Comment on above:   Order Comment: Name   
Collection Type:: Clean-Voided Midstream   
   
                                                            Performed By: #### C  
MP, PHOS, CBC ####  
[place] [address] [place]   
   
                      Bacteria,Urine 2+         High       None Seen  [facility]   
[facility]   
Physician   
Group  
   
                                        Comment on above:   Order Comment: Name   
Collection Type:: Clean-Voided Midstream   
   
                                                            Performed By: #### C  
MP, PHOS, CBC ####  
[place] [address] [place]   
   
                      Bilirubin,Urine Negative   Normal     Negative   [facility]   
[facility]   
Physician   
Group  
   
                                        Comment on above:   Order Comment: Name   
Collection Type:: Clean-Voided Midstream   
   
                                                            Performed By: #### C  
MP, PHOS, CBC ####  
[address]   
   
                      Color (U)  Colorless  Normal     Yellow     [facility]   
[facility]   
Physician   
Group  
   
                                        Comment on above:   Order Comment: Name   
Collection Type:: Clean-Voided Midstream   
   
                                                            Performed By: #### C  
MP, PHOS, CBC ####  
[address]   
   
                      Glucose Ql (U) Normal     Normal     Normal     [facility]   
[facility]   
Physician   
Group  
   
                                        Comment on above:   Order Comment: Name   
Collection Type:: Clean-Voided Midstream   
   
                                                            Performed By: #### C  
MP, PHOS, CBC ####  
[address]   
   
                      Hyaline Casts,Urine None       Normal     0-8        [facility]   
[facility]   
Physician   
Group  
   
                                        Comment on above:   Order Comment: Name   
Collection Type:: Clean-Voided Midstream   
   
                                                            Result Comment: PERF  
ORMED BY:  
[place] [address]  
[phone]  
PATHOLOGIST MEDICAL DIRECTOR  
RIZWAN GONZALEZ M.D.   
   
                                                            Performed By: #### C  
MP, PHOS, CBC ####  
[address]   
   
                      Ketones Ql (U) Negative   Normal     Negative   [facility]   
[facility]   
Physician   
Group  
   
                                        Comment on above:   Order Comment: Name   
Collection Type:: Clean-Voided Midstream   
   
                                                            Performed By: #### C  
MP, PHOS, CBC ####  
[address]   
   
                                                    Leukocyte esterase Test   
strip Ql (U)    Negative        Normal          Negative        [facility]   
[facility]   
Physician   
Group  
   
                                        Comment on above:   Order Comment: Name   
Collection Type:: Clean-Voided Midstream   
   
                                                            Performed By: #### C  
MP, PHOS, CBC ####  
[address]   
   
                      Nitrite,Urine Negative   Normal     Negative   [facility]   
[facility]   
Physician   
Group  
   
                                        Comment on above:   Order Comment: Name   
Collection Type:: Clean-Voided Midstream   
   
                                                            Performed By: #### C  
MP, PHOS, CBC ####  
[address]   
   
                      Occult Blood,Urine Trace      High       Negative   [facility]   
[facility]   
Physician   
Group  
   
                                        Comment on above:   Order Comment: Name   
Collection Type:: Clean-Voided Midstream   
   
                                                            Result Comment: PERF  
ORMED BY:  
[place] [address]  
[phone]  
PATHOLOGIST MEDICAL DIRECTOR  
RIZWAN GONZALEZ M.D.   
   
                                                            Performed By: #### C  
MP, PHOS, CBC ####  
[address]   
   
                      pH (U)     5.5 [pH]   Normal     5.0-9.0    [facility]   
[facility]   
Physician   
Group  
   
                                        Comment on above:   Order Comment: Name   
Collection Type:: Clean-Voided Midstream   
   
                                                            Performed By: #### C  
MP, PHOS, CBC ####  
[address]   
   
                      Protein,Urine Negative   Normal     Negative   [facility]   
[facility]   
Physician   
Group  
   
                                        Comment on above:   Order Comment: Name   
Collection Type:: Clean-Voided Midstream   
   
                                                            Performed By: #### C  
MP, PHOS, CBC ####  
[address]   
   
                      RBC,Urine  1 [HPF]    Normal     0-4        [facility]   
[facility]   
Physician   
Group  
   
                                        Comment on above:   Order Comment: Name   
Collection Type:: Clean-Voided Midstream   
   
                                                            Performed By: #### C  
MP, PHOS, CBC ####  
[address]   
   
                      Specificy Gravity,Urine 1.009      Normal     1.001-1.030   
[facility]   
[facility]   
Physician   
Group  
   
                                        Comment on above:   Order Comment: Name   
Collection Type:: Clean-Voided Midstream   
   
                                                            Performed By: #### C  
MP, PHOS, CBC ####  
[facility]  
[address]   
   
                                                    Squamous Epithelial   
Cell,Urine      1 [HPF]         Normal          0-2             [facility]   
[facility]   
Physician   
Group  
   
                                        Comment on above:   Order Comment: Name   
Collection Type:: Clean-Voided Midstream   
   
                                                            Performed By: #### C  
MP, PHOS, CBC ####  
[facility]  
[address]   
   
                      Urobilinogen,Urine Normal     Normal     Normal     [facility]   
[facility]   
Physician   
Group  
   
                                        Comment on above:   Order Comment: Name   
Collection Type:: Clean-Voided Midstream   
   
                                                            Performed By: #### C  
MP, PHOS, CBC ####  
[facility]  
[address]   
   
                      WBC,Urine  1 [HPF]    Normal     0-4        [facility]   
[facility]   
Physician   
Group  
   
                                        Comment on above:   Order Comment: Name   
Collection Type:: Clean-Voided Midstream   
   
                                                            Performed By: #### C  
MP, PHOS, CBC ####  
[address]   
   
                                                    ECG 12 lead ECGon 2025  
   
   
                                        ECG 12 lead ECG     [facility] [place]  
[address] [place] [address]  
  
Electrocardiograph   
Report  
Signed  
  
Patient:   
Bev Gaming MR#:   
  
[id number]  
: 1965   
Acct:[id number]  
  
Age/Sex: 59 / F ADM   
Date: 25  
  
Loc:  Room: [address] [place] [address]   
Type: ADM IN  
Attending Dr: Charlene Montoya DO  
  
Ordering Provider:   
Quentin Cuenca Jr, MD  
Date of Service:   
  
Accession #:   
([id number]) ECG/ECG   
12 lead ECG: Shortness   
of Breath/Dyspnea  
  
  
Copies to:  
  
  
Test Reason :  
Blood Pressure : */*   
mmHG  
Vent. Rate : 92 BPM   
Atrial Rate : 92 BPM  
P-R Int : 154 ms QRS   
Dur : 82 ms  
QT Int : 346 ms P-R-T   
Axes : 68 72 44 degrees  
QTcB Int : 427 ms  
  
Normal sinus rhythm  
Normal ECG  
When compared with ECG   
of 2023 13:34,  
No significant change   
was found  
Confirmed by QUENTIN CUENCA MD ([id number]) on   
2025 5:42:47 AM  
  
Referred By:   
Electronically Signed   
By: QUENTIN CUENCA MD  
  
  
  
Transcribed By: MUS  
Signed By Quentin Cuenca Jr, MD  [id number]         Normal                                  [facility]   
[facility]   
Physician   
Group  
   
                                                    Eosinophils Auto (Bld) [#/Vo  
l]Ordered By: Quentin Cuenca on 2025   
   
                                                    Eosinophils (Bld)   
[#/Vol]                                 Automated eosinophil   
count                                   0.0-0.45            [facility]  
   
                                                    Eosinophils/100 WBC Auto (Bl  
d)Ordered By: Quentin Cuenca on 2025   
   
                                                    Eosinophils/100 WBC   
(Bld)           Automated eosinophil %                 .               [facility]  
   
                                                    Epithelial cells.squamous [#  
/area] in Urine sediment by Automated countOrdered   
By:   
Quentin Cuenca on 2025   
   
                                                    Epithelial   
cells.squamous Auto   
(Urine sed) [#/Area]                    Epithelial   
cells.squamous [#/area]   
in Urine sediment by   
Automated count                         0-2                 [facility]  
   
                                                    Erythrocyte distribution wid  
th Auto (RBC) [Ratio]Ordered By: Quentin Cuenca on   
2025   
   
                                                    Erythrocyte   
distribution width   
(RBC) [Ratio]                           Erythrocyte   
distribution width   
[Ratio] by Automated   
count                                   11.9-15.3           [facility]  
   
                                                    Erythrocytes [#/area] in Uri  
ne sediment by Automated countOrdered By: Quentin Cuenca   
on 2025   
   
                                                    RBC Auto (Urine sed)   
[#/Area]                                Erythrocytes [#/area]   
in Urine sediment by   
Automated count                         0-4                 [facility]  
   
                                                    Globulin Calc (S) [Mass/Vol]  
Ordered By: Quentin Cuenca on 2025   
   
                                        Globulin (S) [Mass/Vol] Serum globulin   
measurement by   
calculation   
(mass/volume)                                               [facility]  
   
                                                    Glucose [Mass/volume] in Ser  
um or PlasmaOrdered By: Quentin Cuenca on 2025   
   
                                        Glucose [Mass/Vol]  Glucose [Mass/volume  
]   
in Serum or Plasma  High                              [facility]  
   
                                        Comment on above:   ADA recommended refe  
rence rangeRandom Glucose Reference Range   
is dependent on time and content of last meal. Glucose of more   
than 200 mg/dL in a nonstressed, ambulatory subject supports   
the diagnosis of Diabetes Mellitus.   
   
                                                    Glucose [Mass/volume] in Uri  
ne by Test stripOrdered By: Quentin Cuenca on   
2025   
   
                                                    Glucose Test strip (U)   
[Mass/Vol]                              Glucose [Mass/volume]   
in Urine by Test strip                     Normal              [facility]  
   
                                                    Hematocrit Auto (Bld) [Volum  
e fraction]Ordered By: Quentin Cuenca on 2025   
   
                                                    Hematocrit (Bld)   
[Volume fraction]                       Hematocrit [Volume   
Fraction] of Blood by   
Automated count                         34.0-46.4           [facility]  
   
                                                    Hemoglobin Test strip Ql (U)  
Ordered By: Quentin Cuenca on 2025   
   
                                        Hemoglobin Ql (U)   Hemoglobin [Presence  
]   
in Urine by Test strip High                Negative            [facility]  
   
                                                    Hemoglobin [Mass/volume] in   
BloodOrdered By: Quentin Cuenca on 2025   
   
                                                    Hemoglobin (Bld)   
[Mass/Vol]                              Hemoglobin   
[Mass/volume] in Blood                     11.8-15.4           [facility]  
   
                                                    Hyaline casts [#/area] in Ur  
ine sediment by Automated countOrdered By: Quentin Cuenca   
on 2025   
   
                                                    Hyaline casts Auto   
(Urine sed) [#/Area]                    Hyaline casts [#/area]   
in Urine sediment by   
Automated count                         0-8                 [facility]  
   
                                                    Ketones Test strip Ql (U)Ord  
ered By: Quentin Cuenca on 2025   
   
                                        Ketones Ql (U)      Ketones [Presence] i  
n   
Urine by Test strip                     Negative            [facility]  
   
                                                    Leukocyte esterase [Presence  
] in Urine by Test stripOrdered By: Quentin Cuenca on  
   
2025   
   
                                                    Leukocyte esterase Test   
strip Ql (U)                            Leukocyte esterase   
[Presence] in Urine by   
Test strip                              Negative            [facility]  
   
                                                    Leukocytes [#/area] in Urine  
 sediment by Automated countOrdered By: Quentin Cuenca on   
2025   
   
                                                    WBC Auto (Urine sed)   
[#/Area]                                Leukocytes [#/area] in   
Urine sediment by   
Automated count                         0-4                 [facility]  
   
                                                    Leukocytes [#/volume] correc  
ken for nucleated erythrocytes in Blood by Automated  
   
counOrdered By: Quentin Cuenca on 2025   
   
                                                    WBC corrected for nucl   
RBC Auto (Bld) [#/Vol]                  Leukocytes [#/volume]   
corrected for nucleated   
erythrocytes in Blood   
by Automated coun                       3.8-11.6            [facility]  
   
                                                    Lymphocytes Auto (Bld) [#/Vo  
l]Ordered By: Qeuntin Cuenca on 2025   
   
                                                    Lymphocytes (Bld)   
[#/Vol]                                 Lymphocytes [#/volume]   
in Blood by Automated   
count                                   1.00-4.8            [facility]  
   
                                                    Lymphocytes/100 WBC Auto (Bl  
d)Ordered By: Quentin Cuenca on 2025   
   
                                                    Lymphocytes/100 WBC   
(Bld)                                   Lymphocytes/100   
leukocytes in Blood by   
Automated count                         .                   [facility]  
   
                                                    MCH Auto (RBC) [Entitic mass  
]Ordered By: Quentin Cuenca on 2025   
   
                                                    MCH (RBC) [Entitic   
mass]                                   MCH [Entitic mass] by   
Automated count                         24.7-34.3           [facility]  
   
                                                    MCHC Auto (RBC) [Mass/Vol]Or  
dered By: Quentin Cuenca on 2025   
   
                                        MCHC (RBC) [Mass/Vol] MCHC [Mass/volume]  
 by   
Automated count                         32.0-35.0           [facility]  
   
                                                    MCV Auto (RBC) [Entitic vol]  
Ordered By: Quentin Cuenca on 2025   
   
                                        MCV (RBC) [Entitic vol] MCV [Entitic vol  
ume] by   
Automated count                                       [facility]  
   
                                                    Monocyte distribution width   
[Entitic volume] in Blood by AutomatedOrdered By:   
Quentin Cuenca on 2025   
   
                                                    Monocyte distribution   
width Auto (Bld)   
[Entitic vol]                           Monocyte distribution   
width [Entitic volume]   
in Blood by Automated                     0.00-20.00          [facility]  
   
                                                    Monocytes Auto (Bld) [#/Vol]  
Ordered By: Quentin Cuenca on 2025   
   
                                        Monocytes (Bld) [#/Vol] Automated blood   
monocyte count                          0.0-0.8             [facility]  
   
                                                    Monocytes/100 WBC Auto (Bld)  
Ordered By: Quentin Cuenca on 2025   
   
                      Monocytes/100 WBC (Bld) Automated monocyte %            .   
         [facility]  
   
                                                    Natriuretic peptide B [Mass/  
Vol]Ordered By: Quentin Cuenca on 2025   
   
                                                    Natriuretic peptide B   
(Bld) [Mass/Vol] BNP ser/plas                    5-100           [facility]  
   
                                                    Neutrophils Auto (Bld) [#/Vo  
l]Ordered By: Quentin Cuenca on 2025   
   
                                                    Neutrophils (Bld)   
[#/Vol]                                 Neutrophils [#/volume]   
in Blood by Automated   
count                                   1.8-7.7             [facility]  
   
                                                    Neutrophils/100 WBC Auto (Bl  
d)Ordered By: Quentin Cuenca on 2025   
   
                                                    Neutrophils/100 WBC   
(Bld)           Automated neutrophil %                 .               [facility]  
   
                                                    Nitrite Test strip Ql (U)Ord  
ered By: Quentin Cuenca on 2025   
   
                                        Nitrite Ql (U)      Nitrite [Presence] i  
n   
Urine by Test strip                     Negative            [facility]  
   
                                                    No Panel InformationOrdered   
By: Quentin Cuenca on 2025   
   
                      Estimated GFR (CKD-EPI) > 60.0 mL/Min                       
  [facility]  
   
                                                    Pharmacy Creatinine   
Clearance (Chem 105.46                                          [facility]  
   
                                                    Nucleated erythrocytes [Pres  
ence] in Blood by Automated countOrdered By: Quentin Cuenca on 2025   
   
                                                    Nucleated RBC Auto Ql   
(Bld)                                   Nucleated erythrocytes   
[Presence] in Blood by   
Automated count                         0-0.5               [facility]  
   
                                                    Platelet mean volume Auto (B  
ld) [Entitic vol]Ordered By: Quentin Cuenca on   
2025   
   
                                                    Platelet mean volume   
(Bld) [Entitic vol]                     Platelet mean volume   
[Entitic volume] in   
Blood by Automated   
count                                   6.3-10.7            [facility]  
   
                                                    Platelets Auto (Bld) [#/Vol]  
Ordered By: Quentin Cuenca on 2025   
   
                                        Platelets (Bld) [#/Vol] Platelets [#/vol  
ume] in   
Blood by Automated   
count                                   150-450             [facility]  
   
                                                    Potassium [Moles/volume] in   
Serum or PlasmaOrdered By: Quentin Cuenca on   
2025   
   
                                        Potassium [Moles/Vol] Potassium   
[Moles/volume] in Serum   
or Plasma                               3.5-5.1             [facility]  
   
                                                    Protein Test strip (U) [Mass  
/Vol]Ordered By: Quentin Cuenca on 2025   
   
                                        Protein (U) [Mass/Vol] Protein [Mass/vol  
ume]   
in Urine by Test strip                     Negative            [facility]  
   
                                                    Protein [Mass/volume] in Ser  
um or PlasmaOrdered By: Quentin Cuenca on 2025   
   
                                        Protein [Mass/Vol]  Protein [Mass/volume  
]   
in Serum or Plasma                      6.4-8.9             [facility]  
   
                                                    RBC Auto (Bld) [#/Vol]Ordere  
d By: Quentin Cuenca on 2025   
   
                                        RBC (Bld) [#/Vol]   Erythrocytes [#/volu  
me]   
in Blood by Automated   
count                                   3.60-5.00           [facility]  
   
                                                    Serum or plasma albumin/glob  
ulin mass ratioOrdered By: Quentin Cuenca on   
2025   
   
                                                    Albumin/Globulin [Mass   
ratio]                                  Serum or plasma   
albumin/globulin mass   
ratio                                                       [facility]  
   
                                                    Serum or plasma anion gap de  
terminationOrdered By: Quentin Cuenca on 2025   
   
                                        Anion gap [Moles/Vol] Serum or plasma an  
ion   
gap determination                       6.0-15.0            [facility]  
   
                                                    Sodium [Moles/volume] in Ser  
um or PlasmaOrdered By: Quentin Cuenca on 2025   
   
                                        Sodium [Moles/Vol]  Sodium [Moles/volume  
]   
in Serum or Plasma                      136-145             [facility]  
   
                                                    Specific gravity Test strip   
(U) [Rel density]Ordered By: Quentin Cuenca on   
2025   
   
                                                    Specific gravity (U)   
[Rel density]                           Specific gravity of   
Urine by Test strip                     1.001-1.030         [facility]  
   
                                                    Troponin I High Sensitivityo  
n 2025   
   
                                                    Troponin I High   
Sensitivity     3.3 pg/mL       Normal          0.0-15.0        [facility]   
[facility]   
Physician   
Group  
   
                                        Comment on above:   Result Comment: PERF  
ORMED BY:  
[facility]  
[address] [place] [address]  
[phone]  
PATHOLOGIST MEDICAL DIRECTOR  
RIZWAN GONZALEZ M.D.   
   
                                                            Performed By: #### C  
MP, PHOS, CBC ####  
[facility]  
[address]   
   
                                                    Troponin I.cardiac [Mass/vol  
ume] in Serum or Plasma by Detection limit <= 0.01   
ng/Ordered By: Quentin Cuenca on 2025   
   
                                                    Troponin I.cardiac DL   
<= 0.01 ng/mL   
[Mass/Vol]                              Troponin I.cardiac   
[Mass/volume] in Serum   
or Plasma by Detection   
limit <= 0.01 ng/                       0.0-15.0            [facility]  
   
                                                    Urea nitrogen [Mass/volume]   
in Serum or PlasmaOrdered By: Quentin Cuenca on   
2025   
   
                                                    Urea nitrogen   
[Mass/Vol]                              Urea nitrogen   
[Mass/volume] in Serum   
or Plasma                               7-25                [facility]  
   
                                                    Urobilinogen Test strip (U)   
[Mass/Vol]Ordered By: Quentin Cuenca on 2025   
   
                                                    Urobilinogen (U)   
[Mass/Vol]                              Urobilinogen   
[Mass/volume] in Urine   
by Test strip                           Normal              [facility]  
   
                                                    WBC Auto (Bld) [#/Vol]Ordere  
d By: Quentin Cuenca on 2025   
   
                                        WBC (Bld) [#/Vol]   Leukocytes [#/volume  
]   
in Blood by Automated   
count                                   3.8-11.6            [facility]  
   
                                                    X-ray reportOrdered By: Zachary Lee on 2025   
   
                                        Study report        [facility] [address] [address]  
  
XRay Report  
Signed  
  
Patient:   
Bev Gaming MR#:   
MARIANA  
[id number]  
: 1965   
Acct:[id number]  
  
Age/Sex: 59 / F ADM   
Date:   
5  
Loc: ER Room: Type: REG   
ER  
Attending Dr:  
Copies to: Quentin Cuenca Jr, MD~  
  
  
  
Ordering Provider:   
Quentin Cuenca Jr, MD  
Date of Service:   
25  
Accession #:   
([id number]) XR/XR   
chest 1V portable:   
Shortness of   
Breath/Dyspnea  
  
  
  
  
XR chest 1V portable   
1/3/2025 8:53 PM  
  
SIGNS AND SYMPTOMS:   
Shortness breath, Covid   
positive, hypoxia  
  
PROTOCOL: Frontal   
radiograph of the chest  
  
COMPARISON: 2023  
  
FINDINGS:  
  
The trachea is midline.   
The heart and   
mediastinal structures   
are within normal   
limits. The lung   
parenchyma is clear.   
The bony thorax is   
intact.  
  
  
ORDER #: [id number]   
XR/XR chest 1V portable  
IMPRESSION:  
  
No acute   
cardiopulmonary   
pathology.  
  
Impression dictated by:   
Zachayr Lee M.D.1/3/2025 9:36 PM  
  
  
Dictation Location:   
[place] [address]  
  
  
  
Transcribed By: [facility]   
25  
Dictated By: Zachary Lee II, MD   
25  
  
Signed By:  
25  
                                                            [facility]  
Work Phone:   
[phone]  
   
                                                    XR chest 1V portableon    
   
                                        XR chest 1V portable [facility] [address] [address]  
  
XRay Report  
Signed  
  
Patient:   
Bev Gaming MR#:   
  
[id number]  
: 1965   
Acct:[id number]  
  
Age/Sex: 59 / F ADM   
Date: 25  
  
Loc: ER Room: Type: [facility]   
ER  
Attending Dr:  
Copies to: Quentin Cuenca Jr, MD  
  
  
  
Ordering Provider:   
Quentin Cuenca Jr, MD  
Date of Service:   
25  
Accession #:   
([id number]) XR/XR   
chest 1V portable:   
Shortness of   
Breath/Dyspnea  
  
  
  
  
XR chest 1V portable   
1/3/2025 8:53 PM  
  
SIGNS AND SYMPTOMS:   
Shortness breath, Covid   
positive, hypoxia  
  
PROTOCOL: Frontal   
radiograph of the chest  
  
COMPARISON: 2023  
  
FINDINGS:  
  
The trachea is midline.   
The heart and   
mediastinal structures   
are within normal   
limits. The lung  
parenchyma is clear.   
The bony thorax is   
intact.  
  
  
ORDER #: [id number]   
XR/XR chest 1V portable  
IMPRESSION:  
  
No acute   
cardiopulmonary   
pathology.  
  
Impression dictated by:   
Zachary Lee M.D.1/3/2025 9:36 PM  
  
  
Dictation Location:   
[facility]--17  
  
  
  
Transcribed By: [facility]   
25  
Dictated By: Zachary Lee II, MD   
25  
  
Signed By:  
25       Normal                                  [facility]   
[facility]   
Physician   
Group  
   
                                                    pH Test strip (U)Ordered By:  
 Quentin Cuenca on 2025   
   
                                        pH (U)              pH of Urine by Test   
strip                                   5.0-9.0             [facility]  
   
                                                    Laboratory - Microbiology an  
d Antimicrobial susceptibilityon 09-   
   
                                                    SARS-CoV-2 (COVID-19)   
RNA CHRISTOS+probe Ql (Unsp   
spec)           Negative                                        [facility]  
   
                                                    No Panel Informationon 09-10  
-2024   
   
                      FLU A      Negative                         [facility]  
   
                      FLU B      Negative                         [facility]  
   
                                                                  [facility]  
   
                                                    CNOVon 2024   
   
                                        CNOV                Office Visit (OTOLMN  
)  
-----------------------  
-----------  
BEV GAMING   
([id number]) 1965 F   
[place]  
Date Time Provider   
Department  
24 4:00 PM   
DIANA GANDARA OTOLMN  
During your visit   
today, we recorded the   
following information   
about you:  
Florecita Adams MA   
2024 3:49 PM   
Signed  
Tobacco Use: .5   
packs/day Types:   
Cigarettes  
Was smoking cessation   
packet given? Patient   
Declined  
Was a referral   
initiated?Patient   
declined.  
Diana Gandara MD   
2024 4:52 PM   
Signed  
SECTION OF RHINOLOGY,   
SINUS AND SKULL BASE   
SURGERY  
Head and Neck   
[facility] NOTE  
HPI: Patient is a 58   
year old Female   
presenting s/p   
Procedure: NASAL/SINUS  
ENDOSCOPY SURGICAL W/   
MAXILLARY ANTROSTOMY W/   
REMOVAL OF TISSUE FROM   
MAXILLARY  
SINUS on 24 .   
Doing well, no   
complaints has no real   
bad pain took tylenol  
for pain.  
PAST MEDICAL HISTORY  
No date: Arthritis  
No date: COPD (chronic   
obstructive pulmonary   
disease) (Formerly [facility])  
No date: Depression  
Comment: sees a psych  
No date: Diabetes ([facility])  
No date: DVT (deep   
venous thrombosis)   
([facility]ly [facility])  
Comment: Left lower   
extremity s/p ankle   
surgery  
No date:   
Hypogammaglobulinemia   
([facility]ly [facility])  
No date: On home oxygen   
therapy  
Comment: 4L NC  
No date: Overweight  
No date: Pneumonia  
No date: PONV   
(postoperative nausea   
and vomiting)  
No date: RA (rheumatoid   
arthritis) (Formerly [facility])  
PAST SURGICAL HISTORY  
No date: ANKLE LEFT OP   
SURGERY  
Comment: plantar tendon  
No date:   
CHOLECYSTECTOMY  
No date: HERNIA REPAIR   
HX  
No date: HIP RIGHT OP   
SURGERY  
Comment: tendon repair  
No date: HYSTERECTOMY   
HX  
Comment: total  
No date: KNEE RIGHT OP   
SURGERY  
Comment: tendon repair  
No date: SHOULDER   
ARTHROSCOPY/SURGERY  
Comment: bilateral   
tendon repair  
FAMILY HISTORY  
Problem Relation Age of   
Onset  
Hypertension Mother  
Arthritis Mother  
Cancer Father  
Hypertension Brother  
Social History  
Tobacco Use  
Smoking status: Every   
Day  
Current packs/day: 0.50  
Average packs/day: 0.5   
packs/day for 36.7   
years (18.3 ttl pk-yrs)  
Types: Cigarettes  
Start date:   
Smokeless tobacco:   
Never  
Vaping Use  
Vaping status: Never   
Used  
Substance Use Topics  
Alcohol use: No  
Drug use: No  
Comment: denies tx for   
drug/alcohol abuse in   
the past.  
Current Outpatient   
Medications  
Medication Sig Dispense   
Refill  
sodium chloride (AYR   
SALINE) 0.65 % nasal   
spray Use 1 Spray in   
the nose every  
2 hours as needed. 50   
mL 3  
cefADROxil (DURICEF)   
500 mg capsule Take 1   
capsule by mouth two   
times a day  
for 8 days. 16 capsule   
0  
ondansetron orally   
disintegrating (ZOFRAN   
ODT) 4 mg   
disintegrating tablet   
Take  
1 tablet by mouth every   
8 hours as needed for   
nausea/vomiting. 10   
tablet 0  
promethazine   
(PHENERGAN) 25 mg   
tablet 25 mg every 8   
hours as needed.  
azelastine 0.1% nasal   
spray SPRAY 1 SPRAY   
INTO EACH NOSTRIL TWICE   
DAILY 30 mL  
2  
albuterol (PROVENTIL)   
2.5 mg /3 mL (0.083 %)   
nebulizer solution 2.5   
mg as  
needed.  
albuterol HFA   
(PROVENTIL HFA,   
VENTOLIN HFA) 90   
mcg/actuation inhaler   
INHALE 1  
PUFF EVERY 4 HOURS AS   
NEEDED FOR WHEEZE OR   
FOR SHORTNESS OF BREATH  
famotidine (PEPCID) 40   
mg tablet Take 40 mg by   
mouth every morning.  
IBUPROFEN IB ORAL Take   
by mouth as needed.  
LORazepam (ATIVAN) 0.5   
mg Take 0.5 mg by mouth   
at bedtime as needed.  
OXYGEN, HOME THERAPY, 4   
L/min by Nasal Cannula   
route as directed. Can   
be off  
for 3-4 hours without   
issue.  
semaglutide (OZEMPIC) 1   
mg/dose (2 mg/1.5 mL)   
pnij Inject 2 mg   
subcutaneously  
one time a week.  
Insulin Lispro, Human,   
(HUMALOG) 100 unit/mL   
crtg Inject   
subcutaneously w  
MEALS. (Patient not   
taking: Reported on   
2024)  
No current   
facility-administered   
medications for this   
visit.  
ALLERGIES  
Allergen Reactions  
Effexor [Venlafaxin*   
Other: See Comments  
Seizures.  
Penicillins Rash, Hives  
difficult to breathe,   
swelling  
Bactrim [Sulfametho*   
Itching  
Reglan [Metoclopram*   
Mental Status Change  
Topamax [Topiramate]   
Mental Status Change  
ROS:  
CONSTITUTIONAL: No   
fevers, chills,   
nightsweats, unintended   
weight loss  
HEENT: No heaches, No   
nasal congestion/sinus   
symptoms, No epistaxis,   
No sense  
of smell  
EYES: No diplopia or   
blurry vision.  
PHYSICAL EXAM:  
There were no vitals   
filed for this visit.  
? General appearance:   
well developed, well   
nourished, without   
obvious  
deformities  
? Nasal exam: The   
mucosa is pink, the   
septum is Midline and   
the visible  
turbinates are No   
hypertrophied on   
anterior rhinoscopy  
? Oral cavity and   
oropharynx: the oral   
mucosa, tongue, tonsil   
area, and  
posterior pharyngeal   
mucosa are without   
lesions.  
Procedure: After   
obtaining verbal   
consent, the patient   
was sprayed with 4%  
topical lidocaine and   
Afrin. A rigid nasal   
scope was utilized to   
examine the  
patient nose  
Spangler splints were   
removed from bilateral   
nostrils. The Middle   
meatal spacers  
were removed from   
bilateral nostrils. The   
ethmoid crusting and   
clots were  
suctioned and removed   
with blaksley. The rest   
of the sinuses were   
suctioned.  
P (more content not   
included)...        Normal                                  [facility]  
   
                                                    HbA1c (Bld) [Mass fraction]o  
n 2024   
   
                                                    Interpretation and   
review of laboratory   
results         Normal                                          [facility]  
   
                                                    Laboratory - Hematology and   
Cell countson 2024   
   
                                                    HbA1c (Bld) [Mass   
fraction]       6.3 %                                           [facility]  
   
                                                    [facility] SURGICAL PATHOLOGYon    
   
                      [facility] CASE REPORT                                  [facility]  
   
                                        Comment on above:   Surgical Pathology R  
eport Case: [id number]  
Authorizing Provider: Zachary Cooper MD  Collected: 2024   
01:25 PM  
Ordering Location: Admitting Received: 2024 02:02 PM  
Pathologist: Dee Dee Estrada MD  
Specimen: Sinus Contents, Bilateral  
  
   
   
                      [facility] CLINICAL HISTORY                                  [facility]  
   
                                        Comment on above:   Pre-op diagnosis:  
Chronic pansinusitis [J32.4]  
  
   
   
                      F FINAL DIAGNOSIS                                  [facility]  
   
                                        Comment on above:   A. Bilateral sinus c  
ontents, excision:  
- Chronic sinusitis.  
- Fragments of cartilage and bone.  
Electronically signed by Dee Dee Estrada MD on 2024 at   
4:05 PM  
  
   
   
                                                    [facility] FINAL PERFORMING   
LAB                                                             [facility]  
   
                                        Comment on above:   Diagnostic interpret  
ation performed at [facility], [address] [place] [address] CLIA# [id number]  
  
: Osmel Aquino M.D.  
  
   
   
                      F GROSS DESCRIPTION                                  [facility]  
   
                                        Comment on above:   A. Sinus Contents, B  
ilateral  
Labeled: ???Sinus contents bilateral???  
Received: Saline  
Number of tissue fragments: Multiple tan-pink,   
irregularly-shaped soft admixed with tan, irregularly-shaped,   
gritty bone fragments  
Size: 2.5 x 1.6 x 0.3 cm  
Cassette code: Entirely submitted in 2 cassettes (A2 to be   
processed after light decalcification).  
  
Gross examination performed at [place] [address] CLIA#[id number]  
  
[facility] 2024 3:27 PM  
  
  
   
   
                                                            Specimen Type: TISSU  
E   
SPECIMEN  
Ordering Facility:   
[facility]  
Address: [address] [place] [address]  
Original Ordering   
Provider: ZACHARY COOPER                                         [facility] 2024   
   
                                        [facility]                Telephone (OTOLMN)  
-----------------------  
-----------  
BEV GAMING   
([id number]) 1965 [place] [employer]  
Date Time Provider   
Department  
24 SONIA TRUJILLO OTOLMN  
During your visit   
today, we recorded the   
following information   
about you:  
Sonia Trujillo, CARINA 2024   
5:28 PM Signed  
Klaudia, camille is   
Sonia from the   
[facility]. I   
work with Dr Cooper and  
they have asked us to   
call and check in on   
you after your surgery  
Do you have a few   
minutes that I could   
ask you a few   
questions? Yes  
On a scale of 1 to 10,   
with 10 being highest,   
how do you rate your   
pain after  
the surgery? 10  
Is your pain controlled   
with the pain   
medication prescribed?   
Yes  
Have you had any sudden   
changes in your hearing   
in the past few days?   
No  
Have you had any   
nausea/vomiting in the   
past few days? No  
Have you had dizziness   
or vertigo in the past   
few days? No  
Are there any problems   
with your incision or   
do you have dressing   
concerns? NA  
Do you have swelling,   
redness or drainage at   
your incision? NA  
Have you had fevers/?   
No  
Are you able to eat and   
drink normally? Yes  
Which medications are   
you currently taking   
and how often are you   
taking it?  
1000 mg tylenol every 4   
hours routinely and an   
occasional oxycodone  
How is your activity   
level? I am sleeping a   
lot today.  
Do you have any other   
questions or concerns?   
No  
Do you have your post   
op appt? Yes  with   
both Dr Cooper and Dr Gandara  
Do you know how to   
reach us if you have   
any questions before   
your appointment?  
For skull base surgery   
patients (schwannoma,   
glomus, etc)  
Have there been any   
changes in your facial   
movements? No  
Are you experiencing   
any drainage from your   
wound? NA  
Are you experiencing   
any drainage from your   
ear? No  
Are you experiencing   
any drainage from your   
nose? Yes A medium   
amount of blood  
- about the same as   
last time she had this   
procedure.  
Sonia Trujillo RN  
Allergies As of Date:   
2024 Noted   
Allergy Reaction  
EFFEXOR (VENLAFAXINE   
ANALOGUES) 2014   
14 - Other: See   
Comments  
Comments: Seizures.  
PENICILLINS 2021   
2 - Rash  
4 - Hives  
Comments: difficult to   
breathe, swelling  
BACTRIM   
(SULFAMETHOXAZOLE-TRIME  
TH*2021 9 -   
Itching  
REGLAN (METOCLOPRAMIDE   
HCL) 2014 1 -   
Mental Status Change  
TOPAMAX (TOPIRAMATE)   
2014 1 - Mental   
Status Change  
Date Reviewed:   
2024  
Reviewed by: Scarlett Sharif RN - Fully   
Assessed  
Reason for Visit:  
Post Op Follow Up   
[3947]  
Prescriptions as of   
2024  
- sodium chloride (AYR   
SALINE) 0.65 % nasal   
spray  
Use 1 Spray in the nose   
every 2 hours as   
needed.  
- oxyCODONE IR   
(ROXICODONE) 5 mg   
immediate release   
tablet  
Take 1 tablet by mouth   
every 6 hours as needed   
for up to 3 days.  
- cefADROxil (DURICEF)   
500 mg capsule  
Take 1 capsule by mouth   
two times a day for 8   
days.  
- ondansetron orally   
disintegrating (ZOFRAN   
ODT) 4 mg   
disintegrating tablet  
Take 1 tablet by mouth   
every 8 hours as needed   
for nausea/vomiting.  
- promethazine   
(PHENERGAN) 25 mg   
tablet  
25 mg every 8 hours as   
needed.  
- azelastine 0.1% nasal   
spray  
SPRAY 1 SPRAY INTO EACH   
NOSTRIL TWICE DAILY  
- albuterol (PROVENTIL)   
2.5 mg /3 mL (0.083 %)   
nebulizer solution  
2.5 mg as needed.  
- albuterol HFA   
(PROVENTIL HFA,   
VENTOLIN HFA) 90   
mcg/actuation inhaler  
INHALE 1 PUFF EVERY 4   
HOURS AS NEEDED FOR   
WHEEZE OR FOR SHORTNESS   
OF BREATH  
- famotidine (PEPCID)   
40 mg tablet  
Take 40 mg by mouth   
every morning.  
- IBUPROFEN IB ORAL  
Take by mouth as   
needed.  
- LORazepam (ATIVAN)   
0.5 mg  
Take 0.5 mg by mouth at   
bedtime as needed.  
- OXYGEN, HOME THERAPY,  
4 L/min by Nasal   
Cannula route as   
directed. Can be off   
for 3-4 hours without  
issue.  
- semaglutide (OZEMPIC)   
1 mg/dose (2 mg/1.5 mL)   
pnij  
Inject 2 mg   
subcutaneously one time   
a week.  
- Insulin Lispro,   
Human, (HUMALOG) 100   
unit/mL crtg  
Inject subcutaneously w   
MEALS.  
Meds Comments as of   
2021:  
2020 Only takes   
Insuli  
Problem List As Of Date   
2024 Noted   
Resolved  
Pneumonia [J18.9]  
Arthritis [M19.90]  
Diabetes (HCC) [E11.9]  
COPD (chronic   
obstructive pulmonary   
disease) (H*  
Overweight [E66.3]  
RA (rheumatoid   
arthritis) (HCC)   
[M06.9]  
Depression [F32.A]  
Hypogammaglobulinemia   
(HCC) [D80.1]  
Displacement of   
cervical intervertebral   
disc wi*2014  
Cervical herniation   
[M50.20] 2014  
DVT of axillary vein,   
acute left (HCC)   
[I82.A12]2014  
DVT of lower extremity   
(deep venous   
thrombosis)*2014  
Cervical myelopathy   
[G95.9] 2014  
HX: anticoagulation   
[Z92.29] 2014  
S/P cervical spinal   
fusion [Z98.1]   
2014  
Smoking [F17.200]   
2014  
Class 2 obesity [E66.9]   
2021  
Anxiety [F41.9]   
2024  
Alpha-1-antitrypsin   
deficiency (HCC)   
[E88.01] 2023  
GERD (gastroesophageal   
reflux disease) [K21.9]   
2024  
Encounter Number:   
[id number]  
Encounter Status:Closed   
by SONIA TRUJILLO on 24    [facility]  
   
                                                    ANES POSTPROC EVALon   
024   
   
                                        ANES POSTPROC EVAL  HNO ID: [id number]  
Author: MARIANA NGUYỄN MD  
Service: ?  
Author Type:   
Anesthesiologist  
Type: Anesthesia   
Postprocedure   
Evaluation  
Filed: 2024 14:16  
Note Text:  
POST ANESTHESIA   
EVALUATION NOTE  
: 1965  
Procedure Summary  
Date: 24 Room /   
Location: [address] PEDIATRIC SURGERY  
Anesthesia Start: 1140   
Anesthesia Stop:  
Procedures:  
NASAL/SINUS ENDOSCOPY   
SURGICAL W/ MAXILLARY   
ANTROSTOMY W/ REMOVAL   
OF TISSUE  
FROM MAXILLARY SINUS   
(Bilateral: Sinus)  
ENDOSCOPY NASAL/SINUS   
W/ FRONTAL SINUS   
EXPLORATION (Bilateral:   
Sinus)  
ENDOSCOPY NASAL/SINUS   
W/ ETHMOIDECTOMY, TOTAL   
(Bilateral: Sinus)  
ENDOSCOPIC SEPTOPLASTY   
(Nose)  
COBLATION TURBINATES   
INTRAMURAL (Bilateral:   
Sinus)  
ENDOSCOPY NASAL/SINUS   
W/ SPHENOIDOTOMY   
(Bilateral: Sinus)  
STEREOTACTIC   
COMPUTER-ASSISTED   
(NAVIGATIONAL)   
PROCEDURE CRANIAL   
EXTRADURAL  
(Bilateral: Head)   
Diagnosis:  
Chronic pansinusitis  
(Chronic pansinusitis   
[J32.4])  
Surgeons: Zachary Cooper MD Responsible   
Provider: MARIANA Nguyễn MD  
Anesthesia Type:   
general ASA Status: 3  
Anesthesia Type:   
general  
Airway Type: ETT  
Last Vitals  
Vitals Value Taken Time  
/78 24 1402  
Temp 36.2 ?C (97.2 ?F)   
24 1402  
Pulse 92 24 1415  
Resp 18 24 1415  
SpO2 100 % 24   
1415  
Vitals shown include   
unfiled device data.  
Post Anesthesia Patient   
Status  
Patient Evaluation:   
bedside.  
Neurological Status:   
aware and responsive.  
Pulmonary Status:   
breathing comfortably   
on supplemental oxygen  
Airway Control:   
returned to baseline   
unsupported.  
Cardiovascular Status:   
stable.  
Pain Management:   
clinically adequate  
Postoperative   
Hydration: acceptable.  
Intraoperative Events:  
no significant   
anesthesia events  
Post Operative   
Nausea/Vomiting Status:   
no significant post   
operative nausea or  
vomiting  
Recommendation:   
continue current plan   
of care.  
Anesthesia Observations  
No Documentation  
SIGNATURE: MARIANA Nguyễn MD   
PATIENT NAME: Bev Gaming  
DATE: 2024   
MRN: [id number]  
TIME: 2:16 PM CSN:   
[id number]           Normal                                  [facility]  
   
                                                    ANES PRE-OPon 2024   
   
                                        ANES PRE-OP         HNO ID: [id number]  
Author: MARIANA NGUYỄN MD  
Service: ?  
Author Type:   
Anesthesiologist  
Type: Anesthesia   
Preprocedure Evaluation  
Filed: 2024 11:15  
Note Text:  
ANESTHESIOLOGY DAY OF   
SURGERY NOTE  
: 1965  
Procedure Information  
Date/Time: 24   
1015  
Procedures:  
NASAL/SINUS ENDOSCOPY   
SURGICAL W/ MAXILLARY   
ANTROSTOMY W/ REMOVAL   
OF TISSUE  
FROM MAXILLARY SINUS   
(Bilateral: Hand)  
ENDOSCOPY NASAL/SINUS   
W/ FRONTAL SINUS   
EXPLORATION (Bilateral:   
Sinus)  
ENDOSCOPY NASAL/SINUS   
W/ ETHMOIDECTOMY, TOTAL   
(Bilateral: Sinus)  
ENDOSCOPIC SEPTOPLASTY   
(Nose)  
COBLATION TURBINATES   
INTRAMURAL (Bilateral:   
Head)  
ENDOSCOPY NASAL/SINUS   
W/ SPHENOIDOTOMY   
(Bilateral: Sinus)  
STEREOTACTIC   
COMPUTER-ASSISTED   
(NAVIGATIONAL)   
PROCEDURE CRANIAL   
EXTRADURAL  
(Bilateral: Head)  
Location: [address] PEDIATRIC SURGERY  
Surgeons: Zachary Cooper MD  
Estimated body mass   
index is 30.41 kg/m? as   
calculated from the   
following:  
Height as of 24:   
172.7 cm (5' 8 ).  
Weight as of 24:   
90.7 kg (200 lb).  
Most recent hematocrit   
and potassium results:  
Hematocrit 46.4   
2021  
Potassium 4.1 2021  
Relevant Problems  
No relevant active   
problems  
I - PHYSICAL EVALUATION  
AIRWAY  
Patient intubated: No.  
Tracheostomy tube not   
present  
Mallampati: II.  
TM distance: >3 FB.  
Neck ROM: full ROM   
without neurological   
symptoms.  
Mouth opening:   
adequate.  
Short neck: no.  
Thick neck: no  
II - ANESTHESIA PLAN  
ASA Score: 3  
Anesthetic Plan:   
general  
Airway type: ETT  
NPO Status: adequate  
Beta Blocker  
Monitoring Plan  
Monitoring plan:   
standard ASA.  
Post Procedure   
Analgesic Plan  
Postoperative analgesic   
plan: multimodal   
analgesia.  
Informed Consent  
Anesthetic risks,   
benefits, alternatives,   
personnel and consent   
discussed: yes.  
Patient / Responsible   
Party agrees to   
proceed: yes  
Patient / Surrogate   
agrees to blood   
products: Yes  
Significant changes in   
the patient condition   
since the History and   
Physical, not  
otherwise documented in   
primary service   
progress note: no.  
Potential Anesthesia   
issues that may suggest   
increased risk of   
complications or  
contraindication to   
planned procedure:   
none.  
Vitals Value Taken Time  
/55 24 0939  
Pulse 90 24 09  
Resp 18 24  
Temp 36.6 ?C (97.9 ?F)   
24 09  
SpO2 96 % 24  
Facility-Administered   
Medications as of   
2024  
Medication Dose Route   
Frequency  
lidocaine (PF) 10 mg/mL   
(1 %) 1-2 mg injection   
(XYLOCAINE) 0.1-0.2 mL  
INTRADERMAL PRN  
Or  
lidocaine 1% 0.25 mL   
subcutaneous j-tip   
syringe (XYLOCAINE)   
0.25 mL  
SUBCUTANEOUS PRN  
lactated ringers iv   
infusion 5-30 mL/hr   
INTRAVENOUS CONTINUOUS  
NaCl 0.9% iv flush bag   
20 mL INTRAVENOUS PRN  
vancomycin 1.5 g in   
NaCl 0.9% 250 mL   
(VANCOCIN) 0.015   
g/kg/dose  
(Order-Specific)   
INTRAVENOUS Pre-Op Once  
Outpatient Medications   
as of 2024  
Medication Sig  
famotidine (PEPCID) 40   
mg tablet Take 40 mg by   
mouth every morning.  
LORazepam (ATIVAN) 0.5   
mg Take 0.5 mg by mouth   
at bedtime as needed.  
sodium chloride (AYR   
SALINE) 0.65 % nasal   
spray Use 1 Spray in   
the nose every 2  
hours as needed.  
promethazine   
(PHENERGAN) 25 mg   
tablet 25 mg every 8   
hours as needed.  
albuterol (PROVENTIL)   
2.5 mg /3 mL (0.083 %)   
nebulizer solution 2.5   
mg as  
needed.  
albuterol HFA   
(PROVENTIL HFA,   
VENTOLIN HFA) 90   
mcg/actuation inhaler   
INHALE 1  
PUFF EVERY 4 HOURS AS   
NEEDED FOR WHEEZE OR   
FOR SHORTNESS OF BREATH  
IBUPROFEN IB ORAL Take   
by mouth as needed.  
OXYGEN, HOME THERAPY, 4   
L/min by Nasal Cannula   
route as directed. Can   
be off  
for 3-4 hours without   
issue.  
semaglutide (OZEMPIC) 1   
mg/dose (2 mg/1.5 mL)   
pnij Inject 2 mg   
subcutaneously  
one time a week.  
Insulin Lispro, Human,   
(HUMALOG) 100 unit/mL   
crtg Inject   
subcutaneously w  
MEALS. (Patient not   
taking: Reported on   
2024)  
I have interviewed and   
examined the patient. I   
have reviewed the   
medical record  
and/or the   
pre-anesthesia   
evaluation, pertinent   
labs, and test results.  
This contains updated   
information obtained   
within 48 hours of   
Surgery/Procedure.  
SIGNATURE: MARIANA Nguyễn MD   
PATIENT NAME: Bev Gaming  
DATE: 2024   
MRN: [id number]  
TIME: 11:15 AM CSN:   
[id number]           [facility]  
   
                                                    BRIEF OP NOTon 2024   
   
                                        BRIEF OP NOT        HNO ID: [id number]  
Author: ROM ANDREWS MD  
Service: Otolaryngology  
Author Type: Resident  
Type: Brief Op Note  
Filed: 2024 13:40  
Note Text:  
BRIEF OP NOTE  
LOG ID: [id number]  
Surgery/Procedure Date:   
2024  
Incision/Procedure   
Start Time: 12:06 PM  
Incision   
Close/Procedure End   
Time: 1:32 PM  
Surgeons and Role:  
* Zachary Cooper MD -   
Primary  
* Rom Andrews MD -   
Resident - Assisting  
* Diana Gandara MD  
Pre Operative   
Diagnoses: Chronic   
pansinusitis [J32.4]  
Post Operative   
Diagnoses: Same as   
pre-op  
Procedure(s):  
Right maxillary   
antrostomy with removal   
of disease  
Lysis of left   
intranasal synechiae  
Septoplasty  
Revision bilateral   
inferior turbinate   
reduction  
Bilateral Eustachian   
tube dilation  
STEREOTACTIC   
COMPUTER-ASSISTED   
(NAVIGATIONAL)   
PROCEDURE CRANIAL   
EXTRADURAL:  
[id number] (CPT?)  
Anesthesia: General  
Procedure Indications:   
Bev Gaming is an   
58 year old female with   
the above  
preoperative diagnosis   
and as such was taken   
to operating room for   
above listed  
procedure  
Operative Findings:  
See operative report   
for full findings  
Bilateral Eustachian   
tube dilation to 12   
ghislaine, one time each side   
for 2 minutes  
Septoplasty for   
posterior right   
deviation  
Spangler splints left in   
nose  
Bilateral maxillary   
antrostomies with thick   
mucus within maxillary   
sinuses  
Left intranasal   
synechiae from middle   
turbinate to lateral   
nasal wall just  
anterior to maxillary   
antrostomy  
Estimated Blood Loss:   
10ml  
Specimens:  
ID Type Source Tests   
Collected by Time   
Destination  
A : Tissue Sinus   
Contents, Bilateral   
SURGICAL PATHOLOGY   
Zachary Cooper MD  
2024 1:25 PM  
Implants: * No implants   
in log *  
Complications: None  
Drains: None  
Counts: Correct  
SIGNATURE: Rom Andrews MD   
PATIENT NAME: Bev Gaming  
DATE: 2024   
MRN: [id number]  
TIME: 1:38 PM   
PAGER/CONTACT #:   
[phone]         [facility]  
   
                                                    NURSING PROGon 2024   
   
                                        NURSING PROG        HNO ID: [id number]  
Author: SCARLETT SHARIF, RN  
Service: Nursing  
Author Type: Registered   
Nurse  
Type: Nursing Progress   
Note  
Filed: 2024 16:26  
Note Text:  
M23-32 Bev Gaming- pt has script   
for oxy can she have   
dose prior to d/c  
home-thanks scarlett [id number]   
lissett durbin        [facility]  
   
                                        NURSING PROG        HNO ID: [id number]  
Author: RANDA MITCHELL RN  
Service: Nursing  
Author Type: Registered   
Nurse  
Type: Nursing Progress   
Note  
Filed: 2024 09:54  
Note Text:  
Other: SDS Nursing Note  
Dr. Allison galeana for   
informed consent   
 M23-31, OR 56 - K.   
Jonathan, Good Morning,  
patient needs informed   
consent. [place], [place]   
[id number]               Normal                                  [facility]  
   
                                                    OPERATIVE NOon 2024   
   
                                        OPERATIVE NO        HNO ID: [id number]  
Author: ROM ANDREWS MD  
Service: Otolaryngology  
Author Type: Resident  
Type: Operative Report  
Filed: 2024 13:43  
Note Text:  
-----------------------  
-----------  
Attestation signed by   
Zachary Cooper MD at   
2024 7:48 AM  
I was present for the   
entire procedure  
-----------------------  
-----------  
[facility] [place] [id number] [phone] or [phone]   
[facility]  
C O N F I D E N T I A L   
I N F O R M A T I O N  
-----------------------  
-----------  
-----------------------  
------------  
[facility]  
OPERATIVE REPORT   
Otolaryngology Head and   
Neck Surgery  
Name: Bev J Jonathan  
[facility] #: [id number]  
Date: 2024  
YOB: 1965  
Pre Operative   
Diagnoses: Bilateral   
Eustachian tube   
dysfunction  
Post Operative   
Diagnoses: Same as   
pre-op  
Procedures:  
BIlateral Eustachian   
Tube Balloon Dilation  
Surgeons and Role:  
* Zachary Cooper MD -   
Primary  
* Rom Andrews MD -   
Resident - Assisting  
* Diana Gandara MD  
No Additional Staff  
Anesthesia: General  
Incision/Procedure   
Start Time: 12:06 PM  
Incision   
Close/Procedure End   
Time: 12:16 PM for our   
portion  
Findings:  
Bilateral Eustachian   
tubes more open   
following dilation  
COMPLICATIONS: None  
Procedure Indications:   
Bev Gaming is an   
58 year old female who   
presented  
to the clinic with   
bilateral Eustachian   
tube dysfunction. This   
above procedure  
was described to the   
patient including   
risks, benefits,   
alternatives, and  
personnel and informed   
consent was obtained.  
Description:  
The patient was brought   
into the operating room   
and laid in a supine   
position on  
the operating room   
table. A surgical   
huddle was conducted to   
verify the patient  
and the procedure to be   
performed. General   
anesthesia was induced   
and the  
patient's airway was   
secured with an ET   
tube. A time out was   
called. 0.5% Afrin  
was sprayed in the   
bilateral nasal   
passage(s). A 0 degree   
endoscope was then  
used to examine the   
nasal cavity   
demonstrating a septal   
deviation to the right  
and bilaterally   
hypertrophied inferior   
turbinates.  
Using a 0 degree scope,   
the right eustachian   
tube torus was   
visualized and a  
Acclarent inflation   
device was   
atraumatically inserted   
until resistance was   
met  
in the cartilaginous   
tube and then inflated   
to 12atm for 2 min.   
This was then  
deflated. The torus was   
found to be expanded.  
In a similar fashion   
again using a 0 degree   
scope, the left   
eustachian tube  
torus was visualized   
and a Acclarent   
inflation device was   
atraumatically  
inserted until   
resistance was met in   
the cartilaginous tube   
and then inflated to  
12atm for 2 min.. This   
was then deflated. The   
torus was found to be   
expanded.  
The nasopharynx was   
then suctioned, and the   
nasal cavity was   
inspected for any  
bleeding and none was   
noted.  
We then turned the   
patient over to Dr. Gandara's team for   
bilateral endoscopic  
sinus surgery. The   
patient was turned over   
to the anesthesia team   
for awakening  
and extubation. she was   
transferred to the PACU   
in stable condition.  
Specimens:  
None from our portion  
Estimated Blood Loss: 0   
mLs  
Implants: * No implants   
in log *  
Drains: None  
Counts: Correct  
Attestation:  
Zachary Cooper MD was   
scrubbed for the entire   
procedure and performed   
it with  
assistance for Bev Andrews MD for the   
service of Zachary Cooper MD            [facility]  
   
                                        OPERATIVE NO        HNO ID: [id number]  
Author: DIANA GANDARA MD  
Service: Otolaryngology  
Author Type: Resident  
Type: Operative Report  
Filed: 2024 13:03  
Note Text:  
-----------------------  
-----------  
Attestation signed by   
Diana Gandara MD at   
2024 1:03 PM  
I was present and   
supervised the entire   
procedure.  
-----------------------  
-----------  
[facility] OPERATIVE/PROCEDURE   
REPORT  
LOG ID: [id number]  
SURGERY/PROCEDURE DATE:   
2024  
INCISION/PROCEDURE   
START TIME: 12:06 PM  
INCISION   
CLOSE/PROCEDURE END   
TIME: 1:32 PM  
SURGEON(S)/PROCEDURALIS  
T(S) AND ASSISTANT(S):  
Surgeons and Role:  
* Zachary Cooper MD  
* Rom Andrews MD -   
Resident - Assisting  
* Diana Gandara MD   
- Primary  
No Additional Staff  
ANESTHESIA: General  
[facility] OP REPORT  
Performed by: Rom Andrews MD  
Authorized by: Diana Gandara MD  
Sinus Surgery  
Septoplasty  
Revision bilateral   
inferior turbinate   
reduction  
Pre-Op   
Diagnosis/Indication:   
Chronic sinusitis   
without nasal polyps  
Post-Op Diagnoses: Same   
as pre-op  
OPERATIVE   
FINDINGS/ADDITIONAL   
DETAILS  
[facility] [place] [id number] [phone] or [phone] [facility]  
C O N F I D E N T I A L   
I N F O R M A T I O N  
-----------------------  
-----------  
-----------------------  
------------  
STANDARD [facility] DOCUMENT  
OPERATIVE REPORT  
Patient Name: Bev Gaming  
Patient MRN: [id number]  
Date of Surgery: 2024  
Surgeons and Role:  
* Rom Andrews MD -   
Resident - Assisting  
* Diana Gandara MD   
- Primary  
Anesthesia: General  
Procedure(s):  
1. Stereotactic image   
guided surgical   
navigation  
2. Right maxillary   
antrostomy with removal   
of disease, endoscopic  
3. Left lysis of   
intranasal synechiae   
and removal of left   
maxillary sinus  
contents  
4. Septoplasty  
5. Revision bilateral   
inferior turbinate   
reduction  
Operative Findings:  
Thick, clear mucus   
present in bilateral   
maxillary sinuses.  
Left lateral medial   
turbinate to lateral   
nasal wall just   
anterior to maxillary  
antrostomy synechiae  
Right posterior bony   
septal deviation.   
Placed spangler splints at   
end of procedure  
Procedure Narrative:  
The patient was brought   
into the operating room   
and laid in a supine   
position on  
the operating room   
table. A surgical   
huddle was conducted to   
verify the patient  
and the procedure to be   
performed. General   
anesthesia was induced   
and the  
patient's airway was   
secured with an ET   
tube. Case began with   
procedure by Dr. Cooper which is   
dictated separately.   
Once he was completed,   
patient was handed  
over to us. A time out   
was called and   
stereotactic navigation   
was registered and  
tested and used   
throughout the case.   
The nasal cavities were   
sprayed with 0.5%  
Afrin. The bilateral   
nasal cavity was   
injected with lidocaine   
1% with 1:100,000  
epinephrine.  
The bilateral inferior   
turbinate was   
outfractured laterally   
with a [place]  
elevator. Due to   
mid-septal deviation to   
the right, decision was   
made to perform  
septoplasty. Incision   
was made through septal   
mucosa anterior to the   
septal  
deviation using #15   
blade. A   
submucoperichondrial   
flap was elevated over   
the  
deviation using [place]   
elevator. Incision was   
made through septal   
cartilage  
using [place] elevator   
as the area of septal   
deviation was the bony   
septum just  
posterior to the   
osseocartilaginous   
fusion of the septum.   
Submucoperichondrial  
flap was then elevated   
on the contralateral   
side. Septal deviation   
was then  
removed using double   
action punch and   
scissors. Septal flaps   
were then natalia back  
into position and we   
were able to continue   
with right sided sinus   
surgery.  
Using a [place] elevator   
on the right, the   
middle turbinate was   
medialized. The  
double ball probe was   
used to perform an   
uncinectomy with a   
retrograde approach.  
The probe was then used   
to cannulate the   
maxillary os and a   
large antrostomy was  
made. The edges were   
cleaned up with the   
microdebrider. Thick   
mucous was  
suctioned out of the   
maxillary sinus cavity.   
The natural ostium was   
included in  
the antrostomy; this   
was confirmed with a 30   
degree endoscope.  
On the left side, there   
was an intranasal   
synechiae immediately   
anterior to the  
previous maxillary   
antrostomy. This was   
lysed using [place]   
elevator and the  
middle turbinate was   
then medialized. Curved   
suction was then   
introduced into  
the left maxillary   
antrostomy with removal   
of significant thick,   
clear mucus. We  
then turned towards   
revision bilateral   
inferior turbinate   
reduction. Incision  
was made in the heads   
of the bilateral   
inferior turbinates   
using 15 blade.  
Submucosal tissue was   
raised off of the   
turbinate bone using   
Morris elevator,  
starting on the right   
side. Notably this was   
very scarred from   
previous  
turbinate reduction.   
Microdebrider was then   
introduced to perform a   
submucosal  
resection. An identical   
procedure was performed   
on the left side.  
The nasopharynx was   
then jennings (more content   
not included)...    Normal                                  [facility]  
   
                                                    SURGICAL PATHOLOGYon   
024   
   
                      CASE REPORT            Normal                [facility]  
   
                                        Comment on above:   Order Comment: Speci  
men Type: TISSUE SPECIMENOrdering   
Facility: [facility] Address: [address] [place] [address]   
   
                                                            Result Comment: Surg  
[facility] Pathology Report Case: [id number]  
Authorizing Provider: Zachary Cooper MD Collected: 2024   
01:25 PM  
Ordering Location: Admitting Received: 2024 02:02 PM  
Pathologist: Dee Dee Estrada MD  
Specimen: Sinus Contents, Bilateral   
   
                                                            Performed By: #### S  
 ####[facility] LABCLIA   
[id number] [place] [address] [place]   
   
                      CLINICAL HISTORY            Normal                [facility]  
   
                                        Comment on above:   Order Comment: Speci  
men Type: TISSUE SPECIMENOrdering   
Facility: [facility] Address: [address] [place] [address]   
   
                                                            Result Comment: Pre-  
op diagnosis:  
Chronic pansinusitis [J32.4]   
   
                                                            Performed By: #### S  
 ####[facility] LABCLIA   
[id number] [place] [address] [place]   
   
                      FINAL DIAGNOSIS            Normal                [facility]  
   
                                        Comment on above:   Order Comment: Speci  
men Type: TISSUE SPECIMENOrdering   
Facility: [facility] Address: [address] [place] [address]   
   
                                                            Result Comment: A. B  
ilateral sinus contents, excision:  
- Chronic sinusitis.  
- Fragments of cartilage and bone.  
Electronically signed by Dee Dee Estrada MD on 2024 at   
4:05 PM   
   
                                                            Performed By: #### S  
 ####[facility] LABCLIA   
[id number] [place] [address] [place]   
   
                      FINAL PERFORMING LAB            Normal                [facility]  
   
                                        Comment on above:   Order Comment: Speci  
men Type: TISSUE SPECIMENOrdering   
Facility: [facility] Address: [address] [place] [address]   
   
                                                            Result Comment: Diag  
nostic interpretation performed at   
[facility], [address] [place] [address] CLIA#   
[id number]  
: Osmel Aquino M.D.   
   
                                                            Performed By: #### S  
 ####[facility] LABCLIA   
[id number] [place] [address] [place]   
   
                      GROSS DESCRIPTION            Normal                [facility]  
   
                                        Comment on above:   Order Comment: Speci  
men Type: TISSUE SPECIMENOrdering   
Facility: [facility] Address: [address] [place] [address]   
   
                                                            Result Comment: A. S  
inus Contents, Bilateral  
Labeled: ???Sinus contents bilateral???  
Received: Saline  
Number of tissue fragments: Multiple tan-pink,   
irregularly-shaped soft admixed with tan, irregularly-shaped,   
gritty bone fragments  
Size: 2.5 x 1.6 x 0.3 cm  
Cassette code: Entirely submitted in 2 cassettes (A2 to be   
processed after light decalcification).  
Gross examination performed at [facility], [facility] [place] [address] CLIA#[id number]  
RSA 2024 3:27 PM   
   
                                                            Performed By: #### S  
 ####[facility] LABCLIA   
[id number] [facility] [id number] [place], [place] [id number] [facility] [place]   
   
                                                    HISTORY PHYSICALon    
   
                                        HISTORY PHYSICAL    HNO ID: [id number]  
Author: SAPPHIRE MAYORGA APRN.CNP  
Service: ?  
Author Type: Nurse   
Practitioner  
Type: H&P  
Filed: 2024 09:33  
Note Text:  
HISTORY AND PHYSICAL   
EXAMINATION  
SERVICE DATE: 2024  
SERVICE TIME: 10:51 AM  
PRIMARY CARE PHYSICIAN:   
Sherman Malone DO  
REASON FOR VISIT:  
Bev Gaming is a   
58 year old female who   
is scheduled for   
Bilateral -  
NASAL/SINUS ENDOSCOPY   
SURGICAL W/ MAXILLARY   
ANTROSTOMY W/ REMOVAL   
OF TISSUE FROM  
MAXILLARY SINUS  
Bilateral - ENDOSCOPY   
NASAL/SINUS W/ FRONTAL   
SINUS EXPLORATION  
Bilateral - ENDOSCOPY   
NASAL/SINUS W/   
ETHMOIDECTOMY, TOTAL  
ENDOSCOPIC SEPTOPLASTY  
Bilateral - COBLATION   
TURBINATES INTRAMURAL  
Bilateral - ENDOSCOPY   
NASAL/SINUS W/   
SPHENOIDOTOMY  
Bilateral -   
STEREOTACTIC   
COMPUTER-ASSISTED   
(NAVIGATIONAL)   
PROCEDURE CRANIAL  
EXTRADURAL at the   
request of Dr. Diana Gandara for   
consultation. My final  
recommendation will be   
communicated back to   
the requesting   
physician by way of  
shared medical record   
or letter.  
Assessment  
Patient has the   
following medical   
conditions which may   
affect ann-operative  
course:  
Diabetes ([facility])  
Assessment: Stable on   
Ozempic  
Patient given   
instructions regarding   
Ozempic  
Component 5 mo ago  
Hemoglobin A1C 6.5  
Specimen Collected:   
24 11:03 AM  
Follows with PCP  
DVT of lower extremity   
(deep venous   
thrombosis) ([facility])  
Assessment: s/p ankle   
surgery  
Completed course of   
Xarelto  
No recurrence  
Anxiety  
Assessment: Takes   
Valium as needed  
Class 2 obesity  
Assessment: Body mass   
index is 30.41 kg/m?.  
Smoking  
Assessment: Smokes 0.5   
PPD  
COPD (chronic   
obstructive pulmonary   
disease) ([facility])  
Assessment: Stable  
95% on RA  
Albuterol PRN  
On 4L O2 @ HS and   
during the day as   
needed  
Follows with Dr. Davidson @ [facility]  
Alpha-1-antitrypsin   
deficiency (HCC)  
Assessment: Follows   
with Pulmonology  
GERD (gastroesophageal   
reflux disease)  
Assessment: Controlled   
with pepcid  
[facility] Activity Status   
Index:  
METS:  
Do moderate work around   
the house, such as   
vacuuming, sweeping   
floors, or  
carrying in groceries   
(3.50 METs)  
Climb a flight of   
stairs or walk up a   
hill (5.50 METs)  
DASI Score: 9  
Patient denies any   
chest pain or undue   
shortness of breath   
with the above  
physical activity.  
STOP-Bang Score:  
Patient over 50 years   
old  
Denies snoring loudly  
Denies feeling tired,   
fatigued, or sleepy   
during the daytime  
Has not been observed   
to stop breathing or   
choking/gasping during   
sleep  
Denies having high   
blood pressure  
BMI less than or equal   
to 35 kg/m2  
Does not have a large   
neck  
Non-male patient  
STOP-Bang Score: 1  
[id number] Score:  
Age: <65  
Sex: female  
CHF history: No  
Hypertension history:   
No  
Stroke/TIA/thromboembol  
ism history: Yes  
Vascular disease   
history: No  
Diabetes history: Yes  
[id number]-LKNk Score: 4  
ARISCAT Score:  
Age: 51-80  
Preoperative SpO2:   
91-95%  
Respiratory infection   
in the last month: No  
Preoperative anemia:   
Yes  
Surgical incision:   
peripheral  
Duration of surgery:   
2-3 hrs  
Emergency procedure: No  
ARISCAT Score: 38  
ANESTHESIA FINDINGS:  
Intubation History: No   
history of difficult   
intubation  
Significant Anesthesia   
Considerations:  
none  
Airway History:  
No history of difficult   
airway  
I - PHYSICAL EVALUATION  
AIRWAY  
Patient intubated: No.  
Tracheostomy tube not   
present  
Mallampati: I.  
TM distance: >3 FB.  
Neck ROM: full ROM   
without neurological   
symptoms.  
Mouth opening:   
adequate.  
Short neck: no.  
Thick neck: no  
Lip Bite Test: II  
Microretrognathia/Micro  
nagthia/Recessed Chin:   
No  
DENTAL  
Dental findings: teeth   
intact.  
Dentures, upper:   
complete.  
Dentures, lower:   
complete.  
II - ANESTHESIA PLAN  
Beta Blocker  
Monitoring Plan  
Post Procedure   
Analgesic Plan  
Prepared for surgery:  
This patient is   
optimally prepared for   
surgery.  
CONSULTS:  
Patient does not   
require consults for   
optimization at this   
time.  
The Following   
Tests/Procedures Have   
Been Initiated:  
Labs not indicated per   
[facility] protocol, EKG not   
indicated per [facility]   
protocol  
Planned Anesthetic: Per   
anesthesia choice  
Subjective  
CHIEF COMPLAINT:   
Chronic Pansinusitis  
HPI: 58 year old year   
old presents to [facility]   
for evaluation. Patient   
has been  
experiencing sinus   
issues including ear   
fullness and pressure >   
1 year. Has  
elected for surgical   
intervention.  
PAST MEDICAL HISTORY  
No date: Arthritis  
No date: COPD (chronic   
obstructive pulmonary   
disease) ([facility])  
No date: Depression  
Comment: sees a psych  
No date: Diabetes ([facility])  
No date: DVT (deep   
venous thrombosis)   
([facility])  
Comment: Left lower   
extremity s/p ankle   
surgery  
No date:   
Hypogammaglobulinemia   
([facility])  
No date: On home oxygen   
therapy  
Comment: 4L NC  
No date: Overweight  
No date: Pneumonia  
No date: PONV   
(postoperative nausea   
and vomiting)  
No date: RA (rheumatoid   
arthritis) ([facility])  
PAST SURGICAL HISTORY  
No date: ANKLE LEFT OP   
SURGERY  
Comment: plantar tendon  
No date:   
CHOLECYSTECTOMY  
No date: HERNIA REPAIR   
HX  
No date: HIP RIGHT OP   
SURGERY  
Co (more content not   
included)...        Normal                                  [facility]  
   
                                                    CNOVon 2024   
   
                                        CNOV                Office Visit (OTOLMN  
)  
-----------------------  
-----------  
BEV GAMING   
([id number]) 1965 F   
[employer]  
Date Time Provider   
Department  
6/3/24 1:00 PM ZACHARY COOPER OTOLMN  
During your visit   
today, we recorded the   
following information   
about you:  
Ariadna Lockett, RN   
6/3/2024 1:14 PM Signed  
Tobacco Use: .5   
packs/day Types:   
Cigarettes  
Was smoking cessation   
packet given? Patient   
Declined  
Was a referral   
initiated?Patient   
declined.  
Zachary Cooper MD   
6/3/2024 3:57 PM Signed  
SECTION OF OTOLOGY,   
NEUROTOLOGY AND LATERAL   
SKULL BASE SURGERY  
Department of   
Otolaryngology - Head   
and Neck Surgery  
[facility]   
[place]  
History  
History of Present   
Illness:  
Bevkory Gaming is a   
58 year old female who   
presents for evaluation   
of aural  
fullness.  
Reports bilateral aural   
fullness since 2023.   
She has a history of  
tympanostomy tubes for   
HBOT for chronic wound   
treatment. She has   
since had  
issues with aural   
fullness and sensation   
of hearing loss. Denies   
persistent  
otalgia, otorrhea, room   
spinning vertigo, or   
tinnitus. Denies   
foreign body in  
ear, barotrauma,   
acoustic trauma,   
history of recurrent   
ear infections. Aside  
PET, she denies   
previous ear surgery.   
She is planning for   
revision right-sided  
sinus surgery with Dr Gandara.  
PAST MEDICAL HISTORY  
Diagnosis Date  
Arthritis  
COPD (chronic   
obstructive pulmonary   
disease) (Formerly [facility])  
Depression  
sees a psych  
Diabetes (Formerly [facility])  
DVT (deep venous   
thrombosis) (Formerly [facility])  
Left lower extremity   
s/p ankle surgery  
Hypogammaglobulinemia   
(Formerly [facility])  
On home oxygen therapy  
4L NC  
Overweight  
Pneumonia  
PONV (postoperative   
nausea and vomiting)  
RA (rheumatoid   
arthritis) (Formerly [facility])  
PAST SURGICAL HISTORY  
Procedure Laterality   
Date  
ANKLE LEFT OP SURGERY  
plantar tendon  
CHOLECYSTECTOMY  
HERNIA REPAIR HX  
HIP RIGHT OP SURGERY  
tendon repair  
HYSTERECTOMY HX  
total  
KNEE RIGHT OP SURGERY  
tendon repair  
SHOULDER   
ARTHROSCOPY/SURGERY  
bilateral tendon repair  
Current Outpatient   
Medications on File   
Prior to Visit  
Medication Sig  
azelastine 0.1% nasal   
spray SPRAY 1 SPRAY   
INTO EACH NOSTRIL TWICE   
DAILY  
albuterol HFA   
(PROVENTIL HFA,   
VENTOLIN HFA) 90   
mcg/actuation inhaler   
INHALE 1  
PUFF EVERY 4 HOURS AS   
NEEDED FOR WHEEZE OR   
FOR SHORTNESS OF BREATH  
famotidine (PEPCID) 40   
mg tablet Take 40 mg by   
mouth every morning.  
IBUPROFEN IB ORAL Take   
by mouth as needed.  
LORazepam (ATIVAN) 0.5   
mg Take 0.5 mg by mouth   
at bedtime as needed.  
semaglutide (OZEMPIC) 1   
mg/dose (2 mg/1.5 mL)   
pnij Inject 2 mg   
subcutaneously  
one time a week.  
albuterol (PROVENTIL)   
2.5 mg /3 mL (0.083 %)   
nebulizer solution 2.5   
mg as  
needed.  
rosuvastatin (CRESTOR)   
10 mg tablet Take 10 mg   
by mouth every morning.  
budesonide (PULMICORT)   
0.5 mg/2 mL nebulizer   
solution Use 2 mL via   
nebulizer  
once daily. (Patient   
not taking: Reported on   
2024)  
budesonide (PULMICORT)   
0.5 mg/2 mL nebulizer   
solution Use 2 mL via   
nebulizer  
once daily. (Patient   
not taking: Reported on   
2024)  
oxymetazoline (AFRIN,   
OXYMETAZOLINE,) 0.05 %   
nasal spray Instill 2   
Sprays in  
the nose twice daily.   
(Patient not taking:   
Reported on 2024)  
sodium chloride (DEEP   
SEA NASAL) 0.65 % nasal   
spray Use 1 Spray in   
the nose as  
needed. (Patient not   
taking: Reported on   
2024)  
insulin degludec   
(TRESIBA FLEXTOUCH   
U-100) 100 unit/mL (3   
mL) injection pen  
Inject subcutaneously.   
(Patient not taking:   
Reported on 2024)  
OXYGEN, HOME THERAPY, 4   
L/min by Nasal Cannula   
route as directed. Can   
be off  
for 3-4 hours without   
issue.  
insulin aspart (NOVOLOG   
FLEXPEN U-100 INSULIN   
SUBCUTANEOUS) Inject  
subcutaneously. sliding   
scale (Patient not   
taking: Reported on   
2024)  
Insulin Lispro, Human,   
(HUMALOG) 100 unit/mL   
crtg Inject   
subcutaneously w  
MEALS. (Patient not   
taking: Reported on   
2024)  
furosemide (LASIX) 40   
mg tablet Take 40 mg by   
mouth as needed.   
(Patient not  
taking: Reported on   
2024)  
Levalbuterol HCl   
(XOPENEX) 1.25 mg/0.5   
mL nebulizer solution   
Use 1 Ampule via  
nebulizer every 8 hours   
as needed.  
No current   
facility-administered   
medications on file   
prior to visit.  
ALLERGIES  
Allergen Reactions  
Effexor [Venlafaxin*   
Other: See Comments  
Seizures.  
Penicillins Rash, Hives  
difficult to breathe,   
swelling  
Bactrim [Sulfametho*   
Itching  
Reglan [Metoclopram*   
Mental Status Change  
Topamax [Topiramate]   
Mental Status Change  
FAMILY HISTORY  
Problem Relation Age of   
Onset  
Hypertension Mother  
Arthritis Mother  
Cancer Father  
Hypertension Brother  
Social History  
Tobacco Use  
Smoking status: Every   
Day  
Packs/day: .5  
Types: Cigarettes  
Start date:   
Smokeless tobacco:   
Never  
Vaping Use  
Vaping Use: Never used  
Substance Use Topics  
Alcohol use: No  
Drug use: No  
Comment: denies tx for   
drug/alcohol abuse in   
the past.  
Physical Exam  
Patient is alert,   
oriented  
Head examination:   
normocephalic  
Binocular microscopic   
examination of ears:  
Right ear:  
Pinna: normal  
Ex (more content not   
included)...        Normal                                  [facility]  
   
                                        CNOV                Office Visit (CDISMN  
)  
-----------------------  
-----------  
BEV GAMING   
([id number]) 1965 F   
[employer]  
Date Time Provider   
Department  
6/3/24 12:30 PM AMANDA DAIGLE  
During your visit   
today, we recorded the   
following information   
about you:  
Amanda Daigle, SADA   
2024 7:45 AM Signed  
[facility] [place]  
AUDIOLOGIC EVALUATION   
REPORT  
Name: Bev Gaming  
[facility]#: [id number]  
Date of Service:   
6/3/2024  
YOB: 1965  
Age: 58 year old  
Referred by: Diana Gandara MD  
[address]  
[facility] [address]  
Referred for:   
Evaluation of suspected   
change in hearing,   
tinnitus, or balance.  
Referral documented: In   
an order in [facility]  
Patient's major   
complaints: Reduced   
hearing in both ears,   
Tinnitus in both  
ears,   
Dizziness/vertigo/imbal  
ance, Otalgia in both   
ears, Pressure/fullness   
in  
both ears  
Bev Gaming was   
seen for an initial   
audiologic evaluation.   
Medical  
history is significant   
for chronic sinusitis.   
Patient has previously   
received  
PE tubes bilaterally.   
Today patient reported   
she has been noticing   
reduced  
hearing and aural   
fullness bilaterally,   
greater in the right   
ear than the left  
ear, as well as   
intermittent otalgia   
and tinnitus in both   
ears. Patient also  
reported dizziness   
described as   
disorientation/imbalanc  
e. She denied  
room-spinning vertigo   
sensation.She reported   
history of occupation   
noise  
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   
in factory settings.   
Today patient denied   
history of  
ototoxicity. See   
procedures tab for   
audiometric results.  
IMPRESSIONS  
RIGHT EAR:   
Sensorineural hearing   
loss  
LEFT EAR: Sensorineural   
hearing loss  
AUDIOLOGIC EVALUATION  
Following is a brief   
interpretation of the   
obtained findings from   
the  
audiologic evaluation.   
Refer to the Auditory   
Test Record for   
complete  
audiometric results.   
The patient was   
counseled about the   
test findings and  
appropriate audiologic   
recommendations were   
made.  
SUMMARY: See procedures   
tab for audiometric   
results.  
OTOSCOPY  
RIGHT EAR: Otoscopic   
inspection revealed ear   
canal was clear.  
LEFT EAR: Otoscopic   
inspection revealed ear   
canal was clear.  
TYMPANOMETRY  
Description of   
procedure: This test is   
an objective evaluation   
of middle ear  
function. CPT code:   
40312  
RIGHT EAR: Normal ME   
function.  
LEFT EAR: Normal ME   
function.  
ACOUSTIC REFLEXES  
Description of   
procedure: This test is   
an objective measure of   
auditory and  
facial nerve pathways.   
CPT code: 63812, 85218  
RIGHT EAR PROBE EAR:   
(ipsi right stimulus   
ear; contralateral left   
stimulus ear):  
Acoustic Reflex Pattern   
Did not test  
Acoustic Reflex Decay   
(left stimulus ear):   
Did not test.  
LEFT EAR PROBE EAR:   
(ipsi left stimulus   
ear; contralateral   
right stimulus ear):  
Acoustic Reflex Pattern   
Did not test  
Acoustic Reflex Decay   
(right stimulus   
ear):Did not test.  
PURE TONE AUDIOMETRY   
AND SPEECH TESTING  
Description of   
procedure: This test is   
an objective evaluation   
hearing  
sensitivity via air and   
bone conduction and   
speech recognition   
testing. CPT  
code: 73981  
RIGHT EAR:  
Hearing Sensitivity:   
Mild to moderate   
sensorineural hearing   
loss.  
Word Recognition Score:   
Excellent (90%). WRS is   
consistent with hearing  
sensitivity. Words were   
presented at 80 dB HL   
is above (greater than   
or equal  
to 60 dB HL) intensity   
level for average   
conversational speech.   
The NU-6  
Ordered by Difficulty   
Word List (10 words)   
was used for testing.   
Contralateral  
masking was employed.  
LEFT EAR:  
Hearing Sensitivity:   
Mild to moderate   
sensorineural hearing   
loss.  
Word Recognition Score:   
Excellent (100%). WRS   
is consistent with   
hearing  
sensitivity. Words were   
presented at 80 dB HL   
is above (greater than   
or equal  
to 60 dB HL) intensity   
level for average   
conversational speech.   
The NU-6  
Ordered by Difficulty   
Word List (10 words)   
was used for testing.   
Contralateral  
masking was employed.  
RECOMMENDATIONS  
* Continue medical   
follow-up with Diana Gandara MD and Zachary Cooper MD. .  
* The patient was   
counseled regarding   
benefits/limitations of   
hearing aids and  
provided written   
educational materials.  
* Call [phone] to   
schedule an appointment   
to assess your need for   
hearing  
aids. Request a HAE   
appointment.  
* Re-evaluation as   
medically indicated or   
if a change in hearing   
is noted.  
Prakash Gutierrez,   
CCC-A  
Clinical Audiologist  
copied to: Diana Gandara MD  
CHAN  
Abbrev-  
iation Definition   
Degree of hearing   
sensitivity dB  
range  
WNL within normal   
limits WNL 0 - 20  
SNHL sensorineural   
hearing loss Mild 20-40  
CHL conductive hearing   
loss Moderate 40-55  
MHL mixed hearing loss   
Moderately-Severe 55-70  
WRS word recognition   
score Severe 70-90  
ME middle ear Profound   
90 +  
TM tympanic membrane  
Referring Provider:   
DIANA GANDARA   
[[id number]]  
Allergies As of Date:   
2024 Noted   
Allergy Reaction  
EFFEXOR (VENLAFAXINE   
ANALOGUES) 2014   
14 - Other: See   
Comments  
Comments: Seizures.  
PENICILLINS 2021   
2 - Rash  
4 - Hives  
Comments: difficult to   
breath (more content   
not included)...    Normal                                  [facility]  
   
                                                    HEARING TEST/AUDIOGRAMon    
   
                                                                  [facility]  
   
                                                    CNPNon 2024   
   
                                        CNPN                Telephone (HNQ)  
-----------------------  
-----------  
BEV GAMING   
([id number]) 1965 F   
[employer]  
Date Time Provider   
Department  
24 DIANA GANDARA   
HNQ  
During your visit   
today, we recorded the   
following information   
about you:  
Dayan Ribera 2024   
8:50 AM Signed  
Called pt to schedule   
follow up with nichol   
pt confirmed appt for   
 at 4pm.  
Allergies As of Date:   
2024 Noted   
Allergy Reaction  
EFFEXOR (VENLAFAXINE   
ANALOGUES) 2014   
14 - Other: See   
Comments  
Comments: Seizures.  
PENICILLINS 2021   
2 - Rash  
4 - Hives  
Comments: difficult to   
breathe, swelling  
BACTRIM   
(SULFAMETHOXAZOLE-TRIME  
TH*2021 9 -   
Itching  
REGLAN (METOCLOPRAMIDE   
HCL) 2014 1 -   
Mental Status Change  
TOPAMAX (TOPIRAMATE)   
2014 1 - Mental   
Status Change  
Date Reviewed:   
2024  
Reviewed by: Brynn Finch RN - Fully   
Assessed  
Prescriptions as of   
2024  
- azelastine 0.1% nasal   
spray  
SPRAY 1 SPRAY INTO EACH   
NOSTRIL TWICE DAILY  
- albuterol (PROVENTIL)   
2.5 mg /3 mL (0.083 %)   
nebulizer solution  
2.5 mg as needed.  
- albuterol HFA   
(PROVENTIL HFA,   
VENTOLIN HFA) 90   
mcg/actuation inhaler  
INHALE 1 PUFF EVERY 4   
HOURS AS NEEDED FOR   
WHEEZE OR FOR SHORTNESS   
OF BREATH  
- famotidine (PEPCID)   
40 mg tablet  
Take 40 mg by mouth   
every morning.  
- rosuvastatin   
(CRESTOR) 10 mg tablet  
Take 10 mg by mouth   
every morning.  
- IBUPROFEN IB ORAL  
Take by mouth as   
needed.  
- budesonide   
(PULMICORT) 0.5 mg/2 mL   
nebulizer solution  
Use 2 mL via nebulizer   
once daily.  
- budesonide   
(PULMICORT) 0.5 mg/2 mL   
nebulizer solution  
Use 2 mL via nebulizer   
once daily.  
- oxymetazoline (AFRIN,   
OXYMETAZOLINE,) 0.05 %   
nasal spray  
Instill 2 Sprays in the   
nose twice daily.  
- sodium chloride (DEEP   
SEA NASAL) 0.65 % nasal   
spray  
Use 1 Spray in the nose   
as needed.  
- insulin degludec   
(TRESIBA FLEXTOUCH   
U-100) 100 unit/mL (3   
mL) injection pen  
Inject subcutaneously.  
- LORazepam (ATIVAN)   
0.5 mg  
Take 0.5 mg by mouth at   
bedtime as needed.  
- OXYGEN, HOME THERAPY,  
4 L/min by Nasal   
Cannula route as   
directed. Can be off   
for 3-4 hours without  
issue.  
- insulin aspart   
(NOVOLOG FLEXPEN U-100   
INSULIN SUBCUTANEOUS)  
Inject subcutaneously.   
sliding scale  
- semaglutide (OZEMPIC)   
1 mg/dose (2 mg/1.5 mL)   
pnij  
Inject 2 mg   
subcutaneously one time   
a week.  
- Insulin Lispro,   
Human, (HUMALOG) 100   
unit/mL crtg  
Inject subcutaneously w   
MEALS.  
- furosemide (LASIX) 40   
mg tablet  
Take 40 mg by mouth as   
needed.  
- Levalbuterol HCl   
(XOPENEX) 1.25 mg/0.5   
mL nebulizer solution  
Use 1 Ampule via   
nebulizer every 8 hours   
as needed.  
Meds Comments as of   
2021:  
2020 Only takes   
Insuli  
Problem List As Of Date   
2024 Noted   
Resolved  
Pneumonia [J18.9]  
Arthritis [M19.90]  
Diabetes (HCC) [E11.9]  
COPD (chronic   
obstructive pulmonary   
disease) (H*  
Overweight [E66.3]  
RA (rheumatoid   
arthritis) (Formerly [facility])   
[M06.9]  
Depression [F32.A]  
Hypogammaglobulinemia   
(Formerly [facility]) [D80.1]  
Displacement of   
cervical intervertebral   
disc wi*2014  
Cervical herniation   
[M50.20] 2014  
DVT of axillary vein,   
acute left (Formerly [facility])   
[I82.A12]2014  
DVT of lower extremity   
(deep venous   
thrombosis)*2014  
Cervical myelopathy   
[G95.9] 2014  
HX: anticoagulation   
[Z92.29] 2014  
S/P cervical spinal   
fusion [Z98.1]   
2014  
Smoking [F17.200]   
2014  
Class 2 obesity [E66.9]   
2021  
Encounter Number:   
[id number]  
Encounter Status:Closed   
by DAYAN RIEBRA on   
24              [facility]  
   
                                                    CNOVon 2024   
   
                                        CNOV                Office Visit (OTOLLN  
)  
-----------------------  
-----------  
BEV GAMING   
([id number]) 1965 F   
[place] [employer]  
Date Time Provider   
Department  
24 1:00 PM   
DIANA GANDARA  
During your visit   
today, we recorded the   
following information   
about you:  
Temperature  
98.2 degrees  
Dayan Ibanez MA   
2024 4:02 PM   
Signed  
y  
Diana Gandara MD   
2024 4:02 PM   
Signed  
SECTION OF RHINOLOGY,   
SINUS AND SKULL BASE   
SURGERY  
Head and Neck   
[place] NOTE  
HPI: Patient is a 58   
year old Female   
presenting for Chronic   
pansinusitis. She  
c/o of Nasal congestion   
In the right side as   
well as pressure. She   
is also  
having headaches.  
PAST MEDICAL HISTORY  
Diagnosis Date  
Arthritis  
COPD (chronic   
obstructive pulmonary   
disease) (Formerly [facility])  
Depression  
sees a psych  
Diabetes ([facility]ly [facility])  
DVT (deep venous   
thrombosis) (Formerly [facility])  
Left lower extremity   
s/p ankle surgery  
Hypogammaglobulinemia   
(Formerly [facility])  
On home oxygen therapy  
4L NC  
Overweight  
Pneumonia  
PONV (postoperative   
nausea and vomiting)  
RA (rheumatoid   
arthritis) (Formerly [facility])  
PAST SURGICAL HISTORY  
Procedure Laterality   
Date  
ANKLE LEFT OP SURGERY  
plantar tendon  
CHOLECYSTECTOMY  
HERNIA REPAIR HX  
HIP RIGHT OP SURGERY  
tendon repair  
HYSTERECTOMY HX  
total  
KNEE RIGHT OP SURGERY  
tendon repair  
SHOULDER   
ARTHROSCOPY/SURGERY  
bilateral tendon repair  
FAMILY HISTORY  
Problem Relation Age of   
Onset  
Hypertension Mother  
Arthritis Mother  
Cancer Father  
Hypertension Brother  
Social History  
Tobacco Use  
Smoking status: Every   
Day  
Packs/day: .5  
Types: Cigarettes  
Start date:   
Smokeless tobacco:   
Never  
Vaping Use  
Vaping Use: Never used  
Substance Use Topics  
Alcohol use: No  
Drug use: No  
Comment: denies tx for   
drug/alcohol abuse in   
the past.  
Current Outpatient   
Medications  
Medication Sig Dispense   
Refill  
albuterol (PROVENTIL)   
2.5 mg /3 mL (0.083 %)   
nebulizer solution 2.5   
mg as  
needed.  
albuterol HFA   
(PROVENTIL HFA,   
VENTOLIN HFA) 90   
mcg/actuation inhaler   
INHALE 1  
PUFF EVERY 4 HOURS AS   
NEEDED FOR WHEEZE OR   
FOR SHORTNESS OF BREATH  
famotidine (PEPCID) 40   
mg tablet Take 40 mg by   
mouth every morning.  
rosuvastatin (CRESTOR)   
10 mg tablet Take 10 mg   
by mouth every morning.  
IBUPROFEN IB ORAL Take   
by mouth as needed.  
azelastine 0.1% nasal   
spray 1 spray in each   
nostril twice daily for   
30 days 30  
mL 4  
budesonide (PULMICORT)   
0.5 mg/2 mL nebulizer   
solution Use 2 mL via   
nebulizer  
once daily. (Patient   
not taking: Reported on   
2024) 30 Vial 5  
budesonide (PULMICORT)   
0.5 mg/2 mL nebulizer   
solution Use 2 mL via   
nebulizer  
once daily. (Patient   
not taking: Reported on   
2024) 30 Vial 5  
oxymetazoline (AFRIN,   
OXYMETAZOLINE,) 0.05 %   
nasal spray Instill 2   
Sprays in  
the nose twice daily.   
(Patient not taking:   
Reported on 2024)   
30 mL 0  
sodium chloride (DEEP   
SEA NASAL) 0.65 % nasal   
spray Use 1 Spray in   
the nose as  
needed. (Patient not   
taking: Reported on   
2024) 44 mL 0  
insulin degludec   
(TRESIBA FLEXTOUCH   
U-100) 100 unit/mL (3   
mL) injection pen  
Inject subcutaneously.   
(Patient not taking:   
Reported on 2024)  
LORazepam (ATIVAN) 0.5   
mg Take 0.5 mg by mouth   
at bedtime as needed.  
OXYGEN, HOME THERAPY, 4   
L/min by Nasal Cannula   
route as directed. Can   
be off  
for 3-4 hours without   
issue.  
insulin aspart (NOVOLOG   
FLEXPEN U-100 INSULIN   
SUBCUTANEOUS) Inject  
subcutaneously. sliding   
scale (Patient not   
taking: Reported on   
2024)  
semaglutide (OZEMPIC) 1   
mg/dose (2 mg/1.5 mL)   
pnij Inject 2 mg   
subcutaneously  
one time a week.  
Insulin Lispro, Human,   
(HUMALOG) 100 unit/mL   
crtg Inject   
subcutaneously w  
MEALS. (Patient not   
taking: Reported on   
2024)  
furosemide (LASIX) 40   
mg tablet Take 40 mg by   
mouth as needed.   
(Patient not  
taking: Reported on   
2024)  
Levalbuterol HCl   
(XOPENEX) 1.25 mg/0.5   
mL nebulizer solution   
Use 1 Ampule via  
nebulizer every 8 hours   
as needed.  
No current   
facility-administered   
medications for this   
visit.  
ALLERGIES  
Allergen Reactions  
Effexor [Venlafaxin*   
Other: See Comments  
Seizures.  
Penicillins Rash, Hives  
difficult to breathe,   
swelling  
Bactrim [Sulfametho*   
Itching  
Reglan [Metoclopram*   
Mental Status Change  
Topamax [Topiramate]   
Mental Status Change  
ROS:  
CONSTITUTIONAL: No   
fevers, chills,   
nightsweats, unintended   
weight loss  
HEENT: HEENT: Yes   
symptoms: headaches and   
sinus pressure, No   
symptoms: nasal  
congestion, epistaxis,   
sense of smell absent,   
and nasal drainage both  
EYES: No diplopia or   
blurry vision.  
PHYSICAL EXAM:  
24  
1016  
Temp: 36.8 ?C (98.2 ?F)  
General appearance:   
well developed, well   
nourished, without   
obvious deformities  
Eyes: Extra ocular   
muscles are intact, no   
diplopia on primary   
gaze  
Ears: Externally normal   
in appearance, without   
scars, lesions, or   
masses. Both  
left and right external   
auditory canals and   
tympanic membranes are   
normal  
Nasal exam: The mucosa   
is pink, the septum is   
deviated and the   
visible  
turbinates are Yes   
hypertro (more content   
not included)...    Normal                                  [facility]  
   
                                                    CT Guidance for stereotactic  
 localization of Unspecified body region-- WO   
contraston   
2024   
   
                                                                  [facility]  
   
                                                    CT SINUS STEREO WO IVCONon 0  
2024   
   
                                                    CT SINUS STEREO WO   
IVCON                                   * * *Final Report* * *  
DATE OF EXAM: 2024 7:53AM  
LNC  - CT SINUS   
STEREO WO IVCON /   
ACCESSION # [id number]  
PROCEDURE REASON: Other   
chronic sinusitis  
  
* * * * Physician   
Interpretation * * * *  
RESULT: EXAMINATION: CT   
SINUS STEREO WO IVCON  
CLINICAL HISTORY:   
Chronic sinusitis  
TECHNIQUE: Spiral high   
resolution axial   
unenhanced CT images   
were  
obtained through the   
paranasal sinuses with   
sagittal, coronal  
reconstructions.  
MQ: [id number]_1  
CT Radiation dose:   
Integrated Dose-Length   
Product (DLP) for this   
visit =  
171 mGy*cm.  
CT Dose Reduction   
Employed: Automated   
exposure control (AEC)  
COMPARISON: Sinus CT   
2020  
RESULT:  
Post-Surgical Findings:   
Left maxillary   
antrostomy, left   
anterior  
ethmoidectomy, left   
partial turbinectomy   
and eduardo bullosa   
resection.  
Sinus Chambers:   
Paranasal mucosal   
thickening is noted;   
see below for Reading  
José Miguel score for degree   
of sinus opacification.   
0 is less than 2mm  
thickness of mucosal   
thickening, 1 is   
partial opacification,   
and 2 is  
almost complete or   
complete opacification.   
For the ostiomeatal   
complex, 0  
is patent and 2 is any   
portion occluded.  
Air Fluid Levels: There   
are air fluid   
levels/frothy   
secretions in  
the right maxillary   
sinus which may   
indicate acute   
sinusitis in the right  
clinical setting.  
High attenuation sinus   
disease: There is no   
high attenuation sinus  
disease.  
Left Frontal Sinus: 0  
Left Anterior Ethmoid   
Air Cells: 0  
Left posterior Ethmoid   
Air Cells: 0  
Left Maxillary Sinus: 1  
Left Sphenoid Sinus: 0  
Left Ostiomeatal   
Complex: 0  
LEFT Reading José Miguel Score:   
1  
Right Frontal Sinus: 0  
Right Anterior Ethmoid   
Air Cells: 0  
Right posterior Ethmoid   
Air Cells: 0  
Right Maxillary Sinus:   
2  
Right Sphenoid Sinus: 0  
Right Ostiomeatal   
Complex: 2  
RIGHT Germain Kistler   
Score: 4  
TOTAL Germain Kistler   
Score: 5  
Superimposed retention   
cysts or polyps in the   
left maxillary antrum.  
Nasal Cavities:   
Rightward deviation the   
bony nasal septum.   
Nasal  
cavities are otherwise   
clear.  
Developmental   
Anomalies: Right eduardo   
bullosa. Resection of   
left eduardo  
bullosa.  
Other: The visualized   
mastoid air cells and   
middle ear cavities are  
clear. The soft tissues   
of the face and orbits   
are within normal   
limits  
within the limitations   
of the study.  
Localizer images: No   
additional findings.  
IMPRESSION:  
Near complete   
opacification of right   
maxillary sinus with   
findings that  
may indicate acute   
sinusitis. This is new   
from 2020.  
Postoperative changes   
from the prior   
examination as detailed   
with  
improved left maxillary   
sinus inflammatory   
changes.  
Remaining paranasal   
sinuses are clear.  
Transcribe Date/Time:   
2024 8:07A  
Dictated by: PRISCILA PARISI DO  
This examination was   
interpreted and the   
report reviewed and  
electronically signed   
by:  
PRISCILA PARISI DO on   
2024 8:14AM EST  
Thank you for allowing   
us to participate in   
the care of your   
patient.  
Should there be any   
questions regarding   
this interpretation,   
please call  
[phone].  
If you are unable to   
reach us at the number   
above,  
please feel free to   
contact [facility] at   
[phone].  
[id number] Normal                                  [facility]  
   
                                                    CNOVon 2024   
   
                                        CNOV                Office Visit (OTOLMN  
)  
-----------------------  
-----------  
BEV GAMING   
([id number]) 1965 F   
[facility]  
Date Time Provider   
Department  
24 4:00 PM DIANA GANDARA OTOLMN  
During your visit   
today, we recorded the   
following information   
about you:  
Brynn Finch RN   
2024 5:00 PM Signed  
Tobacco Use: .5   
packs/day Types:   
Cigarettes. Ready to   
quit: No.  
Was smoking cessation   
packet given? Patient   
Declined  
Was a referral   
initiated?Patient   
declined.  
Diana Gandara MD   
2024 5:00 PM Signed  
SECTION OF RHINOLOGY,   
SINUS AND SKULL BASE   
SURGERY  
Head and Neck   
[place] NOTE  
HPI: Patient is a 58   
year old female who   
presents for chronic   
pansinusitis  
follow up. She has been   
having frequent   
infections since   
December. She's been  
on antibiotics and   
steroids since   
December. She's not   
currently on anything.  
She can't take Flonase.   
She's done a netipot.   
She has not tried   
Azelastine.  
PAST MEDICAL HISTORY  
Diagnosis Date  
Arthritis  
COPD (chronic   
obstructive pulmonary   
disease) (Formerly [facility])  
Depression  
sees a psych  
Diabetes (Formerly [facility])  
DVT (deep venous   
thrombosis) (Formerly [facility])  
Left lower extremity   
s/p ankle surgery  
Hypogammaglobulinemia   
(Formerly [facility])  
On home oxygen therapy  
4L NC  
Overweight  
Pneumonia  
PONV (postoperative   
nausea and vomiting)  
RA (rheumatoid   
arthritis) (Formerly [facility])  
PAST SURGICAL HISTORY  
Procedure Laterality   
Date  
ANKLE LEFT OP SURGERY  
plantar tendon  
CHOLECYSTECTOMY  
HERNIA REPAIR HX  
HIP RIGHT OP SURGERY  
tendon repair  
HYSTERECTOMY HX  
total  
KNEE RIGHT OP SURGERY  
tendon repair  
SHOULDER   
ARTHROSCOPY/SURGERY  
bilateral tendon repair  
FAMILY HISTORY  
Problem Relation Age of   
Onset  
Hypertension Mother  
Arthritis Mother  
Cancer Father  
Hypertension Brother  
Social History  
Tobacco Use  
Smoking status: Every   
Day  
Packs/day: .5  
Types: Cigarettes  
Start date:   
Smokeless tobacco:   
Never  
Vaping Use  
Vaping Use: Never used  
Substance Use Topics  
Alcohol use: No  
Drug use: No  
Comment: denies tx for   
drug/alcohol abuse in   
the past.  
Current Outpatient   
Medications  
Medication Sig Dispense   
Refill  
albuterol (PROVENTIL)   
2.5 mg /3 mL (0.083 %)   
nebulizer solution 2.5   
mg as  
needed.  
albuterol HFA   
(PROVENTIL HFA,   
VENTOLIN HFA) 90   
mcg/actuation inhaler   
INHALE 1  
PUFF EVERY 4 HOURS AS   
NEEDED FOR WHEEZE OR   
FOR SHORTNESS OF BREATH  
famotidine (PEPCID) 40   
mg tablet Take 40 mg by   
mouth every morning.  
rosuvastatin (CRESTOR)   
10 mg tablet Take 10 mg   
by mouth every morning.  
IBUPROFEN IB ORAL Take   
by mouth as needed.  
LORazepam (ATIVAN) 0.5   
mg Take 0.5 mg by mouth   
at bedtime as needed.  
OXYGEN, HOME THERAPY, 4   
L/min by Nasal Cannula   
route as directed. Can   
be off  
for 3-4 hours without   
issue.  
semaglutide (OZEMPIC) 1   
mg/dose (2 mg/1.5 mL)   
pnij Inject 2 mg   
subcutaneously  
one time a week.  
Levalbuterol HCl   
(XOPENEX) 1.25 mg/0.5   
mL nebulizer solution   
Use 1 Ampule via  
nebulizer every 8 hours   
as needed.  
budesonide (PULMICORT)   
0.5 mg/2 mL nebulizer   
solution Use 2 mL via   
nebulizer  
once daily. (Patient   
not taking: Reported on   
2024) 30 Vial 5  
budesonide (PULMICORT)   
0.5 mg/2 mL nebulizer   
solution Use 2 mL via   
nebulizer  
once daily. (Patient   
not taking: Reported on   
2024) 30 Vial 5  
oxymetazoline (AFRIN,   
OXYMETAZOLINE,) 0.05 %   
nasal spray Instill 2   
Sprays in  
the nose twice daily.   
(Patient not taking:   
Reported on 2024)   
30 mL 0  
sodium chloride (DEEP   
SEA NASAL) 0.65 % nasal   
spray Use 1 Spray in   
the nose as  
needed. (Patient not   
taking: Reported on   
2024) 44 mL 0  
insulin degludec   
(TRESIBA FLEXTOUCH   
U-100) 100 unit/mL (3   
mL) injection pen  
Inject subcutaneously.   
(Patient not taking:   
Reported on 2024)  
insulin aspart (NOVOLOG   
FLEXPEN U-100 INSULIN   
SUBCUTANEOUS) Inject  
subcutaneously. sliding   
scale (Patient not   
taking: Reported on   
2024)  
Insulin Lispro, Human,   
(HUMALOG) 100 unit/mL   
crtg Inject   
subcutaneously w  
MEALS. (Patient not   
taking: Reported on   
2024)  
furosemide (LASIX) 40   
mg tablet Take 40 mg by   
mouth as needed.   
(Patient not  
taking: Reported on   
2024)  
No current   
facility-administered   
medications for this   
visit.  
ALLERGIES  
Allergen Reactions  
Bactrim [Sulfametho*   
Itching  
Effexor [Venlafaxin*   
Other: See Comments  
Seizures.  
Penicillins Rash, Hives  
difficult to breathe,   
swelling  
Reglan [Metoclopram*   
Mental Status Change  
Topamax [Topiramate]   
Mental Status Change  
ROS:  
CONSTITUTIONAL: No   
fevers, chills,   
nightsweats, unintended   
weight loss  
HEENT: HEENT: Yes   
symptoms: headaches,   
sinus pressure, nasal   
congestion, sense  
of smell reduced, and   
nasal drainage both, No   
symptoms: epistaxis  
EYES: No diplopia or   
blurry vision.  
PHYSICAL EXAM:  
There were no vitals   
filed for this visit.  
General appearance:   
well developed, well   
nourished, without   
obvious deformities  
Eyes: Extra ocular   
muscles are intact, no   
diplopia on primary   
gaze  
Ears: Externally normal   
in appearance, without   
scars, lesions, or   
masses. Both (more   
content not   
included)...        Normal                                  [facility]   
[facility]  
   
                                                    Glucose Glucometer (BldC) [M  
ass/Vol]Ordered By: Neeraj Terry on 2023   
   
                      Glucose [Mass/Vol] 245 mg/dL                        [facility]  
   
                                        Comment on above:   Random Glucose Refer  
ence Range is dependent on time and   
content of last meal. Glucose of more than 200 mg/dL in a   
nonstressed, ambulatory subject supports the diagnosis of   
Diabetes Mellitus.   
   
                                                    Gram stain for investigation  
 of transfusion reactionOrdered By: Neeraj Terry   
on   
2023   
   
                                                    Microscopic observation   
Gram stain Nom (Unsp   
spec)                                                           [facility]  
   
                                                    Troponin I.cardiac [Mass/vol  
ume] in Serum or Plasma by Detection limit <= 0.01   
ng/Ordered By: Neeraj Terry on 2023   
   
                                                    Troponin I.cardiac DL   
<= 0.01 ng/mL   
[Mass/Vol]      3.3 pg/mL                       0.0-15.0        [facility]  
   
                                                    Alanine aminotransferase [En  
zymatic activity/volume] in Serum or PlasmaOrdered   
By:   
Nickolas Palomares on 2023   
   
                                                    ALT [Catalytic   
activity/Vol]   20 U/L                          7-52            [facility]  
   
                                                    Albumin [Mass/volume] in Ser  
um or Plasma by Bromocresol green (BCG) dye binding   
methoOrdered By: Nickolas Palomares on 2023   
   
                                                    Albumin BCG dye   
[Mass/Vol]      3.7 g/dL                        3.5-5.7         [facility]  
   
                                                    Alkaline phosphatase [Enzyma  
tic activity/volume] in Serum or PlasmaOrdered By:   
Nickolas Palomares on 2023   
   
                                                    ALP [Catalytic   
activity/Vol]   54 U/L                                    [facility]  
   
                                                    Aspartate aminotransferase [  
Enzymatic activity/volume] in Serum or PlasmaOrdered  
By:   
Nickolas Palomares on 2023   
   
                                                    AST [Catalytic   
activity/Vol]   12 U/L                          13-39           [facility]  
   
                                                    Basophils Auto (Bld) [#/Vol]  
Ordered By: Nickolas Palomares on 2023   
   
                      Basophils (Bld) [#/Vol] 0.1 10*3/uL            0.0-0.2      
[facility]  
   
                                                    Basophils/100 WBC Auto (Bld)  
Ordered By: Nickolas Palomares on 2023   
   
                      Basophils/100 WBC (Bld) 0.7 %                 .          F  
[facility]  
   
                                                    Bilirubin Test strip Ql (U)O  
rdered By: Nickolas Palomares on 2023   
   
                      Bilirubin Ql (U) Negative              Negative   [facility]  
   
                                                    Bilirubin.total [Mass/volume  
] in Serum or PlasmaOrdered By: Nickolas Palomares on   
2023   
   
                      Bilirubin [Mass/Vol] 0.5 mg/dL             0.3-1.0    [facility]  
   
                                                    COVID-19 Detected/Not Detect  
edOrdered By: Nickolas Palomares on 2023   
   
                                                    SARS-CoV-2 (COVID-19)   
RNA CHRISTOS+non-probe Ql   
(Nph)           Not detected                    Not Detecte     [facility]  
   
                                        Comment on above:   This is a duplicate   
RP2.1 COVID (PCR) result to be used for   
statistical tracking purpose only.   
   
                                                    Calcium [Mass/volume] in Ser  
um or PlasmaOrdered By: Nickolas Palomares on 2023   
   
                      Calcium [Mass/Vol] 8.8 mg/dL             8.6-10.3   [facility]  
   
                                                    Carbon dioxide, total [Moles  
/volume] in Serum or PlasmaOrdered By: Nickolas Palomares   
on   
2023   
   
                      CO2 [Moles/Vol] 30.6 mmol/L            21.0-31.0  [facility]  
   
                                                    Chloride [Moles/volume] in S  
herrera or PlasmaOrdered By: Nickolas Palomares on 2023   
   
                      Chloride [Moles/Vol] 102 mmol/L                 [facility]  
   
                                                    Color Auto (U)Ordered By: Uche  
red Marielle on 2023   
   
                      Color (U)  Yellow                Yellow     [facility]  
   
                                                    Creatine kinase [Enzymatic a  
ctivity/volume] in Serum or PlasmaOrdered By: Nickolas Palomares   
on 2023   
   
                                                    CK [Catalytic   
activity/Vol]   18 U/L                                    [facility]  
   
                                                    Creatine kinase.MB [Mass/vol  
ume] in Serum or PlasmaOrdered By: Nickolas Palomares on   
2023   
   
                      CK.MB [Mass/Vol] 1.3 ng/mL             0.6-6.3    [facility]  
   
                                                    Creatinine [Mass/volume] in   
Serum or PlasmaOrdered By: Nickolas Palomares on 2023  
   
   
                      Creatinine [Mass/Vol] 0.55 mg/dL            0.60-1.20  [facility]  
   
                                                    Eosinophils Auto (Bld) [#/Vo  
l]Ordered By: Nickolas Palomares on 2023   
   
                                                    Eosinophils (Bld)   
[#/Vol]         0.1 10*3/uL                     0.0-0.45        [facility]  
   
                                                    Eosinophils/100 WBC Auto (Bl  
d)Ordered By: Nickolas Palomares on 2023   
   
                                                    Eosinophils/100 WBC   
(Bld)           0.6 %                           .               [facility]  
   
                                                    Erythrocyte distribution wid  
th Auto (RBC) [Ratio]Ordered By: Nickolas Palomares on   
2023   
   
                                                    Erythrocyte   
distribution width   
(RBC) [Ratio]   13.3 %                          11.9-15.3       [facility]  
   
                                                    Globulin Calc (S) [Mass/Vol]  
Ordered By: Nickolas Palomares on 2023   
   
                      Globulin (S) [Mass/Vol] 2.2 g/dL                         [facility]  
   
                                                    Glucose [Mass/volume] in Ser  
um or PlasmaOrdered By: Nickolas Palomares on 2023   
   
                      Glucose [Mass/Vol] 184 mg/dL                  [facility]  
   
                                        Comment on above:   ADA recommended refe  
rence rangeRandom Glucose Reference Range   
is dependent on time and content of last meal. Glucose of more   
than 200 mg/dL in a nonstressed, ambulatory subject supports   
the diagnosis of Diabetes Mellitus.   
   
                                                    Hematocrit Auto (Bld) [Volum  
e fraction]Ordered By: Nickolas Palomares on 2023   
   
                                                    Hematocrit (Bld)   
[Volume fraction] 41.8 %                          34.0-46.4       [facility]  
   
                                                    Hemoglobin [Mass/volume] in   
BloodOrdered By: Nickolas Palomares on 2023   
   
                                                    Hemoglobin (Bld)   
[Mass/Vol]      14.3 g/dL                       11.8-15.4       [facility]  
   
                                                    Ketones Auto test strip (U)   
[Mass/Vol]Ordered By: Nickolas Palomares on 2023   
   
                      Ketones (U) [Mass/Vol] Negative              Negative   [facility]  
[facility]  
   
                                                    Leukocytes [#/volume] correc  
ken for nucleated erythrocytes in Blood by Automated  
   
counOrdered By: Nickolas Palomares on 2023   
   
                                                    WBC corrected for nucl   
RBC Auto (Bld) [#/Vol] 13.5 10*3/uL                    3.8-11.6        [facility]  
   
                                                    Lymphocytes Auto (Bld) [#/Vo  
l]Ordered By: Nickolas Palomares on 2023   
   
                                                    Lymphocytes (Bld)   
[#/Vol]         4.4 10*3/uL                     1.00-4.8        [facility]  
   
                                                    Lymphocytes/100 WBC Auto (Bl  
d)Ordered By: Nickolas Palomares on 2023   
   
                                                    Lymphocytes/100 WBC   
(Bld)           32.5 %                          .               [facility]  
   
                                                    MCH Auto (RBC) [Entitic mass  
]Ordered By: Nickolas Palmoares on 2023   
   
                                                    MCH (RBC) [Entitic   
mass]           31.8 pg                         24.7-34.3       [facility]  
   
                                                    MCHC Auto (RBC) [Mass/Vol]Or  
dered By: Nickolas Palomares on 2023   
   
                      MCHC (RBC) [Mass/Vol] 34.2 g/dL             32.0-35.0  [facility]  
[facility]  
   
                                                    MCV Auto (RBC) [Entitic vol]  
Ordered By: Nickolas Palomares on 2023   
   
                      MCV (RBC) [Entitic vol] 93.2 fL                    F  
[facility]  
   
                                                    Monocyte distribution width   
[Entitic volume] in Blood by AutomatedOrdered By:   
Nickolas Palomares on 2023   
   
                                                    Monocyte distribution   
width Auto (Bld)   
[Entitic vol]   17.28 %                         0.00-20.00      [facility]  
   
                                                    Monocytes Auto (Bld) [#/Vol]  
Ordered By: Nickolas Palomares on 2023   
   
                      Monocytes (Bld) [#/Vol] 0.8 10*3/uL            0.0-0.8      
[facility]  
   
                                                    Monocytes/100 WBC Auto (Bld)  
Ordered By: Nickolas Palomares on 2023   
   
                      Monocytes/100 WBC (Bld) 6.3 %                 .          F  
[facility]  
   
                                                    Natriuretic peptide B [Mass/  
Vol]Ordered By: Nickolas Palomares on 2023   
   
                                                    Natriuretic peptide B   
(Bld) [Mass/Vol] 13.0 pg/mL                      5-100           [facility]  
   
                                                    Neutrophils Auto (Bld) [#/Vo  
l]Ordered By: Nickolas Palomares on 2023   
   
                                                    Neutrophils (Bld)   
[#/Vol]         8.1 10*3/uL                     1.8-7.7         [facility]  
   
                                                    Neutrophils/100 WBC Auto (Bl  
d)Ordered By: Nickolas Palomares on 2023   
   
                                                    Neutrophils/100 WBC   
(Bld)           59.9 %                          .               [facility]  
   
                                                    Nitrite Test strip Ql (U)Ord  
ered By: Nickolas Palomares on 2023   
   
                      Nitrite Ql (U) Negative              Negative   [facility]  
   
                                                    No Panel InformationOrdered   
By: Neeraj Terry on 2023   
   
                                                    Bedside Glucose #2   
Comment         Will notify dr/rn                                 [facility]  
   
                      Bedside Glucose Comment See comment                         
[facility]  
   
                                        Comment on above:   Glu2: Will Repeat Te  
st   
   
                                                    No Panel InformationOrdered   
By: Nickolas Palomares on 2023   
   
                                                    D-Dimer Quantitative   
(PE/DVT)        < 200 ng/mL                     0-243           [facility]  
   
                                        Comment on above:   The reference range   
for D-dimer is <243 ng/mL D-dimer   
units.D-dimer results must be used in conjunction with a   
clinicalpretest probability (PTP) assessment model for deep   
veinthrombosis (DVT) and pulmonary embolism (PE). Results   
<230ng/mL d-dimer units can be used as a negative predictor   
inpatients with low or moderate probability for DVT/PE.Results   
above the exclusion threshold of 230 ng/ml D-dimerunits for   
DVT/PE may indicate the need for furtherdiagnostic   
testing.D-Dimer can be increased in hospitalized patients due   
toco-morbid conditions.   
   
                      Estimated GFR (CKD-EPI) > 60.0 mL/Min                       
  [facility]  
   
                                                    Pharmacy Creatinine   
Clearance (Chem 133.51                                          [facility]  
   
                                                    Nucleated erythrocytes [Pres  
ence] in Blood by Automated countOrdered By: Nickolas Palomares   
on 2023   
   
                                                    Nucleated RBC Auto Ql   
(Bld)           0.1 /100{WBC}                   0-0.5           [facility]  
   
                                                    Platelet mean volume Auto (B  
ld) [Entitic vol]Ordered By: Nickolas Palomares on   
2023   
   
                                                    Platelet mean volume   
(Bld) [Entitic vol] 8.2 fL                          6.3-10.7        [facility]  
   
                                                    Platelets Auto (Bld) [#/Vol]  
Ordered By: Nickolas Palomares on 2023   
   
                      Platelets (Bld) [#/Vol] 205 10*3/uL            150-450      
[facility]  
   
                                                    Potassium [Moles/volume] in   
Serum or PlasmaOrdered By: Nickolas Palomares on 2023  
   
   
                      Potassium [Moles/Vol] 3.9 mmol/L            3.5-5.1    [facility]  
   
                                                    Protein Auto test strip (U)   
[Mass/Vol]Ordered By: Nickolas Palomares on 2023   
   
                      Protein (U) [Mass/Vol] Negative              Negative   Fi  
[facility]  
   
                                                    Protein [Mass/volume] in Ser  
um or PlasmaOrdered By: Nickolas Palomares on 2023   
   
                      Protein [Mass/Vol] 5.9 g/dL              6.4-8.9    [facility]  
   
                                                    RBC Auto (Bld) [#/Vol]Ordere  
d By: Nickolas Palomares on 2023   
   
                      RBC (Bld) [#/Vol] 4.48 10*6/uL            3.60-5.00  [facility]  
   
                                                    Respiratory pathogens DNA an  
d RNA panel - Nasopharynx by CHRISTOS with non-probe   
detectionOrdered By: Nickolas Palomares on 2023   
   
                                                    Respiratory pathogens   
DNA and RNA panel   
CHRISTOS+non-probe (Nph)                                                 [facility]  
   
                                                    Serum or plasma albumin/glob  
ulin mass ratioOrdered By: Nickolas Palomares on 2023  
   
   
                                                    Albumin/Globulin [Mass   
ratio]          1.7 {ratio}                                     [facility]  
   
                                                    Serum or plasma anion gap de  
terminationOrdered By: Nickolas Palomares on 2023   
   
                      Anion gap [Moles/Vol] 9.3 mmol/L            6.0-15.0   [facility]  
   
                                                    Serum or plasma creatine kin  
ase MB (CKMB)/total creatine kinase (CK) ratio by   
calculaOrdered By: Nickolas Palomares on 2023   
   
                                                    CK.MB Calc [Catalytic   
fraction]       7.2 %                           0.00-2.50       [facility]  
   
                                                    Sodium [Moles/volume] in Ser  
um or PlasmaOrdered By: Nickolas Palomares on 2023   
   
                      Sodium [Moles/Vol] 138 mmol/L            136-145    [facility]  
   
                                                    Specific gravity Auto test s  
trip (U) [Rel density]Ordered By: Nickolas Palomares on   
2023   
   
                                                    Specific gravity (U)   
[Rel density]   1.004                           1.001-1.030     [facility]  
   
                                                    Troponin I.cardiac [Mass/vol  
ume] in Serum or Plasma by Detection limit <= 0.01   
ng/Ordered By: Nickolas Palomares on 2023   
   
                                                    Troponin I.cardiac DL   
<= 0.01 ng/mL   
[Mass/Vol]      4.0 pg/mL                       0.0-15.0        [facility]  
   
                                                    Urea nitrogen [Mass/volume]   
in Serum or PlasmaOrdered By: Nickolas Palomares on   
2023   
   
                                                    Urea nitrogen   
[Mass/Vol]      10 mg/dL                        7-25            [facility]  
   
                                                    Urine clarity by refractomet  
ry automatedOrdered By: Nickolas Palomares on 2023   
   
                                                    Clarity Refractometry   
automated (U)   Clear                           Clear           [facility]  
   
                                                    Urine glucose measurement by  
 automated test strip (mass/volume)Ordered By: Nickolas Palomares on 2023   
   
                                                    Glucose Auto test strip   
(U) [Mass/Vol]  Normal mg/dL                    Normal          [facility]  
   
                                                    Urine hemoglobin detection b  
y automated test stripOrdered By: Nickolas Palomares on   
2023   
   
                                                    Hemoglobin Auto test   
strip Ql (U)    Negative                        Negative        [facility]  
   
                                                    Urine leukocyte esterase det  
ection by automated test stripOrdered By: Nickolas Palomares on   
2023   
   
                                                    Leukocyte esterase Auto   
test strip Ql (U) Negative                        Negative        [facility]  
   
                                                    Urobilinogen Auto test strip  
 (U) [Mass/Vol]Ordered By: Nickolas Palomares on 2023  
   
   
                                                    Urobilinogen (U)   
[Mass/Vol]      Normal mg/dL                    Normal          [facility]  
   
                                                    WBC Auto (Bld) [#/Vol]Ordere  
d By: Nickolas Palomares on 2023   
   
                      WBC (Bld) [#/Vol] 13.5 10*3/uL            3.8-11.6   [facility]  
   
                                                    pH Auto test strip (U)Ordere  
d By: Nickolas Palomares on 2023   
   
                      pH (U)     5.5 [pH]              5.0-9.0    [facility]  
   
                                                    Anisocytosis LM Ql (Bld)Orde  
red By: Ambrosio Davis on 2023   
   
                      Anisocytosis Ql (Bld) Slight                           [facility]  
   
                                                    Band form neutrophils/100 WB  
C Manual cnt (Bld)Ordered By: Ambrosio Davis on  
   
2023   
   
                                                    Band form   
neutrophils/100 WBC   
(Bld)           1 %                             0-5             [facility]  
   
                                                    Basophils Auto (Bld) [#/Vol]  
Ordered By: Ambrosio Davis on 2023   
   
                      Basophils (Bld) [#/Vol] N/A                              F  
[facility]  
   
                                                    Basophils/100 WBC Auto (Bld)  
Ordered By: Ambrosio Greenor on 2023   
   
                      Basophils/100 WBC (Bld) N/A                              F  
[facility]  
   
                                                    Calcium [Mass/volume] in Ser  
um or PlasmaOrdered By: Ambrosio Davis on   
2023   
   
                      Calcium [Mass/Vol] 8.1 mg/dL             8.6-10.3   [facility]  
   
                                                    Carbon dioxide, total [Moles  
/volume] in Serum or PlasmaOrdered By: Ambrosio Davis on 2023   
   
                      CO2 [Moles/Vol] 30.1 mmol/L            21.0-31.0  [facility]  
   
                                                    Chloride [Moles/volume] in S  
herrera or PlasmaOrdered By: Ambrosio Davis on   
2023   
   
                      Chloride [Moles/Vol] 100 mmol/L                 [facility]  
   
                                                    Creatinine [Mass/volume] in   
Serum or PlasmaOrdered By: Ambrosio Davis on   
2023   
   
                      Creatinine [Mass/Vol] 0.68 mg/dL            0.60-1.20  [facility]  
   
                                                    Eosinophils Auto (Bld) [#/Vo  
l]Ordered By: Ambrosio Davis on 2023   
   
                                                    Eosinophils (Bld)   
[#/Vol]         N/A                                             [facility]  
   
                                                    Eosinophils/100 WBC Auto (Bl  
d)Ordered By: Ambrosio Davis on 2023   
   
                                                    Eosinophils/100 WBC   
(Bld)           N/A                                             [facility]  
   
                                                    Erythrocyte distribution wid  
th Auto (RBC) [Ratio]Ordered By: Ambrosio Davis  
 on   
2023   
   
                                                    Erythrocyte   
distribution width   
(RBC) [Ratio]   12.8 %                          11.9-15.3       [facility]  
   
                                                    Glucose Glucometer (BldC) [M  
ass/Vol]Ordered By: Ambrosio Davis on   
2023   
   
                      Glucose [Mass/Vol] 347 mg/dL                        [facility]  
   
                                        Comment on above:   Random Glucose Refer  
ence Range is dependent on time and   
content of last meal. Glucose of more than 200 mg/dL in a   
nonstressed, ambulatory subject supports the diagnosis of   
Diabetes Mellitus.   
   
                                                    Glucose [Mass/volume] in Ser  
um or PlasmaOrdered By: Ambrosio Davis on   
2023   
   
                      Glucose [Mass/Vol] 331 mg/dL                  [facility]  
   
                                        Comment on above:   ADA recommended refe  
rence rangeRandom Glucose Reference Range   
is dependent on time and content of last meal. Glucose of more   
than 200 mg/dL in a nonstressed, ambulatory subject supports   
the diagnosis of Diabetes Mellitus.   
   
                                                    Glucose mean value [Mass/vol  
ume] in Blood Estimated from glycated   
hemoglobinOrdered   
By: Ambrosio Davis on 2023   
   
                                                    Average glucose   
Estimated from glycated   
hemoglobin (Bld)   
[Mass/Vol]      166 mg/dL                                       [facility]  
   
                                                    Hematocrit Auto (Bld) [Volum  
e fraction]Ordered By: Ambrosio Davis on   
2023   
   
                                                    Hematocrit (Bld)   
[Volume fraction] 43.6 %                          34.0-46.4       [facility]  
   
                                                    Hemoglobin A1c percentageOrd  
ered By: Ambrosio Davis on 2023   
   
                                                    HbA1c (Bld) [Mass   
fraction]       7.4 %                           4.3-5.6         [facility]  
   
                                        Comment on above:   Increased risk for d  
iabetes: 5.7 - 6.4diabetes: >6.4glycemic   
control for adults with diabetes: <7.0   
   
                                                    Hemoglobin [Mass/volume] in   
BloodOrdered By: Ambrosio Davis on 2023   
   
                                                    Hemoglobin (Bld)   
[Mass/Vol]      14.4 g/dL                       11.8-15.4       [facility]  
   
                                                    Leukocytes [#/volume] correc  
ken for nucleated erythrocytes in Blood by Automated  
   
counOrdered By: Ambrosio Davis on 2023   
   
                                                    WBC corrected for nucl   
RBC Auto (Bld) [#/Vol] 26.4 10*3/uL                    3.8-11.6        [facility]  
   
                                                    Lymphocytes Auto (Bld) [#/Vo  
l]Ordered By: Ambrosio Davis on 2023   
   
                                                    Lymphocytes (Bld)   
[#/Vol]         N/A                                             [facility]  
   
                                                    Lymphocytes/100 WBC Auto (Bl  
d)Ordered By: Ambrosio Davis on 2023   
   
                                                    Lymphocytes/100 WBC   
(Bld)           N/A                                             [facility]  
   
                                                    Lymphocytes/100 WBC Manual c  
nt (Bld)Ordered By: Ambrosio Davis on   
2023   
   
                                                    Lymphocytes/100 WBC   
(Bld)           11 %                            18-42           [facility]  
   
                                                    MCH Auto (RBC) [Entitic mass  
]Ordered By: Ambrosio Davis on 2023   
   
                                                    MCH (RBC) [Entitic   
mass]           30.9 pg                         24.7-34.3       [facility]  
   
                                                    MCHC Auto (RBC) [Mass/Vol]Or  
dered By: Ambrosio Dvais on 2023   
   
                      MCHC (RBC) [Mass/Vol] 32.9 g/dL             32.0-35.0  [facility]ir  
[facility]  
   
                                                    MCV Auto (RBC) [Entitic vol]  
Ordered By: Ambrosio Davis on 2023   
   
                      MCV (RBC) [Entitic vol] 93.8 fL                    F  
[facility]  
   
                                                    Metamyelocytes/100 WBC Manua  
l cnt (Bld)Ordered By: Ambrosio Davis on   
2023   
   
                                                    Metamyelocytes/100 WBC   
(Bld)           2 %                             0-0             [facility]  
   
                                                    Microcytes LM Ql (Bld)Ordere  
d By: Ambrosio Davis on 2023   
   
                      Microcytes Ql (Bld) Slight                           [facility]  
   
                                                    Monocytes Auto (Bld) [#/Vol]  
Ordered By: Ambrosio Davis on 2023   
   
                      Monocytes (Bld) [#/Vol] N/A                              F  
[facility]  
   
                                                    Monocytes/100 WBC Auto (Bld)  
Ordered By: Ambrosio Davis on 2023   
   
                      Monocytes/100 WBC (Bld) N/A                              F  
[facility]  
   
                                                    Monocytes/100 WBC Manual cnt  
 (Bld)Ordered By: Ambrosio Davis on 2023   
   
                      Monocytes/100 WBC (Bld) 7 %                   2-11       F  
[facility]  
   
                                                    Myelocytes/100 WBC Manual cn  
t (Bld)Ordered By: Ambrosio Davis on 2023  
   
   
                                                    Myelocytes/100 WBC   
(Bld)           4 %                             0-0             [facility]  
   
                                                    Neutrophils Auto (Bld) [#/Vo  
l]Ordered By: Ambrosio Davis on 2023   
   
                                                    Neutrophils (Bld)   
[#/Vol]         N/A                                             [facility]  
   
                                                    Neutrophils/100 WBC Auto (Bl  
d)Ordered By: Ambrosio Davis on 2023   
   
                                                    Neutrophils/100 WBC   
(Bld)           N/A                                             [facility]  
   
                                                    No Panel InformationOrdered   
By: Ambrosio Davis on 2023   
   
                      Bedside Glucose Comment Glu2: cleaned meter                 
        [facility]  
   
                      Estimated GFR (CKD-EPI) > 60.0 mL/Min                       
  [facility]  
   
                                                    Pharmacy Creatinine   
Clearance (Chem 104.80                                          [facility]  
   
                                                    Nucleated erythrocytes [Pres  
ence] in Blood by Automated countOrdered By:   
Ambrosio Davis on 2023   
   
                                                    Nucleated RBC Auto Ql   
(Bld)           N/A                                             [facility]  
   
                                                    Ovalocyte detectionOrdered B  
y: Ambrosio Davis on 2023   
   
                      Ovalocytes LM Ql (Bld) Slight                           [facility]  
[facility]  
   
                                                    Platelet adequacy [Presence]  
 in Blood by Light microscopyOrdered By: Ambrosio Davis on 2023   
   
                      Platelets LM Ql (Bld) Normal                Normal     [facility]  
[facility]  
   
                                                    Platelet mean volume Auto (B  
ld) [Entitic vol]Ordered By: Ambrosio Davis on   
2023   
   
                                                    Platelet mean volume   
(Bld) [Entitic vol] 7.8 fL                          6.3-10.7        [facility]  
   
                                                    Platelet morphology finding   
[Identifier] in BloodOrdered By: Ambrosio Davis  
 on   
2023   
   
                                                    Platelet morphology   
finding Nom (Bld) Normal                          Normal          [facility]  
   
                                                    Platelets Auto (Bld) [#/Vol]  
Ordered By: Ambrosio Davis on 2023   
   
                      Platelets (Bld) [#/Vol] 224 10*3/uL            150-450      
[facility]  
   
                                                    Potassium [Moles/volume] in   
Serum or PlasmaOrdered By: Ambrosio Davis on   
2023   
   
                      Potassium [Moles/Vol] 4.6 mmol/L            3.5-5.1    [facility]  
   
                                                    RBC Auto (Bld) [#/Vol]Ordere  
d By: Ambrosio Davis on 2023   
   
                      RBC (Bld) [#/Vol] 4.65 10*6/uL            3.60-5.00  [facility]  
   
                                                    RBC morphologyOrdered By: Fr steff Davis on 2023   
   
                                                    RBC morphology finding   
Nom (Bld)       N/A                                             [facility]  
   
                                                    Segmented neutrophils/100 WB  
C Manual cnt (Bld)Ordered By: Ambrosio Davis on  
   
2023   
   
                                                    Segmented   
neutrophils/100 WBC   
(Bld)           75 %                            50-70           [facility]  
   
                                                    Serum or plasma anion gap de  
terminationOrdered By: Ambrosio Davis on   
2023   
   
                      Anion gap [Moles/Vol] 9.5 mmol/L            6.0-15.0   [facility]  
   
                                                    Sodium [Moles/volume] in Ser  
um or PlasmaOrdered By: Ambrosio Davis on   
2023   
   
                      Sodium [Moles/Vol] 135 mmol/L            136-145    [facility]  
   
                                                    Urea nitrogen [Mass/volume]   
in Serum or PlasmaOrdered By: Ambrosio Davis on  
   
2023   
   
                                                    Urea nitrogen   
[Mass/Vol]      22 mg/dL                        7-25            [facility]  
   
                                                    WBC Auto (Bld) [#/Vol]Ordere  
d By: Ambrosio Davis on 2023   
   
                      WBC (Bld) [#/Vol] 26.4 10*3/uL            3.8-11.6   [facility]  
   
                                                    COVID-19 Detected/Not Detect  
edOrdered By: Tashi Rodriges on 2023   
   
                                                    SARS-CoV-2 (COVID-19)   
RNA CHRISTOS+non-probe Ql   
(Nph)           Not detected                    Not Detecte     [facility]  
   
                                        Comment on above:   This is a duplicate   
RP2.1 COVID (PCR) result to be used for   
statistical tracking purpose only.   
   
                                                    Red blood cell stomatocyte d  
etectionOrdered By: Tashi Rodriges on 2023   
   
                                                    Stomatocytes LM Ql   
(Bld)           Slight                                          [facility]  
   
                                                    Respiratory pathogens DNA an  
d RNA panel - Nasopharynx by CHRISTOS with non-probe   
detectionOrdered By: Tashi Rodriges on 2023   
   
                                                    Respiratory pathogens   
DNA and RNA panel   
CHRISTOS+non-probe (Nph)                                                 [facility]  
   
                                                    Alanine aminotransferase [En  
zymatic activity/volume] in Serum or PlasmaOrdered   
By:   
Charlene Hernandez on 2023   
   
                                                    ALT [Catalytic   
activity/Vol]   28 U/L                          7-52            [facility]  
   
                                                    Albumin [Mass/volume] in Ser  
um or Plasma by Bromocresol green (BCG) dye binding   
methoOrdered By: Charlene Hernandez on 2023   
   
                                                    Albumin BCG dye   
[Mass/Vol]      3.7 g/dL                        3.5-5.7         [facility]  
   
                                                    Alkaline phosphatase [Enzyma  
tic activity/volume] in Serum or PlasmaOrdered By:   
Charlene Hernandez on 2023   
   
                                                    ALP [Catalytic   
activity/Vol]   65 U/L                                    [facility]  
   
                                                    Aspartate aminotransferase [  
Enzymatic activity/volume] in Serum or PlasmaOrdered  
By:   
Charlene Hernandez on 2023   
   
                                                    AST [Catalytic   
activity/Vol]   12 U/L                          13-39           [facility]  
   
                                                    Basophils/100 WBC Manual cnt  
 (Bld)Ordered By: Charlene Hernandez on 2023   
   
                      Basophils/100 WBC (Bld) 0 %                   0-2        F  
[facility]  
   
                                                    Bilirubin Test strip Ql (U)O  
rdered By: Charlene Hernandez on 2023   
   
                      Bilirubin Ql (U) Negative              Negative   [facility]  
   
                                                    Bilirubin.total [Mass/volume  
] in Serum or PlasmaOrdered By: Charlene Hernandez on   
2023   
   
                      Bilirubin [Mass/Vol] 0.3 mg/dL             0.3-1.0    [facility]  
   
                                                    Color Auto (U)Ordered By: Mayelin Hernandez on 2023   
   
                      Color (U)  Yellow                Yellow     [facility]  
   
                                                    Creatine kinase [Enzymatic a  
ctivity/volume] in Serum or PlasmaOrdered By:   
Charlene Hernandez on 2023   
   
                                                    CK [Catalytic   
activity/Vol]   49 U/L                                    [facility]  
   
                                                    ED Note-Physicianon 20  
23   
   
                                        ED Note-Physician   Basic Information  
Time Seen:  
Sukhjinder MUJICA, Rei CLEMENT   
2023 09:20  
Chief Complaint  
patient c/o SOB, COVID   
+ with hx COPD 4L NC at   
home. started on   
paxlovid and medrol   
pack on thursday  
History of Present   
Illness  
57-year-old female   
reports to the   
emergency department   
with a chief complaint   
of shortness of breath   
and testing   
COVID-positive. Reports   
that she tested   
COVID-positive on   
Thursday. Reports that   
she did go to [facility], where she was   
started on a Medrol   
Dosepak, as well as   
Paxlovid. She states   
that she does have a   
history of COPD as   
well. States that she   
does wear 4 L of oxygen   
at home normally. She   
denies being on any   
blood thinners. States   
that she has terrible   
body aches, feels like   
she gets winded very   
easily now and has been   
extremely nauseous.   
Reports that this is   
the worst does have a   
headache. She denies   
any chest pain   
associated with this.   
Reports just   
generalized body aches.   
Denies any fevers.   
States that she has had   
nausea and vomiting.   
Denies any belly pain.  
Review of Systems  
A 10 point review of   
systems is negative   
except as noted above.  
Medical and Surgical   
History: Reviewed and   
noted  
Social history: Lives   
at home  
Family History:   
Reviewed.  
Tobacco: User  
Physical Exam  
Vitals & Measurements  
T: 36.8 ?C(Oral) HR:   
91(Peripheral) RR: 20   
BP: 155/96 SpO2: 99%  
HT: 172.72 cm WT: 91.2   
kg BMI: 30.57  
General: The patient   
appears well and in no   
apparent distress.   
Patient is resting   
comfortably on bed.   
Afebrile  
Skin: Warm, dry, no   
pallor noted.  
Head: Normocephalic,   
atraumatic  
Neck: No JVD  
Eye: PERRLA, EOMI  
ENT: Moist mucus   
membranes. Pharynx pink   
moist no erythema or   
exudates. Bilateral TMs   
intact with no erythema   
or bulging  
Cardiovascular: Regular   
rate normal peripheral   
perfusion. Radial   
pulses +2 bilaterally  
Respiratory: No   
respiratory distress no   
accessory muscle use no   
obvious audible   
wheezing. Scattered   
wheezing throughout   
lung sounds   
examination.  
Chest Wall: no   
deformity  
Musculoskeletal: normal   
ROM, no deformity, no   
swelling  
GI: No obvious   
distention soft   
nontender nondistended   
no guarding rebounding   
or rigidity  
Neurological: A&O moves   
all extremities equal   
strength and symmetry  
Psychiatric:   
Cooperative and   
appropriate  
Medical Decision Making  
MEDICAL DECISION MAKING  
Number and Complexity   
of Problems  
Differential Diagnosis:   
[****]  
[facility] Data  
External documents   
reviewed: [****]  
My EKG interpretation:   
reviewed  
My CT interpretation:   
Reviewed  
My X-ray   
interpretation:   
reviewed  
My Ultrasound   
interpretation: [****]  
Decision rules/scores   
evaluated: [****]  
Discussed with: [****]  
Treatment and   
Disposition  
ED Course: 57-year-old   
female reports the   
emergency department   
with a chief complaint   
of shortness of breath   
and recent COVID   
infection. Reports that   
she did test positive   
for COVID on Thursday.   
Reports that she is   
currently on the Medrol   
Dosepak, as well as   
Paxlovid. Reports that   
she is still continuing   
to wheeze. States that   
she does have a history   
of COPD. She is   
currently on 4 L which   
is her normal baseline   
of oxygen and is not   
hypoxic. On physical   
exam she is afebrile in   
no acute distress. I do   
hear scattered wheezes   
throughout lungs on   
examination, but   
otherwise a benign   
physical exam of the   
patient. Due to this,   
we did order cardiac   
work-up. Lab work   
reviewed and noted.   
Patient has slight   
leukocytosis, which   
could be refractory to   
the recent COVID   
infection/COPD   
exacerbation. X-ray was   
negative. Patient was   
given albuterol   
treatments, as well as   
Solu-Medrol and nausea   
medicine. She was   
reevaluated, and stated   
that she still did not   
feel like she was   
breathing right, so we   
did do a CTA of her   
chest. CT of her chest   
was negative for any   
acute findings. I did   
discuss this with the   
patient. Discussed is   
likely the COVID   
infection as well as a   
COPD exacerbation   
wreaking havoc on her   
causing her shortness   
of breath. She is not   
hypoxic on her stay   
here in the emergency   
department. I discussed   
we will start her on a   
high-dose steroid, and   
start her on prednisone   
and stop the Medrol   
Dosepak. Patient will   
be also started on   
azithromycin. I   
discussed days 3-5 are   
the worst symptoms of   
COVID, and likely she   
has MS right now.   
Discussed return   
precautions to the   
patient. Follow-up with   
your primary care   
provider in 3 to 5   
days. If symptoms   
worsen, do not improve,   
or new symptoms arise   
please report back to   
emergency department   
for further evaluation.   
The patient was   
understanding and   
agreeable to plan   
moving forward.  
Shared decision making:   
[****]  
Code status: [****]  
Assessment/Plan  
COPD exacerbation   
(J44.1: Chronic   
obstructive pulmonary   
disease with (acute)   
exacerbation)  
COVID (U07.1: COVID-19)  
Orders:  
albuterol-ipratropium,   
6 mL, Soln-Inh,   
Inhalation, Once, Stop   
date 23 9:35:00   
EDT, STAT, Start date   
23 9:35:00 EDT  
albuterol-ipratropium,   
3 mL, Soln-Inh,   
Inhalation, Once, Stop   
date 23 10:54:00   
EDT, STAT, Start date   
23 10:54:00 EDT  
azithromycin, 250 mg,   
Oral, As Directed, # 6   
tab( (more content not   
included)...        Normal                                  [facility]  
   
                                        Comment on above:   Result Comment: Elec  
tronically Signed By: Rei Slaughter PA-C\.br\Date and Time Signed: 23 12:14   
EDT\.br\Electronically Co-Signed By: Abad Singleton MD\.br\Date   
and Time Co-Signed: 23 07:34 EDT   
   
                                                    Eosinophils/100 WBC Manual c  
nt (Bld)Ordered By: Charlene Hernandez on 2023   
   
                                                    Eosinophils/100 WBC   
(Bld)           0 %                             1-3             [facility]  
   
                                                    Giant platelets/100 leukocyt  
es [Ratio] in Blood by Manual countOrdered By:   
Charlene Hernandez on 2023   
   
                                                    Giant platelets/100 WBC   
Manual cnt (Bld)   
[Ratio]         2 /100{WBC}                                     [facility]  
   
                                                    Globulin Calc (S) [Mass/Vol]  
Ordered By: Charlene Hernandez on 2023   
   
                      Globulin (S) [Mass/Vol] 2.9 g/dL                         F  
[facility]  
   
                                                    Ketones Auto test strip (U)   
[Mass/Vol]Ordered By: Charlene Hernandez on 2023   
   
                      Ketones (U) [Mass/Vol] Negative              Negative   Fi  
[facility]  
   
                                                    Monocyte distribution width   
[Entitic volume] in Blood by AutomatedOrdered By:   
Charlene Hernandez on 04-   
   
                                                    Monocyte distribution   
width Auto (Bld)   
[Entitic vol]   20.00 %                         0.00-20.00      [facility]  
   
                                                    Natriuretic peptide B [Mass/  
Vol]Ordered By: Charlene Hernandez on 2023   
   
                                                    Natriuretic peptide B   
(Bld) [Mass/Vol] 35.0 pg/mL                      5-100           [facility]  
   
                                                    Nitrite Test strip Ql (U)Ord  
ered By: Charlene Hernandez on 2023   
   
                      Nitrite Ql (U) Negative              Negative   [facility]  
   
                                                    Protein Auto test strip (U)   
[Mass/Vol]Ordered By: Charlene Hernandez on 2023   
   
                      Protein (U) [Mass/Vol] Negative              Negative   [facility]  
   
                                                    Protein [Mass/volume] in Ser  
um or PlasmaOrdered By: Charlene Hernandez on 2023  
   
   
                      Protein [Mass/Vol] 6.6 g/dL              6.4-8.9    [facility]  
   
                                                    Serum or plasma albumin/glob  
ulin mass ratioOrdered By: Charlene Hernandez on   
2023   
   
                                                    Albumin/Globulin [Mass   
ratio]          1.3 {ratio}                                     [facility]  
   
                                                    Specific gravity Auto test s  
trip (U) [Rel density]Ordered By: Charlene Hernandez on   
2023   
   
                                                    Specific gravity (U)   
[Rel density]   1.006                           1.001-1.030     [facility]  
   
                                                    Troponin I.cardiac [Mass/vol  
ume] in Serum or Plasma by Detection limit <= 0.01   
ng/Ordered By: Charlene Hernandez on 2023   
   
                                                    Troponin I.cardiac DL   
<= 0.01 ng/mL   
[Mass/Vol]      6.4 pg/mL                       0.0-15.0        [facility]  
   
                                                    Urine clarity by refractomet  
ry automatedOrdered By: Charlene Hernandez on 2023  
   
   
                                                    Clarity Refractometry   
automated (U)   Clear                           Clear           [facility]  
   
                                                    Urine glucose measurement by  
 automated test strip (mass/volume)Ordered By:   
Charlene Hernandez on 2023   
   
                                                    Glucose Auto test strip   
(U) [Mass/Vol]  Normal mg/dL                    Normal          [facility]  
   
                                                    Urine hemoglobin detection b  
y automated test stripOrdered By: Charlene Hernandez on   
2023   
   
                                                    Hemoglobin Auto test   
strip Ql (U)    Negative                        Negative        [facility]  
   
                                                    Urine leukocyte esterase det  
ection by automated test stripOrdered By: Charlene Hernandez   
on 2023   
   
                                                    Leukocyte esterase Auto   
test strip Ql (U) Negative                        Negative        [facility]  
   
                                                    Urobilinogen Auto test strip  
 (U) [Mass/Vol]Ordered By: Charlene Hernandez on   
2023   
   
                                                    Urobilinogen (U)   
[Mass/Vol]      Normal mg/dL                    Normal          [facility]  
   
                                                    Variant lymphocytes/100 WBC   
Manual cnt (Bld)Ordered By: Charlene Hernandez on   
2023   
   
                                                    Variant lymphocytes/100   
WBC (Bld)       2 %                             0-12            [facility]  
   
                                                    pH Auto test strip (U)Ordere  
d By: Charlene Hernandez on 2023   
   
                      pH (U)     6.0 [pH]              5.0-9.0    [facility]  
   
                                                    Coding Summary.on 2023  
   
   
                                        Coding Summary.     [id number]  
Ww+  
PGhlYWQ+EK2ANNKtK25qpCT  
xfK0uE8AYEWsHJiloZQBOQI  
bZGhUhvsVnFE1xrVMtNXAw  
IC8+OZ1xOVVxBohcdTMlf5G  
3qJO5G28zei7nGGrejZU4CZ  
LlUxWulgwjq4uulUh1QEryM  
mluOyBt  
VJZnlO90GYF4eP49Hp51qQC  
csFMye0vtfWv7KzLhITZjHI  
R9vIpjYLcru8TuKXPpU35ni  
TEup7X2  
FGKpsLusvOItEtZidJU0vA8  
fDAjzmtceg5wizteoXuu8ka  
70pPAnv2G8eOL6Q4SqmrR2P  
GJvbGQg  
QidllZXCcO4vbambl7yfhst  
nMcGpVTDcDWp8MRt6URFtqR  
ldCzSdNC24UJR5MJFrizErF  
2FsLWFs  
rRczArC4h4P8Yf5EL4TLBtv  
hJ6JRPFRYTRyvlZR+PC90cj  
82T4WxBiqwCcf7YCJfGYL8y  
IK2sB7f  
DJEnDVqxz9L0eXZ0D5KrxhM  
kwq8tw4cyLFHeJAphF54azL  
Mry2I2GJFjwMC7GPNwxBisP  
iBzaG93  
Oyc+GZPcqRxwr3NlAjorz6x  
ju3cvpDo8FrwaLAVxmhWqoH  
mkFQV8k4UzZr8zFSCobOS6d  
AJ3kV1m  
QkOeLwK0EXxkE869RpHndAB  
lWsdtR84hD7DmwOG+PHRyPj  
n3RHMnjSanTQ2pS1OpLRHlv  
mctbGVm  
zLqlKU7uGYSpowgvPASlsJ4  
lBTNiU9r8BpHnHwV7RNqbY2  
EjPIVvxqltDr86fJ8fBeCbC  
wA7GWsb  
E7QcssI4KLTgjFIdYQozLZQ  
8J63gc2J2JGZgBTZzCGB2zQ  
A1aR9ycDjkpohepFNruDjyv  
mVydGlj  
FMoeVAwjY255PIOpzHhgNlD  
vZGluZyBEYXRlOiAgMDQvMj  
QvMjAyMzwvdGQ+DLSgKOT0l  
WxlPSAn  
dQEhDFwpOi3vmJgrmRtsLD8  
kSQDdjkeiIDXlvV0kZXNdcV  
BbpFzhGB5nSNHugfnkg436Z  
iAxMHB0  
ICYybQBjZ4PhbM1gCsGlUPI  
yTWGdA2UkcHZyXXqbG658BP  
dtHpI5LBNjixUzR4UoAPEdf  
WduOiB0  
v5Z1Dn3Oc7XhtdueC9YrdWX  
xYjPqOpvqZLy8W8NnFrabbE  
I+AO77KFXoFF07HRp2FPJ6l  
WxlPSdi  
JDUmY2EtyM2vSpSbNXThVRZ  
kOyc+PHRhYmxlIHdpZHRoPS  
ffRQKvXrOwiMqeFB6sEl4aR  
GVyLWNv  
vOtbiOIiDmEoi8qfNXLxBAi  
iMQ4btYgyA6NaqPY1GVJxq8  
x7Sx03N05sY8QtpPL+PGNvb  
GV5mHP0  
xV0sRpJjHtF6YCeaN219LxD  
bmNRfIyrtj6xqb2copUp0Zx  
K0LYCwftGdoZqiXAS7r4EfK  
v73B50j  
IHdpZHRoPSIxNSUiIHZhbGl  
evj8rlH3uTn9+PXHocLI4aT  
F1qH7uDrCiVlG7YVczN262U  
nRvcCIv  
Seuws6etr8ewuJb6PrMqSZS  
zlqYwhZqrONP4o9DyFx03O0  
MwaQsqk4DqCjd7zv89xCKto  
7Z7bUF7  
P6RgLODnitqlbTWqnHcdCH7  
rVGSwkhiuJNAmzJ2fJVFeW7  
e4BzHaIsQ9NHxyF8TierJ4I  
GJvbGQg  
FOShqXWEqB9zsmnto5hsnwa  
cVcWcXDYqCYb5BMz6HBDcjI  
szXtYzQEF9GcO6XHC1rHOsq  
O2mmKjo  
nnmvpI0rHci+JLE1hFEamKQ  
LXC0nIsbcdVZ+FRHmIAD1lN  
uqZAjbBPUjqW0hWLEmU1o6M  
iAwLjA1  
EGgxL8OqfhY2TEEbaBXsNFM  
fhBRHlF8cmrgnx9fxtkttYq  
OzNFCyYBf2BQm3BAHlqTwuX  
iBsZWZ0  
SiT5VZO4mXTvbS4ucJahehi  
lxM7vSea+JyteuYgeBPP4DN  
j0H6GrHgg9VGVnyUtiZK4bn  
GFkZGlu  
Fv0glKcosXqdYT7rYZLscee  
oo171TxNcn1osLMSyjDUtUS  
eqCMZ2M07bi9W1IIQrJOYzG  
KV9iUX3  
qY7kwPjayuowiTCctDmdvfR  
ysLrmKOwjRBxiA522NOOmrU  
rfTlFwYAl3G7HjFlq5KUMwx  
YajWS2c  
fDAkPVzuLw1scTbqjLsvWH1  
vXKTxdmmlp777UwKzz7vxFA  
HevHSkBTdgNXL5K99xv7F1N  
CMwMDAw  
YJB7hPH2sZ9ynVpqaamjrYI  
mdDsgdmVydGljYWwtYWxpZ2  
84MZGhpKakEnWnwJa7Y9TxP  
iy6QISm  
tWsqTP2poIHuGPagZg6ipEk  
ogHxgBH2aVLNtwgpsz150Us  
Ccq3hwITAutRYuEAvgAKK9N  
10gw8E6  
HIWgFUVjHYC8qGX5gQ3ubTs  
nbjogbGVmdDsgdmVydGljYW  
bjDPzoU109JLXlgEiiHcHuy  
GllbnQg  
YAtfNKo3L8QeNytrzQV+PC9  
5ENGjJO49eUAvlYUjw8nwmZ  
b3XsXcZQOsFYP6rMspUWjwn  
3JkZXIt  
M15ozIYna6X0PSVdkZohtXJ  
wMlRczUU2hH4kHKrdhzkkj7  
yulvliYlopj1efir10nB33C  
29sIHdp  
ZHRoPSIzMCUiIHZhbGlnbj0  
ynT7tPy7+HPLroRF2vUB2bL  
0kYTWuRuO4CUyzV071DeEya  
CIvPjxj  
g2byr2paqAo4CtQ3ALCzlwS  
gjTmyCLG8h6ZgLl19L29zIZ  
dpZHRoPSIyMCUiIHZhbGlnb  
g3nrG4v  
Ii8+RXTmqWA2xTL9iW5xDpV  
pSbE1DZxlP857QqXsxFGzLh  
geJ40cR0PyzML+MNXaScv9F  
CBzdHls  
SS6fmSEyZZldOy8sNRI6FrK  
wFpAkFAwrZ9FcGKRkjyjotv  
tdjQG9VDJmQEPfpT47Nh1fl  
DogMTBw  
mXNOyY2cqhdfv0lldzymIbB  
wCCUoFIw8THw9YNIckDqgOx  
NlQKM9KiX1SXF7pYZjiR2dp  
Glnbjog  
fX7wB4ToSZBlsxfzGw65eS3  
lQzGsTjF3MAvfDfl+R0VPUk  
iBFQTTZT6FHMJEIPNJEP42J  
B30lEKm  
z9F3kOW4Q0MxDOEkjuriqly  
xpOJ3LGFhNAFkzQ03kXUwHM  
ngZu8wl1H9g908PDRtODQrl  
T75Pc1a  
gNjiTRRfdULJcV6doxgba6q  
gylbqPvWaFJVpDWj2VIc4FH  
PzdRbbUfSsIWF7ZnY8LKA3y  
OTigC2f  
aUbupcjyxC5aWmn+MDkvMTg  
jKRw9CoqniGE+IKIyYSJ2zD  
fjCWcmKPJibQ6iIAJjX2x0B  
iAwLjA1  
VFbiA7RjQTAgrqaeIa89iR3  
cItCpLaO2CPewA4KjefH1OE  
ZjaCRwWOefNNI1V88mn9D0H  
CMwMDAw  
SEH7yDM9jW0lqEubylmkeRC  
mdDsgdmVydGljYWwtYWxpZ2  
90BKGugFmfZbN9HCbtWSDyO  
X14VA78  
hGHju4S7qOQ5P5BoYAUbxjj  
jdabpxOE5QIMgOVBfxK26sH  
JuNAjqHp5cp4I0j146HMLyU  
DUwaW47  
Sh4lvDdnFIDbrMHJfE1gqjg  
hn8ogcntmMwFxJARuKVp4RJ  
p6RAOmzAhcUbAzJHQ0GyN9F  
RP3wWSx  
dB8hiIwcusdamR0cNih+RmV  
gPHsnBF33NK99eXYyu3B8lR  
S4X3HjTHUnbwahklzvmSU8R  
DAuMDUw  
tE83tWJkQSdzNl6js2S8e70  
7TXHhDGAiaO14Aa6fhFixFP  
CstFMXnP5torbli5wfgeqzD  
zAwMDAw  
UHf5DSb1TULxdUahQkXtQZT  
5WrY7EOT4iSBkwP4wsJhtim  
nftH1mXjz+WQ3yeggemkM0T  
L27WW14  
J5LuNizqzAZmfQP+PHRhYmx  
lIHdpZHRoPScxMDAlJyBzdH  
sxTP5qAo2uKCSiJAOenXlkz  
HNlOiBj  
e7zjKTFsLDggSD3djOnaN9U  
bwXU6PHSte5i6Tl34C22eG3  
JvdXA+DXQqeJW9bEB3sA9uC  
zAlIiB2  
XPkoY453WpIfxQHkHwyqs8u  
wu0mqkIu2HwDbOUDbsiDlcS  
utABP3f2StOz99K66bJVbfU  
HRoPSIy  
QFEdMBAtoKzqqw1wtA3oBi5  
+LMUudCX9mLD0rB4cNcXmHl  
N6OHkuR133OcBboJQfQmbnF  
75nV8Fh  
dXA+RHKcJsj1MQQomScaMR3  
qcFLrHOvbKi0rCOM3BoFvPq  
SiTYhzD5AqCIXzldtmwwevd  
JK6MWJv  
DTEyrC73Hx2vnPirEg8bPSH  
bOJZ8UTDgrSVrE4AxxH6fVk  
JfCMLdWYRsX7VpxFBvWPdwU  
246IGxl  
NpT5BHNtyfEjF3VuBTPipUr  
bUdE3h9A7Oh0QpNlvaQHaZH  
5cIlQdCMh1P4IuBes7VPYcb  
HugAY7k  
nUPyBCinQu4mwVzdqYdvUN6  
aCMUszkisf918LbDog7ucAS  
AozVYiXGepYYX5Z89jw5J1M  
CMwMDAw  
MVK3zUN3dT8hvLnyfjpivLB  
mdDsgdmVydGljYWwtYWxpZ2  
35RJNpzSpoGjBWAnn4I3TvV  
nn8QMUr  
kXntJJ7ayCQpFNibTq2zeXs  
axOpcPO9eFVNrsfhvo131Hr  
Arn3qaDUAetJZxMUlpWSL3W  
33jm6K8  
DJSiDEXsLQV4fSJ1uU8vhSq  
nbjogbGVmdDsgdmVydGljYW  
gmIPkaD642KYJwhSfyXx9FK  
lj3O9Gl  
Inv1FVVbxYkhTK2mvPHbCFv  
kIx5usPwxzHswEH9sIYVovo  
abz061HbGnn1nwFDMxyGFbJ  
GltZXM7  
Q57ac0A0WLTgGNNuIMP9oTC  
1bK7guIgnbiakjLRwwOrlcb  
IvbKizODleJQkwK722FHAwh  
DsnPlBh  
eWVyOjwvdGQ+GB85rr47X4Z  
cOqoxOjs9WFFwOEX7wJI5uV  
3mJASfGNfoq3H8yYP5R3Mmf  
iXnqo0e  
r8fwGRDv (more content   
not included)...    Normal                                  [facility]  
   
                                                    Alanine aminotransferase [En  
zymatic activity/volume] in Serum or PlasmaOrdered   
By:   
Ginger Bullimore on 2023   
   
                                                    ALT [Catalytic   
activity/Vol]   24 U/L                          7-52            [facility]  
   
                                                    Albumin [Mass/volume] in Ser  
um or Plasma by Bromocresol green (BCG) dye binding   
methoOrdered By: Ginger Bullimore on 2023   
   
                                                    Albumin BCG dye   
[Mass/Vol]      3.6 g/dL                        3.5-5.7         [facility]  
   
                                                    Alkaline phosphatase [Enzyma  
tic activity/volume] in Serum or PlasmaOrdered By:   
Ginger   
Bullimore on 2023   
   
                                                    ALP [Catalytic   
activity/Vol]   68 U/L                                    [facility]  
   
                                                    Anisocytosis LM Ql (Bld)Orde  
red By: Ginger Bullimore on 2023   
   
                      Anisocytosis Ql (Bld) Slight                           [facility]  
   
                                                    Aspartate aminotransferase [  
Enzymatic activity/volume] in Serum or PlasmaOrdered  
By:   
Ginger Bullimore on 2023   
   
                                                    AST [Catalytic   
activity/Vol]   14 U/L                          13-39           [facility]  
   
                                                    Band form neutrophils/100 WB  
C Manual cnt (Bld)Ordered By: Ginger Bullimore on   
2023   
   
                                                    Band form   
neutrophils/100 WBC   
(Bld)           2 %                             0-5             [facility]  
   
                                                    Basophils Auto (Bld) [#/Vol]  
Ordered By: Ginger Bullimore on 2023   
   
                      Basophils (Bld) [#/Vol] N/A                              F  
[facility]  
   
                                                    Basophils/100 WBC Auto (Bld)  
Ordered By: Ginger Bullimore on 2023   
   
                      Basophils/100 WBC (Bld) N/A                              F  
[facility]  
   
                                                    Basophils/100 WBC Manual cnt  
 (Bld)Ordered By: Ginger Bullimore on 2023   
   
                      Basophils/100 WBC (Bld) 1 %                   0-2        F  
[facility]  
   
                                                    Bilirubin.total [Mass/volume  
] in Serum or PlasmaOrdered By: Ginger Bullimore on   
2023   
   
                      Bilirubin [Mass/Vol] 0.5 mg/dL             0.3-1.0    [facility]  
   
                                                    C reactive protein [Mass/vol  
ume] in Serum or PlasmaOrdered By: Ginger Bullimore   
on   
2023   
   
                      CRP [Mass/Vol] < 0.5 mg/dL            0.0-0.5    [facility]  
   
                                                    Calcium [Mass/volume] in Ser  
um or PlasmaOrdered By: Ginger Bullimore on   
2023   
   
                      Calcium [Mass/Vol] 8.4 mg/dL             8.6-10.3   [facility]  
   
                                                    Carbon dioxide, total [Moles  
/volume] in Serum or PlasmaOrdered By: Ginger   
Bullimore   
on 2023   
   
                      CO2 [Moles/Vol] 30.0 mmol/L            21.0-31.0  [facility]  
   
                                                    Chloride [Moles/volume] in S  
herrera or PlasmaOrdered By: Ginger Bullimore on   
2023   
   
                      Chloride [Moles/Vol] 103 mmol/L                 [facility]  
   
                                                    Creatinine [Mass/volume] in   
Serum or PlasmaOrdered By: Ginger Bullimore on   
2023   
   
                      Creatinine [Mass/Vol] 0.79 mg/dL            0.60-1.20  [facility]  
   
                                                    Eosinophils Auto (Bld) [#/Vo  
l][facility]   
   
                                                    Eosinophils (Bld)   
[#/Vol]         N/A                                             [facility]  
   
                                                    Eosinophils/100 WBC Auto (Bl  
d)[facility]   
   
                                                    Eosinophils/100 WBC   
(Bld)           N/A                                             [facility]  
   
                                                    Erythrocyte distribution wid  
th Auto (RBC) [Ratio]Ordered By: Ginger Bullimore on  
   
2023   
   
                                                    Erythrocyte   
distribution width   
(RBC) [Ratio]   12.9 %                          11.9-15.3       [facility]  
   
                                                    Ferritin [Mass/volume] in Se  
rum or PlasmaOrdered By: [facility]   
2023   
   
                      Ferritin [Mass/Vol] 77.2 ng/mL            11.0-306.8 [facility]  
   
                                                    Giant platelets/100 leukocyt  
es [Ratio] in Blood by Manual countOrdered By:   
[facility]   
   
                                                    Giant platelets/100 WBC   
Manual cnt (Bld)   
[Ratio]         1 /100{WBC}                                     [facility]  
   
                                                    Globulin Calc (S) [Mass/Vol]  
Ordered By: Ginger Robyn on 2023   
   
                      Globulin (S) [Mass/Vol] 2.3 g/dL                         F  
[facility]  
   
                                                    Glucose [Mass/volume] in Ser  
um or PlasmaOrdered By: [facility] on   
2023   
   
                      Glucose [Mass/Vol] 300 mg/dL                  [facility]  
   
                                        Comment on above:   ADA recommended refe  
rence rangeRandom Glucose Reference Range   
is dependent on time and content of last meal. Glucose of more   
than 200 mg/dL in a nonstressed, ambulatory subject supports   
the diagnosis of Diabetes Mellitus.   
   
                                                    Hematocrit Auto (Bld) [Volum  
e fraction]Ordered By: Abbi Carlson on   
2023   
   
                                                    Hematocrit (Bld)   
[Volume fraction] 42.6 %                          34.0-46.4       [facility]  
   
                                                    Hemoglobin [Mass/volume] in   
BloodOrdered By: Abbi Carlson on 2023   
   
                                                    Hemoglobin (Bld)   
[Mass/Vol]      14.5 g/dL                       11.8-15.4       [facility]  
   
                                                    Lactate dehydrogenase [Enzym  
atic activity/volume] in Serum or Plasma by Lactate   
to   
pyOrdered By: Abbi Myrickimore on 2023   
   
                                                    LDH Lactate to pyruvate   
reaction [Catalytic   
activity/Vol]   170 U/L                         140-271         [facility]  
   
                                                    Leukocytes [#/volume] correc  
ken for nucleated erythrocytes in Blood by Automated  
   
counOrdered By: Praveenaer Bullimore on 2023   
   
                                                    WBC corrected for nucl   
RBC Auto (Bld) [#/Vol] 19.0 10*3/uL                    3.8-11.6        [facility]  
   
                                                    Lymphocytes Auto (Bld) [#/Vo  
l]Ordered By: Praveenaer Bullimore on 2023   
   
                                                    Lymphocytes (Bld)   
[#/Vol]         N/A                                             [facility]  
   
                                                    Lymphocytes/100 WBC Auto (Bl  
d)Ordered By: Praveenaer Bullimore on 2023   
   
                                                    Lymphocytes/100 WBC   
(Bld)           N/A                                             [facility]  
   
                                                    Lymphocytes/100 WBC Manual c  
nt (Bld)Ordered By: Ginger Bullimore on 2023   
   
                                                    Lymphocytes/100 WBC   
(Bld)           4 %                             18-42           [facility]  
   
                                                    MCH Auto (RBC) [Entitic mass  
]Ordered By: Abbi Myrickimore on 2023   
   
                                                    MCH (RBC) [Entitic   
mass]           31.4 pg                         24.7-34.3       [facility]  
   
                                                    MCHC Auto (RBC) [Mass/Vol]Or  
dered By: Praveenaer Bullimore on 2023   
   
                      MCHC (RBC) [Mass/Vol] 34.0 g/dL             32.0-35.0  [facility]  
   
                                                    MCV Auto (RBC) [Entitic vol]  
Ordered By: Praveenaer Bullimore on 2023   
   
                      MCV (RBC) [Entitic vol] 92.4 fL                    F  
[facility]  
   
                                                    Microcytes LM Ql (Bld)Ordere  
d By: Praveenaer Bullimore on 2023   
   
                      Microcytes Ql (Bld) Slight                           [facility]  
   
                                                    Monocyte distribution width   
[Entitic volume] in Blood by AutomatedOrdered By:   
Ginger   
Bullimore on 2023   
   
                                                    Monocyte distribution   
width Auto (Bld)   
[Entitic vol]   17.51 %                         0.00-20.00      [facility]  
   
                                                    Monocytes Auto (Bld) [#/Vol]  
Ordered By: Ginger Bullimore on 2023   
   
                      Monocytes (Bld) [#/Vol] N/A                              F  
[facility]  
   
                                                    Monocytes/100 WBC Auto (Bld)  
Ordered By: Ginger Bullimore on 2023   
   
                      Monocytes/100 WBC (Bld) N/A                              F  
[facility]  
   
                                                    Monocytes/100 WBC Manual cnt  
 (Bld)Ordered By: Ginger Bullimore on 2023   
   
                      Monocytes/100 WBC (Bld) 2 %                   2-11       F  
[facility]  
   
                                                    Neutrophils Auto (Bld) [#/Vo  
l][facility]   
   
                                                    Neutrophils (Bld)   
[#/Vol]         N/A                                             [facility]  
   
                                                    Neutrophils/100 WBC Auto (Bl  
d)[facility]   
   
                                                    Neutrophils/100 WBC   
(Bld)           N/A                                             [facility]  
   
                                                    No Panel InformationOrdered   
By: Ginger Robyn on 2023   
   
                                                    D-Dimer Quantitative   
(PE/DVT)        < 200 ng/mL                     0-243           [facility]  
   
                                        Comment on above:   The reference range   
for D-dimer is <243 ng/mL D-dimer   
units.D-dimer results must be used in conjunction with a   
clinicalpretest probability (PTP) assessment model for deep   
veinthrombosis (DVT) and pulmonary embolism (PE). Results   
<230ng/mL d-dimer units can be used as a negative predictor   
inpatients with low or moderate probability for DVT/PE.Results   
above the exclusion threshold of 230 ng/ml D-dimerunits for   
DVT/PE may indicate the need for furtherdiagnostic   
testing.D-Dimer can be increased in hospitalized patients due   
toco-morbid conditions.   
   
                      Estimated GFR (CKD-EPI) > 60.0 mL/Min                       
  [facility]  
   
                                                    Pharmacy Creatinine   
Clearance (Chem 94.33                                           [facility]  
   
                                                    Nucleated erythrocytes [Pres  
ence] in Blood by Automated countOrdered By: [facility] on 2023   
   
                                                    Nucleated RBC Auto Ql   
(Bld)           N/A                                             [facility]  
   
                                                    Platelet adequacy [Presence]  
 in Blood by Light microscopyOrdered By: [facility]   
on 2023   
   
                      Platelets LM Ql (Bld) Normal                Normal     Fir  
[facility]  
   
                                                    Platelet mean volume Auto (B  
ld) [Entitic vol]Ordered By: Ginger Bullimore on   
2023   
   
                                                    Platelet mean volume   
(Bld) [Entitic vol] 8.1 fL                          6.3-10.7        [facility]  
   
                                                    Platelet morphology finding   
[Identifier] in BloodOrdered By: Ginger Bullimore on  
   
2023   
   
                                                    Platelet morphology   
finding Nom (Bld) N/A                                             [facility]  
   
                                                    Platelets Auto (Bld) [#/Vol]  
Ordered By: Ginger Bullimore on 2023   
   
                      Platelets (Bld) [#/Vol] 188 10*3/uL            150-450      
[facility]  
   
                                                    Potassium [Moles/volume] in   
Serum or PlasmaOrdered By: Ginger Bullimore on   
2023   
   
                      Potassium [Moles/Vol] 4.4 mmol/L            3.5-5.1    [facility]  
   
                                                    Protein [Mass/volume] in Ser  
um or PlasmaOrdered By: Ginger Bullimore on   
2023   
   
                      Protein [Mass/Vol] 5.9 g/dL              6.4-8.9    [facility]  
   
                                                    RBC Auto (Bld) [#/Vol]Ordere  
d By: Ginger Bullimore on 2023   
   
                      RBC (Bld) [#/Vol] 4.61 10*6/uL            3.60-5.00  [facility]  
   
                                                    RBC morphologyOrdered By: Gi  
nger Bullimore on 2023   
   
                                                    RBC morphology finding   
Nom (Bld)       N/A                                             [facility]  
   
                                                    Segmented neutrophils/100 WB  
C Manual cnt (Bld)Ordered By: Praveenaer Bullimore on   
2023   
   
                                                    Segmented   
neutrophils/100 WBC   
(Bld)           91 %                            50-70           [facility]  
   
                                                    Serum or plasma albumin/glob  
ulin mass ratioOrdered By: Ginger Bullimore on   
2023   
   
                                                    Albumin/Globulin [Mass   
ratio]          1.6 {ratio}                                     [facility]  
   
                                                    Serum or plasma anion gap de  
terminationOrdered By: Ginger Bullimore on   
2023   
   
                      Anion gap [Moles/Vol] 9.4 mmol/L            6.0-15.0   [facility]  
   
                                                    Sodium [Moles/volume] in Ser  
um or PlasmaOrdered By: Praveenaer Bullimore on   
2023   
   
                      Sodium [Moles/Vol] 138 mmol/L            136-145    [facility]  
   
                                                    Troponin I.cardiac [Mass/vol  
ume] in Serum or Plasma by Detection limit <= 0.01   
ng/Ordered By: Praveenaer Bullimore on 2023   
   
                                                    Troponin I.cardiac DL   
<= 0.01 ng/mL   
[Mass/Vol]      4.7 pg/mL                       0.0-15.0        [facility]  
   
                                                    Urea nitrogen [Mass/volume]   
in Serum or PlasmaOrdered By: Praveenaer Bullimore on   
2023   
   
                                                    Urea nitrogen   
[Mass/Vol]      15 mg/dL                        7-25            [facility]  
   
                                                    WBC Auto (Bld) [#/Vol]Ordere  
d By: Ginger Bullimore on 2023   
   
                      WBC (Bld) [#/Vol] 19.0 10*3/uL            3.8-11.6   [facility]  
   
                                                    Auto Diffon 2023   
   
                      Basophils/100 WBC (Bld) 0.3 %      Normal     0.0-2.0    F  
[facility]  
   
                                        Comment on above:   Order Comment: Order  
 Added by Discern Expert.   
   
                                                            Performed By: #### 2  
[id number], 6315790, 07791768, 47947492,   
9058932, 82163310, 97676404 ####  
[facility]  
[address] [place] [address]   
   
                                                    Basophils/Leukocytes   
Auto (Bld) [Pure #   
fraction]       0.1 E9/L        Normal          0.0-0.2         [facility]  
   
                                        Comment on above:   Order Comment: Order  
 Added by Discern Expert.   
   
                                                            Performed By: #### 2  
576765, 4250228, 92838069, 38210266,   
6618724, 26924935, 94746213 ####  
[facility]  
[address] [place] [address]   
   
                                                    Eosinophils/100 WBC   
(Bld)           0.0 %           Normal          0.0-8.0         [facility]  
   
                                        Comment on above:   Order Comment: Order  
 Added by Discern Expert.   
   
                                                            Performed By: #### 2  
082778, 3390849, 73079664, 93381364,   
8537638, 18308997, 44570913 ####  
[facility]  
[address] [place] [address]   
   
                                                    Eosinophils/Leukocytes   
Auto (Bld) [Pure #   
fraction]       0.0 E9/L        Normal          0.0-0.5         [facility]  
   
                                        Comment on above:   Order Comment: Order  
 Added by Discern Expert.   
   
                                                            Performed By: #### 2  
634327, 5737977, 84335227, 77340361,   
8151512, 09754050, 28859004 ####  
[facility]  
[address] [place] [address]   
   
                                                    Lymphocytes/100 WBC   
(Bld)           8.3 %           Low             14.0-50.0       [facility]  
   
                                        Comment on above:   Order Comment: Order  
 Added by Discern Expert.   
   
                                                            Performed By: #### 2  
260648, 7081303, 40967013, 87911877,   
8512840, 31142496, 45813008 ####  
[facility]  
[address] [place] [address]   
   
                                                    Lymphocytes/Leukocytes   
Auto (Bld) [Pure #   
fraction]       1.5 E9/L        Normal          1.0-4.0         [facility]  
   
                                        Comment on above:   Order Comment: Order  
 Added by Discern Expert.   
   
                                                            Performed By: #### 2  
817402, 4243623, 19127001, 28811105,   
9763989, 07570340, 13617696 ####  
[facility]  
[address] [place] [address]   
   
                      Monocytes/100 WBC (Bld) 4.7 %      Normal     4.0-14.0   [facility]  
   
                                        Comment on above:   Order Comment: Order  
 Added by Discern Expert.   
   
                                                            Performed By: #### 2  
740455, 7400889, 92816405, 64620313,   
6696602, 33727915, 60620587 ####  
[facility]  
[address] [place] [address]   
   
                                                    Monocytes/Leukocytes   
Auto (Bld) [Pure #   
fraction]       0.9 E9/L        Normal          0.2-1.0         [facility]  
   
                                        Comment on above:   Order Comment: Order  
 Added by Discern Expert.   
   
                                                            Performed By: #### 2  
316425, 0157950, 90079021, 31186264,   
7790676, 74077174, 70324618 ####  
[facility]  
[address] [place] [address]   
   
                                                    Neutrophils/100 WBC   
(Bld)           86.7 %          High            36.0-75.0       [facility]  
   
                                        Comment on above:   Order Comment: Order  
 Added by Discern Expert.   
   
                                                            Performed By: #### 2  
690015, 0845587, 69833431, 31359145,   
0705750, 34879464, 00471818 ####  
[facility]  
[address] [place] [address]   
   
                                                    Neutrophils/Leukocytes   
Auto (Bld) [Pure #   
fraction]       16.1 E9/L       High            2.0-7.5         [facility]  
   
                                        Comment on above:   Order Comment: Order  
 Added by Discern Expert.   
   
                                                            Performed By: #### 2  
898114, 8292474, 71345997, 59110802,   
4538841, 73007598, 73888247 ####  
[facility]  
[address] [place] [address]   
   
                                                    BMPon 2023   
   
                      Creatinine [Mass/Vol] 0.6 mg/dL  Normal     0.5-1.3    [facility]  
   
                                        Comment on above:   Performed By: #### 2  
860246, 0589898, 46456733, 05073140,   
8728768, 37469273, 45408844 ####  
[facility]  
[address] [place] [address]   
   
                                                    Urea nitrogen   
[Mass/Vol]      15 mg/dL        Normal          5-21            [facility]  
   
                                        Comment on above:   Performed By: #### 2  
923834, 2888136, 08276293, 29604131,   
8495416, 29558255, 41119316 ####  
[facility]  
[address] [place] [address]   
   
                                                    Urea   
nitrogen/Creatinine   
[Mass ratio]    25 No Units     High            10-20           [facility]  
   
                                        Comment on above:   Performed By: #### 2  
530744, 5506610, 63846315, 89385020,   
5506582, 21002182, 27753667 ####  
[facility]  
[address] [place] [address]   
   
                      Anion gap [Moles/Vol] 13 mmol/L  Normal     6-16       [facility]  
   
                                        Comment on above:   Performed By: #### 2  
099936, 4756022, 90600389, 48702246,   
4559472, 07188579, 29636625 ####  
[facility]  
[address] [place] [address]   
   
                      Calcium [Mass/Vol] 9.2 mg/dL  Normal     8.9-11.1   [facility]  
   
                                        Comment on above:   Performed By: #### 2  
312508, 3730661, 07055464, 62042459,   
9623689, 66394727, 87050180 ####  
[facility]  
[address] [place] [address]   
   
                      Chloride [Moles/Vol] 102 mmol/L Normal     101-111    [facility]  
   
                                        Comment on above:   Performed By: #### 2  
970555, 4025018, 97463191, 87583717,   
3870492, 98108788, 09796006 ####  
[facility]  
[address] [place] [address]   
   
                      CO2 [Moles/Vol] 26 mmol/L  Normal     21-31      [facility]  
   
                                        Comment on above:   Performed By: #### 2  
098258, 4226343, 65212970, 10113159,   
9892843, 38173108, 39088004 ####  
[facility]  
[address] [place] [address]   
   
                      Glucose [Mass/Vol] 216 mg/dL  High            [facility]  
   
                                        Comment on above:   Result Comment: If t  
his glucose result represents a fasting   
glucose, interpretation should refer to the following   
reference range: 55-99 mg/dL   
   
                                                            Performed By: #### 2  
738763, 7454296, 21123367, 23236450,   
3012123, 22311543, 79508628 ####  
[facility]  
[address] [place] [address]   
   
                      Potassium [Moles/Vol] 4.6 mmol/L Normal     3.5-5.3    [facility]  
   
                                        Comment on above:   Performed By: #### 2  
050579, 2764316, 87278934, 56791510,   
5759388, 48198439, 63852907 ####  
[facility]  
[address] [place] [address]   
   
                      Sodium [Moles/Vol] 136 mmol/L Normal     135-145    [facility]  
   
                                        Comment on above:   Performed By: #### 2  
250076, 4108102, 81484381, 50405462,   
9030973, 19609869, 11288798 ####  
[facility]  
[address] [place] [address]   
   
                                                    BNPon 2023   
   
                                                    Natriuretic peptide B   
(Bld) [Mass/Vol] 70 pg/mL        Normal          5-80            [facility]  
   
                                        Comment on above:   Performed By: #### 2  
557435, 3921274, 14569180, 45514509,   
6747801, 90203081, 44747326 ####  
[facility]  
[address] [place] [address]   
   
                                                    CBC w/ Auto Diffon 04-  
3   
   
                                                    Erythrocyte   
distribution width   
(RBC) [Ratio]   13.0 %          Normal          10.9-14.2       [facility]  
   
                                        Comment on above:   Performed By: #### 2  
571996, 3094638, 68655233, 15067356,   
8927760, 35611099, 81155553 ####  
[facility]  
[address] [place] [address]   
   
                                                    Hematocrit (Bld)   
[Volume fraction] 45.1 %          Normal          34.0-46.0       [facility]  
   
                                        Comment on above:   Performed By: #### 2  
765178, 5800141, 85515714, 80284620,   
1218497, 17479890, 74216070 ####  
[facility]  
[address] [place] [address]   
   
                                                    Hemoglobin (Bld)   
[Mass/Vol]      15.1 g/dL       Normal          12.0-16.0       [facility]  
   
                                        Comment on above:   Performed By: #### 2  
366397, 8078704, 68002364, 24005355,   
6520828, 19994257, 56426783 ####  
[facility]  
[address] [place] [address]   
   
                                                    MCH (RBC) [Entitic   
mass]           30.6 pg         Normal          27.0-34.0       [facility]  
   
                                        Comment on above:   Performed By: #### 2  
414828, 4498050, 22551245, 15780497,   
8587240, 18626081, 46441205 ####  
[facility]  
[address] [place] [address]   
   
                      MCHC (RBC) [Mass/Vol] 33.4 g/dL  Normal     31.4-36.0  [facility]  
   
                                        Comment on above:   Performed By: #### 2  
445472, 5500997, 61945459, 71939985,   
8450539, 48545039, 18133918 ####  
[facility]  
[address] [place] [address]   
   
                      MCV (RBC) [Entitic vol] 91.5 fL    Normal     80.0-100.0 F  
[facility]  
   
                                        Comment on above:   Performed By: #### 2  
974450, 6329962, 78809349, 76243995,   
8882605, 70125377, 81316407 ####  
[facility]  
[address] [place] [address]   
   
                                                    Platelet mean volume   
(Bld) [Entitic vol] 8.2 fL          Normal          6.4-10.8        [facility]  
   
                                        Comment on above:   Performed By: #### 2  
188263, 8644142, 01411072, 90902872,   
9079768, 45469491, 26334125 ####  
[facility]  
[address] [place] [address]   
   
                      Platelets (Bld) [#/Vol] 189.0 E9/L Normal     150.0-500.0   
[facility]  
   
                                        Comment on above:   Performed By: #### 2  
960718, 5825145, 03914822, 70483796,   
5969296, 19916847, 02494784 ####  
[facility]  
[address] [place] [address]   
   
                      RBC (Bld) [#/Vol] 4.9 E12/L  Normal     4.3-5.9    [facility]  
   
                                        Comment on above:   Performed By: #### 2  
492841, 9051950, 91485767, 72571285,   
1534629, 20705443, 99231624 ####  
[facility]  
[address] [place] [address]   
   
                                                    WBC corrected for nucl   
RBC Auto (Bld) [#/Vol] 18.6 E9/L       High            4.0-11.0        [facility]  
   
                                        Comment on above:   Performed By: #### 2  
118632, 9475769, 34319944, 32813982,   
7773550, 32174664, 48747133 ####  
[facility]  
[address] [place] [address]   
   
                                                    CHEMISTRYOrdered By: SYSTEM   
SYSTEM on 2023   
   
                          Anion gap [Moles/Vol] 13 mmol/L    Normal       6 - 16  
   
mEq/L                                   [facility]  
   
                          Calcium [Mass/Vol] 9.2 mg/dL    Normal       8.9 - 11.  
1   
mg/dL                                   [facility]  
   
                          Chloride [Moles/Vol] 102 mmol/L   Normal       101 - 1  
11   
mmol/L                                  [facility]  
   
                          CO2 [Moles/Vol] 26 mmol/L    Normal       21 - 31   
mmol/L                                  [facility]  
   
                          Creatinine [Mass/Vol] 0.6 mg/dL    Normal       0.5 -   
1.3   
mg/dL                                   [facility]  
   
                                                    GFR/1.73 sq M.predicted   
among blacks MDRD   
(S/P/Bld) [Vol   
rate/Area]          mL/min/1.73 m2      Normal              >=59mL/min/  
1.73 m2                                 [facility]  
   
                                                    GFR/1.73 sq M.predicted   
among non-blacks MDRD   
(S/P/Bld) [Vol   
rate/Area]          mL/min/1.73 m2      Normal              >=59mL/min/  
1.73 m2                                 [facility]  
   
                          Glucose [Mass/Vol] 216 mg/dL    High         55 - 199   
mg/dL                                   [facility]  
   
                          Potassium [Moles/Vol] 4.6 mmol/L   Normal       3.5 -   
5.3   
mmol/L                                  [facility]  
   
                          Sodium [Moles/Vol] 136 mmol/L   Normal       135 - 145  
   
mmol/L                                  [facility]  
   
                                                    Troponin I.cardiac   
[Mass/Vol]          4.80 pg/mL          Low                 10.10 -   
27.10 pg/mL                             [facility]  
   
                                                    Urea nitrogen   
[Mass/Vol]          15 mg/dL            Normal              5 - 21   
mg/dL                                   [facility]  
   
                                                    Urea   
nitrogen/Creatinine   
[Mass ratio]    25 mg/mg        High            10 - 20         [facility]  
   
                                                    CHEMISTRYOrdered By: Mandie Arambula on 2023   
   
                                                    Natriuretic peptide B   
(Bld) [Mass/Vol]    70 pg/mL            Normal              5 - 80   
pg/mL                                   [facility]   
[facility]  
   
                                                    COAGULATIONOrdered By: Soraya Hassan on 2023   
   
                          aPTT Coag (PPP) [Time] 33.5 s       Normal       25.1   
- 36.5   
second(s)                               [facility] Auto   
Coag  
   
                                                    INR Coag (PPP)   
[Relative time]           1.0 {INR}                 Invalid   
Interpretation   
Code                                                [facility]   
Coag  
   
                          PT Coag (PPP) [Time] 10.8 s       Normal       9.4 - 1  
2.5   
second(s)                               [facility]   
Coag  
   
                                                    CTA Cheston 2023   
   
                                        CTA Chest           Exam Date/Time:  
2023 11:25 EDT  
Reason for Exam:  
Shortness of breath   
(SOB)  
Report  
IMPRESSION: The study   
is negative for   
pulmonary emboli,   
aortic dissection, or  
aneurysm.  
There are no acute   
infiltrates or   
effusions.  
EXAM: CTA Chest  
History: Chest pain,   
SOB shortness of   
breath, difficulty   
breathing, chest pain  
Technique: Multiple   
contiguous axial images   
were obtained of the   
thorax from the  
thoracic inlet through   
the upper abdomen   
during dynamic   
administration of IV  
contrast. Sagittal and   
coronal reformats as   
well as 3-D MIP   
projection reformations  
have been obtained.  
All CT scans at this   
facility use dose   
modulation, iterative   
reconstruction, and/or  
weight based dosing   
when appropriate to   
reduce radiation dose   
to as low as reasonably  
achievable.  
Comparison:  
Findings:  
Visualized portion of   
the thyroid gland is   
within normal limits.  
There is a three-vessel   
aortic arch. No   
thoracic aortic   
aneurysm.  
Heart size is within   
normal limits. No   
significant pericardial   
effusion.  
There are no persistent   
filling defects within   
the pulmonary arteries.  
No axillary,   
mediastinal, or hilar   
lymphadenopathy.  
Esophagus is within   
normal limits.  
No pulmonary nodule or   
mass. No consolidation,   
pleural effusion, or   
pneumothorax.  
Visualized upper   
abdominal viscera   
appear within normal   
limits.  
There are no acute   
osseous changes.  
  
Report  
Ordering Provider:   
Rei Slaughter  
***** FINAL REPORT   
*****  
  
Dictated: 2023   
11:34 am Gerhard Almazan MD  
  
Signed (Electronic   
Signature): 2023   
11:34 am  
Signed by: Gerhard Almazna MD  
Transcribed by: CHIDI   
Technologist: JOHNATHAN  
Technical Comments  
GFR (mL/min/1/73m2) >60  
Contrast: Isovue 370  
Contrast amount in   
ml's: 85            Normal                                  [facility]  
   
                                                    Consent for Treatmenton    
   
                                        Consent for Treatment [id number]  
[id number]  
[id number]  
D:127               Normal                                  [facility]  
   
                                                    Discharge Instructionson    
   
                                        Discharge Instructions [id number]  
[id number]  
[id number]#[id number]  
CD:127              Normal                                  [facility]  
   
                                                    ED Clinical Summaryon 2023   
   
                                        ED Clinical Summary (Inserted Image. Ashley  
ble   
to display)   
[place] [address]  
[phone]  
ED Clinical Summary  
Person Information  
Name: BEV GAMING [facility] Age:   
57 Years : 1965  
Sex: Female Language:   
English PCP: SHERMAN MALONE DO  
Marital Status:    
Phone: [phone]  
MRN: [id number] Visit Id:   
FIN: [id number]  
Visit Reason: Body   
aches; Shortness of   
breath; SOB, DIFF   
BREATHING-COVID+   
Speciality: Acuity: 2  
Enc Type: Emergency Med   
Service: Emergency  
Arrival: 2023   
09:18:42 Discharge:   
2023 11:57:54 LOS:   
000 02:39  
Checkin: 2023   
09:18:42 Checkout:   
2023 11:57:54   
Dispo Type: Home   
(Routine DC)  
  
EVENTS:  
Event Name Event Status   
Request Date/Time Start   
Date/Time Complete   
Date/Time  
Arrive Complete   
2023 09:18:42   
Document Home Meds   
Request 2023   
09:18:42  
Triage Complete   
2023 09:18:42   
2023 09:25:19   
2023 09:25:19  
Bed Assign Complete   
2023 09:19:39   
Dr Exam Complete   
2023 09:19:39   
2023 09:20:28   
2023 09:20:28  
RN Exam Complete   
2023 09:19:39   
2023 09:30:21   
2023 09:30:21  
Registration Complete   
2023 09:20:28   
2023 09:28:53   
2023 09:28:53  
EKG Complete 2023   
09:24:10 2023   
09:25:44  
Patient Care Request   
2023 09:25:20  
Patient Isolation   
Request 2023   
09:25:20  
Patient Care Request   
2023 09:26:38  
Patient Isolation   
Request 2023   
09:26:38  
Reg Complete Request   
2023 09:28:53  
Reg Bed Request   
Complete 2023   
09:28:53 2023   
09:28:53 2023   
09:28:53  
Meds Admin Complete   
2023 09:36:06   
2023 10:09:37  
Pending Labs Request   
2023 09:36:06  
Lab Complete 2023   
09:36:06 2023   
10:35:43  
Patient Care Request   
2023 09:36:06  
RT Request 2023   
09:36:06  
X-Ray Complete   
2023 09:36:06   
2023 10:11:18   
2023 10:27:45  
RT Tx/ABG Complete   
2023 09:36:07   
2023 10:02:31   
2023 10:02:31  
RT Tx/ABG Complete   
2023 09:36:07   
2023 10:02:22   
2023 10:02:22  
Pending Labs Complete   
2023 10:01:44   
2023 10:01:44   
2023 10:35:45  
Lab Complete 2023   
10:01:44 2023   
10:01:44 2023   
10:35:45  
Pending Labs Complete   
2023 10:02:10   
Pending Labs Complete   
2023 10:07:48   
2023 10:07:48   
2023 10:07:57  
Lab Complete 2023   
10:07:48 2023   
10:07:48 2023   
10:07:57  
Wet Read Request   
2023 10:27:45  
Meds Admin Complete   
2023 10:54:25   
2023 10:57:43  
RT Tx/ABG Complete   
2023 10:54:25   
2023 11:18:44   
2023 11:18:44  
RT Tx/ABG Complete   
2023 10:54:25   
2023 11:18:38   
2023 11:18:38  
CT Complete 2023   
10:55:43 2023   
11:09:36 2023   
11:25:25  
Discharge Complete   
2023 11:42:57   
2023 11:58:00   
2023 11:58:00  
Transfer Complete   
2023 11:58:00   
ADDRESS:  
[facility] [id number]  
PHYS DOC NOTES:  
MEDICAL INFORMATION:  
Prescriptions Given:  
New Medications  
[id number], [address]   
[place]   
[address], [phone]  
azithromycin   
(azithromycin 250 mg   
Tab) 250 Milligram By   
Mouth As Directed.   
Refills: 0.  
predniSONE (predniSONE   
20 mg Tab) 3 Tablets By   
Mouth every day for 7   
Days. Refills: 0.  
Medications to Continue   
with No Changes  
Other Medications  
albuterol (albuterol   
0.083% Inh Sol 3 mL) 1   
Nebulized inhalation   
(aerosol) every 4 hours   
as needed Shortness of   
breath or wheezing.  
benzonatate (Tessalon   
100 mg Cap) 1 Capsules   
By Mouth 3 times a day.   
Refills: 0.  
cyclobenzaprine   
(cyclobenzaprine 5 mg   
Tab) 1 Tablets By Mouth   
2 times a day as needed   
Spasm. Refills: 0.  
dextromethorphan-promet  
hazine   
(dextromethorphan-prome  
thazine 15 mg-6.25 mg/5   
mL Oral Syrup 5 mL) 5   
Milliliter By Mouth   
every 4 hours as needed   
for cough.  
insulin aspart (NovoLog   
FlexPen) See   
Instructions. 14   
unit(s) SubCutaneous   
TIDAC.  
insulin degludec   
(Tresiba FlexTouch) 60   
Units Subcutaneous   
every day.  
loratadine (Claritin 10   
mg Tab) 1 Tablets By   
Mouth every day as   
needed Congestion.   
Refills: 0.  
lorazepam (LORazepam   
0.5 mg Tab) 1 Tablets   
By Mouth 3 times a day   
as needed as needed for   
anxiety.  
Oxygen (Home Oxygen) 4   
Liter/minute Nasal   
Cannula every day as   
needed Dyspnea. Oxygen   
- Continuous or   
Nocturnal  
Diagnosis:  
Portable 02 for   
physician visits,   
oxygen conservation.  
oxymetazoline nasal   
(Afrin 0.05% Spray) 2   
Sprays Nasal Inhalation   
2 times a day as needed   
Congestion. Refills: 0.  
semaglutide (Ozempic   
(0.25 mg or 0.5 mg   
dose)) 0.5 Milligram   
Subcutaneous Monday.  
PATIENT EDUCATION   
INFORMATION:  
Instructions:  
COVID-19; Chronic   
Obstructive Pulmonary   
Disease Exacerbation  
  
Follow up:  
With: Address: When:  
SHERMAN MALONE [facility] [address]   
[address] [phone]   
In 3 days 2023  
Comments:  
Follow-up with your   
primary care provider   
in 3 to 5 days. If   
symptoms worsen, do not   
improve, or new symptom   
(more content not   
included)...        Normal                                  [facility]  
   
                                                    ED Patient Summaryon   
023   
   
                                        ED Patient Summary  (Inserted Image. Ashley  
ble   
to display)   
[address] [address] [phone]  
  
Patient Discharge   
Instructions  
  
Person Information  
Name: BEV GAMING Age: 57 Years  
MRN: [id number] FIN:   
[id number]  
Arrival Date: 2023   
09:18:42  
Discharge Diagnosis:   
COPD exacerbation;   
COVID  
Primary Care Physician:   
SHERMAN MALONE DO  
  
Provider Information  
Primary Provider:  
Advanced Practice   
Clinician:None  
The exam and treatment   
you received in the   
Emergency Department   
were for an urgent   
problem and are not   
intended as complete   
care. It is important   
that you follow up with   
a doctor, nurse   
practitioner, or   
physician?s assistant   
for ongoing care. If   
your symptoms become   
worse or you do not   
improve as expected and   
you are unable to reach   
your usual health care   
provider, you should   
return to the Emergency   
Department. We are   
available 24 hours a   
day.  
  
BEV GAMING has   
been given the   
following list of   
patient education   
materials,   
prescriptions and   
follow-up instructions:  
  
Follow-up Instructions:  
With: Address: When:  
SHERMAN MALONE [address]   
[facility] [address]   
[place], [place] [id number]   
In 3 days 2023  
Comments:  
Follow-up with your   
primary care provider   
in 3 to 5 days. If   
symptoms worsen, do not   
improve, or new   
symptoms arise please   
report back to   
emergency department   
for further evaluation.  
  
In the event that this   
physician does not   
participate in your   
insurance network,   
please consult with   
your insurance company   
to find a nearby   
participating provider.  
  
Patient Education   
Materials:  
COVID-19; Chronic   
Obstructive Pulmonary   
Disease Exacerbation  
  
A MESSAGE TO ALL   
PATIENTS REGARDING   
OPIOIDS  
  
PRESCRIPTION OPIOIDS:   
WHAT YOU NEED TO KNOW  
  
Prescription opioids   
can be used to help   
relieve   
moderate-to-severe pain   
and are often   
prescribed following a   
surgery or injury, or   
for certain health   
conditions. These   
medications can be an   
important part of the   
treatment but also come   
with serious risks. It   
is important to work   
with your healthcare   
provider to make sure   
you are getting the   
safest, most effective   
care.  
  
WHAT ARE THE RISKS AND   
SIDE EFFECTS OF OPIOID   
USE?  
Prescription opioids   
carry serious risks of   
addiction and overdose,   
especially with   
prolonged use. An   
opioid overdose, often   
marked by slowed   
breathing, can cause   
sudden death. The use   
of prescription opioids   
can have a number of   
side effects as well,   
even when taken as   
directed:  
? Tolerance?meaning you   
might need to take more   
of the medication for   
the same pain relief  
? Physical   
dependence?meaning you   
have symptoms of   
withdrawal when a   
medication is stopped  
? Increased sensitivity   
to pain  
? Constipation  
? Nausea, vomiting, and   
dry mouth  
? Sleepiness and   
dizziness  
? Confusion  
? Depression  
? Low levels of   
testosterone that can   
result in lower sex   
drive, energy, and   
strength  
? Itching and sweating  
  
RISKS ARE GREATER WITH:  
? History of drug   
misuse, substance use   
disorder, or overdose  
? Mental health   
conditions (such as   
depression or anxiety)  
? Sleep apnea  
? Older age (65 years   
and older)  
? Pregnancy  
  
Avoid alcohol while   
taking prescription   
opioids. Also, unless   
specifically advised by   
your health care   
provider, medications   
to avoid include:  
? Benzodiazepines (such   
as Xanax or Valium)  
? Muscle relaxants   
(such as Soma or   
Flexeril)  
? Hypnotics (such as   
Ambien or Lunesta)  
? Other prescription   
opioids  
  
KNOW YOUR OPTIONS  
Talk to your health   
care provider about   
ways to manage your   
pain that don?t involve   
prescription opioids.   
Some of these options   
may actually work   
better and have fewer   
risks and side effects.   
Options may include:  
? Pain relievers such   
as acetaminophen,   
ibuprofen, and naproxen  
? Some medication that   
are also used for   
depression or seizures  
? Physical therapy and   
exercise  
? Cognitive behavioral   
therapy, a   
psychological,   
goal-directed approach,   
in which patients learn   
how to modify physical,   
behavioral, and   
emotional triggers of   
pain and stress.  
  
IF YOU ARE PRESCRIBED   
OPIOIDS FOR PAIN:  
? Never take opioids in   
greater amounts or more   
often than prescribed.  
? Follow up with your   
primary health care   
provider.  
o Work together to   
create a plan on how to   
manage your pain.  
o Talk about ways to   
help manage your pain   
that don?t involve   
prescription opioids.  
o Talk about any and   
all concerns and side   
effects.  
? Help prevent misuse   
and abuse  
o Never sell or share   
prescription opioids.  
o Never use another   
person?s prescription   
opioids.  
? Store prescription   
opioids in a secure   
place and out of reach   
of others (this may   
include visitors,   
children, friends, and   
family).  
? Safely dispose of   
unused prescription   
opioids: Find your   
community drug   
take-back program or   
your pharmacy mail-back   
program, or flush them   
down the toilet,   
following guidance from   
the Food and Drug   
Administration   
(www.fda.gov/Drugs/Reso  
urcesForYou).  
? Visit   
www.cdc.gov/drugoverdos  
e to learn about the   
risks of opioids abuse   
and overdose.  
(more content not   
included)...        Normal                                  [facility]  
   
                                                    HEMATOLOGYOrdered By: SYSTEM  
 SYSTEM on 2023   
   
                      Basophils/100 WBC (Bld) 0.3 %      Normal     0.0 - 2.0 %   
[facility]   
[facility]eme[facility]  
   
                                                    Basophils/Leukocytes   
Auto (Bld) [Pure #   
fraction]           0.1 E9/L            Normal              0.0 - 0.2   
E9/L                                    [facility]   
[facility]eme[facility]  
   
                                                    Eosinophils/100 WBC   
(Bld)           0.0 %           Normal          0.0 - 8.0 %     [facility]   
[facility]eme[facility]  
   
                                                    Eosinophils/Leukocytes   
Auto (Bld) [Pure #   
fraction]           0.0 E9/L            Normal              0.0 - 0.5   
E9/L                                    [facility]   
[facility]eme[facility]  
   
                                                    Lymphocytes/100 WBC   
(Bld)               8.3 %               Low                 14.0 - 50.0   
%                                       [facility]   
[facility]emeAutoSS  
   
                                                    Lymphocytes/Leukocytes   
Auto (Bld) [Pure #   
fraction]           1.5 E9/L            Normal              1.0 - 4.0   
E9/L                                    [facility]   
[facility]eme[facility]  
   
                          Monocytes/100 WBC (Bld) 4.7 %        Normal       4.0   
- 14.0   
%                                       [facility]   
HemeAutoSS  
   
                                                    Monocytes/Leukocytes   
Auto (Bld) [Pure #   
fraction]           0.9 E9/L            Normal              0.2 - 1.0   
E9/L                                    [facility]   
[facility]eme[facility]  
   
                                                    Neutrophils/100 WBC   
(Bld)               86.7 %              High                36.0 - 75.0   
%                                       [facility]   
[facility]emeAutoSS  
   
                                                    Neutrophils/Leukocytes   
Auto (Bld) [Pure #   
fraction]           16.1 E9/L           High                2.0 - 7.5   
E9/L                                    [facility]   
[facility]  
   
                                                    HEMATOLOGYOrdered By: Mandie Arambula on 2023   
   
                                                    Erythrocyte   
distribution width   
(RBC) [Ratio]       13.0 %              Normal              10.9 - 14.2   
%                                       [facility]   
[facility]eme[facility]  
   
                                                    Hematocrit (Bld)   
[Volume fraction]   45.1 %              Normal              34.0 - 46.0   
%                                       [facility]   
[facility]eme[facility]SS  
   
                                                    Hemoglobin (Bld)   
[Mass/Vol]          15.1 g/dL           Normal              12.0 - 16.0   
gm/dL                                   [facility]   
[facility]eme[facility]  
   
                                                    MCH (RBC) [Entitic   
mass]               30.6 pg             Normal              27.0 - 34.0   
pg                                      [facility]   
[facility]emeAutoSS  
   
                          MCHC (RBC) [Mass/Vol] 33.4 g/dL    Normal       31.4 -  
 36.0   
gm/dL                                   [facility]   
[facility]emeAutoSS  
   
                          MCV (RBC) [Entitic vol] 91.5 fL      Normal       80.0  
 -   
100.0 fL                                [facility]   
[facility]eme[facility]SS  
   
                                                    Platelet mean volume   
(Bld) [Entitic vol] 8.2 fL              Normal              6.4 - 10.8   
fL                                      [facility]   
[facility]  
   
                          Platelets (Bld) [#/Vol] 189.0 E9/L   Normal       150.  
0 -   
500.0 E9/L                              [facility]   
[facility]  
   
                          RBC (Bld) [#/Vol] 4.9 E12/L    Normal       4.3 - 5.9   
E12/L                                   [facility]   
[facility]  
   
                                                    WBC corrected for nucl   
RBC Auto (Bld) [#/Vol] 18.6 E9/L           High                4.0 - 11.0   
E9/L                                    [facility]   
[facility]  
   
                                                    Monitor Recordon 2023   
   
                                        Monitor Record      [id number]  
[id number]  
[id number]#[id number]  
CD:127              Normal                                  [facility]  
   
                                                    PT & PTTon 2023   
   
                      aPTT Coag (PPP) [Time] 33.5 second(s) Normal     25.1-36.5  
  [facility]  
   
                                        Comment on above:   Result Comment: Para  
meter 15 days - 4 weeks 1 - 5 months 6 -   
11 months 1 - 5 years 6 - 10 years 11 - 17 years  
PTT Mean: 35.4 (27.6-45.6) Mean: 33.5 (24.8-40.7) Mean: 32.4   
(25.1-40.7) Mean: 31.6 (24.0-39.2) Mean: 31.6 (26.9-38.7)   
Mean: 31.0 (24.6-38.4)  
  
Pediatric Reference ranges were obtained from a study by   
Lopez Hilton et al. prepared from 1437 samples obtained at 7   
different centers using the same coagulation reagent and   
instrumentation as [facility]. Currently there are no coagulation   
studies available worldwide for children birth to 14 days, and   
no normal ranges.  
Heparin therapeutic range (represented by Anti-Factor Xa   
activity of 0.2 - 0.4  
U/mL) corresponds to PTT of 56.6 - 109.0 sec.   
   
                                                            Performed By: #### 2  
[id number], [id number], [id number], [id number],   
[id number], [id number], [id number] ####  
[facility]  
[address] [place] [address]   
   
                                                    INR Coag (PPP)   
[Relative time]           1.0 {INR}                 Invalid   
Interpretation   
Code                                                [facility]  
   
                                        Comment on above:   Result Comment: INR   
results are specifically intended to   
assess patients stabilized on long-term  
Anticoagulation therapy suggested INR?s  
?Less Intensive Anticoagulation? 2.0 ? 3.0  
Conventional Range 3.0 ? 4.5   
   
                                                            Performed By: #### 2  
[id number], [id number], [id number], [id number],   
[id number], [id number], [id number] ####  
[facility]  
[address] [place] [address]   
   
                      PT Coag (PPP) [Time] 10.8 second(s) Normal     9.4-12.5     
[facility]  
   
                                        Comment on above:   Result Comment: 15 d  
ays - 4 weeks 1 - 5 months 6 -11 months   
1-5 years 6-10 years 11 -17 years  
Mean: 11.2 (9.5-12.6) Mean: 11.0 (9.7-12.8) Mean: 11.0   
(9.8-13.0) Mean: 11.3 (9.9-13.4) Mean: 11.7 (10.0-14.6) Mean:   
11.8 (10.0 - 14.1)  
Pediatric Reference ranges were obtained from a study by   
Lopez Hilton et al. prepared from 1437 samples obtained at 7   
different centers using the same coagulation reagent and   
instrumentation as [facility]. Currently there are no coagulation   
studies available worldwide for children birth to 14 days, and   
no normal ranges.   
   
                                                            Performed By: #### 2  
[id number], [id number], [id number], [id number],   
[id number], [id number], [id number] ####  
[facility]  
[address] [place] [address]   
   
                                                    Troponin 0 Hr.on 2023   
   
                                                    Troponin I.cardiac   
[Mass/Vol]      4.80 pg/mL      Low             10.10-27.10     [facility]  
   
                                        Comment on above:   Result Comment: The   
95% CI (Confidence Interval) PPV (Positive  
   
Predictive Value) for myocardial infarction in females is 38   
pg/mL, in males 51 pg/mL. The results should be used in   
conjunction with clinical conditions of myocardial infarction.  
(Access High Sensitivity Troponin I Instructions For Use,   
Kyara Javon, 2018)   
   
                                                            Performed By: #### 2  
[id number], [id number], [id number], [id number],   
[id number], [id number], [id number] ####  
[facility]  
[address] [place] [address]   
   
                                                    XR Chest Single Viewon    
   
                                        XR Chest Single View Exam Date/Time:  
2023 10:27 EDT  
Reason for Exam:  
Chest pain  
Report  
IMPRESSION: There are   
no acute changes.  
CLINICAL HISTORY: Chest   
pain  
EXAMINATION: XR Chest   
Single View  
COMPARISON: Chest x-ray   
from 2020  
FINDINGS:  
The cardiomediastinal   
silhouette is   
unremarkable.  
The lungs are free of   
infiltrates effusions   
or consolidations.  
There are no acute   
osseous changes.  
Ordering Provider:   
Rei Slaughter  
***** FINAL REPORT   
*****  
  
Dictated: 2023   
11:44 am Gerhard Almazan MD  
  
Signed (Electronic   
Signature): 2023   
11:44 am  
Signed by: Gerhard Almazan MD  
Transcribed by: CHIDI   
Technologist: JOHNATHAN  
Technical Comments  
Radiation Dose: Ka,r in   
mGy = na  
DAP = na            Normal                                  [facility]  
   
                                                    eGFRon 2023   
   
                                                    GFR/1.73 sq M.predicted   
among blacks MDRD   
(S/P/Bld) [Vol   
rate/Area]      mL/min/{1.73_m2} Normal          >=59            [facility]  
   
                                        Comment on above:   Order Comment: Order  
 added by Discern Expert.   
   
                                                            Result Comment: eGFR  
 is race adjusted. AA=.   
   
                                                            Performed By: #### 2  
[id number], [id number], [id number], [id number],   
[id number], [id number], [id number] ####  
[facility]  
[address] [place] [address]   
   
                                                    GFR/1.73 sq M.predicted   
among non-blacks MDRD   
(S/P/Bld) [Vol   
rate/Area]      mL/min/{1.73_m2} Normal          >=59            [facility]  
   
                                        Comment on above:   Order Comment: Order  
 added by Discern Expert.   
   
                                                            Result Comment:   
mary kidney disease could be indicated at   
eGFR's of less than 60 mL/min/1.73m2. Kidney failure is   
indicated at less than 15 mL/min/1.73m2.   
   
                                                            Performed By: #### 2  
[id number], [id number],   
[id number], [id number] ####  
[facility]  
[address] [place] [id number]   
   
                                                    CBC AUTO DIFFon 2023   
   
                      BASO #     0.1 103/ul Normal     0.0-0.1    [facility]  
   
                                        Comment on above:   Performed By: #### C  
BC ####  
[facility]  
[address] [place] [address]  
Dr. Edmund Gordon   
   
                      Basophils/100 WBC (Bld) 0.7 %      Normal     0.2-2.0    [facility]  
   
                                        Comment on above:   Performed By: #### C  
BC ####  
[facility]  
[address] [place] [address]  
Dr. Edmund Gordon   
   
                      EO #       0.2 103/ul Normal     0.0-0.7    [facility]  
   
                                        Comment on above:   Performed By: #### C  
BC ####  
[facility]  
[address] [place] [address]  
Dr. Edmund Gordon   
   
                                                    Eosinophils/100 WBC   
(Bld)           1.7 %           Normal          0.9-7.0         [facility]  
   
                                        Comment on above:   Performed By: #### C  
BC ####  
[facility]  
[address] [place] [address]  
Dr. Edmund Gordon   
   
                                                    Erythrocyte   
distribution width   
(RBC) [Ratio]   12.5 %          Normal          11.0-15.0       [facility]  
   
                                        Comment on above:   Performed By: #### C  
BC ####  
[facility]  
[address] [place] [address]  
Dr. Edmund Gordon   
   
                                                    Hematocrit (Bld)   
[Volume fraction] 42.0 %          Normal          36.0-48.0       [facility]  
   
                                        Comment on above:   Performed By: #### C  
BC ####  
[facility]  
[address] [place] [address]  
Dr. Edmund Gordon   
   
                                                    Hemoglobin (Bld)   
[Mass/Vol]      14.3 g/dL       Normal          12.0-16.0       [facility]  
   
                                        Comment on above:   Performed By: #### C  
BC ####  
[facility]  
[address] [place] [address]  
Dr. Edmund Gordon   
   
                      IG #       0.06 10e3/ul Critically high 0.00-0.03  [facility]  
   
                                        Comment on above:   Performed By: #### C  
BC ####  
[facility]  
[address] [place] [address]  
Dr. Edmund Gordon   
   
                      IG %       0.6 %      Critically high 0.0-0.5    [facility]  
   
                                        Comment on above:   Performed By: #### C  
BC ####  
[facility]  
[address] [place] [address]  
Dr. Edmund Gordon   
   
                      LYMPH #    2.7 103/ul Normal     1.2-3.8    [facility]  
   
                                        Comment on above:   Performed By: #### C  
BC ####  
[facility]  
[address] [place] [address]  
Dr. Edmund Gordon   
   
                                                    Lymphocytes/100 WBC   
(Bld)           27.4 %          Normal          20.5-60.0       [facility]  
   
                                        Comment on above:   Performed By: #### C  
BC ####  
[facility]  
[address] [place] [address]  
Dr. Edmund Gordon   
   
                      MANUAL DIFF REQ NO         Normal                [facility]  
   
                                        Comment on above:   Performed By: #### C  
BC ####  
[facility]  
[address] [place] [address]  
Dr. Edmund Gordon   
   
                                                    MCH (RBC) [Entitic   
mass]           31.4 pg         Normal          26.7-34.0       [facility]  
   
                                        Comment on above:   Performed By: #### C  
BC ####  
[facility]  
[address] [place] [address]  
Dr. Edmund Gordon   
   
                      MCHC (RBC) [Mass/Vol] 34.0 g/dL  Normal     29.9-35.2  [facility]  
   
                                        Comment on above:   Performed By: #### C  
BC ####  
[facility]  
[address] [place] [address]  
Dr. Edmund Gordon   
   
                      MCV (RBC) [Entitic vol] 92.3 fL    Normal     81.0-99.0  [facility]  
   
                                        Comment on above:   Performed By: #### C  
BC ####  
[facility]  
[address] [place] [address]  
Dr. Edmund Gordon   
   
                      MONO #     0.8 103/ul Normal     0.3-0.8    [facility]  
   
                                        Comment on above:   Performed By: #### C  
BC ####  
[facility]  
[address] [place] [address]  
Dr. Edmund Gordon   
   
                      Monocytes/100 WBC (Bld) 8.5 %      Normal     1.7-12.0   [facility]  
   
                                        Comment on above:   Performed By: #### C  
BC ####  
[facility]  
[address] [place] [address]  
Dr. Edmund Gordon   
   
                      NEUT #     6.1 103/ul Normal     1.4-6.5    [facility]  
   
                                        Comment on above:   Performed By: #### C  
BC ####  
[facility]  
[address] [place] [address]  
Dr. Edmund Gordon   
   
                                                    Neutrophils/100 WBC   
(Bld)           61.1 %          Normal          43.0-75.0       [facility]  
   
                                        Comment on above:   Performed By: #### C  
BC ####  
[facility]  
[address] [place] [address]  
Dr. Edmund Gordon   
   
                                                    Platelet mean volume   
(Bld) [Entitic vol] 9.6 fL          Normal          9.5-13.5        [facility]  
   
                                        Comment on above:   Performed By: #### C  
BC ####  
[facility]  
[address] [place] [address]  
Dr. Edmund Gordon   
   
                      PLT        168 103/ul Normal     150-450    [facility]  
   
                                        Comment on above:   Performed By: #### C  
BC ####  
[facility]  
[address] [place] [address]  
Dr. Edmund Gordon   
   
                      RBC        4.55 106/ul Normal     4.20-5.40  [facility]  
   
                                        Comment on above:   Performed By: #### C  
BC ####  
[facility]  
[address] [place] [address]  
Dr. Edmund Gordon   
   
                      WBC        9.9 103/ul Normal     4.0-11.0   [facility]  
   
                                        Comment on above:   Performed By: #### C  
BC ####  
[facility]  
[address] [place] [address]  
Dr. Edmund Gordon   
   
                                                    PROF CHEM 8 (BAS METB)on    
   
                      Anion gap [Moles/Vol] 10.7 mmol/L Normal                Th  
[facility]  
   
                                        Comment on above:   Performed By: #### C  
MP ####  
[facility]  
[address] [address]  
Dr. Edmund Gordon   
   
                      Calcium [Mass/Vol] 8.7 mg/dL  Normal     8.5-10.1   [facility]  
   
                                        Comment on above:   Performed By: #### C  
MP ####  
[facility]  
[address] [address]  
Dr. Edmund Gordon   
   
                      Chloride [Moles/Vol] 104 mmol/L Normal          [facility]  
   
                                        Comment on above:   Performed By: #### C  
MP ####  
[facility]  
[address] [address]  
Dr. Edmund Gordon   
   
                      CO2 [Moles/Vol] 30.4 mmol/L Normal     21.0-32.0  [facility]  
   
                                        Comment on above:   Performed By: #### C  
MP ####  
[facility]  
[address] [address]  
Dr. Edmund Gordon   
   
                      Creatinine [Mass/Vol] 0.76 mg/dL Normal     0.55-1.02  [facility]  
   
                                        Comment on above:   Performed By: #### C  
MP ####  
[facility]  
[address] [address]  
Dr. Edmund Gordon   
   
                      EGFR-AF AMERICAN >60        Normal     >=60       [facility]  
   
                                        Comment on above:   Performed By: #### C  
MP ####  
[facility]  
[address] [address]  
Dr. Edmund Gordon   
   
                      EGFR-NON AF AMERICAN >60        Normal     >=60       [facility]  
   
                                        Comment on above:   Performed By: #### C  
MP ####  
[facility]  
[address] [address]  
Dr. Edmund Gordon   
   
                      Glucose [Mass/Vol] 122 mg/dL  Critically high      [facility]  
   
                                        Comment on above:   Performed By: #### C  
MP ####  
[facility]  
[address] [place] [address]  
Dr. Edmund Gordon   
   
                      Potassium [Moles/Vol] 4.1 mmol/L Normal     3.5-5.1    [facility]  
   
                                        Comment on above:   Performed By: #### C  
MP ####  
[facility]  
[address] [address]  
Dr. Edmund Gordon   
   
                      Sodium [Moles/Vol] 141 mmol/L Normal     136-145    [facility]  
   
                                        Comment on above:   Performed By: #### C  
MP ####  
[facility]  
[address] [place] [address]  
Dr. Edmund Gordon   
   
                                                    Urea nitrogen   
[Mass/Vol]      14.0 mg/dL      Normal          7.0-18.0        [facility]  
   
                                        Comment on above:   Performed By: #### C  
MP ####  
[facility]  
[address] [place] [address]  
Dr. Edmund Gordon   
   
                                                    Urea   
nitrogen/Creatinine   
[Mass ratio]    18.4 mg/mg      Normal                          [facility]  
   
                                        Comment on above:   Performed By: #### C  
MP ####  
[facility]  
[address] [place] [address]  
Dr. Edmund Gordno   
   
                                                    SYMPTOMATIC COVID-19 ANTIGEN  
on 2023   
   
                      EUA Statement SEE BELOW  Normal                [facility]  
   
                                        Comment on above:   Result Comment: This  
 test has not been FDA cleared or   
approved, but has been authorized by the FDA under an   
Emergency Use Authorization (EUA) for use by authorized   
laboratories certified under CLIA that meet the requirements   
to perform moderate or high complexity testing. This test has   
been authorized only for the detection of proteins from   
SARS-CoV-2, not for any other viruses or pathogens. The   
emergency use of this test is authorized for the duration of   
the declaration that circumstances exist justifying the   
authorization of emergency use of in vitro diagnostic tests   
for detection and/or diagnosis of Covid-19 under section   
564(b)(1) of the Act, 21 U.S.C. 360bbb-3(b)(1), unless the   
declaration is terminated or authorization is revoked sooner.   
   
                                                            Performed By: #### P  
OCGLUC ####  
[facility]  
[address] [place] [address]  
Dr. Edmund Gordon   
   
                                                    SARS-CoV-2 (COVID-19)   
RNA CHRISTOS+probe Ql (Unsp   
spec)           Positive        Abnormal        NEGATIVE        [facility]  
   
                                        Comment on above:   Performed By: #### P  
OCGLUC ####  
[facility]  
[address] [place] [address]  
Dr. Edmund Gordon   
   
                                                    XR CHEST 1 Von 2023   
   
                                        XR CHEST 1 V        EXAM: XR CHEST 1 V  
HISTORY: Cough and   
chest pain  
COMPARISON: Portable   
chest 2023  
TECHNIQUE: Portable   
chest  
FINDINGS:  
The lung parenchyma is   
free of consolidation   
or infiltrate. No   
pneumothorax or  
pleural effusion. The   
cardiac, mediastinal   
and hilar contours are   
normal. The  
visualized osseous   
structures exhibit no   
gross abnormality.  
IMPRESSION:  
No acute   
cardiopulmonary   
abnormality.  
Electronically   
authenticated by: TOMASZ RAMIREZ Date:   
2023 18:07    Normal                                  [facility]  
   
                                                    CARDIAC ZACHARY 3-6on   
3   
   
                                                    CK [Catalytic   
activity/Vol]   41 U/L          Normal                    [facility]  
   
                                        Comment on above:   Performed By: #### P  
OCGLUC ####  
[facility]  
[address] [place] [address]  
Dr. Edmund Gordon   
   
                      CK.MB [Mass/Vol] 1.01 ng/mL Normal     <=3.60     [facility]  
   
                                        Comment on above:   Performed By: #### P  
OCGLUC ####  
[facility]  
[address] [place] [address]  
Dr. Edmund Gordon   
   
                      HSTROP     <4.0       Normal     4.0-51.3   [facility]  
   
                                        Comment on above:   Result Comment: CUT-  
OFF POINTS HAVE BEEN ESTABLISHED BASED ON   
THE FOURTH UNIVERSAL DEFINITIONS OF MYOCARDIAL  
INFARCTION. THE UPPER REFERENCE LIMIT (URL) OF TROPONIN,   
DEFINED AS THE 99TH PERCENTILE OF  
cTnI DISTRIBUTION IN A REFERENCE POPULATION, HAS BEEN   
CONFIRMED AS THE DECISION THRESHOLD  
FOR MI DIAGNOSIS.   
   
                                                            Performed By: #### P  
OCGLUC ####  
[facility]  
[address] [address]  
Dr. Edmund Gordon   
   
                                                    LACTATE/LACTIC ACIDon 2023   
   
                      Lactate [Moles/Vol] 0.5 mmol/L Normal     0.4-1.9    [facility]  
   
                                        Comment on above:   Performed By: #### L  
ACT ####  
[facility]  
[address] [place] [address]  
Dr. Edmund Gordon   
   
                                                    CARDIAC ZACHARY ADMITon   
023   
   
                                                    CK [Catalytic   
activity/Vol]   44 U/L          Normal                    [facility]  
   
                                        Comment on above:   Performed By: #### C  
BC ####  
[facility]  
[address] [place] [address]  
Dr. Edmund Gordon   
   
                      CK.MB [Mass/Vol] ng/mL      Normal     <=3.60     [facility]  
   
                                        Comment on above:   Performed By: #### C  
BC ####  
[facility]  
[address] [place] [address]  
Dr. Edmund Gordon   
   
                      HSTROP     <4.0       Normal     4.0-51.3   [facility]  
   
                                        Comment on above:   Result Comment: CUT-  
OFF POINTS HAVE BEEN ESTABLISHED BASED ON   
THE FOURTH UNIVERSAL DEFINITIONS OF MYOCARDIAL  
INFARCTION. THE UPPER REFERENCE LIMIT (URL) OF TROPONIN,   
DEFINED AS THE 99TH PERCENTILE OF  
cTnI DISTRIBUTION IN A REFERENCE POPULATION, HAS BEEN   
CONFIRMED AS THE DECISION THRESHOLD  
FOR MI DIAGNOSIS.   
   
                                                            Performed By: #### C  
BC ####  
[facility]  
[address] [place] [address]  
Dr. Edmund Gordon   
   
                      KENDRA        23 ng/mL   Normal     9-82       [facility]  
   
                                        Comment on above:   Performed By: #### C  
BC ####  
[facility]  
[address] [place] [address]  
Dr. Edmund Gordon   
   
                                                    CBC AUTO DIFFon 2023   
   
                      BASO #     0.1 103/ul Normal     0.0-0.1    [facility]  
   
                                        Comment on above:   Performed By: #### C  
BC ####  
[facility]  
[address] [place] [address]  
Dr. Edmund Gordon   
   
                      Basophils/100 WBC (Bld) 0.9 %      Normal     0.2-2.0    [facility]  
   
                                        Comment on above:   Performed By: #### C  
BC ####  
[facility]  
[address] [place] [address]  
Dr. Edmund Gordon   
   
                      EO #       0.1 103/ul Normal     0.0-0.7    [facility]  
   
                                        Comment on above:   Performed By: #### C  
BC ####  
[facility]  
[address] [place] [address]  
Dr. Edmund Gordon   
   
                                                    Eosinophils/100 WBC   
(Bld)           1.2 %           Normal          0.9-7.0         [facility]  
   
                                        Comment on above:   Performed By: #### C  
BC ####  
[facility]  
[address] [place] [address]  
Dr. Edmund Gordon   
   
                                                    Erythrocyte   
distribution width   
(RBC) [Ratio]   11.9 %          Normal          11.0-15.0       [facility]  
   
                                        Comment on above:   Performed By: #### C  
BC ####  
[facility]  
[address] [place] [address]  
Dr. Edmund Gordon   
   
                                                    Hematocrit (Bld)   
[Volume fraction] 42.0 %          Normal          36.0-48.0       [facility]  
   
                                        Comment on above:   Performed By: #### C  
BC ####  
[facility]  
[address] [place] [address]  
Dr. Edmund Gordon   
   
                                                    Hemoglobin (Bld)   
[Mass/Vol]      14.4 g/dL       Normal          12.0-16.0       [facility]  
   
                                        Comment on above:   Performed By: #### C  
BC ####  
[facility]  
[address] [place] [address]  
Dr. Edmund Gordon   
   
                      IG #       0.03 10e3/ul Normal     0.00-0.03  [facility]  
   
                                        Comment on above:   Performed By: #### C  
BC ####  
[facility]  
[address] [place] [address]  
Dr. Edmund Gordon   
   
                      IG %       0.3 %      Normal     0.0-0.5    [facility]  
   
                                        Comment on above:   Performed By: #### C  
BC ####  
[facility]  
[address] [place] [address]  
Dr. Edmund Gordon   
   
                      LYMPH #    3.8 103/ul Normal     1.2-3.8    [facility]  
   
                                        Comment on above:   Performed By: #### C  
BC ####  
[facility]  
[address] [place] [address]  
Dr. Edmund Gordon   
   
                                                    Lymphocytes/100 WBC   
(Bld)           39.9 %          Normal          20.5-60.0       [facility]  
   
                                        Comment on above:   Performed By: #### C  
BC ####  
[facility]  
[address] [place] [address]  
Dr. Edmund Gordon   
   
                      MANUAL DIFF REQ NO         Normal                [facility]  
   
                                        Comment on above:   Performed By: #### C  
BC ####  
[facility]  
[address] [place] [address]  
Dr. Edmund Gordon   
   
                                                    MCH (RBC) [Entitic   
mass]           31.4 pg         Normal          26.7-34.0       [facility]  
   
                                        Comment on above:   Performed By: #### C  
BC ####  
[facility]  
[address] [address]  
Dr. Edmund Gordon   
   
                      MCHC (RBC) [Mass/Vol] 34.3 g/dL  Normal     29.9-35.2  [facility]  
   
                                        Comment on above:   Performed By: #### C  
BC ####  
[facility]  
[address] [address]  
Dr. Edmund Gordon   
   
                      MCV (RBC) [Entitic vol] 91.5 fL    Normal     81.0-99.0  [facility]  
   
                                        Comment on above:   Performed By: #### C  
BC ####  
[facility]  
[address] [place] [address]  
Dr. Edmund Gordon   
   
                      MONO #     0.6 103/ul Normal     0.3-0.8    [facility]  
   
                                        Comment on above:   Performed By: #### C  
BC ####  
[facility]  
[address] [place] [address]  
Dr. Edmund Gordon   
   
                      Monocytes/100 WBC (Bld) 6.3 %      Normal     1.7-12.0   [facility]  
   
                                        Comment on above:   Performed By: #### C  
BC ####  
[facility]  
[address] [place] [address]  
Dr. Edmund Gordon   
   
                      NEUT #     4.9 103/ul Normal     1.4-6.5    [facility]  
   
                                        Comment on above:   Performed By: #### C  
BC ####  
[facility]  
[address] [place] [address]  
Dr. Edmund Gordon   
   
                                                    Neutrophils/100 WBC   
(Bld)           51.4 %          Normal          43.0-75.0       [facility]  
   
                                        Comment on above:   Performed By: #### C  
BC ####  
[facility]  
[address] [place] [address]  
Dr. Edmund Gordon   
   
                                                    Platelet mean volume   
(Bld) [Entitic vol] 9.7 fL          Normal          9.5-13.5        [facility]  
   
                                        Comment on above:   Performed By: #### C  
BC ####  
[facility]  
[address] [place] [address]  
Dr. Edmund Gordon   
   
                      PLT        213 103/ul Normal     150-450    [facility]  
   
                                        Comment on above:   Performed By: #### C  
BC ####  
[facility]  
[address], [place] [address]  
Dr. Edmund Gordon   
   
                      RBC        4.59 106/ul Normal     4.20-5.40  [facility]  
   
                                        Comment on above:   Performed By: #### C  
BC ####  
[facility]  
[address], [place] [address]  
Dr. Edmund Gordon   
   
                      WBC        9.5 103/ul Normal     4.0-11.0   [facility]  
   
                                        Comment on above:   Performed By: #### C  
BC ####  
[facility]  
[address], [place] [address]  
Dr. Edmund Gordon   
   
                                                    CT HEAD WO CONon 2023   
   
                                        CT HEAD WO CON      EXAMINATION: CT HEAD  
 WO   
CON  
HISTORY: HEADACHE for   
the past week.  
COMPARISON: 2022  
TECHNIQUE: CT   
examination of the head   
without IV contrast.   
Sagittal and coronal  
reconstructions were   
obtained.  
Dose reduction   
techniques were   
achieved by using   
automated exposure   
control  
and/or adjustment of mA   
and/or kV according to   
patient size and/or use   
of  
iterative   
reconstruction   
technique.  
FINDINGS: The   
ventricles are not   
enlarged, the lateral   
ventricles are   
symmetric  
and the third   
ventricles in the   
midline. The sylvian   
fissures and cortical   
sulci  
are unremarkable. There   
is no evidence of an   
intracranial   
hemorrhage, mass  
lesion or apparent   
acute infarct. No   
abnormality is seen in   
the deep white  
matter.  
The cerebellum and   
visualized brainstem   
are intact. The   
paranasal sinuses are  
well-developed with a   
small air-fluid level   
in the right maxillary   
sinus. The  
middle ears are   
aerated. The mastoid   
sinuses are clear.   
There is no apparent  
acute skull fracture.  
IMPRESSION:  
There is no evidence of   
an intracranial   
hemorrhage, mass lesion   
or apparent  
acute infarct. There is   
been interval   
development of a small   
air-fluid level  
within the right   
maxillary sinus   
indicating an element   
of acute sinusitis. The  
paranasal sinuses are   
otherwise relatively   
clear as visualized.   
There is no  
evidence of an acute   
skull fracture.  
Except for the changes   
in the paranasal   
sinuses, the overall   
appearance is  
unchanged.  
Electronically   
authenticated by: BILLIE DE GUZMAN Date: 2023   
20:12               Normal                                  [facility]  
   
                                                    D-DIMERon 2023   
   
                      D-DIMER    0.52 mg/L FEU Normal     <=0.59     [facility]  
   
                                        Comment on above:   Performed By: #### C  
BC ####  
[facility]  
[address] [place] [address]  
Dr. Edmund Gordon   
   
                      D-DIMER COMMENTS SEE BELOW  Normal                [facility]  
   
                                        Comment on above:   Result Comment: Incr  
eases in D-Dimer concentration observed   
with thromboembolic events can be variable due to   
localization, size, and age of the thrombus. Therefore, a   
thromboembolic event cannot be diagnosed with certainty on the   
basis of the reference range. D-Dimers may also be elevated   
for a variety of disorders including: advanced age, pregnancy,   
coronary disease, cancer, liver disease, infection,   
inflammation, hematoma, DIC, trauma, post-surgery, diabetes,   
thrombolytic or anticoagulant therapy, stress, and generalized   
hospitalization.   
   
                                                            Performed By: #### C  
BC ####  
[facility]  
[address] [place] [address]  
Dr. Edmund Gordon   
   
                                                    LACTATE/LACTIC ACIDon 2023   
   
                      Lactate [Moles/Vol] 1.6 mmol/L Normal     0.4-1.9    [facility]  
   
                                        Comment on above:   Performed By: #### P  
T, DDIM, PTT ####  
[facility]  
[address] [place] [address]  
Dr. Edmund Gordon   
   
                                                    PROF CHEM 8 (BAS METB)on    
   
                      Anion gap [Moles/Vol] 12.8 mmol/L Normal                [facility]  
   
                                        Comment on above:   Performed By: #### C  
BC ####  
[facility]  
[address] [place] [address]  
Dr. Edmund Gordon   
   
                      Calcium [Mass/Vol] 9.4 mg/dL  Normal     8.5-10.1   [facility]  
   
                                        Comment on above:   Performed By: #### C  
BC ####  
[facility]  
[address] [place] [address]  
Dr. Edmund Gordon   
   
                      Chloride [Moles/Vol] 103 mmol/L Normal          [facility]  
   
                                        Comment on above:   Performed By: #### C  
BC ####  
[facility]  
[address] [place] [address]  
Dr. Edmund Gordon   
   
                      CO2 [Moles/Vol] 28.7 mmol/L Normal     21.0-32.0  [facility]  
   
                                        Comment on above:   Performed By: #### C  
BC ####  
[facility]  
[address] [place] [address]  
Dr. Edmund Gordon   
   
                      Creatinine [Mass/Vol] 0.67 mg/dL Normal     0.55-1.02  [facility]  
   
                                        Comment on above:   Performed By: #### C  
BC ####  
[facility]  
[address] [address]  
Dr. Edmund Gordon   
   
                      EGFR-AF AMERICAN >60        Normal     >=60       [facility]  
   
                                        Comment on above:   Performed By: #### C  
BC ####  
[facility]  
[address] [address]  
Dr. Edmund Gordon   
   
                      EGFR-NON AF AMERICAN >60        Normal     >=60       [facility]  
   
                                        Comment on above:   Performed By: #### C  
BC ####  
[facility]  
[address] [place] [address]  
Dr. Edmund Gordon   
   
                      Glucose [Mass/Vol] 135 mg/dL  Critically high      T  
[facility]  
   
                                        Comment on above:   Performed By: #### C  
BC ####  
[facility]  
[address] [place] [address]  
Dr. Edmund Gordon   
   
                      Potassium [Moles/Vol] 3.5 mmol/L Normal     3.5-5.1    [facility]  
   
                                        Comment on above:   Performed By: #### C  
BC ####  
[facility]  
[address] [place] [address]  
Dr. Edmund Gordon   
   
                      Sodium [Moles/Vol] 141 mmol/L Normal     136-145    [facility]  
   
                                        Comment on above:   Performed By: #### C  
BC ####  
[facility]  
[address] [address]  
Dr. Edmund Gordon   
   
                                                    Urea nitrogen   
[Mass/Vol]      10.0 mg/dL      Normal          7.0-18.0        [facility]  
   
                                        Comment on above:   Performed By: #### C  
BC ####  
[facility]  
[address] [place] [address]  
Dr. Edmund Gordon   
   
                                                    Urea   
nitrogen/Creatinine   
[Mass ratio]    14.9 mg/mg      Normal                          [facility]  
   
                                        Comment on above:   Performed By: #### C  
BC ####  
[facility]  
[address] [place] [address]  
Dr. Edmund Gordon   
   
                                                    XR CHEST 1 Von 2023   
   
                                        XR CHEST 1 V        EXAMINATION: XR CHES  
T 1   
V  
HISTORY: Chest pain  
COMPARISON: Portable   
chest 2022  
TECHNIQUE: Portable   
chest  
FINDINGS:  
The lung parenchyma is   
free of consolidation   
or infiltrate. No   
pneumothorax or  
pleural effusion. The   
cardiac, mediastinal   
and hilar contours are   
normal. The  
visualized osseous   
structures exhibit no   
gross abnormality.  
IMPRESSION:  
No acute   
cardiopulmonary   
abnormality.  
  
Electronically   
authenticated by: TOMASZ RAMIREZ Date:   
2023 20:11    Normal                                  [facility]  
   
                                                    XR CHEST 1 Von 2022   
   
                                        XR CHEST 1 V        EXAMINATION: XR CHES  
T 1   
V, , 2022 5:37 PM   
EST  
INDICATION:  
SHORTNESS OF BREATH  
HISTORY:  
Ordering Provider   
Reason for Exam:  
Technologist Note:  
Additional:  
COMPARISON: Chest x-ray   
dated 10/11/2022.  
TECHNIQUE: Chest x-ray:   
One view.  
FINDINGS:  
No pneumothorax,   
pleural effusion or   
focal airspace   
consolidation. Heart is  
normal in size. Fusion   
device is seen   
projecting over the   
visualized lower  
cervical spine.  
IMPRESSION:  
No acute   
cardiopulmonary   
process.  
Electronically   
authenticated by: SHILOH BOLTON Date: 2022   
18:38               Normal                                  [facility]  
   
                                                    CORTISOLon 11-   
   
                      Cortisol   17.0 ug/dL Normal                [facility]  
   
                                        Comment on above:   Result Comment: Dru  
isol AM 6.2 - 19.4  
Cortisol PM 2.3 - 11.9   
   
                                                            Performed By: #### P  
T, DDIM, PTT ####  
[facility]  
[address] [place] [address]  
Dr. Edmund Gordon   
   
                                                    CBC AUTO DIFFon 2022   
   
                      BASO #     0.1 103/ul Normal     0.0-0.1    [facility]  
   
                                        Comment on above:   Performed By: #### P  
OCGLUC ####  
[facility]  
[address] [address]  
Dr. Edmund Gordon   
   
                      Basophils/100 WBC (Bld) 0.8 %      Normal     0.2-2.0    [facility]  
   
                                        Comment on above:   Performed By: #### P  
OCGLUC ####  
[facility]  
[address] [place] [address]  
Dr. Edmund Gordon   
   
                      EO #       0.1 103/ul Normal     0.0-0.7    [facility]  
   
                                        Comment on above:   Performed By: #### P  
OCGLUC ####  
[facility]  
[address] [place] [address]  
Dr. Edmund Gordon   
   
                                                    Eosinophils/100 WBC   
(Bld)           1.4 %           Normal          0.9-7.0         [facility]  
   
                                        Comment on above:   Performed By: #### P  
OCGLUC ####  
[facility]  
[address] [place] [address]  
Dr. Edmund Gordon   
   
                                                    Erythrocyte   
distribution width   
(RBC) [Ratio]   12.3 %          Normal          11.0-15.0       [facility]  
   
                                        Comment on above:   Performed By: #### P  
OCGLUC ####  
[facility]  
[address] [place] [address]  
Dr. Edmund Gordon   
   
                                                    Hematocrit (Bld)   
[Volume fraction] 43.2 %          Normal          36.0-48.0       [facility]  
   
                                        Comment on above:   Performed By: #### P  
OCGLUC ####  
[facility]  
[address] [place] [address]  
Dr. Edmund Gordon   
   
                                                    Hemoglobin (Bld)   
[Mass/Vol]      15.2 g/dL       Normal          12.0-16.0       [facility]  
   
                                        Comment on above:   Performed By: #### P  
OCGLUC ####  
[facility]  
[address] [place] [address]  
Dr. Edmund Gordon   
   
                      IG #       0.03 10e3/ul Normal     0.00-0.03  [facility]  
   
                                        Comment on above:   Performed By: #### P  
OCGLUC ####  
[facility]  
[address] [place] [address]  
Dr. Edmund Gordon   
   
                      IG %       0.3 %      Normal     0.0-0.5    [facility]  
   
                                        Comment on above:   Performed By: #### P  
OCGLUC ####  
[facility]  
[address] [place] [address]  
Dr. Edmund Gordon   
   
                      LYMPH #    2.9 103/ul Normal     1.2-3.8    [facility]  
   
                                        Comment on above:   Performed By: #### P  
OCGLUC ####  
[facility]  
[address] [place] [address]  
Dr. Edmund Gordon   
   
                                                    Lymphocytes/100 WBC   
(Bld)           33.3 %          Normal          20.5-60.0       [facility]  
   
                                        Comment on above:   Performed By: #### P  
OCGLUC ####  
[facility]  
[address] [address]  
Dr. Edmund Gordon   
   
                      MANUAL DIFF REQ NO         Normal                [facility]  
   
                                        Comment on above:   Performed By: #### P  
OCGLUC ####  
[facility]  
[address] [address]  
Dr. Edmund Gordon   
   
                                                    MCH (RBC) [Entitic   
mass]           32.1 pg         Normal          26.7-34.0       [facility]  
   
                                        Comment on above:   Performed By: #### P  
OCGLUC ####  
[facility]  
[address] [address]  
Dr. Edmund Gordon   
   
                      MCHC (RBC) [Mass/Vol] 35.2 g/dL  Normal     29.9-35.2  [facility]  
   
                                        Comment on above:   Performed By: #### P  
OCGLUC ####  
[facility]  
[address] [place] [address]  
Dr. Edmund Gordon   
   
                      MCV (RBC) [Entitic vol] 91.1 fL    Normal     81.0-99.0  [facility]  
   
                                        Comment on above:   Performed By: #### P  
OCGLUC ####  
[facility]  
[address] [place] [address]  
Dr. Edmund Gordon   
   
                      MONO #     0.5 103/ul Normal     0.3-0.8    [facility]  
   
                                        Comment on above:   Performed By: #### P  
OCGLUC ####  
[facility]  
[address] [place] [address]  
Dr. Edmund Gordon   
   
                      Monocytes/100 WBC (Bld) 5.9 %      Normal     1.7-12.0   [facility]  
   
                                        Comment on above:   Performed By: #### P  
OCGLUC ####  
[facility]  
[address] [place] [address]  
Dr. Edmund Gordon   
   
                      NEUT #     5.0 103/ul Normal     1.4-6.5    [facility]  
   
                                        Comment on above:   Performed By: #### P  
OCGLUC ####  
[facility]  
[address] [place] [address]  
Dr. Edmund Gordon   
   
                                                    Neutrophils/100 WBC   
(Bld)           58.3 %          Normal          43.0-75.0       [facility]  
   
                                        Comment on above:   Performed By: #### P  
OCGLUC ####  
[facility]  
[address] [address]  
Dr. Edmund Gordon   
   
                                                    Platelet mean volume   
(Bld) [Entitic vol] 9.5 fL          Normal          9.5-13.5        [facility]  
   
                                        Comment on above:   Performed By: #### P  
OCGLUC ####  
[facility]  
[address] [address]  
Dr. Edmund Gordon   
   
                      PLT        204 103/ul Normal     150-450    [facility]  
   
                                        Comment on above:   Performed By: #### P  
OCGLUC ####  
[facility]  
[address] [address]  
Dr. Edmund Gordon   
   
                      RBC        4.74 106/ul Normal     4.20-5.40  [facility]  
   
                                        Comment on above:   Performed By: #### P  
OCGLUC ####  
[facility]  
[address] [place] [address]  
Dr. Edmund Gordon   
   
                      WBC        8.6 103/ul Normal     4.0-11.0   [facility]  
   
                                        Comment on above:   Performed By: #### P  
OCGLUC ####  
[facility]  
[address] [place] [address]  
Dr. Edmund Gordon   
   
                                                    CRPon 2022   
   
                      CRP [Mass/Vol] mg/L       Normal     <=1.0      [facility]  
   
                                        Comment on above:   Performed By: #### P  
T, DDIM, PTT ####  
[facility]  
[address] [place] [address]  
Dr. Edmund Gordon   
   
                                                    LIPID PROFILEon 2022   
   
                      CHOL-HDL RATIO NORM SEE BELOW  Normal                [facility]  
   
                                        Comment on above:   Result Comment: 3.3   
- 4.4 LOW RISK 4.4 - 7.1 AVERAGE RISK 7.1   
- 11.0 MODERATE RISK >11.0 HIGH RISK   
   
                                                            Performed By: #### P  
T, DDIM, PTT ####  
[facility]  
[address] [place] [address]  
Dr. Edmund Gordon   
   
                      Cholesterol [Mass/Vol] 226 mg/dL  Critically high <=200     
   [facility]  
   
                                        Comment on above:   Performed By: #### P  
T, DDIM, PTT ####  
[facility]  
[address] [address]  
Dr. Edmund Gordon   
   
                                                    Cholesterol in HDL   
[Mass/Vol]      64 mg/dL        Critically high 40-60           [facility]  
   
                                        Comment on above:   Performed By: #### P  
T, DDIM, PTT ####  
[facility]  
[address] [place] [address]  
Dr. Edmund Gordon   
   
                                                    Cholesterol in LDL   
[Mass/Vol]      118.0 mg/dL     Normal                          [facility]  
   
                                        Comment on above:   Performed By: #### P  
T, DDIM, PTT ####  
[facility]  
[address] [place] [address]  
Dr. Edmund Gordon   
   
                                                    Cholesterol.total/Maricruz  
sterol in HDL [Mass   
ratio]          3.5 {ratio}     Normal                          [facility]  
   
                                        Comment on above:   Performed By: #### P  
T, DDIM, PTT ####  
[facility]  
[address] [place] [address]  
Dr. Edmund Gordon   
   
                                        HDL NORMAL          > or = 60 mg/dl - LO  
W   
CARDIOVASCULAR RISK <40   
mg/dl - HIGH   
CARDIOVASCULAR RISK Normal                                  [facility]  
   
                                        Comment on above:   Performed By: #### P  
T, DDIM, PTT ####  
[facility]  
[address] [place] [address]  
Dr. Edmund Gordon   
   
                      LDL CALC NORMAL SEE BELOW  Normal                [facility]  
   
                                        Comment on above:   Result Comment: <100  
 mg/dl OPTIMAL 100 - 129 mg/dl NEAR OR   
ABOVE OPTIMAL 130 - 159 mg/dl BORDERLINE HIGH 160 - 189 mg/dl   
HIGH >190 mg/dl VERY HIGH   
   
                                                            Performed By: #### P  
T, DDIM, PTT ####  
[facility]  
[address] [place] [address]  
Dr. Edmund Gordon   
   
                      Triglyceride [Mass/Vol] 220 mg/dL  Critically high <=150    
    [facility]  
   
                                        Comment on above:   Performed By: #### P  
T, DDIM, PTT ####  
[facility]  
[address] [place] [address]  
Dr. Edmund Gordon   
   
                      VLDL CALC  44.0 mg/dL Normal                [facility]  
   
                                        Comment on above:   Performed By: #### P  
T, DDIM, PTT ####  
[facility]  
[address] [place] [address]  
Dr. Edmund Gordon   
   
                                                    PROF 14(COMP METB)on -  
022   
   
                      Albumin [Mass/Vol] 3.7 g/dL   Normal     3.4-5.0    [facility]  
   
                                        Comment on above:   Performed By: #### P  
T, DDIM, PTT ####  
[facility]  
[address] [address]  
Dr. Edmund Gordon   
   
                                                    Albumin/Globulin [Mass   
ratio]          1.1 {ratio}     Normal                          [facility]  
   
                                        Comment on above:   Performed By: #### P  
T, DDIM, PTT ####  
[facility]  
[address] [address]  
Dr. Edmund Gordon   
   
                                                    ALP [Catalytic   
activity/Vol]   89 U/L          Normal                    [facility]  
   
                                        Comment on above:   Performed By: #### P  
T, DDIM, PTT ####  
[facility]  
[address] [address]  
Dr. Edmund Gordon   
   
                                                    ALT [Catalytic   
activity/Vol]   33 U/L          Normal          14-59           [facility]  
   
                                        Comment on above:   Performed By: #### P  
T, DDIM, PTT ####  
[facility]  
[address] [address]  
Dr. Edmund Gordon   
   
                      Anion gap [Moles/Vol] 7.3 mmol/L Normal                [facility]  
   
                                        Comment on above:   Performed By: #### P  
T, DDIM, PTT ####  
[facility]  
[address] [address]  
Dr. Edmund Gordon   
   
                                                    AST [Catalytic   
activity/Vol]   13 U/L          Critically low  15-37           [facility]  
   
                                        Comment on above:   Performed By: #### P  
T, DDIM, PTT ####  
[facility]  
[address] [address]  
Dr. Edmund Gordon   
   
                      Bilirubin [Mass/Vol] 0.4 mg/dL  Normal     0.2-1.0    [facility]  
   
                                        Comment on above:   Performed By: #### P  
T, DDIM, PTT ####  
[facility] [facility]  
[address] [address]  
Dr. Edmund Gordon   
   
                      Calcium [Mass/Vol] 9.0 mg/dL  Normal     8.5-10.1   [facility]  
   
                                        Comment on above:   Performed By: #### P  
T, DDIM, PTT ####  
[facility]  
[address] [address]  
Dr. Edmund Gordon   
   
                      Chloride [Moles/Vol] 103 mmol/L Normal          [facility]  
   
                                        Comment on above:   Performed By: #### P  
T, DDIM, PTT ####  
[facility]  
[address] [address]  
Dr. Edmund Gordon   
   
                      CO2 [Moles/Vol] 31.1 mmol/L Normal     21.0-32.0  [facility]  
   
                                        Comment on above:   Performed By: #### P  
T, DDIM, PTT ####  
[facility]  
[address] [address]  
Dr. Edmund Gordon   
   
                      Creatinine [Mass/Vol] 0.60 mg/dL Normal     0.55-1.02  [facility]  
   
                                        Comment on above:   Performed By: #### P  
T, DDIM, PTT ####  
[facility]  
[address] [place] [address]  
Dr. Edmund Gordon   
   
                      EGFR-AF AMERICAN >60        Normal     >=60       [facility]  
   
                                        Comment on above:   Performed By: #### P  
T, DDIM, PTT ####  
[facility]  
[address] [place] [address]  
Dr. Edmund Gordon   
   
                      EGFR-NON AF AMERICAN >60        Normal     >=60       [facility]  
   
                                        Comment on above:   Performed By: #### P  
T, DDIM, PTT ####  
[facility]  
[address] [place] [address]  
Dr. Edmund Gordon   
   
                      Globulin (S) [Mass/Vol] 3.5 g/dL   Normal                [facility]  
   
                                        Comment on above:   Performed By: #### P  
T, DDIM, PTT ####  
[facility]  
[address] [address]  
Dr. Edmund Gordon   
   
                      Glucose [Mass/Vol] 116 mg/dL  Critically high      [facility]  
   
                                        Comment on above:   Performed By: #### P  
T, DDIM, PTT ####  
[facility]  
[address] [place] [address]  
Dr. Edmund Gordon   
   
                      Potassium [Moles/Vol] 4.4 mmol/L Normal     3.5-5.1    [facility]  
   
                                        Comment on above:   Performed By: #### P  
T, DDIM, PTT ####  
[facility]  
[address] [place] [address]  
Dr. Edmund Gordon   
   
                      Protein [Mass/Vol] 7.2 g/dL   Normal     6.4-8.2    [facility]  
   
                                        Comment on above:   Performed By: #### P  
T, DDIM, PTT ####  
[facility]  
[address] [place] [address]  
Dr. Edmund Gordon   
   
                      Sodium [Moles/Vol] 137 mmol/L Normal     136-145    [facility]  
   
                                        Comment on above:   Performed By: #### P  
T, DDIM, PTT ####  
[facility]  
[address] [place] [address]  
Dr. Edmund Gordon   
   
                                                    Urea nitrogen   
[Mass/Vol]      13.0 mg/dL      Normal          7.0-18.0        [facility]  
   
                                        Comment on above:   Performed By: #### P  
T, DDIM, PTT ####  
[facility]  
[address] [place] [address]  
Dr. Edmund Gordon   
   
                                                    Urea   
nitrogen/Creatinine   
[Mass ratio]    21.7 mg/mg      Normal                          [facility]  
   
                                        Comment on above:   Performed By: #### P  
T, DDIM, PTT ####  
[facility]  
[address] [place] [address]  
Dr. Edmund Gordon   
   
                                                    TSHon 2022   
   
                      TSH        1.024 uIU/mL Normal     0.358-3.740 [facility]  
   
                                        Comment on above:   Performed By: #### P  
T, DDIM, PTT ####  
[facility]  
[address] [place] [address]  
Dr. Edmund Gordon   
   
                                                    BNPon 10-   
   
                                                    Natriuretic peptide B   
(Bld) [Mass/Vol] 100.0 pg/mL     Normal          <=900.0         [facility]  
   
                                        Comment on above:   Performed By: #### C  
BC ####  
[facility]  
[address] [place] [address]  
Dr. Edmund Gordon   
   
                                                    CBC AUTO DIFFon 10-   
   
                      BASO #     0.1 103/ul Normal     0.0-0.1    [facility]  
   
                                        Comment on above:   Performed By: #### P  
OCGLUC ####  
[facility]  
[address] [address]  
Dr. Edmund Gordon   
   
                      Basophils/100 WBC (Bld) 0.8 %      Normal     0.2-2.0    [facility]  
   
                                        Comment on above:   Performed By: #### P  
OCGLUC ####  
[facility]  
[address] [address]  
Dr. Edmund Gordon   
   
                      EO #       0.2 103/ul Normal     0.0-0.7    [facility]  
   
                                        Comment on above:   Performed By: #### P  
OCGLUC ####  
[facility]  
[address] [address]  
Dr. Edmund Gordon   
   
                                                    Eosinophils/100 WBC   
(Bld)           1.6 %           Normal          0.9-7.0         [facility]  
   
                                        Comment on above:   Performed By: #### P  
OCGLUC ####  
[facility]  
[address] [place] [address]  
Dr. Edmund Gordon   
   
                                                    Erythrocyte   
distribution width   
(RBC) [Ratio]   12.0 %          Normal          11.0-15.0       [facility]  
   
                                        Comment on above:   Performed By: #### P  
OCGLUC ####  
[facility]  
[address] [place] [address]  
Dr. Edmund Gordon   
   
                                                    Hematocrit (Bld)   
[Volume fraction] 42.2 %          Normal          36.0-48.0       [facility]  
   
                                        Comment on above:   Performed By: #### P  
OCGLUC ####  
[facility]  
[address] [place] [address]  
Dr. Edmund Gordon   
   
                                                    Hemoglobin (Bld)   
[Mass/Vol]      14.7 g/dL       Normal          12.0-16.0       [facility]  
   
                                        Comment on above:   Performed By: #### P  
OCGLUC ####  
[facility]  
[address] [place] [address]  
Dr. Edmund Gordon   
   
                      IG #       0.03 10e3/ul Normal     0.00-0.03  [facility]  
   
                                        Comment on above:   Performed By: #### P  
OCGLUC ####  
[facility]  
[address] [place] [address]  
Dr. Edmund Gordon   
   
                      IG %       0.3 %      Normal     0.0-0.5    [facility]  
   
                                        Comment on above:   Performed By: #### P  
OCGLUC ####  
[facility]  
[address] [address]  
Dr. Edmund Gordon   
   
                      LYMPH #    4.4 103/ul Critically high 1.2-3.8    [facility]  
   
                                        Comment on above:   Performed By: #### P  
OCGLUC ####  
[facility]  
[address] [address]  
Dr. Edmund Gordon   
   
                                                    Lymphocytes/100 WBC   
(Bld)           44.5 %          Normal          20.5-60.0       [facility]  
   
                                        Comment on above:   Performed By: #### P  
OCGLUC ####  
[facility]  
[address] [address]  
Dr. Edmund Gordon   
   
                      MANUAL DIFF REQ NO         Normal                [facility]  
   
                                        Comment on above:   Performed By: #### P  
OCGLUC ####  
[facility]  
[address] [address]  
Dr. Edmund Gordon   
   
                                                    MCH (RBC) [Entitic   
mass]           31.5 pg         Normal          26.7-34.0       [facility]  
   
                                        Comment on above:   Performed By: #### P  
OCGLUC ####  
[facility]  
[address] [address]  
Dr. Edmund Gordon   
   
                      MCHC (RBC) [Mass/Vol] 34.8 g/dL  Normal     29.9-35.2  [facility]  
   
                                        Comment on above:   Performed By: #### P  
OCGLUC ####  
[facility]  
[address] [address]  
Dr. Edmund Gordon   
   
                      MCV (RBC) [Entitic vol] 90.6 fL    Normal     81.0-99.0  [facility]  
   
                                        Comment on above:   Performed By: #### P  
OCGLUC ####  
[facility]  
[address] [address]  
Dr. Edmund Gordon   
   
                      MONO #     0.6 103/ul Normal     0.3-0.8    [facility]  
   
                                        Comment on above:   Performed By: #### P  
OCGLUC ####  
[facility]  
[address] [address]  
Dr. Edmund Gordon   
   
                      Monocytes/100 WBC (Bld) 6.0 %      Normal     1.7-12.0   [facility]  
   
                                        Comment on above:   Performed By: #### P  
OCGLUC ####  
[facility]  
[address] [address]  
Dr. Edmund Gordon   
   
                      NEUT #     4.6 103/ul Normal     1.4-6.5    [facility]  
   
                                        Comment on above:   Performed By: #### P  
OCGLUC ####  
[facility]  
[address] [address]  
Dr. Edmund Gordon   
   
                                                    Neutrophils/100 WBC   
(Bld)           46.8 %          Normal          43.0-75.0       [facility]  
   
                                        Comment on above:   Performed By: #### P  
OCGLUC ####  
[facility]  
[address] [address]  
Dr. Edmund Gordon   
   
                                                    Platelet mean volume   
(Bld) [Entitic vol] 9.4 fL          Critically low  9.5-13.5        [facility]  
   
                                        Comment on above:   Performed By: #### P  
OCGLUC ####  
[facility]  
[address] [place] [address]  
Dr. Edmund Gordon   
   
                      PLT        215 103/ul Normal     150-450    [facility]  
   
                                        Comment on above:   Performed By: #### P  
OCGLUC ####  
[facility]  
[address] [place] [address]  
Dr. Edmund Gordon   
   
                      RBC        4.66 106/ul Normal     4.20-5.40  [facility]  
   
                                        Comment on above:   Performed By: #### P  
OCGLUC ####  
[facility]  
[address] [place] [address]  
Dr. Edmund Gordon   
   
                      WBC        9.8 103/ul Normal     4.0-11.0   [facility]  
   
                                        Comment on above:   Performed By: #### P  
OCGLUC ####  
[facility]  
[address] [place] [address]  
Dr. Edmund Gordon   
   
                                                    [facility]  
1-2022   
   
                                                    SARS-CoV-2 (COVID-19)   
RNA CHRISTOS+probe Ql (Unsp   
spec)               Not detected        Normal              NOT   
DETECTED                                [facility]  
   
                                        Comment on above:   Result Comment: When  
 diagnostic testing is negative, the   
possibility of a false negative should be considered in  
the context of a patient's recent exposures and the presence   
of clinical signs and symptoms  
consistent with SARS-CoV-2.  
This test is not yet approved or cleared by the [place]   
FDA. When there are no FDA-approved or cleared tests   
available, and other criteria are met, FDA can make tests   
available under an emergency access mechanism called an   
Emergency Use Authorization (EUA). The EUA for this test is   
supported by the [place] of Health and Human Service's   
declaration that circumstances exist to justify the emergency   
use of in vitro diagnostics for the detection and/or diagnosis   
of the virus that causes COVID-19. This EUA will remain in   
effect for the duration of the COVID-19 declaration justifying   
emergency of IVDs, unless it is terminated or revoked by the   
FDA (after which the test may no longer be used).   
   
                                                            Performed By: #### P  
T, DDIM, PTT ####  
[facility]  
[address] [place] [address]  
Dr. Edmund Gordon   
   
                                                    D-DIMERon 10-   
   
                      D-DIMER    0.46 mg/L FEU Normal     <=0.59     [facility]  
   
                                        Comment on above:   Performed By: #### P  
T, DDIM, PTT ####  
[facility]  
[address] [place] [address]  
Dr. Edmund Gordon   
   
                      D-DIMER COMMENTS SEE BELOW  Normal                [facility]  
   
                                        Comment on above:   Result Comment: Incr  
eases in D-Dimer concentration observed   
with thromboembolic events can be variable due to   
localization, size, and age of the thrombus. Therefore, a   
thromboembolic event cannot be diagnosed with certainty on the   
basis of the reference range. D-Dimers may also be elevated   
for a variety of disorders including: advanced age, pregnancy,   
coronary disease, cancer, liver disease, infection,   
inflammation, hematoma, DIC, trauma, post-surgery, diabetes,   
thrombolytic or anticoagulant therapy, stress, and generalized   
hospitalization.   
   
                                                            Performed By: #### P  
T, DDIM, PTT ####  
[facility]  
[address] [place] [address]  
Dr. Edmund Gordon   
   
                                                    PROF 14(COMP METB)on 10-11-2  
022   
   
                      Albumin [Mass/Vol] 3.7 g/dL   Normal     3.4-5.0    [facility]  
   
                                        Comment on above:   Performed By: #### C  
BC ####  
[facility]  
[address] [place] [address]  
Dr. Edmund Gordon   
   
                                                    Albumin/Globulin [Mass   
ratio]          1.2 {ratio}     Normal                          [facility]  
   
                                        Comment on above:   Performed By: #### C  
BC ####  
[facility]  
[address] [address]  
Dr. Edmund Gordon   
   
                                                    ALP [Catalytic   
activity/Vol]   84 U/L          Normal                    [facility]  
   
                                        Comment on above:   Performed By: #### C  
BC ####  
[facility]  
[address] [address]  
Dr. Edmund Gordon   
   
                                                    ALT [Catalytic   
activity/Vol]   28 U/L          Normal          14-59           [facility]  
   
                                        Comment on above:   Performed By: #### C  
BC ####  
[facility]  
[address] [address]  
Dr. Edmund Gordon   
   
                      Anion gap [Moles/Vol] 10.7 mmol/L Normal                [facility]  
   
                                        Comment on above:   Performed By: #### C  
BC ####  
[facility]  
[address] [place] [address]  
Dr. Edmund Gordon   
   
                                                    AST [Catalytic   
activity/Vol]   13 U/L          Critically low  15-37           [facility]  
   
                                        Comment on above:   Performed By: #### C  
BC ####  
[facility]  
[address] [place] [address]  
Dr. Edmund Gordon   
   
                      Bilirubin [Mass/Vol] 0.3 mg/dL  Normal     0.2-1.0    [facility]  
   
                                        Comment on above:   Performed By: #### C  
BC ####  
[facility]  
[address] [address]  
Dr. Edmund Gordon   
   
                      Calcium [Mass/Vol] 8.9 mg/dL  Normal     8.5-10.1   [facility]  
   
                                        Comment on above:   Performed By: #### C  
BC ####  
[facility]  
[address] [address]  
Dr. Edmund Gordon   
   
                      Chloride [Moles/Vol] 104 mmol/L Normal          [facility]  
   
                                        Comment on above:   Performed By: #### C  
BC ####  
[facility]  
[address] [address]  
Dr. Edmund Gordon   
   
                      CO2 [Moles/Vol] 30.0 mmol/L Normal     21.0-32.0  [facility]  
   
                                        Comment on above:   Performed By: #### C  
BC ####  
[facility]  
[address] [address]  
Dr. Edmund Gordon   
   
                      Creatinine [Mass/Vol] 0.59 mg/dL Normal     0.55-1.02  [facility]  
   
                                        Comment on above:   Performed By: #### C  
BC ####  
[facility]  
[address] [address]  
Dr. Edmund Gordon   
   
                      EGFR-AF AMERICAN >60        Normal     >=60       [facility]  
   
                                        Comment on above:   Performed By: #### C  
BC ####  
[facility]  
[address] [address]  
Dr. Edmund Gordon   
   
                      EGFR-NON AF AMERICAN >60        Normal     >=60       [facility]  
   
                                        Comment on above:   Performed By: #### C  
BC ####  
[facility]  
[address] [address]  
Dr. Edmund Gordon   
   
                      Globulin (S) [Mass/Vol] 3.2 g/dL   Normal                [facility]  
   
                                        Comment on above:   Performed By: #### C  
BC ####  
[facility]  
[address] [address]  
Dr. Edmund Gordon   
   
                      Glucose [Mass/Vol] 114 mg/dL  Critically high      [facility]  
   
                                        Comment on above:   Performed By: #### C  
BC ####  
[facility]  
[address] [address]  
Dr. Edmund Gordon   
   
                      Potassium [Moles/Vol] 3.7 mmol/L Normal     3.5-5.1    [facility]  
   
                                        Comment on above:   Performed By: #### C  
BC ####  
[facility]  
[address] [address]  
Dr. Edmund Gordon   
   
                      Protein [Mass/Vol] 6.9 g/dL   Normal     6.4-8.2    [facility]  
   
                                        Comment on above:   Performed By: #### C  
BC ####  
[facility]  
[address] [address]  
Dr. Edmund Gordon   
   
                      Sodium [Moles/Vol] 141 mmol/L Normal     136-145    [facility]  
   
                                        Comment on above:   Performed By: #### C  
BC ####  
[facility]  
[address] [address]  
Dr. Edmund Gordon   
   
                                                    Urea nitrogen   
[Mass/Vol]      11.0 mg/dL      Normal          7.0-18.0        [facility]  
   
                                        Comment on above:   Performed By: #### C  
BC ####  
[facility]  
[address] [place] [address]  
Dr. Edmund Gordon   
   
                                                    Urea   
nitrogen/Creatinine   
[Mass ratio]    18.6 mg/mg      Normal                          [facility]  
   
                                        Comment on above:   Performed By: #### C  
BC ####  
[facility]  
[address] [address]  
Dr. Edmund Gordon   
   
                                                    PROTIMEon 10-   
   
                                                    INR Coag (PPP)   
[Relative time] {INR}           Normal                          [facility]  
   
                                        Comment on above:   Performed By: #### P  
T, DDIM, PTT ####  
[facility]  
[address] [place] [address]  
Dr. Edmund Gordon   
   
                      INR GUIDELINES SEE BELOW  Normal                [facility]  
   
                                        Comment on above:   Result Comment: KELECHI  
RED INR: 2.0 - 3.0 CONDITIONS NOT LISTED   
BELOW 2.5 - 3.5 FOR PROSTHETIC HEART VALVE REPLACEMENT 2.5 -   
3.5 RECURRENT THROMBOSIS   
   
                                                            Performed By: #### P  
T, DDIM, PTT ####  
[facility]  
[address] [place] [address]  
Dr. Edmund Gordon   
   
                      PT Coag (PPP) [Time] 10.0 s     Normal     9.0-11.6   [facility]  
   
                                        Comment on above:   Performed By: #### P  
T, DDIM, PTT ####  
[facility]  
[address] [place] [address]  
Dr. Edmund Gordon   
   
                                                    PTTon 10-   
   
                      aPTT Coag (Bld) [Time] 25.0 s     Normal     22.3-36.2  [facility]  
[facility]  
   
                                        Comment on above:   Performed By: #### P  
T, DDIM, PTT ####  
[facility]  
[address] [place] [address]  
Dr. Edmund Gordon   
   
                                                    TROPONIN, HIGH SENSITIVITYon  
 10-   
   
                      HSTROP     <4.0       Normal     4.0-51.3   [facility]  
   
                                        Comment on above:   Result Comment: CUT-  
OFF POINTS HAVE BEEN ESTABLISHED BASED ON   
THE FOURTH UNIVERSAL DEFINITIONS OF MYOCARDIAL  
INFARCTION. THE UPPER REFERENCE LIMIT (URL) OF TROPONIN,   
DEFINED AS THE 99TH PERCENTILE OF  
cTnI DISTRIBUTION IN A REFERENCE POPULATION, HAS BEEN   
CONFIRMED AS THE DECISION THRESHOLD  
FOR MI DIAGNOSIS.   
   
                                                            Performed By: #### P  
OCGLUC ####  
[facility] [facility]  
[address] [place] [address]  
Dr. Edmund Gordon   
   
                      HSTROP     <4.0       Normal     4.0-51.3   [facility]  
   
                                        Comment on above:   Result Comment: CUT-  
OFF POINTS HAVE BEEN ESTABLISHED BASED ON   
THE FOURTH UNIVERSAL DEFINITIONS OF MYOCARDIAL  
INFARCTION. THE UPPER REFERENCE LIMIT (URL) OF TROPONIN,   
DEFINED AS THE 99TH PERCENTILE OF  
cTnI DISTRIBUTION IN A REFERENCE POPULATION, HAS BEEN   
CONFIRMED AS THE DECISION THRESHOLD  
FOR MI DIAGNOSIS.   
   
                                                            Performed By: #### C  
BC ####  
[facility]  
[address] [place] [address]  
Dr. Edmund Gordon   
   
                                                    US KATJA DOP LEG LTon 10-11-20  
22   
   
                                        US KATJA DOP LEG LT   INDICATION: Pain in   
left leg [leg swelling   
x1 day, chest pain +   
shortness of  
breath  
EXAMINATION: Ultrasound   
was performed of the   
left lower extremity   
veins.  
TECHNIQUE: Gray scale,   
pulse wave, and color   
flow Doppler imaging   
was performed  
of the lower extremity   
venous system.  
COMPARISON: None.  
_______________________  
_____________________  
FINDINGS:  
There is normal   
compression,   
augmentation, and   
signal throughout the   
visualized  
deep lower extremity   
veins.  
Visualized lower   
extremity calf veins   
are patent.  
No mass or fluid   
collection.  
The visualized portion   
of the right iliac vein   
is patent.  
IMPRESSION:  
1. Negative left lower   
extremity ultrasound   
examination for DVT.  
Electronically   
authenticated by:   
CHARLENE CAMARA Date:   
2022-10-11 18:44    Normal                                  [facility]  
   
                                                    XR CHEST 1 Von 10-   
   
                                        XR CHEST 1 V        EXAMINATION: XR CHES  
T 1   
V, , 10/11/2022 5:34 PM   
EDT  
INDICATION:  
CHEST PAIN, UNSPECIFIED  
HISTORY:  
Ordering Provider   
Reason for Exam:  
Technologist Note:  
Additional:  
COMPARISON: Chest x-ray   
dated 2022.  
TECHNIQUE: Chest x-ray:   
One view.  
FINDINGS:  
No pneumothorax,   
pleural effusion or   
focal airspace   
consolidation. Heart is  
normal in size. Fusion   
device is seen   
projecting over the   
lower cervical spine.  
IMPRESSION:  
No acute   
cardiopulmonary   
process.  
Electronically   
authenticated by: SHILOH HENDERSONDIVYA Date: 2022-10-11   
18:11               Normal                                  [facility]  
   
                                                    XR Abdomen Single View (KUB)  
*on 2022   
   
                                                    XR Abdomen Single View   
(KUB)*                                  CLINICAL HISTORY:   
Right-sided abdominal   
pain.  
  
COMPARISON: None.  
  
RESULT:  
  
Nonspecific nondilated   
bowel gas pattern.   
Scattered feces   
throughout the colon.  
No abnormal   
calcifications.   
Surgical clips right   
upper quadrant. No   
acute osseous  
findings. Degenerative   
changes.  
  
IMPRESSION:  
  
No acute radiographic   
findings.  
  
  
Report reported and   
signed by Jamey Zelaya on 2022   
1529                Normal                                  [facility]  
   
                                                    XR Chest 2 Views*on 20   
   
                                        XR Chest 2 Views*   Chest 2 views.  
  
History:  
  
Persistent cough.   
Follow-up Covid.   
Shortness of breath.  
  
Comparison: None.  
  
Findings: Osseous   
structures intact.   
Cardiopericardial   
silhouette normal.   
Pulmonary  
vasculature normal.   
Lungs clear.  
  
Impression:  
  
No acute   
cardiopulmonary   
disease.  
Report reported and   
signed by Matthew Polk on 09/15/2022   
1227                Normal                                  [facility]  
   
                                                    CBC W MANUAL DIFFon 20  
22   
   
                      ATYPICAL LYMPH #            Normal                [facility]  
   
                                        Comment on above:   Performed By: #### C  
BC ####  
[facility]  
[address] [address]  
Dr. Edmund Gordon   
   
                      ATYPICAL LYMPH %            Normal                [facility]  
   
                                        Comment on above:   Performed By: #### C  
BC ####  
[facility]  
[address] [address]  
Dr. Edmund Gordon   
   
                      BAND #     1.1 103/ul Critically high 0.0-0.3    [facility]  
   
                                        Comment on above:   Performed By: #### C  
BC ####  
[facility]  
[address] [address]  
Dr. Edmund Gordon   
   
                      BAND %     4 %        Normal     0-5        [facility]  
   
                                        Comment on above:   Performed By: #### C  
BC ####  
[facility]  
[address] [place] [address]  
Dr. Edmund Gordon   
   
                      BASOM #    0.00 103/ul Normal     0.00-0.10  [facility]  
   
                                        Comment on above:   Performed By: #### C  
BC ####  
[facility]  
[address] [place] [address]  
Dr. Edmund Gordon   
   
                      BASOM %    0.0 %      Critically low 0.2-2.0    [facility]  
   
                                        Comment on above:   Performed By: #### C  
BC ####  
[facility]  
[address] [place] [address]  
Dr. Edmund Gordon   
   
                      BLAST #               Normal                [facility]  
   
                                        Comment on above:   Performed By: #### C  
BC ####  
[facility]  
[address] [place] [address]  
Dr. Edmund Gordon   
   
                      BLAST %               Normal                [facility]  
   
                                        Comment on above:   Performed By: #### C  
BC ####  
[facility]  
[address] [place] [address]  
Dr. Edmund Gordon   
   
                      CORRECTED WBC            Normal     4.0-11.0   [facility]  
   
                                        Comment on above:   Performed By: #### C  
BC ####  
[facility]  
[address] [place] [address]  
Dr. Edmund Gordon   
   
                      EOS #      0.00 103/ul Normal     0.00-0.70  [facility]  
   
                                        Comment on above:   Performed By: #### C  
BC ####  
[facility]  
[address] [place] [address]  
Dr. Edmund Gordon   
   
                      EOS%       0.0 %      Critically low 0.9-7.0    [facility]  
   
                                        Comment on above:   Performed By: #### C  
BC ####  
[facility]  
[address] [place] [address]  
Dr. Edmund Gordon   
   
                      HCT        40.2 %     Normal     36.0-48.0  [facility]  
   
                                        Comment on above:   Performed By: #### C  
BC ####  
[facility]  
[address] [place] [address]  
Dr. Edmund Gordon   
   
                      HGB        13.1 g/dl  Normal     12.0-16.0  [facility]  
   
                                        Comment on above:   Performed By: #### C  
BC ####  
[facility]  
[address] [place] [address]  
Dr. Edmund Gordon   
   
                      LYMPHM #   0.27 103/ul Critically low 1.20-3.80  [facility]  
   
                                        Comment on above:   Performed By: #### C  
BC ####  
[facility]  
[address] [place] [address]  
Dr. Edmund Gordon   
   
                      LYMPHM%    1.0 %      Critically low 20.5-60.0  [facility]  
   
                                        Comment on above:   Performed By: #### C  
BC ####  
[facility]  
[address] [place] [address]  
Dr. Edmund Gordon   
   
                      MCH        31.3 pg    Normal     26.7-34.0  [facility]  
   
                                        Comment on above:   Performed By: #### C  
BC ####  
[facility]  
[address] [place] [address]  
Dr. Edmund Gordon   
   
                      MCHC       32.6 g/dl  Normal     29.9-35.2  [facility]  
   
                                        Comment on above:   Performed By: #### C  
BC ####  
[facility]  
[address] [place] [address]  
Dr. Edmund Gordon   
   
                      MCV        95.9 fL    Normal     81.0-99.0  [facility]  
   
                                        Comment on above:   Performed By: #### C  
BC ####  
[facility]  
[address] [place] [address]  
Dr. Edmund Gordon   
   
                      METAMYELOCYTE # 0.3 103/ul Normal                [facility]  
   
                                        Comment on above:   Performed By: #### C  
BC ####  
[facility]  
[address] [place] [address]  
Dr. Edmund Gordon   
   
                      METAMYELOCYTE % 1 %        Normal                [facility]  
   
                                        Comment on above:   Performed By: #### C  
BC ####  
[facility]  
[address] [place] [address]  
Dr. Edmund Gordon   
   
                      MONOM#     1.09 103/ul Critically high 0.30-0.80  [facility]  
   
                                        Comment on above:   Performed By: #### C  
BC ####  
[facility]  
[address] [place] [address]  
Dr. Edmund Gordon   
   
                      MONOM%     4.0 %      Normal     1.7-12.0   [facility]  
   
                                        Comment on above:   Performed By: #### C  
BC ####  
[facility]  
[address] [place] [address]  
Dr. Edmund Gordon   
   
                      MPV        10.0 fL    Normal     9.5-13.5   [facility]  
   
                                        Comment on above:   Performed By: #### C  
BC ####  
[facility]  
[address] [address]  
Dr. Edmund Gordon   
   
                      MYELOCYTE #            Normal                [facility]  
   
                                        Comment on above:   Performed By: #### C  
BC ####  
[facility]  
[address] [address]  
Dr. Edmund Gordon   
   
                      MYELOCYTE %            Normal                [facility]  
   
                                        Comment on above:   Performed By: #### C  
BC ####  
[facility]  
[address] [address]  
Dr. Edmund Gordon   
   
                      NRBC                  Normal                [facility]  
   
                                        Comment on above:   Performed By: #### C  
BC ####  
[facility]  
[address] [address]  
Dr. Edmund Gordon   
   
                      PLT        191 103/ul Normal     150-450    [facility]  
   
                                        Comment on above:   Performed By: #### C  
BC ####  
[facility]  
[address] [place] [address]  
Dr. Edmund Gordon   
   
                      RBC        4.19 106/ul Critically low 4.20-5.40  [facility]  
   
                                        Comment on above:   Performed By: #### C  
BC ####  
[facility]  
[address] [place] [address]  
Dr. Edmund Gordon   
   
                      RDW        13.0 %     Normal     11.0-15.0  [facility]  
   
                                        Comment on above:   Performed By: #### C  
BC ####  
[facility]  
[address] [address]  
Dr. Edmund Gordon   
   
                      SEG #      24.48 103/ul Critically high 1.40-6.50  [facility]  
   
                                        Comment on above:   Performed By: #### C  
BC ####  
[facility]  
[address] [address]  
Dr. Edmund Gordon   
   
                      SEG %      90.0 %     Critically high 43.0-75.0  [facility]  
   
                                        Comment on above:   Performed By: #### C  
BC ####  
[facility]  
[address] [place] [address]  
Dr. Edmund Gordon   
   
                      WBC        27.2 103/ul Critically high 4.0-11.0   [facility]  
   
                                        Comment on above:   Performed By: #### C  
BC ####  
[facility]  
[address] [address]  
Dr. Edmund Gordon   
   
                                                    POINT OF CARE GLUCOSEon 05-3  
1-2022   
   
                      Glucose [Mass/Vol] 355 mg/dL  Critically high      [facility]  
   
                                        Comment on above:   Performed By: #### P  
T, DDIM, PTT ####  
[facility]  
[address] [address]  
Dr. Edmund Gordon   
   
                      Glucose [Mass/Vol] 425 mg/dL  Critically high -106     [facility]  
   
                                        Comment on above:   Performed By: #### P  
OCGLUC ####  
[facility]  
[address] [address]  
Dr. Edmund Gordon   
   
                      Glucose [Mass/Vol] 360 mg/dL  Critically high -106     [facility]  
   
                                        Comment on above:   Performed By: #### C  
MP ####  
[facility]  
[address] [place] [address]  
Dr. Edmund Gordon   
   
                                                    PROF 14(COMP METB)on   
022   
   
                      Albumin [Mass/Vol] 3.0 g/dL   Critically low 3.4-5.0    [facility]  
   
                                        Comment on above:   Performed By: #### C  
MP ####  
[facility]  
[address] [address]  
Dr. Edmund Gordon   
   
                                                    Albumin/Globulin [Mass   
ratio]          1.0 {ratio}     Normal                          [facility]  
   
                                        Comment on above:   Performed By: #### C  
MP ####  
[facility]  
[address] [place] [address]  
Dr. Edmund Gordon   
   
                                                    ALP [Catalytic   
activity/Vol]   88 U/L          Normal                    [facility]  
   
                                        Comment on above:   Performed By: #### C  
MP ####  
[facility]  
[address] [place] [address]  
Dr. Edmund Gordon   
   
                                                    ALT [Catalytic   
activity/Vol]   32 U/L          Normal          14-59           [facility]  
   
                                        Comment on above:   Performed By: #### C  
MP ####  
[facility]  
[address] [place] [address]  
Dr. Edmund Gordon   
   
                      Anion gap [Moles/Vol] 11.4 mmol/L Normal                [facility]  
   
                                        Comment on above:   Performed By: #### C  
MP ####  
[facility]  
[address] [address]  
Dr. Edmund Gordon   
   
                                                    AST [Catalytic   
activity/Vol]   9 U/L           Critically low  15-37           [facility]  
   
                                        Comment on above:   Performed By: #### C  
MP ####  
[facility]  
[address] [place] [address]  
Dr. Edmund Gordon   
   
                      Bilirubin [Mass/Vol] 0.3 mg/dL  Normal     0.2-1.0    [facility]  
   
                                        Comment on above:   Performed By: #### C  
MP ####  
[facility]  
[address] [place] [address]  
Dr. Edmund Gordon   
   
                      Calcium [Mass/Vol] 8.7 mg/dL  Normal     8.5-10.1   [facility]  
   
                                        Comment on above:   Performed By: #### C  
MP ####  
[facility]  
[address] [place] [address]  
Dr. Edmund Gordon   
   
                      Chloride [Moles/Vol] 102 mmol/L Normal          [facility]  
   
                                        Comment on above:   Performed By: #### C  
MP ####  
[facility]  
[address] [place] [address]  
Dr. Edmund Gordon   
   
                      CO2 [Moles/Vol] 25.7 mmol/L Normal     21.0-32.0  [facility]  
   
                                        Comment on above:   Performed By: #### C  
MP ####  
[facility]  
[address] [place] [address]  
Dr. Edmund Gordon   
   
                      Creatinine [Mass/Vol] 0.82 mg/dL Normal     0.55-1.02  [facility]  
   
                                        Comment on above:   Performed By: #### C  
MP ####  
[facility]  
[address] [place] [address]  
Dr. Edmund Gordon   
   
                      EGFR-AF AMERICAN >60        Normal     >=60       [facility]  
   
                                        Comment on above:   Performed By: #### C  
MP ####  
[facility]  
[address] [place] [address]  
Dr. Edmund Gordon   
   
                      EGFR-NON AF AMERICAN >60        Normal     >=60       [facility]  
   
                                        Comment on above:   Performed By: #### C  
MP ####  
[facility]  
[address] [place] [address]  
Dr. Edmund Gordon   
   
                      Globulin (S) [Mass/Vol] 3.0 g/dL   Normal                [facility]  
   
                                        Comment on above:   Performed By: #### C  
MP ####  
[facility]  
[address] [place] [address]  
Dr. Edmund Gordon   
   
                      Glucose [Mass/Vol] 327 mg/dL  Critically high      [facility]  
   
                                        Comment on above:   Performed By: #### C  
MP ####  
[facility]  
[address] [place] [address]  
Dr. Edmund Gordon   
   
                      Potassium [Moles/Vol] 4.1 mmol/L Normal     3.5-5.1    [facility]  
   
                                        Comment on above:   Performed By: #### C  
MP ####  
[facility]  
[address] [place] [address]  
Dr. Edmund Gordon   
   
                      Protein [Mass/Vol] 6.0 g/dL   Critically low 6.4-8.2    Th  
[facility]  
   
                                        Comment on above:   Performed By: #### C  
MP ####  
[facility]  
[address] [place] [address]  
Dr. Edmund Gordon   
   
                      Sodium [Moles/Vol] 135 mmol/L Critically low 136-145    Th  
[facility]  
   
                                        Comment on above:   Performed By: #### C  
MP ####  
[facility]  
[address] [place] [address]  
Dr. Edmund Gordon   
   
                                                    Urea nitrogen   
[Mass/Vol]      18.0 mg/dL      Normal          7.0-18.0        [facility]  
   
                                        Comment on above:   Performed By: #### C  
MP ####  
[facility]  
[address] [place] [address]  
Dr. Edmund Gordon   
   
                                                    Urea   
nitrogen/Creatinine   
[Mass ratio]    22.0 mg/mg      Normal                          [facility]  
   
                                        Comment on above:   Performed By: #### C  
MP ####  
[facility]  
[address] [place] [address]  
Dr. Edmund Gordon   
   
                                                    BNPon 2022   
   
                                                    Natriuretic peptide B   
(Bld) [Mass/Vol] 73.0 pg/mL      Normal          <=900.0         [facility]  
   
                                        Comment on above:   Performed By: #### C  
BC ####  
[facility]  
[address] [place] [address]  
Dr. Edmund Gordon   
   
                                                    CBC AUTO DIFFon 2022   
   
                      BASO #     0.0 103/ul Normal     0.0-0.1    [facility]  
   
                                        Comment on above:   Performed By: #### C  
BC ####  
[facility]  
[address] [address]  
Dr. Edmund Gordon   
   
                      Basophils/100 WBC (Bld) 0.3 %      Normal     0.2-2.0    [facility]  
   
                                        Comment on above:   Performed By: #### C  
BC ####  
[facility]  
[address] [place] [address]  
Dr. Edmund Gordon   
   
                      EO #       0.0 103/ul Normal     0.0-0.7    [facility]  
   
                                        Comment on above:   Performed By: #### C  
BC ####  
[facility]  
[address] [place] [address]  
Dr. Edmund Gordon   
   
                                                    Eosinophils/100 WBC   
(Bld)           0.0 %           Critically low  0.9-7.0         [facility]  
   
                                        Comment on above:   Performed By: #### C  
BC ####  
[facility]  
[address] [place] [address]  
Dr. Edmund Gordon   
   
                                                    Erythrocyte   
distribution width   
(RBC) [Ratio]   12.7 %          Normal          11.0-15.0       [facility]  
   
                                        Comment on above:   Performed By: #### C  
BC ####  
[facility]  
[address] [place] [address]  
Dr. Edmund Gordon   
   
                                                    Hematocrit (Bld)   
[Volume fraction] 41.7 %          Normal          36.0-48.0       [facility]  
   
                                        Comment on above:   Performed By: #### C  
BC ####  
[facility]  
[address] [place] [address]  
Dr. Edmund Gordon   
   
                                                    Hemoglobin (Bld)   
[Mass/Vol]      13.8 g/dL       Normal          12.0-16.0       [facility]  
   
                                        Comment on above:   Performed By: #### C  
BC ####  
[facility]  
[address] [place] [address]  
Dr. Edmund Gordon   
   
                      IG #       0.10 10e3/ul Critically high 0.00-0.03  [facility]  
   
                                        Comment on above:   Performed By: #### C  
BC ####  
[facility]  
[address] [place] [address]  
Dr. Edmund Gordon   
   
                      IG %       0.8 %      Critically high 0.0-0.5    [facility]  
   
                                        Comment on above:   Performed By: #### C  
BC ####  
[facility]  
[address] [place] [address]  
Dr. Edmund Gordon   
   
                      LYMPH #    0.8 103/ul Critically low 1.2-3.8    [facility]  
   
                                        Comment on above:   Performed By: #### C  
BC ####  
[facility]  
[address] [place] [address]  
Dr. Edmund Gordon   
   
                                                    Lymphocytes/100 WBC   
(Bld)           6.9 %           Critically low  20.5-60.0       [facility]  
   
                                        Comment on above:   Performed By: #### C  
BC ####  
[facility]  
[address] [place] [address]  
Dr. Edmund Gordon   
   
                      MANUAL DIFF REQ NO         Normal                [facility]  
   
                                        Comment on above:   Performed By: #### C  
BC ####  
[facility]  
[address] [place] [address]  
Dr. Edmund Gordon   
   
                                                    MCH (RBC) [Entitic   
mass]           31.8 pg         Normal          26.7-34.0       [facility]  
   
                                        Comment on above:   Performed By: #### C  
BC ####  
[facility]  
[address] [place] [address]  
Dr. Edmund Gordon   
   
                      MCHC (RBC) [Mass/Vol] 33.1 g/dL  Normal     29.9-35.2  [facility]  
   
                                        Comment on above:   Performed By: #### C  
BC ####  
[facility]  
[address] [place] [address]  
Dr. Edmund Gordon   
   
                      MCV (RBC) [Entitic vol] 96.1 fL    Normal     81.0-99.0  [facility]  
   
                                        Comment on above:   Performed By: #### C  
BC ####  
[facility]  
[address] [place] [address]  
Dr. Edmund Gordon   
   
                      MONO #     0.1 103/ul Critically low 0.3-0.8    [facility]  
   
                                        Comment on above:   Performed By: #### C  
BC ####  
[facility]  
[address] [place] [address]  
Dr. Edmund Gordon   
   
                      Monocytes/100 WBC (Bld) 0.7 %      Critically low 1.7-12.0  
   [facility]  
   
                                        Comment on above:   Performed By: #### C  
BC ####  
[facility]  
[address] [place] [address]  
Dr. Edmund Gordon   
   
                      NEUT #     10.8 103/ul Critically high 1.4-6.5    [facility]  
   
                                        Comment on above:   Performed By: #### C  
BC ####  
[facility]  
[address] [place] [address]  
Dr. Edmund Gordon   
   
                                                    Neutrophils/100 WBC   
(Bld)           91.3 %          Critically high 43.0-75.0       [facility]  
   
                                        Comment on above:   Performed By: #### C  
BC ####  
[facility]  
[address] [place] [address]  
Dr. Edmund Gordon   
   
                                                    Platelet mean volume   
(Bld) [Entitic vol] 9.6 fL          Normal          9.5-13.5        [facility]  
   
                                        Comment on above:   Performed By: #### C  
BC ####  
[facility]  
[address] [place] [address]  
Dr. Edmund Gordon   
   
                      PLT        185 103/ul Normal     150-450    [facility]  
   
                                        Comment on above:   Performed By: #### C  
BC ####  
[facility]  
[address] [place] [address]  
Dr. Edmund Gordon   
   
                      RBC        4.34 106/ul Normal     4.20-5.40  [facility]  
   
                                        Comment on above:   Performed By: #### C  
BC ####  
[facility]  
[address] [place] [address]  
Dr. Edmund Gordon   
   
                      WBC        11.8 103/ul Critically high 4.0-11.0   [facility]  
   
                                        Comment on above:   Performed By: #### C  
BC ####  
[facility]  
[address] [place] [address]  
Dr. Edmund Gordon   
   
                                                    POINT OF CARE GLUCOSEon 053  
-   
   
                      Glucose [Mass/Vol] 305 mg/dL  Critically high      [facility]  
   
                                        Comment on above:   Performed By: #### C  
BC ####  
[facility]  
[address] [place] [address]  
Dr. Edmund Gordon   
   
                      Glucose [Mass/Vol] 517 mg/dL  Critically high      T  
[facility]  
   
                                        Comment on above:   Result Comment: Prev  
iously Confirmed   
   
                                                            Performed By: #### C  
BC ####  
[facility]  
[address] [place] [address]  
Dr. Edmund Gordon   
   
                                                    PROF 14(COMP METB)on   
022   
   
                      Albumin [Mass/Vol] 3.0 g/dL   Critically low 3.4-5.0    [facility]  
   
                                        Comment on above:   Performed By: #### C  
BC ####  
[facility]  
[address] [place] [address]  
Dr. Edmund Gordon   
   
                                                    Albumin/Globulin [Mass   
ratio]          0.9 {ratio}     Normal                          [facility]  
   
                                        Comment on above:   Performed By: #### C  
BC ####  
[facility]  
[address] [place] [address]  
Dr. Edmund Gordon   
   
                                                    ALP [Catalytic   
activity/Vol]   85 U/L          Normal                    [facility]  
   
                                        Comment on above:   Performed By: #### C  
BC ####  
[facility]  
[address] [place] [address]  
Dr. Edmund Gordon   
   
                                                    ALT [Catalytic   
activity/Vol]   38 U/L          Normal          14-59           [facility]  
   
                                        Comment on above:   Performed By: #### C  
BC ####  
[facility]  
[address] [place] [address]  
Dr. Edmund Gordon   
   
                      Anion gap [Moles/Vol] 16.9 mmol/L Normal                [facility]  
   
                                        Comment on above:   Performed By: #### C  
BC ####  
[facility]  
[address] [place] [address]  
Dr. Edmund Gordon   
   
                                                    AST [Catalytic   
activity/Vol]   13 U/L          Critically low  15-37           [facility]  
   
                                        Comment on above:   Performed By: #### C  
BC ####  
[facility]  
[address] [place] [address]  
Dr. Edmund Gordon   
   
                      Bilirubin [Mass/Vol] 0.2 mg/dL  Normal     0.2-1.0    [facility]  
   
                                        Comment on above:   Performed By: #### C  
BC ####  
[facility]  
[address] [place] [address]  
Dr. Edmund Gordon   
   
                      Calcium [Mass/Vol] 8.9 mg/dL  Normal     8.5-10.1   [facility]  
   
                                        Comment on above:   Performed By: #### C  
BC ####  
[facility]  
[address] [address]  
Dr. Edmund Gordon   
   
                      Chloride [Moles/Vol] 102 mmol/L Normal          [facility]  
   
                                        Comment on above:   Performed By: #### C  
BC ####  
[facility]  
[address] [address]  
Dr. Edmund Gordon   
   
                      CO2 [Moles/Vol] 24.4 mmol/L Normal     21.0-32.0  [facility]  
   
                                        Comment on above:   Performed By: #### C  
BC ####  
[facility]  
[address] [place] [address]  
Dr. Edmund Gordon   
   
                      Creatinine [Mass/Vol] 1.03 mg/dL Critically high 0.55-1.02  
  [facility]  
   
                                        Comment on above:   Performed By: #### C  
BC ####  
[facility]  
[address] [place] [address]  
Dr. Edmund Gordon   
   
                      EGFR-AF AMERICAN >60        Normal     >=60       [facility]  
   
                                        Comment on above:   Performed By: #### C  
BC ####  
[facility]  
[address] [place] [address]  
Dr. Edmund Gordon   
   
                      EGFR-NON AF AMERICAN 56 mL/min/1.73m2 Critically low >=60   
      [facility]  
   
                                        Comment on above:   Performed By: #### C  
BC ####  
[facility]  
[address] [place] [address]  
Dr. Edmund Gordon   
   
                      Globulin (S) [Mass/Vol] 3.3 g/dL   Normal                [facility]  
   
                                        Comment on above:   Performed By: #### C  
BC ####  
[facility]  
[address] [place] [address]  
Dr. Edmund Gordon   
   
                      Glucose [Mass/Vol] 340 mg/dL  Critically high      [facility]  
   
                                        Comment on above:   Performed By: #### C  
BC ####  
[facility]  
[address] [place] [address]  
Dr. Edmund Gordon   
   
                      Potassium [Moles/Vol] 4.3 mmol/L Normal     3.5-5.1    [facility]  
   
                                        Comment on above:   Performed By: #### C  
BC ####  
[facility]  
[address] [place] [address]  
Dr. Edmund Gordon   
   
                      Protein [Mass/Vol] 6.3 g/dL   Critically low 6.4-8.2    Th  
[facility]  
   
                                        Comment on above:   Performed By: #### C  
BC ####  
[facility]  
[address] [place] [address]  
Dr. Edmund Gordon   
   
                      Sodium [Moles/Vol] 139 mmol/L Normal     136-145    [facility]  
   
                                        Comment on above:   Performed By: #### C  
BC ####  
[facility]  
[address] [place] [address]  
Dr. Edmund Gordon   
   
                                                    Urea nitrogen   
[Mass/Vol]      11.0 mg/dL      Normal          7.0-18.0        [facility]  
   
                                        Comment on above:   Performed By: #### C  
BC ####  
[facility]  
[address] [place] [address]  
Dr. Edmund Gordon   
   
                                                    Urea   
nitrogen/Creatinine   
[Mass ratio]    10.7 mg/mg      Normal                          [facility]  
   
                                        Comment on above:   Performed By: #### C  
BC ####  
[facility]  
[address] [place] [address]  
Dr. Edmund Gordon   
   
                                                    CBC AUTO DIFFon 2022   
   
                      BASO #     0.1 103/ul Normal     0.0-0.1    [facility]  
   
                                        Comment on above:   Performed By: #### C  
BC ####  
[facility]  
[address] [place] [address]  
Dr. Edmund Gordon   
   
                      Basophils/100 WBC (Bld) 1.0 %      Normal     0.2-2.0    [facility]  
   
                                        Comment on above:   Performed By: #### C  
BC ####  
[facility]  
[address] [place] [address]  
Dr. Edmund Gordon   
   
                      EO #       0.2 103/ul Normal     0.0-0.7    [facility]  
   
                                        Comment on above:   Performed By: #### C  
BC ####  
[facility]  
[address] [place] [address]  
Dr. Edmund Gordon   
   
                                                    Eosinophils/100 WBC   
(Bld)           1.8 %           Normal          0.9-7.0         [facility]  
   
                                        Comment on above:   Performed By: #### C  
BC ####  
[facility]  
[address] [place] [address]  
Dr. Edmund Gordon   
   
                                                    Erythrocyte   
distribution width   
(RBC) [Ratio]   12.8 %          Normal          11.0-15.0       [facility]  
   
                                        Comment on above:   Performed By: #### C  
BC ####  
[facility]  
[address] [place] [address]  
Dr. Edmund Gordon   
   
                                                    Hematocrit (Bld)   
[Volume fraction] 44.4 %          Normal          36.0-48.0       [facility]  
   
                                        Comment on above:   Performed By: #### C  
BC ####  
[facility]  
[address] [place] [address]  
Dr. Edmund Gordon   
   
                                                    Hemoglobin (Bld)   
[Mass/Vol]      14.9 g/dL       Normal          12.0-16.0       [facility]  
   
                                        Comment on above:   Performed By: #### C  
BC ####  
[facility]  
[address] [place] [address]  
Dr. Edmund Gordon   
   
                      IG #       0.13 10e3/ul Critically high 0.00-0.03  [facility]  
   
                                        Comment on above:   Performed By: #### C  
BC ####  
[facility]  
[address] [place] [address]  
Dr. Edmund Gordon   
   
                      IG %       1.2 %      Critically high 0.0-0.5    [facility]  
   
                                        Comment on above:   Performed By: #### C  
BC ####  
[facility]  
[address] [place] [address]  
Dr. Edmund Gordon   
   
                      LYMPH #    3.6 103/ul Normal     1.2-3.8    [facility]  
   
                                        Comment on above:   Performed By: #### C  
BC ####  
[facility]  
[address] [place] [address]  
Dr. Edmund Gordon   
   
                                                    Lymphocytes/100 WBC   
(Bld)           34.1 %          Normal          20.5-60.0       [facility]  
   
                                        Comment on above:   Performed By: #### C  
BC ####  
[facility]  
[address] [place] [address]  
Dr. Edmund Gordon   
   
                      MANUAL DIFF REQ NO         Normal                [facility]  
   
                                        Comment on above:   Performed By: #### C  
BC ####  
[facility]  
[address] [address]  
Dr. Edmund Gordon   
   
                                                    MCH (RBC) [Entitic   
mass]           31.8 pg         Normal          26.7-34.0       [facility]  
   
                                        Comment on above:   Performed By: #### C  
BC ####  
[facility]  
[address] [address]  
Dr. Edmund Gordon   
   
                      MCHC (RBC) [Mass/Vol] 33.6 g/dL  Normal     29.9-35.2  [facility]  
   
                                        Comment on above:   Performed By: #### C  
BC ####  
[facility]  
[address] [place] [address]  
Dr. Edmund Gordon   
   
                      MCV (RBC) [Entitic vol] 94.7 fL    Normal     81.0-99.0  [facility]  
   
                                        Comment on above:   Performed By: #### C  
BC ####  
[facility]  
[address] [place] [address]  
Dr. Edmund Gordon   
   
                      MONO #     0.7 103/ul Normal     0.3-0.8    [facility]  
   
                                        Comment on above:   Performed By: #### C  
BC ####  
[facility]  
[address] [place] [address]  
Dr. Edmund Gordon   
   
                      Monocytes/100 WBC (Bld) 6.7 %      Normal     1.7-12.0   [facility]  
   
                                        Comment on above:   Performed By: #### C  
BC ####  
[facility]  
[address] [place] [address]  
Dr. Edmund Gordon   
   
                      NEUT #     5.9 103/ul Normal     1.4-6.5    [facility]  
   
                                        Comment on above:   Performed By: #### C  
BC ####  
[facility]  
[address] [place] [address]  
Dr. Edmund Gordon   
   
                                                    Neutrophils/100 WBC   
(Bld)           55.2 %          Normal          43.0-75.0       [facility]  
   
                                        Comment on above:   Performed By: #### C  
BC ####  
[facility]  
[address] [place] [address]  
Dr. Edmund Gordon   
   
                                                    Platelet mean volume   
(Bld) [Entitic vol] 9.4 fL          Critically low  9.5-13.5        [facility]  
   
                                        Comment on above:   Performed By: #### C  
BC ####  
[facility]  
[address] [place] [address]  
Dr. Edmund Gordon   
   
                      PLT        206 103/ul Normal     150-450    [facility]  
   
                                        Comment on above:   Performed By: #### C  
BC ####  
[facility]  
[address] [place] [address]  
Dr. Edmund Gordon   
   
                      RBC        4.69 106/ul Normal     4.20-5.40  [facility]  
   
                                        Comment on above:   Performed By: #### C  
BC ####  
[facility]  
[address] [place] [address]  
Dr. Edmund Gordon   
   
                      WBC        10.7 103/ul Normal     4.0-11.0   [facility]  
   
                                        Comment on above:   Performed By: #### C  
BC ####  
[facility]  
[address] [place] [address]  
Dr. Edmund Gordon   
   
                                                    CULTURE BLOODon 2022   
   
                                                    Microscopic examination   
of blood, culture                       Culture Observations:  
NO GROWTH AT 5 DAYS.  
Isolate 1  
[id number]            Normal                                  [facility]  
   
                                        Comment on above:   Performed By: #### P  
T, DDIM, PTT ####  
[facility]  
[address] [place] [address]  
Dr. Edmund Gordon   
   
                                                    [facility]    
   
                                                    SARS-CoV-2 (COVID-19)   
RNA CHRISTOS+probe Ql (Unsp   
spec)               Not detected        Normal              NOT   
DETECTED                                [facility]  
   
                                        Comment on above:   Result Comment: When  
 diagnostic testing is negative, the   
possibility of a false negative should be considered in  
the context of a patient's recent exposures and the presence   
of clinical signs and symptoms  
consistent with SARS-CoV-2.  
This test is not yet approved or cleared by the [place]   
FDA. When there are no FDA-approved or cleared tests   
available, and other criteria are met, FDA can make tests   
available under an emergency access mechanism called an   
Emergency Use Authorization (EUA). The EUA for this test is   
supported by the [place] of Health and Human Service's   
declaration that circumstances exist to justify the emergency   
use of in vitro diagnostics for the detection and/or diagnosis   
of the virus that causes COVID-19. This EUA will remain in   
effect for the duration of the COVID-19 declaration justifying   
emergency of IVDs, unless it is terminated or revoked by the   
FDA (after which the test may no longer be used).   
   
                                                            Performed By: #### C  
BC ####  
[facility]  
[address] [place] [address]  
Dr. Edmund Gordon   
   
                                                    LACTATE/LACTIC ACIDon 2022   
   
                      Lactate [Moles/Vol] 4.0 mmol/L Critically high 0.4-1.9      
[facility]  
   
                                        Comment on above:   Performed By: #### P  
OCGLUC ####  
[facility]  
[address] [place] [address]  
Dr. Edmund Gordon   
   
                      Lactate [Moles/Vol] 2.6 mmol/L Critically high 0.4-1.9      
[facility]  
   
                                        Comment on above:   Result Comment: TEST  
 REPEAT CRITICAL RESULT VERIFIED   
   
                                                            Performed By: #### C  
MP ####  
[facility]  
[address] [place] [address]  
Dr. Edmund Gordon   
   
                      Lactate [Moles/Vol] 2.0 mmol/L Critically high 0.4-1.9      
[facility]  
   
                                        Comment on above:   Performed By: #### P  
OCGLUC ####  
[facility]  
[address] [place] [address]  
Dr. Edmund Gordon   
   
                                                    PROF 14(COMP METB)on   
022   
   
                      Albumin [Mass/Vol] 3.2 g/dL   Critically low 3.4-5.0    Th  
[facility]  
   
                                        Comment on above:   Performed By: #### C  
MP ####  
[facility]  
[address] [place] [address]  
Dr. Edmund Gordon   
   
                                                    Albumin/Globulin [Mass   
ratio]          1.0 {ratio}     Normal                          [facility]  
   
                                        Comment on above:   Performed By: #### C  
MP ####  
[facility]  
[address] [place] [address]  
Dr. Edmund Gordon   
   
                                                    ALP [Catalytic   
activity/Vol]   89 U/L          Normal                    [facility]  
   
                                        Comment on above:   Performed By: #### C  
MP ####  
[facility]  
[address] [place] [address]  
Dr. Edmund Gordon   
   
                                                    ALT [Catalytic   
activity/Vol]   35 U/L          Normal          14-59           [facility]  
   
                                        Comment on above:   Performed By: #### C  
MP ####  
[facility]  
[address] [address]  
Dr. Edmund Gordon   
   
                      Anion gap [Moles/Vol] 10.2 mmol/L Normal                Th  
e [facility]  
   
                                        Comment on above:   Performed By: #### C  
MP ####  
[facility]  
[address] [address]  
Dr. Edmund Gordon   
   
                                                    AST [Catalytic   
activity/Vol]   15 U/L          Normal          15-37           [facility]  
   
                                        Comment on above:   Performed By: #### C  
MP ####  
[facility]  
[address] [address]  
Dr. Edmund Gordon   
   
                      Bilirubin [Mass/Vol] 0.4 mg/dL  Normal     0.2-1.0    [facility]  
   
                                        Comment on above:   Performed By: #### C  
MP ####  
[facility]  
[address] [address]  
Dr. Edmund Gordon   
   
                      Calcium [Mass/Vol] 8.5 mg/dL  Normal     8.5-10.1   [facility]  
   
                                        Comment on above:   Performed By: #### C  
MP ####  
[facility]  
[address] [address]  
Dr. Edmund Gordon   
   
                      Chloride [Moles/Vol] 102 mmol/L Normal          [facility]  
   
                                        Comment on above:   Performed By: #### C  
MP ####  
[facility]  
[address] [address]  
Dr. Edmund Gordon   
   
                      CO2 [Moles/Vol] 29.4 mmol/L Normal     21.0-32.0  [facility]  
   
                                        Comment on above:   Performed By: #### C  
MP ####  
[facility]  
[address] [address]  
Dr. Edmund Gordon   
   
                      Creatinine [Mass/Vol] 0.66 mg/dL Normal     0.55-1.02  [facility]  
   
                                        Comment on above:   Performed By: #### C  
MP ####  
[facility]  
[address] [address]  
Dr. Edmund Gordon   
   
                      EGFR-AF AMERICAN >60        Normal     >=60       [facility]  
   
                                        Comment on above:   Performed By: #### C  
MP ####  
[facility]  
[address] [address]  
Dr. Edmund Gordon   
   
                      EGFR-NON AF AMERICAN >60        Normal     >=60       [facility]  
   
                                        Comment on above:   Performed By: #### C  
MP ####  
[facility]  
[address] [address]  
Dr. Edmund Gordon   
   
                      Globulin (S) [Mass/Vol] 3.3 g/dL   Normal                [facility]  
   
                                        Comment on above:   Performed By: #### C  
MP ####  
[facility]  
[address] [address]  
Dr. Edmund Gordon   
   
                      Glucose [Mass/Vol] 156 mg/dL  Critically high      [facility]  
   
                                        Comment on above:   Performed By: #### C  
MP ####  
[facility]  
[address] [place] [address]  
Dr. Edmund Gordon   
   
                      Potassium [Moles/Vol] 3.6 mmol/L Normal     3.5-5.1    [facility]  
   
                                        Comment on above:   Performed By: #### C  
MP ####  
[facility]  
[address] [place] [address]  
Dr. Edmund Gordon   
   
                      Protein [Mass/Vol] 6.5 g/dL   Normal     6.4-8.2    [facility]  
   
                                        Comment on above:   Performed By: #### C  
MP ####  
[facility]  
[address] [place] [address]  
Dr. Edmund Gordon   
   
                      Sodium [Moles/Vol] 138 mmol/L Normal     136-145    [facility]  
   
                                        Comment on above:   Performed By: #### C  
MP ####  
[facility]  
[address] [place] [address]  
Dr. Edmund Gordon   
   
                                                    Urea nitrogen   
[Mass/Vol]      6.0 mg/dL       Critically low  7.0-18.0        [facility]  
   
                                        Comment on above:   Performed By: #### C  
MP ####  
[facility]  
[address] [place] [address]  
Dr. Edmund Gordon   
   
                                                    Urea   
nitrogen/Creatinine   
[Mass ratio]    9.1 mg/mg       Normal                          [facility]  
   
                                        Comment on above:   Performed By: #### C  
MP ####  
[facility]  
[address] [address]  
Dr. Edmund Gordon   
   
                                                    PROTIMEon 2022   
   
                                                    INR Coag (PPP)   
[Relative time] 0.95 {INR}      Normal                          [facility]  
   
                                        Comment on above:   Performed By: #### P  
T, DDIM, PTT ####  
[facility]  
[address] [address]  
Dr. Edmund Gordon   
   
                      INR GUIDELINES SEE BELOW  Normal                [facility]  
   
                                        Comment on above:   Result Comment: KELECHI  
RED INR: 2.0 - 3.0 CONDITIONS NOT LISTED   
BELOW 2.5 - 3.5 FOR PROSTHETIC HEART VALVE REPLACEMENT 2.5 -   
3.5 RECURRENT THROMBOSIS   
   
                                                            Performed By: #### P  
T, DDIM, PTT ####  
[facility]  
[address] [address]  
Dr. Edmund Gordon   
   
                      PT Coag (PPP) [Time] 10.3 s     Normal     9.0-11.6   [facility]  
   
                                        Comment on above:   Performed By: #### P  
T, DDIM, PTT ####  
[facility]  
[address] [address]  
Dr. Edmund Gordon   
   
                                                    XR CHEST 1 Von 2022   
   
                                        XR CHEST 1 V        CHEST X-RAY, 1 VIEW  
HISTORY: Shortness of   
breath.  
COMPARISON: 2022.  
FINDINGS: The heart,   
london, and mediastinum   
are unremarkable. The   
lungs are  
grossly clear. There   
are no pleural   
effusions. There is no   
pneumothorax.  
IMPRESSION:  
No evidence of acute   
cardiopulmonary   
disease.  
  
Electronically   
authenticated by:   
SALVATORE HARRELL Date:   
2022 15:08    Normal                                  [facility]  
   
                                                    XR CHEST 2 Von 2022   
   
                                        XR CHEST 2 V        EXAMINATION: XR CHES  
T 2   
V  
HISTORY: Chronic   
obstructive lung   
disease , cough  
COMPARISON: XR chest   
2022  
FINDINGS:  
LUNGS: Hyperexpanded   
lungs. Mild haziness   
and stranding within   
medial right lung  
base.  
VASCULATURE: No   
increased pulmonary   
vasculature.  
PLEURA: No   
pneumothorax, effusion,   
or pleural thickening.  
CARDIAC: No   
cardiomegaly or cardiac   
silhouette abnormality.  
MEDIASTINUM: No visible   
mass or adenopathy.  
BONES: No fracture or   
visible bone lesion.  
OTHER: Negative.  
IMPRESSION:  
1. Mild right lower   
lobe infiltrates versus   
atelectasis; new since   
prior study.  
2. Hyperexpanded lungs.  
Electronically   
authenticated by:   
DOMINGO WATKINS Date:   
2022 11:24    Normal                                  [facility]  
   
                                                    SPUTUM CULTUREon 05-   
   
                                                    Epithelial cells LM Ql   
(Urine sed)     Few             Normal                          [facility]  
   
                                        Comment on above:   Performed By: #### P  
T, DDIM, PTT ####  
[facility]  
[address] [address]  
Dr. Edmund Gordon   
   
                      Gram Stain Evaluation Comment    Normal                [facility]  
   
                                        Comment on above:   Result Comment: This  
 specimen is of good quality and is   
acceptable for routine  
bacterial culture.   
   
                                                            Performed By: #### P  
T, DDIM, PTT ####  
[facility]  
[address] [place] [address]  
Dr. Edmund Gordon   
   
                                                    Lower Respiratory   
Culture         Final report    Normal                          [facility]  
   
                                        Comment on above:   Performed By: #### P  
T, DDIM, PTT ####  
[facility]  
[address] [place] [address]  
Dr. Edmund Gordon   
   
                      Result 1   Comment    Normal                [facility]  
   
                                        Comment on above:   Result Comment: Few   
gram positive cocci   
   
                                                            Performed By: #### P  
T, DDIM, PTT ####  
[facility]  
[address] [place] [address]  
Dr. Edmund Gordon   
   
                                                            Result Comment: Rout  
ine respiratory surinder   
   
                      Result 2   Comment    Normal                [facility]  
   
                                        Comment on above:   Result Comment: Few   
gram negative rods.   
   
                                                            Performed By: #### P  
T, DDIM, PTT ####  
[facility]  
[address] [place] [address]  
Dr. Edmund Gordon   
   
                      Result 3              Normal                [facility]  
   
                                        Comment on above:   Performed By: #### P  
T, DDIM, PTT ####  
[facility]  
[address] [place] [address]  
Dr. Edmund Gordon   
   
                      Result 4              Normal                [facility]  
   
                                        Comment on above:   Performed By: #### P  
T, DDIM, PTT ####  
[facility]  
[address] [place] [address]  
Dr. Edmund Gordon   
   
                      White Blood Cells None seen  Normal                [facility]  
   
                                        Comment on above:   Performed By: #### P  
T, DDIM, PTT ####  
[facility]  
[address] [place] [address]  
Dr. Edmund Gordon   
   
                                                    BNPon 2022   
   
                                                    Natriuretic peptide B   
(Bld) [Mass/Vol] 37.0 pg/mL      Normal          <=900.0         [facility]  
   
                                        Comment on above:   Performed By: #### P  
T, DDIM, PTT ####  
[facility]  
[address] [place] [address]  
Dr. Edmund Gordon   
   
                                                    CBC AUTO DIFFon 2022   
   
                      BASO #     0.0 103/ul Normal     0.0-0.1    [facility]  
   
                                        Comment on above:   Performed By: #### C  
BC ####  
[facility]  
[address] [place] [address]  
Dr. Edmund Gordon   
   
                      Basophils/100 WBC (Bld) 0.2 %      Normal     0.2-2.0    [facility]  
   
                                        Comment on above:   Performed By: #### C  
BC ####  
[facility]  
[address] [place] [address]  
Dr. Edmund Gordon   
   
                      EO #       0.0 103/ul Normal     0.0-0.7    [facility]  
   
                                        Comment on above:   Performed By: #### C  
BC ####  
[facility]  
[address] [place] [address]  
Dr. Edmund Gordon   
   
                                                    Eosinophils/100 WBC   
(Bld)           0.0 %           Critically low  0.9-7.0         [facility]  
   
                                        Comment on above:   Performed By: #### C  
BC ####  
[facility]  
[address] [place] [address]  
Dr. Edmund Gordon   
   
                                                    Erythrocyte   
distribution width   
(RBC) [Ratio]   12.3 %          Normal          11.0-15.0       [facility]  
   
                                        Comment on above:   Performed By: #### C  
BC ####  
[facility]  
[address] [place] [address]  
Dr. Edmund Gordon   
   
                                                    Hematocrit (Bld)   
[Volume fraction] 43.1 %          Normal          36.0-48.0       [facility]  
   
                                        Comment on above:   Performed By: #### C  
BC ####  
[facility]  
[address] [place] [address]  
Dr. Edmund Gordon   
   
                                                    Hemoglobin (Bld)   
[Mass/Vol]      14.3 g/dL       Normal          12.0-16.0       [facility]  
   
                                        Comment on above:   Performed By: #### C  
BC ####  
[facility]  
[address] [address]  
Dr. Edmund Gordon   
   
                      IG #       0.05 10e3/ul Critically high 0.00-0.03  [facility]  
   
                                        Comment on above:   Performed By: #### C  
BC ####  
[facility]  
[address] [address]  
Dr. Edmund Gordon   
   
                      IG %       0.5 %      Normal     0.0-0.5    [facility]  
   
                                        Comment on above:   Performed By: #### C  
BC ####  
[facility]  
[address] [address]  
Dr. Edmund Gordon   
   
                      LYMPH #    1.0 103/ul Critically low 1.2-3.8    [facility]  
   
                                        Comment on above:   Performed By: #### C  
BC ####  
[facility]  
[address] [place] [address]  
Dr. Edmund Gordon   
   
                                                    Lymphocytes/100 WBC   
(Bld)           9.5 %           Critically low  20.5-60.0       [facility]  
   
                                        Comment on above:   Performed By: #### C  
BC ####  
[facility]  
[address] [place] [address]  
Dr. Edmund Gordon   
   
                      MANUAL DIFF REQ NO         Normal                [facility]  
   
                                        Comment on above:   Performed By: #### C  
BC ####  
[facility]  
[address] [place] [address]  
Dr. Edmund Gordon   
   
                                                    MCH (RBC) [Entitic   
mass]           30.9 pg         Normal          26.7-34.0       [facility]  
   
                                        Comment on above:   Performed By: #### C  
BC ####  
[facility]  
[address] [place] [address]  
Dr. Edmund Gordon   
   
                      MCHC (RBC) [Mass/Vol] 33.2 g/dL  Normal     29.9-35.2  [facility]  
   
                                        Comment on above:   Performed By: #### C  
BC ####  
[facility]  
[address] [place] [address]  
Dr. Edmund Gordon   
   
                      MCV (RBC) [Entitic vol] 93.1 fL    Normal     81.0-99.0  [facility]  
   
                                        Comment on above:   Performed By: #### C  
BC ####  
[facility]  
[address] [address]  
Dr. Edmund Gordon   
   
                      MONO #     0.1 103/ul Critically low 0.3-0.8    [facility]  
   
                                        Comment on above:   Performed By: #### C  
BC ####  
[facility]  
[address] [address]  
Dr. Edmund Gordon   
   
                      Monocytes/100 WBC (Bld) 0.6 %      Critically low 1.7-12.0  
   [facility]  
   
                                        Comment on above:   Performed By: #### C  
BC ####  
[facility]  
[address] [place] [address]  
Dr. Edmund Gordon   
   
                      NEUT #     9.0 103/ul Critically high 1.4-6.5    [facility]  
   
                                        Comment on above:   Performed By: #### C  
BC ####  
[facility]  
[address] [place] [address]  
Dr. Edmund Gordon   
   
                                                    Neutrophils/100 WBC   
(Bld)           89.2 %          Critically high 43.0-75.0       [facility]  
   
                                        Comment on above:   Performed By: #### C  
BC ####  
[facility]  
[address] [place] [address]  
Dr. Edmund Gordon   
   
                                                    Platelet mean volume   
(Bld) [Entitic vol] 9.8 fL          Normal          9.5-13.5        [facility]  
   
                                        Comment on above:   Performed By: #### C  
BC ####  
[facility]  
[address] [place] [address]  
Dr. Edmund Gordon   
   
                      PLT        192 103/ul Normal     150-450    [facility]  
   
                                        Comment on above:   Performed By: #### C  
BC ####  
[facility]  
[address] [place] [address]  
Dr. Edmund Gordon   
   
                      RBC        4.63 106/ul Normal     4.20-5.40  [facility]  
   
                                        Comment on above:   Performed By: #### C  
BC ####  
[facility]  
[address] [place] [address]  
Dr. Edmund Gordon   
   
                      WBC        10.1 103/ul Normal     4.0-11.0   [facility]  
   
                                        Comment on above:   Performed By: #### C  
BC ####  
[facility]  
[address] [place] [address]  
Dr. Edmund Gordon   
   
                                                    CRPon 2022   
   
                      CRP [Mass/Vol] mg/L       Normal     <=1.0      [facility]  
   
                                        Comment on above:   Performed By: #### P  
T, DDIM, PTT ####  
[facility]  
[address] [address]  
Dr. Edmund Gordon   
   
                                                    PROF 14(COMP METB)on   
022   
   
                      Albumin [Mass/Vol] 3.4 g/dL   Normal     3.4-5.0    [facility]  
   
                                        Comment on above:   Performed By: #### P  
T, DDIM, PTT ####  
[facility]  
[address] [address]  
Dr. Edmund Gordon   
   
                                                    Albumin/Globulin [Mass   
ratio]          1.0 {ratio}     Normal                          [facility]  
   
                                        Comment on above:   Performed By: #### P  
T, DDIM, PTT ####  
[facility]  
[address] [place] [address]  
Dr. Edmund Gordon   
   
                                                    ALP [Catalytic   
activity/Vol]   87 U/L          Normal                    [facility]  
   
                                        Comment on above:   Performed By: #### P  
T, DDIM, PTT ####  
[facility]  
[address] [address]  
Dr. Edmund Gordon   
   
                                                    ALT [Catalytic   
activity/Vol]   58 U/L          Normal          14-59           [facility]  
   
                                        Comment on above:   Performed By: #### P  
T, DDIM, PTT ####  
[facility]  
[address] [address]  
Dr. Edmund Gordon   
   
                      Anion gap [Moles/Vol] 13.4 mmol/L Normal                [facility]  
   
                                        Comment on above:   Performed By: #### P  
T, DDIM, PTT ####  
[facility]  
[address] [address]  
Dr. Edmund Gordon   
   
                                                    AST [Catalytic   
activity/Vol]   26 U/L          Normal          15-37           [facility]  
   
                                        Comment on above:   Performed By: #### P  
T, DDIM, PTT ####  
[facility]  
[address] [address]  
Dr. Edmund Gordon   
   
                      Bilirubin [Mass/Vol] 0.2 mg/dL  Normal     0.2-1.0    [facility]  
   
                                        Comment on above:   Performed By: #### P  
T, DDIM, PTT ####  
[facility]  
[address] [address]  
Dr. Edmund Gordon   
   
                      Calcium [Mass/Vol] 8.4 mg/dL  Critically low 8.5-10.1   Th  
e [facility]  
   
                                        Comment on above:   Performed By: #### P  
T, DDIM, PTT ####  
[facility]  
[address] [place] [address]  
Dr. Edmund Gordon   
   
                      Chloride [Moles/Vol] 100 mmol/L Normal          [facility]  
   
                                        Comment on above:   Performed By: #### P  
T, DDIM, PTT ####  
[facility]  
[address] [place] [address]  
Dr. Edmund Gordon   
   
                      CO2 [Moles/Vol] 25.1 mmol/L Normal     21.0-32.0  [facility]  
   
                                        Comment on above:   Performed By: #### P  
T, DDIM, PTT ####  
[facility]  
[address] [place] [address]  
Dr. Edmund Gordon   
   
                      Creatinine [Mass/Vol] 0.82 mg/dL Normal     0.55-1.02  [facility]  
   
                                        Comment on above:   Performed By: #### P  
T, DDIM, PTT ####  
[facility]  
[address] [place] [address]  
Dr. Edmund Gordon   
   
                      EGFR-AF AMERICAN >60        Normal     >=60       [facility]  
   
                                        Comment on above:   Performed By: #### P  
T, DDIM, PTT ####  
[facility]  
[address] [place] [address]  
Dr. Edmund Gordon   
   
                      EGFR-NON AF AMERICAN >60        Normal     >=60       [facility]  
   
                                        Comment on above:   Performed By: #### P  
T, DDIM, PTT ####  
[facility]  
[address] [place] [address]  
Dr. Edmund Gordon   
   
                      Globulin (S) [Mass/Vol] 3.3 g/dL   Normal                T  
[facility]  
   
                                        Comment on above:   Performed By: #### P  
T, DDIM, PTT ####  
[facility]  
[address] [place] [address]  
Dr. Edmund Gordon   
   
                      Glucose [Mass/Vol] 309 mg/dL  Critically high      T  
[facility]  
   
                                        Comment on above:   Performed By: #### P  
T, DDIM, PTT ####  
[facility]  
[address] [place] [address]  
Dr. Edmund Gordon   
   
                      Potassium [Moles/Vol] 4.5 mmol/L Normal     3.5-5.1    [facility]  
   
                                        Comment on above:   Performed By: #### P  
T, DDIM, PTT ####  
[facility]  
[address] [address]  
Dr. Edmund Gordon   
   
                      Protein [Mass/Vol] 6.7 g/dL   Normal     6.4-8.2    [facility]  
   
                                        Comment on above:   Performed By: #### P  
T, DDIM, PTT ####  
[facility]  
[address] [place] [address]  
Dr. Edmund Gordon   
   
                      Sodium [Moles/Vol] 134 mmol/L Critically low 136-145    Th  
[facility]  
   
                                        Comment on above:   Performed By: #### P  
T, DDIM, PTT ####  
[facility]  
[address] [place] [address]  
Dr. Edmund Gordon   
   
                                                    Urea nitrogen   
[Mass/Vol]      19.0 mg/dL      Critically high 7.0-18.0        [facility]  
   
                                        Comment on above:   Performed By: #### P  
T, DDIM, PTT ####  
[facility]  
[address] [place] [address]  
Dr. Edmund Gordon   
   
                                                    Urea   
nitrogen/Creatinine   
[Mass ratio]    23.2 mg/mg      Normal                          [facility]  
   
                                        Comment on above:   Performed By: #### P  
T, DDIM, PTT ####  
[facility]  
[address] [place] [address]  
Dr. Edmund Gordon   
   
                                                    BNPon 2022   
   
                                                    Natriuretic peptide B   
(Bld) [Mass/Vol] 33.0 pg/mL      Normal          <=900.0         [facility]  
   
                                        Comment on above:   Performed By: #### C  
BC ####  
[facility]  
[address] [place] [address]  
Dr. Edmund Gordon   
   
                                                    CARDIAC ZACHARY 3-6on   
2   
   
                                                    CK [Catalytic   
activity/Vol]   48 U/L          Normal                    [facility]  
   
                                        Comment on above:   Performed By: #### C  
MP ####  
[facility]  
[address] [address]  
Dr. Edmund Gordon   
   
                      CK.MB [Mass/Vol] 0.81 ng/mL Normal     <=3.60     [facility]  
   
                                        Comment on above:   Performed By: #### C  
MP ####  
[facility]  
[address] [address]  
Dr. Edmund Gordon   
   
                      HSTROP     2.1 pg/mL  Critically low 4.0-51.3   [facility]  
   
                                        Comment on above:   Result Comment: CUT-  
OFF POINTS HAVE BEEN ESTABLISHED BASED ON   
THE FOURTH UNIVERSAL DEFINITIONS OF MYOCARDIAL  
INFARCTION. THE UPPER REFERENCE LIMIT (URL) OF TROPONIN,   
DEFINED AS THE 99TH PERCENTILE OF  
cTnI DISTRIBUTION IN A REFERENCE POPULATION, HAS BEEN   
CONFIRMED AS THE DECISION THRESHOLD  
FOR MI DIAGNOSIS.   
   
                                                            Performed By: #### C  
MP ####  
[facility]  
[address] [address]  
Dr. Edmund Gordon   
   
                                                    CBC AUTO DIFFon 2022   
   
                      BASO #     0.1 103/ul Normal     0.0-0.1    [facility]  
   
                                        Comment on above:   Performed By: #### C  
BC ####  
[facility]  
[address] [address]  
Dr. Edmund Gordon   
   
                      Basophils/100 WBC (Bld) 0.7 %      Normal     0.2-2.0    [facility]  
   
                                        Comment on above:   Performed By: #### C  
BC ####  
[facility]  
[address] [address]  
Dr. Edmund Gordon   
   
                      EO #       0.1 103/ul Normal     0.0-0.7    [facility]  
   
                                        Comment on above:   Performed By: #### C  
BC ####  
[facility]  
[address] [address]  
Dr. Edmund Gordon   
   
                                                    Eosinophils/100 WBC   
(Bld)           0.8 %           Critically low  0.9-7.0         [facility]  
   
                                        Comment on above:   Performed By: #### C  
BC ####  
[facility]  
[address] [place] [address]  
Dr. Edmund Gordon   
   
                                                    Erythrocyte   
distribution width   
(RBC) [Ratio]   12.4 %          Normal          11.0-15.0       [facility]  
   
                                        Comment on above:   Performed By: #### C  
BC ####  
[facility]  
[address] [address]  
Dr. Edmund Gordon   
   
                                                    Hematocrit (Bld)   
[Volume fraction] 43.5 %          Normal          36.0-48.0       [facility]  
   
                                        Comment on above:   Performed By: #### C  
BC ####  
[facility]  
[address] [address]  
Dr. Edmund Gordon   
   
                                                    Hemoglobin (Bld)   
[Mass/Vol]      15.3 g/dL       Normal          12.0-16.0       [facility]  
   
                                        Comment on above:   Performed By: #### C  
BC ####  
[facility]  
[address] [place] [address]  
Dr. Edmund Gordon   
   
                      IG #       0.05 10e3/ul Critically high 0.00-0.03  [facility]  
   
                                        Comment on above:   Performed By: #### C  
BC ####  
[facility]  
[address] [place] [address]  
Dr. Emdund Gordon   
   
                      IG %       0.5 %      Normal     0.0-0.5    [facility]  
   
                                        Comment on above:   Performed By: #### C  
BC ####  
[facility]  
[address] [place] [address]  
Dr. Edmund Gordon   
   
                      LYMPH #    3.6 103/ul Normal     1.2-3.8    [facility]  
   
                                        Comment on above:   Performed By: #### C  
BC ####  
[facility]  
[address] [place] [address]  
Dr. Edmund Gordon   
   
                                                    Lymphocytes/100 WBC   
(Bld)           33.4 %          Normal          20.5-60.0       [facility]  
   
                                        Comment on above:   Performed By: #### C  
BC ####  
[facility]  
[address] [place] [address]  
Dr. Edmund Gordon   
   
                      MANUAL DIFF REQ NO         Normal                [facility]  
   
                                        Comment on above:   Performed By: #### C  
BC ####  
[facility]  
[address] [place] [address]  
Dr. Edmund Gordon   
   
                                                    MCH (RBC) [Entitic   
mass]           32.1 pg         Normal          26.7-34.0       [facility]  
   
                                        Comment on above:   Performed By: #### C  
BC ####  
[facility]  
[address] [address]  
Dr. Edmund Gordon   
   
                      MCHC (RBC) [Mass/Vol] 35.2 g/dL  Normal     29.9-35.2  [facility]  
   
                                        Comment on above:   Performed By: #### C  
BC ####  
[facility]  
[address] [place] [address]  
Dr. Edmund Gordon   
   
                      MCV (RBC) [Entitic vol] 91.4 fL    Normal     81.0-99.0  [facility]  
   
                                        Comment on above:   Performed By: #### C  
BC ####  
[facility]  
[address] [place] [address]  
Dr. Edmund Gordon   
   
                      MONO #     0.6 103/ul Normal     0.3-0.8    [facility]  
   
                                        Comment on above:   Performed By: #### C  
BC ####  
[facility]  
[address] [place] [address]  
Dr. Edmund Gordon   
   
                      Monocytes/100 WBC (Bld) 5.1 %      Normal     1.7-12.0   [facility]  
   
                                        Comment on above:   Performed By: #### C  
BC ####  
[facility]  
[address] [place] [address]  
Dr. Edmund Gordon   
   
                      NEUT #     6.4 103/ul Normal     1.4-6.5    [facility]  
   
                                        Comment on above:   Performed By: #### C  
BC ####  
[facility]  
[address] [place] [address]  
Dr. Edmund Gordon   
   
                                                    Neutrophils/100 WBC   
(Bld)           59.5 %          Normal          43.0-75.0       [facility]  
   
                                        Comment on above:   Performed By: #### C  
BC ####  
[facility]  
[address] [place] [address]  
Dr. Edmund Gordon   
   
                                                    Platelet mean volume   
(Bld) [Entitic vol] 9.6 fL          Normal          9.5-13.5        [facility]  
   
                                        Comment on above:   Performed By: #### C  
BC ####  
[facility]  
[address] [place] [address]  
Dr. Edmund Gordon   
   
                      PLT        225 103/ul Normal     150-450    [facility]  
   
                                        Comment on above:   Performed By: #### C  
BC ####  
[facility]  
[address] [address]  
Dr. Edmund Gordon   
   
                      RBC        4.76 106/ul Normal     4.20-5.40  [facility]  
   
                                        Comment on above:   Performed By: #### C  
BC ####  
[facility]  
[address] [address]  
Dr. Edmund Gordon   
   
                      WBC        10.7 103/ul Normal     4.0-11.0   [facility]  
   
                                        Comment on above:   Performed By: #### C  
BC ####  
[facility]  
[address] [address]  
Dr. Edmund Gordon   
   
                                                    CRPon 2022   
   
                      CRP [Mass/Vol] mg/L       Normal     <=1.0      [facility]  
   
                                        Comment on above:   Performed By: #### C  
BC ####  
[facility]  
[address] [place] [address]  
Dr. Edmund Gordon   
   
                                                    CT HEAD WO CONon 2022   
   
                                        CT HEAD WO CON      EXAMINATION: CT HEAD  
 WO   
CON  
HISTORY: HEADACHE  
COMPARISON: None.  
TECHNIQUE: CT   
examination of the head   
without IV contrast.   
Sagittal and coronal  
reconstructions were   
obtained.  
Dose reduction   
techniques were   
achieved by using   
automated exposure   
control  
and/or adjustment of mA   
and/or kV according to   
patient size and/or use   
of  
iterative   
reconstruction   
technique.  
FINDINGS: The   
ventricles are not   
enlarged, the lateral   
ventricles are   
symmetric  
and the third   
ventricles in the   
midline. The sylvian   
fissures and cortical   
sulci  
are unremarkable. There   
is no evidence of an   
intracranial   
hemorrhage, mass  
lesion or apparent   
acute infarct. No   
abnormality is seen in   
the deep white  
matter.  
Calcifications of the   
choroid plexus is   
noted. The cerebellum   
and pain stem  
appear intact. The   
paranasal sinuses are   
clear.  
IMPRESSION:  
There is no evidence of   
an intracranial   
hemorrhage, mass lesion   
or apparent  
acute infarct.  
Benign calcifications   
are present. The   
paranasal and ethmoid   
sinuses are intact.  
Electronically   
authenticated by: BILLIE DE GUZMAN Date: 2022   
15:40               Normal                                  [facility]  
   
                                                    Covid-19 PCR (CVDTB)on    
   
                                                    SARS-CoV-2 (COVID-19)   
RNA CHRISTOS+probe Ql (Unsp   
spec)               Not detected        Normal              NOT   
DETECTED                                [facility]  
   
                                        Comment on above:   Result Comment: When  
 diagnostic testing is negative, the   
possibility of a false negative should be considered in  
the context of a patient's recent exposures and the presence   
of clinical signs and symptoms  
consistent with SARS-CoV-2.  
This test is not yet approved or cleared by the [place]   
FDA. When there are no FDA-approved or cleared tests   
available, and other criteria are met, FDA can make tests   
available under an emergency access mechanism called an   
Emergency Use Authorization (EUA). The EUA for this test is   
supported by the  of Health and Human Service's   
declaration that circumstances exist to justify the emergency   
use of in vitro diagnostics for the detection and/or diagnosis   
of the virus that causes COVID-19. This EUA will remain in   
effect for the duration of the COVID-19 declaration justifying   
emergency of IVDs, unless it is terminated or revoked by the   
FDA (after which the test may no longer be used).   
   
                                                            Performed By: #### P  
T, DDIM, PTT ####  
[facility]  
[address] [place] [address]  
Dr. Edmund Gordon   
   
                                                    LIPASEon 2022   
   
                                                    Lipase [Catalytic   
activity/Vol]   136.0 U/L       Normal          73.0-393.0      [facility]  
   
                                        Comment on above:   Performed By: #### C  
BC ####  
[facility]  
[address] [place] [address]  
Dr. Edmund Gordon   
   
                                                    PROF 14(COMP METB)on   
022   
   
                      Albumin [Mass/Vol] 3.6 g/dL   Normal     3.4-5.0    [facility]  
   
                                        Comment on above:   Performed By: #### C  
BC ####  
[facility]  
[address] [place] [address]  
Dr. Edmund Gordon   
   
                                                    Albumin/Globulin [Mass   
ratio]          1.1 {ratio}     Normal                          [facility]  
   
                                        Comment on above:   Performed By: #### C  
BC ####  
[facility]  
[address] [place] [address]  
Dr. Edmund Gordon   
   
                                                    ALP [Catalytic   
activity/Vol]   100 U/L         Normal                    [facility]  
   
                                        Comment on above:   Performed By: #### C  
BC ####  
[facility]  
[address] [place] [address]  
Dr. Edmund Gordon   
   
                                                    ALT [Catalytic   
activity/Vol]   33 U/L          Normal          14-59           [facility]  
   
                                        Comment on above:   Performed By: #### C  
BC ####  
[facility]  
[address] [address]  
Dr. Edmund Gordon   
   
                      Anion gap [Moles/Vol] 10.2 mmol/L Normal                Th  
[facility]  
   
                                        Comment on above:   Performed By: #### C  
BC ####  
[facility]  
[address] [address]  
Dr. Edmund Gordon   
   
                                                    AST [Catalytic   
activity/Vol]   13 U/L          Critically low  15-37           [facility]  
   
                                        Comment on above:   Performed By: #### C  
BC ####  
[facility]  
[address] [address]  
Dr. Edmund Gordon   
   
                      Bilirubin [Mass/Vol] 0.3 mg/dL  Normal     0.2-1.0    [facility]  
   
                                        Comment on above:   Performed By: #### C  
BC ####  
[facility]  
[address] [place] [address]  
Dr. Edmund Gordon   
   
                      Calcium [Mass/Vol] 8.5 mg/dL  Normal     8.5-10.1   [facility]  
   
                                        Comment on above:   Performed By: #### C  
BC ####  
[facility]  
[address] [address]  
Dr. Edmund Gordon   
   
                      Chloride [Moles/Vol] 101 mmol/L Normal          [facility]  
   
                                        Comment on above:   Performed By: #### C  
BC ####  
[facility]  
[address] [address]  
Dr. Edmund Gordon   
   
                      CO2 [Moles/Vol] 28.1 mmol/L Normal     21.0-32.0  [facility]  
   
                                        Comment on above:   Performed By: #### C  
BC ####  
[facility]  
[address] [address]  
Dr. Edmund Gordon   
   
                      Creatinine [Mass/Vol] 0.78 mg/dL Normal     0.55-1.02  [facility]  
   
                                        Comment on above:   Performed By: #### C  
BC ####  
[facility]  
[address] [address]  
Dr. Edmund Gordon   
   
                      EGFR-AF AMERICAN >60        Normal     >=60       [facility]  
   
                                        Comment on above:   Performed By: #### C  
BC ####  
[facility]  
[address] [address]  
Dr. Edmund Gordon   
   
                      EGFR-NON AF AMERICAN >60        Normal     >=60       [facility]  
   
                                        Comment on above:   Performed By: #### C  
BC ####  
[facility]  
[address] [place] [address]  
Dr. Edmund Gordon   
   
                      Globulin (S) [Mass/Vol] 3.4 g/dL   Normal                [facility]  
   
                                        Comment on above:   Performed By: #### C  
BC ####  
[facility]  
[address] [address]  
Dr. Edmund Gordon   
   
                      Glucose [Mass/Vol] 263 mg/dL  Critically high      [facility]  
   
                                        Comment on above:   Performed By: #### C  
BC ####  
[facility]  
[address] [place] [address]  
Dr. Edmund Gordon   
   
                      Potassium [Moles/Vol] 4.3 mmol/L Normal     3.5-5.1    [facility]  
   
                                        Comment on above:   Performed By: #### C  
BC ####  
[facility]  
[address] [place] [address]  
Dr. Edmund Gordon   
   
                      Protein [Mass/Vol] 7.0 g/dL   Normal     6.4-8.2    [facility]  
   
                                        Comment on above:   Performed By: #### C  
BC ####  
[facility]  
[address] [place] [address]  
Dr. Edmund Gordon   
   
                      Sodium [Moles/Vol] 135 mmol/L Critically low 136-145    [facility]  
   
                                        Comment on above:   Performed By: #### C  
BC ####  
[facility]  
[address] [place] [address]  
Dr. Edmund Gordon   
   
                                                    Urea nitrogen   
[Mass/Vol]      19.0 mg/dL      Critically high 7.0-18.0        [facility]  
   
                                        Comment on above:   Performed By: #### C  
BC ####  
[facility]  
[address] [place] [address]  
Dr. Edmund Gordon   
   
                                                    Urea   
nitrogen/Creatinine   
[Mass ratio]    24.4 mg/mg      Normal                          [facility]  
   
                                        Comment on above:   Performed By: #### C  
BC ####  
[facility]  
[address] [place] [address]  
Dr. Edmund Gordon   
   
                                                    PROTIMEon 2022   
   
                                                    INR Coag (PPP)   
[Relative time] {INR}           Normal                          [facility]  
   
                                        Comment on above:   Performed By: #### P  
T, DDIM, PTT ####  
[facility]  
[address] [place] [address]  
Dr. Edmund Gordon   
   
                      INR GUIDELINES SEE BELOW  Normal                [facility]  
   
                                        Comment on above:   Result Comment: KELECHI  
RED INR: 2.0 - 3.0 CONDITIONS NOT LISTED   
BELOW 2.5 - 3.5 FOR PROSTHETIC HEART VALVE REPLACEMENT 2.5 -   
3.5 RECURRENT THROMBOSIS   
   
                                                            Performed By: #### P  
T, DDIM, PTT ####  
[facility]  
[address] [place] [address]  
Dr. Edmund Gordon   
   
                      PT Coag (PPP) [Time] 10.0 s     Normal     9.0-11.6   [facility]  
   
                                        Comment on above:   Performed By: #### P  
T, DDIM, PTT ####  
[facility]  
[address] [place] [address]  
Dr. Edmund Gordon   
   
                                                    PTTon 2022   
   
                      aPTT Coag (Bld) [Time] 26.1 s     Normal     22.3-36.2  Th  
[facility]  
   
                                        Comment on above:   Performed By: #### P  
T, DDIM, PTT ####  
[facility]  
[address] [place] [address]  
Dr. Edmund Gordon   
   
                                                    TROPONIN, HIGH SENSITIVITYon  
 2022   
   
                      HSTROP     <4.0       Normal     4.0-51.3   [facility]  
   
                                        Comment on above:   Result Comment: CUT-  
OFF POINTS HAVE BEEN ESTABLISHED BASED ON   
THE FOURTH UNIVERSAL DEFINITIONS OF MYOCARDIAL  
INFARCTION. THE UPPER REFERENCE LIMIT (URL) OF TROPONIN,   
DEFINED AS THE 99TH PERCENTILE OF  
cTnI DISTRIBUTION IN A REFERENCE POPULATION, HAS BEEN   
CONFIRMED AS THE DECISION THRESHOLD  
FOR MI DIAGNOSIS.   
   
                                                            Performed By: #### P  
T, DDIM, PTT ####  
[facility]  
[address] [place] [address]  
Dr. Edmund Gordon   
   
                      HSTROP     <4.0       Normal     4.0-51.3   [facility]  
   
                                        Comment on above:   Result Comment: CUT-  
OFF POINTS HAVE BEEN ESTABLISHED BASED ON   
THE FOURTH UNIVERSAL DEFINITIONS OF MYOCARDIAL  
INFARCTION. THE UPPER REFERENCE LIMIT (URL) OF TROPONIN,   
DEFINED AS THE 99TH PERCENTILE OF  
cTnI DISTRIBUTION IN A REFERENCE POPULATION, HAS BEEN   
CONFIRMED AS THE DECISION THRESHOLD  
FOR MI DIAGNOSIS.   
   
                                                            Performed By: #### C  
BC ####  
[facility]  
[address] [address]  
Dr. Edmund Gordon   
   
                                                    XR CHEST 1 Von 2022   
   
                                        XR CHEST 1 V        EXAMINATION: XR CHES  
T 1   
V  
HISTORY: CHEST PAIN,   
UNSPECIFIED  
COMPARISON: 2021  
TECHNIQUE: AP portable   
erect  
FINDINGS:  
LUNGS: No significant   
pulmonary parenchymal   
abnormalities.  
VASCULATURE: No   
increased pulmonary   
vasculature.  
PLEURA: No   
pneumothorax, effusion,   
or pleural thickening.  
CARDIAC: No   
cardiomegaly or cardiac   
silhouette abnormality.  
MEDIASTINUM: No visible   
mass or adenopathy.  
BONES: No fracture or   
visible bone lesion.  
OTHER: Negative.  
IMPRESSION:  
No acute disease.  
Electronically   
authenticated by: TOMASZ CAMPOS Date: 2022   
15:44               Normal                                  [facility]  
   
                                                    ANES POSTPROC EVALon 02-15-2  
021   
   
                                        ANES POSTPROC EVAL  HNO ID: [id number]  
Author: Irene Arana  
Service: Anesthesiology  
Author Type:   
Anesthesiologist  
Type: Anesthesia   
Postprocedure   
Evaluation  
Filed: 2/15/2021 3:59   
PM  
Note Text:  
POST ANESTHESIA   
EVALUATION NOTE  
: 1965  
Procedure Summary  
Date: 02/15/21 Room /   
Location:  OR01 / [facility]   
OR  
Anesthesia Start: 1400   
Anesthesia Stop: 1550  
Procedures:  
NASAL/SINUS ENDOSCOPY   
SURGICAL W/ MAXILLARY   
ANTROSTOMY W/ REMOVAL   
OF  
TISSUE FROM MAXILLARY   
SINUS (Left Nose)  
ENDOSCOPY NASAL/SINUS   
W/ ETHMOIDECTOMY, TOTAL   
(Left Sinus)  
COBLATION TURBINATES   
INTRAMURAL (Left Nose)  
STEREOTACTIC   
COMPUTER-ASSISTED   
(NAVIGATIONAL)   
PROCEDURE CRANIAL  
EXTRADURAL (Bilateral   
Nose)  
ENDOSCOPY NASAL/SINUS   
W/ EDUARDO BULLOSA   
RESECTION (Bilateral   
Nose)  
Diagnosis:  
Chronic maxillary   
sinusitis  
(Chronic maxillary   
sinusitis [J32.0])  
Surgeons: Diana Gandara Responsible   
Provider: Irene Arana  
Anesthesia Type:   
general ASA Status: 4  
Anesthesia Type:   
general  
Last vitals  
Vitals Value Taken Time  
/56 02/15/21 1547  
Temp 36.6 ?C (97.9 ?F)   
02/15/21 1546  
Pulse 90 02/15/21 1558  
Resp 17 02/15/21 1558  
SpO2 97 % 02/15/21 1558  
Vitals shown include   
unvalidated device   
data.  
Post Anesthesia Patient   
Status  
Patient Evaluation:   
PACU.  
PACU/ICU Patient   
Condition: stable.  
Anticipated   
Disposition: phase 2   
then home.  
Neurological Status:   
aware and responsive.  
Pulmonary Status:   
breathing comfortably   
on room air  
Airway Control:   
returned to baseline   
unsupported.  
Cardiovascular Status:   
stable.  
Pain Management:   
clinically adequate  
Postoperative   
Hydration: acceptable.  
Intraoperative Events:  
no significant   
anesthesia events  
Post Operative   
Nausea/Vomiting Status:  
Anesthetic   
Observations:  
no significant   
anesthetic observations  
Recommendation:   
continue current plan   
of care.  
SIGNATURE: Irene Arana MD PATIENT   
NAME: Bev Gaming  
DATE: February 15, 2021   
MRN: [id number]  
TIME: 3:59 PM CSN:   
[id number]           [facility]  
   
                                                    ANES PRE-OPon 02-   
   
                                        ANES PRE-OP         HNO ID: [id number]  
Author: Irene Arana  
Service: Anesthesiology  
Author Type:   
Anesthesiologist  
Type: Anesthesia   
Preprocedure Evaluation  
Filed: 2/15/2021 1:08   
PM  
Note Text:  
ANESTHESIOLOGY DAY OF   
SURGERY NOTE  
: 1965  
Procedure(s) (LRB):  
NASAL/SINUS ENDOSCOPY   
SURGICAL W/ MAXILLARY   
ANTROSTOMY W/ REMOVAL   
OF  
TISSUE FROM MAXILLARY   
SINUS (Left)  
ENDOSCOPY NASAL/SINUS   
W/ FRONTAL SINUS   
EXPLORATION (Left)  
ENDOSCOPY NASAL/SINUS   
W/ ETHMOIDECTOMY, TOTAL   
(Left)  
ENDOSCOPIC SEPTOPLASTY   
(Left)  
COBLATION TURBINATES   
INTRAMURAL (Left)  
ENDOSCOPY NASAL/SINUS   
W/ SPHENOIDOTOMY (Left)  
STEREOTACTIC   
COMPUTER-ASSISTED   
(NAVIGATIONAL)   
PROCEDURE CRANIAL   
EXTRADURAL  
(Bilateral)  
ENDOSCOPY NASAL/SINUS   
W/ EDUARDO BULLOSA   
RESECTION (Bilateral)  
Surgeon(s):  
Diana Gandara  
Estimated body mass   
index is 34.21 kg/m? as   
calculated from the   
following:  
Height as of 21:   
172.7 cm (5' 8 ).  
Weight as of 21:   
102.1 kg (225 lb).  
Most recent hematocrit   
and potassium results:  
Hematocrit 46.4   
2021  
Potassium 4.1 2021  
Relevant Problems  
CARDIO  
(+) DVT of axillary   
vein, acute left (HCC)  
(+) DVT of lower   
extremity (deep venous   
thrombosis) (HCC)  
PULMONARY  
(+) COPD (chronic   
obstructive pulmonary   
disease) (HCC)  
(+) Pneumonia  
Other  
(+) Arthritis  
(+) RA (rheumatoid   
arthritis) (HCC)  
I - PHYSICAL EVALUATION  
AIRWAY  
Patient intubated: No.  
Mallampati: II.  
TM distance: >3 FB.  
Neck ROM: full ROM   
without neurological   
symptoms.  
Mouth opening:   
adequate.  
DENTAL  
Normal dental   
observations.  
Dental findings: teeth   
intact.  
Additional exam   
findings: yes.  
CARDIOVASCULAR  
Normal cardiovascular   
observations.  
Rhythm: regular  
Rate: normal  
PULMONARY  
Normal pulmonary   
observations.  
Breath sounds clear to   
auscultation.  
II - ANESTHESIA PLAN  
ASA Score: 4  
Anesthetic Plan:   
general  
Airway type: ETT  
The patient is not a   
current smoker.  
NPO Status: adequate  
Monitoring plan:   
standard ASA.   
Postoperative analgesic   
plan: multimodal  
analgesia.  
Anesthetic Risks,   
Benefits, Alternatives,   
Personnel Discussed.   
Consent  
obtained from:   
patient.Patient /   
Surrogate agrees to   
blood products: yes  
Significant changes in   
the patient condition   
since the History and  
Physical, not otherwise   
documented in primary   
service progress note:   
no.  
Potential Anesthesia   
issues that may suggest   
increased risk of  
complications or   
contraindication to   
planned procedure:   
none.  
Vitals Value Taken Time  
/64 02/15/21 1148  
Pulse 86 02/15/21 1148  
Resp 18 02/15/21 1148  
Temp 37.1 ?C (98.8 ?F)   
02/15/21 1148  
SpO2 96 % 02/15/21 1148  
Facility-Administered   
Medications as of   
2/15/2021  
Medication Dose Route   
Frequency  
- lidocaine 10 mg/mL (1   
%) 1-2 mg injection   
(XYLOCAINE) 0.1-0.2 mL  
INTRADERMAL PRN  
- lactated ringers   
infusion 5-30 mL/hr   
INTRAVENOUS CONTINUOUS  
- clindamycin iv   
piggyback 600 mg in D5W   
50 mL (CLEOCIN) 600 mg  
INTRAVENOUS Pre-Op Once  
- acetaminophen 1,000   
mg tab(s) (TYLENOL)   
1,000 mg ORAL ONCE  
- promethazine 12.5 mg   
tab(s) (PHENERGAN) 12.5   
mg ORAL Pre-Op Once  
- lactated ringers   
infusion 30 mL/hr   
INTRAVENOUS CONTINUOUS  
- lidocaine 10 mg/mL (1   
%) 1-2 mg injection   
(XYLOCAINE) 0.1-0.2 mL  
INTRADERMAL ONCE  
- [COMPLETED]   
acetaminophen 1,000 mg   
tab(s) (TYLENOL) 1,000   
mg ORAL  
Pre-Op Once  
- [COMPLETED]   
promethazine 12.5 mg   
tab(s) (PHENERGAN) 12.5   
mg ORAL Pre-Op  
Once  
- lactated ringers   
infusion 30 mL/hr   
INTRAVENOUS CONTINUOUS  
Outpatient Medications   
as of 2/15/2021  
Medication Sig  
- insulin degludec   
(TRESIBA FLEXTOUCH   
U-100) 100 unit/mL (3   
mL) injection  
pen Inject   
subcutaneously.  
- insulin aspart   
(NOVOLOG FLEXPEN U-100   
INSULIN SUBCUTANEOUS)   
Inject  
subcutaneously. sliding   
scale  
- semaglutide (OZEMPIC)   
1 mg/dose (2 mg/1.5 mL)   
pnij Inject 1 mg  
subcutaneously one time   
a week.  
- Insulin Lispro,   
Human, (HUMALOG) 100   
unit/mL crtg Inject   
subcutaneously  
w MEALS.  
- furosemide (LASIX) 40   
mg tablet Take 40 mg by   
mouth as needed.  
- Levalbuterol HCl   
(XOPENEX) 1.25 mg/0.5   
mL nebulizer solution   
Use 1  
Ampule via nebulizer   
every 8 hours as   
needed.  
I have interviewed and   
examined the patient. I   
have reviewed the   
medical  
record and/or the   
pre-anesthesia   
evaluation, pertinent   
labs, and test  
results.  
This contains updated   
information obtained   
within 48 hours of  
Surgery/Procedure.  
SIGNATURE: Irene Arana MD PATIENT   
NAME: Bev Gaming  
DATE: February 15, 2021   
MRN: [id number]  
TIME: 1:08 PM CSN:   
[id number]           [facility]  
   
                                                    NURSING PROGon 02-   
   
                                        NURSING PROG        HNO ID: [id number]  
Author: Sonia Robbins RN  
Service: Nursing  
Author Type: Registered   
Nurse  
Type: Nursing Progress   
Note  
Filed: 2/15/2021 11:13   
AM  
Note Text:  
PATIENT EDUCATION   
TOPIC: PROCEDURE /   
SURGERY: Pre-op   
Teaching:  
Logistics  
PATIENT NAME: Bev Gaming  
MRN: [id number]  
PATIENT LOCATION: [facility] OR   
POOL/[facility] OR POOL  
READINESS TO LEARN  
COGNITIVE ABILITY:   
Alert and oriented  
MOTIVATION TO LEARN:   
Eager  
FAMILY SUPPORT: None -   
Unavailable/disinterest  
ed  
INSTRUCTION PROVIDED   
TO: Patient  
PATIENT LEARNS BEST BY:   
Individual Instruction  
FACTORS AFFECTING   
LEARNING: None  
PHYSICAL LIMITATIONS   
AFFECTING LEARNING:   
None  
LEARNING RESPONSE  
DIAGNOSIS: ADULT: Well   
Adult  
PATIENT/FAMILY   
RESPONSE: Verbalizes   
understanding of:   
PRE-OPERATIVE  
INSTRUCTIONS-Correct   
action to take to   
follow pre-operative   
instructions  
METHOD OF INSTRUCTION:   
Individual instruction  
FOLLOW-UP PLAN:   
Complete - No need for   
follow-up  
INSTRUCTIONAL AIDS   
USED: NA  
SUPPLEMENTAL MATERIAL   
PROVIDED TO PATIENT:   
None  
REFERRAL   
(RECOMMENDATION): None  
Electronically Signed   
By: Sonia Robbins RN                  [facility]  
   
                                                    OPERATIVE NOon 02-   
   
                                        OPERATIVE NO        HNO ID: [id number]  
Author: Lele Diaz MD (Res)  
Service: Otolaryngology  
Author Type: Resident  
Type: Operative Report  
Filed: 2/15/2021 3:47   
PM  
Note Text:  
-----------------------  
-----------  
Attestation signed by   
Diana Gandara at   
2/15/2021 3:55 PM  
The patient was seen by   
me and examined and I   
agree with the resident   
note Dr. Diaz. I was present   
and performed the   
procedure.  
-----------------------  
-----------  
C O N F I D E N T I A L   
I N F O R M A T I O N  
-----------------------  
------  
-----------------------  
-----------------  
STANDARD [facility] DOCUMENT  
OPERATIVE REPORT  
LOG ID: [id number]  
Patient Name: Bev Gaming  
Patient MRN: [id number]  
Incision/Procedure   
Start Time: 2:42 PM  
Incision   
Close/Procedure End   
Time: 3:30 PM  
Date of Surgery:   
2/15/2021  
Surgeon(s)/Proceduralis  
t(s) and Assistant(s):  
Surgeon(s) and Role:  
* Diana Gandara -   
Primary  
* Lele (Res) MD Joe   
- Resident - Assisting  
Anesthesia: General  
Procedure(s):  
1) Endoscopic left   
eduardo bullosa resectio  
2) Endoscopic left   
maxillary antrostomy  
3) Endoscopic left   
anterior ethmoidectomy   
n  
4) Bilateral inferior   
turbinate reduction  
5) Imaged guided   
surgical navigation -   
extradural  
Preoperative Diagnosis:  
1. Chronic   
Rhinosinusitis of the   
left maxillary, and   
ethmoid sinuses  
2. Left eduardo bullosa   
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  
3. Inferior turbinate   
hypertrophy bilateral  
Postoperative   
Diagnoses:  
Same as preoperative   
diagnosis  
Anesthesia:  
General  
Operative Indications:  
The patient is a 55   
year old female with a   
history of left chronic  
rhinosinusitis,   
turbinate hypertrophy   
with nasal obstruction.   
she was  
treated with maximal   
medical therapy and a   
post-treatment CT scan   
showed  
persistent disease.   
Therefore, the risks,   
benefits, and   
alternatives of  
the above procedures   
were discussed and the   
patient agreed to   
proceed.  
Operative Findings:  
Mucosal disease   
involving the the left   
maxillary and ethmoid   
sinuses.  
Inferior turbinate   
hypertrophy   
bilaterally.  
Procedure Narrative:  
The patient was brought   
into the operating room   
and laid in a supine  
position on the   
operating room table. A   
surgical huddle was   
conducted to  
verify the patient and   
the procedure to be   
performed. General   
anesthesia  
was induced and the   
patient's airway was   
secured with an ET   
tube. A time  
out was called. The   
nasal cavities were   
sprayed with 0.5%   
Afrin. The left  
eduardo bullosa was then   
injected with lidocaine   
1% with 1:100,000  
epinephrine.  
Next, image guidance   
was attached and   
registered. Once the   
image guidance  
was calibrated, the   
nasal cavities were   
examined with a 0   
degree  
endoscope.  
The patient was noted   
to have enlarged   
inferior turbinates on   
the right  
and left. The inferior   
turbinates were   
outfractured laterally   
with a  
freer elevator.  
Using a 0 degree   
endoscope, attention   
was placed to the left   
nasal cavity.  
A knife was used to   
make an incision into   
the left middle   
turbinate  
inferiorly all the way   
superomedially to   
remove the eduardo. This   
was this  
refined using a   
through-cut inferiorly   
and superiorly. The   
eduardo bullosa  
was resected opening   
the middle meatus   
adequately. The double   
ball probe  
was then used to   
anteriorly deflect the   
uncinate, and an   
uncinectomy was  
performed with a   
retrograde approach   
using a combination of   
the back-biter  
and microdebrider. The   
probe was then used to   
cannulate the maxillary   
os  
and a large antrostomy   
in continuity with the   
natural os was created.   
The  
edges were refined with   
the microdebrider. Once   
a large maxillary  
antrostomy was created,   
attention was then   
turned to the ethmoid   
air  
cells. Using a J-   
curette, the ethmoid   
bulla was entered   
inferomedially.  
Further dissection was   
carried superolaterally   
freeing the rest of the  
bulla.  
Attention was then   
placed to the inferior   
turbinates, first the   
right. A  
15 blade was used to   
make a vertical   
incision posterior to   
the head of the  
inferior turbinate down   
to the conchal bone. A   
morris elevator was   
then  
used to elevate a   
mucoperiosteal pocket   
over the medial and   
inferior  
aspect of the   
turbinate. Using the   
turbinate   
micro-debrider, a   
submucosal  
resection was completed   
and then the pocket was   
cauterized using the  
bioplar function. The   
right nasal cavity was   
suctioned, and pledgets   
were  
then removed from the   
left side, and a   
submucosal resection   
was then  
completed in the exact   
same fashion on the   
left.  
The nasopharynx was   
then suctioned and the   
nose was copiously   
irrigated  
with normal saline.   
Hemostasis was achieved   
as necessary. Nasopore   
packing  
was then deployed in   
the left.This completed   
the surgical procedure.   
The  
patient was turned over   
to the anesthesia team   
for awakening and  
extubation. she was   
transferred to the PACU   
in stable condition.  
Specimens:  
Left sinonasal contents  
Implants:  
none  
Complications:  
None  
Estimated Blood Loss:  
10 cc  
Attestation:  
The primary surgeon   
performed the surgery   
with assistance from  
resident(s).  
Dictated by Lele Diaz MD for the   
service of Dr. Gandara [facility]  
   
                                                    PLAN OF CAREon 02-   
   
                                        PLAN OF CARE        HNO ID: [id number]  
Author: Argenis Herndon   
(Opbeat)  
Service: Pharmacy  
Author Type: ?  
Type: Plan of Care  
Filed: 2021 5:15   
PM  
Note Text:  
TECHNICIAN BEDSIDE   
DELIVERY SURVEY  
1. Patient to use   
[facility]   
Bedside Delivery - YES  
Insurance Information   
as follows:  
2. Insurance card on   
file - YES  
3. Credit card for   
payment - N/A       [facility]  
   
                                        PLAN OF CARE        HNO ID: [id number]  
Author: Argenis Herndon   
(Opbeat)  
Service: Pharmacy  
Author Type: ?  
Type: Plan of Care  
Filed: 2021 5:15   
PM  
Note Text:  
Pharmacy Discharge   
Medication Service:  
This patient has   
elected to receive   
their discharge   
prescriptions through  
the [facility]   
Pharmacy Bedside   
Prescription Delivery   
program. The  
prescriptions are   
currently being   
processed. A follow-up   
note will be  
entered once the   
prescriptions have been   
filled and delivered to   
the  
patient. Please contact   
me with any questions   
or updates to the   
patient's  
discharge medications.  
Argenis Herndon (Opbeat)  
DCT Contact Info: [id number] [facility]  
   
                                        PLAN OF CARE        HNO ID: [id number]  
Author: Argenis Herndon   
(Opbeat)  
Service: Pharmacy  
Author Type: ?  
Type: Plan of Care  
Filed: 2021 5:16   
PM  
Note Text:  
PHARMACY BEDSIDE   
DELIVERY SERVICE  
Patient Name: Bev Gaming  
MRN: [id number]  
The marked outpatient   
medications were filled   
and picked up at   
pharmacy.  
Medication List  
START taking these   
medications  
clindamycin 300 mg   
capsule  
Commonly known as:   
Cleocin HCL  
Take 1 capsule by mouth   
three times daily for 7   
days.  
DEEP SEA NASAL 0.65 %   
nasal spray  
Generic drug: sodium   
chloride  
Use 1 Spray in the nose   
as needed.  
HYDROcodone-acetaminoph  
en 5-325 mg per tablet  
Commonly known as:   
NORCO  
Take 1 tablet by mouth   
every 8 hours as needed   
for up to 7 days.  
Nasal Decongestant   
(Oxymetazl) 0.05 %   
nasal spray  
Generic drug:   
oxymetazoline  
Instill 2 Sprays in the   
nose twice daily.  
CONTINUE taking these   
medications  
ATIVAN 0.5 mg  
Generic drug: LORazepam  
HumaLOG U-100 Insulin   
100 unit/mL Crtg  
Generic drug: Insulin   
lispro (Human)  
LASIX 40 mg tablet  
Generic drug:   
furosemide  
NOVOLOG FLEXPEN U-100   
INSULIN SUBCUTANEOUS  
OXYGEN (HOME THERAPY)  
OZEMPIC 1 mg/dose (2   
mg/1.5 mL) Pnij  
Generic drug:   
semaglutide  
TRESIBA FLEXTOUCH U-100   
100 unit/mL (3 mL)   
injection pen  
Generic drug: insulin   
degludec  
Xopenex 1.25 mg/0.5 mL   
nebulizer solution  
Generic drug:   
levalbuterol HCl  
You might also be   
taking other   
medications not listed   
above. If you have  
questions about any of   
your other medications,   
talk to the person who  
prescribed them or your   
Primary Care Provider.  
Argenis Herndon (Pharmacy   
Tech)  
PAGER: [phone]  
February 15, 2021 5:15   
PM                  [facility]  
   
                                                    PT EDon 02-   
   
                                        PT ED               HNO ID: [id number]  
Author: Obdulia ShettyRn)   
CARINA Andino  
Service: Nursing  
Author Type: Registered   
Nurse  
Type: Patient Education  
Filed: 2/15/2021 6:02   
PM  
Note Text:  
PATIENT EDUCATION   
TOPIC: PROCEDURE /   
SURGERY: Post-op   
Teaching: Med  
Administration, Symptom   
Management and Wound   
Care  
SURVIVAL SKILLS:   
Fatigue Management  
Pain Management  
Safety Precautions  
LIFE STYLE CHANGES:   
Exercise, Safety   
Precautions and Sick   
Day Management  
PATIENT NAME: Bev Gaming  
MRN: [id number]  
PATIENT LOCATION: [facility] OR   
POOL/[facility] OR POOL  
READINESS TO LEARN  
COGNITIVE ABILITY:   
Alert and oriented  
MOTIVATION TO LEARN:   
Interested  
FAMILY SUPPORT: Unable   
to assess - Family not   
present  
INSTRUCTION PROVIDED   
TO: Patient  
PATIENT LEARNS BEST BY:   
Written Instruction -   
Hand-outs  
FACTORS AFFECTING   
LEARNING: None  
PHYSICAL LIMITATIONS   
AFFECTING LEARNING:   
None  
LEARNING RESPONSE  
DIAGNOSIS: ADULT: Well   
Adult  
PATIENT/FAMILY   
RESPONSE: Verbalizes   
understanding of:   
POST-PROCEDURE  
INSTRUCTIONS-Correct   
actions to take to   
reduce post procedure  
complications  
METHOD OF INSTRUCTION:   
Written instruction -   
handouts  
FOLLOW-UP PLAN:   
Complete - No need for   
follow-up  
INSTRUCTIONAL AIDS   
USED: NA  
SUPPLEMENTAL MATERIAL   
PROVIDED TO PATIENT:   
None  
REFERRAL   
(RECOMMENDATION): None  
Electronically Signed   
By: Obdulia Andino RN [facility]  
   
                                                    SURGICAL PATHOLOGYon 02-15-2  
021   
   
                                        SURGICAL PATHOLOGY  Specimen originated   
from [facility]  
Specimen #: [id number]  
Submitting Physician:   
DIANA GANDARA M.D.  
_______________________  
_____  
FINAL DIAGNOSIS  
Left sinus shavings,   
excision  
- Chronic sinusitis.  
[facility] 2021  
Den Moore M.D.  
(Electronic Signature)  
_______________________  
_______________________  
_____________________  
SPECIMEN SUBMITTED  
A: LEFT SINUS SHAVING  
  
CLINICAL DATA  
CHRONIC MAXILLARY   
SINUSITIS; LEFT   
ENDOSCOPIC SINUS   
SURGERY AND TURBINATE  
REDUCTION  
GROSS DESCRIPTION  
A. Received in formalin   
designated  left sinus   
shavings  are multiple  
pink-tan hemorrhagic   
and gelatinous   
fragments of tissue   
that aggregate to  
2.5 x 2.5 x 0.2 cm. The   
specimen is entirely   
filtered in one   
cassette.  
Celestino 2021  
Gross examination   
performed at [facility], [address]   
[place] [address]  
Patient ID #: [id number]  
Date of Report:   
2021  
Date of Procedure:   
2/15/2021  
Date of Receipt:   
2021  
Submitted by: DIANA GANDARA M.D.  
Location: [facility]  
Diagnostic   
interpretation   
performed at [facility], [address],  
[place] [address].   
CLIA Number: [id number] Normal                                  [facility]  
   
                                                    HOSPon 2020   
   
                                        [facility]                Patient:Fátima Gaming  
MRN:  
Height:5' 8 (1.727 m)  
Weight:225 lb (102.059   
kg)  
Outpatient Medications   
as of 2/15/21:  
insulin degludec   
(TRESIBA FLEXTOUCH   
U-100) 100 unit/mL (3   
mL) injection pen  
LORazepam (ATIVAN) 0.5   
mg  
OXYGEN, HOME THERAPY,  
insulin aspart (NOVOLOG   
FLEXPEN U-100 INSULIN   
SUBCUTANEOUS)  
semaglutide (OZEMPIC) 1   
mg/dose (2 mg/1.5 mL)   
pnij  
Insulin Lispro, Human,   
(HUMALOG) 100 unit/mL   
crtg  
furosemide (LASIX) 40   
mg tablet  
Levalbuterol HCl   
(XOPENEX) 1.25 mg/0.5   
mL nebulizer solution  
Admission/Clinic   
Administered   
Medications as of   
2/15/21:  
lidocaine 10 mg/mL (1   
%) 1-2 mg injection   
(XYLOCAINE)  
lactated ringers   
infusion  
clindamycin iv   
piggyback 600 mg in D5W   
50 mL (CLEOCIN)  
acetaminophen 1,000 mg   
tab(s) (TYLENOL)  
promethazine 12.5 mg   
tab(s) (PHENERGAN)  
lactated ringers   
infusion  
lidocaine 10 mg/mL (1   
%) 1-2 mg injection   
(XYLOCAINE)  
lactated ringers   
infusion  
Problem List:  
Pneumonia [J18.9]  
Arthritis [M19.90]  
Diabetes (HCC) [E11.9]  
COPD (chronic   
obstructive pulmonary   
disease) (HCC) [J44.9]  
Overweight [E66.3]  
RA (rheumatoid   
arthritis) ([facility])   
[M06.9]  
Depression [F32.9]  
Hypogammaglobulinemia   
(HCC) [D80.1]  
Displacement of   
cervical intervertebral   
disc with myelopathy   
[M50.00]  
Cervical herniation   
[M50.20]  
DVT of axillary vein,   
acute left ([facility])   
[I82.A12]  
DVT of lower extremity   
(deep venous   
thrombosis) ([facility])   
[I82.409]  
Cervical myelopathy   
(Formerly [facility]) [G95.9]  
HX: anticoagulation   
[Z92.29]  
S/P cervical spinal   
fusion [Z98.1]  
Smoking [F17.200]  
Class 2 obesity [E66.9]  
Allergies:  
Effexor [Venlafaxine   
Analogues]  
Penicillins  
Reglan [Metoclopramide   
Hcl]  
Topamax [Topiramate]  
Date Verified: 2/15/21  
Lab Values  
Lab Value Units Date   
High Low  
POTA* 4.1 mmol/L   
2021 5.1 3.7  
HEMA* 46.4 % 2021   
46.0 36.0  
Progress Notes (OTOL   
[facility]):  
Nichole Cunningham RN   
2021 3:29 PM   
Signed  
Note copied from Triage   
nurse:  
Patient calling-  
- states she has had a   
constant HA/ pressure x   
1 month - worsening .  
- HA under Left eye and   
posterior L head.  
- rating pain  8    
currently.  
- pain is worse in   
afternoon  
- has tried tylenol,   
motrin, aleve, Fioricet   
without relief.  
- denies- vision   
changes, Fever, chills.  
- is to have sinus   
surgery 2/15  
?  
- please advise.  
?  
- advised if pain is   
severe and unbearable   
she should proceed to   
ER for Eval.  
?  
- verbalized   
understanding. Nichole Cunningham RN 2021   
3:29 PM Signed  
Call returned to the   
patient. She states   
that she has a lot of   
pain and  
pressure on her left   
side of her face, nose   
and head. She is not   
able to  
breathe out of the left   
side of her nose due to   
the congestion. She   
states that  
she uses her sinus   
rinse but is not fully   
able to get a good   
stream into the  
left. She states that   
she was not able to get   
an antibiotic last time   
she was  
here due to having long   
history of C diff. She   
was asking if her   
surgery date  
could be moved up or if   
not what else could be   
done. Please advise.   
Nichole Hilliard COORD   
2021 8:03 AM   
Signed  
Unable to accommodate   
sooner at [facility]. Please advise   
if able to  
reschedule sooner at   
[facility].  
Progress Notes ([facility]):  
Nika Shepherd RN   
2021 3:05 PM   
Signed  
Patient calling-  
- states she has had a   
constant HA/ pressure x   
1 month - worsening .  
- HA under Left eye and   
posterior L head.  
- rating pain  8    
currently.  
- pain is worse in   
afternoon  
- has tried tylenol,   
motrin, aleve, Fioricet   
without relief.  
- denies- vision   
changes, Fever, chills.  
- is to have sinus   
surgery 2/15  
- please advise.  
- advised if pain is   
severe and unbearable   
she should proceed to   
ER for Eval.  
- verbalized   
understanding.      Normal                                  [facility]  
   
                                                    CT SINUS STEREO WO IVCONon 1  
2020   
   
                                                                  [facility]  
   
                                                    HIP RIGHT 1 OR 2 VWS WITH PE  
LVISon 2017   
   
                                                    HIP RIGHT 1 OR 2 VWS   
WITH PELVIS                             [facility]   
[address] [place], [place]   
[phone]   
=======================  
=====Patient Name:   
BEV GAMING :   
1965Sex: FAge:   
Race: WhiteMRN:   
[id number]. Location:   
OUTPPatient Status:   
OVisit #:   
[id number] Date:   
2017 7:25:00   
AMCompleted Date:   
2017 09:17   
AMRequesting Provider:   
TOMASZ BARAJAS Attending   
Provider: TOMASZ BARAJAS   
Report Copy To: Signs &   
Symptoms: intra op   
right hipHistory:   
arthroscopyComments:   
Exam: HIP RIGHT 1 OR 2   
VWS WITH   
PELVISAccession #:   
[id number]  
=======================  
=======================  
============HIP RIGHT 1   
OR 2 VWS WITH PELVIS   
2017 9:17 AM EDT   
SIGNS AND SYMPTOMS:   
intra op right hip   
TECHNOLOGIST COMMENTS:   
intra op right hip   
arthroscopy 58 seconds   
fluoro time used Dr. Barajas QUESTION FOR THE   
RADIOLOGIST: PROTOCOL:   
AP(PA) and Lateral   
views were obtained.   
COMPARISON: None   
FINDINGS: Soft tissues:   
Bones: Joints:   
IMPRESSION:   
Documentation   
Electronically signed   
by:Nicholas Bishop.   
Transcribed by:   
[email or web address], User   
Resident:   
Electronically Signed   
by: NICHOLAS BISHOP @   
2017 09:45 AM Normal                                  The   
[facility]  
   
                                                    Operative Reporton   
7   
   
                                        Operative Report    MR#: [id number]   
[facility]   
Pt. Name: Bev Gaming Room #: 0   
Discharge Date:   
YOB: 1965   
OPERATIVE REPORTDATE OF   
SURGERY:   
2017SURGEON:   
Tomasz Barajas M.D.PREOPERATIVE   
DIAGNOSIS: Right hip   
psoas   
tendonitis.POSTOPERATIV  
E DIAGNOSIS: Right hip   
psoas   
tendonitis.ASSISTANT:   
Dr. Gab Albrecht.ANESTHESIA:   
General.PROCEDURE   
PERFORMED: Right hip   
psoas tendon   
release.INDICATIONS:   
Bev is a   
52-year-old woman, who   
has been having   
rightinternal snapping   
hip syndrome for the   
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 She has   
triedphysical therapy.   
She has tried   
anti-inflammatories.   
She continues tohave   
pain. On physical   
examination, she had   
tightness and   
tenderness topalpation   
of the psoas tendon. We   
even tried a   
corticosteroid   
injectionand she   
continues to have pain.   
I therefore offered to   
her a right hippsoas   
tendon release. She had   
a similar procedure   
back in , which   
shedid beautifully   
from.PROCEDURE IN   
DETAIL: After   
confirmation and   
marking of the   
correctsurgical   
extremity in the   
preoperative holding   
area, the patient   
wasbrought back to the   
operating suite and   
placed in the supine   
position. Allpressure   
points were adequately   
padded. General   
endotracheal   
anesthesiawas smoothly   
induced. Preoperative   
antibiotics were   
administered. Theright   
lower extremity was   
prepped and draped in a   
sterile fashion.   
Afterobservation of a   
surgical time-out   
procedure using 2   
separate   
patientidentifiers, we   
began with the case.We   
first started with   
application of traction   
with a total traction   
time of24 minutes. We   
then introduced C-arm   
and using fluoroscopy,   
created ourstandard   
lateral and mid lateral   
portals. A 70 degree   
arthroscope wasinserted   
into the hip itself. We   
then began with a   
diagnostic   
arthroscopy.We first   
inspected all the   
cartilage surfaces in   
the acetabular labrum.   
Notears appreciated.   
There was, however,   
inflammation seen   
around the psoastendon,   
which was tight and   
inflamed. I therefore   
performed a psoas   
tendonrelease,   
releasing only the   
tendinous portion, but   
preserving the   
muscularportion of the   
psoas tendon and   
muscle.At this point,   
all instruments were   
removed and the hip was   
drained offluid. The   
portal incisions were   
closed using simple   
Steri-Strips. Asterile   
compressive wrap was   
applied. The patient   
was then   
awakened,extubated, and   
brought back to the   
PACU in stable   
condition. I   
waspresent, scrubbed,   
and actively   
participating through   
all key portions ofthe   
surgery.ESTIMATED BLOOD   
LOSS:   
Minimal.COMPLICATIONS:   
None.DISPOSITION: To   
the PACU in stable   
condition.POSTOPERATIVE   
PLAN: I will see   
Bev back in 10-14   
days' time forsuture   
removal and initiation   
of physical therapy.   
She may beweightbearing   
as tolerated.   
Nonetheless, we will   
use aspirin for   
DVTprophylaxis over the   
next 7   
days.Electronically   
Signed by:Tomasz Barajas M.D. 2017   
02:21   
P______________________  
______________Tomasz Barajas M.D.Date Dict:   
2017/09:22   
A/Tomasz Barajas M.D.Date Trans:   
2017 03:53   
P/[id number]  
09cc: [address]   
[place] , [facility] [address]   
[facility] [address]   Normal                                  The   
[facility]  
   
                                                    POC GLUCOSE LABon 2017  
   
   
                      Glucose mass conc 193 mg/dL  High            The   
[facility]  
   
                                        Comment on above:   Performed By: #### 8  
5499 ####[facility] [address], [facility]   
   
                      Glucose mass conc 225 mg/dL  High            The   
[facility]  
   
                                        Comment on above:   Performed By: #### 8  
5499 ####[facility] [place] [address], [facility]   
  
  
  
Vital Signs  
  
  
                          Date Time    Vital Sign   Value        Performing   
Clinician                               Facility  
   
                                                    2025   
09:          Body height         172.72 cm           Sherman Malone DO  
Work Phone:   
[phone]                          [facility]  
   
                                                    2025   
09:                              Body mass index   
(BMI) [Ratio]             30.4 kg/m2                Sherman Malone DO  
Work Phone:   
[phone]                          [facility]  
   
                                                    2025   
09:          Body weight         90.71 kg            Sherman Malone DO  
Work Phone:   
[phone]                          [facility]  
   
                                                    2025   
09:                              Diastolic blood   
pressure                  84 mm[Hg]                 Sherman Malone DO  
Work Phone:   
[phone]                          [facility]  
   
                                                    2025   
09:          Heart rate          91 /min             Sherman Malone DO  
Work Phone:   
[phone]                          [facility]  
   
                                                    2025   
09:          Respiratory rate    20 /min             Sherman Malone DO  
Work Phone:   
[phone]                          [facility]  
   
                                                    2025   
09:                              SaO2% (BldA) [Mass   
fraction]                 93 %                      Sherman Malone DO  
Work Phone:   
[phone]                          [facility]  
   
                                                    2025   
09:                              Systolic blood   
pressure                  133 mm[Hg]                Sherman Malone DO  
Work Phone:   
[phone]                          [facility]  
   
                                                    2025   
10:          Body height         172.7 cm            Concepcion Frazier   
DO  
Work Phone:   
[phone]                          [facility]  
   
                                                    2025   
10:                              Body mass index   
(BMI) [Ratio]             30.11 kg/m2               Concepcion Petznick   
DO  
Work Phone:   
[phone]                          [facility] [employer]  
   
                                                    2025   
10:          Body temperature    98.29 [degF]        Concepcion Petznick   
DO  
Work Phone:   
[phone]                          [facility] [employer]  
   
                                                    2025   
10:          Body weight         89.81 kg            Concepcion Petznick   
DO  
Work Phone:   
[phone]                          [facility] [employer]  
   
                                                    2025   
10:                              Diastolic blood   
pressure                  82 mm[Hg]                 Concepcion Petznick   
DO  
Work Phone:   
[phone]                          [facility]  
   
                                                    2025   
10:          Heart rate          96 /min             Concepcion Petznick   
DO  
Work Phone:   
[phone]                          [facility]  
   
                                                    2025   
10:                              SaO2% (BldA) [Mass   
fraction]                 92 %                      Concepcion Petznick   
DO  
Work Phone:   
[phone]                          [facility]  
   
                                                    2025   
10:                              Systolic blood   
pressure                  128 mm[Hg]                Concepcion Petznick   
DO  
Work Phone:   
[phone]                          [facility]  
   
                                                    2025   
10:          Body height         172.7 cm            Sherman Malone DO  
Work Phone:   
[phone]                          [facility]  
   
                                                    2025   
10:                              Body mass index   
(BMI) [Ratio]             30.41 kg/m2               Sherman Malone DO  
Work Phone:   
[phone]                          [facility]  
   
                                                    2025   
10:          Body weight         90.72 kg            Sherman Malone DO  
Work Phone:   
[phone]                          [facility]  
   
                                                    2025   
10:                              Diastolic blood   
pressure                  70 mm[Hg]                 Sherman Malone DO  
Work Phone:   
[phone]                          [facility]  
   
                                                    2025   
10:          Heart rate          99 /min             Sherman Malone DO  
Work Phone:   
[phone]                          [facility]  
   
                                                    2025   
10:                              SaO2% (BldA) [Mass   
fraction]                 95 %                      Sherman Malone DO  
Work Phone:   
[phone]                          [facility]  
   
                                                    2025   
10:                              Systolic blood   
pressure                  128 mm[Hg]                Sherman Malone DO  
Work Phone:   
[phone]                          [facility]  
   
                                                    2025   
09:                              Diastolic blood   
pressure                  78 mm[Hg]                 Anjana Didion NP  
Work Phone:   
[phone]                          [facility]  
   
                                                    2025   
09:          Heart rate          96 /min             Anjana Didion NP  
Work Phone:   
[phone]                          [facility]  
   
                                                    2025   
09:          Respiratory rate    20 /min             Anjana Didion NP  
Work Phone:   
[phone]                          [facility]  
   
                                                    2025   
09:                              SaO2% (BldA) [Mass   
fraction]                 95 %                      Anjana Didion NP  
Work Phone:   
[phone]                          [facility]  
   
                                                    2025   
09:                              Systolic blood   
pressure                  122 mm[Hg]                Anjana Didion NP  
Work Phone:   
[phone]                          [facility]  
   
                                                    2025   
15:                              Body mass index   
(BMI) [Ratio]             30.41 kg/m2               Sherman Malone DO  
Work Phone:   
[phone]                          [facility]  
   
                                                    2025   
15:          Body weight         90.72 kg            Sherman Malone DO  
Work Phone:   
[phone]                          [facility]  
   
                                                    2025   
15:                              Diastolic blood   
pressure                  80 mm[Hg]                 Shermansukhjinder Malone DO  
Work Phone:   
[phone]                          [facility]  
   
                                                    2025   
15:          Heart rate          89 /min             Sherman Malone DO  
Work Phone:   
[phone]                          [facility]  
   
                                                    2025   
15:                              SaO2% (BldA) [Mass   
fraction]                 92 %                      Shermansukhjinder Malone DO  
Work Phone:   
[phone]                          [facility]  
   
                                                    2025   
15:                              Systolic blood   
pressure                  110 mm[Hg]                Sherman Malone DO  
Work Phone:   
[phone] [place] [address]  
   
                                                    2025   
11:                              Inhaled oxygen flow   
rate                      3 L/min                   Sherman Malone DO  
Work Phone:   
[phone]                          [facility]  
   
                                                    2025   
11:          Body temperature    97.9 [degF]         Sherman Malone DO  
Work Phone:   
[phone]                          [facility]  
   
                                                    2025   
11:                              Diastolic blood   
pressure                  85 mm[Hg]                 Sherman Malone DO  
Work Phone:   
[phone]                          [facility]  
   
                                                    2025   
11:          Heart rate          99 /min             Sherman Malone DO  
Work Phone:   
[phone]                          [facility]  
   
                                                    2025   
11:          Respiratory rate    20 /min             Sherman Malone DO  
Work Phone:   
[phone]                          [facility]  
   
                                                    2025   
11:                              SaO2% (BldA) [Mass   
fraction]                 90 %                      Sherman Malone DO  
Work Phone:   
[phone]                          [facility]  
   
                                                    2025   
11:                              Systolic blood   
pressure                  145 mm[Hg]                Sherman Malone DO  
Work Phone:   
[phone]                          [facility]  
   
                                                    2025   
06:          Body weight         94.3 kg             Sherman Malone DO  
Work Phone:   
[phone]                          [facility]  
   
                                                    2025   
16:          Body height         172.72 cm           Sherman Malone DO  
Work Phone:   
[phone]                          [facility]  
   
                                                    2025   
04:                              Diastolic blood   
pressure                  89 mm[Hg]                 Sherman Malone DO  
Work Phone:   
[phone]                          [facility]  
   
                                                    2025   
04:          Heart rate          100 /min            Sherman Malone DO  
Work Phone:   
[phone]                          [facility]  
   
                                                    2025   
04:                              Inhaled oxygen flow   
rate                      4 L/min                   Sherman Malone DO  
Work Phone:   
[phone]                          [facility]  
   
                                                    2025   
04:          Respiratory rate    20 /min             Sherman Malone DO  
Work Phone:   
[phone]                          [facility]  
   
                                                    2025   
04:                              SaO2% (BldA) [Mass   
fraction]                 96 %                      Sherman Malone DO  
Work Phone:   
[phone]                          [facility]  
   
                                                    2025   
04:                              Systolic blood   
pressure                  156 mm[Hg]                Sherman Malone DO  
Work Phone:   
[phone]                          [facility]  
   
                                                    2025   
22:          Body temperature    97.6 [degF]         Sherman Malone DO  
Work Phone:   
[phone]                          [facility]  
   
                                                    2025   
18:          Body height         172.72 cm           Sherman Malone DO  
Work Phone:   
[phone]                          [facility]  
   
                                                    2025   
18:          Body weight         89.81 kg            Sherman Malone DO  
Work Phone:   
[phone]                          [facility]  
   
                                                    10-   
14:          Body height         172.72 cm           DO Sherman Malone  
Work Phone:   
[phone]                          [facility]  
   
                                                    10-   
14:                              Body mass index   
(BMI) [Ratio]             30.2 kg/m2                DO Sherman Malone  
Work Phone:   
[phone]                          [facility]  
   
                                                    10-   
14:          Body temperature    98.2 [degF]         DO Sherman Malone  
Work Phone:   
[phone]                          [facility]  
   
                                                    10-   
14:          Body weight         90.26 kg            DO Sherman Malone  
Work Phone:   
[phone]                          [facility]  
   
                                                    10-   
14:                              Diastolic blood   
pressure                  77 mm[Hg]                 DO Sherman Malone  
Work Phone:   
[phone]                          [facility]  
   
                                                    10-   
14:          Heart rate          98 /min             DO Sherman Malone  
Work Phone:   
[phone]                          [facility]  
   
                                                    10-   
14:          Respiratory rate    20 /min             DO Sherman Faisal  
Work Phone:   
[phone]                          [facility]  
   
                                                    10-   
14:                              SaO2% (BldA) [Mass   
fraction]                 95 %                      DO Sherman Faisal  
Work Phone:   
[phone]                          [facility]  
   
                                                    10-   
14:                              Systolic blood   
pressure                  109 mm[Hg]                DO Sherman Faisal  
Work Phone:   
[phone]                          [facility]  
   
                                                    2024   
13:          Body height         172.72 cm           DO Sherman Malone  
Work Phone:   
[phone]                          [facility]  
   
                                                    2024   
13:          Body weight         90.15 kg            DO Sherman Malone  
Work Phone:   
[phone]                          [facility]  
   
                                                    2024   
13:          Body temperature    98.7 [degF]         DO Sherman Malone  
Work Phone:   
[phone]                          [facility]  
   
                                                    2024   
13:                              Diastolic blood   
pressure                  73 mm[Hg]                 DO Sherman Malone  
Work Phone:   
[phone]                          [facility]  
   
                                                    2024   
13:          Heart rate          87 /min             DO Sherman Malone  
Work Phone:   
[phone]                          [facility]  
   
                                                    2024   
13:          Respiratory rate    18 /min             DO Sherman Malone  
Work Phone:   
[phone]                          [facility]  
   
                                                    2024   
13:                              SaO2% (BldA) [Mass   
fraction]                 93 %                      DO Sherman Malone  
Work Phone:   
[phone]                          [facility]  
   
                                                    2024   
13:                              Systolic blood   
pressure                  152 mm[Hg]                DO Sherman Faisal  
Work Phone:   
[phone]                          [facility]  
   
                                                    09-   
13:          Body height         172.7 cm            Shermansukhjinder Malone DO  
Work Phone:   
[phone]                          [facility]  
   
                                                    09-   
13:                              Body mass index   
(BMI) [Ratio]             30.26 kg/m2               Sherman Malone DO  
Work Phone:   
[phone]                          [facility]  
   
                                                    09-   
13:          Body weight         90.27 kg            Sherman Malone DO  
Work Phone:   
[phone]                          [facility]  
   
                                                    09-   
13:                              Diastolic blood   
pressure                  82 mm[Hg]                 Sherman Malone DO  
Work Phone:   
[phone]                          [facility]  
   
                                                    09-   
13:          Heart rate          95 /min             Sherman Malone DO  
Work Phone:   
[phone]                          [facility]  
   
                                                    09-   
13:                              SaO2% (BldA) [Mass   
fraction]                 92 %                      Sherman Malone DO  
Work Phone:   
[phone]                          [facility]  
   
                                                    09-   
13:                              Systolic blood   
pressure                  130 mm[Hg]                Sherman Malone DO  
Work Phone:   
[phone]                          [facility]  
   
                                                    2024   
10:          Body height         172.7 cm            Concepcion Petznick   
DO  
Work Phone:   
[phone]                          [facility]  
   
                                                    2024   
10:                              Body mass index   
(BMI) [Ratio]             30.26 kg/m2               Concepcion Petznick   
DO  
Work Phone:   
[phone]                          [facility]  
   
                                                    2024   
10:          Body temperature    98.29 [degF]        Concepcion Petznick   
DO  
Work Phone:   
[phone]                          [facility]  
   
                                                    2024   
10:          Body weight         90.27 kg            Concepcion Petznick   
DO  
Work Phone:   
[phone]                          [facility]  
   
                                                    2024   
10:                              Diastolic blood   
pressure                  64 mm[Hg]                 Concepcion Petznick   
DO  
Work Phone:   
[phone]                          [facility]  
   
                                                    2024   
10:          Heart rate          78 /min             Concepcion Petznick   
DO  
Work Phone:   
[phone]                          [facility]  
   
                                                    2024   
10:                              SaO2% (BldA) [Mass   
fraction]                 96 %                      Concepcion Lunaick   
DO  
Work Phone:   
[phone]                          [facility]  
   
                                                    2024   
10:                              Systolic blood   
pressure                  112 mm[Hg]                Concepcion Lunaick   
DO  
Work Phone:   
[phone]                          [facility]  
   
                                                    2024   
13:          Body height         172.7 cm            [facility]  
Work Phone:   
[phone]                          [facility]  
   
                                                    2024   
13:                              Body mass index   
(BMI) [Ratio]             30.41 kg/m2               [facility]  
Work Phone:   
[phone]                          [facility]  
   
                                                    2024   
13:          Body temperature    98.01 [degF]        [facility]  
Work Phone:   
[phone]                          [facility]  
   
                                                    2024   
13:          Body weight         90.72 kg            [facility]  
Work Phone:   
[phone]                          [facility]  
   
                                                    Comment on   
above:                                  actual weight   
   
                                                    2024   
13:                              Diastolic blood   
pressure                  78 mm[Hg]                 [facility]  
Work Phone:   
[phone]                          [facility]  
   
                                                    2024   
13:          Heart rate          87 /min             [facility]  
Work Phone:   
[phone]                          [facility]  
   
                                                    2024   
13:          Respiratory rate    16 /min             [facility]  
Work Phone:   
[phone]                          [facility]  
   
                                                    2024   
13:                              SaO2% (BldA) [Mass   
fraction]                 95 %                      [facility]  
Work Phone:   
[phone]                          [facility]  
   
                                                    2024   
13:                              Systolic blood   
pressure                  118 mm[Hg]                [facility]  
Work Phone:   
[phone]                          [facility]  
   
                                                    2024   
10:          Body temperature    98.2 [degF]         Diana Gandara MD  
Work Phone:   
[phone]                          [facility]  
   
                                                    2024   
15:      Body height     172.72 cm                       [facility]  
   
                                                    2024   
15:                              Body mass index   
(BMI) [Ratio]       30.9 kg/m2                              [facility]  
   
                                                    2024   
15:      Body temperature 97.3 [degF]                     [facility]  
   
                                                    2024   
15:      Body weight     92.07 kg                        [facility]  
   
                                                    2024   
15:                              Diastolic blood   
pressure            86 mm[Hg]                               [facility]  
   
                                                    2024   
15:      Heart rate      94 /min                         [facility]  
   
                                                    2024   
15:      Respiratory rate 20 /min                         [facility]  
   
                                                    2024   
15:                              SaO2% (BldA) [Mass   
fraction]           96 %                                    [facility]  
   
                                                    2024   
15:                              Systolic blood   
pressure            141 mm[Hg]                              [facility]  
   
                                                    2023   
12:                              Diastolic blood   
pressure                  80 mm[Hg]                 DO Sherman Malone  
Work Phone:   
[phone]                          [facility]  
   
                                                    2023   
12:          Heart rate          102 /min            DO Sherman Malone  
Work Phone:   
[phone]                          [facility]  
   
                                                    2023   
12:                              Inhaled oxygen flow   
rate                      3.5 L/min                 DO Sherman Malone  
Work Phone:   
[phone]                          [facility]  
   
                                                    2023   
12:          Respiratory rate    18 /min             DO Sherman Malone  
Work Phone:   
[phone]                          [facility]  
   
                                                    2023   
12:                              SaO2% (BldA) [Mass   
fraction]                 97 %                      DO Sherman Malone  
Work Phone:   
[phone]                          [facility]  
   
                                                    2023   
12:                              Systolic blood   
pressure                  138 mm[Hg]                DO Sherman Malone  
Work Phone:   
[phone]                          [facility]  
   
                                                    2023   
07:          Body temperature    98.1 [degF]         DO Sherman Malone  
Work Phone:   
[phone]                          [facility]  
   
                                                    2023   
05:          Body weight         92.2 kg             DO Sherman Malone  
Work Phone:   
[phone]                          [facility]  
   
                                                    2023   
18:          Body height         172.72 cm           DO Sherman Malone  
Work Phone:   
[phone]                          [facility]  
   
                                                    2023   
16:                              Diastolic blood   
pressure                  59 mm[Hg]                 DO Sherman Malone  
Work Phone:   
[phone]                          [facility]  
   
                                                    2023   
16:          Heart rate          79 /min             DO Sherman Malone  
Work Phone:   
[phone]                          [facility]  
   
                                                    2023   
16:          Respiratory rate    16 /min             DO Sherman Malone  
Work Phone:   
[phone]                          [facility]  
   
                                                    2023   
16:                              SaO2% (BldA) [Mass   
fraction]                 100 %                     DO Sherman Malone  
Work Phone:   
[phone]                          [facility]  
   
                                                    2023   
16:                              Systolic blood   
pressure                  120 mm[Hg]                DO Sherman Malone  
Work Phone:   
[phone]                          [facility]  
   
                                                    2023   
16:                              Inhaled oxygen flow   
rate                      4 L/min                   DO Sherman Malone  
Work Phone:   
[phone]                          [facility]  
   
                                                    2023   
14:          Body temperature    97.9 [degF]         DO Sherman Malone  
Work Phone:   
[phone]                          [facility]  
   
                                                    2023   
13:          Body height         172.72 cm           DO Sherman Malone  
Work Phone:   
[phone]                          [facility]  
   
                                                    2023   
13:          Body weight         87.08 kg            DO Sherman Malone  
Work Phone:   
[phone]                          [facility]  
   
                                                    2023   
16:          Heart rate          82 /min             DO Sherman Malone  
Work Phone:   
[phone]                          [facility]  
   
                                                    2023   
16:                              Inhaled oxygen flow   
rate                      4 L/min                   DO Sherman Malone  
Work Phone:   
[phone]                          [facility]  
   
                                                    2023   
16:          Respiratory rate    20 /min             DO Sherman Malone  
Work Phone:   
[phone]                          [facility]  
   
                                                    2023   
11:          Body temperature    98 [degF]           DO Sherman Malone  
Work Phone:   
[phone]                          [facility]  
   
                                                    2023   
11:                              Diastolic blood   
pressure                  77 mm[Hg]                 DO Sherman Malone  
Work Phone:   
[phone]                          [facility]  
   
                                                    2023   
11:                              SaO2% (BldA) [Mass   
fraction]                 93 %                      DO Sherman Malone  
Work Phone:   
[phone]                          [facility]  
   
                                                    2023   
11:                              Systolic blood   
pressure                  119 mm[Hg]                DO Sherman Malone  
Work Phone:   
[phone]                          [facility]  
   
                                                    2023   
06:          Body weight         82 kg               DO Sherman Malone  
Work Phone:   
[phone]                          [facility]  
   
                                                    2023   
14:          Body height         172.72 cm           DO Sherman Malone  
Work Phone:   
[phone]                          [facility]  
   
                                                    2023   
23:                              Diastolic blood   
pressure                  67 mm[Hg]                 DO Sherman Malone  
Work Phone:   
[phone]                          [facility]  
   
                                                    2023   
23:          Heart rate          82 /min             DO Sherman Malone  
Work Phone:   
[phone]                          [facility]  
   
                                                    2023   
23:                              Inhaled oxygen flow   
rate                      4 L/min                   DO Sherman Malone  
Work Phone:   
[phone]                          [facility]  
   
                                                    2023   
23:          Respiratory rate    22 /min             DO Sherman Malone  
Work Phone:   
[phone]                          [facility]  
   
                                                    2023   
23:                              SaO2% (BldA) [Mass   
fraction]                 98 %                      DO Sherman Malone  
Work Phone:   
[phone]                          [facility]  
   
                                                    2023   
23:                              Systolic blood   
pressure                  140 mm[Hg]                DO Sherman Malone  
Work Phone:   
[phone]                          [facility]  
   
                                                    2023   
18:          Body height         172.72 cm           DO Sherman Malone  
Work Phone:   
[phone]                          [facility]  
   
                                                    2023   
18:          Body temperature    98.2 [degF]         DO Sherman Malone  
Work Phone:   
[phone]                          [facility]  
   
                                                    2023   
18:          Body weight         89.81 kg            DO Sherman Malone  
Work Phone:   
[phone]                          [facility]  
   
                                                    2023   
11:                              Diastolic blood   
pressure                  76 mm[Hg]                 Abad Singleton  
Work Phone:   
[phone]                           [facility]  
   
                                                    2023   
11:          Heart rate          80 /min             Abad Quentin  
Work Phone:   
[phone]                           [facility]  
   
                                                    2023   
11:          Mean blood pressure 95 mm[Hg]           Abad Quentin  
Work Phone:   
[phone]                           [facility]  
   
                                                    2023   
11:          Respiratory rate    15 /min             Abad Quentin  
Work Phone:   
[phone]                           [facility]  
   
                                                    2023   
11:                              SaO2% (BldA) [Mass   
fraction]                 99 %                      Abad Quentin  
Work Phone:   
[phone]                           [facility]  
   
                                                    2023   
11:                              Systolic blood   
pressure                  132 mm[Hg]                Abad Quentin  
Work Phone:   
[phone]                           [facility]  
   
                                                    2023   
10:                              Diastolic blood   
pressure                  83 mm[Hg]                 Abad Quentin  
Work Phone:   
[phone]                           [facility]  
   
                                                    2023   
10:          Heart rate          85 /min             Abad Quentin  
Work Phone:   
[phone]                           [facility]  
   
                                                    2023   
10:          Mean blood pressure 97 mm[Hg]           Abad Quentin  
Work Phone:   
[phone]                           [facility]  
   
                                                    2023   
10:          Respiratory rate    15 /min             Abad Quentin  
Work Phone:   
[phone]                           [facility]  
   
                                                    2023   
10:                              SaO2% (BldA) [Mass   
fraction]                 100 %                     Abad Quentin  
Work Phone:   
[phone]                           [facility]  
   
                                                    2023   
10:                              Systolic blood   
pressure                  124 mm[Hg]                Abad Quentin  
Work Phone:   
[phone]                           [facility]  
   
                                                    2023   
10:          Hourly Rounding                         Abad Quentin  
Work Phone:   
[phone]                           [facility]  
   
                                                    2023   
10:          Promise to Return                       Abad Quentin  
Work Phone:   
[phone]                           [facility]  
   
                                                    2023   
10:          Heart rate          83 /min             Abad Quentin  
Work Phone:   
[phone]                           [facility]  
   
                                                    2023   
10:          Respiratory rate    20 /min             Abad Quentin  
Work Phone:   
[phone]                           [facility]  
   
                                                    2023   
10:                              SaO2% (BldA) [Mass   
fraction]                 99 %                      Abad Quentin  
Work Phone:   
[phone]                           [facility]  
   
                                                    2023   
09:          Respiratory rate    19 /min             Abad Quentin  
Work Phone:   
[phone]                           [facility]  
   
                                                    2023   
09:          Body temperature    98.24 [degF]        Abad Quentin  
Work Phone:   
[phone]                           [facility]  
   
                                                    2023   
09:                              Diastolic blood   
pressure                  96 mm[Hg]                 Abad Quentin  
Work Phone:   
[phone]                           [facility]  
   
                                                    2023   
09:          Heart rate          91 /min             Abad Quentin  
Work Phone:   
[phone]                           [facility]  
   
                                                    2023   
09:          Mean blood pressure 116 mm[Hg]          Abad Quentin  
Work Phone:   
[phone]                           [facility]  
   
                                                    2023   
09:          Respiratory rate    20 /min             Abad Singleton  
Work Phone:   
[phone]                           [facility]  
   
                                                    2023   
09:                              Systolic blood   
pressure                  155 mm[Hg]                Abad Singleton  
Work Phone:   
[phone]                           [facility]  
   
                                                    2023   
12:          Body height         167.64 cm           Erendira Alen  
Other Phone:   
[phone]                           [facility]   
Professional   
Corporation  
Other Phone:   
[phone]  
   
                                                    2023   
12:                              Body mass index   
(BMI) [Ratio]             31.95 kg/m2               Erendira Alen  
Other Phone:   
[phone]                           [facility]   
[employer]   
[employer]  
Other Phone:   
[phone]  
   
                                                    2023   
12:          Body temperature    96.5 [degF]         Erendira Alen  
Other Phone:   
[phone]                           [facility]   
[employer]   
[employer]  
Other Phone:   
[phone]  
   
                                                    2023   
12:          Body weight         89.81 kg            Erendira Alen  
Other Phone:   
[phone]                           [facility]   
[employer]   
[employer]  
Other Phone:   
[phone]  
   
                                                    2023   
12:                              Diastolic blood   
pressure                  78 mm[Hg]                 Erendira Alen  
Other Phone:   
[phone]                           [facility]   
[employer]   
[employer]  
Other Phone:   
[phone]  
   
                                                    2023   
12:          Respiratory rate    20 /min             Erendira Alen  
Other Phone:   
[phone]                           [facility]   
[employer]   
[employer]  
Other Phone:   
[phone]  
   
                                                    2023   
12:                              SaO2% (BldA) [Mass   
fraction]                 97 %                      Erendiar Alen  
Other Phone:   
[phone]                           [facility]   
[employer]   
[employer]  
Other Phone:   
[phone]  
   
                                                    2023   
12:                              Systolic blood   
pressure                  125 mm[Hg]                Erendira Alen  
Other Phone:   
[phone]                           [facility]   
[employer]   
[employer]  
Other Phone:   
[phone]  
   
                                                    2022   
11:          Body height         167.64 cm           Erendira Alen  
Other Phone:   
[phone]                           [facility]   
[employer]   
[employer]  
Other Phone:   
[phone]  
   
                                                    2022   
11:                              Body mass index   
(BMI) [Ratio]             33.25 kg/m2               Erendira Alen  
Other Phone:   
[phone]                           [facility]   
[employer]   
[employer]  
Other Phone:   
[phone]  
   
                                                    2022   
11:          Body temperature    97.4 [degF]         Erendira Alen  
Other Phone:   
[phone]                           [facility]   
[employer]   
[employer]  
Other Phone:   
[phone]  
   
                                                    2022   
11:          Body weight         93.44 kg            Erendira Alen  
Other Phone:   
[phone]                           [facility]   
[employer]   
[employer]ation  
Other Phone:   
[phone]  
   
                                                    2022   
11:                              Diastolic blood   
pressure                  78 mm[Hg]                 Erendira Alen  
Other Phone:   
[phone]                           [facility]   
[employer]   
[employer]ation  
Other Phone:   
[phone]  
   
                                                    2022   
11:          Respiratory rate    20 /min             Erendira Alen  
Other Phone:   
[phone]                           [facility]   
[employer]   
[employer]  
Other Phone:   
[phone]  
   
                                                    2022   
11:                              SaO2% (BldA) [Mass   
fraction]                 96 %                      Erendira Alen  
Other Phone:   
[phone]                           [facility]   
[employer]   
[employer]ation  
Other Phone:   
[phone]  
   
                                                    2022   
11:                              Systolic blood   
pressure                  130 mm[Hg]                Erendira Alen  
Other Phone:   
[phone]                           [facility]   
[employer]   
[employer]ation  
Other Phone:   
[phone]  
   
                                                    2021   
11:          Body height         167.64 cm           Estuardo Cooper  
Other Phone:   
[phone]                           [facility]   
Professional   
[employer]ation  
Other Phone:   
[phone]  
   
                                                    2021   
11:                              Body mass index   
(BMI) [Ratio]             31.95 kg/m2               Estuardo Cooper  
Other Phone:   
[phone]                           [facility]   
Professional   
[employer]ation  
Other Phone:   
[phone]  
   
                                                    2021   
11:          Body weight         89.81 kg            Estuardo Cooper  
Other Phone:   
[phone]                           [facility]   
[employer]   
[employer]ation  
Other Phone:   
[phone]  
   
                                                    2021   
11:                              Diastolic blood   
pressure                  91 mm[Hg]                 Estuardo Cooper  
Other Phone:   
[phone]                           [facility]   
[employer]   
[employer]ation  
Other Phone:   
[phone]  
   
                                                    2021   
11:                              Systolic blood   
pressure                  124 mm[Hg]                Estuardo Cooper  
Other Phone:   
[phone]                           [facility]   
[employer]   
[employer]ation  
Other Phone:   
[phone]  
  
  
  
Encounters  
  
  
                          Encounter Date Encounter Type Care Provider Facility  
   
                                                    Start: 2025  
End: 2025           ambulatory                Sherman Malone DO  
Work Phone:   
[phone]                          [facility]  
Work Phone:   
[phone]  
   
                                                    Start: 2025  
End: 2025                         Patient encounter   
procedure                               Sherman Malone DO  
Work Phone:   
[phone]                          [facility]   
Pulmonary  
Work Phone:   
[phone]  
   
                                                    Start: 2025  
End: 2025                         Office outpatient visit   
25 minutes                              Concepcion Frazier   
DO  
Work Phone:   
[phone]                          [facility]  
   
                                        Comment on above:   Type 2 diabetes pauline  
itus with other diabetic neurological   
complication (CMS/HCC) (Primary Dx)   
   
                                                    Start: 2025  
End: 2025     ambulatory          CONCEPCION FRAZIER  Not Available  
   
                                                    Start: 2025  
End: 2025           Telephone encounter       Concepcion Frazier   
DO  
Work Phone:   
[phone]                          [facility]  
   
                                        Comment on above:   Appointment Confirma  
tion   
   
                                                    Start: 2025  
End: 2025           Refill                    Sherman Malone DO  
Work Phone:   
[phone]                          [facility]  
   
                                        Comment on above:   Gastroparesis   
   
                                                    Start: 2025  
End: 2025           Bamboo flowsheet          Sherman Malone DO  
Work Phone:   
[phone]                          [facility]  
   
                                                    Start: 2025  
End: 2025           Bamboo flowsheet          Sherman Malone DO  
Work Phone:   
[phone]                          [facility]  
   
                                                    Start: 2025  
End: 2025                         Office outpatient visit   
25 minutes                              Sherman Malone DO  
Work Phone:   
[phone]                          [facility]  
   
                                        Comment on above:   Post-COVID chronic c  
ough (Primary Dx);  
Chronic respiratory failure with hypoxia (CMS/[facility]);  
Medicare annual wellness visit, subsequent;  
Chronic cough;  
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   
complication (CMS/HCC);  
Severe persistent reactive airway disease with acute exacerbation   
(CMS/[facility]);  
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  
Alpha-1-antitrypsin deficiency (CMS/HCC)   
   
                                                    Start: 2025  
End: 2025                         Patient encounter   
procedure                               Sherman Malone DO  
Work Phone:   
[phone]                          [facility]  
   
                                                    Start: 2025  
End: 2025     ambulatory          SHERMAN MALONE    Not Available  
   
                                                    Start: 2025  
End: 2025                         Office outpatient visit   
25 minutes                              Anjana Bowman NP  
Work Phone:   
[phone]                          [facility]  
   
                                        Comment on above:   Walking pneumonia (P  
rimary Dx);  
Asthma exacerbation with COPD (chronic obstructive pulmonary   
disease) (CMS/[facility]);  
Acute cough;  
Right hip pain;  
Status post fall;  
Acute bilateral low back pain without sciatica   
   
                                                    Start: 2025  
End: 2025     ambulatory          ANJANA BOWMAN    Not Available  
   
                                                    Start: 01-  
End: 2025           ambulatory                Demetrice Holloway LSW  
Work Phone:   
[phone]                          [facility]  
   
                                                    Start: 2025  
End: 2025                         Office outpatient visit   
25 minutes                              Sherman Malone DO  
Work Phone:   
[phone]                          [facility]  
   
                                        Comment on above:   Walking pneumonia (P  
rimary Dx);  
Acute cough;  
Asthma exacerbation with COPD (chronic obstructive pulmonary   
disease) (CMS/HCC);  
Alpha-1-antitrypsin deficiency (CMS/[facility]);  
Rheumatoid arthritis involving multiple sites, unspecified whether   
rheumatoid factor present (CMS/[facility]);  
Bipolar disorder, in partial remission, most recent episode mixed   
(CMS/[facility])   
   
                                                    Start: 2025  
End: 2025     ambulatory          SHERMAN MALONE    Not Available  
   
                                                    Start: 2025  
End: 2025                         Evaluation and   
management of inpatient                 Sherman Malone DO  
Work Phone:   
[phone]                          [facility] [address]   
Progressive  
Work Phone:   
[phone]  
   
                                                    Start: 2025  
End: 2025           ambulatory                Demetrice Holloway LSW  
Work Phone:   
[phone]                          [facility]  
   
                                                    Start: 2024  
End: 2024           ambulatory                Demetrice Holloway LSW  
Work Phone:   
[phone]                          [facility]  
   
                                                    Start: 10-  
End: 10-           ambulatory                DO Sherman Malone  
Work Phone:   
[phone]                          [facility]  
Work Phone:   
[phone]  
   
                                                    Start: 10-  
End: 10-                         Patient encounter   
procedure                               DO Sherman Malone  
Work Phone:   
[phone]                          [facility]   
[facility]   
Disease  
Work Phone:   
[phone]  
   
                                                    Start: 2024  
End: 2024     Deanne Marrufo LPN      [facility]  
   
                                        Comment on above:   Chronic cough   
   
                                                    Start: 2024  
End: 2024                         Emergency department   
patient visit                           DO Sherman Malone  
Work Phone:   
[phone]                          [facility]   
Room  
Work Phone:   
[phone]  
   
                                                    Start: 09-  
End: 09-           Bamboo flowsheet          Sherman Malone DO  
Work Phone:   
[phone]                          [facility]  
   
                                                    Start: 09-  
End: 09-           Bamboo flowsheet          Sherman Malone DO  
Work Phone:   
[phone]                          [facility]  
   
                                                    Start: 09-  
End: 09-                         Office outpatient visit   
25 minutes                              Sherman JUANA Malone DO  
Work Phone:   
[phone]                          [facility]  
   
                                        Comment on above:   Acute asthmatic bron  
chitis (CMS/[facility]) (Primary Dx);  
Wheezing;  
Acute cough;  
Nasal drainage   
   
                                                    Start: 09-  
End: 09-     ambulatory          SHERMAN MALONE    Not Available  
   
                                                    Start: 2024  
End: 2024                         Patient encounter   
procedure                               Diana Gandara MD  
Work Phone:   
[phone]                          Otolaryngology  
   
                                        Comment on above:   Other chronic sinusi  
tis (Primary Dx)   
   
                                                    Start: 2024  
End: 2024           ambulatory                SHERMAN MALONE                                  Facility:[facility]  
   
                                                    Start: 2024  
End: 2024                         Office outpatient visit   
25 minutes                              Concepcion Frazier   
DO  
Work Phone:   
[phone]                          [facility]   
   
                                        Comment on above:   Type 2 diabetes pauline  
itus with other diabetic neurological   
complication (CMS/[facility])   
   
                                                    Start: 2024  
End: 2024     ambulatory          CONCEPCION FRAZIER  Not Available  
   
                                                    Start: 2024  
End: 2024           Telephone encounter       Sonia Trujillo RN                              Otolaryngology  
   
                                        Comment on above:   Post Op Follow Up   
   
                                                    Start: 2024  
End: 2024                         Clinisync Result   
Encounter                               Generic External   
Data Provider                           [facility] External Department   
Unsolicited  
   
                                                    Start: 2024  
End: 2024                         Clinisync Result   
Encounter                               Generic External   
Data Provider                           [facility] External Department   
Unsolicited  
   
                                                    Start: 2024  
End: 2024           ambulatory                SHERMAN MALONE                                  Facility:[facility]  
   
                                                    Start: 2024  
End: 2024     ambulatory          SHERMAN MALONE    Not Available  
   
                                                    Start: 2024  
End: 2024                         Admission to   
establishment                           [facility]  
Work Phone:   
[phone]                          Pre Anesthesia  
   
                                                    Start: 2024  
End: 2024           ambulatory                SHERMAN MALONE                                  Facility:[facility]  
   
                                                    Start: 2024  
End: 2024           Anesthesia consultation   [facility]  
Work Phone:   
[phone]                          Pre Anesthesia  
   
                                        Comment on above:   Pre-op evaluation (P  
rimary Dx);  
Type 2 diabetes mellitus with other specified complication, without   
long-term current use of insulin ([facility]);  
Acute deep vein thrombosis (DVT) of other vein of lower extremity,   
unspecified laterality ([facility]);  
Anxiety;  
Class 2 obesity;  
Smoking;  
Chronic obstructive pulmonary disease, unspecified COPD type ([facility]);  
Alpha-1-antitrypsin deficiency ([facility]);  
Gastroesophageal reflux disease, unspecified whether esophagitis   
present   
   
                                        Start: 2024   Encounter for other   
preprocedural   
examination               SHERMAN MALONE            [facility]  
   
                                                    Start: 2024  
End: 2024                         Preprocedural   
examination done                        [facility] [address]  
Work Phone:   
[phone]                          [facility]  
Work Phone:   
[phone]  
   
                                                    Start: 2024  
End: 2024                         Office outpatient visit   
25 minutes                              Zachary Cooper MD  
Work Phone:   
[phone]                          Otolaryngology  
   
                                        Comment on above:   Dysfunction of both   
eustachian tubes (Primary Dx);  
History of tympanostomy tube placement;  
History of hyperbaric oxygen therapy;  
Sensorineural hearing loss, bilateral   
   
                                                    Start: 2024  
End: 2024           ambulatory                SHERMAN MALONE                                  Facility:[facility]  
   
                                                    Start: 2024  
End: 2024                         Patient encounter   
procedure                               Amanda Jeanvalerie TOMLIN  
Work Phone:   
[phone]                          Audiology  
   
                                        Comment on above:   Sensorineural hearin  
g loss, bilateral (Primary Dx);  
Tinnitus, bilateral;  
Ear pressure, bilateral;  
Otalgia of both ears;  
Dizziness   
   
                                Start: 2024 Telephone encounter Diana friend MD  
Work Phone:   
[phone]                          [facility]  
   
                                Start: 2024 Refill          Diana mercado MD  
Work Phone:   
[phone]                          Otolaryngology  
   
                                        Comment on above:   Med Change Request   
   
                                                    Start: 2024  
End: 2024                         Patient encounter   
procedure                               Diana Gandara MD  
Work Phone:   
[phone]                          Otolaryngology  
   
                                        Comment on above:   Chronic pansinusitis  
 (Primary Dx);  
Vascular headache   
   
                                                    Start: 2024  
End: 2024           ambulatory                SHERMAN MALONE                                  Facility:[facility]  
   
                                                    Start: 2024  
End: 2024     ambulatory                              [facility]  
Work Phone:   
[phone]  
   
                                                    Start: 2024  
End: 2024                         Patient encounter   
procedure                                           [facility]   
Disease  
Work Phone:   
[phone]  
   
                                                    Start: 2024  
End: 2024           ambulatory                SHERMAN MALONE                                  Facility:[facility]  
   
                                                    Start: 2024  
End: 2024                         Subsequent hospital   
visit by physician                      [facility]  
Work Phone:   
[phone]                          Radiology  
   
                                        Comment on above:   Other chronic sinusi  
tis [J32.8]   
   
                                                    Start: 2024  
End: 2024           ambulatory                SHERMAN MALONE                                  Facility:[facility]  
   
                                                    Start: 2024  
End: 2024                         Patient encounter   
procedure                               Diana Gandara MD  
Work Phone:   
[phone]                          Otolaryngology  
   
                                        Comment on above:   Chronic pansinusitis  
 (Primary Dx);  
Other chronic sinusitis;  
Allergic rhinitis, unspecified seasonality, unspecified trigger;  
Vascular headache;  
Vertigo, peripheral, bilateral   
   
                                                    Start: 2024  
End: 2025                         Patient encounter   
procedure                 Generic Provider          [facility]  
   
                                                    Start: 2023  
End: 2023                         Evaluation and   
management of inpatient                 DO Sherman Malone  
Work Phone:   
[phone]                          [facility]   
Surg  
Work Phone:   
[phone]  
   
                                                    Start: 2023  
End: 2023           observation encounter     DO Sherman Malone  
Work Phone:   
[phone]                          [facility]  
Work Phone:   
[phone]  
   
                                                    Start: 2023  
End: 2023           ambulatory                Erendira Alen  
Other Phone:   
[phone]                           [employer]  
Other Phone:   
[phone]  
   
                          Start: 2023 Telephone encounter Erendira Alen   [facility]  
 Pulmonary Disease  
   
                                                    Start: 2023  
End: 2023                         Evaluation and   
management of inpatient                 DO Sherman Malone  
Work Phone:   
[phone]                          [facility]   
Surg  
Work Phone:   
[phone]  
   
                                                    Start: 2023  
End: 2023           ambulatory                Erendira Alen  
Other Phone:   
[phone]                           [employer]  
Other Phone:   
[phone]  
   
                          Start: 2023 Telephone encounter Erendira Alen   [facility]  
 Pulmonary Disease  
   
                                                    Start: 2023  
End: 2023                         Emergency department   
patient visit                           DO Sherman Malone  
Work Phone:   
[phone]                          [facility]   
Room  
Work Phone:   
[phone]  
   
                                                    Start: 2023  
End: 2023                         Emergency department   
patient visit             Abad Singleton                [facility]  
   
                                                    Start: 2023  
End: 2023                         Emergency department   
patient visit                           Abad Singleton  
Work Phone:   
[phone]                           [facility]  
Work Phone: [phone]  
   
                                                    Start: 2023  
End: 2023     ambulatory          GOOD CERVANTES .  Facility:[facility]  
   
                                                    Start: 2023  
End: 2023           ambulatory                Erendira Alen  
Other Phone:   
[phone]                           [employer]  
Other Phone:   
[phone]  
   
                          Start: 2023 Telephone encounter Erendira Alen   [facility]  
 Pulmonary Disease  
   
                                                    Start: 2023  
End: 2023     ambulatory          BERNIE TAY        Facility:  
   
                                                    Start: 2023  
End: 2023           ambulatory                Erendira Alen  
Other Phone:   
[phone]                           [employer]  
Other Phone:   
[phone]  
   
                          Start: 2023 Telephone encounter Erendira Alen   [facility]  
 Pulmonary Disease  
   
                                                    Start: 2023  
End: 2023           ambulatory                Erendira Alen  
Other Phone:   
[phone]                           [employer]  
Other Phone:   
[phone]  
   
                                        Start: 2023   Office outpatient vi  
sit   
25 minutes                Erendira Alen                [facility]  
   
                                                    Start: 2022  
End: 2022     ambulatory          DR SHERMAN HART .    Facility:[facility]  
   
                                                    Start: 2022  
End: 11-     ambulatory          DR SHERMAN MALONE   Facility:[facility]  
   
                                                    Start: 10-  
End: 10-     ambulatory          DR SHERMAN MALONE   Facility:[facility]  
   
                                                    Start: 2022  
End: 2022           ambulatory                Erendirarafiq Lowry  
Other Phone:   
[phone]                           [employer]  
Other Phone:   
[phone]  
   
                                        Start: 2022   Office outpatient ne  
w   
45 minutes                Erendira Alen                [facility]  
   
                          Start: 2022 ambulatory   DR SHERMAN MALONE Facil  
ity:[facility]  
   
                                                    Start: 2022  
End: 2022                         Evaluation and   
management of inpatient   DR SHERMAN MALONE         Facility:[facility]  
   
                                                    Start: 2022  
End: 2022     ambulatory          DR SHERMAN MALONE   Facility:[facility]  
   
                                                    Start: 2022  
End: 2022     ambulatory          DR SHERMAN MALONE   Facility:[facility]  
   
                                                    Start: 2021  
End: 2021           ambulatory                Dagmar Casarez  
Other Phone:   
[phone]                           [employer]  
Other Phone:   
[phone]  
   
                          Start: 2021 Telephone encounter Dagmar Casarez    [facility]  
   
                                                    Start: 2021  
End: 2021           ambulatory                Estuardo Lambramon  
Other Phone:   
[phone]                           [employer]  
Other Phone:   
[phone]  
   
                                        Start: 2021   Patient encounter   
procedure                 Estuardo Kelielena             [facility] G[facility]ology  
   
                                                    Start: 2020  
End: 2020                         Subsequent hospital   
visit by physician                      [facility]  
Work Phone:   
[phone]                          Radiology  
   
                                        Comment on above:   Chronic pansinusitis  
 [J32.4]   
   
                                                    Start: 2017  
End: 2017     Ambulatory          TOMASZ BARAJAS        Facility:[facility]  
  
  
  
Procedures  
  
  
                          Date         Procedure    Procedure Detail Performing   
Clinician  
   
                          Start: 2025 Plain chest X-ray              Leilani Malone DO  
Work Phone:   
[phone]  
   
                          Start: 09- STATUS COVID-19/FLU              Arnold  
anitha Malone DO  
Work Phone:   
[phone]  
   
                                        Start: 2024   Hemoglobin glycosyla  
ken   
a1c                                                 Concepcion Frazier   
DO  
Work Phone:   
[phone]  
   
                          Start: 2024 [facility]                
Generic External   
Data Provider  
   
                          Start: 2024 HEARING TEST/AUDIOGRAM                
Amanda TOMLIN  
Work Phone:   
[phone]  
   
                                        Start: 2024   Ct maxillofacial w/o  
   
contrast material                                   Diana Gandara MD  
Work Phone:   
[phone]  
   
                                        Start: 2023   Investigation of   
transfusion reaction                                DO Sherman Malone  
Work Phone:   
[phone]  
   
                          Start: 2023 Respiratory Panel (PCR)               
 DO Sherman Malone  
Work Phone:   
[phone]  
   
                          Start: 2023 Plain chest X-ray              DO Jas Malone  
Work Phone:   
[phone]  
   
                          Start: 2023 Respiratory Panel (PCR)               
 DO Sherman Malone  
Work Phone:   
[phone]  
   
                          Start: 2023 Plain chest X-ray              DO Jas Malone  
Work Phone:   
[phone]  
   
                          Start: 2023 Plain chest X-ray              DO Jas Malone  
Work Phone:   
[phone]  
   
                                        Start: 2020   Ct maxillofacial w/o  
   
contrast material                                   Bernardino ESTEVESCNP  
Work Phone:   
[phone]  
   
                          Start: 2020 Incision AND drainage              AYUSH Singleton  
Work Phone:   
[phone]  
   
                                        Comment on above:   LEFT ANKLE   
   
                          Start: 2019 Closure of skin wound              AYUSH Singleton  
Work Phone:   
[phone]  
   
                                        Comment on above:   LEFT ANKLE I & D , W  
OUND CLOSURE   
   
                                        Start: 2019   External hyperostosi  
s   
(morphologic abnormality)                           Abad Singleton  
Work Phone:   
[phone]  
   
                                        Comment on above:   removal lt. mid foot  
,manipulation   
   
                          Start: 2019 Fasciotomy of foot              Abad Singleton  
Work Phone:   
[phone]  
   
                                        Comment on above:   left   
   
                          Start: 2019 Stabilization              Abad luque  
Work Phone:   
[phone]  
   
                                        Comment on above:   left ankle   
   
                          Start: 10- Mammography               Generic Pr  
ovider  
   
                          Start: 2017 ANESTH ARTHROSCOPY OF HIP             
   QUENTIN BENÍTEZ  
   
                          Start: 2017 ARTHROSCOPY OF JOINT              DA  
SHANIA BARAJAS  
   
                                        Start: 2015   lateral ankle   
stabilization right which   
is the Helder modification   
of the Key procedure.   
Endoscopic plantar   
fasciotomy right. 6                                 Abad Singleton  
Work Phone:   
[phone]  
   
                                        Comment on above:   application of below  
 knee posterior splint   
   
                          Start: 2014 Colonoscopy               [facility] 2  
Work Phone:   
[phone]  
   
                                        Start: 2014   ACDF C5-6, structrua  
l   
allograft                                           Abad Singleton  
Work Phone:   
[phone]  
   
                                        Start: 2014   lateral ankle   
stabilization, Helder   
modification of the Key   
procedure. synovectomy   
peroneal brevis and   
longus tendon 7                                     Abad Singleton  
Work Phone:   
[phone]  
   
                                        Comment on above:   application of below  
 knee posterior splint   
   
                                        Start: 2013   endoscopic plantar   
fasciotomy                                          Abad Singleton  
Work Phone:   
[phone]  
   
                          Start: 2013 right hip surgery x2 8                
Abad Singleton  
Work Phone:   
[phone]  
   
                                        Comment on above:    cut a tendon and cl  
eaned out debris    
   
                                        Start: 2012   Repair of umbilical   
hernia                                              Abad Singleton  
Work Phone:   
[phone]  
   
                                       Abdominal hysterectomy              Abad flores  
Work Phone:   
[phone]  
   
                                       Appendectomy              Abad Singleton  
Work Phone:   
[phone]  
   
                                        section              Abad Singleton  
Work Phone:   
[phone]  
   
                                       Cholecystectomy              Abad Singleton  
Work Phone:   
[phone]  
   
                                       Colonoscopy               Abad Singleton  
Work Phone:   
[phone]  
   
                                                H/O: surgery    History of   
tympanostomy tube   
placement                               Zachary Cooper MD  
Work Phone:   
[phone]  
   
                                       right knee surgery x2              Aabd Th  
omas  
Work Phone:   
[phone]  
   
                                       Shoulder 9                Abad Singleton  
Work Phone:   
[phone]  
   
                                        Comment on above:   bilateral shoulder s  
milvia   
   
                                                            Stability of ankle   
(observable entity)                                 Abad Singleton  
Work Phone:   
[phone]  
   
                                       Tonsillectomy              Abad Singleton  
Work Phone:   
[phone]  
  
  
  
Plan of Treatment  
  
  
                          Date         Care Activity Detail       Author  
   
                          Start: 2025 Influenza vaccination Influenza Vacc  
ine (#1) [facility]  
   
                                        Comment on above:   Postponed from  (Other System Reasons)   
   
                                                    Start: 2025  
End: 2025                         Patient encounter   
procedure                               2025 10:30 AM EDT   
Office Visit [facility] [address]   
[address] [place]   
[id number] [phone]   
Concepcion Frazier, DO   
[address]   
[place] [address]   
[phone] (Work)   
[phone] (Fax)                      [facility]   
   
                                Start: 2025 Glaucoma screening Diabetes: R  
etinopathy   
Screening                               [facility]  
   
                                                    Start: 2025  
End: 2025                         Patient encounter   
procedure                               2025 10:15 AM EDT   
Office Visit [facility]   
IM [address]   
[address] [place]   
[id number] [phone]   
Sherman Malone, DO   
[address]   
[place] [address]   
[phone] (Work)   
[phone] (Fax)                      [facility]  
   
                                        Start: 2025   Hemoglobin A1c   
measurement                             Diabetes: Hemoglobin   
A1C                                     [facility]  
   
                                                    Start: 2025  
End: 2025                         Patient encounter   
procedure                               2025 10:45 AM EST   
Office Visit [facility] [address]   
[address] [place]   
[id number] [phone]   
Concepcion Frazier, DO   
[address]   
[place] [address]   
[phone] (Work)   
[phone] (Fax)                      [facility]  
   
                                        Start: 2025   Hemoglobin A1c   
measurement               HbA1C                     [facility]  
   
                                                    Start: 2025  
End: 2025                         Patient encounter   
procedure                               2025 11:15 AM EST   
Office Visit [facility]   
IM [address]   
[address] [place]   
[id number] [phone]   
Sherman Malone,    
[address]   
[place] [address]   
[phone] (Work)   
[phone] (Fax)                      [facility]  
   
                                                    Start: 2025  
End: 2025                         Patient encounter   
procedure                               2025 9:45 AM EST   
Office Visit [facility]   
[address]   
[address] [place]   
[address] [phone]   
Sherman Malone DO   
[address]   
[place] [address]   
[phone] (Work)   
[phone] (Fax)   
Chronic respiratory   
failure with hypoxia   
(CMS/HCC)                               [facility]  
   
                                        Comment on above:   Chronic respiratory   
failure with hypoxia (CMS/HCC)   
   
                                                    Start: 2025  
End: 2026     XR Chest 2 Views                        [facility]  
   
                                        Comment on above:   Expected: 2025  
, Expires: 2026   
   
                                                    Start: 2025  
End: 2026                         XR Lumbar spine 2 or 3   
Views                                               [facility]  
Work Phone:   
[phone]  
   
                                        Comment on above:   Expected: 2025  
, Expires: 2026   
   
                                                    Start: 2025  
End: 2025                         Professional /   
ancillary services   
management                              2025 10:30 AM EST   
Ancillary Procedure   
[facility] [address]   
[place] [id number] [phone] Acute   
bilateral low back pain   
without sciatica;   
Walking pneumonia;   
Acute cough                             [facility]  
   
                                        Comment on above:   Acute bilateral low   
back pain without sciatica;  
Walking pneumonia;  
Acute cough   
   
                                        Start: 2025   Comprehensive   
metabolic 2000 panel -   
Serum or Plasma                                     [facility]  
   
                          Start: 2025                           [facility]  
   
                                        Start: 2025   Introduction of Othe  
r   
New Technology   
Therapeutic Substance   
into Mouth and   
Pharynx, External   
Approach, New   
[employer]                      Introduction of Other   
New Technology   
Therapeutic Substance   
into Mouth and Pharynx,   
External Approach, New   
[employer]                      [facility]  
   
                                                    Start: 2025  
End: 2025                                             [facility]  
   
                          Start: 2025 Hospital admission              [facility]  
   
                                        Start: 2024   Hemoglobin A1c   
measurement                             Diabetes: Hemoglobin   
A1C                                     [facility]  
   
                                        Start: 2024   Screening for   
malignant neoplasm of   
colon                                               [facility]  
   
                          Start: 2024                           [facility]  
   
                                                    Start: 09-  
End: 09-                         Patient encounter   
procedure                               09/10/2024 1:30 PM EDT   
Office Visit [facility]   
IM [address]   
[address] [place]   
[id number] [phone]   
Sherman Malone,    
[address]   
[place] [address]   
[phone] (Work)   
[phone] (Fax)   
Arrived                                 [facility]  
   
                                        Comment on above:   Arrived   
   
                          Start: 2024 Influenza vaccination              [facility]  
   
                                                    Start: 2024  
End: 2024                         Patient encounter   
procedure                                           [facility]  
   
                                        Comment on above:   POST OP   
   
                                        Start: 2024   Hemoglobin A1c   
measurement               HbA1C                     [facility]  
   
                                                    Start: 2024  
End: 2024                         Patient encounter   
procedure                               2024 10:45 AM EDT   
Office Visit [facility]   
 [address]   
[address] [place]   
[id number] [phone]   
Concepcion Frazier,    
[address]   
[place] [address]   
[phone] (Work)   
[phone] (Fax) Type   
2 diabetes mellitus   
with other diabetic   
neurological   
complication (CMS/HCC)                  [facility]   
   
                                        Comment on above:   Type 2 diabetes pauline  
itus with other diabetic neurological   
complication (CMS/HCC)   
   
                                                    Start: 2024  
End: 2024                         Abltj sof tiss inf   
turbs uni/bi supfc   
intramural                              COBLATION TURBINATES   
INTRAMURAL Chronic   
pansinusitis 2024   
10:15 AM EDT                             [facility]  
   
                                                    Start: 2024  
End: 2024                         Admission to same day   
surgery center                          2024 10:15 AM EDT   
- 2024 1:15 PM   
EDT Surgery Admitting   
[address] [place] [id number]   
Zachary [place] [address] [place] [address] [phone] (Work)   
[phone] (Fax)   
NASAL/SINUS ENDOSCOPY   
SURGICAL W/ MAXILLARY   
ANTROSTOMY W/ REMOVAL   
OF TISSUE FROM   
MAXILLARY SINUS                         Admitting  
   
                                        Comment on above:   NASAL/SINUS ENDOSCOP  
Y SURGICAL W/ MAXILLARY ANTROSTOMY W/   
REMOVAL OF   
TISSUE FROM MAXILLARY SINUS   
   
                                                    Start: 2024  
End: 2024                         Nasal/sinus endoscopy   
w/sphenoidotomy                         ENDOSCOPY NASAL/SINUS   
W/ SPHENOIDOTOMY   
Chronic pansinusitis   
2024 10:15 AM EDT                  [facility]  
   
                                                    Start: 2024  
End: 2024                         Nasal/sinus ndsc   
w/rmvl tiss from   
frontal sinus                           ENDOSCOPY NASAL/SINUS   
W/ FRONTAL SINUS   
EXPLORATION Chronic   
pansinusitis 2024   
10:15 AM EDT                             [facility]  
   
                                                    Start: 2024  
End: 2024                         Nasal/sinus ndsc   
w/total ethoidectomy                    ENDOSCOPY NASAL/SINUS   
W/ ETHMOIDECTOMY, TOTAL   
Chronic pansinusitis   
2024 10:15 AM EDT                  [facility]  
   
                                                    Start: 2024  
End: 2024                         Nsl/sinus ndsc max   
antrost w/rmvl tiss   
max sinus                               NASAL/SINUS ENDOSCOPY   
SURGICAL W/ MAXILLARY   
ANTROSTOMY W/ REMOVAL   
OF TISSUE FROM   
MAXILLARY SINUS Chronic   
pansinusitis 2024   
10:15 AM EDT                             [facility]  
   
                                                    Start: 2024  
End: 2024                         Septoplasty/submucous   
resecj w/wo cartilage   
grf                                     ENDOSCOPIC SEPTOPLASTY   
Chronic pansinusitis   
2024 10:15 AM EDT                  [facility]  
   
                                                    Start: 2024  
End: 2024                         Strtctc cptr asstd px   
extradural cranial                      STEREOTACTIC   
COMPUTER-ASSISTED   
(NAVIGATIONAL)   
PROCEDURE CRANIAL   
EXTRADURAL Chronic   
pansinusitis 2024   
10:15 AM EDT                             [facility]  
   
                                        Start: 2024   Subsequent hospital   
visit by physician                      2024 10:15 AM EDT   
Hospital Encounter   
Admitting [address] [place] [address]   
Zachary Cooper MD [address]   
[place] [id number] [phone] (Work)   
[phone] (Fax)   
Chronic pansinusitis   
[J32.4]                                 Admitting  
   
                                        Comment on above:   Chronic pansinusitis  
 [J32.4]   
   
                                                    Start: 2024  
End: 2024                         Patient encounter   
procedure                               2024 4:00 PM EDT   
Office Visit   
Otolaryngology    
[address] [address]   
[phone] Diana Gandara MD [address]   
[place] [address]   
[phone] (Work)   
[phone] (Fax)   
Follow up                               Otolaryngology  
   
                                        Comment on above:   Follow up   
   
                                                    Start: 2024  
End: 2024                         Patient encounter   
procedure                                           Audiology  
   
                                        Comment on above:   Vertigo, peripheral,  
 bilateral [H81.393]   
   
                                        Start: 2024   Hemoglobin A1c   
measurement                             Diabetes: Hemoglobin   
A1C                                     [facility]  
   
                                        Start: 2024   Urine screening for   
protein                                 Diabetes: Urine Protein   
Screening                               [facility]  
   
                                Start: 2024 Glaucoma screening Diabetes: R  
etinopathy   
Screening                               [facility]  
   
                                        Start: 2023   Covid-19 Vaccine (3   
-   
2023-24 season)                         Covid-19 Vaccine (3 -   
2023-24 season)                         [facility]  
   
                                        Start: 2023   Covid-19 Vaccine (4   
-   
2023-24 season)                         Covid-19 Vaccine (4 -   
2023-24 season)                         [facility]  
   
                          Start: 2023 Influenza vaccination INFLUENZA (#1)  
 [facility]  
   
                          Start: 2023 Aerobic Culture Aerobic Culture [facility]  
   
                                        Start: 2023   Microbial culture of  
   
sputum                                              [facility]  
   
                                                    Start: 2023  
End: 2023                                             [facility]  
   
                          Start: 2023 Hospital admission              [facility]  
   
                                        Start: 2023   Microbial culture of  
   
sputum                                              [facility]  
   
                          Start: 2023                           [facility]  
   
                          Start: 2023                           [facility]  
   
                          Start: 2023 Consultation              [facility]  
   
                          Start: 2023 Hospital admission              [facility]  
   
                          Start: 2023                           [facility]  
   
                          Start: 2023 Plain chest X-ray XR chest 1V portab  
le [facility]  
   
                          Start: 2023 XR Chest Single view              [facility]  
   
                                        Start: 2022   Urine screening for   
protein                                 Diabetes: Urine Protein   
Screening                               [facility]  
   
                                        Start: 2021   COVID-19 VACCINE (3   
-   
Pfizer series)                          COVID-19 VACCINE (3 -   
Pfizer series)                          [facility]  
   
                                        Start: 10-   Screening for   
malignant neoplasm of   
breast                                              [facility]  
   
                                        Start: 2017   SHINGRIX VACCINE (1   
of   
2)                                      SHINGRIX VACCINE (1 of   
2)                                      [facility]  
   
                                        Start: 2015   Screening for   
malignant neoplasm of   
colon                                               [facility]  
   
                          Start: 2012 COLOGUARD (FIT-DNA) COLOGUARD (FIT-D  
NA) [facility]  
   
                          Start: 2012 Colonoscopy  COLONOSCOPY  [facility]  
   
                                        Start: 2012   COLORECTAL CANCER   
SCREENING                               COLORECTAL CANCER   
SCREENING                               [facility]  
   
                          Start: 2012 CT COLONOGRAPHY CT COLONOGRAPHY [facility]  
   
                          Start: 2012 FECAL OCCULT BLOOD FECAL OCCULT BLOO  
D [facility]  
   
                                        Start: 2012   Screening for   
malignant neoplasm of   
colon                                               [facility]  
   
                          Start: 2012 SIGMOIDOSCOPY SIGMOIDOSCOPY [facility]  
   
                          Start: 2007 Mammography  MAMMOGRAM    [facility]  
   
                          Start: 1997 HPV TESTING  HPV TESTING  [facility]  
   
                                        Start: 1997   Screening for   
malignant neoplasm of   
cervix                                              [facility]  
   
                                        Start: 1997   Zoledronic acid   
therapy                                 ALPHA-1 ANTITRYPSIN   
DEFICIENCY SCREENING                    [facility]  
   
                          Start: 1988 PAP TESTING  PAP TESTING  [facility]  
   
                                        Start: 1988   Screening for   
malignant neoplasm of   
cervix                                              [facility]  
   
                                        Start: 1986   Hepatitis B Vaccine   
(1   
of 3 - 19+ 3-dose   
series)                                 Hepatitis B Vaccine (1   
of 3 - 19+ 3-dose   
series)                                 [facility]  
   
                                        Start: 1986   Urine microalbumin   
profile                                             [facility]  
   
                                        Start: 1985   ANNUAL PCP TEAM   
CHRONIC DISEASE VISIT                   ANNUAL PCP TEAM CHRONIC   
DISEASE VISIT                           [facility]  
   
                                        Start: 1985   Hepatitis B surface   
antibody level            LDL CHOLESTEROL           [facility]  
   
                          Start: 1985 HEPATITIS C SCREENING HEPATITIS C [facility]  
   
                          Start: 1985 Hepatitis C screening Hepatitis C [facility]  
   
                          Start: 1985 HIV SCREENING HIV SCREENING [facility]  
   
                          Start: 1985 HIV screening HIV Screening [facility]  
   
                          Start: 1985 SPIROMETRY   SPIROMETRY   [facility]  
   
                                        Start: 1977   3 comp foot exam   
completed                 DIABETIC FOOT EXAM        [facility]  
   
                                        Start: 1977   Diabetic foot   
examination               Diabetic Foot Exam        [facility]  
   
                          Start: 1977 Glaucoma screening Dilated Retinal E  
xam [facility]  
   
                                Start: 1977 Hepatitis B screening URINE   
ALBUMIN:CREATININE   
RATIO                                   [facility]  
   
                                        Start: 1977   Hepatitis C antibody  
,   
confirmatory test         DILATED RETINAL EXAM      [facility]  
   
                          Start: 1973 PNEUMOCOCCAL (1 - PCV) PNEUMOCOCCAL   
(1 - PCV) [facility]  
   
                                        Start: 1973   Pneumococcal   
vaccination                             Pneumococcal Vaccine (1   
of 2 - PCV)                             [facility]  
   
                                        Start: 1972   Hemoglobin   
A1c/Hemoglobin.total   
in Blood                  HBA1C                     [facility]  
   
                                        Start: 1967   HEPATITIS B (1 of 3   
-   
3-dose series)                          HEPATITIS B (1 of 3 -   
3-dose series)                          [facility]  
   
                                        Start: 1967   Screening for   
malignant neoplasm of   
colon                                               [facility]  
   
                                                            Ablt sof tiss inf   
turbs uni/bi supfc   
intramural                              COBLATION TURBINATES   
INTRAMURAL Chronic   
pansinusitis                            [facility]  
   
                                                            Abltj sof tiss inf   
turbs uni/bi supfc   
intramural                              COBLATION TURBINATES   
INTRAMURAL Chronic   
pansinusitis                             [facility]  
   
                                       Anion gap measurement              [facility]  
   
                                                            aPTT in Platelet poo  
r   
plasma by Coagulation   
assay                                               [facility]  
   
                                                            Bacteria identified   
in   
Unspecified specimen   
by Aerobe culture                                   [facility]  
   
                                                            Basophils [#/volume]  
   
in Blood by Automated   
count                                               [facility]  
   
                                                            Basophils/100   
leukocytes in Blood by   
Automated count                                     [facility]  
   
                                                      
End: 2025                         CT Guidance for   
stereotactic   
localization of   
Unspecified body   
region-- WO contrast                    CT SINUS STEREO WO   
IVCON Radiology Routine   
Other chronic sinusitis   
1 Occurrences starting   
2024 until   
2025                              [facility]  
Work Phone:   
[phone]  
   
                                        Comment on above:   1 Occurrences starti  
ng 2024 until 2025   
   
                                                            CT Unspecified body   
region                                              [facility]  
   
                                                            Eosinophils/100   
leukocytes in Blood by   
Automated count                                     [facility]  
   
                                                            Erythrocyte   
distribution width   
[Ratio] by Automated   
count                                               [facility]  
   
                                                            Erythrocytes   
[#/volume] in Blood                                 [facility]  
   
                                                            Hematocrit [Volume   
Fraction] of Blood                                  [facility]  
   
                                                            Hemoglobin   
[Mass/volume] in Blood                              [facility]  
   
                                                            INR in Platelet poor  
   
plasma by Coagulation   
assay                                               [facility]  
   
                                                            Leukocytes [#/volume  
]   
corrected for   
nucleated erythrocytes   
in Blood by Automated   
coun                                                [facility]  
   
                                                            Leukocytes [#/volume  
]   
in Blood                                            [facility]  
   
                                                            Lymphocytes [#/volum  
e]   
in Blood by Automated   
count                                               [facility]  
   
                                                            Lymphocytes/100   
leukocytes in Blood by   
Automated count                                     [facility]  
   
                                                            MCH [Entitic mass] b  
y   
Automated count                                     [facility]  
   
                                                            MCHC [Mass/volume] b  
y   
Automated count                                     [facility]  
   
                                                            MCV [Entitic volume]  
   
by Automated count                                  [facility]  
   
                                                            Monocytes [#/volume]  
   
in Blood by Automated   
count                                               [facility]  
   
                                                            Monocytes/100   
leukocytes in Blood by   
Automated count                                     [facility]  
   
                                                            Nasal/sinus endoscop  
y   
w/sphenoidotomy                         ENDOSCOPY NASAL/SINUS   
W/ SPHENOIDOTOMY   
Chronic pansinusitis                    [facility]  
   
                                                            Nasal/sinus endoscop  
y   
w/sphenoidotomy                         ENDOSCOPY NASAL/SINUS   
W/ SPHENOIDOTOMY   
Chronic pansinusitis                    [facility]  
   
                                                            Nasal/sinus ndsc   
w/rmvl tiss from   
frontal sinus                           ENDOSCOPY NASAL/SINUS   
W/ FRONTAL SINUS   
EXPLORATION Chronic   
pansinusitis                            [facility]  
   
                                                            Nasal/sinus ndsc   
w/rmvl tiss from   
frontal sinus                           ENDOSCOPY NASAL/SINUS   
W/ FRONTAL SINUS   
EXPLORATION Chronic   
pansinusitis                            [facility]  
   
                                                            Nasal/sinus ndsc   
w/total ethoidectomy                    ENDOSCOPY NASAL/SINUS   
W/ ETHMOIDECTOMY, TOTAL   
Chronic pansinusitis                    [facility]  
   
                                                            Nasal/sinus ndsc   
w/total ethoidectomy                    ENDOSCOPY NASAL/SINUS   
W/ ETHMOIDECTOMY, TOTAL   
Chronic pansinusitis                    [facility]  
   
                                                            Neutrophils [#/volum  
e]   
in Blood by Automated   
count                                               [facility]  
   
                                                            Neutrophils/100   
leukocytes in Blood by   
Automated count                                     [facility]  
   
                                                            Nsl/sinus ndsc max   
antrost w/rmvl tiss   
max sinus                               NASAL/SINUS ENDOSCOPY   
SURGICAL W/ MAXILLARY   
ANTROSTOMY W/ REMOVAL   
OF TISSUE FROM   
MAXILLARY SINUS Chronic   
pansinusitis                            [facility]  
   
                                                            Nsl/sinus ndsc max   
antrost w/rmvl tiss   
max sinus                               NASAL/SINUS ENDOSCOPY   
SURGICAL W/ MAXILLARY   
ANTROSTOMY W/ REMOVAL   
OF TISSUE FROM   
MAXILLARY SINUS Chronic   
pansinusitis                            [facility]  
   
                                                            Nucleated erythrocyt  
es   
[Presence] in Blood by   
Automated count                                     [facility]  
   
                                       Patient Education              [facility]  
Work Phone:   
[phone]  
   
                                       Patient referral              [facility]  
Work Phone:   
[phone]  
   
                                                            Platelet mean volume  
   
[Entitic volume] in   
Blood by Automated   
count                                               [facility]  
   
                                                            Platelets [#/volume]  
   
in Blood                                            [facility]  
   
                                       Prothrombin time (PT)              [facility]  
   
                                                            Septoplasty/submucou  
s   
resecj w/wo cartilage   
grf                                     ENDOSCOPIC SEPTOPLASTY   
Chronic pansinusitis                    [facility]  
   
                                                            Septoplasty/submucou  
s   
resecj w/wo cartilage   
grf                                     ENDOSCOPIC SEPTOPLASTY   
Chronic pansinusitis                    [facility]  
   
                                                            Strtctc cptr asstd p  
x   
extradural cranial                      STEREOTACTIC   
COMPUTER-ASSISTED   
(NAVIGATIONAL)   
PROCEDURE CRANIAL   
EXTRADURAL Chronic   
pansinusitis                            [facility]  
   
                                                            Strtctc cptr asstd p  
x   
extradural cranial                      STEREOTACTIC   
COMPUTER-ASSISTED   
(NAVIGATIONAL)   
PROCEDURE CRANIAL   
EXTRADURAL Chronic   
pansinusitis                            [facility]  
   
                                                XR Hip - right 3 Views XR hip ri  
ght 2 or 3   
views Imaging Routine   
Right hip pain   
2025 9:58 AM EST                  [facility]  
c  
  
  
  
Immunizations  
  
  
                      Immunization Date Immunization Notes      [facility]  
[facility]  
   
                          2022   COVID-19 Pfizer              Erendira Alen  
Other Phone:   
[phone]                           [facility]  
   
                                        2021          COVID-19 Vaccine   
Pfizer - Documentation   
Purposes Only                                       Erendira Alen  
Other Phone:   
[phone]                           [facility]  
   
                                        2021          COVID-19 Vaccine   
Pfizer - Documentation   
Purposes Only                                       Erendira Alen  
Other Phone:   
[phone]                           [facility]  
   
                                        01-          influenza, seasonal,  
   
injectable                              Generic Provider    [facility]  
   
                                        01-          influenza virus   
vaccine, unspecified   
formulation                                         Diana Gandara MD  
Work Phone:   
[phone]                          [facility]  
   
                                                    NEGATED: Highlighted   
row has not   
occurred!2016                     influenza, seasonal,   
injectable                                          Abad Singleton  
Work Phone:   
[phone]                           [facility]  
   
                                        Comment on above:   Result Note: pt alre  
julius had this season   
  
  
  
Payers  
  
  
                          Date         Payer Category Payer        Policy ID  
   
                          2024   Self-pay                  [id number]  
[id number]  
   
                          06-   [employer]              [id number]  
[id number]  
[id number]  
   
                          06-   Unknown                     
   
                          2011   Medicare                  [id number]  
[id number]  
   
                          1967   Unknown                   [id number] 2.16.84  
[id number]  
   
                          1967   Unknown                   [id number] 2.16.84  
[id number]  
   
                          1967   Unknown                   [id number] 2.16.84  
[id number]  
   
                          1967   Unknown                   [id number] 2.16.84  
[id number]  
   
                          1967   Unknown                   [id number] 2.16.84  
[id number]  
   
                          1967   Unknown                   [id number] 2.16.84  
[id number]  
   
                          1967   Unknown                   [id number] 2.16.84  
[id number]  
   
                          1967   Unknown                   [id number] 2.16.84  
[id number]  
   
                          1967   Unknown                   [id number] 2.16.84  
[id number]  
   
                          1967   Unknown                   [id number] 2.16.8  
[id number]  
   
                          1967   Unknown                   [id number] 2.16.84  
[id number]  
   
                          1967   Unknown                   [id number] 2.16.84  
[id number]  
   
                          1967   Unknown                   [id number] 2.16.84  
[id number]  
   
                          1967   Unknown                   [id number] 2.16.84  
[id number]  
   
                          1967   Unknown                   [id number] 2.16.84  
[id number]  
   
                          1967   Unknown                   [id number] 2.16.84  
[id number]  
   
                          1967   Unknown                   [id number] 2.16.84  
[id number]  
   
                          1967   Unknown                   [id number] 2.16.84  
[id number]  
   
                          1960   Medicare                  [id number] 2.1  
[id number]  
   
                          1960   Self-pay                  [id number]  
   
                          1960   Unknown                   [id number]  
   
                                       Medicaid     Medicaid     [id number]   
[id number]  
   
                                       Unknown                   [id number] 2.16.8  
[id number]  
   
                                       Unknown                   [id number] 2.16.8  
[id number]  
  
  
  
Social History  
  
  
                          Date         Type         Detail       Facility  
   
                                       Sex Assigned At Birth              [place]   
[facility]   
Corporation  
Other Phone:   
[phone]  
   
                                                    Start:   
2020  
End: 2025     Sex Assigned At Birth                     [facility]  
   
                                                    Start:   
2020                Tobacco smoking status    Heavy tobacco smoker   
(finding)                               [facility]  
   
                                                    Start:   
2023  
End: 2025                         Tobacco smoking status   
NHIS                      Smoker (finding)          [facility]  
   
                                                    Start:   
1965          Sex Assigned At Birth Female              [facility]  
   
                                                    Start:   
2023  
End: 2023                         Tobacco smoking status   
NHIS                      Ex-smoker (finding)       [facility]  
   
                                                    Start:   
1989  
End: 09-                         Tobacco smoking status   
NHIS                      Smokes tobacco daily      [facility]  
   
                                                    Start:   
1988          History of tobacco use Cigarette Smoker    [facility]  
   
                                                    Start:   
2014  
End: 2025                         Cigarettes smoked   
current (pack per day)   
- Reported                1                         [facility]  
   
                                                    Start:   
2014  
End: 09-                         Tobacco use and   
exposure                                Smokeless tobacco   
non-user                                [facility]  
   
                                                    Start:   
2020  
End: 2024           Alcohol intake            Current non-drinker of   
alcohol (finding)                       [facility]  
   
                                                    Start:   
1965          Sex Assigned At Birth Not on file         [facility]  
   
                                                    Start:   
10-  
End: 2020                         Exposure to SARS-CoV-2   
(event)                   Not sure                  [facility]  
   
                                                            National Score   
(1-100), lower number   
is lower risk             Not on file               [facility]  
   
                                                    Start:   
2024                              Tobacco smoking status   
NHIS                      Current some day smoker   [facility]  
   
                                       History of tobacco use Passive smoker [facility]  
   
                                                    Start:   
09-  
End: 2025                         Alcoholic beverage   
intake                                  Lifetime non-drinker   
(finding)                               [facility]  
   
                                                            How often to you hav  
e   
a drink containing   
alcohol?                  Never                     [facility]  
   
                                                    Start:   
2023          Education           13                  [facility]  
   
                                                    Start:   
10-                Tobacco Comment           Patient smokes 10 or   
less cigarettes/day   
after 6-30 minutes of   
waking up.Thinking   
about quitting.                         [facility]  
   
                                                    Start:   
2023                Alcohol Comment           Caffeine: >4 cups/day   
iced unsweetened tea                    [facility]  
   
                                                    Start:   
2025  
End: 2025     Sex                 Female (finding)    [facility]  
  
  
  
Medical Equipment  
  
  
                                Procedure Code  Equipment Code  Equipment Origin  
al   
Text                                    Equipment   
Identifier                              Dates  
   
                                                                Unknown Unknown   
18 Non   
Biological Arm            FDA                       Start:   
2018  
   
                                        Comment on above:   Patient can remove t  
his monitoring system herself. Literature   
states must be removed prior to MRI, CT and diathermy treatment.   
   
                                                                Pen Needle,   
Diabetic (Bd   
Ultra-Fine Mini   
Pen Needle) 31   
gauge x 3/16    
needle                                              Start:   
2023  
   
                                                                Pen Needle,   
Diabetic (Bd   
Ultra-Fine Mini   
Pen Needle) 31   
gauge x 3/16    
needle                                              Start:   
2023  
   
                                                                Pen Needle,   
Diabetic (Bd   
Ultra-Fine Mini   
Pen Needle) 31   
gauge x 3/16    
needle                                              Start:   
2023  
   
                                                                Pen Needle,   
Diabetic (Bd   
Ultra-Fine Mini   
Pen Needle) 31   
gauge x 3/16    
needle                                              Start:   
2023  
   
                                                                Pen Needle,   
Diabetic (Bd   
Ultra-Fine Mini   
Pen Needle) 31   
gauge x 3/16    
needle                                              Start:   
2023  
End:   
2025  
   
                                                                Pen Needle,   
Diabetic (Bd   
Ultra-Fine Mini   
Pen Needle) 31   
gauge x 3/16    
needle                                              Start:   
2023  
End:   
2025  
   
                                                                Pen Needle,   
Diabetic (Bd   
Ultra-Fine Mini   
Pen Needle) 31   
gauge x 3/16    
needle                                              Start:   
2023  
End:   
2025  
  
  
  
Goals  
  
  
                                Date            Patient Goal    Desired Activity  
/State  
   
                                                                  
  
  
  
Functional Status  
  
  
                          Date         Assessment   Result       Facility  
   
                          2025   Functional status Patient at [facility]  
Work Phone: [phone]  
   
                          2023   Functional status Patient at [facility]  
Work Phone: [phone]  
   
                                2023      Functional status Functional Sta  
tus Comment Pt   
very SOB with exertion or   
activity.                               [facility]  
Work Phone: [phone]  
   
                          2023   Functional status Patient at [facility]  
Work Phone: [phone]  
   
                          2023   Functional Status N/A          [facility]  
  
  
  
Mental Status  
  
  
                          Date         Assessment   Result       Facility  
   
                                2025      Cognitive function Cognitive Sta  
tus Patient at   
Baseline                                [facility]  
Work Phone: [phone]  
   
                                2023      Cognitive function Cognitive Sta  
tus Patient at   
Baseline                                [facility]  
Work Phone: [phone]  
   
                                2023      Cognitive function Cognitive Sta  
tus Patient at   
Baseline                                [facility]  
Work Phone: [phone]  
  
  
  
Clinical Notes 2020 to 2025  
    Concepcion Frazier,  - 2025 1:08 PM Rafaela Frazier,  -   
2025 10:45 AM ESTTelephone Encounter - Jana Dent - 2025 1:46 PM 
Monica Bowman NP - 2025 9:30 AM EST  
  
                                Note Date & Type Note            Facility  
   
                                                    2025 History of Presen  
t   
illness Narrative                       Associated Problem(s): Type 2   
diabetes mellitus with other   
diabetic neurological complication   
(CMS/[facility])  
Formatting of this note might be   
different from the original.  
During the appointment today all   
pertinent labs, imaging, health   
maintenance, and glucose readings   
were reviewed. Encouraged to check   
blood glucose throughout the day   
with some fasting and some PP   
readings. They are to bring their   
glucose meter/cgm in to all   
appointments.  
All of the patients questions,   
treatment options, and current   
care plan and goals were   
discussed. A copy of this along   
with pertinent instructions were   
given to the patient at the end of   
the appointment. The patient   
voices understanding of all of   
this and is to call in between   
appointments if they have any   
problems or questions.  
Nita Gaming control is stable   
overall. , Will stay on current   
medications. , Instructions given   
today include: Dietary education  
  
Electronically signed by Concepcion Frazier DO at 2025 1:08 PM   
EST  
Formatting of this note is   
different from the original.  
Images from the original note were   
not included.  
Nita Gaming is a 59 y.o. female   
presents with chief complaint of   
Diabetes  
  
HPI:  
Diabetes Mellitus Follow-up:  
Bev Gaming is here for   
follow-up evaluation of diabetes   
mellitus. The initial diagnosis of   
diabetes was made around   
Diabetes complications: peripheral   
neuropathy  
She has been checking her blood   
glucose 2 times a day  
  
Last A1c: 6.6 (25) and 6.3   
at her last office visit on   
2024  
Last eye exam: 2023  
  
Current concerns include:  
Bg levels: range between 100-130  
She had covid in January and was   
hospitalized for 6 days and   
developed walking pneumonia after   
that. She has been on abx and a   
steriod for almost a month. Used   
correction doses of humalog to   
keep her numbers down when she was   
on steroids.  
  
Diet: limits sugars and carbs,   
smaller portions  
Drinks: water, unsweetened tea,   
occasional diet Dr Pepper  
Exercise: none  
Hypoglycemia: none  
  
  
  
SUBJECTIVE:  
  
PROBLEM LIST SOCIAL ALLERGIES:  
Patient Active Problem List  
Diagnosis  
Alpha-1-antitrypsin deficiency   
(CMS/HCC)  
Cluster headache syndrome  
COPD with asthma (CMS/[facility])  
Diastolic heart failure (CMS/[facility])  
Gastroparesis  
Insomnia  
Intractable chronic migraine   
without aura and without status   
migrainosus (CMS/[facility])  
Anxiety and depression (CMS/[facility])  
Obsessive-compulsive disorder   
(CMS/[facility])  
PTSD (post-traumatic stress   
disorder) (CMS/[facility])  
On home oxygen therapy  
RA (rheumatoid arthritis)   
(CMS/HCC)  
Pulmonary HTN (CMS/[facility])  
Current smoker  
FERNANDES (dyspnea on exertion)  
Post viral RAD (reactive airway   
disease) (CMS/[facility])  
Type 2 diabetes mellitus with   
other diabetic neurological   
complication (CMS/[facility])  
Encounter for screening mammogram   
for malignant neoplasm of breast  
Postmenopausal  
History of stress fracture  
Reactive airway disease with acute   
exacerbation (CMS/[facility])  
Chronic pansinusitis  
Social History  
  
Tobacco Use  
Smoking status: Every Day  
Current packs/day: 1.00  
Average packs/day: 1 pack/day for   
36.2 years (36.2 ttl pk-yrs)  
Types: Cigarettes  
Start date: 1989  
Passive exposure: Past  
Smokeless tobacco: Never  
Tobacco comments:  
Patient smokes 10 or less   
cigarettes/day after 6-30 minutes   
of waking up.  
Thinking about quitting.  
Substance Use Topics  
Alcohol use: Never  
Comment: Caffeine: >4 cups/day   
iced unsweetened tea  
Drug use: Never  
  
Allergies  
Allergen Reactions  
Penicillins Itching, Hives and   
Rash  
Other Reaction(s): hives  
  
difficult to breathe, swelling  
Metoclopramide  
Other Reaction(s): agitation,   
makes me mean, Mental Status   
Change  
  
Other Reaction(s): Mental Status   
Change  
  
Other Reaction(s): Agitated  
Topiramate  
Other Reaction(s): makes me mean,   
rash,fantacize about dying  
  
RASH,  
  
Other Reaction(s): Mental Status   
Change  
  
Other Reaction(s): Hallucinating  
Bupropion  
Other Reaction(s): Unknown  
Cephalexin Unknown  
Percocet [Oxycodone-Acetaminophen]   
GI intolerance  
Quetiapine  
Other Reaction(s): seizures,   
Unknown  
Risperidone  
Other Reaction(s): Altered mental   
status  
  
Other Reaction(s): Confusion  
Sulfamethoxazole-Trimethoprim  
Other Reaction(s): itching  
Venlafaxine Hcl  
Other Reaction(s): wet bed, fell,   
probable seizure  
  
  
Synopsis SmartLink 2025  
00:00 2025  
00:00  
Antidiabetic medications  
Insulin Lispro Correction 1:50 >   
150 mg/dl (max daily 30 units)   
(100 UNIT/ML SOPN)  
Semaglutide 2 mg q7 days SC (8   
MG/3ML SOPN) 2 mg q7 days SC (8   
MG/3ML SOPN)  
Labs  
[facility]   
A1C/HEMOGLOBIN.TOTAL:MFR:PT:BLD:QN  
: 6.6  
See encounter on 2025 from   
[facility]  
  
Outpatient prescription  
Medication marked as long-term  
  
The 10-year ASCVD risk score   
(Marc RAMIREZ, et al., 2019) is:   
10.3%*  
Values used to calculate the   
score:  
Age: 59 years  
Sex: Female  
Is Non- :   
No  
Diabetic: Yes  
Tobacco smoker: Yes  
Systolic Blood Pressure: 128 mmHg  
Is BP treated: No  
HDL Cholesterol: 64 mg/dL*  
Total Cholesterol: 226 mg/dL*  
* - Cholesterol units were assumed   
for this score calculation  
REVIEW OF SYMPTOMS:  
Review of Systems  
Constitutional: Positive for   
fatigue. Negative for appetite   
change and unexpected weight   
change.  
Eyes: Negative for visual   
disturbance.  
Respiratory: Negative for cough,   
shortness of breath and wheezing.  
Cardiovascular: Negative for chest   
pain, palpitations and leg   
swelling.  
Neurological: Positive for   
numbness.  
Endocrine: Negative for   
polydipsia, polyphagia and   
polyuria.  
  
OBJECTIVE:  
  
3/4/2025  
10:57 AM 2025  
10:23 AM 2025  
9:34 AM  
Vitals  
BMI 30.11 kg/m2 30.41 kg/m2  
Systolic 128 128 122  
Diastolic 82 70 78  
Heart Rate 96 99 96  
Temp 98.3 F  
Resp 20  
Height (in) 5' 8  5' 8   
Weight (lb) 198 200  
Visit Report Report Report Report  
  
Physical Exam  
Constitutional:  
General: She is not in acute   
distress.  
Appearance: Normal appearance.  
Cardiovascular:  
Rate and Rhythm: Normal rate and   
regular rhythm.  
Heart sounds: No murmur heard.  
No friction rub. No gallop.  
Pulmonary:  
Breath sounds: Normal breath   
sounds. No wheezing, rhonchi or   
rales.  
Musculoskeletal:  
General: No swelling.  
Neurological:  
Mental Status: She is alert.  
  
  
ASSESSMENT AND PLAN:  
  
Problem List Items Addressed This   
Visit  
  
Type 2 diabetes mellitus with   
other diabetic neurological   
complication (CMS/HCC) - Primary  
During the appointment today all   
pertinent labs, imaging, health   
maintenance, and glucose readings   
were reviewed. Encouraged to check   
blood glucose throughout the day   
with some fasting and some PP   
readings. They are to bring their   
glucose meter/cgm in to all   
appointments.  
All of the patients questions,   
treatment options, and current   
care plan and goals were   
discussed. A copy of this along   
with pertinent instructions were   
given to the patient at the end of   
the appointment. The patient   
voices understanding of all of   
this and is to call in between   
appointments if they have any   
problems or questions.  
Nita Gaming control is stable   
overall. , Will stay on current   
medications. , Instructions given   
today include: Dietary education  
  
  
  
  
Follow up in about 6 months   
(around 2025) for Recheck.  
Patient's Medications  
New Prescriptions  
No medications on file  
Previous Medications  
ALBUTEROL (2.5 MG/3ML) 0.083%   
NEBULIZER SOLUTION Take 2.5 mg by   
nebulization in the morning and   
2.5 mg at noon and 2.5 mg in the   
evening and 2.5 mg before bedtime.  
ALBUTEROL HFA 90 MCG/ACT INHALER   
Inhale 1 puff every 4 (four) hours   
if needed for wheezing or   
shortness of breath  
BREO ELLIPTA 100-25 MCG/ACT   
AEROSOL POWDER USE 1 INHALATION   
DAILY  
DOXYCYCLINE (VIBRAMYCIN) 100 MG   
CAPSULE Take 1 capsule (100 mg) by   
mouth in the morning and 1 capsule   
(100 mg) before bedtime. Do all   
this for 10 days. Take with at   
least 8 ounces (large glass) of   
water, do not lie down for 30   
minutes after.  
FAMOTIDINE (PEPCID) 40 MG TABLET   
TAKE 1 TABLET BY MOUTH EVERY DAY   
IN THE MORNING  
INSULIN LISPRO (HUMALOG KWIKPEN)   
100 UNIT/ML INJECTION Correction   
1:50 > 150 mg/dl (max daily 30   
units)  
IPRATROPIUM-ALBUTEROL (DUO-NEB)   
0.5-2.5 MG/3 ML NEBULIZER SOLUTION   
Take 3 mL by nebulization every 6   
(six) hours.  
LORAZEPAM (ATIVAN) 0.5 MG TABLET   
Take 1 tablet (0.5 mg) by mouth   
every 12 (twelve) hours  
OXYGEN (O2) GAS Inhale 4 L/min   
continuously.  
PROMETHAZINE (PHENERGAN) 25 MG   
TABLET Take 1 tablet (25 mg) by   
mouth every 8 (eight) hours if   
needed for nausea or vomiting  
PROMETHAZINE-DEXTROMETHORPHAN   
(PHENERGAN-DM) 6.25-15 MG/5ML   
SYRUP Take 5 mL by mouth every 4   
(four) hours if needed for cough  
SEMAGLUTIDE, 2 MG/DOSE, (OZEMPIC,   
2 MG/DOSE,) 8 MG/3ML SOLUTION   
PEN-INJECTOR Inject 2 mg under the   
skin every 7 (seven) days  
Modified Medications  
No medications on file  
Discontinued Medications  
BENZONATATE (TESSALON) 200 MG   
CAPSULE Take 1 capsule (200 mg) by   
mouth 3 (three) times a day as   
needed for cough Do not crush or   
chew.  
BUDESONIDE (PULMICORT) 0.5 MG/2ML   
NEBULIZER SOLUTION Inhale 0.5 mg   
in the morning.  
DEXAMETHASONE (DECADRON) 6 MG   
TABLET Daily  
GUAIFENESIN-CODEINE   
(ROBITUSSIN-AC) 100-10 MG/5ML   
SYRUP Take 5-10 mL by mouth 3   
(three) times a day as needed for   
cough  
PREDNISONE (DELTASONE) 10 MG   
TABLET Take 1 tablet (10 mg) by   
mouth Daily 4 tabs x 3 days, 3   
tabs x 3 days, 2 tabs x 3 days, 1   
tab x 3 days  
  
I have reviewed and reconciled the   
history and medication list with   
the patient today.  
  
  
  
  
Electronically signed by Concepcion Frazier DO at 2025 1:08 PM   
EST  
documented in this encounter            [facility]  
   
                                        2025 Telephone encounter Note Form  
atting of this note might be   
different from the original.  
P/c appointment reminder. Pt   
voiced confirmation  
Electronically signed by Jana Dent at 2025 1:47 PM EST  
                                        [facility]  
   
                                        2025 Miscellaneous Notes Formattin  
g of this note might be   
different from the original.  
P/c appointment reminder. Pt   
voiced confirmation  
Electronically signed by Jana Dent at 2025 1:47 PM EST  
documented in this encounter            [facility]  
   
                                                    2025 History of Presen  
t   
illness Narrative                       Formatting of this note is   
different from the original.  
Images from the original note were   
not included.  
Subjective  
Patient ID: Nita Gaming (:   
1965) is a 59 y.o. female who   
presents for Fall.  
HPI  
History of Present Illness  
The patient presents for   
evaluation of right hip pain,   
post-walking pneumonia, and   
diabetes.  
  
She reports a history of COVID-19   
infection, which necessitated a   
6-day hospital stay. Following her   
discharge, she developed walking   
pneumonia, for which she was   
treated by Dr. Malone with   
Augmentin for 10 days, a 12-day   
taper of prednisone, and codeine   
cough syrup. Despite these   
treatments, she reports no   
improvement and continues to   
produce purulent sputum. She is   
currently out of her codeine cough   
syrup and requests a refill. She   
is unable to take PENICILLIN but   
can tolerate doxycycline. She also   
has Phenergan at home. She has had   
COVID-19 three times and has been   
hospitalized twice due to the   
infection.  
  
She experienced a fall due to   
dyspnea while walking to her car,   
resulting in an injury to her   
right hip. She describes severe   
pain in the groin area that   
radiates up and around into her   
buttock, causing electric   
shock-like sensations down to her   
heel. The pain is so intense that   
she is unable to sit on a chair.   
She is able to stand without   
assistance but requires a   
20-minute rest after a short walk.   
She finds climbing stairs   
particularly challenging.  
  
She is diabetic and does not check   
her fingersticks every day. She   
does have rescue insulin.  
  
SOCIAL HISTORY  
She is a grandmother of 16 and   
takes care of her grandchildren 4   
days a week. Her youngest son, who   
is 31, lives with her and her   
.  
  
Current Outpatient Medications  
Medication Instructions  
albuterol HFA 90 mcg/act inhaler 1   
puff, Inhalation, Every 4 hours   
PRN  
albuterol 2.5 mg, 4 times daily RT  
Breo Ellipta 100-25 MCG/ACT   
aerosol powder USE 1 INHALATION   
DAILY  
budesonide (PULMICORT) 0.5 mg,   
Daily RT  
doxycycline (VIBRAMYCIN) 100 mg,   
Oral, 2 times daily, Take with at   
least 8 ounces (large glass) of   
water, do not lie down for 30   
minutes after  
famotidine (PEPCID) 40 mg, Oral,   
Every morning  
guaiFENesin-codeine   
(Robitussin-AC) 100-10 MG/5ML   
syrup 5-10 mL, Oral, 3 times daily   
PRN  
HYDROcodone-acetaminophen (Norco)   
5-325 MG tablet 1 tablet, Oral,   
Every 6 hours PRN  
insulin lispro (HumaLOG KWIKPEN)   
100 UNIT/ML injection Correction   
1:50 > 150 mg/dl (max daily 30   
units)  
ipratropium-albuterol (Duo-Neb)   
0.5-2.5 mg/3 mL nebulizer solution   
3 mL, Every 6 hours  
LORazepam (ATIVAN) 0.5 mg, Oral,   
Every 12 hours  
oxygen (O2) 4 L/min, Continuous  
Ozempic (2 MG/DOSE) 2 mg,   
Subcutaneous, Every 7 days  
predniSONE (Deltasone) 10 MG   
tablet Take 6 tablets (60 mg) by   
mouth Daily for 2 days, THEN 5   
tablets (50 mg) Daily for 2 days,   
THEN 4 tablets (40 mg) Daily for 2   
days, THEN 3 tablets (30 mg) Daily   
for 2 days, THEN 2 tablets (20 mg)   
Daily for 2 days, THEN 1 tablet   
(10 mg) Daily for 2 days.  
predniSONE (DELTASONE) 10 mg,   
Oral, Daily, 4 tabs x 3 days, 3   
tabs x 3 days, 2 tabs x 3 days, 1   
tab x 3 days  
promethazine (PHENERGAN) 25 mg,   
Oral, Every 8 hours PRN  
promethazine-dextromethorphan   
(Phenergan-DM) 6.25-15 MG/5ML   
syrup 5 mL, Oral, Every 4 hours   
PRN  
  
Allergies  
Allergen Reactions  
Penicillins Itching, Hives and   
Rash  
Other Reaction(s): hives  
  
difficult to breathe, swelling  
Metoclopramide  
Other Reaction(s): agitation,   
makes me mean, Mental Status   
Change  
  
Other Reaction(s): Mental Status   
Change  
  
Other Reaction(s): Agitated  
Topiramate  
Other Reaction(s): makes me mean,   
rash,fantacize about dying  
  
RASH,  
  
Other Reaction(s): Mental Status   
Change  
  
Other Reaction(s): Hallucinating  
Bupropion  
Other Reaction(s): Unknown  
Cephalexin Unknown  
Percocet [Oxycodone-Acetaminophen]   
GI intolerance  
Quetiapine  
Other Reaction(s): seizures,   
Unknown  
Risperidone  
Other Reaction(s): Altered mental   
status  
  
Other Reaction(s): Confusion  
Sulfamethoxazole-Trimethoprim  
Other Reaction(s): itching  
Venlafaxine Hcl  
Other Reaction(s): wet bed, fell,   
probable seizure  
  
Patient Active Problem List  
Diagnosis  
Alpha-1-antitrypsin deficiency   
(CMS/Formerly [facility])  
Cluster headache syndrome  
COPD with asthma (CMS/Formerly [facility])  
Diastolic heart failure (CMS/Formerly [facility])  
Gastroparesis  
Insomnia  
Intractable chronic migraine   
without aura and without status   
migrainosus (CMS/Formerly [facility])  
Anxiety and depression (CMS/Formerly [facility])  
Obsessive-compulsive disorder   
(CMS/Formerly [facility])  
PTSD (post-traumatic stress   
disorder) (CMS/Formerly [facility])  
On home oxygen therapy  
RA (rheumatoid arthritis)   
(CMS/Formerly [facility])  
Pulmonary HTN (CMS/Formerly [facility])  
Current smoker  
FERNANDES (dyspnea on exertion)  
Post viral RAD (reactive airway   
disease) (CMS/Formerly [facility])  
Type 2 diabetes mellitus with   
other diabetic neurological   
complication (CMS/Formerly [facility])  
Medicare annual wellness visit,   
subsequent  
ACP (advance care planning)  
Encounter for screening mammogram   
for malignant neoplasm of breast  
Postmenopausal  
History of stress fracture  
Reactive airway disease with acute   
exacerbation (CMS/Formerly [facility])  
Chronic pansinusitis  
  
Review of Systems  
Constitutional: Positive for   
fatigue. Negative for chills.  
Respiratory: Positive for cough,   
shortness of breath and wheezing.  
Gastrointestinal: Negative for   
diarrhea, nausea and vomiting.  
Musculoskeletal: Positive for gait   
problem.  
R hip pain, stomach bruising  
Neurological: Negative for   
dizziness, weakness,   
light-headedness and headaches.  
  
Objective  
Vital signs: /78 (Patient   
Position: Sitting)   Pulse 96     
Resp 20   SpO2 95%  
  
Physical Exam  
Constitutional:  
Appearance: She is ill-appearing.  
HENT:  
Head: Normocephalic.  
Nose: Nose normal.  
Mouth/Throat:  
Mouth: Mucous membranes are moist.  
Eyes:  
Pupils: Pupils are equal, round,   
and reactive to light.  
Cardiovascular:  
Rate and Rhythm: Normal rate and   
regular rhythm.  
Pulmonary:  
Effort: Pulmonary effort is   
normal.  
Breath sounds: Wheezing and   
rhonchi present.  
Musculoskeletal:  
Lumbar back: Tenderness present.  
Right hip: Tenderness and bony   
tenderness present. Decreased   
range of motion.  
Legs:  
  
Comments: Abdominal bruising and   
tenderness  
Patient very slow to go from   
sitting to standing due to pain  
Skin:  
General: Skin is warm and dry.  
Neurological:  
Mental Status: She is alert and   
oriented to person, place, and   
time.  
  
Assessment/Plan  
  
Assessment & Plan  
1. Post-walking pneumonia.  
She reports persistent symptoms   
despite previous treatment with   
Augmentin and prednisone. A chest   
x-ray will be ordered to evaluate   
her current condition. She will be   
prescribed doxycycline 100 mg   
twice daily for 14 days, along   
with a 12-day taper of steroids.   
Additional codeine cough syrup   
will be provided to manage her   
symptoms. The x-ray results will   
be communicated to her once they   
are available. If symptoms worsen   
she is to report right to ER.  
  
2. Right hip pain.  
She reports significant pain in   
the right hip following a fall,   
which radiates to her groin and   
buttock, causing electric   
shock-like sensations down to her   
heel. An x-ray of the right hip   
will be ordered to assess for any   
fractures or other abnormalities.   
She will be prescribed Norco to be   
taken every 6 hours as needed for   
pain relief. The x-ray results   
will be communicated to her once   
they are available. OARRS reviewed   
with no concerns.  
  
3. Diabetes mellitus.  
She does not regularly check her   
fingerstick blood glucose levels.   
She will be advised to monitor her   
blood glucose levels more   
frequently, especially while on   
the 12-day steroid taper. She has   
rescue insulin available for use   
if needed.  
  
Problem List Items Addressed This   
Visit  
None  
Visit Diagnoses  
  
Walking pneumonia - Primary  
Relevant Medications  
doxycycline (Vibramycin) 100 MG   
capsule  
Other Relevant Orders  
XR chest 2 views  
Asthma exacerbation with COPD   
(chronic obstructive pulmonary   
disease) (CMS/[facility])  
Relevant Medications  
predniSONE (Deltasone) 10 MG   
tablet  
Acute cough  
Relevant Medications  
guaiFENesin-codeine   
(Robitussin-AC) 100-10 MG/5ML   
syrup  
Other Relevant Orders  
XR chest 2 views  
Right hip pain  
Relevant Medications  
HYDROcodone-acetaminophen (Norco)   
5-325 MG tablet  
Other Relevant Orders  
XR hip right 2 or 3 views  
Status post fall  
Relevant Medications  
HYDROcodone-acetaminophen (Norco)   
5-325 MG tablet  
Acute bilateral low back pain   
without sciatica  
Relevant Medications  
HYDROcodone-acetaminophen (Norco)   
5-325 MG tablet  
Other Relevant Orders  
XR lumbar spine 2 or 3 views  
  
  
  
Health Maintenance  
Topic Date Due  
Cervical Cancer Screening Never   
done  
Mammogram 10/09/2020  
Diabetes: Urine Protein Screening   
2022  
Influenza Vaccine (1) 2024  
Colorectal Cancer Screening   
2024  
Diabetes: Hemoglobin A1C   
2024  
Diabetes: Retinopathy Screening   
2025  
  
Immunization History  
Administered Date(s) Administered  
Influenza, seasonal, injectable   
01/15/2015  
Pfizer Purple Cap SARS-CoV-2   
Vaccination 2021, 2021  
  
-Patient's chronic conditions have   
been reviewed in preparation for   
this appointment. Protocols   
reviewed and updated. A   
collaborative plan of care has   
been created for pt regarding   
specific health concerns. Any   
barriers to care have been   
identified and addressed. Any part   
of this document that has been   
added/copied from other documents   
has been reviewed for accuracy and   
updated as appropriate at the time   
of the patient encounter.  
  
-Follow up for Next scheduled   
follow-up.  
  
Anjana Bowman NP  
Electronically signed by Anjana Bowman NP at 2025 10:00 AM   
EST  
documented in this encounter            [facility]  
   
                                                    01- History of Presen  
t   
illness Narrative                       Formatting of this note might be   
different from the original.  
Weekly outreach call completed for   
30 day monitor. Pt reports she was   
in for follow up with Dr. Malone   
on . She was told now she has   
walking pneumonia. Pt was given an   
another order of prednisone,   
augmentin, and cough syrup. States   
she was also given an order of   
insulin by the hospital doctor   
upon her discharge, but she is   
having issues filling that. She is   
no longer using the Amber system,   
stating it go to be too expensive.   
She is now asking for a sample   
meter/test strips if there is   
anything available she could get   
from the office to use. She needs   
to be checking her blood sugars   
now that she is on this steroid.   
She is going to follow up with [facility]   
regarding the insulin order, and   
if still having issues, she is   
requesting an insulin order from   
Dr. Javier.  
  
<January 15, 2025, 09:56 -   
TEJINDER Mann>  
Pt calls back again and states [facility]   
wont fill the insulin order from   
the hospital because it is the   
wrong brand. They can't request   
another brand since it was ordered   
by a hospitalist. Pt is requesting   
an insulin be ordered by Dr. Javier or Dr. Malone. States she   
only needs a couple of pens?  
  
<January 15, 2025, 11:30 -   
TEJINDER Mann> discussed   
with CARINA Palacios. Will need to see   
first if we have a glucometer   
available to pt. If not, she will   
need one ordered if covered by   
insurance. Or else she will need   
to purchase if not covered.  
Electronically signed by TEJINDER Mann at 2025 12:20 PM   
EST  
documented in this encounter            [facility]  
  
  
  
                                        2025 Discharge summary   
  
  
   
                                           
   
                                        Note Date/Time      2025   
11:55am  
   
                                                    [facility]  
ENTER  
[address] [place] [address]  
  
Discharge Summary  
Signed  
  
Patient: Bev Gaming MR#: [id number]  
[id number]  
: 1965 Acct:[id number]  
  
Age/Sex: 59 / F Adm Date:   
5  
Loc:  Room: [address] [place] [address]  
  
Attending Dr: Jordon Vásquez MD  
  
Copies to: MD Sherman Aburto,DO~  
  
  
Providers  
Date of Discharge: 25  
Discharging Provider: Jordon Vásquez  
Primary Care Provider: Sherman Malone  
  
Discharge Diagnosis  
(1) Acute and chronic respiratory failure with   
hypoxia:  
(2) COPD exacerbation:  
(3) COVID-19 virus infection:  
(4) Diabetes mellitus:  
Final Diagnosis  
Final Discharge Diagnosis:  
Acute on chronic hypoxic respiratory failure in   
setting of COVID infection/COPD exacerbation  
  
Summary  
Hospital Course  
Hospital course:  
(1) Acute and chronic respiratory failure with   
hypoxia:  
(2) COPD exacerbation:  
(3) COVID-19 virus infection:  
(4) Diabetes mellitus:  
  
  
Ms. Gaming a 59-year-old female with a PMH of COPD,   
alpha-1 antitrypsin deficiency, chronic hypoxic   
respiratory failure on 4 L nasal cannula at home,   
tobacco dependence, GERD, anxiety the presented to the   
emergency room for complaints of shortness of breath.   
Patient reports she was diagnosed with COVIDon   
Tuesday, started feeling symptoms on Monday, with a   
dry barky cough and feeling generally crappy. Reports   
a productive cough, thick sputum, sometimes at screen   
symptoms is white. She also complains of a headache,   
chest tightness. She denies fever, chills, nausea or   
vomiting, diarrhea. States that when she gets up and   
walks around her pulse ox drops into the 70s and   
everything gets tingly and she feels like she will   
pass out. She reports starting Paxlovid on Tuesday   
when she was diagnosed with COVID, has 1 day left. She   
states that she is a pack-a-day smoker, has not smoked   
since Tuesday. Denies alcohol or illicitdrug use. She   
does report wearing 4 L nasal cannula at home, reports   
it is ordered as at all times but on good days she can   
walk around the house without it. Chest x-ray shows no   
acute cardiopulmonary pathology. EKG is normal sinus   
rhythm. CBC is unremarkable. CMP with a serum bicarb   
of 34.5, glucose 202, otherwise unremarkable. UA is   
negative for infection. Patient was medicated with   
DuoNeb's, oxycodone, Zofran and methylprednisolone.   
She will be admitted as inpatient to the [facility]   
telemetry floor. Patient already out of window   
forremdesivir. Patient was managed with   
bronchodilators, steroid and azithromycin. Patient was   
also complaining of headache on and off. No acute   
neurological deficit. Headache was managed with pain   
medication and steroids. In setting ofCOVID infection   
recommended 10-day course of Decadron which should   
help with COVID as well as headache. Her headache is   
resolved now on discharge. Patient is still on 4 to 5   
L by nasal cannula and is stable to go home. Patient   
is hyperglycemic in setting of steroid use which will   
be managed with insulin with sliding scale. Patient   
used to be on insulin which she has not been using now   
as she is on Ozempic and lost her weight. The patient   
states she knows how to use insulin. Patient will be   
discharged to home today.  
  
Condition  
Condition at Discharge: Stable  
Time Spent with Patient  
Time spent providing/coordinating discharge services   
(# min): 38  
  
Discharge Plan  
Discharge Plan  
Patient Disposition: Home  
  
Activity: No Activity Restriction  
  
Diet: Diabetic  
  
Additional Instructions:  
You tested positive for COVID-19 on 24. Please   
see attached COVID-19 discharge instructions  
  
Continue oxygen as per chronic orders.  
  
Instructions: Know your Meds, [facility] COVID-19 Discharge   
Instructions  
  
Prescriptions:  
New  
azithromycin 250 mg Tablet  
500 mg PO DAILY 2 Days Qty: 4 0RF  
dexamethasone 6 mg Tablet  
6 mg PO DAILY 7 Days Qty: 7 0RF  
amlodipine 5 mg Tablet  
5 mg PO DAILY 60 Days Qty: 60 0RF  
insulin aspart U-100 100 unit/mL (3 mL) Insulin Pen  
See Protocol subcut TID.WM.HS 7 Days Qty: 3 0RF  
Protocol: Corrective Scale #1 (TDI </= 25)  
Condition: Corrective Scale #1 (TDI <=25)  
Condition: ======================= Dose/Route:   
======================== Instruction:   
=======================  
Condition: Fingerstick Blood Glucose Dose/Route:   
Insulin Units  
Condition: 150-199 mg/dl Dose/Route: 1 unit  
Condition: 200-249 mg/dl Dose/Route: 2 unit  
Condition: 250-299 mg/dl Dose/Route: 3 unit  
Condition: 300-349 mg/dl Dose/Route: 4 unit  
Condition: 350-399 mg/dl Dose/Route: 5 unit  
Condition: greater than or = 400 mg/dl Dose/Route: 6   
unit Instruction: CallProvider  
Protocol Text:  
*If the corrective scale dose has been administered   
within the past 4 hours, do not use corrective scale   
again unless approved by prescriber*  
  
Continued  
lorazepam 0.5 mg tablet  
0.5 mg PO TID PRN (Reason: Anxiety)  
Patient Comments:  
7 day supply, filled 10/6/18  
famotidine 40 mg tablet  
40 mg PO QHS  
guaifenesin [Mucinex] 600 mg Tablet Extended Release   
12hr  
1,200 mg PO BID 4 Days Qty: 16 0RF  
Paxlovid 300 mg (150 mg x 2)-100 mg tablets,dose pack  
1 tab PO DAILY  
Patient Comments:  
pt states has two more doses 24  
albuterol sulfate 2.5 mg /3 mL (0.083 %) solution for   
nebulization  
2.5 mg INHALATION QID PRN (Reason: shortness of breath   
or wheezing)  
Paxlovid 300 mg (150 mg x 2)-100 mg tablets,dose pack  
See Rx Instructions .ROUTE .COMPLEX  
Rx Instructions:  
take  mg tablets of nirmatrelvir with    
mg tablet of ritonavir twice daily for 5 days  
albuterol sulfate 90 mcg/actuation HFA aerosol inhaler  
2 inh inhalation Q4HR PRN (Reason: shortness of breath   
or wheezing)  
Ozempic 2 mg/dose (8 mg/3 mL) pen injector  
2 mg subcut .weekly  
Patient Comments:  
  
(DME) Oxygen Unit  
See Rx Instructions .Route  
Rx Instructions:  
As directed at 4 liters DME [facility]  
fluticasone furoate-vilanterol [Breo Ellipta] 100-25   
mcg/dose blister with device  
1 inh inhalation QDAY 30 Days Qty: 60 11RF  
  
Follow Up:  
Sherman Malone DO [Primary Care Provider] - 25   
2:45 pm (You have been scheduled for a follow up   
appointment for the following date and time, please   
call to reschedule if needed.)  
  
  
Exam  
Physical Exam  
Vital Signs:  
  
  
  
  
Temp Pulse Resp BP Pulse Ox O2 Del Method O2 Flow Rate  
  
97.5 F L 92 20 142/81 H 91 L Nasal Cannula 3  
  
25 09:31 25 09:31 25 09:31 25   
09:31 25 09:31 25 10:46 25 10:46  
  
  
  
Narrative:  
General: Awake alert, no acute distress  
HEENT: head atraumatic, normocephalic, moist mucous   
membranes  
Neck: supple no masses, no lymphadenopathy  
CVS: regular rate and rhythm, no murmurs or gallops  
Respiratory: Diminished breath sound bilaterally with   
polyphonic wheezing, symmetric expansion  
GI: soft, nondistended, nontender, positive bowel   
sounds with no organomegaly  
Extremity: moves all extremities, no restrictions of   
movements, no calf tenderness  
Neuro: AOx3, CN II-VII intact. Moves all extremities   
in all planes of motion.  
Skin: intact no rashes or lesions  
  
Diagnostic Studies  
Completed and Pending Studies  
Pending studies at discharge:  
  
  
25 05:00  
Comprehensive Metabolic Panel [CHEM] IN AM  
  
  
  
Labs on day of discharge:  
  
  
25 06:07: Corrected WBC 17.6 H, Uncorrected WBC   
Count 17.6 H, RBC 4.36, Hgb 13.8, Hct 40.6, MCV 93.2,   
MCH 31.6, MCHC 33.9, RDW 12.1, Plt Count 234, MPV 7.6,   
Neut % (Auto) N/A, Lymph % (Auto) N/A, Mono % (Auto)   
N/A, Eos % (Auto) N/A, Baso % (Auto) N/A, Nucleat RBC   
Rel Count N/A, Neut # (Auto) N/A, Lymph # (Auto) N/A,   
Mono # (Auto) N/A, Eos # (Auto) N/A, Baso # (Auto)   
N/A, Lymphocytes % 5 L, Monocytes % 5, Metamyelocytes   
% 1 H, Myelocytes % 3 H, Segmented Neutrophils 87 H,   
Platelet Estimate Normal, Plt Morphology Comment   
Normal, RBC Morphology Normal, PHA Creatinine Clear   
128.50, Sodium 137, Potassium 4.7, Chloride 97 L,   
Carbon Dioxide 35.8 H, Anion Gap 8.9, BUN 20,   
Creatinine 0.58 L, Est GFR (CKD-EPI) > 60.0, Glucose   
280 H, Calcium 8.7, Total Bilirubin 0.3, AST 11 L, ALT   
17, Alkaline Phosphatase 72, Total Protein 6.1 L,   
Albumin 3.5, Globulin 2.6, Albumin/Globulin Ratio 1.3  
25 16:39: POC Glucose 369  
  
  
  
  
  
Documented By: Jordon Vásquez MD 25 [id number]  
Signed By: <Electronically signed by Jordon Vásquez MD>  
25 1157  
   
  
[facility]  
Work Phone: [phone]- [place] [id number]  
  
Discharge Summary  
Signed  
  
Patient: Bev Gaming MR#: [id number]  
[id number]  
: 1965 Acct:[id number]  
  
Age/Sex: 59 / F Adm Date:   
5  
Loc:  Room: [address] [place] [address]  
  
Attending Dr: Jordon Vásquez MD  
  
Copies to: MD Sherman Aburto,DO~  
  
  
Providers  
Date of Discharge: 25  
Discharging Provider: Jordon Vásquez  
Primary Care Provider: Sherman Malone  
  
Discharge Diagnosis  
(1) Acute and chronic respiratory failure with hypoxia:  
(2) COPD exacerbation:  
(3) COVID-19 virus infection:  
(4) Diabetes mellitus:  
Final Diagnosis  
Final Discharge Diagnosis:  
Acute on chronic hypoxic respiratory failure in setting of COVID infection/COPD 
exacerbation  
  
Summary  
Hospital Course  
Hospital course:  
(1) Acute and chronic respiratory failure with hypoxia:  
(2) COPD exacerbation:  
(3) COVID-19 virus infection:  
(4) Diabetes mellitus:  
  
  
Ms. Gaming a 59-year-old female with a PMH of COPD, alpha-1 antitrypsin 
deficiency, chronic hypoxicrespiratory failure on 4 L nasal cannula at home, 
tobacco dependence, GERD, anxiety the presented to the emergency room for 
complaints of shortness of breath. Patient reports she was diagnosed with CO
VIDon Tuesday, started feeling symptoms on Monday, with a dry barky cough and 
feeling generally crappy. Reports a productive cough, thick sputum, sometimes at
 screen symptoms is white. She also complains of a headache, chest tightness. 
She denies fever, chills, nausea or vomiting, diarrhea. States that when she 
gets up and walks around her pulse ox drops into the 70s and everything gets 
tingly and she feels like she will pass out. She reports starting Paxlovid on 
Tuesday when she was diagnosed with COVID, has 1 day left. She states that she 
is a pack-a-day smoker, has not smoked since Tuesday. Denies alcohol or 
illicitdrug use. She does report wearing 4 L nasal cannula at home, reports it 
is ordered as at all times but on good days she can walk around the house 
without it. Chest x-ray shows no acute cardiopulmonary pathology. EKG is normal 
sinus rhythm. CBC is unremarkable. CMP with a serum bicarb of 34.5, glucose 202,
 otherwise unremarkable. UA is negative for infection. Patient was medicated 
with DuoNeb's, oxycodone, Zofran and methylprednisolone. She will be admitted as
 inpatientto the [facility] telemetry floor. Patient already out of window 
forremdesivir. Patient was managed with bronchodilators, steroid and 
azithromycin. Patient was also complaining of headache on and off. No acute 
neurological deficit. Headache was managed with pain medication and steroids. In
 setting ofCOVID infection recommended 10-day course of Decadron which should 
help with COVID as well as headache. Her headache is resolved now on discharge. 
Patient is still on 4 to 5 L by nasal cannula and is stable to go home. Patient 
is hyperglycemic in setting of steroid use which will be managed with insulin 
with sliding scale. Patient used to be on insulin which she has not been using 
now as she is on Ozempic and lost her weight. The patient states she knows how 
to use insulin. Patient will be discharged to home today.  
  
Condition  
Condition at Discharge: Stable  
Time Spent with Patient  
Time spent providing/coordinating discharge services (# min): 38  
  
Discharge Plan  
Discharge Plan  
Patient Disposition: Home  
  
Activity: No Activity Restriction  
  
Diet: Diabetic  
  
Additional Instructions:  
You tested positive for COVID-19 on 24. Please see attached COVID-19 
discharge instructions  
  
Continue oxygen as per chronic orders.  
  
Instructions: Know your [facility] COVID-19 Discharge Instructions  
  
Prescriptions:  
New  
azithromycin 250 mg Tablet  
500 mg PO DAILY 2 Days Qty: 4 0RF  
dexamethasone 6 mg Tablet  
6 mg PO DAILY 7 Days Qty: 7 0RF  
amlodipine 5 mg Tablet  
5 mg PO DAILY 60 Days Qty: 60 0RF  
insulin aspart U-100 100 unit/mL (3 mL) Insulin Pen  
See Protocol subcut TID.WM.HS 7 Days Qty: 3 0RF  
Protocol: Corrective Scale #1 (TDI Condition: Corrective Scale #1 (TDI <=25)  
Condition: ======================= Dose/Route: ======================== 
Instruction: =======================  
Condition: Fingerstick Blood Glucose Dose/Route: Insulin Units  
Condition: 150-199 mg/dl Dose/Route: 1 unit  
Condition: 200-249 mg/dl Dose/Route: 2 unit  
Condition: 250-299 mg/dl Dose/Route: 3 unit  
Condition: 300-349 mg/dl Dose/Route: 4 unit  
Condition: 350-399 mg/dl Dose/Route: 5 unit  
Condition: greater than or = 400 mg/dl Dose/Route: 6 unit Instruction: 
CallProvider  
Protocol Text:  
*If the corrective scale dose has been administered within the past 4 hours, do 
not use corrective scale again unless approved by prescriber*  
  
Continued  
lorazepam 0.5 mg tablet  
0.5 mg PO TID PRN (Reason: Anxiety)  
Patient Comments:  
7 day supply, filled 10/6/18  
famotidine 40 mg tablet  
40 mg PO QHS  
guaifenesin [Mucinex] 600 mg Tablet Extended Release 12hr  
1,200 mg PO BID 4 Days Qty: 16 0RF  
Paxlovid 300 mg (150 mg x 2)-100 mg tablets,dose pack  
1 tab PO DAILY  
Patient Comments:  
pt states has two more doses 24  
albuterol sulfate 2.5 mg /3 mL (0.083 %) solution for nebulization  
2.5 mg INHALATION QID PRN (Reason: shortness of breath or wheezing)  
Paxlovid 300 mg (150 mg x 2)-100 mg tablets,dose pack  
See Rx Instructions .ROUTE .COMPLEX  
Rx Instructions:  
take  mg tablets of nirmatrelvir with  mg tablet of ritonavir 
twice daily for 5 days  
albuterol sulfate 90 mcg/actuation HFA aerosol inhaler  
2 inh inhalation Q4HR PRN (Reason: shortness of breath or wheezing)  
Ozempic 2 mg/dose (8 mg/3 mL) pen injector  
2 mg subcut .weekly  
Patient Comments:  
  
(DME) Oxygen Unit  
See Rx Instructions .Route  
Rx Instructions:  
As directed at 4 liters DME [facility]  
fluticasone furoate-vilanterol [Breo Ellipta] 100-25 mcg/dose blister with 
device  
1 inh inhalation QDAY 30 Days Qty: 60 11RF  
  
Follow Up:  
Sherman Malone DO [Primary Care Provider] - 25 2:45 pm (You have been 
scheduled for a followup appointment for the following date and time, please 
call to reschedule if needed.)  
  
  
Exam  
Physical Exam  
Vital Signs:  
  
  
  
  
Temp Pulse Resp BP Pulse Ox O2 Del Method O2 Flow Rate  
  
97.5 F L 92 20 142/81 H 91 L Nasal Cannula 3  
  
25 09:31 25 09:31 25 09:31 25 09:31 25 09:31 
25 10:46 25 10:46  
  
  
  
Narrative:  
General: Awake alert, no acute distress  
HEENT: head atraumatic, normocephalic, moist mucous membranes  
Neck: supple no masses, no lymphadenopathy  
CVS: regular rate and rhythm, no murmurs or gallops  
Respiratory: Diminished breath sound bilaterally with polyphonic wheezing, 
symmetric expansion  
GI: soft, nondistended, nontender, positive bowel sounds with no organomegaly  
Extremity: moves all extremities, no restrictions of movements, no calf 
tenderness  
Neuro: AOx3, CN II-VII intact. Moves all extremities in all planes of motion.  
Skin: intact no rashes or lesions  
  
Diagnostic Studies  
Completed and Pending Studies  
Pending studies at discharge:  
  
  
25 05:00  
Comprehensive Metabolic Panel [CHEM] IN AM  
  
  
  
Labs on day of discharge:  
  
  
25 06:07: Corrected WBC 17.6 H, Uncorrected WBC Count 17.6 H, RBC 4.36, 
Hgb 13.8, Hct 40.6, MCV 93.2, MCH 31.6, MCHC 33.9, RDW 12.1, Plt Count 234, MPV 
7.6, Neut % (Auto) N/A, Lymph % (Auto) N/A, Mono % (Auto) N/A, Eos % (Auto) N/A,
 Baso % (Auto) N/A, Nucleat RBC Rel Count N/A, Neut # (Auto) N/A, Lymph # (Auto)
 N/A, Mono # (Auto) N/A, Eos # (Auto) N/A, Baso # (Auto) N/A, Lymphocytes % 5 L,
 Monocytes % 5, Metamyelocytes % 1 H, Myelocytes % 3 H, Segmented Neutrophils 87
 H, Platelet EstimateNormal, Plt Morphology Comment Normal, RBC Morphology 
Normal, PHA Creatinine Clear 128.50, Sodium 137, Potassium 4.7, Chloride 97 L, 
Carbon Dioxide 35.8 H, Anion Gap 8.9, BUN 20, Creatinine 0.58 L, Est GFR (CKD-
EPI) > 60.0, Glucose 280 H, Calcium 8.7, Total Bilirubin 0.3, AST 11 L, ALT 17, 
Alkaline Phosphatase 72, Total Protein 6.1 L, Albumin 3.5, Globulin 2.6, 
Albumin/Globulin Ratio 1.3  
25 16:39: POC Glucose 369  
  
  
  
  
  
Documented By: Jordon Vásquez MD 25 [id number]  
Signed By:  
25 [id number]  
[facility]01- Progress note  
  
  
  
                                        Author              Jordon Vásquez  
[facility]  
   
                                        Note Date/Time      2025 5:  
04pm  
   
                                                    [facility]  
ENTER  
[address] [place] [address]  
  
Hospitalist Progress Note  
Signed  
  
Patient: Bev Gaming MR#: [id number]  
[id number]  
: 1965 Acct:[id number]  
  
Age/Sex: 59 / F Adm Date:   
5  
Loc:  Room: [address] [place] [address] Type: ADM IN  
Attending Dr: Jordon Vásquez MD  
  
Copies to: ~  
  
  
Date of Service:  
2025  
  
  
Subjective  
Subjective  
Narrative:  
Patient states he uses 4 L by nasal cannula at home. She was on 6 L of nasal 
cannula   
which was weaned down to 4 L. Mentioned that her headache has improved. Patient 
was   
lying in the bed. Denies any other complaints.  
  
Exam  
Physical Exam  
Vital Signs:  
  
  
  
  
Temp Pulse Resp BP Pulse Ox O2 Del Method O2 Flow Rate  
  
98 F 93 20 181/89 H 94 L Nasal Cannula 5  
  
25 15:03 25 16:38 25 16:38 25 15:03 25 15:03 
25   
16:00 25 16:00  
  
  
  
Narrative:  
General: Awake alert, no acute distress  
HEENT: head atraumatic, normocephalic, moist mucous membranes  
Neck: supple no masses, no lymphadenopathy  
CVS: regular rate and rhythm, no murmurs or gallops  
Respiratory: Diminished breath sound bilaterally with polyphonic wheezing, 
symmetric   
expansion  
GI: soft, nondistended, nontender, positive bowel sounds with no organomegaly  
Extremity: moves all extremities, no restrictions of movements, no calf 
tenderness  
Neuro: AOx3, CN II-VII intact. Moves all extremities in all planes of motion.  
Skin: intact no rashes or lesions  
  
Objective  
Lab Results  
  
25 04:41  
  
25 04:41  
  
  
Meds  
Allergies and Active Meds  
Allergies  
  
cephalexin (Keflex) Allergy (Unknown, Verified 10/21/24 14:03)  
Unknown Reaction  
metoclopramide (From Reglan) Allergy (Unknown, Verified 10/21/24 14:03)  
Agitated  
quetiapine (Seroquel) Allergy (Unknown, Verified 10/21/24 14:03)  
Unknown Reaction  
risperidone (From Risperdal) Allergy (Unknown, Verified 10/21/24 14:03)  
Confusion  
sulfacetamide (Sulfacet-R) Allergy (Unknown, Verified 10/21/24 14:03)  
Hives  
venlafaxine (Effexor) Allergy (Unknown, Verified 10/21/24 14:03)  
Unknown Reaction  
Penicillins Adverse Reaction (Verified 10/21/24 14:03)  
Hives  
topiramate (From Topamax) Adverse Reaction (Verified 10/21/24 14:03)  
Hallucinating  
  
  
Active Meds:  
Active Medications  
  
  
  
Generic Name Dose Route Start Last Admin  
  
Trade Name Freq PRN Reason Stop Dose Admin  
  
Acetaminophen 1,000 mg 25 03:16 25 14:02  
  
Acetaminophen 500 Mg Tablet PO 26 03:15 1,000 mg  
  
Q6HR PRN Administration  
  
Pain Scale 1 - 3 or fever  
  
Albuterol 2.5 mg 25 03:16  
  
Albuterol Neb 2.5 Mg/3 Ml Vial.Neb INHALATION 26 03:15  
  
Q3H PRN  
  
Cough/Wheeze  
  
Albuterol/Ipratropium 3 ml 25 08:00 25 16:37  
  
Ipratropium/Albuterol 0.5-3 Mg 3 Ml Ampul.Neb INHALATION 26 07:59 3 ml  
  
QID.RESP AYDEE Administration  
  
Amlodipine Besylate 5 mg 25 12:00 25 08:47  
  
Amlodipine 5 Mg Tablet PO 26 11:59 5 mg  
  
DAILY AYDEE Administration  
  
Azithromycin 500 mg 25 12:00 25 08:46  
  
Azithromycin 250 Mg Tablet  mg  
  
DAILY AYDEE Administration  
  
Benzonatate 200 mg 25 03:45 25 15:48  
  
Benzonatate 100 Mg Capsule PO 26 03:44 200 mg  
  
TID PRN Administration  
  
Cough  
  
Budesonide/Formoterol Fumarate 2 puff 25 09:00 25 08:53  
  
Budesonide/Formoterol 160-4.5 Mcg 60 Puff/6 Gm Hfa.Aer.Ad INHALATION 26 
08:59 2   
puff  
  
BID AYDEE Administration  
  
Dexamethasone 2 mg/ 6 mg 25 18:00 25 08:46  
  
Dexamethasone 4 mg PO 26 17:59 6 mg  
  
DAILY AYDEE Administration  
  
Dextrose 0 gm 25 03:16  
  
Dextrose 50% In Water 25 Gm/50 Ml Syringe IV-PUSH 26 03:15  
  
PRN PRN  
  
Hypoglycemia  
  
Enoxaparin Sodium 40 mg 25 10:00 25 10:51  
  
Enoxaparin 40 Mg/0.4 Ml Syringe SUBCUT 26 09:59 40 mg  
  
DAILY@10 AYDEE Administration  
  
Famotidine 40 mg 25 22:00 25 21:47  
  
Famotidine 20 Mg Tablet PO 26 21:59 40 mg  
  
QHS AYDEE Administration  
  
Glucose 0 gm 25 03:16  
  
Dextrose 40% Gel 15 Gm Tube PO 26 03:15  
  
PRN PRN  
  
Hypoglycemia  
  
Guaifenesin 20 ml 25 03:16 25 14:57  
  
Guaifenesin Syrup 10 Ml Udc PO 26 03:15 20 ml  
  
Q6H PRN Administration  
  
Cough  
  
Guaifenesin 1,200 mg 25 09:00 25 08:47  
  
Guaifenesin 600 Mg Tab.Er.12h PO 26 08:59 1,200 mg  
  
BID AYDEE Administration  
  
Insulin Aspart 0 units 25 08:00 25 12:21  
  
Insulin Aspart 300 Units/3 Ml SUBCUT 26 07:59 4 units  
  
TID.WM.HS AYDEE Administration  
  
Protocol  
  
Ketorolac Tromethamine 15 mg 25 05:18 25 14:57  
  
Ketorolac Tromethamine 15 Mg/Ml Vial IV-PUSH 01/10/25 05:17 15 mg  
  
Q6H PRN Administration  
  
Pain Scale 4 - 7  
  
Lorazepam 0.5 mg 25 03:21 25 14:02  
  
Lorazepam 0.5 Mg Tablet PO 25 03:20 0.5 mg  
  
TID PRN Administration  
  
Anxiety  
  
Melatonin 5 mg 25 03:16 25 21:47  
  
Melatonin 5 Mg Tablet PO 26 03:15 5 mg  
  
QHS PRN Administration  
  
Insomnia  
  
Ondansetron HCl 4 mg 25 03:16 25 06:56  
  
Ondansetron 4 Mg/2 Ml Vial IV-PUSH 26 03:15 4 mg  
  
Q6H PRN Administration  
  
Nausea And Vomiting  
  
Sodium Chloride 0 ml 25 17:55 25 23:10  
  
Sodium Chloride 0.9 % 10 Ml Syringe IV-PUSH 26 17:54 10 ml  
  
PRN PRN Administration  
  
Flush  
  
Sodium Chloride 0 ml 25 06:00 25 14:07  
  
Sodium Chloride 0.9 % 10 Ml Syringe IV-PUSH 26 05:59 Not Given  
  
[facility]  
  
  
  
  
A&P - Hospitalist  
Assessment/Plan  
(1) Acute and chronic respiratory failure with hypoxia:  
(2) COPD exacerbation:  
(3) COVID-19 virus infection:  
(4) Diabetes mellitus:  
  
Plan  
Ms. Gaming a 59-year-old female with a PMH of COPD, alpha-1 antitrypsin 
deficiency,   
chronic hypoxic respiratory failure on 4 L nasal cannula at home, tobacco 
dependence,   
GERD, anxiety the presented to the emergency room for complaints of shortness of
   
breath. Patient reports she was diagnosed with COVIDon Tuesday, started feeling   
symptoms on Monday, with a dry barky cough and feeling generally crappy. Reports
 a   
productive cough, thick sputum, sometimes at screen symptoms is white. She also   
complains of a headache, chest tightness. She denies fever, chills, nausea or   
vomiting, diarrhea. States that when she gets up and walks around her pulse ox 
drops   
into the 70s and everything gets tingly and she feels like she will pass out. 
She   
reports starting Paxlovid on Tuesday when she was diagnosed with COVID, has 1 
day   
left. She states that she is a pack-a-day smoker, has not smoked since Tuesday.   
Denies alcohol or illicitdrug use. She does report wearing 4 L nasal cannula at 
home,   
reports it is ordered as at all times but on good days she can walk around the 
house   
without it. Chest x-ray shows no acute cardiopulmonary pathology. EKG is normal 
sinus   
rhythm. CBC is unremarkable. CMP with a serum bicarb of 34.5, glucose 202, 
otherwise   
unremarkable. UA is negative for infection. Patient was medicated with DuoNeb's,
   
oxycodone, Zofran and methylprednisolone. She will be admitted as inpatient to 
the   
[facility] telemetry floor. Patient is already out of window for remdesivir. 
Patient   
currently on bronchodilator, steroid. Azithromycin added.  
  
Acute on chronic respiratory failure with hypoxia?likely due to COVID-19  
Acute exacerbation of COPD  
Covid 19- symptoms began Monday, positive test Tuesday-started paxlovid-she 
reports 1   
day left on Paxlovid pack  
? Continue oxygen as needed, patient wears 4 L nasal cannula at home  
? Keep SpO2 greater than 90%  
? DuoNebs 4 times daily, albuterol as needed  
? Mucinex twice daily, Robitussin, tessalon as needed  
? Steroid switched to dexamethasone which will help with her COVID infection as 
well   
as her headache with Compazine.  
? Acetaminophen, Toradol as needed  
? Continue inhaler-Breo ellipta  
- Supportive therapy  
  
Tobacco abuse- tobacco cessation, encouraged abstinence  
  
Chronic conditions  
T2DM?fingersticks ACHS, SSI AC, A1c in a.m.  
Anxiety?lorazepam as needed  
GERD?famotidine  
  
DVT PPx?SCDs, enoxaparin  
CODE STATUS?full code  
  
  
  
Documented By: Jordon Vásquez MD 25  
Signed By: <Electronically signed by Jordon Vásquez MD>  
25 1704  
   
  
[facility]  
Work Phone: [phone][place] [address]  
  
Hospitalist Progress Note  
Signed  
  
Patient: Bev Gaming MR#: [id number]  
[id number]  
: 1965 Acct:[id number]  
  
Age/Sex: 59 / F Adm Date:   
5  
Loc:  Room: [address] [place] [address] Type: ADM IN  
Attending Dr: Jordon Vásquez MD  
  
Copies to: ~  
  
  
Date of Service:  
2025  
  
  
Subjective  
Subjective  
Narrative:  
Patient states he uses 4 L by nasal cannula at home. She was on 6 L of nasal 
cannula which was weaned down to 4 L. Mentioned that her headache has improved. 
Patient was lying in the bed. Denies any other complaints.  
  
Exam  
Physical Exam  
Vital Signs:  
  
  
  
  
Temp Pulse Resp BP Pulse Ox O2 Del Method O2 Flow Rate  
  
98 F 93 20 181/89 H 94 L Nasal Cannula 5  
  
25 15:03 25 16:38 25 16:38 25 15:03 25 15:03 
25 16:00 25 16:00  
  
  
  
Narrative:  
General: Awake alert, no acute distress  
HEENT: head atraumatic, normocephalic, moist mucous membranes  
Neck: supple no masses, no lymphadenopathy  
CVS: regular rate and rhythm, no murmurs or gallops  
Respiratory: Diminished breath sound bilaterally with polyphonic wheezing, 
symmetric expansion  
GI: soft, nondistended, nontender, positive bowel sounds with no organomegaly  
Extremity: moves all extremities, no restrictions of movements, no calf 
tenderness  
Neuro: AOx3, CN II-VII intact. Moves all extremities in all planes of motion.  
Skin: intact no rashes or lesions  
  
Objective  
Lab Results  
  
25 04:41  
  
25 04:41  
  
  
Meds  
Allergies and Active Meds  
Allergies  
  
cephalexin (Keflex) Allergy (Unknown, Verified 10/21/24 14:03)  
Unknown Reaction  
metoclopramide (From Reglan) Allergy (Unknown, Verified 10/21/24 14:03)  
Agitated  
quetiapine (Seroquel) Allergy (Unknown, Verified 10/21/24 14:03)  
Unknown Reaction  
risperidone (From Risperdal) Allergy (Unknown, Verified 10/21/24 14:03)  
Confusion  
sulfacetamide (Sulfacet-R) Allergy (Unknown, Verified 10/21/24 14:03)  
Hives  
venlafaxine (Effexor) Allergy (Unknown, Verified 10/21/24 14:03)  
Unknown Reaction  
Penicillins Adverse Reaction (Verified 10/21/24 14:03)  
Hives  
topiramate (From Topamax) Adverse Reaction (Verified 10/21/24 14:03)  
Hallucinating  
  
  
Active Meds:  
Active Medications  
  
  
  
Generic Name Dose Route Start Last Admin  
  
Trade Name Freq PRN Reason Stop Dose Admin  
  
Acetaminophen 1,000 mg 25 03:16 25 14:02  
  
Acetaminophen 500 Mg Tablet PO 26 03:15 1,000 mg  
  
Q6HR PRN Administration  
  
Pain Scale 1 - 3 or fever  
  
Albuterol 2.5 mg 25 03:16  
  
Albuterol Neb 2.5 Mg/3 Ml Vial.Neb INHALATION 26 03:15  
  
Q3H PRN  
  
Cough/Wheeze  
  
Albuterol/Ipratropium 3 ml 25 08:00 25 16:37  
  
Ipratropium/Albuterol 0.5-3 Mg 3 Ml Ampul.Neb INHALATION 26 07:59 3 ml  
  
QID.RESP AYDEE Administration  
  
Amlodipine Besylate 5 mg 25 12:00 25 08:47  
  
Amlodipine 5 Mg Tablet PO 26 11:59 5 mg  
  
DAILY AYDEE Administration  
  
Azithromycin 500 mg 25 12:00 25 08:46  
  
Azithromycin 250 Mg Tablet  mg  
  
DAILY AYDEE Administration  
  
Benzonatate 200 mg 25 03:45 25 15:48  
  
Benzonatate 100 Mg Capsule PO 26 03:44 200 mg  
  
TID PRN Administration  
  
Cough  
  
Budesonide/Formoterol Fumarate 2 puff 25 09:00 25 08:53  
  
Budesonide/Formoterol 160-4.5 Mcg 60 Puff/6 Gm Hfa.Aer.Ad INHALATION 26 
08:59 2 puff  
  
BID AYDEE Administration  
  
Dexamethasone 2 mg/ 6 mg 25 18:00 25 08:46  
  
Dexamethasone 4 mg PO 26 17:59 6 mg  
  
DAILY AYDEE Administration  
  
Dextrose 0 gm 25 03:16  
  
Dextrose 50% In Water 25 Gm/50 Ml Syringe IV-PUSH 26 03:15  
  
PRN PRN  
  
Hypoglycemia  
  
Enoxaparin Sodium 40 mg 25 10:00 25 10:51  
  
Enoxaparin 40 Mg/0.4 Ml Syringe SUBCUT 26 09:59 40 mg  
  
DAILY@10 AYDEE Administration  
  
Famotidine 40 mg 25 22:00 25 21:47  
  
Famotidine 20 Mg Tablet PO 26 21:59 40 mg  
  
QHS AYDEE Administration  
  
Glucose 0 gm 25 03:16  
  
Dextrose 40% Gel 15 Gm Tube PO 26 03:15  
  
PRN PRN  
  
Hypoglycemia  
  
Guaifenesin 20 ml 25 03:16 25 14:57  
  
Guaifenesin Syrup 10 Ml Udc PO 26 03:15 20 ml  
  
Q6H PRN Administration  
  
Cough  
  
Guaifenesin 1,200 mg 25 09:00 25 08:47  
  
Guaifenesin 600 Mg Tab.Er.12h PO 26 08:59 1,200 mg  
  
BID AYDEE Administration  
  
Insulin Aspart 0 units 25 08:00 25 12:21  
  
Insulin Aspart 300 Units/3 Ml SUBCUT 26 07:59 4 units  
  
TID.WM.HS AYDEE Administration  
  
Protocol  
  
Ketorolac Tromethamine 15 mg 25 05:18 25 14:57  
  
Ketorolac Tromethamine 15 Mg/Ml Vial IV-PUSH 01/10/25 05:17 15 mg  
  
Q6H PRN Administration  
  
Pain Scale 4 - 7  
  
Lorazepam 0.5 mg 25 03:21 25 14:02  
  
Lorazepam 0.5 Mg Tablet PO 25 03:20 0.5 mg  
  
TID PRN Administration  
  
Anxiety  
  
Melatonin 5 mg 25 03:16 25 21:47  
  
Melatonin 5 Mg Tablet PO 26 03:15 5 mg  
  
QHS PRN Administration  
  
Insomnia  
  
Ondansetron HCl 4 mg 25 03:16 25 06:56  
  
Ondansetron 4 Mg/2 Ml Vial IV-PUSH 26 03:15 4 mg  
  
Q6H PRN Administration  
  
Nausea And Vomiting  
  
Sodium Chloride 0 ml 25 17:55 25 23:10  
  
Sodium Chloride 0.9 % 10 Ml Syringe IV-PUSH 26 17:54 10 ml  
  
PRN PRN Administration  
  
Flush  
  
Sodium Chloride 0 ml 25 06:00 25 14:07  
  
Sodium Chloride 0.9 % 10 Ml Syringe IV-PUSH 26 05:59 Not Given  
  
[facility]  
  
  
  
  
A&P - Hospitalist  
Assessment/Plan  
(1) Acute and chronic respiratory failure with hypoxia:  
(2) COPD exacerbation:  
(3) COVID-19 virus infection:  
(4) Diabetes mellitus:  
  
Plan  
Ms. Gaming a 59-year-old female with a PMH of COPD, alpha-1 antitrypsin 
deficiency, chronic hypoxicrespiratory failure on 4 L nasal cannula at home, 
tobacco dependence, GERD, anxiety the presented to the emergency room for 
complaints of shortness of breath. Patient reports she was diagnosed with CO
VIDon Tuesday, started feeling symptoms on Monday, with a dry barky cough and 
feeling generally crappy. Reports a productive cough, thick sputum, sometimes at
 screen symptoms is white. She also complains of a headache, chest tightness. 
She denies fever, chills, nausea or vomiting, diarrhea. States that when she 
gets up and walks around her pulse ox drops into the 70s and everything gets 
tingly and she feels like she will pass out. She reports starting Paxlovid on 
Tuesday when she was diagnosed with COVID, has 1 day left. She states that she 
is a pack-a-day smoker, has not smoked since Tuesday. Denies alcohol or 
illicitdrug use. She does report wearing 4 L nasal cannula at home, reports it 
is ordered as at all times but on good days she can walk around the house 
without it. Chest x-ray shows no acute cardiopulmonary pathology. EKG is normal 
sinus rhythm. CBC is unremarkable. CMP with a serum bicarb of 34.5, glucose 202,
 otherwise unremarkable. UA is negative for infection. Patient was medicated 
with DuoNeb's, oxycodone, Zofran and methylprednisolone. She will be admitted as
 inpatientto the [facility] telemetry floor. Patient is already out of window for 
remdesivir. Patient currently on bronchodilator, steroid. Azithromycin added.  
  
Acute on chronic respiratory failure with hypoxia?likely due to COVID-19  
Acute exacerbation of COPD  
Covid 19- symptoms began Monday, positive test Tuesday-started paxlovid-she 
reports 1 day left on Paxlovid pack  
? Continue oxygen as needed, patient wears 4 L nasal cannula at home  
? Keep SpO2 greater than 90%  
? DuoNebs 4 times daily, albuterol as needed  
? Mucinex twice daily, Robitussin, tessalon as needed  
? Steroid switched to dexamethasone which will help with her COVID infection as 
well as her headache with Compazine.  
? Acetaminophen, Toradol as needed  
? Continue inhaler-Breo ellipta  
- Supportive therapy  
  
Tobacco abuse- tobacco cessation, encouraged abstinence  
  
Chronic conditions  
T2DM?fingersticks ACHS, SSI AC, A1c in a.m.  
Anxiety?lorazepam as needed  
GERD?famotidine  
  
DVT PPx?SCDs, enoxaparin  
CODE STATUS?full code  
  
  
  
Documented By: Jordon Vásquez MD 25  
Signed By:  
25  
[facility]- Progress note  
  
  
  
                                        Author              Jordon Vásquez  
[facility]  
   
                                        Note Date/Time      2025 5:  
05pm  
   
                                                    [facility]  
ENTER  
[address] [place] [address]  
  
Hospitalist Progress Note  
Signed  
  
Patient: Bev Gaming MR#: MARIANA  
[id number]  
: 1965 Acct:[id number]  
  
Age/Sex: 59 / F Adm Date:   
5  
Loc:  Room: [address] [place] [address] Type: ADM IN  
Attending Dr: Jordon Vásquez MD  
  
Copies to: ~  
  
  
Date of Service:  
2025  
  
  
Subjective  
Subjective  
Narrative:  
Patient states he uses 4 L by nasal cannula at home. She continues to be on 4 
Lbut   
mentions she is very short of breath especially on exertion. She also mentioned   
having intractable headache not relieved with Tylenol/Toradol and Percocet. 
Patient   
was lying in the bed. Denies any other complaints.  
  
Exam  
Physical Exam  
Vital Signs:  
  
  
  
  
Temp Pulse Resp BP Pulse Ox O2 Del Method O2 Flow Rate  
  
97.7 F 109 H 22 162/63 H 91 L Nasal Cannula 4  
  
25 15:53 25 15:53 25 15:53 25 15:53 25 15:53 
25   
16:00 25 16:00  
  
  
  
Narrative:  
General: Awake alert, no acute distress  
HEENT: head atraumatic, normocephalic, moist mucous membranes  
Neck: supple no masses, no lymphadenopathy  
CVS: regular rate and rhythm, no murmurs or gallops  
Respiratory: Diminished breath sound bilaterally with polyphonic wheezing, 
symmetric   
expansion  
GI: soft, nondistended, nontender, positive bowel sounds with no organomegaly  
Extremity: moves all extremities, no restrictions of movements, no calf 
tenderness  
Neuro: AOx3, CN II-VII intact. Moves all extremities in all planes of motion.  
Skin: intact no rashes or lesions  
  
Objective  
Lab Results  
  
25 05:02  
  
25 13:08  
  
  
Meds  
Allergies and Active Meds  
Allergies  
  
cephalexin (Keflex) Allergy (Unknown, Verified 10/21/24 14:03)  
Unknown Reaction  
metoclopramide (From Reglan) Allergy (Unknown, Verified 10/21/24 14:03)  
Agitated  
quetiapine (Seroquel) Allergy (Unknown, Verified 10/21/24 14:03)  
Unknown Reaction  
risperidone (From Risperdal) Allergy (Unknown, Verified 10/21/24 14:03)  
Confusion  
sulfacetamide (Sulfacet-R) Allergy (Unknown, Verified 10/21/24 14:03)  
Hives  
venlafaxine (Effexor) Allergy (Unknown, Verified 10/21/24 14:03)  
Unknown Reaction  
Penicillins Adverse Reaction (Verified 10/21/24 14:03)  
Hives  
topiramate (From Topamax) Adverse Reaction (Verified 10/21/24 14:03)  
Hallucinating  
  
  
Active Meds:  
Active Medications  
  
  
  
Generic Name Dose Route Start Last Admin  
  
Trade Name Freq PRN Reason Stop Dose Admin  
  
Acetaminophen 1,000 mg 25 03:16 25 15:48  
  
Acetaminophen 500 Mg Tablet PO 26 03:15 1,000 mg  
  
Q6HR PRN Administration  
  
Pain Scale 1 - 3 or fever  
  
Albuterol 2.5 mg 25 03:16  
  
Albuterol Neb 2.5 Mg/3 Ml Vial.Neb INHALATION 26 03:15  
  
Q3H PRN  
  
Cough/Wheeze  
  
Albuterol/Ipratropium 3 ml 25 08:00 25 13:51  
  
Ipratropium/Albuterol 0.5-3 Mg 3 Ml Ampul.Neb INHALATION 26 07:59 3 ml  
  
QID.RESP AYDEE Administration  
  
Amlodipine Besylate 5 mg 25 12:00 25 12:55  
  
Amlodipine 5 Mg Tablet PO 26 11:59 5 mg  
  
DAILY AYDEE Administration  
  
Azithromycin 500 mg 25 12:00 25 09:10  
  
Azithromycin 250 Mg Tablet  mg  
  
DAILY AYDEE Administration  
  
Benzonatate 200 mg 25 03:45 25 15:48  
  
Benzonatate 100 Mg Capsule PO 26 03:44 200 mg  
  
TID PRN Administration  
  
Cough  
  
Budesonide/Formoterol Fumarate 2 puff 25 09:00 25 06:15  
  
Budesonide/Formoterol 160-4.5 Mcg 60 Puff/6 Gm Hfa.Aer.Ad INHALATION 26 
08:59 2   
puff  
  
BID AYDEE Administration  
  
Dexamethasone 2 mg/ 6 mg 25 16:45  
  
Dexamethasone 4 mg PO 01/15/25 16:44  
  
DAILY AYDEE  
  
Dextrose 0 gm 25 03:16  
  
Dextrose 50% In Water 25 Gm/50 Ml Syringe IV-PUSH 26 03:15  
  
PRN PRN  
  
Hypoglycemia  
  
Enoxaparin Sodium 40 mg 25 10:00 25 10:29  
  
Enoxaparin 40 Mg/0.4 Ml Syringe SUBCUT 26 09:59 40 mg  
  
DAILY@10 AYDEE Administration  
  
Famotidine 40 mg 25 22:00 25 21:18  
  
Famotidine 20 Mg Tablet PO 26 21:59 40 mg  
  
QHS AYDEE Administration  
  
Glucose 0 gm 25 03:16  
  
Dextrose 40% Gel 15 Gm Tube PO 26 03:15  
  
PRN PRN  
  
Hypoglycemia  
  
Guaifenesin 20 ml 25 03:16 25 15:48  
  
Guaifenesin Syrup 10 Ml Udc PO 26 03:15 20 ml  
  
Q6H PRN Administration  
  
Cough  
  
Guaifenesin 1,200 mg 25 09:00 25 09:10  
  
Guaifenesin 600 Mg Tab.Er.12h PO 26 08:59 1,200 mg  
  
BID AYDEE Administration  
  
Insulin Aspart 0 units 25 08:00 25 12:56  
  
Insulin Aspart 300 Units/3 Ml SUBCUT 26 07:59 4 units  
  
TID.WM.HS AYDEE Administration  
  
Protocol  
  
Ketorolac Tromethamine 15 mg 25 05:18 25 12:56  
  
Ketorolac Tromethamine 15 Mg/Ml Vial IV-PUSH 01/10/25 05:17 15 mg  
  
Q6H PRN Administration  
  
Pain Scale 4 - 7  
  
Lorazepam 0.5 mg 25 03:21  
  
Lorazepam 0.5 Mg Tablet PO 25 03:20  
  
TID PRN  
  
Anxiety  
  
Melatonin 5 mg 25 03:16 25 21:18  
  
Melatonin 5 Mg Tablet PO 26 03:15 5 mg  
  
QHS PRN Administration  
  
Insomnia  
  
Ondansetron HCl 4 mg 25 03:16 25 06:56  
  
Ondansetron 4 Mg/2 Ml Vial IV-PUSH 26 03:15 4 mg  
  
Q6H PRN Administration  
  
Nausea And Vomiting  
  
Prochlorperazine Edisylate 5 mg 25 16:38  
  
Prochlorperazine Edisylate 10 Mg/2 Ml Vial IV-PUSH 25 16:39  
  
ONCE ONE  
  
Sodium Chloride 0 ml 25 17:55 25 23:10  
  
Sodium Chloride 0.9 % 10 Ml Syringe IV-PUSH 26 17:54 10 ml  
  
PRN PRN Administration  
  
Flush  
  
Sodium Chloride 0 ml 25 06:00 25 15:51  
  
Sodium Chloride 0.9 % 10 Ml Syringe IV-PUSH 26 05:59 10 ml  
  
QSHIFT AYDEE Administration  
  
  
  
  
A&P - Hospitalist  
Assessment/Plan  
(1) Acute and chronic respiratory failure with hypoxia:  
(2) COPD exacerbation:  
(3) COVID-19 virus infection:  
(4) Diabetes mellitus:  
  
Plan  
Ms. Gaming a 59-year-old female with a PMH of COPD, alpha-1 antitrypsin 
deficiency,   
chronic hypoxic respiratory failure on 4 L nasal cannula at home, tobacco 
dependence,   
GERD, anxiety the presented to the emergency room for complaints of shortness of
   
breath. Patient reports she was diagnosed with COVIDon Tuesday, started feeling   
symptoms on Monday, with a dry barky cough and feeling generally crappy. Reports
 a   
productive cough, thick sputum, sometimes at screen symptoms is white. She also   
complains of a headache, chest tightness. She denies fever, chills, nausea or   
vomiting, diarrhea. States that when she gets up and walks around her pulse ox 
drops   
into the 70s and everything gets tingly and she feels like she will pass out. 
She   
reports starting Paxlovid on Tuesday when she was diagnosed with COVID, has 1 
day   
left. She states that she is a pack-a-day smoker, has not smoked since Tuesday.   
Denies alcohol or illicitdrug use. She does report wearing 4 L nasal cannula at 
home,   
reports it is ordered as at all times but on good days she can walk around the 
house   
without it. Chest x-ray shows no acute cardiopulmonary pathology. EKG is normal 
sinus   
rhythm. CBC is unremarkable. CMP with a serum bicarb of 34.5, glucose 202, 
otherwise   
unremarkable. UA is negative for infection. Patient was medicated with DuoNeb's,
   
oxycodone, Zofran and methylprednisolone. She will be admitted as inpatient to 
the   
[facility] telemetry floor. Patient is already out of window for remdesivir. 
Patient   
currently on bronchodilator, steroid. Azithromycin added.  
  
Acute on chronic respiratory failure with hypoxia?likely due to COVID-19  
Acute exacerbation of COPD  
Covid 19- symptoms began Monday, positive test Tuesday-started paxlovid-she 
reports 1   
day left on Paxlovid pack  
? Continue oxygen as needed, patient wears 4 L nasal cannula at home  
? Keep SpO2 greater than 90%  
? DuoNebs 4 times daily, albuterol as needed  
? Mucinex twice daily, Robitussin, tessalon as needed  
? Steroid switched to dexamethasone which will help with her COVID infection as 
well   
as her headache with Compazine.  
? Acetaminophen, Toradol as needed  
? Continue inhaler-Breo ellipta  
- Supportive therapy  
  
Tobacco abuse- tobacco cessation, encouraged abstinence  
  
Chronic conditions  
T2DM?fingersticks ACHS, SSI AC, A1c in a.m.  
Anxiety?lorazepam as needed  
GERD?famotidine  
  
DVT PPx?SCDs, enoxaparin  
CODE STATUS?full code  
  
  
  
Documented By: Jordon Vásquez MD 25  
Signed By: <Electronically signed by Jordon Vásquez MD>  
25  
   
  
[facility]  
Work Phone: [phone][place] [address]  
  
Hospitalist Progress Note  
Signed  
  
Patient: Bev Gaming MR#: [id number]  
[id number]  
: 1965 Acct:[id number]  
  
Age/Sex: 59 / F Adm Date:   
5  
Loc:  Room: [address] [place] [address] Type: ADM IN  
Attending Dr: Jordon Vásquez MD  
  
Copies to: ~  
  
  
Date of Service:  
2025  
  
  
Subjective  
Subjective  
Narrative:  
Patient states he uses 4 L by nasal cannula at home. She continues to be on 4 
Lbut mentions she is very short of breath especially on exertion. She also 
mentioned having intractable headache not relieved with Tylenol/Toradol and 
Percocet. Patient was lying in the bed. Denies any other complaints.  
  
Exam  
Physical Exam  
Vital Signs:  
  
  
  
  
Temp Pulse Resp BP Pulse Ox O2 Del Method O2 Flow Rate  
  
97.7 F 109 H 22 162/63 H 91 L Nasal Cannula 4  
  
25 15:53 25 15:53 25 15:53 25 15:53 25 15:53 
25 16:00 25 16:00  
  
  
  
Narrative:  
General: Awake alert, no acute distress  
HEENT: head atraumatic, normocephalic, moist mucous membranes  
Neck: supple no masses, no lymphadenopathy  
CVS: regular rate and rhythm, no murmurs or gallops  
Respiratory: Diminished breath sound bilaterally with polyphonic wheezing, 
symmetric expansion  
GI: soft, nondistended, nontender, positive bowel sounds with no organomegaly  
Extremity: moves all extremities, no restrictions of movements, no calf 
tenderness  
Neuro: AOx3, CN II-VII intact. Moves all extremities in all planes of motion.  
Skin: intact no rashes or lesions  
  
Objective  
Lab Results  
  
25 05:02  
  
25 13:08  
  
  
Meds  
Allergies and Active Meds  
Allergies  
  
cephalexin (Keflex) Allergy (Unknown, Verified 10/21/24 14:03)  
Unknown Reaction  
metoclopramide (From Reglan) Allergy (Unknown, Verified 10/21/24 14:03)  
Agitated  
quetiapine (Seroquel) Allergy (Unknown, Verified 10/21/24 14:03)  
Unknown Reaction  
risperidone (From Risperdal) Allergy (Unknown, Verified 10/21/24 14:03)  
Confusion  
sulfacetamide (Sulfacet-R) Allergy (Unknown, Verified 10/21/24 14:03)  
Hives  
venlafaxine (Effexor) Allergy (Unknown, Verified 10/21/24 14:03)  
Unknown Reaction  
Penicillins Adverse Reaction (Verified 10/21/24 14:03)  
Hives  
topiramate (From Topamax) Adverse Reaction (Verified 10/21/24 14:03)  
Hallucinating  
  
  
Active Meds:  
Active Medications  
  
  
  
Generic Name Dose Route Start Last Admin  
  
Trade Name Freq PRN Reason Stop Dose Admin  
  
Acetaminophen 1,000 mg 25 03:16 25 15:48  
  
Acetaminophen 500 Mg Tablet PO 26 03:15 1,000 mg  
  
Q6HR PRN Administration  
  
Pain Scale 1 - 3 or fever  
  
Albuterol 2.5 mg 25 03:16  
  
Albuterol Neb 2.5 Mg/3 Ml Vial.Neb INHALATION 26 03:15  
  
Q3H PRN  
  
Cough/Wheeze  
  
Albuterol/Ipratropium 3 ml 25 08:00 25 13:51  
  
Ipratropium/Albuterol 0.5-3 Mg 3 Ml Ampul.Neb INHALATION 26 07:59 3 ml  
  
QID.RESP AYDEE Administration  
  
Amlodipine Besylate 5 mg 25 12:00 25 12:55  
  
Amlodipine 5 Mg Tablet PO 26 11:59 5 mg  
  
DAILY AYDEE Administration  
  
Azithromycin 500 mg 25 12:00 25 09:10  
  
Azithromycin 250 Mg Tablet  mg  
  
DAILY AYDEE Administration  
  
Benzonatate 200 mg 25 03:45 25 15:48  
  
Benzonatate 100 Mg Capsule PO 26 03:44 200 mg  
  
TID PRN Administration  
  
Cough  
  
Budesonide/Formoterol Fumarate 2 puff 25 09:00 25 06:15  
  
Budesonide/Formoterol 160-4.5 Mcg 60 Puff/6 Gm Hfa.Aer.Ad INHALATION 26 
08:59 2 puff  
  
BID AYDEE Administration  
  
Dexamethasone 2 mg/ 6 mg 25 16:45  
  
Dexamethasone 4 mg PO 01/15/25 16:44  
  
DAILY AYDEE  
  
Dextrose 0 gm 25 03:16  
  
Dextrose 50% In Water 25 Gm/50 Ml Syringe IV-PUSH 26 03:15  
  
PRN PRN  
  
Hypoglycemia  
  
Enoxaparin Sodium 40 mg 25 10:00 25 10:29  
  
Enoxaparin 40 Mg/0.4 Ml Syringe SUBCUT 26 09:59 40 mg  
  
DAILY@10 AYDEE Administration  
  
Famotidine 40 mg 25 22:00 25 21:18  
  
Famotidine 20 Mg Tablet PO 26 21:59 40 mg  
  
QHS AYDEE Administration  
  
Glucose 0 gm 25 03:16  
  
Dextrose 40% Gel 15 Gm Tube PO 26 03:15  
  
PRN PRN  
  
Hypoglycemia  
  
Guaifenesin 20 ml 25 03:16 25 15:48  
  
Guaifenesin Syrup 10 Ml Udc PO 26 03:15 20 ml  
  
Q6H PRN Administration  
  
Cough  
  
Guaifenesin 1,200 mg 25 09:00 25 09:10  
  
Guaifenesin 600 Mg Tab.Er.12h PO 26 08:59 1,200 mg  
  
BID AYDEE Administration  
  
Insulin Aspart 0 units 25 08:00 25 12:56  
  
Insulin Aspart 300 Units/3 Ml SUBCUT 26 07:59 4 units  
  
TID.WM.HS AYDEE Administration  
  
Protocol  
  
Ketorolac Tromethamine 15 mg 25 05:18 25 12:56  
  
Ketorolac Tromethamine 15 Mg/Ml Vial IV-PUSH 01/10/25 05:17 15 mg  
  
Q6H PRN Administration  
  
Pain Scale 4 - 7  
  
Lorazepam 0.5 mg 25 03:21  
  
Lorazepam 0.5 Mg Tablet PO 25 03:20  
  
TID PRN  
  
Anxiety  
  
Melatonin 5 mg 25 03:16 25 21:18  
  
Melatonin 5 Mg Tablet PO 26 03:15 5 mg  
  
QHS PRN Administration  
  
Insomnia  
  
Ondansetron HCl 4 mg 25 03:16 25 06:56  
  
Ondansetron 4 Mg/2 Ml Vial IV-PUSH 26 03:15 4 mg  
  
Q6H PRN Administration  
  
Nausea And Vomiting  
  
Prochlorperazine Edisylate 5 mg 25 16:38  
  
Prochlorperazine Edisylate 10 Mg/2 Ml Vial IV-PUSH 25 16:39  
  
ONCE ONE  
  
Sodium Chloride 0 ml 25 17:55 25 23:10  
  
Sodium Chloride 0.9 % 10 Ml Syringe IV-PUSH 26 17:54 10 ml  
  
PRN PRN Administration  
  
Flush  
  
Sodium Chloride 0 ml 25 06:00 25 15:51  
  
Sodium Chloride 0.9 % 10 Ml Syringe IV-PUSH 26 05:59 10 ml  
  
QSHIFT AYDEE Administration  
  
  
  
  
A&P - Hospitalist  
Assessment/Plan  
(1) Acute and chronic respiratory failure with hypoxia:  
(2) COPD exacerbation:  
(3) COVID-19 virus infection:  
(4) Diabetes mellitus:  
  
Plan  
Ms. Jonathan a 59-year-old female with a PMH of COPD, alpha-1 antitrypsin 
deficiency, chronic hypoxicrespiratory failure on 4 L nasal cannula at home, 
tobacco dependence, GERD, anxiety the presented to the emergency room for 
complaints of shortness of breath. Patient reports she was diagnosed with CO
VIDon Tuesday, started feeling symptoms on Monday, with a dry barky cough and 
feeling generally crappy. Reports a productive cough, thick sputum, sometimes at
 screen symptoms is white. She also complains of a headache, chest tightness. 
She denies fever, chills, nausea or vomiting, diarrhea. States that when she 
gets up and walks around her pulse ox drops into the 70s and everything gets 
tingly and she feels like she will pass out. She reports starting Paxlovid on 
Tuesday when she was diagnosed with COVID, has 1 day left. She states that she 
is a pack-a-day smoker, has not smoked since Tuesday. Denies alcohol or 
illicitdrug use. She does report wearing 4 L nasal cannula at home, reports it 
is ordered as at all times but on good days she can walk around the house 
without it. Chest x-ray shows no acute cardiopulmonary pathology. EKG is normal 
sinus rhythm. CBC is unremarkable. CMP with a serum bicarb of 34.5, glucose 202,
 otherwise unremarkable. UA is negative for infection. Patient was medicated 
with DuoNeb's, oxycodone, Zofran and methylprednisolone. She will be admitted as
 inpatientto the [facility] telemetry floor. Patient is already out of window for 
remdesivir. Patient currently on bronchodilator, steroid. Azithromycin added.  
  
Acute on chronic respiratory failure with hypoxia?likely due to COVID-19  
Acute exacerbation of COPD  
Covid 19- symptoms began Monday, positive test Tuesday-started paxlovid-she 
reports 1 day left on Paxlovid pack  
? Continue oxygen as needed, patient wears 4 L nasal cannula at home  
? Keep SpO2 greater than 90%  
? DuoNebs 4 times daily, albuterol as needed  
? Mucinex twice daily, Robitussin, tessalon as needed  
? Steroid switched to dexamethasone which will help with her COVID infection as 
well as her headache with Compazine.  
? Acetaminophen, Toradol as needed  
? Continue inhaler-Breo ellipta  
- Supportive therapy  
  
Tobacco abuse- tobacco cessation, encouraged abstinence  
  
Chronic conditions  
T2DM?fingersticks ACHS, SSI AC, A1c in a.m.  
Anxiety?lorazepam as needed  
GERD?famotidine  
  
DVT PPx?SCDs, enoxaparin  
CODE STATUS?full code  
  
  
  
Documented By: Jordon Vásquez MD 25  
Signed By:  
25  
[facility]- Progress note  
  
  
  
                                        Author              Jordon Vásquez  
[facility]  
   
                                        Note Date/Time      2025 3:  
05pm  
   
                                                    [facility]  
ENTER  
[address] [place] [address]  
  
Hospitalist Progress Note  
Signed  
  
Patient: Bev Gaming MR#: M  
[id number]  
: 1965 Acct:[id number]  
  
Age/Sex: 59 / F Adm Date:   
5  
Loc:  Room: [address] [place] [address] Type: ADM IN  
Attending Dr: Jordon Vásquez MD  
  
Copies to: ~  
  
  
Date of Service:  
2025  
  
  
Subjective  
Subjective  
Narrative:  
Patient states he uses 4 L by nasal cannula at home. She continues to be on 4L 
but   
mentions he is very short of breath especially on exertion. Patient was lying in
 the   
bed. Denies any other complaints.  
  
Exam  
Physical Exam  
Vital Signs:  
  
  
  
  
Temp Pulse Resp BP Pulse Ox O2 Del Method O2 Flow Rate  
  
98.3 F 93 20 156/70 H 92 L Nasal Cannula 4  
  
25 12:00 25 12:53 25 12:53 25 12:00 25 12:00 
25   
12:00 25 12:00  
  
  
  
Narrative:  
General: Awake alert, no acute distress  
HEENT: head atraumatic, normocephalic, moist mucous membranes  
Neck: supple no masses, no lymphadenopathy  
CVS: regular rate and rhythm, no murmurs or gallops  
Respiratory: Diminished breath sound bilaterally with polyphonic wheezing, 
symmetric   
expansion  
GI: soft, nondistended, nontender, positive bowel sounds with no organomegaly  
Extremity: moves all extremities, no restrictions of movements, no calf 
tenderness  
Neuro: AOx3, CN II-VII intact. Moves all extremities in all planes of motion.  
Skin: intact no rashes or lesions  
  
Objective  
Lab Results  
  
25 05:02  
  
25 05:02  
  
  
Meds  
Allergies and Active Meds  
Allergies  
  
cephalexin (Keflex) Allergy (Unknown, Verified 10/21/24 14:03)  
Unknown Reaction  
metoclopramide (From Reglan) Allergy (Unknown, Verified 10/21/24 14:03)  
Agitated  
quetiapine (Seroquel) Allergy (Unknown, Verified 10/21/24 14:03)  
Unknown Reaction  
risperidone (From Risperdal) Allergy (Unknown, Verified 10/21/24 14:03)  
Confusion  
sulfacetamide (Sulfacet-R) Allergy (Unknown, Verified 10/21/24 14:03)  
Hives  
venlafaxine (Effexor) Allergy (Unknown, Verified 10/21/24 14:03)  
Unknown Reaction  
Penicillins Adverse Reaction (Verified 10/21/24 14:03)  
Hives  
topiramate (From Topamax) Adverse Reaction (Verified 10/21/24 14:03)  
Hallucinating  
  
  
Active Meds:  
Active Medications  
  
  
  
Generic Name Dose Route Start Last Admin  
  
Trade Name Freq PRN Reason Stop Dose Admin  
  
Acetaminophen 1,000 mg 25 03:16  
  
Acetaminophen 500 Mg Tablet PO 26 03:15  
  
Q6HR PRN  
  
Pain Scale 1 - 3 or fever  
  
Albuterol 2.5 mg 25 03:16  
  
Albuterol Neb 2.5 Mg/3 Ml Vial.Neb INHALATION 26 03:15  
  
Q3H PRN  
  
Cough/Wheeze  
  
Albuterol/Ipratropium 3 ml 25 08:00 25 12:53  
  
Ipratropium/Albuterol 0.5-3 Mg 3 Ml Ampul.Neb INHALATION 26 07:59 3 ml  
  
QID.RESP AYDEE Administration  
  
Azithromycin 500 mg 25 12:00 25 12:31  
  
Azithromycin 250 Mg Tablet  mg  
  
DAILY AYDEE Administration  
  
Benzonatate 200 mg 25 03:45  
  
Benzonatate 100 Mg Capsule PO 26 03:44  
  
TID PRN  
  
Cough  
  
Budesonide/Formoterol Fumarate 2 puff 25 09:00 25 09:07  
  
Budesonide/Formoterol 160-4.5 Mcg 60 Puff/6 Gm Hfa.Aer.Ad INHALATION 26 
08:59 2   
puff  
  
BID AYDEE Administration  
  
Dextrose 0 gm 25 03:16  
  
Dextrose 50% In Water 25 Gm/50 Ml Syringe IV-PUSH 26 03:15  
  
PRN PRN  
  
Hypoglycemia  
  
Enoxaparin Sodium 40 mg 25 10:00 25 10:12  
  
Enoxaparin 40 Mg/0.4 Ml Syringe SUBCUT 26 09:59 40 mg  
  
DAILY@10 AYDEE Administration  
  
Famotidine 40 mg 25 22:00  
  
Famotidine 20 Mg Tablet PO 26 21:59  
  
QHS AYDEE  
  
Glucose 0 gm 25 03:16  
  
Dextrose 40% Gel 15 Gm Tube PO 26 03:15  
  
PRN PRN  
  
Hypoglycemia  
  
Guaifenesin 20 ml 25 03:16  
  
Guaifenesin Syrup 10 Ml Udc PO 26 03:15  
  
Q6H PRN  
  
Cough  
  
Guaifenesin 1,200 mg 25 09:00 25 08:54  
  
Guaifenesin 600 Mg Tab.Er.12h PO 26 08:59 1,200 mg  
  
BID AYDEE Administration  
  
Insulin Aspart 0 units 25 08:00 25 12:31  
  
Insulin Aspart 300 Units/3 Ml SUBCUT 26 07:59 3 units  
  
TID.WM.HS AYDEE Administration  
  
Protocol  
  
Ketorolac Tromethamine 15 mg 25 03:16 25 12:00  
  
Ketorolac Tromethamine 30 Mg/Ml Vial IV-PUSH 25 03:15 15 mg  
  
Q6H PRN Administration  
  
Pain Scale 4 - 7  
  
Lorazepam 0.5 mg 25 03:21  
  
Lorazepam 0.5 Mg Tablet PO 25 03:20  
  
TID PRN  
  
Anxiety  
  
Melatonin 5 mg 25 03:16  
  
Melatonin 5 Mg Tablet PO 26 03:15  
  
QHS PRN  
  
Insomnia  
  
Methylprednisolone Sodium Succinate 40 mg 25 09:00 25 10:08  
  
Methylprednisolone Sod Succ/Pf 40 Mg/Ml (1ml) Vial IV-PUSH 26 08:59 40 mg  
  
Q12HR AYDEE Administration  
  
Ondansetron HCl 4 mg 25 03:16  
  
Ondansetron 4 Mg/2 Ml Vial IV-PUSH 26 03:15  
  
Q6H PRN  
  
Nausea And Vomiting  
  
Sodium Chloride 0 ml 25 17:55 25 12:01  
  
Sodium Chloride 0.9 % 10 Ml Syringe IV-PUSH 26 17:54 10 ml  
  
PRN PRN Administration  
  
Flush  
  
Sodium Chloride 0 ml 25 06:00 25 05:03  
  
Sodium Chloride 0.9 % 10 Ml Syringe IV-PUSH 26 05:59 10 ml  
  
QSHIFT AYDEE Administration  
  
  
  
  
A&P - Hospitalist  
Assessment/Plan  
(1) Acute and chronic respiratory failure with hypoxia:  
(2) COPD exacerbation:  
(3) COVID-19 virus infection:  
(4) Diabetes mellitus:  
  
Plan  
Ms. Gaming a 59-year-old female with a PMH of COPD, alpha-1 antitrypsin 
deficiency,   
chronic hypoxic respiratory failure on 4 L nasal cannula at home, tobacco 
dependence,   
GERD, anxiety the presented to the emergency room for complaints of shortness of
   
breath. Patient reports she was diagnosed with COVIDon Tuesday, started feeling   
symptoms on Monday, with a dry barky cough and feeling generally crappy. Reports
 a   
productive cough, thick sputum, sometimes at screen symptoms is white. She also   
complains of a headache, chest tightness. She denies fever, chills, nausea or   
vomiting, diarrhea. States that when she gets up and walks around her pulse ox 
drops   
into the 70s and everything gets tingly and she feels like she will pass out. 
She   
reports starting Paxlovid on Tuesday when she was diagnosed with COVID, has 1 
day   
left. She states that she is a pack-a-day smoker, has not smoked since Tuesday.   
Denies alcohol or illicitdrug use. She does report wearing 4 L nasal cannula at 
home,   
reports it is ordered as at all times but on good days she can walk around the 
house   
without it. Chest x-ray shows no acute cardiopulmonary pathology. EKG is normal 
sinus   
rhythm. CBC is unremarkable. CMP with a serum bicarb of 34.5, glucose 202, 
otherwise   
unremarkable. UA is negative for infection. Patient was medicated with DuoNeb's,
   
oxycodone, Zofran and methylprednisolone. She will be admitted as inpatient to 
the   
[facility] telemetry floor. Patient is already out of window for remdesivir. 
Patient   
currently on bronchodilator, steroid. Azithromycin added.  
  
Acute on chronic respiratory failure with hypoxia?likely due to COVID-19  
Acute exacerbation of COPD  
Covid 19- symptoms began Monday, positive test Tuesday-started paxlovid-she 
reports 1   
day left on Paxlovid pack  
? Continue oxygen as needed, patient wears 4 L nasal cannula at home  
? Keep SpO2 greater than 90%  
? DuoNebs 4 times daily, albuterol as needed  
? Mucinex twice daily, Robitussin, tessalon as needed  
? Methylprednisolone 40 mg IV every 12  
? Acetaminophen, Toradol as needed  
? Continue inhaler-Breo ellipta  
- Supportive therapy  
  
Tobacco abuse- tobacco cessation, encouraged abstinence  
  
Chronic conditions  
T2DM?fingersticks ACHS, SSI AC, A1c in a.m.  
Anxiety?lorazepam as needed  
GERD?famotidine  
  
DVT PPx?SCDs, enoxaparin  
CODE STATUS?full code  
  
  
  
Documented By: Jordon Vásquez MD 25 1500  
Signed By: <Electronically signed by Jordon Vásquez MD>  
25 [id number]  
   
  
[facility]  
Work Phone: [phone][place] [address]  
  
Hospitalist Progress Note  
Signed  
  
Patient: Bev Gaming MR#: M  
[id number]  
: 1965 Acct:[id number]  
  
Age/Sex: 59 / F Adm Date:   
5  
Loc:  Room: [address] [place] [address] Type: ADM IN  
Attending Dr: Jordon Vásquez MD  
  
Copies to: ~  
  
  
Date of Service:  
2025  
  
  
Subjective  
Subjective  
Narrative:  
Patient states he uses 4 L by nasal cannula at home. She continues to be on 4L 
but mentions he is very short of breath especially on exertion. Patient was 
lying in the bed. Denies any other complaints.  
  
Exam  
Physical Exam  
Vital Signs:  
  
  
  
  
Temp Pulse Resp BP Pulse Ox O2 Del Method O2 Flow Rate  
  
98.3 F 93 20 156/70 H 92 L Nasal Cannula 4  
  
25 12:00 25 12:53 25 12:53 25 12:00 25 12:00 
25 12:00 25 12:00  
  
  
  
Narrative:  
General: Awake alert, no acute distress  
HEENT: head atraumatic, normocephalic, moist mucous membranes  
Neck: supple no masses, no lymphadenopathy  
CVS: regular rate and rhythm, no murmurs or gallops  
Respiratory: Diminished breath sound bilaterally with polyphonic wheezing, 
symmetric expansion  
GI: soft, nondistended, nontender, positive bowel sounds with no organomegaly  
Extremity: moves all extremities, no restrictions of movements, no calf 
tenderness  
Neuro: AOx3, CN II-VII intact. Moves all extremities in all planes of motion.  
Skin: intact no rashes or lesions  
  
Objective  
Lab Results  
  
25 05:02  
  
25 05:02  
  
  
Meds  
Allergies and Active Meds  
Allergies  
  
cephalexin (Keflex) Allergy (Unknown, Verified 10/21/24 14:03)  
Unknown Reaction  
metoclopramide (From Reglan) Allergy (Unknown, Verified 10/21/24 14:03)  
Agitated  
quetiapine (Seroquel) Allergy (Unknown, Verified 10/21/24 14:03)  
Unknown Reaction  
risperidone (From Risperdal) Allergy (Unknown, Verified 10/21/24 14:03)  
Confusion  
sulfacetamide (Sulfacet-R) Allergy (Unknown, Verified 10/21/24 14:03)  
Hives  
venlafaxine (Effexor) Allergy (Unknown, Verified 10/21/24 14:03)  
Unknown Reaction  
Penicillins Adverse Reaction (Verified 10/21/24 14:03)  
Hives  
topiramate (From Topamax) Adverse Reaction (Verified 10/21/24 14:03)  
Hallucinating  
  
  
Active Meds:  
Active Medications  
  
  
  
Generic Name Dose Route Start Last Admin  
  
Trade Name Freq PRN Reason Stop Dose Admin  
  
Acetaminophen 1,000 mg 25 03:16  
  
Acetaminophen 500 Mg Tablet PO 26 03:15  
  
Q6HR PRN  
  
Pain Scale 1 - 3 or fever  
  
Albuterol 2.5 mg 25 03:16  
  
Albuterol Neb 2.5 Mg/3 Ml Vial.Neb INHALATION 26 03:15  
  
Q3H PRN  
  
Cough/Wheeze  
  
Albuterol/Ipratropium 3 ml 25 08:00 25 12:53  
  
Ipratropium/Albuterol 0.5-3 Mg 3 Ml Ampul.Neb INHALATION 26 07:59 3 ml  
  
QID.RESP AYDEE Administration  
  
Azithromycin 500 mg 25 12:00 25 12:31  
  
Azithromycin 250 Mg Tablet  mg  
  
DAILY AYDEE Administration  
  
Benzonatate 200 mg 25 03:45  
  
Benzonatate 100 Mg Capsule PO 26 03:44  
  
TID PRN  
  
Cough  
  
Budesonide/Formoterol Fumarate 2 puff 25 09:00 25 09:07  
  
Budesonide/Formoterol 160-4.5 Mcg 60 Puff/6 Gm Hfa.Aer.Ad INHALATION 26 
08:59 2 puff  
  
BID AYDEE Administration  
  
Dextrose 0 gm 25 03:16  
  
Dextrose 50% In Water 25 Gm/50 Ml Syringe IV-PUSH 26 03:15  
  
PRN PRN  
  
Hypoglycemia  
  
Enoxaparin Sodium 40 mg 25 10:00 25 10:12  
  
Enoxaparin 40 Mg/0.4 Ml Syringe SUBCUT 26 09:59 40 mg  
  
DAILY@10 AYDEE Administration  
  
Famotidine 40 mg 25 22:00  
  
Famotidine 20 Mg Tablet PO 26 21:59  
  
QHS AYDEE  
  
Glucose 0 gm 25 03:16  
  
Dextrose 40% Gel 15 Gm Tube PO 26 03:15  
  
PRN PRN  
  
Hypoglycemia  
  
Guaifenesin 20 ml 25 03:16  
  
Guaifenesin Syrup 10 Ml Udc PO 26 03:15  
  
Q6H PRN  
  
Cough  
  
Guaifenesin 1,200 mg 25 09:00 25 08:54  
  
Guaifenesin 600 Mg Tab.Er.12h PO 26 08:59 1,200 mg  
  
BID AYDEE Administration  
  
Insulin Aspart 0 units 25 08:00 25 12:31  
  
Insulin Aspart 300 Units/3 Ml SUBCUT 26 07:59 3 units  
  
TID.WM.HS AYDEE Administration  
  
Protocol  
  
Ketorolac Tromethamine 15 mg 25 03:16 25 12:00  
  
Ketorolac Tromethamine 30 Mg/Ml Vial IV-PUSH 25 03:15 15 mg  
  
Q6H PRN Administration  
  
Pain Scale 4 - 7  
  
Lorazepam 0.5 mg 25 03:21  
  
Lorazepam 0.5 Mg Tablet PO 25 03:20  
  
TID PRN  
  
Anxiety  
  
Melatonin 5 mg 25 03:16  
  
Melatonin 5 Mg Tablet PO 26 03:15  
  
QHS PRN  
  
Insomnia  
  
Methylprednisolone Sodium Succinate 40 mg 25 09:00 25 10:08  
  
Methylprednisolone Sod Succ/Pf 40 Mg/Ml (1ml) Vial IV-PUSH 26 08:59 40 mg  
  
Q12HR AYDEE Administration  
  
Ondansetron HCl 4 mg 25 03:16  
  
Ondansetron 4 Mg/2 Ml Vial IV-PUSH 26 03:15  
  
Q6H PRN  
  
Nausea And Vomiting  
  
Sodium Chloride 0 ml 25 17:55 25 12:01  
  
Sodium Chloride 0.9 % 10 Ml Syringe IV-PUSH 26 17:54 10 ml  
  
PRN PRN Administration  
  
Flush  
  
Sodium Chloride 0 ml 25 06:00 25 05:03  
  
Sodium Chloride 0.9 % 10 Ml Syringe IV-PUSH 26 05:59 10 ml  
  
QSHIFT AYDEE Administration  
  
  
  
  
A&P - Hospitalist  
Assessment/Plan  
(1) Acute and chronic respiratory failure with hypoxia:  
(2) COPD exacerbation:  
(3) COVID-19 virus infection:  
(4) Diabetes mellitus:  
  
Plan  
Ms. Gaming a 59-year-old female with a PMH of COPD, alpha-1 antitrypsin 
deficiency, chronic hypoxicrespiratory failure on 4 L nasal cannula at home, 
tobacco dependence, GERD, anxiety the presented to the emergency room for 
complaints of shortness of breath. Patient reports she was diagnosed with CO
VIDon Tuesday, started feeling symptoms on Monday, with a dry barky cough and 
feeling generally crappy. Reports a productive cough, thick sputum, sometimes at
 screen symptoms is white. She also complains of a headache, chest tightness. 
She denies fever, chills, nausea or vomiting, diarrhea. States that when she 
gets up and walks around her pulse ox drops into the 70s and everything gets 
tingly and she feels like she will pass out. She reports starting Paxlovid on 
Tuesday when she was diagnosed with COVID, has 1 day left. She states that she 
is a pack-a-day smoker, has not smoked since Tuesday. Denies alcohol or 
illicitdrug use. She does report wearing 4 L nasal cannula at home, reports it 
is ordered as at all times but on good days she can walk around the house 
without it. Chest x-ray shows no acute cardiopulmonary pathology. EKG is normal 
sinus rhythm. CBC is unremarkable. CMP with a serum bicarb of 34.5, glucose 202,
 otherwise unremarkable. UA is negative for infection. Patient was medicated 
with DuoNeb's, oxycodone, Zofran and methylprednisolone. She will be admitted as
 inpatientto the [facility] telemetry floor. Patient is already out of window for 
remdesivir. Patient currently on bronchodilator, steroid. Azithromycin added.  
  
Acute on chronic respiratory failure with hypoxia?likely due to COVID-19  
Acute exacerbation of COPD  
Covid 19- symptoms began Monday, positive test Tuesday-started paxlovid-she 
reports 1 day left on Paxlovid pack  
? Continue oxygen as needed, patient wears 4 L nasal cannula at home  
? Keep SpO2 greater than 90%  
? DuoNebs 4 times daily, albuterol as needed  
? Mucinex twice daily, Robitussin, tessalon as needed  
? Methylprednisolone 40 mg IV every 12  
? Acetaminophen, Toradol as needed  
? Continue inhaler-Breo ellipta  
- Supportive therapy  
  
Tobacco abuse- tobacco cessation, encouraged abstinence  
  
Chronic conditions  
T2DM?fingersticks ACHS, SSI AC, A1c in a.m.  
Anxiety?lorazepam as needed  
GERD?famotidine  
  
DVT PPx?SCDs, enoxaparin  
CODE STATUS?full code  
  
  
  
Documented By: Jordon Vásquez MD 25 1500  
Signed By:  
25 [id number]  
[facility]- History and physical note  
  
  
  
                                        Author              Lidia Sorensen  
[facility]  
   
                                        Note Date/Time      2025 8:  
29am  
   
                                                    [facility]  
ENTER  
[address] [place] [address]  
  
Hospitalist H&P  
Signed  
  
Patient: Bev Gaming MR#: [id number]  
[id number]  
: 1965 Acct:[id number]  
  
Age/Sex: 59 / F Adm Date:   
5  
Loc:  Room: [address] [place] [address] Type: ADM IN  
Attending Dr: Jordon Vásquez MD  
  
Copies to: MD Sherman Aburto,DO Charlene Montoya, DO Lidia Sorensen, APRN~  
  
  
HPI  
DATE OF EXAMINATION: 25  
CHIEF COMPLAINT: shortness of breath  
HISTORY OF PRESENT ILLNESS:  
Ms. Gaming a 59-year-old female with a PMH of COPD, alpha-1 antitrypsin 
deficiency,   
chronic hypoxic respiratory failure on 4 L nasal cannula at home, tobacco 
dependence,   
GERD, anxiety the presents to the emergency room tonight forcomplaints of 
shortness   
of breath. Patient reports she was diagnosed with COVIDon Tuesday, started 
feeling   
symptoms on Monday, with a dry barky cough and feeling generally crappy. Reports
 a   
productive cough, thick sputum, sometimes at screen symptoms is white. She also   
complains of a headache, chest tightness. She denies fever, chills, nausea or   
vomiting, diarrhea. States that when she gets up and walks around her pulse ox 
drops   
into the 70s and everything gets tingly and she feels like she will pass out. 
She   
reports starting Paxlovid on Tuesday when she was diagnosed with COVID, has 1 
day   
left. She states that she is a pack-a-day smoker, has not smoked since Tuesday.   
Denies alcohol or illicitdrug use. She does report wearing 4 L nasal cannula at 
home,   
reports it is ordered as at all times but on good days she can walk around the 
house   
without it.  
  
Chest x-ray shows no acute cardiopulmonary pathology. EKG is normal sinus 
rhythm. CBC   
is unremarkable. CMP with a serum bicarb of 34.5, glucose 202, otherwise   
unremarkable. UA is negative for infection. Patient was medicated with DuoNeb's,
   
oxycodone, Zofran and methylprednisolone. She will be admitted as inpatient to 
the   
[facility] telemetry floor.  
  
Review of Systems  
Review of Systems  
Review of systems:  
A 10 point review of systems was obtained, negative unless noted in the HPI or 
below.  
  
[facility]  
Medical History (Updated 25 @ 03:36 by SAHRA Hyde)  
  
Hernia  
Asthma with COPD  
GERD (gastroesophageal reflux disease)  
Congestive heart failure  
Chronic respiratory failure with hypoxia  
Alpha-1-antitrypsin deficiency  
Neck pain with history of cervical spinal surgery  
with hardware and cadaver bone  
  
Diabetes  
COPD (chronic obstructive pulmonary disease)  
  
Surgical History (Updated 25 @ 03:36 by SAHRA Hyde)  
  
H/O neck surgery  
hardware and cadaver bones  
History of cardiac catheterization  
No stents  
H/O shoulder surgery  
History of sinus surgery  
2024 CC Deviated septum.  
History of   
x3  
History of colectomy  
History of appendectomy  
History of hysterectomy  
  
History of ankle surgery  
bilat  
  
Family History (Reviewed 25 @ 03:37 by SAHRA Hyde)  
Mother Hypertension  
Asthma  
Migraine headache  
Brother Hypertension  
Legacy FamHx Relation: Brother(s)  
Father Cancer  
Lung  
  
Family history of other condition  
Legacy FamHx Problem: Lung CA  
Son Asthma  
  
Social History  
Marital Status:   
Household Members: spouse  
Smoking Status: Current every day smoker (1pk/day)  
Tobacco Type: cigarettes  
Substance Use Type: None  
  
Meds  
Medications and Allergies  
Allergies  
  
cephalexin (Keflex) Allergy (Unknown, Verified 10/21/24 14:03)  
Unknown Reaction  
metoclopramide (From Reglan) Allergy (Unknown, Verified 10/21/24 14:03)  
Agitated  
quetiapine (Seroquel) Allergy (Unknown, Verified 10/21/24 14:03)  
Unknown Reaction  
risperidone (From Risperdal) Allergy (Unknown, Verified 10/21/24 14:03)  
Confusion  
sulfacetamide (Sulfacet-R) Allergy (Unknown, Verified 10/21/24 14:03)  
Hives  
venlafaxine (Effexor) Allergy (Unknown, Verified 10/21/24 14:03)  
Unknown Reaction  
Penicillins Adverse Reaction (Verified 10/21/24 14:03)  
Hives  
topiramate (From Topamax) Adverse Reaction (Verified 10/21/24 14:03)  
Hallucinating  
  
  
Home Medications  
  
lorazepam 0.5 mg tablet 0.5 mg PO TID PRN Anxiety 10/08/18 [History Confirmed   
25]  
famotidine 40 mg tablet 40 mg PO QHS 23 [History Confirmed 25]  
guaifenesin 600 mg tablet, extended release 12 hr (Mucinex) 1,200 mg (2 x 600 
mg) PO   
BID 4 days #16 tabs 23 [Rx Confirmed 25]  
albuterol sulfate 90 mcg/actuation aerosol inhaler 2 inh inhalation Q4HR PRN   
shortness of breath or wheezing 04/15/24 [History Confirmed 25]  
Oxygen 24 [History Confirmed 25]  
semaglutide 2 mg/dose (8 mg/3 mL) subcutaneous pen injector (Ozempic) 2 mg 
subcut   
.weekly 24 [History Confirmed 25]  
Breo Ellipta 100 mcg-25 mcg/dose powder for inhalation (fluticasone   
furoate-vilanterol) 1 inh inhalation QDAY 30 days #60 ea 10/21/24 [Rx Confirmed   
25]  
albuterol sulfate 2.5 mg/3 mL (0.083 %) solution for nebulization 2.5 mg 
inhalation   
QID PRN shortness of breath or wheezing 25 [History Confirmed 25]  
nirmatrelvir 300 mg (150 mg x2)-ritonavir 100 mg tablet,dose pack (Paxlovid) 1 
tab PO   
DAILY 25 [History Confirmed 25]  
  
  
  
Exam  
Physical Exam  
Vital Signs:  
  
  
  
  
Temp Pulse Resp BP Pulse Ox O2 Del Method O2 Flow Rate  
  
97.6 F 96 20 156/92 H 95 Nasal Cannula 4  
  
25 22:32 25 03:22 25 03:22 25 03:22 25 03:22 
25   
03:22 25 03:22  
  
  
  
Narrative:  
  
CONST- Appears well -developed and well nourished.  
HEAD - Normocephalic and atraumatic  
EENT-Sclera nonicteric, conjunctive are non-erythemic, dry oral mucosa, pharynx 
clear  
NECK-Supple, no cervical lymphadenopathy  
CARDIAC-normal rate, regular rhythm, S1 & S2.  
PULM-diminished, expiratory wheeze throughout, 4L NC, no accessory muscle use, 
dry   
cough noted  
ABD - Soft. Bowel sounds are normal. No distention. No tenderness  
EXTREM-no edema BLE calves, nontender  
SKIN- W/D good turgor  
MS- MAEX4 spontaneously with equal with equal strength  
NEURO- A&Ox3 speech clear and tongue midline, equal facial symmetry, no focal 
motor   
deficits  
PSYCH-Mood, affect, and behavior appropriate  
  
Results - Hospitalist H&P  
Lab Results  
Labs:  
Laboratory Last Values  
  
  
  
Corrected WBC 8.9 X10E3/uL (3.8-11.6) 25 21:11  
  
Uncorrected WBC Count 8.9 x10E3/uL (3.8-11.6) 25 21:11  
  
RBC 4.53 x10E6/uL (3.60-5.00) 25 21:11  
  
Hgb 14.5 g/dL (11.8-15.4) 25 21:11  
  
Hct 41.9 % (34.0-46.4) 25 21:11  
  
MCV 92.5 fl () 25 21:11  
  
MCH 32.0 pg (24.7-34.3) 25 21:11  
  
MCHC 34.6 g/dL (32.0-35.0) 25 21:11  
  
RDW 12.4 % (11.9-15.3) 25 21:11  
  
Plt Count 233 x10E3/uL (150-450) 25 21:11  
  
MPV 7.4 fl (6.3-10.7) 25 21:11  
  
Neut % (Auto) 55.4 % (.) 25 21:11  
  
Lymph % (Auto) 32.6 % (.) 25 21:11  
  
Mono % (Auto) 8.4 % (.) 25 21:11  
  
Eos % (Auto) 2.9 % (.) 25 21:11  
  
Baso % (Auto) 0.7 % (.) 25 21:11  
  
Nucleat RBC Rel Count 0.0 /100 WBC (0-0.5) 25 21:11  
  
Neut # (Auto) 5.0 x10E3/uL (1.8-7.7) 25 21:11  
  
Lymph # (Auto) 2.9 x10E3/uL (1.00-4.8) 25 21:11  
  
Mono # (Auto) 0.7 x10E3/uL (0.0-0.8) 25 21:11  
  
Eos # (Auto) 0.3 x10E3/uL (0.0-0.45) 25 21:11  
  
Baso # (Auto) 0.1 x10E3/uL (0.0-0.2) 25 21:11  
  
Monocyte Dist Width 18.64 % (0.00-20.00) 25 21:11  
  
PHA Creatinine Clear 105.46 25 21:11  
  
Sodium 140 mmol/L (136-145) 25 21:11  
  
Potassium 4.0 mmol/L (3.5-5.1) 25 21:11  
  
Chloride 100 mmol/L () 25 21:11  
  
Carbon Dioxide 34.5 mmol/L (21.0-31.0) H 25 21:11  
  
Anion Gap 9.5 mEq/L (6.0-15.0) 25 21:11  
  
BUN 14 mg/dL (7-25) 25 21:11  
  
Creatinine 0.69 mg/dL (0.60-1.20) 25 21:11  
  
Est GFR (CKD-EPI) > 60.0 mL/Min 25 21:11  
  
Glucose 202 mg/dL () H 25 21:11  
  
Calcium 9.4 mg/dL (8.6-10.3) 25 21:11  
  
Total Bilirubin 0.4 mg/dl (0.3-1.0) 25 21:11  
  
AST 13 U/L (13-39) 25 21:11  
  
ALT 16 U/L (7-52) 25 21:11  
  
Alkaline Phosphatase 84 U/L () 25 21:11  
  
Total Creatine Kinase 42 U/L () 25 21:11  
  
Troponin I High Sens 3.3 pg/mL (0.0-15.0) 25 21:11  
  
B-Natriuretic Peptide 41.0 pg/mL (5-100) 25 21:11  
  
Total Protein 6.6 gm/dL (6.4-8.9) 25 21:11  
  
Albumin 3.7 gm/dL (3.5-5.7) 25 21:11  
  
Globulin 2.9 gm/dL 25 21:11  
  
Albumin/Globulin Ratio 1.3 25 21:11  
  
Urine Color Colorless (Yellow) 25 23:55  
  
Urine Appearance Clear (Clear) 25 23:55  
  
Urine pH 5.5 (5.0-9.0) 25 23:55  
  
Ur Specific Gravity 1.009 (1.001-1.030) 25 23:55  
  
Urine Protein Negative mg/dL (Negative) 25 23:55  
  
Urine Glucose (UA) Normal mg/dL (Normal) 25 23:55  
  
Urine Ketones Negative (Negative) 25 23:55  
  
Urine Occult Blood Trace (Negative) H 25 23:55  
  
Urine Nitrite Negative (Negative) 25 23:55  
  
Urine Bilirubin Negative (Negative) 25 23:55  
  
Urine Urobilinogen Normal mg/dL (Normal) 25 23:55  
  
Ur Leukocyte Esterase Negative (Negative) 25 23:55  
  
Urine RBC 1-2 /HPF (0-4) 25 23:55  
  
Urine WBC 1-2 /HPF (0-4) 25 23:55  
  
Ur Squamous Epith Cells 1-2 /HPF (0-2) 25 23:55  
  
Urine Bacteria 2+ /HPF (None Seen) H 25 23:55  
  
Hyaline Casts None /LPF (0-8) 25 23:55  
  
  
  
  
Assessment & Plan  
Assessment/Plan  
(1) Acute and chronic respiratory failure with hypoxia:  
(2) COPD exacerbation:  
(3) COVID-19 virus infection:  
(4) Diabetes mellitus:  
  
Plan  
Acute on chronic respiratory failure with hypoxia?likely due to COVID-19  
Acute exacerbation of COPD  
Covid 19- symptoms began Monday, positive test Tuesday-started paxlovid-she 
reports 1   
day left on Paxlovid pack  
? Continue oxygen as needed, patient wears 4 L nasal cannula at home  
? Keep SpO2 greater than 90%  
? DuoNebs 4 times daily, albuterol as needed  
? Mucinex twice daily, Robitussin, tessalon as needed  
? Methylprednisolone 40 mg IV every 12  
? Acetaminophen, Toradol as needed  
? Continue inhaler-Breo ellipta  
- Supportive therapy  
  
Tobacco abuse- tobacco cessation, encouraged abstinence  
  
Chronic conditions  
T2DM?fingersticks ACHS, SSI AC, A1c in a.m.  
Anxiety?lorazepam as needed, [place]AR[place] report checked, patient states she usually 
only   
goes throughout 30 pills a year  
GERD?famotidine  
  
DVT PPx?SCDs, enoxaparin  
Diet order?1800 ADA  
CODE STATUS?full code  
  
IP vs OBS Justification  
Based on differential dx, clinical care plan, and risk of adverse events, if   
untreated, in my clinical judgement this patient requires an acute care setting 
as:   
INPATIENT because of an expectation of an over 2 midnight stay.  
Estimated length of stay (# of days): 3  
  
  
Documented By: SAHRA Hyde 25 [id number]  
Signed By: <Electronically signed by SAHRA Sorensen>  
25 [id number]  
<Electronically signed by Charlene Montoya DO>  
25 0829  
   
  
[facility]  
Work Phone: [phone]- [place] [address]  
  
Hospitalist H&P  
Signed  
  
Patient: Bev Gaming MR#: M  
[id number]  
: 1965 Acct:[id number]  
  
Age/Sex: 59 / F Adm Date:   
5  
Loc:  Room: [address] [place] [address] Type: ADM IN  
Attending Dr: Jordon Vásquez MD  
  
Copies to: MD Sherman Aburto,DO Lidia Mahoney APRN~  
  
  
HPI  
DATE OF EXAMINATION: 25  
CHIEF COMPLAINT: shortness of breath  
HISTORY OF PRESENT ILLNESS:  
Ms. Gaming a 59-year-old female with a PMH of COPD, alpha-1 antitrypsin 
deficiency, chronic hypoxicrespiratory failure on 4 L nasal cannula at home, 
tobacco dependence, GERD, anxiety the presents tothe emergency room tonight 
forcomplaints of shortness of breath. Patient reports she was diagnosed with 
COVIDon Tuesday, started feeling symptoms on Monday, with a dry barky cough and 
feeling generally crappy. Reports a productive cough, thick sputum, sometimes at
screen symptoms is white. She alsocomplains of a headache, chest tightness. She 
denies fever, chills, nausea or vomiting, diarrhea. States that when she gets up
and walks around her pulse ox drops into the 70s and everything gets tingly and 
she feels like she will pass out. She reports starting Paxlovid on Tuesday when 
she was diagnosed with COVID, has 1 day left. She states that she is a 
pack-a-day smoker, has not smoked since Tuesday. Denies alcohol or illicitdrug 
use. She does report wearing 4 L nasal cannula at home, reports it is ordered as
at all times but on good days she can walk around the house without it.  
  
Chest x-ray shows no acute cardiopulmonary pathology. EKG is normal sinus 
rhythm. CBC is unremarkable. CMP with a serum bicarb of 34.5, glucose 202, 
otherwise unremarkable. UA is negative for infection. Patient was medicated with
DuoNeb's, oxycodone, Zofran and methylprednisolone. She will be admitted as 
inpatient to the [facility] telemetry floor.  
  
Review of Systems  
Review of Systems  
Review of systems:  
A 10 point review of systems was obtained, negative unless noted in the HPI or 
below.  
  
[facility]  
Medical History (Updated 25 @ 03:36 by SAHRA Hyde)  
  
Hernia  
Asthma with COPD  
GERD (gastroesophageal reflux disease)  
Congestive heart failure  
Chronic respiratory failure with hypoxia  
Alpha-1-antitrypsin deficiency  
Neck pain with history of cervical spinal surgery  
with hardware and cadaver bone  
  
Diabetes  
COPD (chronic obstructive pulmonary disease)  
  
Surgical History (Updated 25 @ 03:36 by SAHRA Hyde)  
  
H/O neck surgery  
hardware and cadaver bones  
History of cardiac catheterization  
No stents  
H/O shoulder surgery  
History of sinus surgery  
2024 CC Deviated septum.  
History of   
x3  
History of colectomy  
History of appendectomy  
History of hysterectomy  
  
History of ankle surgery  
bilat  
  
Family History (Reviewed 25 @ 03:37 by SAHRA Hyde)  
Mother Hypertension  
Asthma  
Migraine headache  
Brother Hypertension  
Legacy FamHx Relation: Brother(s)  
Father Cancer  
Lung  
  
Family history of other condition  
Legacy FamHx Problem: Lung CA  
Son Asthma  
  
Social History  
Marital Status:   
Household Members: spouse  
Smoking Status: Current every day smoker (1pk/day)  
Tobacco Type: cigarettes  
Substance Use Type: None  
  
Meds  
Medications and Allergies  
Allergies  
  
cephalexin (Keflex) Allergy (Unknown, Verified 10/21/24 14:03)  
Unknown Reaction  
metoclopramide (From Reglan) Allergy (Unknown, Verified 10/21/24 14:03)  
Agitated  
quetiapine (Seroquel) Allergy (Unknown, Verified 10/21/24 14:03)  
Unknown Reaction  
risperidone (From Risperdal) Allergy (Unknown, Verified 10/21/24 14:03)  
Confusion  
sulfacetamide (Sulfacet-R) Allergy (Unknown, Verified 10/21/24 14:03)  
Hives  
venlafaxine (Effexor) Allergy (Unknown, Verified 10/21/24 14:03)  
Unknown Reaction  
Penicillins Adverse Reaction (Verified 10/21/24 14:03)  
Hives  
topiramate (From Topamax) Adverse Reaction (Verified 10/21/24 14:03)  
Hallucinating  
  
  
Home Medications  
  
lorazepam 0.5 mg tablet 0.5 mg PO TID PRN Anxiety 10/08/18 [History Confirmed 
25]  
famotidine 40 mg tablet 40 mg PO QHS 23 [History Confirmed 25]  
guaifenesin 600 mg tablet, extended release 12 hr (Mucinex) 1,200 mg (2 x 600 
mg) PO BID 4 days #16tabs 23 [Rx Confirmed 25]  
albuterol sulfate 90 mcg/actuation aerosol inhaler 2 inh inhalation Q4HR PRN 
shortness of breath orwheezing 04/15/24 [History Confirmed 25]  
Oxygen 24 [History Confirmed 25]  
semaglutide 2 mg/dose (8 mg/3 mL) subcutaneous pen injector (Ozempic) 2 mg 
subcut .weekly 24 [History Confirmed 25]  
Breo Ellipta 100 mcg-25 mcg/dose powder for inhalation (fluticasone furoate-
vilanterol) 1 inh inhalation QDAY 30 days #60 ea 10/21/24 [Rx Confirmed 
25]  
albuterol sulfate 2.5 mg/3 mL (0.083 %) solution for nebulization 2.5 mg 
inhalation QID PRN shortness of breath or wheezing 25 [History Confirmed 
25]  
nirmatrelvir 300 mg (150 mg x2)-ritonavir 100 mg tablet,dose pack (Paxlovid) 1 
tab PO DAILY 25 [History Confirmed 25]  
  
  
  
Exam  
Physical Exam  
Vital Signs:  
  
  
  
  
Temp Pulse Resp BP Pulse Ox O2 Del Method O2 Flow Rate  
  
97.6 F 96 20 156/92 H 95 Nasal Cannula 4  
  
25 22:32 25 03:22 25 03:22 25 03:22 25 03:22 
25 03:22 25 03:22  
  
  
  
Narrative:  
  
CONST- Appears well -developed and well nourished.  
HEAD - Normocephalic and atraumatic  
EENT-Sclera nonicteric, conjunctive are non-erythemic, dry oral mucosa, pharynx 
clear  
NECK-Supple, no cervical lymphadenopathy  
CARDIAC-normal rate, regular rhythm, S1 & S2.  
PULM-diminished, expiratory wheeze throughout, 4L NC, no accessory muscle use, 
dry cough noted  
ABD - Soft. Bowel sounds are normal. No distention. No tenderness  
EXTREM-no edema BLE calves, nontender  
SKIN- W/D good turgor  
MS- MAEX4 spontaneously with equal with equal strength  
NEURO- A&Ox3 speech clear and tongue midline, equal facial symmetry, no focal 
motor deficits  
PSYCH-Mood, affect, and behavior appropriate  
  
Results - Hospitalist H&P  
Lab Results  
Labs:  
Laboratory Last Values  
  
  
  
Corrected WBC 8.9 X10E3/uL (3.8-11.6) 25 21:11  
  
Uncorrected WBC Count 8.9 x10E3/uL (3.8-11.6) 25 21:11  
  
RBC 4.53 x10E6/uL (3.60-5.00) 25 21:11  
  
Hgb 14.5 g/dL (11.8-15.4) 25 21:11  
  
Hct 41.9 % (34.0-46.4) 25 21:11  
  
MCV 92.5 fl () 25 21:11  
  
MCH 32.0 pg (24.7-34.3) 25 21:11  
  
MCHC 34.6 g/dL (32.0-35.0) 25 21:11  
  
RDW 12.4 % (11.9-15.3) 25 21:11  
  
Plt Count 233 x10E3/uL (150-450) 25 21:11  
  
MPV 7.4 fl (6.3-10.7) 25 21:11  
  
Neut % (Auto) 55.4 % (.) 25 21:11  
  
Lymph % (Auto) 32.6 % (.) 25 21:11  
  
Mono % (Auto) 8.4 % (.) 25 21:11  
  
Eos % (Auto) 2.9 % (.) 25 21:11  
  
Baso % (Auto) 0.7 % (.) 25 21:11  
  
Nucleat RBC Rel Count 0.0 /100 WBC (0-0.5) 25 21:11  
  
Neut # (Auto) 5.0 x10E3/uL (1.8-7.7) 25 21:11  
  
Lymph # (Auto) 2.9 x10E3/uL (1.00-4.8) 25 21:11  
  
Mono # (Auto) 0.7 x10E3/uL (0.0-0.8) 25 21:11  
  
Eos # (Auto) 0.3 x10E3/uL (0.0-0.45) 25 21:11  
  
Baso # (Auto) 0.1 x10E3/uL (0.0-0.2) 25 21:11  
  
Monocyte Dist Width 18.64 % (0.00-20.00) 25 21:11  
  
PHA Creatinine Clear 105.46 25 21:11  
  
Sodium 140 mmol/L (136-145) 25 21:11  
  
Potassium 4.0 mmol/L (3.5-5.1) 25 21:11  
  
Chloride 100 mmol/L () 25 21:11  
  
Carbon Dioxide 34.5 mmol/L (21.0-31.0) H 25 21:11  
  
Anion Gap 9.5 mEq/L (6.0-15.0) 25 21:11  
  
BUN 14 mg/dL (7-25) 25 21:11  
  
Creatinine 0.69 mg/dL (0.60-1.20) 25 21:11  
  
Est GFR (CKD-EPI) > 60.0 mL/Min 25 21:11  
  
Glucose 202 mg/dL () H 25 21:11  
  
Calcium 9.4 mg/dL (8.6-10.3) 25 21:11  
  
Total Bilirubin 0.4 mg/dl (0.3-1.0) 25 21:11  
  
AST 13 U/L (13-39) 25 21:11  
  
ALT 16 U/L (7-52) 25 21:11  
  
Alkaline Phosphatase 84 U/L () 25 21:11  
  
Total Creatine Kinase 42 U/L () 25 21:11  
  
Troponin I High Sens 3.3 pg/mL (0.0-15.0) 25 21:11  
  
B-Natriuretic Peptide 41.0 pg/mL (5-100) 25 21:11  
  
Total Protein 6.6 gm/dL (6.4-8.9) 25 21:11  
  
Albumin 3.7 gm/dL (3.5-5.7) 25 21:11  
  
Globulin 2.9 gm/dL 25 21:11  
  
Albumin/Globulin Ratio 1.3 25 21:11  
  
Urine Color Colorless (Yellow) 25 23:55  
  
Urine Appearance Clear (Clear) 25 23:55  
  
Urine pH 5.5 (5.0-9.0) 25 23:55  
  
Ur Specific Gravity 1.009 (1.001-1.030) 25 23:55  
  
Urine Protein Negative mg/dL (Negative) 25 23:55  
  
Urine Glucose (UA) Normal mg/dL (Normal) 25 23:55  
  
Urine Ketones Negative (Negative) 25 23:55  
  
Urine Occult Blood Trace (Negative) H 25 23:55  
  
Urine Nitrite Negative (Negative) 25 23:55  
  
Urine Bilirubin Negative (Negative) 25 23:55  
  
Urine Urobilinogen Normal mg/dL (Normal) 25 23:55  
  
Ur Leukocyte Esterase Negative (Negative) 25 23:55  
  
Urine RBC 1-2 /HPF (0-4) 25 23:55  
  
Urine WBC 1-2 /HPF (0-4) 25 23:55  
  
Ur Squamous Epith Cells 1-2 /HPF (0-2) 25 23:55  
  
Urine Bacteria 2+ /HPF (None Seen) H 25 23:55  
  
Hyaline Casts None /LPF (0-8) 25 23:55  
  
  
  
  
Assessment & Plan  
Assessment/Plan  
(1) Acute and chronic respiratory failure with hypoxia:  
(2) COPD exacerbation:  
(3) COVID-19 virus infection:  
(4) Diabetes mellitus:  
  
Plan  
Acute on chronic respiratory failure with hypoxia?likely due to COVID-19  
Acute exacerbation of COPD  
Covid 19- symptoms began Monday, positive test Tuesday-started paxlovid-she 
reports 1 day left on Paxlovid pack  
? Continue oxygen as needed, patient wears 4 L nasal cannula at home  
? Keep SpO2 greater than 90%  
? DuoNebs 4 times daily, albuterol as needed  
? Mucinex twice daily, Robitussin, tessalon as needed  
? Methylprednisolone 40 mg IV every 12  
? Acetaminophen, Toradol as needed  
? Continue inhaler-Breo ellipta  
- Supportive therapy  
  
Tobacco abuse- tobacco cessation, encouraged abstinence  
  
Chronic conditions  
T2DM?fingersticks ACHS, SSI AC, A1c in a.m.  
Anxiety?lorazepam as needed, OARRS report checked, patient states she usually 
only goes throughout 30 pills a year  
GERD?famotidine  
  
DVT PPx?SCDs, enoxaparin  
Diet order?1800 ADA  
CODE STATUS?full code  
  
IP vs OBS Justification  
Based on differential dx, clinical care plan, and risk of adverse events, if 
untreated, in my clinical judgement this patient requires an acute care setting 
as: INPATIENT because of an expectation ofan over 2 midnight stay.  
Estimated length of stay (# of days): 3  
  
  
Documented By: SAHRA Hyde [id number]  
Signed By:  
[id number]  
  
25 [id number]  
[facility]- Evaluation note*   
  
                      Diagnosis  Onset Date Resolution Status     Admit Date  
   
                                                    Chronic respiratory failure   
with hypoxia, on home O2   
therapy                                         chronic          1:10am  
   
                      Diabetes mellitus                       chronic    2025 1:10am  
   
                      Tobacco abuse disorder                       chronic    Ja  
nuary 2025 1:10am  
   
                                                    Acute and chronic respirator  
y   
failure with hypoxia                                 resolved         1:10am  
   
                      COPD exacerbation                       resolved   2025 1:10am  
   
                      COVID-19 virus infection                       resolved     
2025 1:10am  
   
                      Asthma with COPD                       acute       9:41am  
   
                                                    COPD (chronic obstructive   
pulmonary disease)                                 acute            9:41am  
   
                                                    Chronic respiratory failure   
with hypoxia, on home O2   
therapy                                         chronic         2025  
 9:41am  
  
  
[facility]  
Work Phone: [phone]- History of Present illness Narrative* 
  TEJINDER Mann - 2025 8:37 AM ESTFormatting of this note might be 
  different from the original.  
Received message from pt stating she was at [facility] ER  and diagnosed with 
COVID. ED Note received, reviewed, and imported to pt chart.  
Electronically signed by TEJINDER Mann at 2025 11:35 AM EST  
  
* TEJINDER Mann - 2025 8:37 AM ESTFormatting of this note is 
  different from the original.  
Images from the original note were not included.  
Flowsheet Row Patient Outreach from 2025 in [facility] with 
TEJINDER Mann  
Hospital Information  
ED, Hospital or Skilled Nursing Facility Discharge? ED  
Patient has been contacted within 1 week of being seen in the ED Yes  
Diagnosis COVID  
Discharge Date 24  
Discharged To: Home Setting  
Discharge Hospital [facility]  
Engagement  
Call Start Time 0850  
Admission Date 24  
Medications  
Discharge medications reviewed and reconciled from hospital? Yes [Paxlovid]  
Is the patient having any side effects they believe may be caused by any 
medication additions or changes? No  
Does the patient have all medications ordered at discharge? Yes  
Is the patient taking all medications as directed (includes completed medication
 regime)? Yes  
Appointments  
Does the patient have a primary care provider? Yes  
Self Management  
Does patient have home health no  
Patient Teaching  
Does the patient have access to their discharge instructions? Yes  
What is the patient's perception of their health status since discharge? Same  
Wrap Up  
Wrap Up Additional Comments Pt presented to [facility] ER  with upper respiratory 
symptoms, cough, and shortness of breath. She was diagnosed with COVID. Chest x-
ray completed with no acute findings for pneumonia. Pt was given an order for 
Paxlovid and discharged home. TEJINDER spoke with pt today who reports she is feeling
 miserable. She continues to cough constantly and c/o rib and back pain from 
coughing. She is using the Paxlovid, breathing treatments, and mucinex. She is 
coughing up  green chunks.  Has been wearing her oxygen continuous. Reports O2 
level drops to 80% when she has to walk to thebathroom with oxygen on. Pt 
resting as much as possible and has been pushing fluids. States her body hurts. 
She is requesting an order for cough syrup with codeine.  
Call End Time 0910  
  
  
  
  
Electronically signed by TEJINDER Mann at 2025 11:35 AM EST  
  
* TEJINDER Mann - 2025 8:37 AM ESTFormatting of this note might be 
  different from the original.  
Diagnosed with COVID and given an order for Paxlovid. Continues to cough 
constantly and c/o rib andback pain from coughing. She is requesting an order 
for cough syrup with codeine.  
She is taking Paxlovid, breathing treatments, and mucinex. She is coughing up 
 green chunks.  Wearing her oxygen continuous. Reports O2 level drops to 80% 
when she has to walk to the bathroom with oxygen on. Pt resting as much as 
possible and has been pushing fluids. States her body hurts.  
Electronically signed by TEJINDER Mann at 2025 11:35 AM EST  
  
documented in this encounterUniversity of Missouri Children's HospitalTvlrebxvjw95- Hospital Discharge 
instructions  
Additional Instructions  
You tested positive for COVID-19 on 24. Please see attached COVID-19 
discharge instructions  
  
Continue oxygen as per chronic orders.[facility]  
Work Phone: [phone]- History of Present illness Narrative* 
  TEJINDER Mann - 2024 9:18 AM ESTFormatting of this note might be 
  different from the original.  
Message from pt requesting new order for her Ozempic 2, stating she is on her 
last one and will need a new order please.  
Electronically signed by TEJINDER Mann at 2024 10:43 AM EST  
  
* TEJINDER Mann - 2024 9:18 AM ESTFormatting of this note might be 
  different from the original.  
Pt updated. She is requesting we change the order to [facility], as [facility] has been 
having difficulty keeping in stock for her.  
Electronically signed by TEJINDER Mann at 2024 11:14 AM EST  
  
* Suzanne Lewis RN - 2024 9:18 AM ESTFormatting of this note might be 
  different from the original.  
Resent order for Ozempic to [facility] as pt requested.  
Electronically signed by Suzanne Lewis RN at 2024 11:40 AM EST  
  
* TEJINDER Mann - 2024 9:18 AM ESTFormatting of this note might be 
  different from the original.  
Pt made aware. Appreciative of the assistance. Pt reports she has been feeling 
well overall and denies any new concerns. Active listening offfered.  
Electronically signed by TEJINDER Mann at 11/15/2024 9:18 AM EST  
  
documented in this [facility]11- Miscellaneous Notes* 
  Addendum Note - Suzanne Lewis RN - 2024 9:18 AM ESTAddended by: 
  SUZANNE LEWIS on: 2024 11:41 AM  
  
Modules accepted: Orders  
  
  
Electronically signed by Suzanne Lewis RN at 2024 11:41 AM EST  
  
documented in this [facility]11- Note* Addendum Note - 
  Suzanne Lewis RN - 2024 9:18 AM ESTAddended by: SUZANNE LEWIS 
  on: 2024 11:41 AM  
  
Modules accepted: Orders  
  
  
Electronically signed by Suzanne Lewis RN at 2024 11:41 AM EST  
  
[facility] HospitalAuxdujnqtq20- Evaluation note*   
  
                      Diagnosis  Onset Date Resolution Status     Admit Date  
   
                      Asthma with COPD                       acute      2024 2:00pm  
   
                                                    COPD (chronic obstructive   
pulmonary disease)                                 acute           2024 2:00pm  
   
                                                    Chronic respiratory failure   
with hypoxia, on home O2   
therapy                                         chronic          2:00pm  
   
                                                    Acute and chronic respirator  
y   
failure with hypoxia                                 acute            1:10am  
   
                      COPD exacerbation                       acute      2025 1:10am  
   
                      COVID-19 virus infection                       acute        
2025 1:10am  
   
                                                    Chronic respiratory failure   
with hypoxia, on home O2   
therapy                                         chronic          1:10am  
   
                      Diabetes mellitus                       chronic    2025 1:10am  
   
                      Tobacco abuse disorder                       chronic    Ja  
nuary 2025 1:10am  
  
  
[facility]  
Work Phone: [phone]- Telephone encounter Note* Telephone 
  Encounter - Mary Marrufo LPN - 2024 11:27 AM EDTFormatting of this note 
  might be different from the original.  
Loaded to send to [facility]  
Electronically signed by Mary Marrufo LPN at 2024 11:28 AM EDT  
  
[facility] HospitalAsevaxnmkh17- Miscellaneous Notes* Telephone Encounter - Mary Marrufo LPN - 2024 11:27 AM EDTFormatting of this note might be different from 
  the original.  
Loaded to send to [facility]  
Electronically signed by Mary Marrufo LPN at 2024 11:28 AM EDT  
  
documented in this encounterUniversity of Missouri Children's Hospital09- History of Present illness
 Narrative* Sherman Malone,  - 09/10/2024 1:30 PM EDTFormatting of this note
   is different from the original.  
Images from the original note were not included.  
  
Bev Gaming is a 58 y.o. female presents with chief complaint of Cough 
(Patient presents today for cough, headache, and wheezing, SOB, and nasal 
drainage. She had nose surgery last week and can't test for Covid. Yesterday she
 was coughing until she vomited. She got custody of 3 grandkids lastweek and the
 baby is starting to cough as well. She denies fever and chills. )  
  
HPI:  
  
I have reviewed and reconciled the history and medication list with the patient 
today.  
  
PAST MEDICAL HISTORY:  
Past Medical History:  
Diagnosis Date  
Acute reaction to stress  
Unspecified  
Ankle contracture, left  
Bipolar 1 disorder, depressed (CMS/HCC)  
Bipolar disorder, unspecified (CMS/HCC)  
Calculus of kidney  
Calculus of kidney  
Chest pain 2022  
Chronic airway obstruction (CMS/HCC)  
not elsewhere classified  
Chronic pain  
Chronic ulcer of left foot with fat layer exposed (CMS/HCC)  
Congestive heart failure (CHF) (CMS/HCC)  
unspecified  
COPD (chronic obstructive pulmonary disease) (CMS/[facility])  
-  
Diabetes mellitus (CMS/[facility])  
without mention of complication, type II or unspecified type, not stated as 
uncontrolled  
DM (diabetes mellitus) (CMS/[facility])  
DVT (deep venous thrombosis) (CMS/[facility])  
Edema  
Endometriosis  
Esophageal reflux  
Gastroesophageal reflux disease  
Hernia, umbilical  
History of Clostridium difficile infection  
History of medical problems  
DX Refugio. lower leg edema,SOB,body aches. [/-15]  
Knee torn cartilage, right  
Moderate persistent reactive airway disease without complication (CMS/[facility])  
Mononeuritis of lower limb, unspecified laterality  
Nondependent tobacco use disorder  
Obesity, unspecified  
Obstructive sleep apnea (adult) (pediatric)  
BOB (obstructive sleep apnea)  
Personality disorder, unspecified (CMS/[facility])  
Personality disorder, unspecified (CMS/[facility])  
Plantar fascial fibromatosis  
Pneumonia of right lower lobe due to infectious organism  
Post traumatic stress disorder (CMS/[facility])   
Rotator cuff tear, right  
Spinal stenosis in cervical region  
Spinal stenosis in cervical region  
TIA (transient ischemic attack)  
Type II or unspecified type diabetes mellitus with neurological manifestations, 
not stated as uncontrolled(250.60) (CMS/[facility])  
  
  
SURGICAL HISTORY:  
Past Surgical History:  
Procedure Laterality Date  
AMB EPIDURAL STEROID INJECTION 2015  
Injection lower back,per Dr. Daigle,[place]  
CERVICAL FUSION 2014  
 SECTION, LOW TRANSVERSE  
x3  
CHOLECYSTECTOMY  
CYSTOSCOPY 2014  
EGD 2010  
HERNIA REPAIR 2012  
Abdominal Hernia Surgery  
HIP SURGERY Right  
Dr Shoen  
HYSTERECTOMY  
KNEE SURGERY  
arthroscopy x 2;Disease:Torn Cartlage Right Knee  
LOWER ENDOSCOPY 2014  
per Dr. Wooten.Dx Polyp and Diverticulosos.  
OTHER SURGICAL HISTORY   
STAH SO not sure which side / appi  
OTHER SURGICAL HISTORY  
Individual Psychotherapy and med mgt;Disease:Post-traumatic stress disorder  
ROTATOR CUFF REPAIR Right  
SHOULDER SURGERY  
L shoulder BALTAZAR DAP  
SINUS SURGERY 2021  
@[facility] by Dr. Navarro  
SINUS SURGERY 2024  
  
  
SOCIAL HISTORY:  
Social History  
  
Tobacco Use  
Smoking status: Every Day  
Current packs/day: 1.00  
Average packs/day: 1 pack/day for 35.7 years (35.7 ttl pk-yrs)  
Types: Cigarettes  
Start date: 1989  
Passive exposure: Past  
Smokeless tobacco: Never  
Tobacco comments:  
Patient smokes 10 or less cigarettes/day after 6-30 minutes of waking up.  
Thinking about quitting.  
Substance Use Topics  
Alcohol use: Never  
Comment: Caffeine: >4 cups/day iced unsweetened tea  
Drug use: Never  
  
Depression: Not at risk (2024)  
PHQ-2  
PHQ-2 Score: 0  
  
  
FAMILY HISTORY:  
Family History  
Problem Relation Name Age of Onset  
Thyroid disease Mother  
Arthritis Mother  
Hypertension Mother  
Cancer Father  
Leukemia Maternal Grandfather  
Drug abuse Son  
  
  
MEDICATIONS:  
Current Outpatient Medications  
Medication Instructions  
albuterol HFA 90 mcg/act inhaler 1 puff, Inhalation, Every 4 hours PRN  
albuterol 2.5 mg, Nebulization, 4 times daily RT  
Ayr 0.65 % nasal spray 1 spray, Nasal, Every 2 hour PRN  
budesonide (PULMICORT) 0.5 mg, Inhalation, Daily RT  
famotidine (PEPCID) 40 mg, Oral, Every morning  
ipratropium-albuterol (Duo-Neb) 0.5-2.5 mg/3 mL nebulizer solution 3 mL, 
Nebulization, Every 6 hours  
LORazepam (ATIVAN) 0.5 mg, Oral, Every 12 hours  
oxygen (O2) 4 L/min, Inhalation, Continuous  
Ozempic (2 MG/DOSE) 2 mg, Subcutaneous, Every 7 days  
promethazine (PHENERGAN) 25 mg, Oral, Every 8 hours PRN  
rosuvastatin (CRESTOR) 10 mg, Oral, Daily  
traZODone (DESYREL)  mg, Oral, Daily PRN  
  
  
ALLERGIES:  
Allergies  
Allergen Reactions  
Penicillins Itching, Hives and Rash  
Other Reaction(s): hives  
  
difficult to breathe, swelling  
Metoclopramide  
Other Reaction(s): agitation, makes me mean, Mental Status Change  
  
Other Reaction(s): Mental Status Change  
  
Other Reaction(s): Agitated  
Topiramate  
Other Reaction(s): makes me mean, rash,fantacize about dying  
  
RASH,  
  
Other Reaction(s): Mental Status Change  
  
Other Reaction(s): Hallucinating  
Bupropion  
Other Reaction(s): Unknown  
Cephalexin Unknown  
Percocet [Oxycodone-Acetaminophen] GI intolerance  
Quetiapine  
Other Reaction(s): seizures, Unknown  
Risperidone  
Other Reaction(s): Altered mental status  
  
Other Reaction(s): Confusion  
Sulfamethoxazole-Trimethoprim  
Other Reaction(s): itching  
Venlafaxine Hcl  
Other Reaction(s): wet bed, fell, probable seizure  
  
  
REVIEW OF SYMPTOMS:  
  
The ROS was neg. No chest pain or dyspnea. Denies any GI issues. Weight stable. 
No swellng. Denies any issues with meds.  
  
PHYSICAL EXAM:  
  
Visit Vitals  
/82  
Pulse 95  
Ht 5' 8   
Wt 199 lb  
SpO2 92%  
BMI 30.26 kg/m  
Smoking Status Every Day  
BSA 2.08 m  
  
BP Readings from Last 3 Encounters:  
09/10/24 130/82  
24 112/64  
24 128/88  
  
Wt Readings from Last 3 Encounters:  
09/10/24 199 lb  
24 199 lb  
24 198 lb  
  
Physical exam- No focal neuro signs. No enlarged Lymph Nodes, no thyromegaly/ 
nodules, HRRR, LCTA, benign ABD exam w/ no bruits. Carotid pulse wnl, no bruits.
Pulses intact and palp in all extremities, no edema  
  
ASSESSMENT AND PLAN:  
  
Assessment/Plan  
Problem List Items Addressed This Visit  
None  
Visit Diagnoses  
  
Acute asthmatic bronchitis (CMS/[facility]) - Primary  
Wheezing  
Relevant Orders  
STATUS COVID-19/FLU  
Acute cough  
Relevant Orders  
STATUS COVID-19/FLU  
Nasal drainage  
Relevant Orders  
STATUS COVID-19/FLU  
  
  
  
She has seen today with cough and shortness of breath. Oxygen saturation of drop
below 90 at home and she s on supplemental oxygen PRN. She had sinus surgery 
about two weeks ago at the [facility]. She has a lot of nasal congestion 
pressure but her main complaints for the last two days she s been wheezy and 
coughing. She has a history of significant Asthma with reactive, airway disease 
and recurrent bronchial infections. Today she s got bilateral inspiratory 
expiratory wheeze. She s got cough but no sputum of any color at this time. 
Oxygen saturation 92% vital signs are stable. I m goingto put her on doxycycline
100 twice a day for a week and give her prednisone 40 mg for five days and then 
20 mg for five days. She also get some codeine cough syrup that she can use PRN 
for the next week. If she s not better in 2 to 3 days, she ll call and will move
ahead with chest extra and further work out.she has an appointment for follow up
with me a couple months routine.  
  
This note was Dictated and not read.  
  
  
Electronically signed by Sherman Malone DO at 2024 9:03 AM EDT  
  
documented in this encounterUniversity of Missouri Children's HospitalChsviaeqis12- History of Present illness
Narrative* Diana Gandara MD - 2024 4:00 PM EDTFormatting of this note 
  is different from the original.  
Images from the original note were not included.  
  
  
  
SECTION OF RHINOLOGY, SINUS AND SKULL BASE SURGERY  
Head and Neck [place] NOTE  
  
HPI: Patient is a 58 year old Female presenting s/p Procedure: NASAL/SINUS 
ENDOSCOPY SURGICAL W/ MAXILLARY ANTROSTOMY W/ REMOVAL OF TISSUE FROM MAXILLARY 
SINUS on 24 . Doing well, no complaints has no real bad pain took tylenol 
for pain.  
  
PAST MEDICAL HISTORY  
No date: Arthritis  
No date: COPD (chronic obstructive pulmonary disease) ([facility])  
No date: Depression  
Comment: sees a psych  
No date: Diabetes ([facility])  
No date: DVT (deep venous thrombosis) ([facility])  
Comment: Left lower extremity s/p ankle surgery  
No date: Hypogammaglobulinemia ([facility])  
No date: On home oxygen therapy  
Comment: 4L NC  
No date: Overweight  
No date: Pneumonia  
No date: PONV (postoperative nausea and vomiting)  
No date: RA (rheumatoid arthritis) ([facility])  
  
PAST SURGICAL HISTORY  
No date: ANKLE LEFT OP SURGERY  
Comment: plantar tendon  
No date: CHOLECYSTECTOMY  
No date: HERNIA REPAIR HX  
No date: HIP RIGHT OP SURGERY  
Comment: tendon repair  
No date: HYSTERECTOMY HX  
Comment: total  
No date: KNEE RIGHT OP SURGERY  
Comment: tendon repair  
No date: SHOULDER ARTHROSCOPY/SURGERY  
Comment: bilateral tendon repair  
  
FAMILY HISTORY  
Problem Relation Age of Onset  
Hypertension Mother  
Arthritis Mother  
Cancer Father  
Hypertension Brother  
  
  
Social History  
  
Tobacco Use  
Smoking status: Every Day  
Current packs/day: 0.50  
Average packs/day: 0.5 packs/day for 36.7 years (18.3 ttl pk-yrs)  
Types: Cigarettes  
Start date:   
Smokeless tobacco: Never  
Vaping Use  
Vaping status: Never Used  
Substance Use Topics  
Alcohol use: No  
Drug use: No  
Comment: denies tx for drug/alcohol abuse in the past.  
  
  
Current Outpatient Medications  
Medication Sig Dispense Refill  
sodium chloride (AYR SALINE) 0.65 % nasal spray Use 1 Spray in the nose every 2 
hours as needed. 50mL 3  
cefADROxil (DURICEF) 500 mg capsule Take 1 capsule by mouth two times a day for 
8 days. 16 capsule 0  
ondansetron orally disintegrating (ZOFRAN ODT) 4 mg disintegrating tablet Take 1
tablet by mouth every 8 hours as needed for nausea/vomiting. 10 tablet 0  
promethazine (PHENERGAN) 25 mg tablet 25 mg every 8 hours as needed.  
azelastine 0.1% nasal spray SPRAY 1 SPRAY INTO EACH NOSTRIL TWICE DAILY 30 mL 2  
albuterol (PROVENTIL) 2.5 mg /3 mL (0.083 %) nebulizer solution 2.5 mg as 
needed.  
albuterol HFA (PROVENTIL HFA, VENTOLIN HFA) 90 mcg/actuation inhaler INHALE 1 
PUFF EVERY 4 HOURS ASNEEDED FOR WHEEZE OR FOR SHORTNESS OF BREATH  
famotidine (PEPCID) 40 mg tablet Take 40 mg by mouth every morning.  
IBUPROFEN IB ORAL Take by mouth as needed.  
LORazepam (ATIVAN) 0.5 mg Take 0.5 mg by mouth at bedtime as needed.  
OXYGEN, HOME THERAPY, 4 L/min by Nasal Cannula route as directed. Can be off for
3-4 hours without issue.  
semaglutide (OZEMPIC) 1 mg/dose (2 mg/1.5 mL) pnij Inject 2 mg subcutaneously 
one time a week.  
Insulin Lispro, Human, (HUMALOG) 100 unit/mL crtg Inject subcutaneously w MEALS.
(Patient not taking: Reported on 2024)  
  
No current facility-administered medications for this visit.  
  
  
ALLERGIES  
Allergen Reactions  
Effexor [Venlafaxin* Other: See Comments  
Seizures.  
Penicillins Rash, Hives  
difficult to breathe, swelling  
Bactrim [Sulfametho* Itching  
Reglan [Metoclopram* Mental Status Change  
Topamax [Topiramate] Mental Status Change  
  
ROS:  
CONSTITUTIONAL: No fevers, chills, nightsweats, unintended weight loss  
HEENT: No heaches, No nasal congestion/sinus symptoms, No epistaxis, No sense of
 smell  
EYES: No diplopia or blurry vision.  
  
PHYSICAL EXAM:  
  
There were no vitals filed for this visit.  
  
General appearance: well developed, well nourished, without obvious deformities  
Nasal exam: The mucosa is pink, the septum is Midline and the visible turbinates
 are No hypertrophied on anterior rhinoscopy  
Oral cavity and oropharynx: the oral mucosa, tongue, tonsil area, and posterior 
pharyngeal mucosa are without lesions.  
  
Procedure: After obtaining verbal consent, the patient was sprayed with 4% 
topical lidocaine and Afrin. A rigid nasal scope was utilized to examine the 
patient nose  
  
Spangler splints were removed from bilateral nostrils. The Middle meatal spacers 
were removed from bilateral nostrils. The ethmoid crusting and clots were 
suctioned and removed with blaksley. The rest of the sinuses were suctioned.  
  
Patient tolerated the procedure well with no complications.  
  
Radiology: None  
  
ASSESSMENT:  
1. Other chronic sinusitis - ICD9: 473.8, ICD10: J32.8  
  
  
Bev Gaming is a 58 year old Female presenting s/p Procedure: NASAL/SINUS 
ENDOSCOPY SURGICAL W/ MAXILLARY ANTROSTOMY W/ REMOVAL OF TISSUE FROM MAXILLARY 
SINUS on 24 . Doing well, no complaints has no real bad pain took tylenol 
for pain. Discussed Pathology report showed Bilateral sinus contents, 
excision,Chronic sinusitis ,Fragments of cartilage and bone. Splints were 
removed and The ethmoid crusting and clots were suctioned and removed with 
blaksley. The rest of the sinuses were suctioned. She will start Budesonide and 
return in 4 weeks  
  
PLAN:  
Start Budesonide 0.5ml/2 mL nebulizer solution Rx sent  
Return for follow up in 4 weeks  
  
Add Budesonide 0.5 mg to the hiram med irrigation 8 ounces  
Use distilled water or bottled water with salt packet as well  
  
The patient is seen and examined by Dr. Diana Gandara and the following 
reflects his/her service.Scribed by Florecita Sorensen  
  
Provider Attestation: I, Dr.Mohamad Gandara, personally performed the services 
described in this documentation. All medical record entries made by the 
NP/PA/scribe/resident/fellow were at my direction and in my presence. I have 
reviewed the chart and agree that the record reflects my personal performance 
and is accurate and complete.  
  
  
Electronically signed by Diana Gandara MD at 2024 4:52 PM EDT  
  
documented in this encounterPeoples Hospital08- NoteHNO ID: [id number]  
Author: DIANA GANDARA MD  
Service: ?  
Author Type: Physician  
Type: Progress Notes  
Filed: 2024 16:52  
Note Text:  
SECTION OF RHINOLOGY, SINUS AND SKULL BASE SURGERY  
[facility] and [facility]ck [facility] NOTE  
HPI: Patient is a 58 year old Female presenting s/p Procedure: NASAL/SINUS  
ENDOSCOPY SURGICAL W/ MAXILLARY ANTROSTOMY W/ REMOVAL OF TISSUE FROM MAXILLARY  
SINUS on 24 . Doing well, no complaints has no real bad pain took tylenol  
for pain.  
PAST MEDICAL HISTORY  
No date: Arthritis  
No date: COPD (chronic obstructive pulmonary disease) ([facility])  
No date: Depression  
Comment: sees a psych  
No date: Diabetes ([facility])  
No date: DVT (deep venous thrombosis) ([facility])  
Comment: Left lower extremity s/p ankle surgery  
No date: Hypogammaglobulinemia ([facility])  
No date: On home oxygen therapy  
Comment: 4L NC  
No date: Overweight  
No date: Pneumonia  
No date: PONV (postoperative nausea and vomiting)  
No date: RA (rheumatoid arthritis) ([facility])  
PAST SURGICAL HISTORY  
No date: ANKLE LEFT OP SURGERY  
Comment: plantar tendon  
No date: CHOLECYSTECTOMY  
No date: HERNIA REPAIR HX  
No date: HIP RIGHT OP SURGERY  
Comment: tendon repair  
No date: HYSTERECTOMY HX  
Comment: total  
No date: KNEE RIGHT OP SURGERY  
Comment: tendon repair  
No date: SHOULDER ARTHROSCOPY/SURGERY  
Comment: bilateral tendon repair  
FAMILY HISTORY  
Problem Relation Age of Onset  
Hypertension Mother  
Arthritis Mother  
Cancer Father  
Hypertension Brother  
Social History  
Tobacco Use  
Smoking status: Every Day  
Current packs/day: 0.50  
Average packs/day: 0.5 packs/day for 36.7 years (18.3 ttl pk-yrs)  
Types: Cigarettes  
Start date:   
Smokeless tobacco: Never  
Vaping Use  
Vaping status: Never Used  
Substance Use Topics  
Alcohol use: No  
Drug use: No  
Comment: denies tx for drug/alcohol abuse in the past.  
Current Outpatient Medications  
Medication Sig Dispense Refill  
sodium chloride (AYR SALINE) 0.65 % nasal spray Use 1 Spray in the nose every 2  
hours as needed. 50 mL 3  
cefADROxil (DURICEF) 500 mg capsule Take 1 capsule by mouth two times a day for  
8 days. 16 capsule 0  
ondansetron orally disintegrating (ZOFRAN ODT) 4 mg disintegrating tablet Take  
1 tablet by mouth every 8 hours as needed for nausea/vomiting. 10 tablet 0  
promethazine (PHENERGAN) 25 mg tablet 25 mg every 8 hours as needed.  
azelastine 0.1% nasal spray SPRAY 1 SPRAY INTO EACH NOSTRIL TWICE DAILY 30 mL 2  
albuterol (PROVENTIL) 2.5 mg /3 mL (0.083 %) nebulizer solution 2.5 mg as  
needed.  
albuterol HFA (PROVENTIL HFA, VENTOLIN HFA) 90 mcg/actuation inhaler INHALE 1  
PUFF EVERY 4 HOURS AS NEEDED FOR WHEEZE OR FOR SHORTNESS OF BREATH  
famotidine (PEPCID) 40 mg tablet Take 40 mg by mouth every morning.  
IBUPROFEN IB ORAL Take by mouth as needed.  
LORazepam (ATIVAN) 0.5 mg Take 0.5 mg by mouth at bedtime as needed.  
OXYGEN, HOME THERAPY, 4 L/min by Nasal Cannula route as directed. Can be off  
for 3-4 hours without issue.  
semaglutide (OZEMPIC) 1 mg/dose (2 mg/1.5 mL) pnij Inject 2 mg subcutaneously  
one time a week.  
Insulin Lispro, Human, (HUMALOG) 100 unit/mL crtg Inject subcutaneously w  
MEALS. (Patient not taking: Reported on 2024)  
No current facility-administered medications for this visit.  
ALLERGIES  
Allergen Reactions  
Effexor [Venlafaxin* Other: See Comments  
Seizures.  
Penicillins Rash, Hives  
difficult to breathe, swelling  
Bactrim [Sulfametho* Itching  
Reglan [Metoclopram* Mental Status Change  
Topamax [Topiramate] Mental Status Change  
ROS:  
CONSTITUTIONAL: No fevers, chills, nightsweats, unintended weight loss  
HEENT: No heaches, No nasal congestion/sinus symptoms, No epistaxis, No sense  
of smell  
EYES: No diplopia or blurry vision.  
PHYSICAL EXAM:  
There were no vitals filed for this visit.  
? General appearance: well developed, well nourished, without obvious  
deformities  
? Nasal exam: The mucosa is pink, the septum is Midline and the visible  
turbinates are No hypertrophied on anterior rhinoscopy  
? Oral cavity and oropharynx: the oral mucosa, tongue, tonsil area, and  
posterior pharyngeal mucosa are without lesions.  
Procedure: After obtaining verbal consent, the patient was sprayed with 4%  
topical lidocaine and Afrin. A rigid nasal scope was utilized to examine the  
patient nose  
Spangler splints were removed from bilateral nostrils. The Middle meatal spacers  
were removed from bilateral nostrils. The ethmoid crusting and clots were  
suctioned and removed with blaksley. The rest of the sinuses were suctioned.  
Patient tolerated the procedure well with no complications.  
Radiology: None  
ASSESSMENT:  
1. Other chronic sinusitis - ICD9: 473.8, ICD10: J32.8  
Bev Gaming is a 58 year old Female presenting s/p Procedure: NASAL/SINUS  
ENDOSCOPY SURGICAL W/ MAXILLARY ANTROSTOMY W/ REMOVAL OF TISSUE FROM MAXILLARY  
SINUS on 24 . Doing well, no complaints has no real bad pain took tylenol  
for pa (more content not included)...[facility]08- Nurse 
Note* Florecita Adams MA - 2024 3:49 PM EDTFormatting of this note might 
  be different from the original.  
  
Tobacco Use: .5 packs/day Types: Cigarettes  
  
Was smoking cessation packet given? Patient Declined  
  
Was a referral initiated?Patient declined.  
  
Electronically signed by Florecita Adams MA at 2024 3:49 PM EDT  
  
[facility]08- Nurse Note* Florecita Adams MA - 2024 3:49 PM 
  EDTFormatting of this note might be different from the original.  
  
Tobacco Use: .5 packs/day Types: Cigarettes  
  
Was smoking cessation packet given? Patient Declined  
  
Was a referral initiated?Patient declined.  
  
Electronically signed by Florecita Adams MA at 2024 3:49 PM EDT  
  
[facility] in [facility]- History of Present 
illness Narrative* Concepcion Frazier DO - 2024 11:07 AM EDTAssociated 
  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 
  complication (CMS/[facility])  
Formatting of this note might be different from the original.  
During the appointment today all pertinent labs, imaging, health maintenance, 
and glucose readings were reviewed. Encouraged to check blood glucose throughout
 the day with some fasting and some PP readings. They are to bring their glucose
 meter/cgm in to all appointments.  
All of the patients questions, treatment options, and current care plan and 
goals were discussed. Acopy of this along with pertinent instructions were given
 to the patient at the end of the appointment. The patient voices understanding 
of all of this and is to call in between appointments if they have any problems 
or questions.  
Bev Gaming is doing very well and encouraged on this. , Will stay on 
current medications.  
  
Electronically signed by Concepcion Frazier DO at 2024 11:07 AM EDT  
  
* Concepcion Frazier DO - 2024 10:45 AM EDTFormatting of this note is 
  different from the original.  
Images from the original note were not included.  
Bev Gaming is a 58 y.o. female presents with chief complaint of Diabetes  
  
HPI:  
Diabetes Mellitus Follow-up:  
Bev Gaming is here for follow-up evaluation of diabetes mellitus. The 
initial diagnosis of diabetes was made around   
Diabetes complications: peripheral neuropathy  
She has not been checking her blood glucose at all  
  
Last A1c: 6.5 (24)  
Last eye exam: 2023  
  
Current concerns include:  
She has not been checking her bg levels at all. States she has been doing well  
Diet: limits sugars and carbs, smaller portions  
Drinks: unsweetened tea, water, diet dr pepper  
Exercise: none  
Hypoglycemia: Unknown  
  
Had sinus surgery and fixed deviated septum.  
She was told to hold the ozmepic. She didn't take ozempic last Wednesday or 
yesterday due to the surgery. She is asking when she can restart it.  
  
  
  
SUBJECTIVE:  
  
PROBLEM LIST SOCIAL ALLERGIES:  
Patient Active Problem List  
Diagnosis  
Alpha-1-antitrypsin deficiency (CMS/HCC)  
Cluster headache syndrome  
COPD with asthma (CMS/HCC)  
Diastolic heart failure (CMS/HCC)  
Gastroparesis  
Insomnia  
Intractable chronic migraine without aura and without status migrainosus 
(CMS/HCC)  
Anxiety and depression (CMS/HCC)  
Obsessive-compulsive disorder (CMS/HCC)  
PTSD (post-traumatic stress disorder) (CMS/[facility])  
On home oxygen therapy  
RA (rheumatoid arthritis) (CMS/HCC)  
Pulmonary HTN (CMS/HCC)  
Current smoker  
FERNANDES (dyspnea on exertion)  
Post viral RAD (reactive airway disease) (CMS/HCC)  
Type 2 diabetes mellitus with other diabetic neurological complication (CMS/[facility])  
Medicare annual wellness visit, subsequent  
ACP (advance care planning)  
Encounter for screening mammogram for malignant neoplasm of breast  
Postmenopausal  
History of stress fracture  
Reactive airway disease with acute exacerbation (CMS/HCC)  
Chronic pansinusitis  
Social History  
  
Tobacco Use  
Smoking status: Every Day  
Current packs/day: 1.00  
Average packs/day: 1 pack/day for 35.7 years (35.7 ttl pk-yrs)  
Types: Cigarettes  
Start date: 1989  
Passive exposure: Past  
Smokeless tobacco: Never  
Tobacco comments:  
Patient smokes 10 or less cigarettes/day after 6-30 minutes of waking up.  
Thinking about quitting.  
Substance Use Topics  
Alcohol use: Never  
Comment: Caffeine: >4 cups/day iced unsweetened tea  
Drug use: Never  
  
Allergies  
Allergen Reactions  
Penicillins Itching, Hives and Rash  
Other Reaction(s): hives  
  
difficult to breathe, swelling  
Metoclopramide  
Other Reaction(s): agitation, makes me mean, Mental Status Change  
  
Other Reaction(s): Mental Status Change  
  
Other Reaction(s): Agitated  
Topiramate  
Other Reaction(s): makes me mean, rash,fantacize about dying  
  
RASH,  
  
Other Reaction(s): Mental Status Change  
  
Other Reaction(s): Hallucinating  
Bupropion  
Other Reaction(s): Unknown  
Cephalexin Unknown  
Percocet [Oxycodone-Acetaminophen] GI intolerance  
Quetiapine  
Other Reaction(s): seizures, Unknown  
Risperidone  
Other Reaction(s): Altered mental status  
  
Other Reaction(s): Confusion  
Sulfamethoxazole-Trimethoprim  
Other Reaction(s): itching  
Venlafaxine Hcl  
Other Reaction(s): wet bed, fell, probable seizure  
  
  
Synopsis SmartLink 2024  
10:52 2024  
23:59  
Antidiabetic medications  
Insulin Lispro correction 1:30 > 150 mg/dl (max daily 50 units) Subcutaneous for
30 days (100 UNIT/ML SOPN) -Discontinued (Therapy comp)  
No sig  
Semaglutide 2 mg q7 days SC (8 MG/3ML SOPN) 2 mg q7 days SC (8 MG/3ML SOPN)  
No sig  
Labs  
MHPT A1C 6.3  
  
Outpatient prescription  
Medication marked as long-term  
Patient-reported medication  
  
REVIEW OF SYMPTOMS:  
Review of Systems  
Constitutional: Negative for appetite change, fatigue and unexpected weight 
change.  
Eyes: Negative for visual disturbance.  
Respiratory: Negative for cough, shortness of breath and wheezing.  
Cardiovascular: Negative for chest pain, palpitations and leg swelling.  
Neurological: Positive for numbness.  
Endocrine: Negative for polydipsia, polyphagia and polyuria.  
  
OBJECTIVE:  
  
2024  
10:32 AM 2024  
2:00 PM 3/1/2024  
10:26 AM  
Vitals  
BMI 30.26 kg/m2 30.11 kg/m2 31.32 kg/m2  
Systolic 112 128 108  
Diastolic 64 88 80  
Heart Rate 78 87 93  
Temp 98.3 F 97.4 F  
Height (in) 5' 8  5' 8   
Weight (lb) 199 198 206  
Visit Report Report Report Report  
  
Physical Exam  
Constitutional:  
General: She is not in acute distress.  
Appearance: Normal appearance.  
Cardiovascular:  
Rate and Rhythm: Normal rate and regular rhythm.  
Heart sounds: No murmur heard.  
No friction rub. No gallop.  
Pulmonary:  
Breath sounds: Normal breath sounds. No wheezing, rhonchi or rales.  
Musculoskeletal:  
General: No swelling.  
Neurological:  
Mental Status: She is alert.  
  
  
ASSESSMENT AND PLAN:  
  
Problem List Items Addressed This Visit  
  
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  
During the appointment today all pertinent labs, imaging, health maintenance, 
and glucose readings were reviewed. Encouraged to check blood glucose throughout
the day with some fasting and some PP readings. They are to bring their glucose 
meter/cgm in to all appointments.  
All of the patients questions, treatment options, and current care plan and 
goals were discussed. Acopy of this along with pertinent instructions were given
to the patient at the end of the appointment. The patient voices understanding 
of all of this and is to call in between appointments if they have any problems 
or questions.  
Bev Gaming is doing very well and encouraged on this. , Will stay on 
current medications.  
  
  
  
Relevant Orders  
POCT glycosylated hemoglobin (Hb A1C) docked device (Completed)  
  
Follow up in about 6 months (around 2025) for Recheck.  
Patient's Medications  
New Prescriptions  
No medications on file  
Previous Medications  
ALBUTEROL (2.5 MG/3ML) 0.083% NEBULIZER SOLUTION Take 2.5 mg by nebulization in 
the morning and 2.5mg at noon and 2.5 mg in the evening and 2.5 mg before 
bedtime.  
ALBUTEROL HFA 90 MCG/ACT INHALER Inhale 1 puff every 4 (four) hours if needed 
for wheezing or shortness of breath  
FAMOTIDINE (PEPCID) 40 MG TABLET TAKE 1 TABLET BY MOUTH EVERY DAY IN THE MORNING  
IPRATROPIUM-ALBUTEROL (DUO-NEB) 0.5-2.5 MG/3 ML NEBULIZER SOLUTION Take 3 mL by 
nebulization every 6 (six) hours.  
LORAZEPAM (ATIVAN) 0.5 MG TABLET Take 1 tablet (0.5 mg) by mouth every 12 
(twelve) hours  
OXYGEN (O2) GAS Inhale 4 L/min continuously.  
PROMETHAZINE (PHENERGAN) 25 MG TABLET Take 1 tablet (25 mg) by mouth every 8 
(eight) hours if needed for nausea or vomiting  
ROSUVASTATIN (CRESTOR) 10 MG TABLET Take 1 tablet (10 mg) by mouth in the 
morning.  
SEMAGLUTIDE, 2 MG/DOSE, (OZEMPIC, 2 MG/DOSE,) 8 MG/3ML SOLUTION PEN-INJECTOR 
Inject 2 mg under the skin every 7 (seven) days.  
TRAZODONE (DESYREL) 50 MG TABLET Take 1-2 tablets ( mg) by mouth Daily as 
needed for sleep  
Modified Medications  
No medications on file  
Discontinued Medications  
AZELASTINE (ASTELIN) 0.1 % NASAL SPRAY Administer 1 spray into each nostril in 
the morning and 1 spray before bedtime.  
CEFADROXIL (DURICEF) 500 MG CAPSULE Take 500 mg by mouth in the morning and 500 
mg in the evening.  
ONDANSETRON ODT (ZOFRAN-ODT) 4 MG DISINTEGRATING TABLET Take 4 mg by mouth every
8 (eight) hours ifneeded  
  
I have reviewed and reconciled the history and medication list with the patient 
today.  
  
  
  
  
Electronically signed by Concepcion Frazier DO at 2024 11:08 AM EDT  
  
documented in this encounterUniversity [id number]- Telephone encounter Note* 
  Telephone Encounter - Sonia Trujillo RN - 2024 5:21 PM EDT
  Formatting of this note might be different from the original.  
Klaudia, camille is Sonia from the [facility]. I work with Dr Cooper and 
they have asked us tocall and check in on you after your surgery  
  
  
  
Do you have a few minutes that I could ask you a few questions? Yes  
  
  
  
On a scale of 1 to 10, with 10 being highest, how do you rate your pain after 
the surgery? 4/10  
  
  
  
Is your pain controlled with the pain medication prescribed? Yes  
  
  
  
Have you had any sudden changes in your hearing in the past few days? No  
  
  
  
Have you had any nausea/vomiting in the past few days? No  
  
  
  
Have you had dizziness or vertigo in the past few days? No  
  
  
  
Are there any problems with your incision or do you have dressing concerns? NA  
  
  
  
Do you have swelling, redness or drainage at your incision? NA  
  
  
  
Have you had fevers/? No  
  
  
  
Are you able to eat and drink normally? Yes  
  
  
  
Which medications are you currently taking and how often are you taking it? 1000
mg tylenol every 4hours routinely and an occasional oxycodone  
  
How is your activity level? I am sleeping a lot today.  
  
  
  
Do you have any other questions or concerns? No  
  
  
  
Do you have your post op appt? Yes  with both Dr Cooper and Dr Gandara  
  
  
  
Do you know how to reach us if you have any questions before your appointment?  
  
  
  
  
  
For skull base surgery patients (schwannoma, glomus, etc)  
  
  
  
Have there been any changes in your facial movements? No  
  
  
  
Are you experiencing any drainage from your wound? NA  
  
  
  
Are you experiencing any drainage from your ear? No  
  
  
  
Are you experiencing any drainage from your nose? Yes A medium amount of blood -
about the same as last time she had this procedure.  
  
Sonia Trujillo RN  
  
Electronically signed by Sonia Trujillo RN at 2024 5:28 PM EDT  
  
[facility]08- Miscellaneous Notes* Telephone Encounter - Sonia Trujillo RN - 2024 5:21 PM EDTFormatting of this note might be 
  different from the original.  
Klaudia, this is Sonia from the [facility]. I work with Dr Cooper and 
they have asked us tocall and check in on you after your surgery  
  
  
  
Do you have a few minutes that I could ask you a few questions? Yes  
  
  
  
On a scale of 1 to 10, with 10 being highest, how do you rate your pain after 
the surgery? 4/10  
  
  
  
Is your pain controlled with the pain medication prescribed? Yes  
  
  
  
Have you had any sudden changes in your hearing in the past few days? No  
  
  
  
Have you had any nausea/vomiting in the past few days? No  
  
  
  
Have you had dizziness or vertigo in the past few days? No  
  
  
  
Are there any problems with your incision or do you have dressing concerns? NA  
  
  
  
Do you have swelling, redness or drainage at your incision? NA  
  
  
  
Have you had fevers/? No  
  
  
  
Are you able to eat and drink normally? Yes  
  
  
  
Which medications are you currently taking and how often are you taking it? 1000
mg tylenol every 4hours routinely and an occasional oxycodone  
  
How is your activity level? I am sleeping a lot today.  
  
  
  
Do you have any other questions or concerns? No  
  
  
  
Do you have your post op appt? Yes  with both Dr Cooper and Dr Gandara  
  
  
  
Do you know how to reach us if you have any questions before your appointment?  
  
  
  
  
  
For skull base surgery patients (schwannoma, glomus, etc)  
  
  
  
Have there been any changes in your facial movements? No  
  
  
  
Are you experiencing any drainage from your wound? NA  
  
  
  
Are you experiencing any drainage from your ear? No  
  
  
  
Are you experiencing any drainage from your nose? Yes A medium amount of blood -
about the same as last time she had this procedure.  
  
Sonia Trujillo, CARINA  
  
Electronically signed by Sonia Trujillo RN at 2024 5:28 PM EDT  
  
documented in this encounterPeoples Hospital08- NoteHNO ID: [id number]  
Author: MITRA MARKS SRNA  
Service: ?  
Author Type: Student  
Type: Anesthesia Procedure Notes  
Filed: 2024 12:19  
Note Text:  
ANESTHESIOLOGY PROCEDURE NOTE  
Airway  
General Information  
Procedure Start Time/Medication Administration: 2024 11:49 AM  
Procedure End Time: 2024 11:49 PM  
Patient location during procedure: OR  
Timeout Performed Pre-procedure: timeout performed  
Consent Obtained: Yes  
Patient identity confirmed: arm band, care team member and patient  
Staffing  
Anesthesiologist: MARIANA Nguyễn MD  
SRNA: Mitra Marks SRNA  
Performed by: BEBE  
Indications and Patient Condition  
Indications for airway management: anesthesia and airway protection  
Preoxygenated: yes  
anesthesia circuit  
Patient position: sniffing and ramp  
Method: asleep  
Cricoid Pressure: Yes  
Manual In-Line Stabilization: No  
Difficult Mask: No  
Airway Accessory: oral airway  
Final Airway Details  
Final airway type: endotracheal airway  
Final Endotracheal Airway: ETT  
Successful intubation technique: direct laryngoscopy  
Devices used: intubating stylet  
Endotracheal tube insertion site: oral  
Blade: Malou  
Blade size: #3  
ETT size (mm): 7.0  
Measured from: gums  
Measurement (cm): 22  
Placement verified by: capnometry  
Cormack-Lehane Classification: grade IIa - partial view of glottis  
Number of attempts at approach: 1  
Airway trauma: oral cavity  
Failed airway: no  
Unrecognized esophageal intubation: no  
Airway not difficult  
SIGNATURE: BEBE Venegas PATIENT NAME: Bev Gaming  
DATE: 2024 MRN: [id number]  
TIME: 12:16 PM CSN: [id number]- History and 
physical note* Sapphire Mayorga APRN.CNP - 2024 1:40 PM EDTFormatting of 
  this note is different from the original.  
Images from the original note were not included.  
HISTORY AND PHYSICAL EXAMINATION  
  
SERVICE DATE: 2024  
SERVICE TIME: 10:51 AM  
  
  
PRIMARY CARE PHYSICIAN: Sherman Malone DO  
  
  
REASON FOR VISIT:  
Bev Gaming is a 58 year old female who is scheduled for Bilateral - 
NASAL/SINUS ENDOSCOPY SURGICAL W/ MAXILLARY ANTROSTOMY W/ REMOVAL OF TISSUE FROM
MAXILLARY SINUS  
Bilateral - ENDOSCOPY NASAL/SINUS W/ FRONTAL SINUS EXPLORATION  
Bilateral - ENDOSCOPY NASAL/SINUS W/ ETHMOIDECTOMY, TOTAL  
ENDOSCOPIC SEPTOPLASTY  
Bilateral - COBLATION TURBINATES INTRAMURAL  
Bilateral - ENDOSCOPY NASAL/SINUS W/ SPHENOIDOTOMY  
Bilateral - STEREOTACTIC COMPUTER-ASSISTED (NAVIGATIONAL) PROCEDURE CRANIAL 
EXTRADURAL at the request of Dr. Diana Gandara for consultation. My final 
recommendation will be communicated back to therequesting physician by way of 
shared medical record or letter.  
  
Assessment  
Patient has the following medical conditions which may affect ann-operative 
course:  
  
Diabetes ([facility])  
Assessment: Stable on Ozempic  
Patient given instructions regarding Ozempic  
Component 5 mo ago  
Hemoglobin A1C 6.5  
Specimen Collected: 24 11:03 AM  
  
Follows with PCP  
  
DVT of lower extremity (deep venous thrombosis) ([facility])  
Assessment: s/p ankle surgery  
Completed course of Xarelto  
No recurrence  
  
Anxiety  
Assessment: Takes Valium as needed  
  
Class 2 obesity  
Assessment: Body mass index is 30.41 kg/m .  
  
Smoking  
Assessment: Smokes 0.5 PPD  
  
COPD (chronic obstructive pulmonary disease) ([facility])  
Assessment: Stable  
95% on RA  
Albuterol PRN  
On 4L O2 @ HS and during the day as needed  
Follows with Dr. Davidson @ [facility]  
  
Alpha-1-antitrypsin deficiency ([facility])  
Assessment: Follows with Pulmonology  
  
GERD (gastroesophageal reflux disease)  
Assessment: Controlled with pepcid  
  
[facility] Activity Status Index:  
METS:  
Do moderate work around the house, such as vacuuming, sweeping floors, or 
carrying in groceries (3.50 METs)  
Climb a flight of stairs or walk up a hill (5.50 METs)  
DASI Score: 9  
Patient denies any chest pain or undue shortness of breath with the above 
physical activity.  
  
STOP-Bang Score:  
Patient over 50 years old  
Denies snoring loudly  
Denies feeling tired, fatigued, or sleepy during the daytime  
Has not been observed to stop breathing or choking/gasping during sleep  
Denies having high blood pressure  
BMI less than or equal to 35 kg/m^2  
Does not have a large neck  
Non-male patient  
STOP-Bang Score: 1  
  
[id number] Score:  
Age: <65  
Sex: female  
CHF history: No  
Hypertension history: No  
Stroke/TIA/thromboembolism history: Yes  
Vascular disease history: No  
Diabetes history: Yes  
[id number] Score: 4  
  
ARISCAT Score:  
Age: 51-80  
Preoperative SpO2: 91-95%  
Respiratory infection in the last month: No  
Preoperative anemia: Yes  
Surgical incision: peripheral  
Duration of surgery: 2-3 hrs  
Emergency procedure: No  
ARISCAT Score: 38  
  
ANESTHESIA FINDINGS:  
Intubation History: No history of difficult intubation  
Significant Anesthesia Considerations:  
none  
  
Airway History:  
No history of difficult airway  
  
I - PHYSICAL EVALUATION  
AIRWAY  
Patient intubated: No.  
Tracheostomy tube not present  
Mallampati: I.  
TM distance: >3 FB.  
Neck ROM: full ROM without neurological symptoms.  
Mouth opening: adequate.  
Short neck: no.  
Thick neck: no  
Lip Bite Test: II  
Microretrognathia/Micronagthia/Recessed Chin: No  
  
DENTAL  
Dental findings: teeth intact.  
Dentures, upper: complete.  
Dentures, lower: complete.  
  
II - ANESTHESIA PLAN  
  
Beta Blocker  
Monitoring Plan  
Post Procedure Analgesic Plan  
  
  
  
Prepared for surgery:  
This patient is optimally prepared for surgery.  
  
CONSULTS:  
Patient does not require consults for optimization at this time.  
  
The Following Tests/Procedures Have Been Initiated:  
Labs not indicated per [facility] protocol, EKG not indicated per [facility] protocol  
  
Planned Anesthetic: Per anesthesia choice  
  
Subjective  
CHIEF COMPLAINT: Chronic Pansinusitis  
HPI: 58 year old year old presents to [facility] for evaluation. Patient has been 
experiencing sinus issues including ear fullness and pressure > 1 year. Has 
elected for surgical intervention.  
  
PAST MEDICAL HISTORY  
No date: Arthritis  
No date: COPD (chronic obstructive pulmonary disease) [facility] [facility]  
No date: Depression  
Comment: sees a psych  
No date: Diabetes ([facility])  
No date: DVT (deep venous thrombosis) (Formerly [facility])  
Comment: Left lower extremity s/p ankle surgery  
No date: Hypogammaglobulinemia (HCC)  
No date: On home oxygen therapy  
Comment: 4L NC  
No date: Overweight  
No date: Pneumonia  
No date: PONV (postoperative nausea and vomiting)  
No date: RA (rheumatoid arthritis) (HCC)  
PAST SURGICAL HISTORY  
No date: ANKLE LEFT OP SURGERY  
Comment: plantar tendon  
No date: CHOLECYSTECTOMY  
No date: HERNIA REPAIR HX  
No date: HIP RIGHT OP SURGERY  
Comment: tendon repair  
No date: HYSTERECTOMY HX  
Comment: total  
No date: KNEE RIGHT OP SURGERY  
Comment: tendon repair  
No date: SHOULDER ARTHROSCOPY/SURGERY  
Comment: bilateral tendon repair  
FAMILY HISTORY  
Problem Relation Age of Onset  
Hypertension Mother  
Arthritis Mother  
Cancer Father  
Hypertension Brother  
  
SOCIAL HISTORY:  
Social History  
  
Tobacco Use  
Smoking status: Every Day  
Packs/day: .5  
Types: Cigarettes  
Start date:   
Smokeless tobacco: Never  
Vaping Use  
Vaping Use: Never used  
Substance Use Topics  
Alcohol use: No  
Drug use: No  
Comment: denies tx for drug/alcohol abuse in the past.  
  
Prior to Admission medications as of 24 1334  
Medication Sig Last Dose Taking  
promethazine (PHENERGAN) 25 mg tablet 25 mg every 8 hours as needed. Yes  
azelastine 0.1% nasal spray SPRAY 1 SPRAY INTO EACH NOSTRIL TWICE DAILY Yes  
albuterol (PROVENTIL) 2.5 mg /3 mL (0.083 %) nebulizer solution 2.5 mg as 
needed. Yes  
albuterol HFA (PROVENTIL HFA, VENTOLIN HFA) 90 mcg/actuation inhaler INHALE 1 
PUFF EVERY 4 HOURS ASNEEDED FOR WHEEZE OR FOR SHORTNESS OF BREATH Yes  
famotidine (PEPCID) 40 mg tablet Take 40 mg by mouth every morning. Yes  
IBUPROFEN IB ORAL Take by mouth as needed. Yes  
LORazepam (ATIVAN) 0.5 mg Take 0.5 mg by mouth at bedtime as needed. Yes  
OXYGEN, HOME THERAPY, 4 L/min by Nasal Cannula route as directed. Can be off for
3-4 hours without issue. Yes  
semaglutide (OZEMPIC) 1 mg/dose (2 mg/1.5 mL) pnij Inject 2 mg subcutaneously 
one time a week. Yes  
Insulin Lispro, Human, (HUMALOG) 100 unit/mL crtg Inject subcutaneously w MEALS.  
Patient not taking: Reported on 2024  
  
Medication Comments documented by Lulu Gregg LPN on 2021 at 1221.  
2020 Only takes Insuli  
  
ALLERGIES  
Allergen Reactions  
Effexor [Venlafaxin* Other: See Comments  
Seizures.  
Penicillins Rash, Hives  
difficult to breathe, swelling  
Bactrim [Sulfametho* Itching  
Reglan [Metoclopram* Mental Status Change  
Topamax [Topiramate] Mental Status Change  
  
Covid Immunization Dates  
  
Overdue - Covid-19 Vaccine (4 - 2023-24 season) Overdue since 2022 Outside Immunization: COVID-19 Pfizer  
2021 Outside Immunization: COVID-19, mRNA, LNP-S, PF, 30 mcg/0.3 mL dose  
2021 Outside Immunization: COVID-19, mRNA, LNP-S, PF, 30 mcg/0.3 mL dose  
  
  
  
  
  
  
REVIEW OF SYSTEMS:  
PAIN ASSESSMENT: Pain  
Pain Level: 4  
Pain Location: Face  
Description: Pressure, Aching  
Duration Amount of Time: 8  
Duration Units: Months  
Frequency: Continuous  
Intervention/Comfort measure: Cold, Heat, Medication  
Comments: nothing helps anymore per patient  
  
General: No weight loss, malaise or fevers.  
Neuro: No history of TIA's, stroke, CNS tumor, impaired sensorium, hemiplegia, 
paraplegia or quadraplegia. No neurological symptoms or problems.  
Respiratory: Negative for Bronchitis, Wheezing  
+COPD  
+Home O2L 4L  
+Alpha 1  
+Smokes- 1 PPD  
Cardiovascular: Negative for Recent MI, Angina, Chest Pain, PVD, DVT/PE  
+H/o DVT s/p ankle sx (course of xarelto completed)  
GI: Negative for Nausea, Vomiting, Abdominal pain  
+GERD  
: Negative for dysuria, incontinence, hematuria, and hesitancy  
GYN: Negative for abnormal vaginal bleeding, abnormal vaginal discharge.  
Pregnancy: Denies, No LMP recorded. Patient has had a hysterectomy.  
Endocrine: +DM2 Ozempic  
Hematology: No history of bleeding or clotting disorder. Pt is not taking anti-
coagulation or platelet medications. No history of hematological symptoms or 
problems.  
Oncology: No history of CA metastasis, chemo within 30 days, or radiotherapy 
within 90 days. Has not lost 10% of body wt in 6 months. No history of 
oncological symptoms or problems.  
Psych: Anxiety  
Musculoskeletal: Negative for joint pain or swelling, back pain or muscle pain.  
Skin: Negative for lesions, rash and itching.  
  
  
Objective  
PHYSICAL EXAM:  
VITALS:  
/78   Pulse 87   Temp (Src) 98 (Oral)   Resp 16   Ht 5' 8  (1.73m)   Wt 
200 lb (90.7kg)   SpO2 95%   BMI 30.42 kg/(m^2).  
  
  
  
General: Alert and oriented  
Skin: Normal color, no rash, no lesions.  
HEENT: EOM, pupils equal, round and reactive.  
Cardiovascular: Normal S1 & S2, no rubs, murmurs or gallops. No JVD. Pulse 
regular.  
Lungs: Wheezes- exp  
Abdomen: Soft, non-tender, no rigidity.  
Extremities: No deformity, no edema or tenderness, no joint swelling or 
clubbing.  
Neurological: Normal cognition and motor skills.  
Pulses: Carotid and radial pulses normal +2.  
  
Diagnostic tests reviewed for today's visit:  
  
No new labs or tests  
  
Instructions Given to Patient:  
Instructions located in the after visit summary.  
Patient given verbal and written preop instructions and voices comprehension and
 compliance.  
  
SIGNATURE: Sapphire Mayorga APRN.CNP PATIENT NAME: Bev Gaming  
DATE: 2024 MRN: [id number]  
TIME: 10:02 AM  
  
  
Electronically signed by Sapphire Mayorga APRN.CNP at 2024 12:20 PM EDT  
  
[facility]- History and physical note* Sapphire Mayorga APRN.CNP -
   2024 1:40 PM EDTFormatting of this note is different from the original.  
Images from the original note were not included.  
HISTORY AND PHYSICAL EXAMINATION  
  
SERVICE DATE: 2024  
SERVICE TIME: 10:51 AM  
  
  
PRIMARY CARE PHYSICIAN: Sherman Malone DO  
  
  
REASON FOR VISIT:  
Bev Gaming is a 58 year old female who is scheduled for Bilateral - 
NASAL/SINUS ENDOSCOPY SURGICAL W/ MAXILLARY ANTROSTOMY W/ REMOVAL OF TISSUE FROM
 MAXILLARY SINUS  
Bilateral - ENDOSCOPY NASAL/SINUS W/ FRONTAL SINUS EXPLORATION  
Bilateral - ENDOSCOPY NASAL/SINUS W/ ETHMOIDECTOMY, TOTAL  
ENDOSCOPIC SEPTOPLASTY  
Bilateral - COBLATION TURBINATES INTRAMURAL  
Bilateral - ENDOSCOPY NASAL/SINUS W/ SPHENOIDOTOMY  
Bilateral - STEREOTACTIC COMPUTER-ASSISTED (NAVIGATIONAL) PROCEDURE CRANIAL 
EXTRADURAL at the request of Dr. Diana Gandara for consultation. My final 
recommendation will be communicated back to therequesting physician by way of 
shared medical record or letter.  
  
Assessment  
Patient has the following medical conditions which may affect ann-operative 
course:  
  
Diabetes (Formerly [facility])  
Assessment: Stable on Ozempic  
Patient given instructions regarding Ozempic  
Component 5 mo ago  
Hemoglobin A1C 6.5  
Specimen Collected: 24 11:03 AM  
  
Follows with PCP  
  
DVT of lower extremity (deep venous thrombosis) (Formerly [facility])  
Assessment: s/p ankle surgery  
Completed course of Xarelto  
No recurrence  
  
Anxiety  
Assessment: Takes Valium as needed  
  
Class 2 obesity  
Assessment: Body mass index is 30.41 kg/m .  
  
Smoking  
Assessment: Smokes 0.5 PPD  
  
COPD (chronic obstructive pulmonary disease) (Formerly [facility])  
Assessment: Stable  
95% on RA  
Albuterol PRN  
On 4L O2 @ HS and during the day as needed  
Follows with Dr. Davidson @ [facility]  
  
Alpha-1-antitrypsin deficiency (Formerly [facility])  
Assessment: Follows with Pulmonology  
  
GERD (gastroesophageal reflux disease)  
Assessment: Controlled with pepcid  
  
[facility] Activity Status Index:  
METS:  
Do moderate work around the house, such as vacuuming, sweeping floors, or 
carrying in groceries (3.50 METs)  
Climb a flight of stairs or walk up a hill (5.50 METs)  
DASI Score: 9  
Patient denies any chest pain or undue shortness of breath with the above 
physical activity.  
  
STOP-Bang Score:  
Patient over 50 years old  
Denies snoring loudly  
Denies feeling tired, fatigued, or sleepy during the daytime  
Has not been observed to stop breathing or choking/gasping during sleep  
Denies having high blood pressure  
BMI less than or equal to 35 kg/m^2  
Does not have a large neck  
Non-male patient  
STOP-Bang Score: 1  
  
[id number] Score:  
Age: <65  
Sex: female  
CHF history: No  
Hypertension history: No  
Stroke/TIA/thromboembolism history: Yes  
Vascular disease history: No  
Diabetes history: Yes  
[id number] Score: 4  
  
ARISCAT Score:  
Age: 51-80  
Preoperative SpO2: 91-95%  
Respiratory infection in the last month: No  
Preoperative anemia: Yes  
Surgical incision: peripheral  
Duration of surgery: 2-3 hrs  
Emergency procedure: No  
ARISCAT Score: 38  
  
ANESTHESIA FINDINGS:  
Intubation History: No history of difficult intubation  
Significant Anesthesia Considerations:  
none  
  
Airway History:  
No history of difficult airway  
  
I - PHYSICAL EVALUATION  
AIRWAY  
Patient intubated: No.  
Tracheostomy tube not present  
Mallampati: I.  
TM distance: >3 FB.  
Neck ROM: full ROM without neurological symptoms.  
Mouth opening: adequate.  
Short neck: no.  
Thick neck: no  
Lip Bite Test: II  
Microretrognathia/Micronagthia/Recessed Chin: No  
  
DENTAL  
Dental findings: teeth intact.  
Dentures, upper: complete.  
Dentures, lower: complete.  
  
II - ANESTHESIA PLAN  
  
Beta Blocker  
Monitoring Plan  
Post Procedure Analgesic Plan  
  
  
  
Prepared for surgery:  
This patient is optimally prepared for surgery.  
  
CONSULTS:  
Patient does not require consults for optimization at this time.  
  
The Following Tests/Procedures Have Been Initiated:  
Labs not indicated per [facility] protocol, EKG not indicated per [facility] protocol  
  
Planned Anesthetic: Per anesthesia choice  
  
Subjective  
CHIEF COMPLAINT: Chronic Pansinusitis  
HPI: 58 year old year old presents to [facility] for evaluation. Patient has been 
experiencing sinus issues including ear fullness and pressure > 1 year. Has 
elected for surgical intervention.  
  
PAST MEDICAL HISTORY  
No date: Arthritis  
No date: COPD (chronic obstructive pulmonary disease) (Formerly [facility])  
No date: Depression  
Comment: sees a psych  
No date: Diabetes (Formerly [facility])  
No date: DVT (deep venous thrombosis) (Formerly [facility])  
Comment: Left lower extremity s/p ankle surgery  
No date: Hypogammaglobulinemia (Formerly [facility])  
No date: On home oxygen therapy  
Comment: 4L NC  
No date: Overweight  
No date: Pneumonia  
No date: PONV (postoperative nausea and vomiting)  
No date: RA (rheumatoid arthritis) (Formerly [facility])  
PAST SURGICAL HISTORY  
No date: ANKLE LEFT OP SURGERY  
Comment: plantar tendon  
No date: CHOLECYSTECTOMY  
No date: HERNIA REPAIR HX  
No date: HIP RIGHT OP SURGERY  
Comment: tendon repair  
No date: HYSTERECTOMY HX  
Comment: total  
No date: KNEE RIGHT OP SURGERY  
Comment: tendon repair  
No date: SHOULDER ARTHROSCOPY/SURGERY  
Comment: bilateral tendon repair  
FAMILY HISTORY  
Problem Relation Age of Onset  
Hypertension Mother  
Arthritis Mother  
Cancer Father  
Hypertension Brother  
  
SOCIAL HISTORY:  
Social History  
  
Tobacco Use  
Smoking status: Every Day  
Packs/day: .5  
Types: Cigarettes  
Start date:   
Smokeless tobacco: Never  
Vaping Use  
Vaping Use: Never used  
Substance Use Topics  
Alcohol use: No  
Drug use: No  
Comment: denies tx for drug/alcohol abuse in the past.  
  
Prior to Admission medications as of 24 1334  
Medication Sig Last Dose Taking  
promethazine (PHENERGAN) 25 mg tablet 25 mg every 8 hours as needed. Yes  
azelastine 0.1% nasal spray SPRAY 1 SPRAY INTO EACH NOSTRIL TWICE DAILY Yes  
albuterol (PROVENTIL) 2.5 mg /3 mL (0.083 %) nebulizer solution 2.5 mg as 
needed. Yes  
albuterol HFA (PROVENTIL HFA, VENTOLIN HFA) 90 mcg/actuation inhaler INHALE 1 
PUFF EVERY 4 HOURS ASNEEDED FOR WHEEZE OR FOR SHORTNESS OF BREATH Yes  
famotidine (PEPCID) 40 mg tablet Take 40 mg by mouth every morning. Yes  
IBUPROFEN IB ORAL Take by mouth as needed. Yes  
LORazepam (ATIVAN) 0.5 mg Take 0.5 mg by mouth at bedtime as needed. Yes  
OXYGEN, HOME THERAPY, 4 L/min by Nasal Cannula route as directed. Can be off for
 3-4 hours without issue. Yes  
semaglutide (OZEMPIC) 1 mg/dose (2 mg/1.5 mL) pnij Inject 2 mg subcutaneously 
one time a week. Yes  
Insulin Lispro, Human, (HUMALOG) 100 unit/mL crtg Inject subcutaneously w MEALS.  
Patient not taking: Reported on 2024  
  
Medication Comments documented by Lulu Gregg LPN on 2021 at 1221.  
2020 Only takes Insuli  
  
ALLERGIES  
Allergen Reactions  
Effexor [Venlafaxin* Other: See Comments  
Seizures.  
Penicillins Rash, Hives  
difficult to breathe, swelling  
Bactrim [Sulfametho* Itching  
Reglan [Metoclopram* Mental Status Change  
Topamax [Topiramate] Mental Status Change  
  
Covid Immunization Dates  
  
Overdue - Covid-19 Vaccine (4 - 2023-24 season) Overdue since 2022 Outside Immunization: COVID-19 Pfizer  
2021 Outside Immunization: COVID-19, mRNA, LNP-S, PF, 30 mcg/0.3 mL dose  
2021 Outside Immunization: COVID-19, mRNA, LNP-S, PF, 30 mcg/0.3 mL dose  
  
  
  
  
  
  
REVIEW OF SYSTEMS:  
PAIN ASSESSMENT: Pain  
Pain Level: 4  
Pain Location: Face  
Description: Pressure, Aching  
Duration Amount of Time: 8  
Duration Units: Months  
Frequency: Continuous  
Intervention/Comfort measure: Cold, Heat, Medication  
Comments: nothing helps anymore per patient  
  
General: No weight loss, malaise or fevers.  
Neuro: No history of TIA's, stroke, CNS tumor, impaired sensorium, hemiplegia, 
paraplegia or quadraplegia. No neurological symptoms or problems.  
Respiratory: Negative for Bronchitis, Wheezing  
+COPD  
+Home O2L 4L  
+Alpha 1  
+Smokes- 1 PPD  
Cardiovascular: Negative for Recent MI, Angina, Chest Pain, PVD, DVT/PE  
+H/o DVT s/p ankle sx (course of xarelto completed)  
GI: Negative for Nausea, Vomiting, Abdominal pain  
+GERD  
: Negative for dysuria, incontinence, hematuria, and hesitancy  
GYN: Negative for abnormal vaginal bleeding, abnormal vaginal discharge.  
Pregnancy: Denies, No LMP recorded. Patient has had a hysterectomy.  
Endocrine: +DM2 Ozempic  
Hematology: No history of bleeding or clotting disorder. Pt is not taking anti-
coagulation or platelet medications. No history of hematological symptoms or 
problems.  
Oncology: No history of CA metastasis, chemo within 30 days, or radiotherapy 
within 90 days. Has not lost 10% of body wt in 6 months. No history of 
oncological symptoms or problems.  
Psych: Anxiety  
Musculoskeletal: Negative for joint pain or swelling, back pain or muscle pain.  
Skin: Negative for lesions, rash and itching.  
  
  
Objective  
PHYSICAL EXAM:  
VITALS:  
/78   Pulse 87   Temp (Src) 98 (Oral)   Resp 16   Ht 5' 8  (1.73m)   Wt 
200 lb (90.7kg)   SpO2 95%   BMI 30.42 kg/(m^2).  
  
  
  
General: Alert and oriented  
Skin: Normal color, no rash, no lesions.  
HEENT: EOM, pupils equal, round and reactive.  
Cardiovascular: Normal S1 & S2, no rubs, murmurs or gallops. No JVD. Pulse 
regular.  
Lungs: Wheezes- exp  
Abdomen: Soft, non-tender, no rigidity.  
Extremities: No deformity, no edema or tenderness, no joint swelling or 
clubbing.  
Neurological: Normal cognition and motor skills.  
Pulses: Carotid and radial pulses normal +2.  
  
Diagnostic tests reviewed for today's visit:  
  
No new labs or tests  
  
Instructions Given to Patient:  
Instructions located in the after visit summary.  
Patient given verbal and written preop instructions and voices comprehension and
 compliance.  
  
SIGNATURE: Sapphire Mayorga APRN.CNP PATIENT NAME: Bev Gaming  
DATE: 2024 MRN: [id number]  
TIME: 10:02 AM  
  
  
Electronically signed by Sapphire Mayorga APRN.CNP at 2024 12:20 PM EDT  
  
[facility]* Patient 
  Instructions*   
  
Sapphire Mayorga APRN.CNP - 2024 10:02 AM EDT  
  
Formatting of this note is different from the original.  
PATIENT PREOPERATIVE INSTRUCTIONS  
  
You Surgeon has scheduled you for your procedure at this surgery center:  
[facility] [facility] Scheduling Office: [phone] --[address], [place] 
[address].  
Please read below carefully for your personalized instructions.  
  
Dietary Restrictions:  
- No solid food after midnight.  
- You may have 12 ounces of clear liquids (water, clear juices such as apple 
juice or gatorade, carbonated beverages, clear tea, black coffee, jello) until 2
 hours before scheduled arrival at facility.  
  
Medications:  
Unless instructed differently below, stay on all of your medications until your 
surgery.  
Approved medications to take the morning of surgery with a sip of water: 
Albuterol & Pepcid  
  
Preoperative Instructions for Patient's with Diabetes Mellitus  
Oral/ Injectable Medication Instructions - Semaglutide (Ozempic) - please HOLD 7
 DAYS PRIOR TO SURGERY  
  
Last dose prior to 2024 procedure: 2024  
  
If you start any new medications after today's visit, please contact the 
surgeon's office.  
  
Blood Thinning Medications:  
- Stop NSAIDS (Ibuprofen, Advil, Aleve, Motrin, Celebrex, Mobic, etc.) 7 days 
before surgery, as directed by your surgeon.  
- Stop Aspirin 7 days before surgery, as directed by your surgeon.  
- Stop Vitamin E, ALL multi-vitamins, herbals and dietary supplements 7 days 
before surgery.  
- You may take Tylenol (Acetaminophen) or any of your pain medications that do 
not contain aspirin or NSAIDS as needed.  
  
Important Reminders:  
- If you are prescribed inhalers for breathing, continue using them.  
  
- Candy, mints, and tobacco products are NOT permitted the morning of surgery.  
- Hearing aids, dentures and glasses may be worn the morning of surgery.  
- NO jewelry, body piercings, makeup, hairpins or contacts are to be worn the 
day of surgery.  
  
If you develop symptoms such as a fever, cold, or flu, or have other changes to 
your health within TWO DAYS of scheduled surgery or the morning of surgery, 
please contact the surgery center above.  
  
Personal Belongings:  
-Please have photo ID and insurance cards.  
-If you do not have a copy of advance directives on file with us, please bring a
 copy with you on the day of surgery.  
- Leave ALL valuables and money at home or with family members.  
  
For Outpatient Procedures:  
- YOU MUST HAVE A RESPONSIBLE  TAKE YOU HOME. A  OR  
CANNOT BE MADE A RESPONSIBLE .  
- We recommend that a responsible person stays with you overnight to take care 
of you.  
- You cannot stay in a hotel alone after outpatient surgery. You will not be 
permitted to have yoursurgery, if you do not have someone to take care of you.  
  
Arrival Time for Surgery:  
- To obtain your arrival time for surgery, call your physician's office the day 
before your surgery.  
- If your surgery is scheduled for Monday, call the Friday before. Your surgeon 
s  will tell you what time to call the office.  
- If you have not reached the departmental  by 5 P.M., call 
[phone] after 5 P.M. the day before your surgery.  
Please be aware that emergency situations arise, which may delay or change your 
surgical time. If this happens, we will notify you as soon as possible and 
regret any inconvenience.  
  
If you already have an Advance Directive, please fax a copy to [phone] or 
email to [email or web address] for it to be added to your chart. If you do 
not have an Advance Directive, you can find the appropriate form and more 
information at [email or web address]. We recommend that youcomplete the 
Advance Directive form found on the website and bring it with you the day of 
your surgery. It can be witnessed and scanned into your chart that day.  
  
Sapphire Mayorga APRN.CNP  
Electronically signed by Sapphire Mayorga APRN.CNP at 2024 1:42 PM EDT  
  
  
  
[facility]- NoteHNO ID: [id number]  
Author: ZACHARY COOPER MD  
Service: ?  
Author Type: Physician  
Type: Progress Notes  
Filed: 2024 15:57  
Note Text:  
SECTION OF OTOLOGY, NEUROTOLOGY AND LATERAL SKULL BASE SURGERY  
Department of Otolaryngology - Head and Neck Surgery  
[facility]  
History  
History of Present Illness:  
Bev Gaming is a 58 year old female who presents for evaluation of aural  
fullness.  
Reports bilateral aural fullness since 2023. She has a history of  
tympanostomy tubes for HBOT for chronic wound treatment. She has since had  
issues with aural fullness and sensation of hearing loss. Denies persistent  
otalgia, otorrhea, room spinning vertigo, or tinnitus. Denies foreign body in  
ear, barotrauma, acoustic trauma, history of recurrent ear infections. Aside  
PET, she denies previous ear surgery. She is planning for revision right-sided  
sinus surgery with Dr Gandara.  
PAST MEDICAL HISTORY  
Diagnosis Date  
Arthritis  
COPD (chronic obstructive pulmonary disease) (Formerly [facility])  
Depression  
sees a psych  
Diabetes (Formerly [facility])  
DVT (deep venous thrombosis) (Formerly [facility])  
Left lower extremity s/p ankle surgery  
Hypogammaglobulinemia (Formerly [facility])  
On home oxygen therapy  
4L NC  
Overweight  
Pneumonia  
PONV (postoperative nausea and vomiting)  
RA (rheumatoid arthritis) (Formerly [facility])  
PAST SURGICAL HISTORY  
Procedure Laterality Date  
ANKLE LEFT OP SURGERY  
plantar tendon  
CHOLECYSTECTOMY  
HERNIA REPAIR HX  
HIP RIGHT OP SURGERY  
tendon repair  
HYSTERECTOMY HX  
total  
KNEE RIGHT OP SURGERY  
tendon repair  
SHOULDER ARTHROSCOPY/SURGERY  
bilateral tendon repair  
Current Outpatient Medications on File Prior to Visit  
Medication Sig  
azelastine 0.1% nasal spray SPRAY 1 SPRAY INTO EACH NOSTRIL TWICE DAILY  
albuterol HFA (PROVENTIL HFA, VENTOLIN HFA) 90 mcg/actuation inhaler INHALE 1  
PUFF EVERY 4 HOURS AS NEEDED FOR WHEEZE OR FOR SHORTNESS OF BREATH  
famotidine (PEPCID) 40 mg tablet Take 40 mg by mouth every morning.  
IBUPROFEN IB ORAL Take by mouth as needed.  
LORazepam (ATIVAN) 0.5 mg Take 0.5 mg by mouth at bedtime as needed.  
semaglutide (OZEMPIC) 1 mg/dose (2 mg/1.5 mL) pnij Inject 2 mg subcutaneously  
one time a week.  
albuterol (PROVENTIL) 2.5 mg /3 mL (0.083 %) nebulizer solution 2.5 mg as  
needed.  
rosuvastatin (CRESTOR) 10 mg tablet Take 10 mg by mouth every morning.  
budesonide (PULMICORT) 0.5 mg/2 mL nebulizer solution Use 2 mL via nebulizer  
once daily. (Patient not taking: Reported on 2024)  
budesonide (PULMICORT) 0.5 mg/2 mL nebulizer solution Use 2 mL via nebulizer  
once daily. (Patient not taking: Reported on 2024)  
oxymetazoline (AFRIN, OXYMETAZOLINE,) 0.05 % nasal spray Instill 2 Sprays in  
the nose twice daily. (Patient not taking: Reported on 2024)  
sodium chloride (DEEP SEA NASAL) 0.65 % nasal spray Use 1 Spray in the nose as  
needed. (Patient not taking: Reported on 2024)  
insulin degludec (TRESIBA FLEXTOUCH U-100) 100 unit/mL (3 mL) injection pen  
Inject subcutaneously. (Patient not taking: Reported on 2024)  
OXYGEN, HOME THERAPY, 4 L/min by Nasal Cannula route as directed. Can be off  
for 3-4 hours without issue.  
insulin aspart (NOVOLOG FLEXPEN U-100 INSULIN SUBCUTANEOUS) Inject  
subcutaneously. sliding scale (Patient not taking: Reported on 2024)  
Insulin Lispro, Human, (HUMALOG) 100 unit/mL crtg Inject subcutaneously w  
MEALS. (Patient not taking: Reported on 2024)  
furosemide (LASIX) 40 mg tablet Take 40 mg by mouth as needed. (Patient not  
taking: Reported on 2024)  
Levalbuterol HCl (XOPENEX) 1.25 mg/0.5 mL nebulizer solution Use 1 Ampule via  
nebulizer every 8 hours as needed.  
No current facility-administered medications on file prior to visit.  
ALLERGIES  
Allergen Reactions  
Effexor [Venlafaxin* Other: See Comments  
Seizures.  
Penicillins Rash, Hives  
difficult to breathe, swelling  
Bactrim [Sulfametho* Itching  
Reglan [Metoclopram* Mental Status Change  
Topamax [Topiramate] Mental Status Change  
FAMILY HISTORY  
Problem Relation Age of Onset  
Hypertension Mother  
Arthritis Mother  
Cancer Father  
Hypertension Brother  
Social History  
Tobacco Use  
Smoking status: Every Day  
Packs/day: .5  
Types: Cigarettes  
Start date:   
Smokeless tobacco: Never  
Vaping Use  
Vaping Use: Never used  
Substance Use Topics  
Alcohol use: No  
Drug use: No  
Comment: denies tx for drug/alcohol abuse in the past.  
Physical Exam  
Patient is alert, oriented  
Head examination: normocephalic  
Binocular microscopic examination of ears:  
Right ear:  
Pinna: normal  
External canal : patent, single strand of hair removed  
Tympanic membrane :normal, only limited movement with valsalva  
Left ear:  
Pinna: normal  
External canal : patent  
Tympanic membrane :normal, no movement with valsalva  
Tuning [place] Examination  
[place] midline  
Right ear Rinne AC>BC  
Left ear Rinne AC>BC  
Vestibular examination:  
Romberg Stable  
Sharpened Romberg Stable  
Fuku (more content not included)...[facility]06- History 
of Present illness Narrative* Zachary Cooper MD - 2024 1:26 PM EDT
  Formatting of this note is different from the original.  
Images from the original note were not included.  
  
  
SECTION OF OTOLOGY, NEUROTOLOGY AND LATERAL SKULL BASE SURGERY  
Department of Otolaryngology - Head and Neck Surgery  
[facility] [place], [facility]  
  
History  
  
History of Present Illness:  
Bev Gaming is a 58 year old female who presents for evaluation of aural 
fullness.  
  
Reports bilateral aural fullness since 2023. She has a history of 
tympanostomy tubes for HBOT for chronic wound treatment. She has since had 
issues with aural fullness and sensation of hearing loss. Denies persistent 
otalgia, otorrhea, room spinning vertigo, or tinnitus. Denies foreign body in ea
r, barotrauma, acoustic trauma, history of recurrent ear infections. Aside PET, 
she denies previousear surgery. She is planning for revision right-sided sinus 
surgery with Dr Gandara.  
  
PAST MEDICAL HISTORY  
Diagnosis Date  
Arthritis  
COPD (chronic obstructive pulmonary disease) (Formerly [facility])  
Depression  
sees a psych  
Diabetes (Formerly [facility])  
DVT (deep venous thrombosis) (Formerly [facility])  
Left lower extremity s/p ankle surgery  
Hypogammaglobulinemia (Formerly [facility])  
On home oxygen therapy  
4L NC  
Overweight  
Pneumonia  
PONV (postoperative nausea and vomiting)  
RA (rheumatoid arthritis) (Formerly [facility])  
  
  
PAST SURGICAL HISTORY  
Procedure Laterality Date  
ANKLE LEFT OP SURGERY  
plantar tendon  
CHOLECYSTECTOMY  
HERNIA REPAIR HX  
HIP RIGHT OP SURGERY  
tendon repair  
HYSTERECTOMY HX  
total  
KNEE RIGHT OP SURGERY  
tendon repair  
SHOULDER ARTHROSCOPY/SURGERY  
bilateral tendon repair  
  
Current Outpatient Medications on File Prior to Visit  
Medication Sig  
azelastine 0.1% nasal spray SPRAY 1 SPRAY INTO EACH NOSTRIL TWICE DAILY  
albuterol HFA (PROVENTIL HFA, VENTOLIN HFA) 90 mcg/actuation inhaler INHALE 1 
PUFF EVERY 4 HOURS ASNEEDED FOR WHEEZE OR FOR SHORTNESS OF BREATH  
famotidine (PEPCID) 40 mg tablet Take 40 mg by mouth every morning.  
IBUPROFEN IB ORAL Take by mouth as needed.  
LORazepam (ATIVAN) 0.5 mg Take 0.5 mg by mouth at bedtime as needed.  
semaglutide (OZEMPIC) 1 mg/dose (2 mg/1.5 mL) pnij Inject 2 mg subcutaneously 
one time a week.  
albuterol (PROVENTIL) 2.5 mg /3 mL (0.083 %) nebulizer solution 2.5 mg as 
needed.  
rosuvastatin (CRESTOR) 10 mg tablet Take 10 mg by mouth every morning.  
budesonide (PULMICORT) 0.5 mg/2 mL nebulizer solution Use 2 mL via nebulizer 
once daily. (Patient not taking: Reported on 2024)  
budesonide (PULMICORT) 0.5 mg/2 mL nebulizer solution Use 2 mL via nebulizer 
once daily. (Patient not taking: Reported on 2024)  
oxymetazoline (AFRIN, OXYMETAZOLINE,) 0.05 % nasal spray Instill 2 Sprays in the
 nose twice daily. (Patient not taking: Reported on 2024)  
sodium chloride (DEEP SEA NASAL) 0.65 % nasal spray Use 1 Spray in the nose as 
needed. (Patient nottaking: Reported on 2024)  
insulin degludec (TRESIBA FLEXTOUCH U-100) 100 unit/mL (3 mL) injection pen 
Inject subcutaneously. (Patient not taking: Reported on 2024)  
OXYGEN, HOME THERAPY, 4 L/min by Nasal Cannula route as directed. Can be off for
 3-4 hours without issue.  
insulin aspart (NOVOLOG FLEXPEN U-100 INSULIN SUBCUTANEOUS) Inject 
subcutaneously. sliding scale (Patient not taking: Reported on 2024)  
Insulin Lispro, Human, (HUMALOG) 100 unit/mL crtg Inject subcutaneously w MEALS.
 (Patient not taking: Reported on 2024)  
furosemide (LASIX) 40 mg tablet Take 40 mg by mouth as needed. (Patient not 
taking: Reported on 2024)  
Levalbuterol HCl (XOPENEX) 1.25 mg/0.5 mL nebulizer solution Use 1 Ampule via 
nebulizer every 8 hours as needed.  
  
No current facility-administered medications on file prior to visit.  
  
ALLERGIES  
Allergen Reactions  
Effexor [Venlafaxin* Other: See Comments  
Seizures.  
Penicillins Rash, Hives  
difficult to breathe, swelling  
Bactrim [Sulfametho* Itching  
Reglan [Metoclopram* Mental Status Change  
Topamax [Topiramate] Mental Status Change  
  
FAMILY HISTORY  
Problem Relation Age of Onset  
Hypertension Mother  
Arthritis Mother  
Cancer Father  
Hypertension Brother  
  
Social History  
  
Tobacco Use  
Smoking status: Every Day  
Packs/day: .5  
Types: Cigarettes  
Start date:   
Smokeless tobacco: Never  
Vaping Use  
Vaping Use: Never used  
Substance Use Topics  
Alcohol use: No  
Drug use: No  
Comment: denies tx for drug/alcohol abuse in the past.  
  
Physical Exam  
  
Patient is alert, oriented  
  
Head examination: normocephalic  
  
Binocular microscopic examination of ears:  
  
Right ear:  
Pinna: normal  
External canal : patent, single strand of hair removed  
Tympanic membrane :normal, only limited movement with valsalva  
  
Left ear:  
Pinna: normal  
External canal : patent  
Tympanic membrane :normal, no movement with valsalva  
  
Tuning [place] Examination  
  
[place] midline  
Right ear Rinne AC>BC  
Left ear Rinne AC>BC  
  
Vestibular examination:  
Romberg Stable  
Sharpened Romberg Stable  
Fukuda stepping test Did not perform  
Nystagmus: None  
Deion and Hallpike maneuver: Did not perform  
Walking: Normal  
  
CN VII: Face is symmetric with normal eye closure and smile.  
  
Test Results  
  
Audiogram & Tympanogram (personally reviewed)  
  
6/3/2024  
  
WRS  
  
Tympanometry  
  
Vestibular testing  
None  
  
Imaging (personally reviewed)  
CT sinus (2024) - limited view of middle and external ears is unremarkable,
 no evidence of middle ear effusion  
  
Assessment and Plan  
  
Bev SANDEE Jonathan's history, physical examination, and diagnostic studies are 
consistent with the following diagnoses and associated treatment plan of care.  
  
Dysfunction of both eustachian tubes  
  
History of tympanostomy tube placement  
  
History of hyperbaric oxygen therapy  
  
Sensorineural hearing loss, bilateral  
  
Patient presents bilateral eustachian tube dysfunction in the setting of prior 
hyperbaric oxygen therapy and prior tympanostomy tubes. She is planning to 
undergo revision sinus surgery with Dr. Gandara and would like to consider 
management and coordinated fashion. Although her tympanometry appears normal, 
she does demonstrate evidence of limited middle ear insufflation on examination.
 We discussed both medical and surgical options. Risks, benefits, alternatives 
and personnel of bilateral eustachian tube dilation were discussed in detail 
with the patient. Risks including anesthesia, bleeding, infection, need for 
repeat dilation were discussed. Patient agrees to proceed. We will discuss 
timing with Dr Gandara and plan to coordinate with either myself or any of my 
partners/comprehensive otolaryngologists. Of note, she also has mild bilateral 
sensorineural hearing loss but compensating wellwith good WRS scores. Will plan 
to follow clinically and repeating audiograms as needed should she note 
worsening.  
  
- Plan for surgery for bilateral ET dilation  
- Return to clinic on as needed basis  
  
I have answered all of her questions.  
  
Signed by:  
  
Js Cooper MD, FACS  
Section Head, Otology-Neurotology  
Medical Director, Hearing Implant Program  
[facility]  
  
Medical Decision Making:  
  
Problems:  
Moderate: New problem with uncertain prognosis  
Data:  
Unique test result(s) reviewed: 2  
Risk:  
Moderate: Decision on minor surgery w/ risk factors  
  
Medical Decision Making Level: 4 - Moderate  
  
  
Electronically signed by Zachary Cooper MD at 2024 3:57 PM EDT  
  
[facility]* Ariadna Lockett RN - 2024 1:14 PM EDTFormatting of this note might be different 
  from the original.  
  
Tobacco Use: .5 packs/day Types: Cigarettes  
  
Was smoking cessation packet given? Patient Declined  
  
Was a referral initiated?Patient declined.  
  
Electronically signed by Ariadna Lockett RN at 2024 1:14 PM EDT  
  
[facility]- [facility] Note* Ariadna Lockett RN - 2024 1:14 PM
   EDTFormatting of this note might be different from the original.  
  
Tobacco Use: .5 packs/day Types: Cigarettes  
  
Was smoking cessation packet given? Patient Declined  
  
Was a referral initiated?Patient declined.  
  
Electronically signed by Ariadna Lockett RN at 2024 1:14 PM EDT  
  
documented in this [facility]Pe[facility]- History of Present 
illness Narrative* Amanda Daigle, SADA - 2024 12:30 PM EDTFormatting of this 
  note is different from the original.  
[facility] [place]  
AUDIOLOGIC EVALUATION REPORT  
  
Name: Bev Gaming  
[facility]#: [id number]  
Date of Service: 6/3/2024  
YOB: 1965  
Age: 58 year old  
  
Referred by: Diana Gandara MD  
[address]  
[facility] [address]  
  
Referred for: Evaluation of suspected change in hearing, tinnitus, or balance.  
  
Referral documented: [facility]  
  
Patient's major complaints: Reduced hearing in both ears, Tinnitus in both ears,
 Dizziness/vertigo/imbalance, Otalgia in both ears, Pressure/fullness in both 
ears  
  
Bev Gaming was seen for an initial audiologic evaluation. Medical history
 is significant forchronic sinusitis. Patient has previously received PE tubes 
bilaterally. Today patient reported shehas been noticing reduced hearing and 
aural fullness bilaterally, greater in the right ear than theleft ear, as well 
as intermittent otalgia and tinnitus in both ears. Patient also reported 
dizziness described as disorientation/imbalance. She denied room-spinning 
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 
working in factory settings. Today patient denied history of ototoxicity. See 
procedures tab for audiometric results.  
  
IMPRESSIONS  
  
RIGHT EAR: Sensorineural hearing loss  
LEFT EAR: Sensorineural hearing loss  
  
AUDIOLOGIC EVALUATION  
  
Following is a brief interpretation of the obtained findings from the audiologic
 evaluation. Refer to the Auditory Test Record for complete audiometric results.
 The patient was counseled about the test findings and appropriate audiologic 
recommendations were made.  
  
SUMMARY: See procedures tab for audiometric results.  
  
OTOSCOPY  
  
RIGHT EAR: Otoscopic inspection revealed ear canal was clear.  
LEFT EAR: Otoscopic inspection revealed ear canal was clear.  
  
TYMPANOMETRY  
Description of procedure: This test is an objective evaluation of middle ear 
function. CPT code: 34944  
  
RIGHT EAR: Normal ME function.  
LEFT EAR: Normal ME function.  
  
ACOUSTIC REFLEXES  
Description of procedure: This test is an objective measure of auditory and 
facial nerve pathways. CPT code: 67529, 72316  
  
RIGHT EAR PROBE EAR: (ipsi right stimulus ear; contralateral left stimulus ear):  
Acoustic Reflex Pattern Did not test  
Acoustic Reflex Decay (left stimulus ear): Did not test.  
  
LEFT EAR PROBE EAR: (ipsi left stimulus ear; contralateral right stimulus ear):  
Acoustic Reflex Pattern Did not test  
Acoustic Reflex Decay (right stimulus ear):Did not test.  
  
PURE TONE AUDIOMETRY AND SPEECH TESTING  
Description of procedure: This test is an objective evaluation hearing 
sensitivity via air and boneconduction and speech recognition testing. CPT code:
 57502  
  
RIGHT EAR:  
Hearing Sensitivity: Mild to moderate sensorineural hearing loss.  
Word Recognition Score: Excellent (90%). WRS is consistent with hearing 
sensitivity. Words were presented at 80 dB HL is above (greater than or equal to
 60 dB HL) intensity level for average conversational speech. The NU-6 Ordered 
by Difficulty Word List (10 words) was used for testing. Contralateral masking 
was employed.  
  
LEFT EAR:  
Hearing Sensitivity: Mild to moderate sensorineural hearing loss.  
Word Recognition Score: Excellent (100%). WRS is consistent with hearing 
sensitivity. Words were presented at 80 dB HL is above (greater than or equal to
 60 dB HL) intensity level for average conversational speech. The NU-6 Ordered 
by Difficulty Word List (10 words) was used for testing. Contralateral masking 
was employed.  
  
RECOMMENDATIONS  
  
* Continue medical follow-up with Diana Gandara MD and Zachary Cooper MD. .  
* The patient was counseled regarding benefits/limitations of hearing aids and 
provided written educational materials.  
* Call [phone] to schedule an appointment to assess your need for hearing 
aids. Request a HAE appointment.  
* Re-evaluation as medically indicated or if a change in hearing is noted.  
  
Prakash Gutierrez, CCC-A  
Clinical Audiologist  
  
copied to: Diana Gandara MD  
  
CHAN  
Abbrev-  
iation Definition Degree of hearing sensitivity dB  
range  
WNL within normal limits WNL 0 - 20  
SNHL sensorineural hearing loss Mild 20-40  
CHL conductive hearing loss Moderate 40-55  
MHL mixed hearing loss Moderately-Severe 55-70  
WRS word recognition score Severe 70-90  
ME middle ear Profound 90 +  
TM tympanic membrane  
  
  
Electronically signed by Amanda Daigle AUD at 2024 7:45 AM EDT  
  
documented in this encounterPeoples [facility]06- NoteHNO ID: [id number]  
Author: AMANDA DAIGLE AUD  
Service: ?  
Author Type: Audiologist  
Type: Progress Notes  
Filed: 2024 07:45  
Note Text:  
[facility] [place]  
AUDIOLOGIC EVALUATION REPORT  
Name: Bev Gaming  
[facility]#: [id number]  
Date of Service: 6/3/2024  
YOB: 1965  
Age: 58 year old  
Referred by: Diana Gandara MD  
[address]  
[facility] [address]  
Referred for: Evaluation of suspected change in hearing, tinnitus, or balance.  
Referral documented: [facility]  
Patient's major complaints: Reduced hearing in both ears, Tinnitus in both ears,  
Dizziness/vertigo/imbalance, Otalgia in both ears, Pressure/fullness in both  
ears  
Bev Gaming was seen for an initial audiologic evaluation. Medical history  
is significant for chronic sinusitis. Patient has previously received PE tubes  
bilaterally. Today patient reported she has been noticing reduced hearing and  
aural fullness bilaterally, greater in the right ear than the left ear, as well  
as intermittent otalgia and tinnitus in both ears. Patient also reported  
dizziness described as disorientation/imbalance. She denied room-spinning  
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  
working in factory settings. Today patient denied history of ototoxicity. See  
procedures tab for audiometric results.  
IMPRESSIONS  
RIGHT EAR: Sensorineural hearing loss  
LEFT EAR: Sensorineural hearing loss  
AUDIOLOGIC EVALUATION  
Following is a brief interpretation of the obtained findings from the audiologic  
evaluation. Refer to the Auditory Test Record for complete audiometric results.  
The patient was counseled about the test findings and appropriate audiologic  
recommendations were made.  
SUMMARY: See procedures tab for audiometric results.  
OTOSCOPY  
RIGHT EAR: Otoscopic inspection revealed ear canal was clear.  
LEFT EAR: Otoscopic inspection revealed ear canal was clear.  
TYMPANOMETRY  
Description of procedure: This test is an objective evaluation of middle ear  
function. CPT code: 38042  
RIGHT EAR: Normal ME function.  
LEFT EAR: Normal ME function.  
ACOUSTIC REFLEXES  
Description of procedure: This test is an objective measure of auditory and  
facial nerve pathways. CPT code: 18429, 22549  
RIGHT EAR PROBE EAR: (ipsi right stimulus ear; contralateral left stimulus ear):  
Acoustic Reflex Pattern Did not test  
Acoustic Reflex Decay (left stimulus ear): Did not test.  
LEFT EAR PROBE EAR: (ipsi left stimulus ear; contralateral right stimulus ear):  
Acoustic Reflex Pattern Did not test  
Acoustic Reflex Decay (right stimulus ear):Did not test.  
PURE TONE AUDIOMETRY AND SPEECH TESTING  
Description of procedure: This test is an objective evaluation hearing  
sensitivity via air and bone conduction and speech recognition testing. CPT  
code: 82116  
RIGHT EAR:  
Hearing Sensitivity: Mild to moderate sensorineural hearing loss.  
Word Recognition Score: Excellent (90%). WRS is consistent with hearing  
sensitivity. Words were presented at 80 dB HL is above (greater than or equal  
to 60 dB HL) intensity level for average conversational speech. The NU-6  
Ordered by Difficulty Word List (10 words) was used for testing. Contralateral  
masking was employed.  
LEFT EAR:  
Hearing Sensitivity: Mild to moderate sensorineural hearing loss.  
Word Recognition Score: Excellent (100%). WRS is consistent with hearing  
sensitivity. Words were presented at 80 dB HL is above (greater than or equal  
to 60 dB HL) intensity level for average conversational speech. The NU-6  
Ordered by Difficulty Word List (10 words) was used for testing. Contralateral  
masking was employed.  
RECOMMENDATIONS  
* Continue medical follow-up with Diana Gandara MD and Zachary Cooper MD. .  
* The patient was counseled regarding benefits/limitations of hearing aids and  
provided written educational materials.  
* Call [phone] to schedule an appointment to assess your need for hearing  
aids. Request a HAE appointment.  
* Re-evaluation as medically indicated or if a change in hearing is noted.  
Prakash Gutierrez, CCC-A  
Clinical Audiologist  
copied to: Diana Gandara MD  
CHAN  
Abbrev-  
iation Definition Degree of hearing sensitivity dB  
range  
WNL within normal limits WNL 0 - 20  
SNHL sensorineural hearing loss Mild 20-40  
CHL conductive hearing loss Moderate 40-55  
MHL mixed hearing loss Moderately-Severe 55-70  
WRS word recognition score Severe 70-90  
ME middle ear Profound 90 +  
[facility]- Telephone encounter 
Note* Telephone Encounter - Dayan Ribera - 2024 8:49 AM EDTFormatting of
   this note might be different from the original.  
Called pt to schedule follow up with ab gandara confirmed appt for  at 4pm.  
Electronically signed by Dayan Ribera at 2024 8:50 AM EDT  
  
[facility]- Miscellaneous Notes* Telephone Encounter - Dayan Ribera - 2024 8:49 AM EDTFormatting of this note might be different 
  from the original.  
Called pt to schedule follow up with nichol, pt confirmed appt for  at 4pm.  
Electronically signed by Dayan Ribera at 2024 8:50 AM EDT  
  
documented in this encounterPe[facility] Hospital04- NoteHNO ID: [id number]  
Author: DIANA GANDARA MD  
Service: ?  
Author Type: Physician  
Type: Progress Notes  
Filed: 2024 16:02  
Note Text:  
SECTION OF RHINOLOGY, SINUS AND SKULL BASE SURGERY  
[facility] [place] NOTE  
HPI: Patient is a 58 year old Female presenting for Chronic pansinusitis. She  
c/o of Nasal congestion In the right side as well as pressure. She is also  
having headaches.  
PAST MEDICAL HISTORY  
Diagnosis Date  
Arthritis  
COPD (chronic obstructive pulmonary disease) (Formerly [facility])  
Depression  
sees a psych  
Diabetes (Formerly [facility])  
DVT (deep venous thrombosis) (Formerly [facility])  
Left lower extremity s/p ankle surgery  
Hypogammaglobulinemia (Formerly [facility])  
On home oxygen therapy  
4L NC  
Overweight  
Pneumonia  
PONV (postoperative nausea and vomiting)  
RA (rheumatoid arthritis) (Formerly [facility])  
PAST SURGICAL HISTORY  
Procedure Laterality Date  
ANKLE LEFT OP SURGERY  
plantar tendon  
CHOLECYSTECTOMY  
HERNIA REPAIR HX  
HIP RIGHT OP SURGERY  
tendon repair  
HYSTERECTOMY HX  
total  
KNEE RIGHT OP SURGERY  
tendon repair  
SHOULDER ARTHROSCOPY/SURGERY  
bilateral tendon repair  
FAMILY HISTORY  
Problem Relation Age of Onset  
Hypertension Mother  
Arthritis Mother  
Cancer Father  
Hypertension Brother  
Social History  
Tobacco Use  
Smoking status: Every Day  
Packs/day: .5  
Types: Cigarettes  
Start date:   
Smokeless tobacco: Never  
Vaping Use  
Vaping Use: Never used  
Substance Use Topics  
Alcohol use: No  
Drug use: No  
Comment: denies tx for drug/alcohol abuse in the past.  
Current Outpatient Medications  
Medication Sig Dispense Refill  
albuterol (PROVENTIL) 2.5 mg /3 mL (0.083 %) nebulizer solution 2.5 mg as  
needed.  
albuterol HFA (PROVENTIL HFA, VENTOLIN HFA) 90 mcg/actuation inhaler INHALE 1  
PUFF EVERY 4 HOURS AS NEEDED FOR WHEEZE OR FOR SHORTNESS OF BREATH  
famotidine (PEPCID) 40 mg tablet Take 40 mg by mouth every morning.  
rosuvastatin (CRESTOR) 10 mg tablet Take 10 mg by mouth every morning.  
IBUPROFEN IB ORAL Take by mouth as needed.  
azelastine 0.1% nasal spray 1 spray in each nostril twice daily for 30 days 30  
mL 4  
budesonide (PULMICORT) 0.5 mg/2 mL nebulizer solution Use 2 mL via nebulizer  
once daily. (Patient not taking: Reported on 2024) 30 Vial 5  
budesonide (PULMICORT) 0.5 mg/2 mL nebulizer solution Use 2 mL via nebulizer  
once daily. (Patient not taking: Reported on 2024) 30 Vial 5  
oxymetazoline (AFRIN, OXYMETAZOLINE,) 0.05 % nasal spray Instill 2 Sprays in  
the nose twice daily. (Patient not taking: Reported on 2024) 30 mL 0  
sodium chloride (DEEP SEA NASAL) 0.65 % nasal spray Use 1 Spray in the nose as  
needed. (Patient not taking: Reported on 2024) 44 mL 0  
insulin degludec (TRESIBA FLEXTOUCH U-100) 100 unit/mL (3 mL) injection pen  
Inject subcutaneously. (Patient not taking: Reported on 2024)  
LORazepam (ATIVAN) 0.5 mg Take 0.5 mg by mouth at bedtime as needed.  
OXYGEN, HOME THERAPY, 4 L/min by Nasal Cannula route as directed. Can be off  
for 3-4 hours without issue.  
insulin aspart (NOVOLOG FLEXPEN U-100 INSULIN SUBCUTANEOUS) Inject  
subcutaneously. sliding scale (Patient not taking: Reported on 2024)  
semaglutide (OZEMPIC) 1 mg/dose (2 mg/1.5 mL) pnij Inject 2 mg subcutaneously  
one time a week.  
Insulin Lispro, Human, (HUMALOG) 100 unit/mL crtg Inject subcutaneously w  
MEALS. (Patient not taking: Reported on 2024)  
furosemide (LASIX) 40 mg tablet Take 40 mg by mouth as needed. (Patient not  
taking: Reported on 2024)  
Levalbuterol HCl (XOPENEX) 1.25 mg/0.5 mL nebulizer solution Use 1 Ampule via  
nebulizer every 8 hours as needed.  
No current facility-administered medications for this visit.  
ALLERGIES  
Allergen Reactions  
Effexor [Venlafaxin* Other: See Comments  
Seizures.  
Penicillins Rash, Hives  
difficult to breathe, swelling  
Bactrim [Sulfametho* Itching  
Reglan [Metoclopram* Mental Status Change  
Topamax [Topiramate] Mental Status Change  
ROS:  
CONSTITUTIONAL: No fevers, chills, nightsweats, unintended weight loss  
HEENT: HEENT: Yes symptoms: headaches and sinus pressure, No symptoms: nasal  
congestion, epistaxis, sense of smell absent, and nasal drainage both  
EYES: No diplopia or blurry vision.  
PHYSICAL EXAM:  
24  
1016  
Temp: 36.8 ?C (98.2 ?F)  
General appearance: well developed, well nourished, without obvious deformities  
Eyes: Extra ocular muscles are intact, no diplopia on primary gaze  
Ears: Externally normal in appearance, without scars, lesions, or masses. Both  
left and right external auditory canals and tympanic membranes are normal  
Nasal exam: The mucosa is pink, the septum is deviated and the visible  
turbinates are Yes hypertrophied on anterior rhinoscopy, nasal polyps (0 L, 0 R)  
Oral cavity and oropharynx: the oral mucosa, tongue, tonsil area, and posterior  
pharyngeal mucosa are without lesions. Floor of mouth is soft without edema.  
None  
UPSIT:None  
No results found for:  REVW ,  BMPNOTE ,  ALRG5  (more content not included)...
[facility]04- History of Present illness Narrative* 
  Diana Gandara MD - 2024 10:27 AM EDTFormatting of this note is 
  different from the original.  
Images from the original note were not included.  
  
SECTION OF RHINOLOGY, SINUS AND SKULL BASE SURGERY  
[facility] and Neck [place] NOTE  
  
HPI: Patient is a 58 year old Female presenting for Chronic pansinusitis. She 
c/o of Nasal congestion In the right side as well as pressure. She is also 
having headaches.  
  
PAST MEDICAL HISTORY  
Diagnosis Date  
Arthritis  
COPD (chronic obstructive pulmonary disease) (Formerly [facility])  
Depression  
sees a psych  
Diabetes (Formerly [facility])  
DVT (deep venous thrombosis) (Formerly [facility])  
Left lower extremity s/p ankle surgery  
Hypogammaglobulinemia (Formerly [facility])  
On home oxygen therapy  
4L NC  
Overweight  
Pneumonia  
PONV (postoperative nausea and vomiting)  
RA (rheumatoid arthritis) (Formerly [facility])  
  
  
PAST SURGICAL HISTORY  
Procedure Laterality Date  
ANKLE LEFT OP SURGERY  
plantar tendon  
CHOLECYSTECTOMY  
HERNIA REPAIR HX  
HIP RIGHT OP SURGERY  
tendon repair  
HYSTERECTOMY HX  
total  
KNEE RIGHT OP SURGERY  
tendon repair  
SHOULDER ARTHROSCOPY/SURGERY  
bilateral tendon repair  
  
FAMILY HISTORY  
Problem Relation Age of Onset  
Hypertension Mother  
Arthritis Mother  
Cancer Father  
Hypertension Brother  
  
  
Social History  
  
Tobacco Use  
Smoking status: Every Day  
Packs/day: .5  
Types: Cigarettes  
Start date:   
Smokeless tobacco: Never  
Vaping Use  
Vaping Use: Never used  
Substance Use Topics  
Alcohol use: No  
Drug use: No  
Comment: denies tx for drug/alcohol abuse in the past.  
  
  
Current Outpatient Medications  
Medication Sig Dispense Refill  
albuterol (PROVENTIL) 2.5 mg /3 mL (0.083 %) nebulizer solution 2.5 mg as 
needed.  
albuterol HFA (PROVENTIL HFA, VENTOLIN HFA) 90 mcg/actuation inhaler INHALE 1 
PUFF EVERY 4 HOURS ASNEEDED FOR WHEEZE OR FOR SHORTNESS OF BREATH  
famotidine (PEPCID) 40 mg tablet Take 40 mg by mouth every morning.  
rosuvastatin (CRESTOR) 10 mg tablet Take 10 mg by mouth every morning.  
IBUPROFEN IB ORAL Take by mouth as needed.  
azelastine 0.1% nasal spray 1 spray in each nostril twice daily for 30 days 30 
mL 4  
budesonide (PULMICORT) 0.5 mg/2 mL nebulizer solution Use 2 mL via nebulizer 
once daily. (Patient not taking: Reported on 2024) 30 Vial 5  
budesonide (PULMICORT) 0.5 mg/2 mL nebulizer solution Use 2 mL via nebulizer 
once daily. (Patient not taking: Reported on 2024) 30 Vial 5  
oxymetazoline (AFRIN, OXYMETAZOLINE,) 0.05 % nasal spray Instill 2 Sprays in the
 nose twice daily. (Patient not taking: Reported on 2024) 30 mL 0  
sodium chloride (DEEP SEA NASAL) 0.65 % nasal spray Use 1 Spray in the nose as 
needed. (Patient nottaking: Reported on 2024) 44 mL 0  
insulin degludec (TRESIBA FLEXTOUCH U-100) 100 unit/mL (3 mL) injection pen 
Inject subcutaneously. (Patient not taking: Reported on 2024)  
LORazepam (ATIVAN) 0.5 mg Take 0.5 mg by mouth at bedtime as needed.  
OXYGEN, HOME THERAPY, 4 L/min by Nasal Cannula route as directed. Can be off for
 3-4 hours without issue.  
insulin aspart (NOVOLOG FLEXPEN U-100 INSULIN SUBCUTANEOUS) Inject 
subcutaneously. sliding scale (Patient not taking: Reported on 2024)  
semaglutide (OZEMPIC) 1 mg/dose (2 mg/1.5 mL) pnij Inject 2 mg subcutaneously 
one time a week.  
Insulin Lispro, Human, (HUMALOG) 100 unit/mL crtg Inject subcutaneously w MEALS.
 (Patient not taking: Reported on 2024)  
furosemide (LASIX) 40 mg tablet Take 40 mg by mouth as needed. (Patient not 
taking: Reported on 2024)  
Levalbuterol HCl (XOPENEX) 1.25 mg/0.5 mL nebulizer solution Use 1 Ampule via 
nebulizer every 8 hours as needed.  
  
No current facility-administered medications for this visit.  
  
  
ALLERGIES  
Allergen Reactions  
Effexor [Venlafaxin* Other: See Comments  
Seizures.  
Penicillins Rash, Hives  
difficult to breathe, swelling  
Bactrim [Sulfametho* Itching  
Reglan [Metoclopram* Mental Status Change  
Topamax [Topiramate] Mental Status Change  
  
ROS:  
CONSTITUTIONAL: No fevers, chills, nightsweats, unintended weight loss  
  
HEENT: HEENT: Yes symptoms: headaches and sinus pressure, No symptoms: nasal 
congestion, epistaxis,sense of smell absent, and nasal drainage both  
EYES: No diplopia or blurry vision.  
  
PHYSICAL EXAM:  
  
24  
1016  
Temp: 36.8 C (98.2 F)  
  
General appearance: well developed, well nourished, without obvious deformities  
Eyes: Extra ocular muscles are intact, no diplopia on primary gaze  
Ears: Externally normal in appearance, without scars, lesions, or masses. Both 
left and right external auditory canals and tympanic membranes are normal  
Nasal exam: The mucosa is pink, the septum is deviated and the visible 
turbinates are Yes hypertrophied on anterior rhinoscopy, nasal polyps (0 L, 0 R)  
  
Oral cavity and oropharynx: the oral mucosa, tongue, tonsil area, and posterior 
pharyngeal mucosa are without lesions. Floor of mouth is soft without edema.  
  
None  
  
UPSIT:None  
  
No results found for:  REVW ,  BMPNOTE ,  ALRG5   
Last CT Sinus - Impression Only  
  
CT SINUS STEREO WO IVCON Exam End: 2024 7:53 AM (Final result)  
Impression: IMPRESSION:  
  
Near complete opacification of right maxillary sinus with findings that  
may indicate acute sinusitis. This is new from 2020.  
  
Postoperative changes from the prior examination as detailed with  
improved left maxillary sinus inflammatory changes.  
  
Remaining paranasal sinuses are clear.  
...  
  
  
  
  
  
  
ASSESSMENT/PLAN:  
1. Chronic pansinusitis - ICD9: 473.8, ICD10: J32.4 (primary diagnosis)  
2. Vascular headache - ICD9: 784.0, ICD10: G44.1  
  
PLAN:  
CONSULT TO HEADACHE CLINIC ORDER  
SURGICAL REQUEST - ELECTIVE (2020) ORDER  
VIRTUAL CONSULT TO [facility] ORDER  
FOLLOW UP AFTER SURGERY  
  
Bev Gaming is a 58 year old Female presented for Chronic pansinusitis. 
She c/o of Nasal congestion In the right side as well as pressure. She is also 
having headaches.  
  
We discussed treatment options including medical management continuing vs 
surgery. He elected to proceed with surgery and the risks of the surgery include
 but are not limited to the ones below:  
The specific risks for FESS (functional endoscopic Sinus Surgery) were discussed
 with the patient and that included  
  
- Bleeding. Bleeding can either happen during surgery or after surgery. Bleeding
 can either be minor or major. Minor bleeding may require packing. Major bleed 
in severe cases, as in the case of injury to a major artery (carotid or internal
 maxillary), may require embolization and/or surgery. A blood transfusion may be
 necessary depending on the amount of blood lost.  
- Brain injury. CSF (brain / spinal fluid) leak, meningitis or brain abscess may
 occur. In addition, pneumocephalus (air in the brain) can occur. These usually,
 if detected during surgery, will be fixed immediately. If detected after 
surgery, surgery will usually be needed; however, you will be placed on 
antibiotics as well as repair may be scheduled and/or placement of lumbar drain.
 Neurosurgical expertise and consultation may be needed.  
- Infection of the sinuses and requires antibiotic therapy, very rarely fungal 
infection may occur which may require debridement.  
- Altered taste and smell which may be permanent.  
- Scar tissue may grow inside the nose which may need sinus drainage requiring 
further surger  
- Hole in the partition inside the nose (Septum) when septoplasty is performed. 
This does not usually cause any problems. Sometimes it may cause whistling, 
crusting or bleeding and may require further surgery to close the hole. This may
 cause disfigurement/change in the shape of the nose.  
- Numbnes front two teeth if it was done and that can last few months, and may 
be persistent  
-Orbit injury that could lead to blindness  
  
The patient was offered a surgery/procedure at a [facility] facility. The 
surgeon/proceduralist and patient have discussed in detail the risk of exposure 
to and/or potential harm posed by the COVID-19 virus with having a 
surgery/procedure at this time versus the risk of delaying the surgery/pr
ocedure. It is not possible to know either the risk of delaying the surgery or 
procedure or chance of getting an infection with perfect accuracy, but a joint 
decision was made between the patient andthe surgeon/proceduralist to proceed at
 this time with the scheduled surgery/procedure as indicatedon the consent form.  
  
Time away from work: The average time is 1 week with some patients going back 
before and some patients going back afterwards. If your job requires lifting, 
then you will need 2 weeks off from work.  
What to expect: Headache, nasal pain, eye pain, pain as well as tooth pain and 
bleeding.  
Activity: No strenuous activity as well as no heavy lifting for 3 weeks after 
surgery, followed by reduced activity for a total of 6 weeks.  
Diet: Once you are not nauseated  
Medications:  
Pain: You will be given prescriptions for narcotic. You need to avoid driving 
while on these medications as well as working and making decisions. Avoid NSAIDs
 like Ibuprofen or Aspirin because they will make you bleed.  
Nausea: You may be Zofran for nausea. For the first 24 hours use every 6 hours 
for nausea. This is to be used as needed  
Stool softeners: Take over the counter medications for constipation since your 
narcotic may cause you be to be constipated and straining may lead to bleeding.  
Antibiotics: You will be given antibiotics for 1-2 weeks  
  
Nasal care following surgery:  
  
Day 1 Expect intermittent bleeding, use Afrin.  
Day 2 If bleeding stops, start using [place] nasal spray 3 times daily  
Day 3 Same as above  
Day 4-10 [place] nasal spray  
Day 11-till 6 weeks Nasal saline irrigation twice daily ([facility])  
  
If you have sleep apnea, and you have CPAP, you can start using it once bleeding
 stops AND at leastafter 7 days after surgery  
  
The Do's and Do Not's of postoperative sinus care:  
  
DO:  
DO take the pain medication prescribed by your doctor.  
Tylenol may be taken instead of your pain medication (if it is sufficient to 
control your pain). Avoid taking Tylenol in addition to pain medication as your 
medicine already has Tylenol as one of itscomponents.  
DO take the antibiotics prescribed  
DO make sure you have a follow-up appointment after 10 days  
DO call to make or confirm your appointment  
DO cough and sneeze with your mouth open.  
DO eat a regular diet  
DO take your pain medication before your first postoperative appointment.  
  
DO NOT:  
DO NOT perform any heavy lifting (nothing greater than 15 lbs), bending,  
straining.  
DO NOT blow your nose or pick at your nose for at least 2 weeks  
DO NOT take aspirin or aspirin containing medications (Advil, Motrin, or any 
other NSAIDS)  
DO NOT fly without your doctor's clearance for 3-5 days after surgery  
  
Call US Immediately If You Develop:  
1. Change in vision  
2. Increased swelling around the eyes  
3. Neck stiffness, deep head pain or progressively worsening headache  
4. Continued Nausea or Vomiting  
5. Bright red blood that lasts more than ten minutes or causes choking  
6. Fever over 101 degrees  
  
If any issues and during regular business hours, please call [phone]  
  
If after hours, call the ENT doctor on call. [phone]  
  
  
No data to display  
  
  
  
  
Provider Attestation: I, Dr.Mohamad Gandara, personally performed the services 
described in this documentation. All medical record entries made by the 
scribe/resident/fellow were at my direction and in my presence. I have reviewed 
the chart and agree that the record reflects my personal performanceand is 
accurate and complete.  
  
  
Electronically signed by Diana Gandara MD at 2024 4:02 PM EDT  
  
* Dayan Ibanez MA - 2024 10:15 AM EDTFormatting of this note might be 
  different from the original.  
y  
Electronically signed by Dayan Ibanez MA at 2024 4:02 PM EDT  
  
[facility] NoteHNO ID: [id number]  
Author: DAYAN IBANEZ MA  
Service: ?  
Author Type: Medical Assistant  
Type: Progress Notes  
Filed: 2024 16:02  
Note Text:  
[facility]04- History of Present illness Narrative* 
  Anjana Proctor RT(R) - 2024 8:00 AM EDTFormatting of this note might 
  be different from the original.  
Radiology Service Progress Note  
  
PATIENT NAME: Bev Gaming  
MRN: [id number]  
  
DATE OF SERVICE: 2024  
TIME: 7:44 AM  
PATIENT IDENTITY VERIFICATION COMPLETED USING TWO (2) IDENTIFIERS: Name and Date
 of Birth confirmedby patient verbally and Name and Date of Birth confirmed by 
identification band.  
FALL SCREENING: Has the patient had 2 falls in the last year or 1 fall with 
injury or currently using an Ambulatory Assistive Device (Walker, Cane, 
Wheelchair, Crutches, etc.)? No  
PATIENT GENDER DATA: Female. Pregnancy status: Pregnant: No Breastfeeding 
status: NO.  
PATIENT RELEVANT IMPLANT DATA REVIEWED: Not Applicable  
PATIENT PRESENTS WITH AN IMPLANTABLE OR ATTACHED MEDICAL DEVICE: No  
  
RADIOLOGY DEPARTMENT: CT; Exam(s) Completed: Sinus  
  
PERIPHERAL IV DATA: Not applicable  
  
SIGNED BY: BREANNA Ramirez)  
2024 7:44 AM  
  
  
Electronically signed by Anjana Proctor RT (R) at 2024 7:47 AM EDT  
  
[facility] NoteHNO ID: [id number]  
Author: ANJANA PROCTRO RT(R)  
Service: Radiology  
Author Type: Technologist  
Type: Progress Notes  
Filed: 2024 07:47  
Note Text:  
Radiology Service Progress Note  
PATIENT NAME: Bev Gaming  
MRN: [id number]  
DATE OF SERVICE: 2024  
TIME: 7:44 AM  
PATIENT IDENTITY VERIFICATION COMPLETED USING TWO (2) IDENTIFIERS: Name and  
Date of Birth confirmed by patient verbally and Name and Date of Birth confirmed  
by identification band.  
FALL SCREENING: Has the patient had 2 falls in the last year or 1 fall with  
injury or currently using an Ambulatory Assistive Device (Walker, Cane,  
Wheelchair, Crutches, etc.)? No  
PATIENT GENDER DATA: Female. Pregnancy status: Pregnant: No Breastfeeding  
status: NO.  
PATIENT RELEVANT IMPLANT DATA REVIEWED: Not Applicable  
PATIENT PRESENTS WITH AN IMPLANTABLE OR ATTACHED MEDICAL DEVICE: No  
RADIOLOGY DEPARTMENT: CT; Exam(s) Completed: Sinus  
PERIPHERAL IV DATA: Not applicable  
SIGNED BY: BREANNA Ramirez)  
2024 7:44 [facility]04- NoteHNO ID: 
[id number]  
Author: DIANA GANDARA MD  
Service: ?  
Author Type: Physician  
Type: Progress Notes  
Filed: 2024 17:00  
Note Text:  
SECTION OF RHINOLOGY, SINUS AND SKULL BASE SURGERY  
[facility] [place] NOTE  
HPI: Patient is a 58 year old female who presents for chronic pansinusitis  
follow up. She has been having frequent infections since December. She's been on  
antibiotics and steroids since December. She's not currently on anything. She  
can't take Flonase. She's done a netipot. She has not tried Azelastine.  
PAST MEDICAL HISTORY  
Diagnosis Date  
Arthritis  
COPD (chronic obstructive pulmonary disease) (Formerly [facility])  
Depression  
sees a psych  
Diabetes (Formerly [facility])  
DVT (deep venous thrombosis) (Formerly [facility])  
Left lower extremity s/p ankle surgery  
Hypogammaglobulinemia (Formerly [facility])  
On home oxygen therapy  
4L NC  
Overweight  
Pneumonia  
PONV (postoperative nausea and vomiting)  
RA (rheumatoid arthritis) (Formerly [facility])  
PAST SURGICAL HISTORY  
Procedure Laterality Date  
ANKLE LEFT OP SURGERY  
plantar tendon  
CHOLECYSTECTOMY  
HERNIA REPAIR HX  
HIP RIGHT OP SURGERY  
tendon repair  
HYSTERECTOMY HX  
total  
KNEE RIGHT OP SURGERY  
tendon repair  
SHOULDER ARTHROSCOPY/SURGERY  
bilateral tendon repair  
FAMILY HISTORY  
Problem Relation Age of Onset  
Hypertension Mother  
Arthritis Mother  
Cancer Father  
Hypertension Brother  
Social History  
Tobacco Use  
Smoking status: Every Day  
Packs/day: .5  
Types: Cigarettes  
Start date:   
Smokeless tobacco: Never  
Vaping Use  
Vaping Use: Never used  
Substance Use Topics  
Alcohol use: No  
Drug use: No  
Comment: denies tx for drug/alcohol abuse in the past.  
Current Outpatient Medications  
Medication Sig Dispense Refill  
albuterol (PROVENTIL) 2.5 mg /3 mL (0.083 %) nebulizer solution 2.5 mg as  
needed.  
albuterol HFA (PROVENTIL HFA, VENTOLIN HFA) 90 mcg/actuation inhaler INHALE 1  
PUFF EVERY 4 HOURS AS NEEDED FOR WHEEZE OR FOR SHORTNESS OF BREATH  
famotidine (PEPCID) 40 mg tablet Take 40 mg by mouth every morning.  
rosuvastatin (CRESTOR) 10 mg tablet Take 10 mg by mouth every morning.  
IBUPROFEN IB ORAL Take by mouth as needed.  
LORazepam (ATIVAN) 0.5 mg Take 0.5 mg by mouth at bedtime as needed.  
OXYGEN, HOME THERAPY, 4 L/min by Nasal Cannula route as directed. Can be off  
for 3-4 hours without issue.  
semaglutide (OZEMPIC) 1 mg/dose (2 mg/1.5 mL) pnij Inject 2 mg subcutaneously  
one time a week.  
Levalbuterol HCl (XOPENEX) 1.25 mg/0.5 mL nebulizer solution Use 1 Ampule via  
nebulizer every 8 hours as needed.  
budesonide (PULMICORT) 0.5 mg/2 mL nebulizer solution Use 2 mL via nebulizer  
once daily. (Patient not taking: Reported on 2024) 30 Vial 5  
budesonide (PULMICORT) 0.5 mg/2 mL nebulizer solution Use 2 mL via nebulizer  
once daily. (Patient not taking: Reported on 2024) 30 Vial 5  
oxymetazoline (AFRIN, OXYMETAZOLINE,) 0.05 % nasal spray Instill 2 Sprays in  
the nose twice daily. (Patient not taking: Reported on 2024) 30 mL 0  
sodium chloride (DEEP SEA NASAL) 0.65 % nasal spray Use 1 Spray in the nose as  
needed. (Patient not taking: Reported on 2024) 44 mL 0  
insulin degludec (TRESIBA FLEXTOUCH U-100) 100 unit/mL (3 mL) injection pen  
Inject subcutaneously. (Patient not taking: Reported on 2024)  
insulin aspart (NOVOLOG FLEXPEN U-100 INSULIN SUBCUTANEOUS) Inject  
subcutaneously. sliding scale (Patient not taking: Reported on 2024)  
Insulin Lispro, Human, (HUMALOG) 100 unit/mL crtg Inject subcutaneously w  
MEALS. (Patient not taking: Reported on 2024)  
furosemide (LASIX) 40 mg tablet Take 40 mg by mouth as needed. (Patient not  
taking: Reported on 2024)  
No current facility-administered medications for this visit.  
ALLERGIES  
Allergen Reactions  
Bactrim [Sulfametho* Itching  
Effexor [Venlafaxin* Other: See Comments  
Seizures.  
Penicillins Rash, Hives  
difficult to breathe, swelling  
Reglan [Metoclopram* Mental Status Change  
Topamax [Topiramate] Mental Status Change  
ROS:  
CONSTITUTIONAL: No fevers, chills, nightsweats, unintended weight loss  
HEENT: HEENT: Yes symptoms: headaches, sinus pressure, nasal congestion, sense  
of smell reduced, and nasal drainage both, No symptoms: epistaxis  
EYES: No diplopia or blurry vision.  
PHYSICAL EXAM:  
There were no vitals filed for this visit.  
General appearance: well developed, well nourished, without obvious deformities  
Eyes: Extra ocular muscles are intact, no diplopia on primary gaze  
Ears: Externally normal in appearance, without scars, lesions, or masses. Both  
left and right external auditory canals and tympanic membranes are normal  
Nasal exam: The mucosa is pink, the septum is No deviated, and the visible  
turbinates are Yes hypertrophied on anterior rhinoscopy, nasal polyps (0 L, 0 R)  
Oral cavity and oropharynx: the oral mucosa, tongue, tonsil area, and posterior  
pharyngeal mucosa are without lesions. Floor of mouth is soft without edema.  
Procedure: Diagn (more content not included)...[facility]
2024 History of Present illness Narrative* Diana Gandara MD - 
  2024 4:06 PM EDTFormatting of this note is different from the original.  
Images from the original note were not included.  
  
SECTION OF RHINOLOGY, SINUS AND SKULL BASE SURGERY  
[facility] [place] NOTE  
  
HPI: Patient is a 58 year old female who presents for chronic pansinusitis 
follow up. She has been having frequent infections since December. She's been on
 antibiotics and steroids since December. She's not currently on anything. She 
can't take Flonase. She's done a netipot. She has not tried Azelastine.  
  
PAST MEDICAL HISTORY  
Diagnosis Date  
Arthritis  
COPD (chronic obstructive pulmonary disease) (Formerly [facility])  
Depression  
sees a psych  
Diabetes (Formerly [facility])  
DVT (deep venous thrombosis) (Formerly [facility])  
Left lower extremity s/p ankle surgery  
Hypogammaglobulinemia (Formerly [facility])  
On home oxygen therapy  
4L NC  
Overweight  
Pneumonia  
PONV (postoperative nausea and vomiting)  
RA (rheumatoid arthritis) (Formerly [facility])  
  
  
PAST SURGICAL HISTORY  
Procedure Laterality Date  
ANKLE LEFT OP SURGERY  
plantar tendon  
CHOLECYSTECTOMY  
HERNIA REPAIR HX  
HIP RIGHT OP SURGERY  
tendon repair  
HYSTERECTOMY HX  
total  
KNEE RIGHT OP SURGERY  
tendon repair  
SHOULDER ARTHROSCOPY/SURGERY  
bilateral tendon repair  
  
FAMILY HISTORY  
Problem Relation Age of Onset  
Hypertension Mother  
Arthritis Mother  
Cancer Father  
Hypertension Brother  
  
  
Social History  
  
Tobacco Use  
Smoking status: Every Day  
Packs/day: .5  
Types: Cigarettes  
Start date:   
Smokeless tobacco: Never  
Vaping Use  
Vaping Use: Never used  
Substance Use Topics  
Alcohol use: No  
Drug use: No  
Comment: denies tx for drug/alcohol abuse in the past.  
  
  
Current Outpatient Medications  
Medication Sig Dispense Refill  
albuterol (PROVENTIL) 2.5 mg /3 mL (0.083 %) nebulizer solution 2.5 mg as 
needed.  
albuterol HFA (PROVENTIL HFA, VENTOLIN HFA) 90 mcg/actuation inhaler INHALE 1 
PUFF EVERY 4 HOURS ASNEEDED FOR WHEEZE OR FOR SHORTNESS OF BREATH  
famotidine (PEPCID) 40 mg tablet Take 40 mg by mouth every morning.  
rosuvastatin (CRESTOR) 10 mg tablet Take 10 mg by mouth every morning.  
IBUPROFEN IB ORAL Take by mouth as needed.  
LORazepam (ATIVAN) 0.5 mg Take 0.5 mg by mouth at bedtime as needed.  
OXYGEN, HOME THERAPY, 4 L/min by Nasal Cannula route as directed. Can be off for
 3-4 hours without issue.  
semaglutide (OZEMPIC) 1 mg/dose (2 mg/1.5 mL) pnij Inject 2 mg subcutaneously 
one time a week.  
Levalbuterol HCl (XOPENEX) 1.25 mg/0.5 mL nebulizer solution Use 1 Ampule via 
nebulizer every 8 hours as needed.  
budesonide (PULMICORT) 0.5 mg/2 mL nebulizer solution Use 2 mL via nebulizer 
once daily. (Patient not taking: Reported on 2024) 30 Vial 5  
budesonide (PULMICORT) 0.5 mg/2 mL nebulizer solution Use 2 mL via nebulizer 
once daily. (Patient not taking: Reported on 2024) 30 Vial 5  
oxymetazoline (AFRIN, OXYMETAZOLINE,) 0.05 % nasal spray Instill 2 Sprays in the
 nose twice daily. (Patient not taking: Reported on 2024) 30 mL 0  
sodium chloride (DEEP SEA NASAL) 0.65 % nasal spray Use 1 Spray in the nose as 
needed. (Patient nottaking: Reported on 2024) 44 mL 0  
insulin degludec (TRESIBA FLEXTOUCH U-100) 100 unit/mL (3 mL) injection pen 
Inject subcutaneously. (Patient not taking: Reported on 2024)  
insulin aspart (NOVOLOG FLEXPEN U-100 INSULIN SUBCUTANEOUS) Inject 
subcutaneously. sliding scale (Patient not taking: Reported on 2024)  
Insulin Lispro, Human, (HUMALOG) 100 unit/mL crtg Inject subcutaneously w MEALS.
 (Patient not taking: Reported on 2024)  
furosemide (LASIX) 40 mg tablet Take 40 mg by mouth as needed. (Patient not 
taking: Reported on 2024)  
  
No current facility-administered medications for this visit.  
  
  
ALLERGIES  
Allergen Reactions  
Bactrim [Sulfametho* Itching  
Effexor [Venlafaxin* Other: See Comments  
Seizures.  
Penicillins Rash, Hives  
difficult to breathe, swelling  
Reglan [Metoclopram* Mental Status Change  
Topamax [Topiramate] Mental Status Change  
  
ROS:  
CONSTITUTIONAL: No fevers, chills, nightsweats, unintended weight loss  
  
HEENT: HEENT: Yes symptoms: headaches, sinus pressure, nasal congestion, sense 
of smell reduced, and nasal drainage both, No symptoms: epistaxis  
EYES: No diplopia or blurry vision.  
  
PHYSICAL EXAM:  
  
There were no vitals filed for this visit.  
  
General appearance: well developed, well nourished, without obvious deformities  
Eyes: Extra ocular muscles are intact, no diplopia on primary gaze  
Ears: Externally normal in appearance, without scars, lesions, or masses. Both 
left and right external auditory canals and tympanic membranes are normal  
Nasal exam: The mucosa is pink, the septum is No deviated, and the visible 
turbinates are Yes hypertrophied on anterior rhinoscopy, nasal polyps (0 L, 0 R)  
Oral cavity and oropharynx: the oral mucosa, tongue, tonsil area, and posterior 
pharyngeal mucosa are without lesions. Floor of mouth is soft without edema.  
  
Procedure: Diagnostic rigid nasal endoscopy  
Consent: verbal consent obtained  
Surgeon: Diana Gandara  
Anesthesia: The patient was sprayed with 4% topical lidocaine and Afrin. A rigid
 nasal scope was utilized to examine the patient nose  
Findings: A 30-degree rigid endoscope was passed through the patient's bilateral
 nares. The first pass was along the floor of the nose to the nasopharynx. The 
second pass was to the area of the middle meatus. The third pass was to the 
sphenoethmoidal recess.  
Septum: The septum is midline, no perforations.  
R/L Nasal cavity  
No nasal polyps (0 L, 0 R)  
Right nasal cavity:  
Inferior turbinate: Hypertrophied  
Inferior meatus: Clear, no discharge, no polyps/masses/lesions  
Middle meatus: Clear, no discharge, no polyps/masses/lesions  
Middle turbinate: Normal in size  
Sphenoethmoidal recess: Clear, no discharge, no polyps/masses/lesions  
Left nasal cavity:  
Inferior turbinate: Hypertrophied  
Inferior meatus: Clear, no discharge, no polyps/masses/lesions  
Middle meatus: Clear, no discharge, no polyps/masses/lesions  
Middle turbinate: Normal in size  
Sphenoethmoidal recess: Clear, no discharge, no polyps/masses/lesions  
Nasopharynx: Clear, no discharge, no masses/lesions  
  
UPSIT:None  
  
No results found for:  REVW ,  BMPNOTE ,  ALRG5   
Last CT Sinus - Impression Only  
  
CT SINUS STEREO WO IVCON Exam End: 2020 12:19 PM (Final result)  
Impression: IMPRESSION:  
  
Chronic left maxillary sinusitis. Paranasal sinuses are otherwise well  
aerated.  
  
Transcribe Date/Time: 2020 1:24P  
  
Dictated by: GAB MANLEY MD...  
  
  
  
  
  
  
ASSESSMENT/PLAN:  
1. Chronic pansinusitis - ICD9: 473.8, ICD10: J32.4 (primary diagnosis)  
  
2. Other chronic sinusitis - ICD9: 473.8, ICD10: J32.8  
  
- CT SINUS STEREO WO IVCON  
  
3. Allergic rhinitis, unspecified seasonality, unspecified trigger - ICD9: 
477.9, ICD10: J30.9  
  
- CONSULT TO ALLERGY/IMMUNOLOGY  
  
4. Vascular headache - ICD9: 784.0, ICD10: G44.1  
  
- CONSULT TO HEADACHE CLINIC  
  
5. Vertigo, peripheral, bilateral - ICD9: 386.10, ICD10: H81.393  
  
- CONSULT TO ENT  
  
  
No data to display  
  
  
  
  
Scribe Attestation:  
By signing my name below, I, Gabbie Mccauley, attest that this documentation
 has been prepared under the direction and in the presence of Dr. Diana Gandara MD.  
Electronically Signed: Ade Richardson, 2024 4:12 PM  
  
Provider Attestation: I, Dr.Mohamad Gandara, personally performed the services 
described in this documentation. All medical record entries made by the 
scribe/resident/fellow were at my direction and in my presence. I have reviewed 
the chart and agree that the record reflects my personal performanceand is 
accurate and complete.  
  
  
Electronically signed by Diana Gandara MD at 2024 5:00 PM EDT  
  
* Brynn Finch RN - 2024 3:31 PM EDTFormatting of this note might be 
  different from the original.  
  
Tobacco Use: .5 packs/day Types: Cigarettes. Ready to quit: No.  
  
Was smoking cessation packet given? Patient Declined  
  
Was a referral initiated?Patient declined.  
Electronically signed by Brynn Finch RN at 2024 5:00 PM EDT  
  
[facility] NoteHNO ID: [id number]  
Author: BRYNN FINCH RN  
Service: ?  
Author Type: Registered Nurse  
Type: Progress Notes  
Filed: 2024 17:00  
Note Text:  
Tobacco Use: .5 packs/day Types: Cigarettes. Ready to quit: No.  
Was smoking cessation packet given? Patient Declined  
Was a referral initiated?Patient declined.[facility]06- 
Progress note  
  
  
  
  
                                        Author              Neeraj Terry  
[facility]  
2023 9:56am  
   
                                        Note Date/Time      2023 9:54  
am  
   
                                                    [facility]  
ENTER  
[address] [place] [address]  
  
Hospitalist Progress Note  
Signed  
  
Patient: Bev Gaming MR#: [id number]  
[id number]  
: 1965 Acct:[id number]  
  
Age/Sex: 57 / F Adm Date:   
3  
Loc:  Room: [address] [place] [address] Type: ADM INOo  
Attending Dr: Neeraj Terry MD  
  
Copies to: ~  
  
  
Date of Service:  
2023  
  
  
Subjective  
Subjective  
Narrative:  
Patient is feeling better today. Still having a cough, with minimal sputum   
production.  
Lungs with minimal wheezes bilaterally, no crackles  
Heart is regular  
Abdomen soft  
Neurological nonfocal  
  
Assessment and plan  
  
1.COPD exacerbation, failure of prior treatment course with azithromycin.  
Continue bronchodilators, inhaled and systemic steroids.  
Continue Levaquin day 2.  
Sputum cultures pending  
Mucinex changed to Robitussin-AC  
  
2. Chest pain. I believe this is musculoskeletal in nature related to her cough.
   
Continue telemetry. Troponin is negative.  
  
DVT prophylaxis Xarelto  
  
Possible later today discharge home if feeling better.  
  
Continue management of further chronic illnesses with home regimen  
  
COPD and chronic hypoxic respiratory failure on oxygen 4 L nasal cannula  
Alpha-1 antitrypsin deficiency  
Tobacco abuse  
History of DVT  
Diabetes mellitus type 2  
Dyslipidemia  
  
  
Exam  
Physical Exam  
Vital Signs:  
  
  
  
  
Temp Pulse Resp BP Pulse Ox O2 Del Method O2 Flow Rate  
  
98.1 F 88 16 108/72 96 Nasal Cannula 3.5  
  
23 07:52 23 07:52 23 07:52 23 07:52 23 07:52 
23   
07:55 23 07:55  
  
  
  
  
Objective  
Lab Results  
  
23 15:23  
  
23 15:23  
  
Microbiology Results  
Microbiology  
  
23 15:45 Nasopharyngeal Respiratory Panel (PCR) - Final  
  
  
  
Meds  
Allergies and Active Meds  
Allergies  
  
Sulfa (Sulfonamide Antibiotics) Allergy (Verified 23 14:38)  
Hives  
metoclopramide [From Reglan] Adverse Reaction (Verified 23 14:38)  
Agitated  
Penicillins Adverse Reaction (Verified 23 14:38)  
Hives  
risperidone [From Risperdal] Adverse Reaction (Verified 23 14:38)  
Confusion  
topiramate [From Topamax] Adverse Reaction (Verified 23 14:39)  
Hallucinating  
  
  
Active Meds:  
Active Medications  
  
  
  
Generic Name Dose Route Start Last Admin  
  
Trade Name Brandonq PRN Reason Stop Dose Admin  
  
Acetaminophen 1,000 mg 23 17:24  
  
Acetaminophen 500 Mg Tablet PO 24 17:23  
  
Q6H PRN  
  
Fever or Pain  
  
Albuterol/Ipratropium 3 ml 23 20:00 23 07:33  
  
Ipratropium/Albuterol 0.5-3 Mg 3 Ml Ampul.Neb INHALATION 24 19:59 3 ml  
  
QID.RESP AYDEE Administration  
  
Budesonide/Formoterol Fumarate 1 puff 23 21:00 23 07:33  
  
Budesonide/Formoterol 160-4.5 Mcg 60 Puff/6 Gm Hfa.Aer.Ad INHALATION 24 
20:59 1   
puff  
  
BID AYDEE Administration  
  
Famotidine 40 mg 23 22:00 23 22:10  
  
Famotidine 20 Mg Tablet PO 24 21:59 40 mg  
  
QHS AYDEE Administration  
  
Guaifenesin 1,200 mg 23 21:00 23 09:00  
  
Guaifenesin 600 Mg Tab.Er.12h PO 24 20:59 1,200 mg  
  
BID AYDEE Administration  
  
Levofloxacin 750 mg in 150 mls @ 100 mls/hr 23 17:30 23 18:33  
  
Levaquin  mls/hr  
  
Q24H AYDEE Administration  
  
Insulin Aspart 0 units 23 17:00 23 07:59  
  
Insulin Aspart 300 Units/3 Ml Insuln.Pen SUBCUT 24 16:59 2 units  
  
TID.WM.HS.2A AYDEE Administration  
  
Protocol  
  
Lorazepam 0.5 mg 23 16:49  
  
Lorazepam 0.5 Mg Tablet PO 23 16:48  
  
TID PRN  
  
Anxiety  
  
Methylprednisolone 32 mg 23 09:00 23 09:00  
  
Methylprednisolone 16 Mg Tablet PO 24 08:59 32 mg  
  
QAM AYDEE Administration  
  
Naproxen 500 mg 23 17:24 23 20:41  
  
Naproxen 500 Mg Tablet PO 24 17:23 500 mg  
  
BID.WITH.MEALS PRN Administration  
  
Pain  
  
Promethazine HCl 12.5 mg 23 22:03 23 22:10  
  
Promethazine 12.5 Mg Tablet PO 24 22:02 12.5 mg  
  
Q4H PRN Administration  
  
Nausea And Vomiting  
  
Rivaroxaban 10 mg 23 09:00 23 08:59  
  
Rivaroxaban 10 Mg Tablet PO 24 08:59 10 mg  
  
DAILY AYDEE Administration  
  
Sodium Chloride 0 ml 23 15:34 23 17:11  
  
Sodium Chloride 0.9 % 10 Ml Syringe IV-PUSH 24 15:33 10 ml  
  
PRN PRN Administration  
  
Flush  
  
Sodium Chloride 10 ml 23 17:30 23 09:01  
  
Sodium Chloride 0.9 % 10 Ml Syringe IV-PUSH 24 17:29 10 ml  
  
Q8H AYDEE Administration  
  
  
  
  
  
Documented By: Neeraj Terry MD 23 0954  
Signed By: <Electronically signed by Neeraj Terry MD>  
23 0956  
   
  
[facility]  
Work Phone: [phone]- History and physical note  
  
  
  
  
                                        Author              Neeraj Terry  
[facility]  
2023 5:24pm  
   
                                        Note Date/Time      2023 4:53  
pm  
   
                                                    [facility]  
ENTER  
[address] [place] [address]  
  
Hospitalist H&P  
Signed  
  
Patient: Bev Gaming MR#: [id number]  
[id number]  
: 1965 Acct:[id number]  
  
Age/Sex: 57 / F Adm Date:   
3  
Loc: ER Room: Type: [facility] ER  
Attending Dr:  
  
Copies to: MD Nickolas Mccarty, DO Sherman Malone,~  
  
  
HPI  
DATE OF EXAMINATION: 23  
HISTORY OF PRESENT ILLNESS:  
Patient is a 57-year-old female, with known history of severe COPD, and chronic   
hypoxic respiratory failure on 4 L nasal cannula at home. She has not been 
smoking in   
about 6 months. Couple months ago, she had COVID, and it took her quite a while 
to   
recover. About 2 weeks ago, upon returning from [place], she had dyspnea,
  
productive cough, and she was admitted to [facility] where she was kept   
overnight. Apparently she had a COPD exacerbation, and she was discharged home 
with   
5-day therapy with azithromycin and prednisone. Her thick greenish sputum 
improved,   
being no longer green, rather yellow and frothy at times. However shortness of 
breath   
did not get any better. She has been having some chest pressure and right upper   
quadrant pain, mostly pleuritic in nature, exacerbated by deep breathing and 
cough.   
Today she came to the emergency department with these complaints.  
  
Past medical history  
  
COPD and chronic hypoxic respiratory failure on oxygen 4 L nasal cannula  
Alpha-1 antitrypsin deficiency  
Tobacco abuse  
History of DVT  
Diabetes mellitus type 2  
Dyslipidemia  
  
Family history cancer thyroid asthma  
  
10 point review of systems negative except as noted above  
  
Physical exam  
  
Patient was seen in the emergency department  
  
Patient appears comfortable, in no distress.  
  
Skin is normally colored, no icterus, cyanosis or edema noted. Capillary refill 
is   
normal.  
  
Joints are without any effusion.  
  
Abdomen is soft, benign, no rebound or rigidity. No organomegaly. Bowel sounds   
present.  
  
Heart regular, no gallop, rub or JVD. Peripheral pulses present bilaterally. 
Chest is   
tender to palpation, mostly over the left anterior aspect.  
  
Lungs are with expiratory wheezes bilaterally, some rhonchi scattered. No 
crackles.  
  
HENT normal  
  
Neurological: Patient is awake. Cognition is normal. Cranial nerves are intact. 
Power   
is symmetric all extremities, with no focal motor deficit identified on a 
cursory   
exam.  
  
Psych: affect is normal.  
  
EKG Labs imaging reviewed  
  
Assessment and plan  
  
1. COPD exacerbation, failure of prior treatment course with azithromycin.  
We will obtain a sputum culture to identify other pathogens that might have not 
been   
covered. Admit to the hospital. Continue bronchodilators, inhaled and systemic   
steroids, Mucinex. We will administer levofloxacin for the time being. Adjust   
antimicrobial treatment based on the results of sputum cultures if positive. 
Noted   
that she received doxycycline in the ED  
  
2. Chest pain. I believe this is musculoskeletal in nature related to her cough.
We   
will keep on telemetry and repeat troponins tomorrow.  
  
DVT prophylaxis Xarelto  
Continue management of further chronic illnesses with home regimen  
  
COPD and chronic hypoxic respiratory failure on oxygen 4 L nasal cannula  
Alpha-1 antitrypsin deficiency  
Tobacco abuse  
History of DVT  
Diabetes mellitus type 2  
Dyslipidemia  
  
  
[facility]  
Medical History (Updated 23 @ 15:30 by Nickolas Palomares DO)  
  
Tlvwu-9-[id number] deficiency  
COPD (chronic obstructive pulmonary disease)  
Diabetes  
Neck pain with history of cervical spinal surgery  
with hardware and cadaver bone  
  
Surgical History (Reviewed 23 @ 13:34 by Gabbie Thorne RN)  
  
History of ankle surgery  
bilat  
History of appendectomy  
History of colectomy  
History of hysterectomy  
  
Family History (Updated 10/01/21 @ 07:17 by Anjana Tovar RN)  
Mother Hypertension  
Asthma  
Brother Hypertension  
Father Lung cancer  
  
Social History  
Smoking Status: Former smoker  
Tobacco Type: cigarettes  
Substance Use Type: None  
  
Meds  
Medications and Allergies  
Allergies  
  
Sulfa (Sulfonamide Antibiotics) Allergy (Verified 23 14:38)  
Hives  
metoclopramide [From Reglan] Adverse Reaction (Verified 23 14:38)  
Agitated  
Penicillins Adverse Reaction (Verified 23 14:38)  
Hives  
risperidone [From Risperdal] Adverse Reaction (Verified 23 14:38)  
Confusion  
topiramate [From Topamax] Adverse Reaction (Verified 23 14:39)  
Hallucinating  
  
  
Home Medications  
  
lorazepam 0.5 mg tablet 0.5 mg PO TID PRN Anxiety 10/08/18 [History Confirmed   
23]  
albuterol sulfate 2.5 mg/3 mL (0.083 %) solution for nebulization 2.5 mg (3 mL)   
inhalation QID #125 vials 10/11/18 [Rx Confirmed 23]  
promethazine-DM 6.25 mg-15 mg/5 mL oral syrup 5 ml PO Q4-6H PRN cough #473 mL   
10/11/18 [Rx Confirmed 23]  
semaglutide 1 mg/dose (4 mg/3 mL) subcutaneous pen injector (Ozempic) 2 mg 
subcut   
QWEEK 10/01/21 [History Confirmed 23]  
famotidine 40 mg tablet 40 mg PO QHS 23 [History Confirmed 23]  
budesonide-formoterol  mcg-4.5 mcg/actuation aerosol inhaler (Symbicort) 
1 inh   
inhalation BID #10.2 grams 23 [Rx Confirmed 23]  
guaifenesin 600 mg tablet, extended release 12 hr (Mucinex) 1,200 mg PO BID 4 
days   
#16 tabs 23 [Rx Confirmed 23]  
  
  
  
Exam  
Physical Exam  
Vital Signs:  
  
  
  
  
Temp Pulse Resp BP Pulse Ox O2 Del Method O2 Flow Rate  
  
97.9 F 79 16 120/59 L 100 Simple Mask 4  
  
23 14:33 23 16:04 23 16:04 23 16:04 23 16:04 
23   
16:04 23 16:00  
  
  
  
  
Results  
Lab Results  
Labs:  
Laboratory Last Values  
  
  
  
Corrected WBC 13.5 X10E3/uL (3.8-11.6) H 23 15:23  
  
Uncorrected WBC Count 13.5 x10E3/uL (3.8-11.6) H 23 15:23  
  
RBC 4.48 X10E6/uL (3.60-5.00) 23 15:23  
  
Hgb 14.3 g/dL (11.8-15.4) 23 15:23  
  
Hct 41.8 % (34.0-46.4) 23 15:  
  
MCV 93.2 fl () 23 15:23  
  
MCH 31.8 pg (24.7-34.3) 23 15:  
  
MCHC 34.2 g/dL (32.0-35.0) 23 15:  
  
RDW 13.3 % (11.9-15.3) 23 15:  
  
Plt Count 205 x10E3/uL (150-450) 23 15:  
  
MPV 8.2 fl (6.3-10.7) 23 15:23  
  
Neut % (Auto) 59.9 % (.) 23 15:  
  
Lymph % (Auto) 32.5 % (.) 23 15:23  
  
Mono % (Auto) 6.3 % (.) 23 15:  
  
Eos % (Auto) 0.6 % (.) 06/27/23 15:  
  
Baso % (Auto) 0.7 % (.) 23 15:  
  
Nucleat RBC Rel Count 0.1 /100 WBC (0-0.5) 23 15:  
  
Neut # (Auto) 8.1 x10E3/uL (1.8-7.7) H 23 15:  
  
Lymph # (Auto) 4.4 x10E3/uL (1.00-4.8) 23 15:23  
  
Mono # (Auto) 0.8 x10E3/uL (0.0-0.8) 23 15:  
  
Eos # (Auto) 0.1 x10E3/uL (0.0-0.45) 06/27/23 15:  
  
Baso # (Auto) 0.1 x10E3/uL (0.0-0.2) 23 15:  
  
Monocyte Dist Width 17.28 % (0.00-20.00) 23 15:23  
  
D-Dimer Quant (PE/DVT) < 200 ng/mL (0-243) 23 15:23  
  
PHA Creatinine Clear 133.51 23 15:23  
  
Sodium 138 mmol/L (136-145) 23 15:23  
  
Potassium 3.9 mmol/L (3.5-5.1) 23 15:23  
  
Chloride 102 mmol/L () 23 15:23  
  
Carbon Dioxide 30.6 mmol/L (21.0-31.0) 23 15:23  
  
Anion Gap 9.3 mEq/L (6.0-15.0) 23 15:23  
  
BUN 10 mg/dL (7-25) 23 15:23  
  
Creatinine 0.55 mg/dL (0.60-1.20) L 23 15:23  
  
Est GFR (CKD-EPI) > 60.0 mL/Min 23 15:23  
  
Glucose 184 mg/dL () H 23 15:23  
  
Calcium 8.8 mg/dL (8.6-10.3) 23 15:  
  
Total Bilirubin 0.5 mg/dl (0.3-1.0) 23 15:23  
  
AST 12 U/L (13-39) L 23 15:23  
  
ALT 20 U/L (7-52) 23 15:23  
  
Alkaline Phosphatase 54 U/L () 23 15:23  
  
Total Creatine Kinase 18 U/L () L 23 15:23  
  
CK-MB (CK-2) 1.3 ng/mL (0.6-6.3) 23 15:23  
  
CK-MB (CK-2) Rel Index 7.2 % (0.00-2.50) H 23 15:23  
  
Troponin I High Sens 4.0 pg/mL (0.0-15.0) 23 15:23  
  
B-Natriuretic Peptide 13.0 pg/mL (5-100) 23 15:23  
  
Total Protein 5.9 gm/dL (6.4-8.9) L 23 15:23  
  
Albumin 3.7 gm/dL (3.5-5.7) 23 15:23  
  
Globulin 2.2 gm/dL 23 15:23  
  
Albumin/Globulin Ratio 1.7 23 15:23  
  
Urine Color Yellow (Yellow) 23 14:20  
  
Urine Appearance Clear (Clear) 23 14:20  
  
Urine pH 5.5 (5.0-9.0) 23 14:20  
  
Ur Specific Gravity 1.004 (1.001-1.030) 23 14:20  
  
Urine Protein Negative mg/dL (Negative) 23 14:20  
  
Urine Glucose (UA) Normal mg/dL (Normal) 23 14:20  
  
Urine Ketones Negative (Negative) 23 14:20  
  
Urine Occult Blood Negative (Negative) 23 14:20  
  
Urine Nitrite Negative (Negative) 23 14:20  
  
Urine Bilirubin Negative (Negative) 23 14:20  
  
Urine Urobilinogen Normal mg/dL (Normal) 23 14:20  
  
Ur Leukocyte Esterase Negative (Negative) 23 14:20  
  
COVID-19 Clin Com Not detected (Not Detecte) 23 15:45  
  
  
  
Microbiology Results  
Micro:  
Microbiology - Results from entire visit  
  
23 15:45 Nasopharyngeal Respiratory Panel (PCR) - Final  
  
  
  
Assessment & Plan  
IP vs OBS Justification  
Based on differential dx, clinical care plan, and risk of adverse events, if   
untreated, in my clinical judgement this patient requires an acute care setting   
as:OBSERVATION because of an expectation of an under 2 midnight stay.  
Estimated length of stay (# of days): 1  
  
  
Documented By: Neeraj Terry MD 23 1653  
Signed By: <Electronically signed by Neeraj Terry MD>  
23 6434  
   
  
[facility]  
Work Phone: [phone]- Progress note  
  
  
  
  
                                        Author              Rizwan Westfall  
[facility]  
2023 1:43pm  
   
                                        Note Date/Time      2023 1:4  
3pm  
   
                                                    [facility]  
ENTER  
[address] [place] [address]  
  
Pulmonology Progress Note  
Signed  
  
Patient: Bev Gaming MR#: M  
[id number]  
: 1965 Acct:[id number]  
  
Age/Sex: 57 / F Adm Date:   
3  
Loc:  Room: [address] [place] [address] Type: ADM IN  
Attending Dr: Ambrosio Davis MD  
  
Copies to: ~  
  
  
Date of Service:  
2023  
  
  
Subjective  
Subjective  
Narrative:  
No events overnight. Doing well on 4 L nasal cannula which is baseline O2   
concentration at home.  
Repeat COVID-19 swab test yesterday negative  
Denies chest pain. Denies abdominal pain, still coughing but not able to bring 
up   
phlegm.  
  
Exam  
Physical Exam  
Vital Signs:  
  
  
  
  
Temp Pulse Resp BP Pulse Ox O2 Del Method O2 Flow Rate  
  
98.0 F 79 20 119/77 93 L Nasal Cannula 4  
  
23 11:39 23 13:03 23 13:03 23 11:39 23 11:39 
23   
11:39 23 11:39  
  
  
  
Narrative:  
General appearance: No acute distress, alert and oriented x3.  
Eyes: PERRLA, EOMI.  
HEENT: The external ears and nose appear grossly normal.  
Cardiovascular: Regular rate and rhythm, no murmurs rubs gallops. Pulses are 
2+and   
symmetric throughout.  
Neck: trachea is midline  
Respiratory: Normal work of breathing. Prolonged expiratory phase with bilateral
end   
expiratory wheezes.  
Gastrointestinal: Abdomen obese, soft, nontender abdomen. No distention. No 
rebound,   
guarding, organomegaly noted.  
Skin: No rash  
Musculoskeletal: No clubbing  
Neurological exam: Moves all extremities without difficulty. Alert and oriented 
x3.   
No focal neurological deficits.  
  
  
Objective  
Intake and Output  
I&O - Last 24 Hours:  
Intake & Output  
  
  
  
23  
  
23:59 07:59 15:59  
  
Intake Total 225 / 1375 275 / 275  
  
Balance 225 / 1375 275 / 275  
  
Weight 82 kg  
  
  
  
Labs  
  
23 05:19  
  
23 05:19  
  
  
Assessment/Plan  
Assessment/Plan  
(1) Chronic respiratory failure with hypoxia, on home O2 therapy:  
(2) COPD exacerbation:  
(3) COVID-19:  
  
Plan  
  
Patient with nicotine dependence, severe chronic obstructive pulmonary disease,   
advanced hypoxemic respiratory failure on 4 L nasal cannula at home who was 
diagnosed   
with COVID-19 a week ago . she received Paxlovid as an outpatient. Patient does 
not   
qualify for remdesivir.  
Repeat COVID-19 swab test yesterday negative.  
Discontinue dexamethasone, start oral prednisone.  
O2 via regular nasal cannula, SPO2 currently mid 90s on baseline home O2 4 
L/min.  
Switch to oral doxycycline  
High-dose Mucinex  
Nebulized bronchodilators and budesonide  
  
Needs to quit smoking, she was counseled.  
  
Discussed with nursing staff.  
  
  
  
  
Documented By: Rizwan Westfall MD 23 134  
0  
Signed By: <Electronically signed by Rizwan Westfall MD>  
23 1343  
   
  
[facility]  
Work Phone: [phone]- Consult note  
  
  
  
  
                                        Author              Rizwan Westfall  
[facility]  
2023 2:34pm  
   
                                        Note Date/Time      2023 1:0  
1pm  
   
                                                    [facility]  
ENTER  
[address] [place] [address]  
  
Pulmonology Consult Note  
Signed  
  
Patient: Bev Gaming MR#: M  
[id number]  
: 1965 Acct:[id number]  
  
Age/Sex: 57 / F Adm Date:   
3  
Loc:  Room: [address] [place] [address] Type: ADM IN  
Attending Dr: Ambrosio Davis MD  
  
Copies to: MD Sherman Delcid DO Joseph Parenteau, DO, FLO Westfall MD~  
  
  
HPI  
Date/Time of Consultation:  
Date of Service: 2023  
Time of Service: 08:37  
  
Consulting Provider: Rizwan Westfall  
Requesting Provider: Ambrosio Davis  
Reason for Consult:  
COVID-19 positivity, increased oxygen demand  
History of Present Illness  
History of present illness:  
Ms. Gaming is a 57 year old female with past medical history of tobacco use 
disorder,   
COPD, diabetes, hyperlipidemia who presented to the hospital yesterdaynight for   
evaluation of increasing oxygen demand. She uses home oxygen, 4 L 
intermittently. She   
is also recently COVID-positive, was diagnosed with the [facility] emergency room 
1   
week prior. Since then, she has completed course of Paxlovid. Since that time, 
she   
has been progressively more dyspneic with her normal activities and using her 
home   
ambulatory O2 monitor she is seen herself dipped down into the 70s on multiple   
occasions. This is abnormal for her. She also complains of diffuse myalgias over
the   
course of the week. She has a persistent cough intermittently productive for   
green/yellow sputum, no hemoptysis. She states that the days between her most 
recent   
ER visit and admission to this hospital her sputum became much thicker and 
harder to   
clear and her dyspnea got much worse. On intake chest x-ray was benign but 
patient   
had significant dyspnea with hypoxia on room air prompting admission to the   
hospitalist service.  
  
  
Review of Systems  
Review of Systems  
Review of systems:  
Please see HPI for further details regarding review of symptoms. Review of 
symptoms   
should be considered negative unless otherwise noted in HPI.  
  
PMFSH  
Vaccinated for COVID-19?: Yes  
Medical History (Updated 23 @ 20:08 by Darrian Lawrence DO)  
  
COPD (chronic obstructive pulmonary disease)  
Diabetes  
Neck pain with history of cervical spinal surgery  
with hardware and cadaver bone  
  
Surgical History (Reviewed 23 @ 18:06 by Alysa Marquez RN)  
  
History of ankle surgery  
bilat  
History of appendectomy  
History of colectomy  
History of hysterectomy  
  
Family History (Updated 10/01/21 @ 07:17 by Anjana Tovar RN)  
Mother Hypertension  
Asthma  
Brother Hypertension  
Father Lung cancer  
  
Social History  
Smoking Status: Current every day smoker  
Tobacco Type: cigarettes  
Substance Use Type: None  
  
Meds  
Medications and Allergies  
Allergies  
  
Sulfa (Sulfonamide Antibiotics) Allergy (Verified 23 18:06)  
Hives  
metoclopramide [From Reglan] Adverse Reaction (Verified 23 18:06)  
Agitated  
Penicillins Adverse Reaction (Verified 23 18:06)  
Hives  
risperidone [From Risperdal] Adverse Reaction (Verified 23 18:06)  
Confusion  
  
  
Home Medications  
  
lorazepam 0.5 mg tablet 0.5 mg PO TID PRN Anxiety 10/08/18 [History Confirmed   
23]  
albuterol sulfate 2.5 mg/3 mL (0.083 %) solution for nebulization 2.5 mg (3 mL)   
inhalation QID #125 vials 10/11/18 [Rx Confirmed 23]  
prednisone 10 mg tablet 10 mg PO DAILY #28 tabs 10/11/18 [Rx Confirmed 23]  
promethazine-DM 6.25 mg-15 mg/5 mL oral syrup 5 ml PO Q4-6H PRN cough #473 mL   
10/11/18 [Rx Confirmed 23]  
semaglutide 1 mg/dose (4 mg/3 mL) subcutaneous pen injector (Ozempic) 2 mg 
subcut   
QWEEK 10/01/21 [History Confirmed 23]  
famotidine 40 mg tablet 40 mg PO QHS 23 [History Confirmed 23]  
insulin lispro 100 unit/mL subcutaneous pen (Humalog KwikPen (U-100) Insulin) 
subcut   
23 [History Confirmed 23]  
  
  
  
Exam  
Physical Exam  
Vital Signs:  
  
  
  
  
Temp Pulse Resp BP Pulse Ox O2 Del Method O2 Flow Rate  
  
98.0 F 80 20 128/84 96 Nasal Cannula 4  
  
23 04:43 23 08:12 23 08:12 23 04:43 23 04:43 
23   
08:00 23 08:00  
  
  
  
Narrative:  
General appearance: No acute distress, alert and oriented x3.  
Eyes: PERRLA, EOMI. No conjunctival injection. No nystagmus.  
HEENT: The external ears and nose appear grossly normal.  
Cardiovascular: Regular rate and rhythm, no murmurs rubs gallops. Pulses are 
2+and   
symmetric throughout.  
Neck: trachea is midline  
Respiratory: On nasal cannula oxygen. Coarse breath sounds throughout with 
scattered   
expiratory wheezes.  
Gastrointestinal: Soft, nontender abdomen. No distention. No rebound,   
guarding,organomegaly noted.  
Neurological exam: Moves all extremities without difficulty. Alert and oriented 
x3.   
No focal neurological deficits.  
Musculoskeletal: No obvious deformity. No obvious effusion. No obvious lower   
extremity edema.  
  
Results  
Intake and Output  
I&O - Last 24 Hours:  
Intake & Output  
  
  
  
23  
  
23:59 07:59 15:59  
  
Intake Total 50 / 50 450 / 450  
  
Balance 50 / 50 450 / 450  
  
Weight 92.95 kg 81.7 kg  
  
  
  
Labs  
  
23 05:23  
  
23 05:23  
  
Microbiology  
Micro:  
  
  
  
  
23 01:38 Respiratory Panel (PCR) - Final  
  
Nasopharyngeal  
  
  
  
  
Assessment/Plan  
(1) Chronic respiratory failure with hypoxia, on home O2 therapy:  
(2) COPD exacerbation:  
(3) COVID-19:  
  
Plan  
Consultation on a 57-year-old female with COPD, tobacco use disorder, 
hyperlipidemia,   
diabetes. In brief, she has been to multiple emergency rooms over the last few 
days   
for evaluation of respiratory symptoms in the context of COVID-19 diagnosis 1 
week   
prior to evaluation. We are consulted for increased oxygen demand and evaluation
for   
remdesivir therapy. From our standpoint, with a relatively benign chest x-ray, 
use of   
what is baseline oxygen for her, and thefact that her COVID-19 positivity was   
diagnosed 1 week prior to admission remdesivir would serve limited utility. We 
will   
treat this more like a COPD exacerbation and start the patient on steroids.  
  
  
Attending:  
  
Patient was seen and examined by myself, discussed with resident, agree with 
above   
consult note.  
Patient with nicotine dependence, severe chronic obstructive pulmonary disease,   
advanced hypoxemic respiratory failure on 4 L nasal cannula at home who was 
diagnosed   
with COVID-19 a week ago and has visited over 4 ERs in the area since then was   
eventually admitted to [facility] for COPD exacerbation 
and   
COVID-19 infection. I did review chest x-ray, no evidence of pneumonia.  
Patient was diagnosed with COVID-19 a week ago and there is no evidence of 
pneumonia   
on chest x-ray, so there is no indication for remdesivir.  
Treat with dexamethasone 6 mg daily for total 10 days.  
O2 via regular nasal cannula, SPO2 currently mid 90s on baseline home O2 4 
L/min.  
May change doxycycline to by mouth(patient complains of burning at site of   
doxycycline infusion)  
Inhaled corticosteroids, albuterol neb treatments, and symptomatic treatment 
forcough   
and pain.  
Needs to quit smoking, she was counseled.  
  
Discussed with nursing staff.  
  
  
  
  
Documented By: Rizwan Westfall MD 23 083  
7  
Signed By: <Electronically signed by Rizwan Westfall MD>  
23 1434  
<Electronically signed by DO FLO Parekh>  
23 1301  
   
  
[facility]  
Work Phone: [phone]- Progress note  
  
  
  
  
                                        Author              Ambrosio Davis  
[facility]  
2023 1:46pm  
   
                                        Note Date/Time      2023 1:1  
0pm  
   
                                                    [facility]  
ENTER  
[address] [place] [address]  
  
Hospitalist Progress Note  
Signed  
  
Patient: Bev Gaming MR#: [id number]  
[id number]  
: 1965 Acct:[id number]  
  
Age/Sex: 57 / F Adm Date:   
3  
Loc:  Room: [address] [place] [address] Type: ADM IN  
Attending Dr: Ambrosio Davis MD  
  
Copies to: ~  
  
  
Date of Service:  
2023  
  
  
Subjective  
Subjective  
Narrative:  
Patient has persistent cough and shortness of breath symptoms that she says are 
not   
improved since being admitted to the hospital yesterday.  
  
Exam  
Physical Exam  
Vital Signs:  
  
  
  
  
Temp Pulse Resp BP Pulse Ox O2 Del Method O2 Flow Rate  
  
98.5 F 85 20 122/74 97 Nasal Cannula 4  
  
23 08:54 23 12:04 23 12:04 23 08:54 23 08:54 
23   
08:54 23 08:54  
  
  
  
Narrative:  
  
Constitutional: Middle-aged WF, resting in bed comfortably  
HEENT: Moist mucous membranes, neck supple  
Cardiovascular: RRR, no M/R/G, normal S1 and S2, no JVD  
Respiratory: Rhonchi noted throughout all lung fields.  
GI: Soft, NTND, normoactive bowel sounds  
: Deferred  
Neuro: AAO x3, no focal deficits. CN III-XII grossly intact, Strength 5/5 
throughout  
Extremities: No clubbing, cyanosis or edema  
Psych: Patient calm, cooperative and conversant  
  
Objective  
Lab Results  
  
23 05:23  
  
23 05:23  
  
Microbiology Results  
Microbiology  
  
23 01:38 Nasopharyngeal Respiratory Panel (PCR) - Final  
  
  
  
Meds  
Allergies and Active Meds  
Allergies  
  
Sulfa (Sulfonamide Antibiotics) Allergy (Verified 23 18:06)  
Hives  
metoclopramide [From Reglan] Adverse Reaction (Verified 23 18:06)  
Agitated  
Penicillins Adverse Reaction (Verified 23 18:06)  
Hives  
risperidone [From Risperdal] Adverse Reaction (Verified 23 18:06)  
Confusion  
  
  
Active Meds:  
Active Medications  
  
  
  
Generic Name Dose Route Start Last Admin  
  
Trade Name Freq PRN Reason Stop Dose Admin  
  
Acetaminophen 650 mg 23 22:31  
  
Acetaminophen 325 Mg Tablet PO 24 22:30  
  
Q6HR PRN  
  
Pain Scale 1 - 3 or fever  
  
Albuterol/Ipratropium 3 ml 23 08:00 23 12:04  
  
Ipratropium/Albuterol 0.5-3 Mg 3 Ml Ampul.Neb INHALATION 24 07:59 3 ml  
  
QID.RESP AYDEE Administration  
  
Dexamethasone Sodium Phosphate 6 mg 23 09:00  
  
Dexamethasone Sod Phosphate 4 Mg/Ml Vial IV-PUSH 23 09:01  
  
DAILY AYDEE  
  
Enoxaparin Sodium 40 mg 23 10:00 23 10:38  
  
Enoxaparin 40 Mg/0.4 Ml Syringe SUBCUT 24 09:59 Not Given  
  
DAILY@10 AYDEE  
  
Famotidine 40 mg 23 22:00  
  
Famotidine 20 Mg Tablet PO 24 21:59  
  
QHS AYDEE  
  
Guaifenesin/Dextromethorphan 10 ml 23 23:00 23 08:54  
  
Guaif/Dextromethorphan Syrup 10 Ml Udc PO 24 22:59 10 ml  
  
Q8H AYDEE Administration  
  
Doxycycline Hyclate 100 mg in 100 mls @ 100 mls/hr 23 00:30 23 01:53  
  
Doxy 100  mls/hr  
  
Q12H AYDEE Administration  
  
Insulin Aspart 0 units 23 08:00 23 11:40  
  
Insulin Aspart 300 Units/3 Ml Insuln.Pen SUBCUT 24 07:59 9 units  
  
TID.WM.HS AYDEE Administration  
  
Protocol  
  
Ketorolac Tromethamine 15 mg 23 02:13 23 09:01  
  
Ketorolac Tromethamine 15 Mg/Ml Vial IV-PUSH 23 02:12 15 mg  
  
Q6H PRN Administration  
  
Pain  
  
Lorazepam 0.5 mg 23 22:37  
  
Lorazepam 0.5 Mg Tablet PO 10/24/23 22:36  
  
TID PRN  
  
Anxiety  
  
Non-Formulary Medication 2 mg 23 09:00  
  
Semaglutide [Ozempic] SUBCUT 24 08:59  
  
QWEEK [facility]  
  
Ondansetron HCl 4 mg 23 22:31  
  
Ondansetron 4 Mg/2 Ml Vial IV-PUSH 24 22:30  
  
Q8H PRN  
  
Nausea And Vomiting  
  
Potassium Chloride 40 meq 23 22:31  
  
Potassium Chloride Er 20 Meq Tab.Er.Prt PO 24 22:30  
  
DAILY PRN  
  
Hypokalemia  
  
  
  
  
A&P - Hospitalist  
Assessment/Plan  
(1) COPD exacerbation:  
(2) COVID-19:  
(3) Tobacco abuse disorder:  
(4) Diabetes mellitus:  
(5) Bipolar disorder:  
(6) Chronic respiratory failure with hypoxia, on home O2 therapy:  
  
Plan  
  
COVID-19 Infection  
COPD Exacerbation  
Acute on Chronic Respiratory Failure with Hypoxia  
Patient notes that she continues to have severe respiratory symptoms with 
shortness   
of breath and dry cough. She had 4 ED visits prior to admission yesterday. She   
intermittently uses 4 L nasal cannula oxygen, but now is requiring 4 L at all 
times.   
She does continue to smoke. We will treat as COPD exacerbation.  
However she has worsening respiratory symptoms though saturations are at 
baseline and   
has failed outpatient treatment  
Labs showed leukocytosis probably from steroids with normal kidney functions  
-Ordered COVID-19 PCR  
-Appreciate pulmonary assessment  
-Start IV steroids, breathing treatments, antibiotics, cough suppressants  
-Supplemental oxygen as needed  
-Voltaren gel for chest wall pain  
-She has finished a course of Paxlovid; would hold on further antiviral therapy 
at   
this time  
-Monitor POC glucoses closely in the setting of steroid administration  
Continue home meds  
DVT prophylaxis  
  
Diabetes mellitus type 2 with hyperglycemia  
Patient has been placed on steroid therapy for COPD exacerbation.  
-IV steroids per pulmonary  
-Start scheduled Lantus and scheduled AC aspart as well as corrective scale 
insulin  
  
Bipolar Disorder  
GERD  
  
CODE STATUS: Full Code  
  
  
Documented By: Ambrosio Davis MD   
3 [id number]  
Signed By: <Electronically signed by Ambrosio Davis MD>  
23 8579  
   
  
[facility]  
Work Phone: [phone]- History and physical note  
  
  
  
  
                                        Author              Tashi Rodriges  
[facility]  
2023 10:56pm  
   
                                        Note Date/Time      2023 10:  
40pm  
   
                                                    [facility]  
ENTER  
[address] [place] [address]  
  
Hospitalist H&P  
Signed  
  
Patient: Bev Gaming MR#: [id number]  
[id number]  
: 1965 Acct:[id number]  
  
Age/Sex: 57 / F Adm Date:   
3  
Loc:  Room: [address] [place] [address] Type: ADM IN  
Attending Dr: Tashi Rodriges MD  
  
Copies to: MD Sherman Negron DO~  
  
  
HPI  
DATE OF EXAMINATION: 23  
CHIEF COMPLAINT: sob  
HISTORY OF PRESENT ILLNESS:  
Patient is a 57-year-old lady past medical history of chronic respiratory 
failure on   
4 L home oxygen, asthma, smoker, diabetes, bipolar disorder presentedto the 
emergency   
department complaining of shortness of breath. States that about a week ago she 
went   
to [place] ED because of shortness of breath and productive cough, was 
diagnosed   
with COVID at the time, she was sent home on steroids and  
Paxlovid. Couple days later she continued to have symptoms she decided to go 
[facility] emergency department, she was given oral antibiotics and recommended to   
continue steroids and Paxlovid. States that despite taking antibiotics and 
steroids   
and having finished the course of the extremity she continues to have shortness 
of   
breath with minimal exertion and is gasping for breath. States that her 
saturations   
goes down to low 80s with minimal exertion which is totally new to her. She got   
concerned and called her lung doctor who recommended to come to the emergency   
department. States that she vomited twice because of intractable cough but 
denies any   
nausea, vomiting currently. Denies any fever or chills. Denies any chest pain,   
palpitations. States that she has cut down on smoking, currently smoking 2 to 3   
cigarettes a day despite symptoms. Denies any other recreational drug use.  
  
Review of Systems  
Review of Systems  
All other systems reviewed & are negative unless noted below or in HPI  
  
PMFSH  
Vaccinated for COVID-19?: Yes  
Medical History (Updated 23 @ 20:08 by Darrian Lawrence DO)  
  
COPD (chronic obstructive pulmonary disease)  
Diabetes  
Neck pain with history of cervical spinal surgery  
with hardware and cadaver bone  
  
Surgical History (Reviewed 23 @ 18:06 by Alysa Marquez RN)  
  
History of ankle surgery  
bilat  
History of appendectomy  
History of colectomy  
History of hysterectomy  
  
Family History (Updated 10/01/21 @ 07:17 by Anjana Tovar RN)  
Mother Hypertension  
Asthma  
Brother Hypertension  
Father Lung cancer  
  
Social History  
Smoking Status: Current every day smoker  
Tobacco Type: cigarettes  
Substance Use Type: None  
  
Meds  
Medications and Allergies  
Allergies  
  
Sulfa (Sulfonamide Antibiotics) Allergy (Verified 23 18:06)  
Hives  
metoclopramide [From Reglan] Adverse Reaction (Verified 23 18:06)  
Agitated  
Penicillins Adverse Reaction (Verified 23 18:06)  
Hives  
risperidone [From Risperdal] Adverse Reaction (Verified 23 18:06)  
Confusion  
  
  
Home Medications  
  
lorazepam 0.5 mg tablet 0.5 mg PO TID PRN Anxiety 10/08/18 [History Confirmed   
23]  
albuterol sulfate 2.5 mg/3 mL (0.083 %) solution for nebulization 2.5 mg (3 mL)   
inhalation QID #125 vials 10/11/18 [Rx Confirmed 23]  
prednisone 10 mg tablet 10 mg PO DAILY #28 tabs 10/11/18 [Rx Confirmed 23]  
promethazine-DM 6.25 mg-15 mg/5 mL oral syrup 5 ml PO Q4-6H PRN cough #473 mL   
10/11/18 [Rx Confirmed 23]  
semaglutide 1 mg/dose (4 mg/3 mL) subcutaneous pen injector (Ozempic) 2 mg 
subcut   
QWEEK 10/01/21 [History Confirmed 23]  
famotidine 40 mg tablet 40 mg PO QHS 23 [History Confirmed 23]  
insulin lispro 100 unit/mL subcutaneous pen (Humalog KwikPen (U-100) Insulin) 
subcut   
23 [History Confirmed 23]  
  
  
  
Exam  
Physical Exam  
Vital Signs:  
  
  
  
  
Temp Pulse Resp BP Pulse Ox O2 Del Method O2 Flow Rate  
  
99.3 F H 92 H 22 105/55 L 94 L Nasal Cannula 4  
  
23 18:08 23 21:45 23 21:45 23 21:45 23 21:45 
23   
21:45 23 21:45  
  
  
  
Narrative:  
General: Awake, alert, oriented x3 not in acute distress  
HEENT: Normocephalic, atraumatic, PERRLA, normal mucosa  
Cardiovascular: Regular rate and rhythm , S1-S2 heard, no murmurs or gallops  
Lungs: Expiratory and inspiratory wheezing heard on auscultation  
Gastrointestinal: Soft, nontender, bowel sounds heard  
Extremities: No edema  
Neurological: no sensory or motor deficit  
Skin: Dry and warm, no rashes or lesions  
Psych: Normal mood and affect  
  
  
Results  
Lab Results  
Labs:  
Laboratory Last Values  
  
  
  
Corrected WBC 21.5 X10E3/uL (3.8-11.6) H 23 19:05  
  
Uncorrected WBC Count 21.5 x10E3/uL (3.8-11.6) H 23 19:05  
  
RBC 4.99 X10E6/uL (3.60-5.00) 23 19:05  
  
Hgb 15.8 g/dL (11.8-15.4) H 23 19:05  
  
Hct 46.1 % (34.0-46.4) 23 19:05  
  
MCV 92.4 fl () 23 19:05  
  
MCH 31.6 pg (24.7-34.3) 23 19:05  
  
MCHC 34.2 g/dL (32.0-35.0) 23 19:05  
  
RDW 12.8 % (11.9-15.3) 23 19:05  
  
Plt Count 242 x10E3/uL (150-450) 23 19:05  
  
MPV 7.9 fl (6.3-10.7) 23 19:05  
  
Neut % (Auto) N/A 23 19:05  
  
Lymph % (Auto) N/A 23 19:05  
  
Mono % (Auto) N/A 23 19:05  
  
Eos % (Auto) N/A 23 19:05  
  
Baso % (Auto) N/A 23 19:05  
  
Nucleat RBC Rel Count N/A 23 19:05  
  
Neut # (Auto) N/A 23 19:05  
  
Lymph # (Auto) N/A 23 19:05  
  
Mono # (Auto) N/A 23 19:05  
  
Eos # (Auto) N/A 23 19:05  
  
Baso # (Auto) N/A 23 19:05  
  
Lymphocytes % 15 % (18-42) L 23 19:05  
  
Monocytes % 7 % (2-11) 23 19:05  
  
Eosinophils % 0 % (1-3) L 23 19:05  
  
Basophils % 0 % (0-2) 23 19:05  
  
Segmented Neutrophils 76 % (50-70) H 23 19:05  
  
Monocyte Dist Width 20.00 % (0.00-20.00) 23 19:05  
  
Reactive Lymphocytes 2 % (0-12) 23 19:05  
  
Platelet Estimate Normal (Normal) 23 19:05  
  
Giant Platelets 2 /100 WBC 23 19:05  
  
Plt Morphology Comment N/A 23 19:05  
  
RBC Morphology Normal (Normal) 23 19:05  
  
PHA Creatinine Clear 106.74 23 19:05  
  
Sodium 136 mmol/L (136-145) 23 19:05  
  
Potassium 4.5 mmol/L (3.5-5.1) 23 19:05  
  
Chloride 97 mmol/L () L 23 19:05  
  
Carbon Dioxide 30.7 mmol/L (21.0-31.0) 23 19:05  
  
Anion Gap 12.8 mEq/L (6.0-15.0) 23 19:05  
  
BUN 17 mg/dL (7-25) 23 19:05  
  
Creatinine 0.71 mg/dL (0.60-1.20) 23 19:05  
  
Est GFR (CKD-EPI) > 60.0 mL/Min 23 19:05  
  
Glucose 276 mg/dL () H 23 19:05  
  
Calcium 9.0 mg/dL (8.6-10.3) 23 19:05  
  
Total Bilirubin 0.3 mg/dl (0.3-1.0) 23 19:05  
  
AST 12 U/L (13-39) L 23 19:05  
  
ALT 28 U/L (7-52) 23 19:05  
  
Alkaline Phosphatase 65 U/L () 23 19:05  
  
Total Creatine Kinase 49 U/L () 23 19:05  
  
Troponin I High Sens 6.4 pg/mL (0.0-15.0) 23 19:05  
  
B-Natriuretic Peptide 35.0 pg/mL (5-100) 23 19:05  
  
Total Protein 6.6 gm/dL (6.4-8.9) 23 19:05  
  
Albumin 3.7 gm/dL (3.5-5.7) 23 19:05  
  
Globulin 2.9 gm/dL 23 19:05  
  
Albumin/Globulin Ratio 1.3 23 19:05  
  
Urine Color Yellow (Yellow) 23 18:17  
  
Urine Appearance Clear (Clear) 23 18:17  
  
Urine pH 6.0 (5.0-9.0) 23 18:17  
  
Ur Specific Gravity 1.006 (1.001-1.030) 23 18:17  
  
Urine Protein Negative mg/dL (Negative) 23 18:17  
  
Urine Glucose (UA) Normal mg/dL (Normal) 23 18:17  
  
Urine Ketones Negative (Negative) 23 18:17  
  
Urine Occult Blood Negative (Negative) 23 18:17  
  
Urine Nitrite Negative (Negative) 23 18:17  
  
Urine Bilirubin Negative (Negative) 23 18:17  
  
Urine Urobilinogen Normal mg/dL (Normal) 23 18:17  
  
Ur Leukocyte Esterase Negative (Negative) 23 18:17  
  
  
  
  
A&P - Hospitalist  
Assessment/Plan  
(1) COPD exacerbation:  
(2) COVID-19:  
(3) Tobacco abuse disorder:  
(4) Diabetes mellitus:  
(5) Bipolar disorder:  
(6) Chronic respiratory failure with hypoxia, on home O2 therapy:  
  
Plan  
Patient will be admitted to floor for further evaluation and management  
She is currently saturating in mid 90s on 4 L nasal cannula which is her 
baseline  
However she has worsening respiratory symptoms though saturations are at 
baseline and   
has failed outpatient treatment  
Labs showed leukocytosis probably from steroids with normal kidney functions  
Ordered COVID-19 PCR  
Consult pulmonary  
Start IV steroids, breathing treatments, antibiotics, cough suppressants  
Supplemental oxygen as needed  
She has finished a course of Paxlovid. Will defer remdesivir therapy to 
pulmonary  
Insulin sliding scale, Accu-Chek  
Continue home meds  
DVT prophylaxis  
CODE STATUS full code  
  
  
Documented By: Tashi Rodriges MD 23 [id number]  
Signed By: <Electronically signed by Tashi Rodriges MD>  
23 8122  
   
  
[facility]  
Work Phone: [phone]- NoteInfectious Disease  
COVID-19  
COVID-19, or coronavirus disease 2019, is an infection that is caused by a new 
(novel) coronavirus called SARS-CoV-2. COVID-19 can cause many symptoms. In some
people, the virus may not cause any symptoms. In others, it may cause mild or 
severe symptoms. Some people with severe infection develop severe disease.  
What are the causes?  
(Inserted Image. Unable to display)  
This illness is caused by a virus. The virus may be in the air as tiny specks of
fluid (aerosols) or droplets, or it may be on surfaces. You may catch the virus 
by:  
? Breathing in droplets from an infected person. Droplets can be spread by a 
person breathing, speaking, singing, coughing, or sneezing.  
? Touching something, like a table or a doorknob, that has virus on it (is 
contaminated) and then touching your mouth, nose, or eyes.  
What increases the risk?  
Risk for infection:  
You are more likely to get infected with the COVID-19 virus if:  
? You are within 6 ft (1.8 m) of a person with COVID-19 for 15 minutes or 
longer.  
? You are providing care for a person who is infected with COVID-19.  
? You are in close personal contact with other people. Close personal contact 
includes hugging, kissing, or sharing eating or drinking utensils.  
Risk for serious illness caused by COVID-19:  
You are more likely to get seriously ill from the COVID-19 virus if:  
? You have cancer.  
? You have a long-term (chronic) disease, such as:  
? Chronic lung disease. This includes pulmonary embolism, chronic obstructive 
pulmonary disease, and cystic fibrosis.  
? Long-term disease that lowers your body's ability to fight infection 
(immunocompromise).  
? Serious cardiac conditions, such as heart failure, coronary artery disease, or
cardiomyopathy.  
? Diabetes.  
? Chronic kidney disease.  
? Liver diseases. These include cirrhosis, nonalcoholic fatty liver disease, 
alcoholic liver disease, or autoimmune hepatitis.  
? You have obesity.  
? You are pregnant or were recently pregnant.  
? You have sickle cell disease.  
What are the signs or symptoms?  
Symptoms of this condition can range from mild to severe. Symptoms may appear 
any time from 2 to 14days after being exposed to the virus. They include:  
? Fever or chills.  
? Shortness of breath or trouble breathing.  
? Feeling tired or very tired.  
? Headaches, body aches, or muscle aches.  
? Runny or stuffy nose, sneezing, coughing, or sore throat.  
? New loss of taste or smell. This is rare.  
Some people may also have stomach problems, such as nausea, vomiting, or 
diarrhea.  
Other people may not have any symptoms of COVID-19.  
How is this diagnosed?  
(Inserted Image. Unable to display)  
This condition may be diagnosed by testing samples to check for the COVID-19 
virus. The most commontests are the PCR test and the antigen test. Tests may be 
done in the lab or at home. They include:  
? Using a swab to take a sample of fluid from the back of your nose and throat 
(nasopharyngeal fluid), from your nose, or from your throat.  
? Testing a sample of saliva from your mouth.  
? Testing a sample of coughed-up mucus from your lungs (sputum).  
How is this treated?  
Treatment for COVID-19 infection depends on the severity of the condition.  
? Mild symptoms can be managed at home with rest, fluids, and over-the-counter 
medicines.  
? Serious symptoms may be treated in a hospital intensive care unit (ICU). 
Treatment in the ICU mayinclude:  
? Supplemental oxygen. Extra oxygen is given through a tube in the nose, a face 
mask, or a jimenez.  
? Medicines. These may include:  
? Antivirals, such as monoclonal antibodies. These help your body fight off 
certain viruses that can cause disease.  
? Anti-inflammatories, such as corticosteroids. These reduce inflammation and 
suppress the immune system.  
? Antithrombotics. These prevent or treat blood clots, if they develop.  
? Convalescent plasma. This helps boost your immune system, if you have an 
underlying immunosuppressive condition or are getting immunosuppressive 
treatments.  
? Prone positioning. This means you will lie on your stomach. This helps oxygen 
to get into your lungs.  
? Infection control measures.  
If you are at risk for more serious illness caused by COVID-19, your health care
provider may prescribe two long-acting monoclonal antibodies, given together 
every 6 months.  
How is this prevented?  
To protect yourself:  
? Use preventive medicine (pre-exposure prophylaxis). You may get pre-exposure 
prophylaxis if you have moderate or severe immunocompromise.  
? Get vaccinated. Anyone 6 months old or older who meets guidelines can get a 
COVID-19 vaccine or vaccine series. This includes people who are pregnant or 
making breast milk (lactating).  
? Get an added dose of COVID-19 vaccine after your first vaccine or vaccine 
series if you have moderate to severe immunocompromise. This applies if you have
had a solid organ transplant or have been diagnosed with an immunocompromising 
condition.  
? You should (more content not included)...[facility]04-
Hospital Discharge instructions  
  
Patient Education  
  
  
  
2023 11:58:00  
  
  
  
COVID-19  
  
  
  
COVID-19  
COVID-19, or coronavirus disease 2019, is an infection that is caused by a new 
(novel) coronavirus called SARS-CoV-2. COVID-19 can cause many symptoms. In some
people, the virus may not cause any symptoms. In others, it may cause mild or 
severe symptoms. Some people with severe infection develop severe disease.  
What are the causes?  
This illness is caused by a virus. The virus may be in the air as tiny specks of
fluid (aerosols) or droplets, or it may be on surfaces. You may catch the virus 
by:  
Breathing in droplets from an infected person. Droplets can be spread by a 
person breathing, speaking, singing, coughing, or sneezing.  
Touching something, like a table or a doorknob, that has virus on it (is 
contaminated) and then touching your mouth, nose, or eyes.  
What increases the risk?  
Risk for infection:  
You are more likely to get infected with the COVID-19 virus if:  
You are within 6 ft (1.8 m) of a person with COVID-19 for 15 minutes or longer.  
You are providing care for a person who is infected with COVID-19.  
You are in close personal contact with other people. Close personal contact 
includes hugging, kissing, or sharing eating or drinking utensils.  
Risk for serious illness caused by COVID-19:  
You are more likely to get seriously ill from the COVID-19 virus if:  
You have cancer.  
You have a long-term (chronic) disease, such as:  
?Chronic lung disease. This includes pulmonary embolism, chronic obstructive 
pulmonary disease, andcystic fibrosis.  
?Long-term disease that lowers your body's ability to fight infection 
(immunocompromise).  
?Serious cardiac conditions, such as heart failure, coronary artery disease, or 
cardiomyopathy.  
?Diabetes.  
?Chronic kidney disease.  
?Liver diseases. These include cirrhosis, nonalcoholic fatty liver disease, 
alcoholic liver disease, or autoimmune hepatitis.  
You have obesity.  
You are pregnant or were recently pregnant.  
You have sickle cell disease.  
What are the signs or symptoms?  
Symptoms of this condition can range from mild to severe. Symptoms may appear 
any time from 2 to 14days after being exposed to the virus. They include:  
Fever or chills.  
Shortness of breath or trouble breathing.  
Feeling tired or very tired.  
Headaches, body aches, or muscle aches.  
Runny or stuffy nose, sneezing, coughing, or sore throat.  
New loss of taste or smell. This is rare.  
Some people may also have stomach problems, such as nausea, vomiting, or 
diarrhea.  
Other people may not have any symptoms of COVID-19.  
How is this diagnosed?  
This condition may be diagnosed by testing samples to check for the COVID-19 
virus. The most commontests are the PCR test and the antigen test. Tests may be 
done in the lab or at home. They include:  
Using a swab to take a sample of fluid from the back of your nose and throat 
(nasopharyngeal fluid), from your nose, or from your throat.  
Testing a sample of saliva from your mouth.  
Testing a sample of coughed-up mucus from your lungs (sputum).  
How is this treated?  
Treatment for COVID-19 infection depends on the severity of the condition.  
Mild symptoms can be managed at home with rest, fluids, and over-the-counter 
medicines.  
Serious symptoms may be treated in a hospital intensive care unit (ICU). 
Treatment in the ICU may include:  
?Supplemental oxygen. Extra oxygen is given through a tube in the nose, a face 
mask, or a jimenez.  
?Medicines. These may include:  
?Antivirals, such as monoclonal antibodies. These help your body fight off 
certain viruses that cancause disease.  
?Anti-inflammatories, such as corticosteroids. These reduce inflammation and 
suppress the immune system.  
?Antithrombotics. These prevent or treat blood clots, if they develop.  
?Convalescent plasma. This helps boost your immune system, if you have an 
underlying immunosuppressive condition or are getting immunosuppressive 
treatments.  
?Prone positioning. This means you will lie on your stomach. This helps oxygen 
to get into your lungs.  
?Infection control measures.  
If you are at risk for more serious illness caused by COVID-19, your health care
provider may prescribe two long-acting monoclonal antibodies, given together 
every 6 months.  
How is this prevented?  
To protect yourself:  
Use preventive medicine (pre-exposure prophylaxis). You may get pre-exposure 
prophylaxis if you have moderate or severe immunocompromise.  
Get vaccinated. Anyone 6 months old or older who meets guidelines can get a 
COVID-19 vaccine or vaccine series. This includes people who are pregnant or 
making breast milk (lactating).  
Get an added dose of COVID-19 vaccine after your first vaccine or vaccine series
if you have moderate to severe immunocompromise. This applies if you have had a 
solid organ transplant or have been diagnosed with an immunocompromising 
condition.  
?You should get the added dose 4 weeks after you got the first COVID-19 vaccine 
or vaccine series.  
?If you get an mRNA vaccine, you will need a 3-dose primary series.  
?If you get the J&J/Sara vaccine, you will need a 2-dose primary series, with
the second dose being an mRNA vaccine.  
Talk to your health care provider about getting experimental monoclonal 
antibodies. This treatment is approved under emergency use authorization to 
prevent severe illness before or after being exposed to the COVID-19 virus. You 
may be given monoclonal antibodies if:  
?You have moderate or severe immunocompromise. This includes treatments that 
lower your immune response. People with immunocompromise may not develop 
protection against COVID-19 when they are vaccinated.  
?You cannot be vaccinated. You may not get a vaccine if you have a severe 
allergic reaction to the vaccine or its components.  
?You are not fully vaccinated.  
?You are in a facility where COVID-19 is present and:  
?Are in close contact with a person who is infected with the COVID-19 virus.  
?Are at high risk of being exposed to the COVID-19 virus.  
?You are at risk of illness from new variants of the COVID-19 virus.  
To protect others:  
If you have symptoms of COVID-19, take steps to prevent the virus from spreading
to others.  
Stay home. Leave your house only to get medical care. Do not use public transit,
if possible.  
Do not travel while you are sick.  
Wash your hands often with soap and water for at least 20 seconds. If soap and 
water are not available, use alcohol-based hand .  
Make sure that all people in your household wash their hands well and often.  
Cough or sneeze into a tissue or your sleeve or elbow. Do not cough or sneeze 
into your hand or into the air.  
Where to find more information  
Centers for Disease Control and Prevention: www.cdc.gov/coronavirus  
World Health Organization: www.who.int/health-topics/coronavirus  
Get help right away if:  
You have trouble breathing.  
You have pain or pressure in your chest.  
You are confused.  
You have bluish lips and fingernails.  
You have trouble waking from sleep.  
You have symptoms that get worse.  
These symptoms may be an emergency. Get help right away. Call 911.  
Do not wait to see if the symptoms will go away.  
Do not drive yourself to the hospital.  
Summary  
COVID-19 is an infection that is caused by a new coronavirus.  
Sometimes, there are no symptoms. Other times, symptoms range from mild to 
severe. Some people witha severe COVID-19 infection develop severe disease.  
The virus that causes COVID-19 can spread from person to person through droplets
or aerosols from breathing, speaking, singing, coughing, or sneezing.  
Mild symptoms of COVID-19 can be managed at home with rest, fluids, and 
over-the-counter medicines.  
This information is not intended to replace advice given to you by your health 
care provider. Make sure you discuss any questions you have with your health 
care provider.  
Document Revised: 2022 Document Reviewed: 2022  
[employer] Patient Education  [employer]  
  
  
  
  
2023 11:58:00  
  
  
  
Chronic Obstructive Pulmonary Disease Exacerbation  
  
  
  
Chronic Obstructive Pulmonary Disease Exacerbation  
Chronic obstructive pulmonary disease (COPD) is a long-term (chronic) condition 
that affects the lungs. COPD is a general term that can be used to describe many
different lung problems that cause lung inflammation and limit airflow, 
including chronic bronchitis and emphysema. COPD exacerbations areepisodes when 
breathing symptoms flare up, become much worse, and require extra treatment.  
COPD exacerbations are usually caused by infections. Without treatment, COPD 
exacerbations can be severe and even life threatening. Frequent COPD 
exacerbations can cause further damage to the lungs.  
What are the causes?  
This condition may be caused by:  
Respiratory infections, including viral and bacterial infections.  
Exposure to smoke.  
Exposure to air pollution, chemical fumes, or dust.  
Things that can cause an allergic reaction (allergens).  
Not taking your usual COPD medicines as directed.  
Underlying medical problems, such as congestive heart failure or infections not 
involving the lungs.  
In many cases, the cause of this condition is not known.  
What increases the risk?  
The following factors may make you more likely to develop this condition:  
Smoking cigarettes.  
Being an older adult.  
Having frequent prior COPD exacerbations.  
What are the signs or symptoms?  
Symptoms of this condition include:  
Increased coughing.  
Increased production of mucus from your lungs.  
Increased wheezing and shortness of breath.  
Rapid or labored breathing.  
Chest tightness.  
Less energy than usual.  
Sleep disruption from symptoms.  
Confusion  
Increased sleepiness.  
Often, these symptoms happen or get worse even with the use of medicines.  
How is this diagnosed?  
This condition is diagnosed based on:  
Your medical history.  
A physical exam.  
You may also have tests, including:  
A chest X-ray.  
Blood tests.  
Lung (pulmonary) function tests.  
How is this treated?  
Treatment for this condition depends on the severity and cause of the symptoms. 
You may need to be admitted to a hospital for treatment. Some of the treatments 
commonly used to treat COPD exacerbations are:  
Antibiotic medicines. These may be used for severe exacerbations caused by a 
lung infection, such as pneumonia.  
Bronchodilators. These are inhaled medicines that expand the air passages and 
allow increased airflow. They may make your breathing more comfortable.  
Steroid medicines. These act to reduce inflammation in the airways. They may be 
given with an inhaler, taken by mouth, or given through an IV tube inserted into
one of your veins.  
Supplemental oxygen therapy.  
Airway clearing techniques, such as noninvasive ventilation (NIV) and positive 
expiratory pressure (PEP). These provide respiratory support through a mask or 
other noninvasive device. An example of this would be using a continuous 
positive airway pressure (CPAP) machine to improve delivery of oxygen into your 
lungs.  
Follow these instructions at home:  
Medicines  
Take over-the-counter and prescription medicines only as told by your health 
care provider.  
It is important to use correct technique with inhaled medicines.  
If you were prescribed an antibiotic medicine or oral steroid, take it as told 
by your health care provider. Do not stop taking the medicine even if you start 
to feel better.  
Lifestyle  
Do not use any products that contain nicotine or tobacco. These products include
cigarettes, chewing tobacco, and vaping devices, such as e-cigarettes. If you 
need help quitting, ask your health careprovider.  
Eat a healthy diet.  
Exercise regularly.  
Get enough sleep. Most adults need 7 or more hours per night.  
Avoid exposure to all substances that irritate the airway, especially tobacco 
smoke.  
Regularly wash your hands with soap and water for at least 20 seconds. If soap 
and water are not available, use hand . This may help prevent you from 
getting infections.  
During flu season, avoid enclosed spaces that are crowded with people.  
General instructions  
Drink enough fluid to keep your urine pale yellow, unless you have a medical 
condition that requires fluid restriction.  
Use a cool mist vaporizer. This humidifies the air and makes it easier for you 
to clear your chest when you cough.  
If you have a home nebulizer and oxygen, continue to use them as told by your 
health care provider.  
Keep all follow-up visits. This is important.  
How is this prevented?  
Stay up-to-date on pneumococcal and flu (influenza) vaccines. A flu shot is 
recommended every year to help prevent exacerbations.  
Quitting smoking is very important in preventing COPD from getting worse and in 
preventing exacerbations from happening as often.  
Follow all instructions for pulmonary rehabilitation after a recent 
exacerbation. This can help prevent future exacerbations.  
Work with your health care provider to develop and follow an action plan. This 
tells you what stepsto take when you experience certain symptoms.  
Contact a health care provider if:  
You have a worsening of your regular COPD symptoms.  
Get help right away if:  
You have worsening shortness of breath, even when resting.  
You have trouble talking.  
You have severe chest pain.  
You cough up blood.  
You have a fever.  
You have weakness, vomit repeatedly, or faint.  
You feel confused.  
You are not able to sleep because of your symptoms.  
You have trouble doing daily activities.  
These symptoms may represent a serious problem that is an emergency. Do not wait
to see if the symptoms will go away. Get medical help right away. Call your 
local emergency services (911 in the [place] Do not drive yourself to the 
hospital.  
Summary  
COPD exacerbations are episodes when breathing symptoms become much worse and 
require extra treatment above your normal treatment.  
Exacerbations can be severe and even life threatening. Frequent COPD 
exacerbations can cause further damage to your lungs.  
COPD exacerbations are usually triggered by infections such as the flu, colds, 
and even pneumonia.  
Treatment for this condition depends on the severity and cause of the symptoms. 
You may need to be admitted to a hospital for treatment.  
Quitting smoking is very important to prevent COPD from getting worse and to 
prevent exacerbations from happening as often.  
This information is not intended to replace advice given to you by your health 
care provider. Make sure you discuss any questions you have with your health 
care provider.  
Document Revised: 10/26/2021 Document Reviewed: 10/26/2021  
[employer] Patient Education  [employer]  
  
  
  
  
Follow Up Care  
  
  
  
2023 09:19:26  
  
  
  
With:SHERMAN MALONE  
Address:  
[address] [place] [id number]  
  
When:2023 11:42:49  
Comments:Follow-up with your primary care provider in 3 to 5 days. If symptoms 
worsen, do not improve, or new symptoms arise please report back to emergency 
department for further evaluation.  
  
[facility]01- Evaluation note*   
  
                      Encounter Date Diagnosis  Assessment Notes Treatment Notes  
 Treatment Clinical   
Notes  
   
                                                Asthma with COPD   
(ICD-10 - J44.9)                                              
  
  
[facility]  
Other Phone: [phone]- Evaluation note*   
  
                      Encounter Date Diagnosis  Assessment Notes Treatment Notes  
 Treatment Clinical   
Notes  
   
                                                Asthma with COPD   
(ICD-10 - J44.9)                                              
   
                                                GERD   
(gastroesophageal   
reflux disease)   
(ICD-10 - K21.9)                                              
   
                                                Tobacco abuse   
(ICD-10 - Z72.0)                                              
  
  
[facility]  
Other Phone: [phone]- Evaluation note*   
  
                      Encounter Date Diagnosis  Assessment Notes Treatment Notes  
 Treatment   
Clinical Notes  
   
                                        29 Sep, 2022        Asthma with COPD   
(ICD-10 - J44.9)                                              
   
                                        29 Sep, 2022        Rhinitis, allergic   
(ICD-10 - J30.9)                                    use your saline   
nasal spray  
Recommed using   
flonase when PND   
worsens  
                                          
   
                                        29 Sep, 2022        GERD   
(gastroesophageal   
reflux disease)   
(ICD-10 - K21.9)                                              
   
                                        29 Sep, 2022        Tobacco abuse   
(ICD-10 - Z72.0)                                    stop smoking  
                                          
  
  
[facility]  
Other Phone: [phone]- Miscellaneous Notes* [facility] - 
  Corina Burris (Ct) - 2020 1:20 PM ESTFormatting of this note 
  might be different from the original.  
Radiology Service Progress Note  
  
PATIENT NAME: Bev Gaming  
MRN: [id number]  
  
DATE OF SERVICE: 2020  
TIME: 12:17 PM  
PATIENT IDENTITY VERIFICATION COMPLETED USING TWO (2) IDENTIFIERS: Name and Date
of Birth confirmedby patient verbally.  
FALL SCREENING: Has the patient had 2 falls in the last year or 1 fall with 
injury or currently using an Ambulatory Assistive Device (Walker, Cane, 
Wheelchair, Crutches, etc.)? No  
PATIENT GENDER DATA: Female. Pregnancy status: Pregnant: No Breastfeeding 
status: NO.  
PATIENT RELEVANT IMPLANT DATA REVIEWED: Not Applicable  
  
RADIOLOGY DEPARTMENT: CT; Exam(s) Completed: Sinus  
  
PERIPHERAL IV DATA: Not applicable  
  
SIGNED BY: FERNANDO Plasencia  
2020 12:17 PM  
  
  
Electronically signed by Corina Burris (Ct) at 2020 12:18 PM EST  
  
documented in this encounterPeoples HospitalDischarge summary  
  
  
  
  
                                        Author              Ambrosio Davis  
[facility]  
2023 5:27pm  
   
                                        Note Date/Time      2023 5:2  
7pm  
   
                                                    [facility]  
ENTER  
[address] [place] [address]  
  
Discharge Summary  
Signed  
  
Patient: Bev Gaming MR#: [id number]  
[id number]  
: 1965 Acct:[id number]  
  
Age/Sex: 57 / F Adm Date:   
3  
Loc:  Room: [address] [place] [address]  
  
Attending Dr: Ambrosio Davis MD  
  
Copies to: MD Sherman Delcid,DO~  
  
  
Providers  
Date of Discharge: 23  
Discharging Provider: Ambrosio Davis  
Primary Care Provider: Sherman Malone  
Consults:  
  
  
23 22:31  
Consult to Pulmonology Routine  
  
  
  
  
Discharge Diagnosis  
(1) Chronic respiratory failure with hypoxia, on home O2 therapy:  
(2) COPD exacerbation:  
(3) COVID-19:  
(4) Acute and chronic respiratory failure with hypoxia:  
Final Diagnosis  
Final Discharge Diagnosis:  
As above  
  
Summary  
Hospital Course  
Hospital course:  
Ms. Gaming is a 56yo F with PMH of chronic respiratory failure on 4 L nasal 
cannula,   
asthma, tobacco abuse, diabetes mellitus type 2, bipolar disorder who presented 
to   
the emergency department with complaints of dyspnea. Patient was diagnosed with   
COVID-19 infection in the weeks prior to hospitalization. Her primary care 
physician   
prescribed her steroids and Paxlovid. She had persistentsymptoms and decided to   
present to the [facility] emergency department, who recommended that she 
continue   
steroids and Paxlovid at home. She continues to have shortness of breath upon 
minimal   
exertion despite this treatment. She presented to 2 other emergency departments 
prior   
to coming here for further management. Patient was concerned and called her   
pulmonologist, who recommendedshe come to the emergency department again if she 
was   
not improving in her symptoms. Patient also continues to smoke, she states, a 
few   
cigarettes daily. She was started on steroid therapy and was given aerosol 
treatments   
4 times daily. Patient had been managing her diabetes well at home with A1c of 
7.4.   
Shedid have hyperglycemia while inpatient here. Patient will continue a total 5-
day   
course of steroid treatment for her COPD exacerbation.  
  
35 minutes spent coordinating the discharge of this patient  
Time Spent with Patient  
Time spent providing/coordinating discharge services (# min): 35  
  
Diagnostic Studies  
Completed and Pending Studies  
Labs on day of discharge:  
  
  
23 16:25: POC Glucose 347, POC Glucose Comment Glu2: cleaned meter  
23 06:48: POC Glucose 299  
23 05:19: PHA Creatinine Clear 104.80, Sodium 135 L, Potassium 4.6, 
Chloride   
100, Carbon Dioxide 30.1, Anion Gap 9.5, BUN 22, Creatinine 0.68, Est GFR (CKD-
EPI) >   
60.0, Glucose 331 H, Calcium 8.1 L  
23 05:19: Corrected WBC 26.4 H, Uncorrected WBC Count 26.4 H, RBC 4.65, 
Hgb   
14.4, Hct 43.6, MCV 93.8, MCH 30.9, MCHC 32.9, RDW 12.8, Plt Count 224, MPV 7.8,
Neut   
% (Auto) N/A, Lymph % (Auto) N/A, Mono % (Auto) N/A, Eos % (Auto) N/A,Baso % 
(Auto)   
N/A, Nucleat RBC Rel Count N/A, Neut # (Auto) N/A, Lymph # (Auto) N/A, Mono # 
(Auto)   
N/A, Eos # (Auto) N/A, Baso # (Auto) N/A, Band Neutrophils % 1, Lymphocytes % 11
L,   
Monocytes % 7, Metamyelocytes % 2 H, Myelocytes % 4 H, Segmented Neutrophils 75 
H,   
Platelet Estimate Normal, Plt Morphology Comment Normal, RBC Morphology N/A,   
Anisocytosis Slight, Microcytosis Slight, OvalocytesSlight  
23 05:19: Estimat Average Glucose 166, Hemoglobin A1c 7.4 H  
23 20:20: POC Glucose 333  
  
  
  
  
Exam  
Physical Exam  
Vital Signs:  
  
  
  
  
Temp Pulse Resp BP Pulse Ox O2 Del Method O2 Flow Rate  
  
98.0 F 79 20 119/77 93 L Nasal Cannula 4  
  
23 11:39 23 13:03 23 13:03 23 11:39 23 11:39 
23   
11:39 23 11:39  
  
  
  
Narrative:  
  
Constitutional: Middle-aged WF, resting in bed comfortably  
HEENT: Moist mucous membranes, neck supple  
Cardiovascular: RRR, no M/R/G, normal S1 and S2, no JVD  
Respiratory: Rhonchi noted throughout all lung fields.  
GI: Soft, NTND, normoactive bowel sounds  
: Deferred  
Neuro: AAO x3, no focal deficits. CN III-XII grossly intact, Strength 5/5 
throughout  
Extremities: No clubbing, cyanosis or edema  
Psych: Patient calm, cooperative and conversant  
  
Discharge Plan  
Discharge Plan  
Patient Disposition: Home  
  
Activity: No Activity Restriction  
  
Diet: Carb Count  
  
Additional Instructions:  
You have tested positive for Covid-19, please see attached instructions and 
follow up   
with the Health Department for further instructions.  
  
Continue home oxygen.  
  
Instructions: [facility] COVID-19 Discharge Instructions  
  
Prescriptions:  
New  
doxycycline hyclate 100 mg Tablet  
100 mg PO BID 4 Days Qty: 8 0RF  
guaifenesin [Mucinex] 600 mg Tablet Extended Release 12hr  
1,200 mg PO BID 4 Days Qty: 16 0RF  
prednisone 20 mg Tablet  
40 mg PO DAILY 3 Days Qty: 6 0RF  
budesonide-formoterol [Symbicort] 160-4.5 mcg/actuation HFA aerosol inhaler  
1 inh inhalation BID Qty: 10.2 3RF  
  
Continued  
lorazepam 0.5 mg tablet  
0.5 mg PO TID PRN (Reason: Anxiety)  
Patient Comments:  
7 day supply, filled 10/6/18  
promethazine-DM 6.25-15 mg/5 mL syrup  
5 ml PO Q4-6H PRN (Reason: cough) Qty: 473 0RF  
albuterol sulfate 2.5 mg /3 mL (0.083 %) solution for nebulization  
2.5 mg INHALATION QID Qty: 125 3RF  
famotidine 40 mg tablet  
40 mg PO QHS  
insulin lispro [Humalog KwikPen Insulin] 100 unit/mL insulin pen  
SUBCUT  
Rx Instructions:  
sliding scale  
Ozempic 1 mg/dose (4 mg/3 mL) pen injector  
2 mg SUBCUT QWEEK  
Patient Comments:  
INJECT UNDER THE SKIN 1 MG EVERY WEEK AS DIRECTED  
Rx Instructions:  
  
  
Discontinued  
prednisone 10 mg tablet  
10 mg PO DAILY Qty: 28 0RF  
Patient Comments:  
pt has 2 more doses  
Rx Instructions:  
40 mg qday for 4 days, then 20 mg qday for 4 days, then 10 mg qday for 4 days, 
then   
stop  
  
Follow Up:  
Erendira Lowry APRN, RICARDOP-BC [Nurse Practitioner] - (Call office on Monday to 
schedule   
follow-up with Pulmonology. )  
Sherman Malone DO [Primary Care Provider] - (Call office on Monday to   
schedulefollow-up with your Primary Care Provider in 3-5 days. )  
  
  
  
Documented By: Ambrosio Davis MD   
3 1712  
Signed By: <Electronically signed by Ambrosio Davis MD>  
23 1723  
   
  
[facility]  
Work Phone: [phone] summary  
  
  
  
                                        Author              Jordon Vásquez  
[facility]  
   
                                        Note Date/Time      2025 11  
:55am  
   
                                                    [facility]  
ENTER  
[address] [place] [address]  
  
Discharge Summary  
Signed  
  
Patient: Bev Gaming MR#: [id number]  
[id number]  
: 1965 Acct:[id number]  
  
Age/Sex: 59 / F Adm Date:   
5  
Loc:  Room: [address] [place] [address]  
  
Attending Dr: Jordon Vásquez MD  
  
Copies to: MD Sherman Aburto DO~  
  
  
Providers  
Date of Discharge: 25  
Discharging Provider: Jordon Vásquez  
Primary Care Provider: Sherman Malone  
  
Discharge Diagnosis  
(1) Acute and chronic respiratory failure with hypoxia:  
(2) COPD exacerbation:  
(3) COVID-19 virus infection:  
(4) Diabetes mellitus:  
Final Diagnosis  
Final Discharge Diagnosis:  
Acute on chronic hypoxic respiratory failure in setting of COVID infection/COPD   
exacerbation  
  
Summary  
Hospital Course  
Hospital course:  
(1) Acute and chronic respiratory failure with hypoxia:  
(2) COPD exacerbation:  
(3) COVID-19 virus infection:  
(4) Diabetes mellitus:  
  
  
Ms. Gaming a 59-year-old female with a PMH of COPD, alpha-1 antitrypsin 
deficiency,   
chronic hypoxic respiratory failure on 4 L nasal cannula at home, tobacco 
dependence,   
GERD, anxiety the presented to the emergency room for complaints of shortness of
  
breath. Patient reports she was diagnosed with COVIDon Tuesday, started feeling   
symptoms on Monday, with a dry barky cough and feeling generally crappy. Reports
a   
productive cough, thick sputum, sometimes at screen symptoms is white. She also   
complains of a headache, chest tightness. She denies fever, chills, nausea or   
vomiting, diarrhea. States that when she gets up and walks around her pulse ox 
drops   
into the 70s and everything gets tingly and she feels like she will pass out. 
She   
reports starting Paxlovid on Tuesday when she was diagnosed with COVID, has 1 
day   
left. She states that she is a pack-a-day smoker, has not smoked since Tuesday.   
Denies alcohol or illicitdrug use. She does report wearing 4 L nasal cannula at 
home,   
reports it is ordered as at all times but on good days she can walk around the 
house   
without it. Chest x-ray shows no acute cardiopulmonary pathology. EKG is normal 
sinus   
rhythm. CBC is unremarkable. CMP with a serum bicarb of 34.5, glucose 202, 
otherwise   
unremarkable. UA is negative for infection. Patient was medicated with DuoNeb's,
  
oxycodone, Zofran and methylprednisolone. She will be admitted as inpatient to 
the   
[facility] telemetry floor. Patient already out of window forremdesivir. Patient 
was   
managed with bronchodilators, steroid and azithromycin. Patient was also 
complaining   
of headache on and off. No acute neurological deficit. Headache was managed with
pain   
medication and steroids. In setting ofCOVID infection recommended 10-day course 
of   
Decadron which should help with COVID as well as headache. Her headache is 
resolved   
now on discharge. Patient is still on 4 to 5 L by nasal cannula and is stable to
go   
home. Patient is hyperglycemic in setting of steroid use which will be managed 
with   
insulin with sliding scale. Patient used to be on insulin which she has not been
  
using now as she is on Ozempic and lost her weight. The patient states she knows
how   
to use insulin. Patient will be discharged to home today.  
  
Condition  
Condition at Discharge: Stable  
Time Spent with Patient  
Time spent providing/coordinating discharge services (# min): 38  
  
Discharge Plan  
Discharge Plan  
Patient Disposition: Home  
  
Activity: No Activity Restriction  
  
Diet: Diabetic  
  
Additional Instructions:  
You tested positive for COVID-19 on 24. Please see attached COVID-19 
discharge   
instructions  
  
Continue oxygen as per chronic orders.  
  
Instructions: Know your Meds, [facility] COVID-19 Discharge Instructions  
  
Prescriptions:  
New  
azithromycin 250 mg Tablet  
500 mg PO DAILY 2 Days Qty: 4 0RF  
dexamethasone 6 mg Tablet  
6 mg PO DAILY 7 Days Qty: 7 0RF  
amlodipine 5 mg Tablet  
5 mg PO DAILY 60 Days Qty: 60 0RF  
insulin aspart U-100 100 unit/mL (3 mL) Insulin Pen  
See Protocol subcut TID.WM.HS 7 Days Qty: 3 0RF  
Protocol: Corrective Scale #1 (TDI </= 25)  
Condition: Corrective Scale #1 (TDI <=25)  
Condition: ======================= Dose/Route: ======================== 
Instruction:   
=======================  
Condition: Fingerstick Blood Glucose Dose/Route: Insulin Units  
Condition: 150-199 mg/dl Dose/Route: 1 unit  
Condition: 200-249 mg/dl Dose/Route: 2 unit  
Condition: 250-299 mg/dl Dose/Route: 3 unit  
Condition: 300-349 mg/dl Dose/Route: 4 unit  
Condition: 350-399 mg/dl Dose/Route: 5 unit  
Condition: greater than or = 400 mg/dl Dose/Route: 6 unit Instruction: 
CallProvider  
Protocol Text:  
*If the corrective scale dose has been administered within the past 4 hours, do 
not   
use corrective scale again unless approved by prescriber*  
  
Continued  
lorazepam 0.5 mg tablet  
0.5 mg PO TID PRN (Reason: Anxiety)  
Patient Comments:  
7 day supply, filled 10/6/18  
famotidine 40 mg tablet  
40 mg PO QHS  
guaifenesin [Mucinex] 600 mg Tablet Extended Release 12hr  
1,200 mg PO BID 4 Days Qty: 16 0RF  
Paxlovid 300 mg (150 mg x 2)-100 mg tablets,dose pack  
1 tab PO DAILY  
Patient Comments:  
pt states has two more doses 24  
albuterol sulfate 2.5 mg /3 mL (0.083 %) solution for nebulization  
2.5 mg INHALATION QID PRN (Reason: shortness of breath or wheezing)  
Paxlovid 300 mg (150 mg x 2)-100 mg tablets,dose pack  
See Rx Instructions .ROUTE .COMPLEX  
Rx Instructions:  
take  mg tablets of nirmatrelvir with  mg tablet of ritonavir 
twice   
daily for 5 days  
albuterol sulfate 90 mcg/actuation HFA aerosol inhaler  
2 inh inhalation Q4HR PRN (Reason: shortness of breath or wheezing)  
Ozempic 2 mg/dose (8 mg/3 mL) pen injector  
2 mg subcut .weekly  
Patient Comments:  
  
(DME) Oxygen Unit  
See Rx Instructions .Route  
Rx Instructions:  
As directed at 4 liters [facility]  
fluticasone furoate-vilanterol [Breo Ellipta] 100-25 mcg/dose blister with 
device  
1 inh inhalation QDAY 30 Days Qty: 60 11RF  
  
Follow Up:  
Sherman Malone DO [Primary Care Provider] - 25 2:45 pm (You have been   
scheduled for a follow up appointment for the following date and time, please 
call to   
reschedule if needed.)  
  
  
Exam  
Physical Exam  
Vital Signs:  
  
  
  
  
Temp Pulse Resp BP Pulse Ox O2 Del Method O2 Flow Rate  
  
97.5 F L 92 20 142/81 H 91 L Nasal Cannula 3  
  
25 09:31 25 09:31 25 09:31 25 09:31 25 09:31 
25   
10:46 25 10:46  
  
  
  
Narrative:  
General: Awake alert, no acute distress  
HEENT: head atraumatic, normocephalic, moist mucous membranes  
Neck: supple no masses, no lymphadenopathy  
CVS: regular rate and rhythm, no murmurs or gallops  
Respiratory: Diminished breath sound bilaterally with polyphonic wheezing, 
symmetric   
expansion  
GI: soft, nondistended, nontender, positive bowel sounds with no organomegaly  
Extremity: moves all extremities, no restrictions of movements, no calf 
tenderness  
Neuro: AOx3, CN II-VII intact. Moves all extremities in all planes of motion.  
Skin: intact no rashes or lesions  
  
Diagnostic Studies  
Completed and Pending Studies  
Pending studies at discharge:  
  
  
25 05:00  
Comprehensive Metabolic Panel [CHEM] IN AM  
  
  
  
Labs on day of discharge:  
  
  
25 06:07: Corrected WBC 17.6 H, Uncorrected WBC Count 17.6 H, RBC 4.36, 
Hgb   
13.8, Hct 40.6, MCV 93.2, MCH 31.6, MCHC 33.9, RDW 12.1, Plt Count 234, MPV 7.6,
 Neut   
% (Auto) N/A, Lymph % (Auto) N/A, Mono % (Auto) N/A, Eos % (Auto) N/A, Baso % 
(Auto)   
N/A, Nucleat RBC Rel Count N/A, Neut # (Auto) N/A, Lymph # (Auto) N/A, Mono # 
(Auto)   
N/A, Eos # (Auto) N/A, Baso # (Auto) N/A, Lymphocytes % 5 L, Monocytes % 5,   
Metamyelocytes % 1 H, Myelocytes % 3 H, Segmented Neutrophils 87 H, Platelet 
Estimate   
Normal, Plt Morphology Comment Normal, RBC Morphology Normal, PHA Creatinine 
Clear   
128.50, Sodium 137, Potassium 4.7, Chloride 97 L, Carbon Dioxide 35.8 H, Anion 
Gap   
8.9, BUN 20, Creatinine 0.58 L, Est GFR (CKD-EPI) > 60.0, Glucose 280 H, Calcium
 8.7,   
Total Bilirubin 0.3, AST 11 L, ALT 17, Alkaline Phosphatase 72, Total Protein 
6.1 L,   
Albumin 3.5, Globulin 2.6, Albumin/Globulin Ratio 1.3  
25 16:39: POC Glucose 369  
  
  
  
  
  
Documented By: Jordon Vásquez MD 25 1146  
Signed By: <Electronically signed by Jordon Vásquez MD>  
25 1155  
   
  
[facility]  
Work Phone: [phone] + Plan note  
  
No data available for this section  
  
[facility] CenterEvaluation noteNort [facility] Professional Corporation  
Other Phone: [phone] noteNo InformationNort Coast Professional 
Corporation  
Other Phone: [phone] noteNo assessment information available
[facility]  
Work Phone: [phone] note*   
  
                          Diagnosis    Onset Date   Resolution   Status  
   
                          COPD exacerbation                           acute  
   
                          COVID-19                               acute  
   
                          Bipolar disorder                           chronic  
   
                                                    Chronic respiratory failure   
with hypoxia, on home O2   
therapy                                                     chronic  
   
                          Diabetes mellitus                           chronic  
   
                          Tobacco abuse disorder                           chron  
ic  
  
  
[facility]  
Work Phone: [phone] note*   
  
                          Diagnosis    Onset Date   Resolution   Status  
   
                          COPD exacerbation                           acute  
   
                          COVID-19                               acute  
   
                          Bipolar disorder                           chronic  
   
                                                    Chronic respiratory failure   
with hypoxia, on home O2   
therapy                                                     chronic  
   
                          Diabetes mellitus                           chronic  
   
                          Tobacco abuse disorder                           chron  
ic  
   
                                                    Acute exacerbation of chroni  
c obstructive airways   
disease                                                     acute  
  
  
[facility]  
Work Phone: [phone] note*   
  
                                                    Diagnosis  
   
                                                      
  
  
Chronic pansinusitis  
  
  
Other chronic sinusitis  
  
documented in this encounter  
[facility]   
  
                                                    Diagnosis  
   
                                                      
  
  
Chronic pansinusitis- Primary  
  
  
Other chronic sinusitis  
   
                                                      
  
  
Other chronic sinusitis  
   
                                                      
  
  
Allergic rhinitis, unspecified seasonality, unspecified trigger  
   
                                                      
  
  
Vascular headache  
  
  
Headache  
   
                                                      
  
  
Vertigo, peripheral, bilateral  
  
documented in this encounter  
[facility]   
  
                                                    Diagnosis  
   
                                                      
  
  
Other chronic sinusitis  
  
documented in this encounter  
[facility]   
  
                          Diagnosis    Onset Date   Resolution   Status  
   
                          Asthma with COPD                           acute  
   
                          COPD (chronic obstructive pulmonary disease)            
                 acute  
   
                                                    Chronic respiratory failure   
with hypoxia, on home O2   
therapy                                                     chronic  
  
  
[facility]  
Work Phone: [phone] note*   
  
                                                    Diagnosis  
   
                                                      
  
  
Chronic pansinusitis- Primary  
  
  
Other chronic sinusitis  
   
                                                      
  
  
Vascular headache  
  
  
Headache  
  
documented in this encounter  
[facility]   
  
                                                    Diagnosis  
   
                                                      
  
  
Dysfunction of both eustachian tubes- Primary  
  
  
Dysfunction of Eustachian tube  
   
                                                      
  
  
History of tympanostomy tube placement  
   
                                                      
  
  
History of hyperbaric oxygen therapy  
   
                                                      
  
  
Sensorineural hearing loss, bilateral  
  
documented in this encounter  
[facility]   
  
                                                    Diagnosis  
   
                                                      
  
  
Sensorineural hearing loss, bilateral- Primary  
   
                                                      
  
  
Tinnitus, bilateral  
  
  
Unspecified tinnitus  
   
                                                      
  
  
Ear pressure, bilateral  
   
                                                      
  
  
Otalgia of both ears  
  
  
Otalgia, unspecified  
   
                                                      
  
  
Dizziness  
  
  
Dizziness and giddiness  
  
documented in this encounter  
[facility]   
  
                                                    Diagnosis  
   
                                                      
  
  
Pre-op evaluation- Primary  
  
  
Preoperative examination, unspecified  
   
                                                      
  
  
Type 2 diabetes mellitus with other specified complication, without long-term 
current   
use of insulin (HCC)  
   
                                                      
  
  
Acute deep vein thrombosis (DVT) of other vein of lower extremity, unspecified   
laterality (HCC)  
   
                                                      
  
  
Anxiety  
  
  
Anxiety state, unspecified  
   
                                                      
  
  
Class 2 obesity  
   
                                                      
  
  
Smoking  
  
  
Tobacco use disorder  
   
                                                      
  
  
Chronic obstructive pulmonary disease, unspecified COPD type (HCC)  
   
                                                      
  
  
Alpha-1-antitrypsin deficiency (HCC)  
  
  
Alpha-1-antitrypsin deficiency  
   
                                                      
  
  
Gastroesophageal reflux disease, unspecified whether esophagitis present  
   
                                                      
  
  
Chronic pansinusitis  
  
  
Other chronic sinusitis  
  
  
* Assessment & Plan Note - Sapphire Mayorga APRN.CNP - 2024 12:18 PM EDT
  Associated Problem(s): GERD (gastroesophageal reflux disease)  
Formatting of this note might be different from the original.  
Assessment: Controlled with pepcid  
  
Electronically signed by Sapphire Mayorga APRN.CNP at 2024 12:18 PM EDT  
  
* Assessment & Plan Note - Sapphire Mayorga APRN.CNP - 2024 12:17 PM EDT
  Associated Problem(s): Alpha-1-antitrypsin deficiency (HCC)  
Formatting of this note might be different from the original.  
Assessment: Follows with Pulmonology  
  
Electronically signed by Sapphire Mayorga APRN.CNP at 2024 12:17 PM EDT  
  
* Assessment & Plan Note - Sapphire Mayorga APRN.CNP - 2024 12:16 PM EDT
  Associated Problem(s): COPD (chronic obstructive pulmonary disease) (HCC)  
Formatting of this note might be different from the original.  
Assessment: Stable  
95% on RA  
Albuterol PRN  
On 4L O2 @ HS and during the day as needed  
Follows with Dr. Davidson @ [facility]  
  
Electronically signed by Sapphire Mayorga APRN.CNP at 2024 12:16 PM EDT  
  
* Assessment & Plan Note - Sapphire Mayorga APRN.CNP - 2024 12:12 PM EDT
  Associated Problem(s): Smoking  
Formatting of this note might be different from the original.  
Assessment: Smokes 0.5 PPD  
  
Electronically signed by Sapphire Mayorga APRN.CNP at 2024 12:12 PM EDT  
  
* Assessment & Plan Note - Sapphire Mayorga APRN.CNP - 2024 11:57 AM EDT
  Associated Problem(s): Class 2 obesity  
Formatting of this note might be different from the original.  
Assessment: Body mass index is 30.41 kg/m .  
  
  
Electronically signed by Sapphire Mayorga APRN.CNP at 2024 11:57 AM EDT  
  
* Assessment & Plan Note - Sapphire Mayorga APRN.CNP - 2024 11:57 AM EDT
  Associated Problem(s): Anxiety  
Formatting of this note might be different from the original.  
Assessment: Takes Valium as needed  
  
Electronically signed by Sapphire Mayorga APRN.CNP at 2024 11:57 AM EDT  
  
* Assessment & Plan Note - Sapphire Mayorga APRN.CNP - 2024 11:55 AM EDT
  Associated Problem(s): DVT of lower extremity (deep venous thrombosis) (HCC)  
Formatting of this note might be different from the original.  
Assessment: s/p ankle surgery  
Completed course of Xarelto  
No recurrence  
  
Electronically signed by Sapphire Mayorga APRN.CNP at 2024 11:55 AM EDT  
  
* Assessment & Plan Note - Sapphire Mayorga APRN.CNP - 2024 1:32 PM EDT
  Associated Problem(s): Diabetes (HCC)  
Formatting of this note is different from the original.  
Assessment: Stable on Ozempic  
Patient given instructions regarding Ozempic  
Component 5 mo ago  
Hemoglobin A1C 6.5  
Specimen Collected: 24 11:03 AM  
  
Follows with PCP  
Electronically signed by Sapphire Mayorga APRN.CNP at 2024 1:32 PM EDT  
  
documented in this encounter  
[facility]   
  
                                                    Diagnosis  
   
                                                      
  
  
Preop examination- Primary  
  
  
Preoperative examination, unspecified  
   
                                                      
  
  
Chronic maxillary sinusitis  
   
                                                      
  
  
Chronic obstructive pulmonary disease, unspecified COPD type (HCC)  
   
                                                      
  
  
Controlled type 2 diabetes mellitus without complication, unspecified whether 
long   
term insulin use (HCC)  
   
                                                      
  
  
Rheumatoid arthritis involving multiple sites with positive rheumatoid factor 
(HCC)  
   
                                                      
  
  
Hypogammaglobulinemia (HCC)  
  
  
Hypogammaglobulinaemia, unspecified  
   
                                                      
  
  
History of DVT (deep vein thrombosis)  
  
  
Personal history of venous thrombosis and embolism  
   
                                                      
  
  
Smoker  
  
  
Tobacco use disorder  
   
                                                      
  
  
Gastroesophageal reflux disease, unspecified whether esophagitis present  
   
                                                      
  
  
Pneumonia due to infectious organism, unspecified laterality, unspecified part 
of   
lung  
   
                                                      
  
  
Smoking  
  
  
Tobacco use disorder  
   
                                                      
  
  
DVT of axillary vein, acute left (HCC)  
  
  
Acute venous embolism and thrombosis of axillary veins  
   
                                                      
  
  
Class 2 obesity  
   
                                                      
  
  
Pre-op evaluation- Primary  
  
  
Preoperative examination, unspecified  
   
                                                      
  
  
Type 2 diabetes mellitus with other specified complication, without long-term 
current   
use of insulin (HCC)  
   
                                                      
  
  
Acute deep vein thrombosis (DVT) of other vein of lower extremity, unspecified   
laterality (HCC)  
   
                                                      
  
  
Anxiety  
  
  
Anxiety state, unspecified  
   
                                                      
  
  
Class 2 obesity  
   
                                                      
  
  
Smoking  
  
  
Tobacco use disorder  
   
                                                      
  
  
Chronic obstructive pulmonary disease, unspecified COPD type (HCC)  
   
                                                      
  
  
Alpha-1-antitrypsin deficiency (HCC)  
  
  
Alpha-1-antitrypsin deficiency  
   
                                                      
  
  
Gastroesophageal reflux disease, unspecified whether esophagitis present  
   
                                                      
  
  
Other chronic sinusitis- Primary  
  
[facility] this encounter  
[facility]   
  
                                                    Diagnosis  
   
                                                      
  
  
Type 2 diabetes mellitus with other diabetic neurological complication (CMS/[facility])  
   
                                                      
  
  
Type 2 diabetes mellitus with other diabetic neurological complication (CMS/[facility])  
   
                                                      
  
  
Type 2 diabetes mellitus with other diabetic neurological complication (CMS/[facility])  
   
                                                      
  
  
Type 2 diabetes mellitus with other diabetic neurological complication 
(CMS/[facility])-   
Primary  
   
                                                      
  
  
COPD with asthma (CMS/[facility])  
  
documented in this encounter  
[facility]   
  
                                                    Diagnosis  
   
                                                      
  
  
Type 2 diabetes mellitus with other diabetic neurological complication (CMS/[facility])  
  
documented in this encounter  
[facility]   
  
                                                    Diagnosis  
   
                                                      
  
  
Acute asthmatic bronchitis (CMS/[facility])- Primary  
  
  
Unspecified asthma, with exacerbation  
   
                                                      
  
  
Wheezing  
   
                                                      
  
  
Acute cough  
   
                                                      
  
  
Nasal drainage  
  
documented in this encounter  
[facility]   
  
                                                    Diagnosis  
   
                                                      
  
  
Chronic cough  
  
  
Cough  
  
documented in this encounter  
[facility]   
  
                                                    Diagnosis  
   
                                                      
  
  
Type 2 diabetes mellitus with other diabetic neurological complication (CMS/[facility])  
   
                                                      
  
  
Type 2 diabetes mellitus with other diabetic neurological complication (CMS/[facility])  
   
                                                      
  
  
Type 2 diabetes mellitus with other diabetic neurological complication (CMS/[facility])  
   
                                                      
  
  
COPD with asthma (CMS/[facility])- Primary  
   
                                                      
  
  
Pulmonary HTN (CMS/[facility])  
   
                                                      
  
  
Acute cough  
  
documented in this encounter  
[facility]   
  
                                                    Diagnosis  
   
                                                      
  
  
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  
   
                                                      
  
  
Type 2 diabetes mellitus with other diabetic neurological complication (CMS/[facility])  
   
                                                      
  
  
Walking pneumonia- Primary  
  
  
Pneumonia due to Mycoplasma pneumoniae  
   
                                                      
  
  
Acute cough  
   
                                                      
  
  
Asthma exacerbation with COPD (chronic obstructive pulmonary disease) (CMS/[facility])  
   
                                                      
  
  
Alpha-1-antitrypsin deficiency (CMS/[facility])  
  
  
Alpha-1-antitrypsin deficiency  
   
                                                      
  
  
Rheumatoid arthritis involving multiple sites, unspecified whether rheumatoid 
factor   
present (CMS/HCC)  
   
                                                      
  
  
Bipolar disorder, in partial remission, most recent episode mixed (CMS/HCC)  
  
documented in this encounter  
[facility]   
  
                                                    Diagnosis  
   
                                                      
  
  
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  
   
                                                      
  
  
COPD with asthma (CMS/HCC)- Primary  
   
                                                      
  
  
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  
  
documented in this encounter  
[facility]   
  
                                                    Diagnosis  
   
                                                      
  
  
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  
   
                                                      
  
  
Walking pneumonia- Primary  
  
  
Pneumonia due to Mycoplasma pneumoniae  
   
                                                      
  
  
Asthma exacerbation with COPD (chronic obstructive pulmonary disease) (CMS/HCC)  
   
                                                      
  
  
Acute cough  
   
                                                      
  
  
Right hip pain  
  
  
Pain in joint, pelvic region and thigh  
   
                                                      
  
  
Status post fall  
   
                                                      
  
  
Acute bilateral low back pain without sciatica  
   
                                                      
  
  
Acute bilateral low back pain without sciatica  
   
                                                      
  
  
Walking pneumonia  
  
  
Pneumonia due to Mycoplasma pneumoniae  
   
                                                      
  
  
Acute cough  
  
documented in this encounter  
[facility]   
  
                                                    Diagnosis  
   
                                                      
  
  
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  
   
                                                      
  
  
Post-COVID chronic cough- Primary  
   
                                                      
  
  
Chronic respiratory failure with hypoxia (CMS/HCC)  
   
                                                      
  
  
Medicare annual wellness visit, subsequent  
   
                                                      
  
  
Chronic cough  
  
  
Cough  
   
                                                      
  
  
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  
   
                                                      
  
  
Severe persistent reactive airway disease with acute exacerbation (CMS/HCC)  
   
                                                      
  
  
COPD with asthma (CMS/HCC)  
   
                                                      
  
  
Alpha-1-antitrypsin deficiency (CMS/HCC)  
  
  
Alpha-1-antitrypsin deficiency  
  
documented in this encounter  
[facility]   
  
                                                    Diagnosis  
   
                                                      
  
  
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  
   
                                                      
  
  
Gastroparesis  
  
documented in this encounter  
[facility]   
  
                                                    Diagnosis  
   
                                                      
  
  
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  
   
                                                      
  
  
Type 2 diabetes mellitus with other diabetic neurological complication 
(CMS/HCC)-   
Primary  
  
documented in this encounter  
[facility]  
  
  
  
  
                                        Author              Neeraj Terry  
[facility]  
2023 5:24pm  
   
                                        Note Date/Time      2023 4:53  
pm  
   
                                                    [facility]  
ENTER  
[address] [place] [address]  
  
Hospitalist H&P  
Signed  
  
Patient: Bev Gaming MR#: [id number]  
[id number]  
: 1965 Acct:[id number]  
  
Age/Sex: 57 / F Adm Date:   
3  
Loc: ER Room: Type: [facility] ER  
Attending Dr:  
  
Copies to: MD Nickolas Mccarty DO Jeffrey A Garman,~  
  
  
HPI  
DATE OF EXAMINATION: 23  
HISTORY OF PRESENT ILLNESS:  
Patient is a 57-year-old female, with known history of severe COPD, and chronic   
hypoxic respiratory failure on 4 L nasal cannula at home. She has not been 
smoking in   
about 6 months. Couple months ago, she had COVID, and it took her quite a while 
to   
recover. About 2 weeks ago, upon returning from [place], she had dyspnea,
   
productive cough, and she was admitted to [facility] where she was kept   
overnight. Apparently she had a COPD exacerbation, and she was discharged home 
with   
5-day therapy with azithromycin and prednisone. Her thick greenish sputum 
improved,   
being no longer green, rather yellow and frothy at times. However shortness of 
breath   
did not get any better. She has been having some chest pressure and right upper   
quadrant pain, mostly pleuritic in nature, exacerbated by deep breathing and 
cough.   
Today she came to the emergency department with these complaints.  
  
Past medical history  
  
COPD and chronic hypoxic respiratory failure on oxygen 4 L nasal cannula  
Alpha-1 antitrypsin deficiency  
Tobacco abuse  
History of DVT  
Diabetes mellitus type 2  
Dyslipidemia  
  
Family history cancer thyroid asthma  
  
10 point review of systems negative except as noted above  
  
Physical exam  
  
Patient was seen in the emergency department  
  
Patient appears comfortable, in no distress.  
  
Skin is normally colored, no icterus, cyanosis or edema noted. Capillary refill 
is   
normal.  
  
Joints are without any effusion.  
  
Abdomen is soft, benign, no rebound or rigidity. No organomegaly. Bowel sounds   
present.  
  
Heart regular, no gallop, rub or JVD. Peripheral pulses present bilaterally. 
Chest is   
tender to palpation, mostly over the left anterior aspect.  
  
Lungs are with expiratory wheezes bilaterally, some rhonchi scattered. No 
crackles.  
  
HENT normal  
  
Neurological: Patient is awake. Cognition is normal. Cranial nerves are intact. 
Power   
is symmetric all extremities, with no focal motor deficit identified on a 
cursory   
exam.  
  
Psych: affect is normal.  
  
EKG Labs imaging reviewed  
  
Assessment and plan  
  
1. COPD exacerbation, failure of prior treatment course with azithromycin.  
We will obtain a sputum culture to identify other pathogens that might have not 
been   
covered. Admit to the hospital. Continue bronchodilators, inhaled and systemic   
steroids, Mucinex. We will administer levofloxacin for the time being. Adjust   
antimicrobial treatment based on the results of sputum cultures if positive. 
Noted   
that she received doxycycline in the ED  
  
2. Chest pain. I believe this is musculoskeletal in nature related to her cough.
 We   
will keep on telemetry and repeat troponins tomorrow.  
  
DVT prophylaxis Xarelto  
Continue management of further chronic illnesses with home regimen  
  
COPD and chronic hypoxic respiratory failure on oxygen 4 L nasal cannula  
Alpha-1 antitrypsin deficiency  
Tobacco abuse  
History of DVT  
Diabetes mellitus type 2  
Dyslipidemia  
  
  
[facility]  
Medical History (Updated 23 @ 15:30 by Nickolas Palomares DO)  
  
Btfgh-3-hurnxfshkhm deficiency  
COPD (chronic obstructive pulmonary disease)  
Diabetes  
Neck pain with history of cervical spinal surgery  
with hardware and cadaver bone  
  
Surgical History (Reviewed 23 @ 13:34 by Gabbie Thorne RN)  
  
History of ankle surgery  
bilat  
History of appendectomy  
History of colectomy  
History of hysterectomy  
  
Family History (Updated 10/01/21 @ 07:17 by Anjana Tovar RN)  
Mother Hypertension  
Asthma  
Brother Hypertension  
Father Lung cancer  
  
Social History  
Smoking Status: Former smoker  
Tobacco Type: cigarettes  
Substance Use Type: None  
  
Meds  
Medications and Allergies  
Allergies  
  
Sulfa (Sulfonamide Antibiotics) Allergy (Verified 23 14:38)  
Hives  
metoclopramide [From Reglan] Adverse Reaction (Verified 23 14:38)  
Agitated  
Penicillins Adverse Reaction (Verified 23 14:38)  
Hives  
risperidone [From Risperdal] Adverse Reaction (Verified 23 14:38)  
Confusion  
topiramate [From Topamax] Adverse Reaction (Verified 23 14:39)  
Hallucinating  
  
  
Home Medications  
  
lorazepam 0.5 mg tablet 0.5 mg PO TID PRN Anxiety 10/08/18 [History Confirmed   
23]  
albuterol sulfate 2.5 mg/3 mL (0.083 %) solution for nebulization 2.5 mg (3 mL)   
inhalation QID #125 vials 10/11/18 [Rx Confirmed 23]  
promethazine-DM 6.25 mg-15 mg/5 mL oral syrup 5 ml PO Q4-6H PRN cough #473 mL   
10/11/18 [Rx Confirmed 23]  
semaglutide 1 mg/dose (4 mg/3 mL) subcutaneous pen injector (Ozempic) 2 mg 
subcut   
QWEEK 10/01/21 [History Confirmed 23]  
famotidine 40 mg tablet 40 mg PO QHS 23 [History Confirmed 23]  
budesonide-formoterol  mcg-4.5 mcg/actuation aerosol inhaler (Symbicort) 
1 inh   
inhalation BID #10.2 grams 23 [Rx Confirmed 23]  
guaifenesin 600 mg tablet, extended release 12 hr (Mucinex) 1,200 mg PO BID 4 
days   
#16 tabs 23 [Rx Confirmed 23]  
  
  
  
Exam  
Physical Exam  
Vital Signs:  
  
  
  
  
Temp Pulse Resp BP Pulse Ox O2 Del Method O2 Flow Rate  
  
97.9 F 79 16 120/59 L 100 Simple Mask 4  
  
23 14:33 23 16:04 23 16:04 23 16:04 23 16:04 
23   
16:04 23 16:00  
  
  
  
  
Results  
Lab Results  
Labs:  
Laboratory Last Values  
  
  
  
Corrected WBC 13.5 X10E3/uL (3.8-11.6) H 23 15:23  
  
Uncorrected WBC Count 13.5 x10E3/uL (3.8-11.6) H 23 15:23  
  
RBC 4.48 X10E6/uL (3.60-5.00) 23 15:23  
  
Hgb 14.3 g/dL (11.8-15.4) 23 15:23  
  
Hct 41.8 % (34.0-46.4) 23 15:23  
  
MCV 93.2 fl () 23 15:23  
  
MCH 31.8 pg (24.7-34.3) 23 15:  
  
MCHC 34.2 g/dL (32.0-35.0) 23 15:  
  
RDW 13.3 % (11.9-15.3) 23 15:23  
  
Plt Count 205 x10E3/uL (150-450) 23 15:  
  
MPV 8.2 fl (6.3-10.7) 23 15:23  
  
Neut % (Auto) 59.9 % (.) 23 15:23  
  
Lymph % (Auto) 32.5 % (.) 23 15:23  
  
Mono % (Auto) 6.3 % (.) 23 15:  
  
Eos % (Auto) 0.6 % (.) 23 15:  
  
Baso % (Auto) 0.7 % (.) 23 15:  
  
Nucleat RBC Rel Count 0.1 /100 WBC (0-0.5) 23 15:  
  
Neut # (Auto) 8.1 x10E3/uL (1.8-7.7) H 23 15:23  
  
Lymph # (Auto) 4.4 x10E3/uL (1.00-4.8) 23 15:23  
  
Mono # (Auto) 0.8 x10E3/uL (0.0-0.8) 23 15:  
  
Eos # (Auto) 0.1 x10E3/uL (0.0-0.45) 06/27/23 15:  
  
Baso # (Auto) 0.1 x10E3/uL (0.0-0.2) 23 15:  
  
Monocyte Dist Width 17.28 % (0.00-20.00) 23 15:23  
  
D-Dimer Quant (PE/DVT) < 200 ng/mL (0-243) 23 15:23  
  
PHA Creatinine Clear 133.51 23 15:23  
  
Sodium 138 mmol/L (136-145) 23 15:23  
  
Potassium 3.9 mmol/L (3.5-5.1) 23 15:23  
  
Chloride 102 mmol/L () 23 15:23  
  
Carbon Dioxide 30.6 mmol/L (21.0-31.0) 23 15:23  
  
Anion Gap 9.3 mEq/L (6.0-15.0) 23 15:23  
  
BUN 10 mg/dL (7-25) 23 15:23  
  
Creatinine 0.55 mg/dL (0.60-1.20) L 23 15:23  
  
Est GFR (CKD-EPI) > 60.0 mL/Min 23 15:23  
  
Glucose 184 mg/dL () H 23 15:23  
  
Calcium 8.8 mg/dL (8.6-10.3) 23 15:  
  
Total Bilirubin 0.5 mg/dl (0.3-1.0) 23 15:23  
  
AST 12 U/L (13-39) L 23 15:  
  
ALT 20 U/L (7-52) 23 15:23  
  
Alkaline Phosphatase 54 U/L () 23 15:23  
  
Total Creatine Kinase 18 U/L () L 23 15:23  
  
CK-MB (CK-2) 1.3 ng/mL (0.6-6.3) 23 15:  
  
CK-MB (CK-2) Rel Index 7.2 % (0.00-2.50) H 23 15:23  
  
Troponin I High Sens 4.0 pg/mL (0.0-15.0) 23 15:23  
  
B-Natriuretic Peptide 13.0 pg/mL (5-100) 23 15:23  
  
Total Protein 5.9 gm/dL (6.4-8.9) L 23 15:23  
  
Albumin 3.7 gm/dL (3.5-5.7) 23 15:23  
  
Globulin 2.2 gm/dL 23 15:23  
  
Albumin/Globulin Ratio 1.7 23 15:23  
  
Urine Color Yellow (Yellow) 23 14:20  
  
Urine Appearance Clear (Clear) 23 14:20  
  
Urine pH 5.5 (5.0-9.0) 23 14:20  
  
Ur Specific Gravity 1.004 (1.001-1.030) 23 14:20  
  
Urine Protein Negative mg/dL (Negative) 23 14:20  
  
Urine Glucose (UA) Normal mg/dL (Normal) 23 14:20  
  
Urine Ketones Negative (Negative) 23 14:20  
  
Urine Occult Blood Negative (Negative) 23 14:20  
  
Urine Nitrite Negative (Negative) 23 14:20  
  
Urine Bilirubin Negative (Negative) 23 14:20  
  
Urine Urobilinogen Normal mg/dL (Normal) 23 14:20  
  
Ur Leukocyte Esterase Negative (Negative) 23 14:20  
  
COVID-19 Clin Com Not detected (Not Detecte) 23 15:45  
  
  
  
Microbiology Results  
Micro:  
Microbiology - Results from entire visit  
  
23 15:45 Nasopharyngeal Respiratory Panel (PCR) - Final  
  
  
  
Assessment & Plan  
IP vs OBS Justification  
Based on differential dx, clinical care plan, and risk of adverse events, if   
untreated, in my clinical judgement this patient requires an acute care setting   
as:OBSERVATION because of an expectation of an under 2 midnight stay.  
Estimated length of stay (# of days): 1  
  
  
Documented By: Neeraj Terry MD 23 1653  
Signed By: <Electronically signed by Neeraj Terry MD>  
23 1724  
   
  
[facility]  
Work Phone: [phone] and physical note  
  
  
  
                                        Author              Lidia Sorensen  
[facility]  
   
                                        Note Date/Time      2025 8:  
29am  
   
                                                    [facility]  
ENTER  
[address] [place] [address]  
  
Hospitalist H&P  
Signed  
  
Patient: Bev Gaming MR#: [id number]  
[id number]  
: 1965 Acct:[id number]  
  
Age/Sex: 59 / F Adm Date:   
5  
Loc: 4 Room: [address] [place] [address] Type: ADM IN  
Attending Dr: Jordon Vásquez MD  
  
Copies to: MD Sherman Aburto,DO Charlene Montoya, DO Lidia Sorensen, APRN~  
  
  
HPI  
DATE OF EXAMINATION: 25  
CHIEF COMPLAINT: shortness of breath  
HISTORY OF PRESENT ILLNESS:  
Ms. Gaming a 59-year-old female with a PMH of COPD, alpha-1 antitrypsin 
deficiency,   
chronic hypoxic respiratory failure on 4 L nasal cannula at home, tobacco 
dependence,   
GERD, anxiety the presents to the emergency room tonight forcomplaints of 
shortness   
of breath. Patient reports she was diagnosed with COVIDon Tuesday, started 
feeling   
symptoms on Monday, with a dry barky cough and feeling generally crappy. Reports
a   
productive cough, thick sputum, sometimes at screen symptoms is white. She also   
complains of a headache, chest tightness. She denies fever, chills, nausea or   
vomiting, diarrhea. States that when she gets up and walks around her pulse ox 
drops   
into the 70s and everything gets tingly and she feels like she will pass out. 
She   
reports starting Paxlovid on Tuesday when she was diagnosed with COVID, has 1 
day   
left. She states that she is a pack-a-day smoker, has not smoked since Tuesday.   
Denies alcohol or illicitdrug use. She does report wearing 4 L nasal cannula at 
home,   
reports it is ordered as at all times but on good days she can walk around the 
house   
without it.  
  
Chest x-ray shows no acute cardiopulmonary pathology. EKG is normal sinus 
rhythm. CBC   
is unremarkable. CMP with a serum bicarb of 34.5, glucose 202, otherwise   
unremarkable. UA is negative for infection. Patient was medicated with DuoNeb's,
  
oxycodone, Zofran and methylprednisolone. She will be admitted as inpatient to 
the   
[facility] telemetry floor.  
  
Review of Systems  
Review of Systems  
Review of systems:  
A 10 point review of systems was obtained, negative unless noted in the HPI or 
below.  
  
[facility]  
Medical History (Updated 25 @ 03:36 by SAHRA Hyde)  
  
Hernia  
Asthma with COPD  
GERD (gastroesophageal reflux disease)  
Congestive heart failure  
Chronic respiratory failure with hypoxia  
Alpha-1-antitrypsin deficiency  
Neck pain with history of cervical spinal surgery  
with hardware and cadaver bone  
  
Diabetes  
COPD (chronic obstructive pulmonary disease)  
  
Surgical History (Updated 25 @ 03:36 by SAHRA Hyde)  
  
H/O neck surgery  
hardware and cadaver bones  
History of cardiac catheterization  
No stents  
H/O shoulder surgery  
History of sinus surgery  
2024 CC Deviated septum.  
History of   
x3  
History of colectomy  
History of appendectomy  
History of hysterectomy  
  
History of ankle surgery  
bilat  
  
Family History (Reviewed 25 @ 03:37 by SAHRA Hyde)  
Mother Hypertension  
Asthma  
Migraine headache  
Brother Hypertension  
[employer] Relation: Brother(s)  
Father Cancer  
Lung  
  
Family history of other condition  
[employer] Problem: Lung CA  
Son Asthma  
  
Social History  
Marital Status:   
Household Members: spouse  
Smoking Status: Current every day smoker (1pk/day)  
Tobacco Type: cigarettes  
Substance Use Type: None  
  
Meds  
Medications and Allergies  
Allergies  
  
cephalexin (Keflex) Allergy (Unknown, Verified 10/21/24 14:03)  
Unknown Reaction  
metoclopramide (From Reglan) Allergy (Unknown, Verified 10/21/24 14:03)  
Agitated  
quetiapine (Seroquel) Allergy (Unknown, Verified 10/21/24 14:03)  
Unknown Reaction  
risperidone (From Risperdal) Allergy (Unknown, Verified 10/21/24 14:03)  
Confusion  
sulfacetamide (Sulfacet-R) Allergy (Unknown, Verified 10/21/24 14:03)  
Hives  
venlafaxine (Effexor) Allergy (Unknown, Verified 10/21/24 14:03)  
Unknown Reaction  
Penicillins Adverse Reaction (Verified 10/21/24 14:03)  
Hives  
topiramate (From Topamax) Adverse Reaction (Verified 10/21/24 14:03)  
Hallucinating  
  
  
Home Medications  
  
lorazepam 0.5 mg tablet 0.5 mg PO TID PRN Anxiety 10/08/18 [History Confirmed   
25]  
famotidine 40 mg tablet 40 mg PO QHS 23 [History Confirmed 25]  
guaifenesin 600 mg tablet, extended release 12 hr (Mucinex) 1,200 mg (2 x 600 
mg) PO   
BID 4 days #16 tabs 23 [Rx Confirmed 25]  
albuterol sulfate 90 mcg/actuation aerosol inhaler 2 inh inhalation Q4HR PRN   
shortness of breath or wheezing 04/15/24 [History Confirmed 25]  
Oxygen 24 [History Confirmed 25]  
semaglutide 2 mg/dose (8 mg/3 mL) subcutaneous pen injector (Ozempic) 2 mg 
subcut   
.weekly 24 [History Confirmed 25]  
Breo Ellipta 100 mcg-25 mcg/dose powder for inhalation (fluticasone   
furoate-vilanterol) 1 inh inhalation QDAY 30 days #60 ea 10/21/24 [Rx Confirmed   
25]  
albuterol sulfate 2.5 mg/3 mL (0.083 %) solution for nebulization 2.5 mg 
inhalation   
QID PRN shortness of breath or wheezing 25 [History Confirmed 25]  
nirmatrelvir 300 mg (150 mg x2)-ritonavir 100 mg tablet,dose pack (Paxlovid) 1 
tab PO   
DAILY 25 [History Confirmed 25]  
  
  
  
Exam  
Physical Exam  
Vital Signs:  
  
  
  
  
Temp Pulse Resp BP Pulse Ox O2 Del Method O2 Flow Rate  
  
97.6 F 96 20 156/92 H 95 Nasal Cannula 4  
  
25 22:32 25 03:22 25 03:22 25 03:22 25 03:22 
25   
03:22 25 03:22  
  
  
  
Narrative:  
  
CONST- Appears well -developed and well nourished.  
HEAD - Normocephalic and atraumatic  
EENT-Sclera nonicteric, conjunctive are non-erythemic, dry oral mucosa, pharynx 
clear  
NECK-Supple, no cervical lymphadenopathy  
CARDIAC-normal rate, regular rhythm, S1 & S2.  
PULM-diminished, expiratory wheeze throughout, 4L NC, no accessory muscle use, 
dry   
cough noted  
ABD - Soft. Bowel sounds are normal. No distention. No tenderness  
EXTREM-no edema BLE calves, nontender  
SKIN- W/D good turgor  
MS- MAEX4 spontaneously with equal with equal strength  
NEURO- A&Ox3 speech clear and tongue midline, equal facial symmetry, no focal 
motor   
deficits  
PSYCH-Mood, affect, and behavior appropriate  
  
Results - Hospitalist H&P  
Lab Results  
Labs:  
Laboratory Last Values  
  
  
  
Corrected WBC 8.9 X10E3/uL (3.8-11.6) 25 21:11  
  
Uncorrected WBC Count 8.9 x10E3/uL (3.8-11.6) 25 21:11  
  
RBC 4.53 x10E6/uL (3.60-5.00) 25 21:11  
  
Hgb 14.5 g/dL (11.8-15.4) 25 21:11  
  
Hct 41.9 % (34.0-46.4) 25 21:11  
  
MCV 92.5 fl () 25 21:11  
  
MCH 32.0 pg (24.7-34.3) 25 21:11  
  
MCHC 34.6 g/dL (32.0-35.0) 25 21:11  
  
RDW 12.4 % (11.9-15.3) 25 21:11  
  
Plt Count 233 x10E3/uL (150-450) 25 21:11  
  
MPV 7.4 fl (6.3-10.7) 25 21:11  
  
Neut % (Auto) 55.4 % (.) 25 21:11  
  
Lymph % (Auto) 32.6 % (.) 25 21:11  
  
Mono % (Auto) 8.4 % (.) 25 21:11  
  
Eos % (Auto) 2.9 % (.) 25 21:11  
  
Baso % (Auto) 0.7 % (.) 25 21:11  
  
Nucleat RBC Rel Count 0.0 /100 WBC (0-0.5) 25 21:11  
  
Neut # (Auto) 5.0 x10E3/uL (1.8-7.7) 25 21:11  
  
Lymph # (Auto) 2.9 x10E3/uL (1.00-4.8) 25 21:11  
  
Mono # (Auto) 0.7 x10E3/uL (0.0-0.8) 25 21:11  
  
Eos # (Auto) 0.3 x10E3/uL (0.0-0.45) 25 21:11  
  
Baso # (Auto) 0.1 x10E3/uL (0.0-0.2) 25 21:11  
  
Monocyte Dist Width 18.64 % (0.00-20.00) 25 21:11  
  
PHA Creatinine Clear 105.46 25 21:11  
  
Sodium 140 mmol/L (136-145) 25 21:11  
  
Potassium 4.0 mmol/L (3.5-5.1) 25 21:11  
  
Chloride 100 mmol/L () 25 21:11  
  
Carbon Dioxide 34.5 mmol/L (21.0-31.0) H 25 21:11  
  
Anion Gap 9.5 mEq/L (6.0-15.0) 25 21:11  
  
BUN 14 mg/dL (7-25) 25 21:11  
  
Creatinine 0.69 mg/dL (0.60-1.20) 25 21:11  
  
Est GFR (CKD-EPI) > 60.0 mL/Min 25 21:11  
  
Glucose 202 mg/dL () H 25 21:11  
  
Calcium 9.4 mg/dL (8.6-10.3) 25 21:11  
  
Total Bilirubin 0.4 mg/dl (0.3-1.0) 25 21:11  
  
AST 13 U/L (13-39) 25 21:11  
  
ALT 16 U/L (7-52) 25 21:11  
  
Alkaline Phosphatase 84 U/L () 25 21:11  
  
Total Creatine Kinase 42 U/L () 25 21:11  
  
Troponin I High Sens 3.3 pg/mL (0.0-15.0) 25 21:11  
  
B-Natriuretic Peptide 41.0 pg/mL (5-100) 25 21:11  
  
Total Protein 6.6 gm/dL (6.4-8.9) 25 21:11  
  
Albumin 3.7 gm/dL (3.5-5.7) 25 21:11  
  
Globulin 2.9 gm/dL 25 21:11  
  
Albumin/Globulin Ratio 1.3 25 21:11  
  
Urine Color Colorless (Yellow) 25 23:55  
  
Urine Appearance Clear (Clear) 25 23:55  
  
Urine pH 5.5 (5.0-9.0) 25 23:55  
  
Ur Specific Gravity 1.009 (1.001-1.030) 25 23:55  
  
Urine Protein Negative mg/dL (Negative) 25 23:55  
  
Urine Glucose (UA) Normal mg/dL (Normal) 25 23:55  
  
Urine Ketones Negative (Negative) 25 23:55  
  
Urine Occult Blood Trace (Negative) H 25 23:55  
  
Urine Nitrite Negative (Negative) 25 23:55  
  
Urine Bilirubin Negative (Negative) 25 23:55  
  
Urine Urobilinogen Normal mg/dL (Normal) 25 23:55  
  
Ur Leukocyte Esterase Negative (Negative) 25 23:55  
  
Urine RBC 1-2 /HPF (0-4) 25 23:55  
  
Urine WBC 1-2 /HPF (0-4) 25 23:55  
  
Ur Squamous Epith Cells 1-2 /HPF (0-2) 25 23:55  
  
Urine Bacteria 2+ /HPF (None Seen) H 25 23:55  
  
Hyaline Casts None /LPF (0-8) 25 23:55  
  
  
  
  
Assessment & Plan  
Assessment/Plan  
(1) Acute and chronic respiratory failure with hypoxia:  
(2) COPD exacerbation:  
(3) COVID-19 virus infection:  
(4) Diabetes mellitus:  
  
Plan  
Acute on chronic respiratory failure with hypoxia?likely due to COVID-19  
Acute exacerbation of COPD  
Covid 19- symptoms began Monday, positive test Tuesday-started paxlovid-she 
reports 1   
day left on Paxlovid pack  
? Continue oxygen as needed, patient wears 4 L nasal cannula at home  
? Keep SpO2 greater than 90%  
? DuoNebs 4 times daily, albuterol as needed  
? Mucinex twice daily, Robitussin, tessalon as needed  
? Methylprednisolone 40 mg IV every 12  
? Acetaminophen, Toradol as needed  
? Continue inhaler-Breo ellipta  
- Supportive therapy  
  
Tobacco abuse- tobacco cessation, encouraged abstinence  
  
Chronic conditions  
T2DM?fingersticks ACHS, SSI AC, A1c in a.m.  
Anxiety?lorazepam as needed, OARRS report checked, patient states she usually 
only   
goes throughout 30 pills a year  
GERD?famotidine  
  
DVT PPx?SCDs, enoxaparin  
Diet order?1800 ADA  
CODE STATUS?full code  
  
IP vs OBS Justification  
Based on differential dx, clinical care plan, and risk of adverse events, if   
untreated, in my clinical judgement this patient requires an acute care setting 
as:   
INPATIENT because of an expectation of an over 2 midnight stay.  
Estimated length of stay (# of days): 3  
  
  
Documented By: SAHRA Hyde 25 0324  
Signed By: <Electronically signed by SARHA Sorensen>  
25 0347  
<Electronically signed by Charlene Montoya, >  
25 0829  
   
  
[facility]  
Work Phone: [phone] general Narrative - ReportedNorth [facility] 
[employer]  
Other Phone: [phone] general Narrative - Reported*   
  
                                Type            Description     Date  
   
                                Medical History Asthmatic Bronchitis   
   
                                Medical History anxiety           
   
                                Medical History post-traumatic stress disorder   
   
                                Medical History Arthritis         
   
                                Medical History type II diabetes   
   
                                Medical History COPD              
   
                                Medical History C DIFF            
   
                                Medical History Gastroparesis     
   
                                Surgical History C section x 3   ,  
   
                                Surgical History hysterectomy      
   
                                Surgical History cholecystectomy   
   
                                Surgical History D&C               
   
                                Surgical History rotator cuff tear repair - Rt/l  
t Rotator cuff   
   
                                Surgical History knee arthroscopy - X 2   
   
                                Surgical History RT hip surgery/tendon   
   
                                Surgical History LT ankle stablization   
   
                                Surgical History hernia repair     
   
                                Hospitalization History Multiple - Lung   
  
  
[employer]  
Other Phone: [phone] general Narrative - Reported*   
  
                                Type            Description     Date  
   
                                Medical History Asthmatic Bronchitis   
   
                                Medical History anxiety           
   
                                Medical History post-traumatic stress disorder   
   
                                Medical History Arthritis         
   
                                Medical History type II diabetes   
   
                                Medical History COPD - moderate   
   
                                Medical History C DIFF            
   
                                Medical History Gastroparesis     
   
                                Medical History Congestive Heart Failure   
   
                                Medical History diabetes mallitus   
   
                                Surgical History C section x 3   ,  
   
                                Surgical History hysterectomy      
   
                                Surgical History cholecystectomy   
   
                                Surgical History D&C               
   
                                Surgical History rotator cuff tear repair - Rt/l  
t Rotator cuff   
   
                                Surgical History knee arthroscopy - X 2   
   
                                Surgical History RT hip surgery/tendon   
   
                                Surgical History LT ankle stablization   
   
                                Surgical History hernia repair     
   
                                Hospitalization History Multiple - Lung   
   
                                Hospitalization History COVID           ,  
  
  
[employer] [facility] [employer]  
Other Phone: [phone] general Narrative - Reported*   
  
                                Type            Description     Date  
   
                                Medical History Asthmatic Bronchitis   
   
                                Medical History anxiety           
   
                                Medical History post-traumatic stress disorder   
   
                                Medical History Arthritis         
   
                                Medical History type II diabetes   
   
                                Medical History COPD - moderate   
   
                                Medical History C DIFF            
   
                                Medical History Gastroparesis     
   
                                Medical History Congestive Heart Failure   
   
                                Medical History diabetes mallitus   
   
                                Medical History Esophageal reflux   
   
                                Medical History Covid 2022 and 2023   
   
                                Surgical History C section x 3   , 89, 93,  
   
                                Surgical History hysterectomy      
   
                                Surgical History cholecystectomy   
   
                                Surgical History D&C               
   
                                Surgical History rotator cuff tear repair - Rt/l  
t Rotator cuff   
   
                                Surgical History knee arthroscopy - X 2 1983, 84  
   
                                Surgical History RT hip surgery/tendon   
   
                                Surgical History LT ankle stablization   
   
                                Surgical History hernia repair     
   
                                Hospitalization History Multiple - Lung   
   
                                Hospitalization History COVID           ,  
   
                                Hospitalization History Covid [facility]      2023  
  
  
[employer]  
Other Phone: [phone] Discharge instructions  
Additional Instructions  
Continue the current medications that you are prescribed for COVID  
May use the DuoNeb aerosol treatment every 6 hours as needed for wheezing cough 
shortness of breath  
Make sure you using your oxygen at home when you are short of breath and limit 
excessive activity in  
the house while you are short of breath  
Follow-up with your family doctor or pulmonologist  
Return to the ER for worsening shortness of breath low oxygen even on your home 
oxygen chest pain  
vomiting or any other concernsT[facility]  
Work Phone: [phone] Discharge instructions  
Additional Instructions  
You have tested positive for Covid-19, please see attached instructions and 
follow up with the  
Health Department for further instructions.  
  
Continue home oxygen.[facility]  
Work Phone: [phone] Discharge instructions  
Additional Instructions  
Continue to check your glucose levels before meals and at bedtime. Keep a record
of these and take  
it with you to your follow-up appointment.  
  
Continue oxygen as per chronic orders.[facility]  
Work Phone: [phone] note  
  
No data available for this section  
  
[facility]  
  
  
  
                                        Author              Jordon Vásquez  
[facility]  
   
                                        Note Date/Time      2025 3:  
05pm  
   
                                                    [facility]  
ENTER  
[address] [place] [address]  
  
Hospitalist Progress Note  
Signed  
  
Patient: Bev Gaming MR#: [id number]  
[id number]  
: 1965 Acct:[id number]  
  
Age/Sex: 59 / F Adm Date:   
5  
Loc:  Room: 5C9579-7 Type: ADM IN  
Attending Dr: Jordon Vásquez MD  
  
Copies to: ~  
  
  
Date of Service:  
2025  
  
  
Subjective  
Subjective  
Narrative:  
Patient states he uses 4 L by nasal cannula at home. She continues to be on 4L 
but   
mentions he is very short of breath especially on exertion. Patient was lying in
the   
bed. Denies any other complaints.  
  
Exam  
Physical Exam  
Vital Signs:  
  
  
  
  
Temp Pulse Resp BP Pulse Ox O2 Del Method O2 Flow Rate  
  
98.3 F 93 20 156/70 H 92 L Nasal Cannula 4  
  
25 12:00 25 12:53 25 12:53 25 12:00 25 12:00 
25   
12:00 25 12:00  
  
  
  
Narrative:  
General: Awake alert, no acute distress  
HEENT: head atraumatic, normocephalic, moist mucous membranes  
Neck: supple no masses, no lymphadenopathy  
CVS: regular rate and rhythm, no murmurs or gallops  
Respiratory: Diminished breath sound bilaterally with polyphonic wheezing, 
symmetric   
expansion  
GI: soft, nondistended, nontender, positive bowel sounds with no organomegaly  
Extremity: moves all extremities, no restrictions of movements, no calf 
tenderness  
Neuro: AOx3, CN II-VII intact. Moves all extremities in all planes of motion.  
Skin: intact no rashes or lesions  
  
Objective  
Lab Results  
  
25 05:02  
  
25 05:02  
  
  
Meds  
Allergies and Active Meds  
Allergies  
  
cephalexin (Keflex) Allergy (Unknown, Verified 10/21/24 14:03)  
Unknown Reaction  
metoclopramide (From Reglan) Allergy (Unknown, Verified 10/21/24 14:03)  
Agitated  
quetiapine (Seroquel) Allergy (Unknown, Verified 10/21/24 14:03)  
Unknown Reaction  
risperidone (From Risperdal) Allergy (Unknown, Verified 10/21/24 14:03)  
Confusion  
sulfacetamide (Sulfacet-R) Allergy (Unknown, Verified 10/21/24 14:03)  
Hives  
venlafaxine (Effexor) Allergy (Unknown, Verified 10/21/24 14:03)  
Unknown Reaction  
Penicillins Adverse Reaction (Verified 10/21/24 14:03)  
Hives  
topiramate (From Topamax) Adverse Reaction (Verified 10/21/24 14:03)  
Hallucinating  
  
  
Active Meds:  
Active Medications  
  
  
  
Generic Name Dose Route Start Last Admin  
  
Trade Name Freq PRN Reason Stop Dose Admin  
  
Acetaminophen 1,000 mg 25 03:16  
  
Acetaminophen 500 Mg Tablet PO 26 03:15  
  
Q6HR PRN  
  
Pain Scale 1 - 3 or fever  
  
Albuterol 2.5 mg 25 03:16  
  
Albuterol Neb 2.5 Mg/3 Ml Vial.Neb INHALATION 26 03:15  
  
Q3H PRN  
  
Cough/Wheeze  
  
Albuterol/Ipratropium 3 ml 25 08:00 25 12:53  
  
Ipratropium/Albuterol 0.5-3 Mg 3 Ml Ampul.Neb INHALATION 26 07:59 3 ml  
  
QID.RESP AYDEE Administration  
  
Azithromycin 500 mg 25 12:00 25 12:31  
  
Azithromycin 250 Mg Tablet  mg  
  
DAILY AYDEE Administration  
  
Benzonatate 200 mg 25 03:45  
  
Benzonatate 100 Mg Capsule PO 26 03:44  
  
TID PRN  
  
Cough  
  
Budesonide/Formoterol Fumarate 2 puff 25 09:00 25 09:07  
  
Budesonide/Formoterol 160-4.5 Mcg 60 Puff/6 Gm Hfa.Aer.Ad INHALATION 26 
08:59 2   
puff  
  
BID AYDEE Administration  
  
Dextrose 0 gm 25 03:16  
  
Dextrose 50% In Water 25 Gm/50 Ml Syringe IV-PUSH 26 03:15  
  
PRN PRN  
  
Hypoglycemia  
  
Enoxaparin Sodium 40 mg 25 10:00 25 10:12  
  
Enoxaparin 40 Mg/0.4 Ml Syringe SUBCUT 26 09:59 40 mg  
  
DAILY@10 AYDEE Administration  
  
Famotidine 40 mg 25 22:00  
  
Famotidine 20 Mg Tablet PO 26 21:59  
  
QHS AYDEE  
  
Glucose 0 gm 25 03:16  
  
Dextrose 40% Gel 15 Gm Tube PO 26 03:15  
  
PRN PRN  
  
Hypoglycemia  
  
Guaifenesin 20 ml 25 03:16  
  
Guaifenesin Syrup 10 Ml Udc PO 26 03:15  
  
Q6H PRN  
  
Cough  
  
Guaifenesin 1,200 mg 25 09:00 25 08:54  
  
Guaifenesin 600 Mg Tab.Er.12h PO 26 08:59 1,200 mg  
  
BID AYDEE Administration  
  
Insulin Aspart 0 units 25 08:00 25 12:31  
  
Insulin Aspart 300 Units/3 Ml SUBCUT 26 07:59 3 units  
  
TID.WM.HS AYDEE Administration  
  
Protocol  
  
Ketorolac Tromethamine 15 mg 25 03:16 25 12:00  
  
Ketorolac Tromethamine 30 Mg/Ml Vial IV-PUSH 25 03:15 15 mg  
  
Q6H PRN Administration  
  
Pain Scale 4 - 7  
  
Lorazepam 0.5 mg 25 03:21  
  
Lorazepam 0.5 Mg Tablet PO 25 03:20  
  
TID PRN  
  
Anxiety  
  
Melatonin 5 mg 25 03:16  
  
Melatonin 5 Mg Tablet PO 26 03:15  
  
QHS PRN  
  
Insomnia  
  
Methylprednisolone Sodium Succinate 40 mg 25 09:00 25 10:08  
  
Methylprednisolone Sod Succ/Pf 40 Mg/Ml (1ml) Vial IV-PUSH 26 08:59 40 mg  
  
Q12HR AYDEE Administration  
  
Ondansetron HCl 4 mg 25 03:16  
  
Ondansetron 4 Mg/2 Ml Vial IV-PUSH 26 03:15  
  
Q6H PRN  
  
Nausea And Vomiting  
  
Sodium Chloride 0 ml 25 17:55 25 12:01  
  
Sodium Chloride 0.9 % 10 Ml Syringe IV-PUSH 26 17:54 10 ml  
  
PRN PRN Administration  
  
Flush  
  
Sodium Chloride 0 ml 25 06:00 25 05:03  
  
Sodium Chloride 0.9 % 10 Ml Syringe IV-PUSH 26 05:59 10 ml  
  
QSHIFT AYDEE Administration  
  
  
  
  
A&P - Hospitalist  
Assessment/Plan  
(1) Acute and chronic respiratory failure with hypoxia:  
(2) COPD exacerbation:  
(3) COVID-19 virus infection:  
(4) Diabetes mellitus:  
  
Plan  
Ms. Gaming a 59-year-old female with a PMH of COPD, alpha-1 antitrypsin 
deficiency,   
chronic hypoxic respiratory failure on 4 L nasal cannula at home, tobacco 
dependence,   
GERD, anxiety the presented to the emergency room for complaints of shortness of
  
breath. Patient reports she was diagnosed with COVIDon Tuesday, started feeling   
symptoms on Monday, with a dry barky cough and feeling generally crappy. Reports
a   
productive cough, thick sputum, sometimes at screen symptoms is white. She also   
complains of a headache, chest tightness. She denies fever, chills, nausea or   
vomiting, diarrhea. States that when she gets up and walks around her pulse ox 
drops   
into the 70s and everything gets tingly and she feels like she will pass out. 
She   
reports starting Paxlovid on Tuesday when she was diagnosed with COVID, has 1 
day   
left. She states that she is a pack-a-day smoker, has not smoked since Tuesday.   
Denies alcohol or illicitdrug use. She does report wearing 4 L nasal cannula at 
home,   
reports it is ordered as at all times but on good days she can walk around the 
house   
without it. Chest x-ray shows no acute cardiopulmonary pathology. EKG is normal 
sinus   
rhythm. CBC is unremarkable. CMP with a serum bicarb of 34.5, glucose 202, 
otherwise   
unremarkable. UA is negative for infection. Patient was medicated with DuoNeb's,
  
oxycodone, Zofran and methylprednisolone. She will be admitted as inpatient to 
the   
[facility] telemetry floor. Patient is already out of window for remdesivir. 
Patient   
currently on bronchodilator, steroid. Azithromycin added.  
  
Acute on chronic respiratory failure with hypoxia?likely due to COVID-19  
Acute exacerbation of COPD  
Covid 19- symptoms began Monday, positive test Tuesday-started paxlovid-she 
reports 1   
day left on Paxlovid pack  
? Continue oxygen as needed, patient wears 4 L nasal cannula at home  
? Keep SpO2 greater than 90%  
? DuoNebs 4 times daily, albuterol as needed  
? Mucinex twice daily, Robitussin, tessalon as needed  
? Methylprednisolone 40 mg IV every 12  
? Acetaminophen, Toradol as needed  
? Continue inhaler-Breo ellipta  
- Supportive therapy  
  
Tobacco abuse- tobacco cessation, encouraged abstinence  
  
Chronic conditions  
T2DM?fingersticks ACHS, SSI AC, A1c in a.m.  
Anxiety?lorazepam as needed  
GERD?famotidine  
  
DVT PPx?SCDs, enoxaparin  
CODE STATUS?full code  
  
  
  
Documented By: Jordon Vásquez MD 25 1500  
Signed By: <Electronically signed by Jordon Vásquez MD>  
25 1505  
   
  
[facility]  
Work Phone: [phone] note  
  
  
  
                                        Author              Jordon Vásquez  
[facility]  
   
                                        Note Date/Time      2025 5:  
05pm  
   
                                                    [facility]  
ENTER  
[address] [place], [place] [id number]  
  
Hospitalist Progress Note  
Signed  
  
Patient: Bev Gaming MR#: [id number]  
[id number]  
: 1965 Acct:[id number]  
  
Age/Sex: 59 / F Adm Date:   
5  
Loc:  Room: [address] [place] [address] Type: ADM IN  
Attending Dr: Jordon Vásquez MD  
  
Copies to: ~  
  
  
Date of Service:  
2025  
  
  
Subjective  
Subjective  
Narrative:  
Patient states he uses 4 L by nasal cannula at home. She continues to be on 4 
Lbut   
mentions she is very short of breath especially on exertion. She also mentioned   
having intractable headache not relieved with Tylenol/Toradol and Percocet. 
Patient   
was lying in the bed. Denies any other complaints.  
  
Exam  
Physical Exam  
Vital Signs:  
  
  
  
  
Temp Pulse Resp BP Pulse Ox O2 Del Method O2 Flow Rate  
  
97.7 F 109 H 22 162/63 H 91 L Nasal Cannula 4  
  
25 15:53 25 15:53 25 15:53 25 15:53 25 15:53 
25   
16:00 25 16:00  
  
  
  
Narrative:  
General: Awake alert, no acute distress  
HEENT: head atraumatic, normocephalic, moist mucous membranes  
Neck: supple no masses, no lymphadenopathy  
CVS: regular rate and rhythm, no murmurs or gallops  
Respiratory: Diminished breath sound bilaterally with polyphonic wheezing, 
symmetric   
expansion  
GI: soft, nondistended, nontender, positive bowel sounds with no organomegaly  
Extremity: moves all extremities, no restrictions of movements, no calf 
tenderness  
Neuro: AOx3, CN II-VII intact. Moves all extremities in all planes of motion.  
Skin: intact no rashes or lesions  
  
Objective  
Lab Results  
  
25 05:02  
  
25 13:08  
  
  
Meds  
Allergies and Active Meds  
Allergies  
  
cephalexin (Keflex) Allergy (Unknown, Verified 10/21/24 14:03)  
Unknown Reaction  
metoclopramide (From Reglan) Allergy (Unknown, Verified 10/21/24 14:03)  
Agitated  
quetiapine (Seroquel) Allergy (Unknown, Verified 10/21/24 14:03)  
Unknown Reaction  
risperidone (From Risperdal) Allergy (Unknown, Verified 10/21/24 14:03)  
Confusion  
sulfacetamide (Sulfacet-R) Allergy (Unknown, Verified 10/21/24 14:03)  
Hives  
venlafaxine (Effexor) Allergy (Unknown, Verified 10/21/24 14:03)  
Unknown Reaction  
Penicillins Adverse Reaction (Verified 10/21/24 14:03)  
Hives  
topiramate (From Topamax) Adverse Reaction (Verified 10/21/24 14:03)  
Hallucinating  
  
  
Active Meds:  
Active Medications  
  
  
  
Generic Name Dose Route Start Last Admin  
  
Trade Name Freq PRN Reason Stop Dose Admin  
  
Acetaminophen 1,000 mg 25 03:16 25 15:48  
  
Acetaminophen 500 Mg Tablet PO 26 03:15 1,000 mg  
  
Q6HR PRN Administration  
  
Pain Scale 1 - 3 or fever  
  
Albuterol 2.5 mg 25 03:16  
  
Albuterol Neb 2.5 Mg/3 Ml Vial.Neb INHALATION 26 03:15  
  
Q3H PRN  
  
Cough/Wheeze  
  
Albuterol/Ipratropium 3 ml 25 08:00 25 13:51  
  
Ipratropium/Albuterol 0.5-3 Mg 3 Ml Ampul.Neb INHALATION 26 07:59 3 ml  
  
QID.RESP AYDEE Administration  
  
Amlodipine Besylate 5 mg 25 12:00 25 12:55  
  
Amlodipine 5 Mg Tablet PO 26 11:59 5 mg  
  
DAILY AYDEE Administration  
  
Azithromycin 500 mg 25 12:00 25 09:10  
  
Azithromycin 250 Mg Tablet  mg  
  
DAILY AYDEE Administration  
  
Benzonatate 200 mg 25 03:45 25 15:48  
  
Benzonatate 100 Mg Capsule PO 26 03:44 200 mg  
  
TID PRN Administration  
  
Cough  
  
Budesonide/Formoterol Fumarate 2 puff 25 09:00 25 06:15  
  
Budesonide/Formoterol 160-4.5 Mcg 60 Puff/6 Gm Hfa.Aer.Ad INHALATION 26 
08:59 2   
puff  
  
BID AYDEE Administration  
  
Dexamethasone 2 mg/ 6 mg 25 16:45  
  
Dexamethasone 4 mg PO 01/15/25 16:44  
  
DAILY AYDEE  
  
Dextrose 0 gm 25 03:16  
  
Dextrose 50% In Water 25 Gm/50 Ml Syringe IV-PUSH 26 03:15  
  
PRN PRN  
  
Hypoglycemia  
  
Enoxaparin Sodium 40 mg 25 10:00 25 10:29  
  
Enoxaparin 40 Mg/0.4 Ml Syringe SUBCUT 26 09:59 40 mg  
  
DAILY@10 AYDEE Administration  
  
Famotidine 40 mg 25 22:00 25 21:18  
  
Famotidine 20 Mg Tablet PO 26 21:59 40 mg  
  
QHS AYDEE Administration  
  
Glucose 0 gm 25 03:16  
  
Dextrose 40% Gel 15 Gm Tube PO 26 03:15  
  
PRN PRN  
  
Hypoglycemia  
  
Guaifenesin 20 ml 25 03:16 25 15:48  
  
Guaifenesin Syrup 10 Ml Udc PO 26 03:15 20 ml  
  
Q6H PRN Administration  
  
Cough  
  
Guaifenesin 1,200 mg 25 09:00 25 09:10  
  
Guaifenesin 600 Mg Tab.Er.12h PO 26 08:59 1,200 mg  
  
BID AYDEE Administration  
  
Insulin Aspart 0 units 25 08:00 25 12:56  
  
Insulin Aspart 300 Units/3 Ml SUBCUT 26 07:59 4 units  
  
TID.WM.HS AYDEE Administration  
  
Protocol  
  
Ketorolac Tromethamine 15 mg 25 05:18 25 12:56  
  
Ketorolac Tromethamine 15 Mg/Ml Vial IV-PUSH 01/10/25 05:17 15 mg  
  
Q6H PRN Administration  
  
Pain Scale 4 - 7  
  
Lorazepam 0.5 mg 25 03:21  
  
Lorazepam 0.5 Mg Tablet PO 25 03:20  
  
TID PRN  
  
Anxiety  
  
Melatonin 5 mg 25 03:16 25 21:18  
  
Melatonin 5 Mg Tablet PO 26 03:15 5 mg  
  
QHS PRN Administration  
  
Insomnia  
  
Ondansetron HCl 4 mg 25 03:16 25 06:56  
  
Ondansetron 4 Mg/2 Ml Vial IV-PUSH 26 03:15 4 mg  
  
Q6H PRN Administration  
  
Nausea And Vomiting  
  
Prochlorperazine Edisylate 5 mg 25 16:38  
  
Prochlorperazine Edisylate 10 Mg/2 Ml Vial IV-PUSH 25 16:39  
  
ONCE ONE  
  
Sodium Chloride 0 ml 25 17:55 25 23:10  
  
Sodium Chloride 0.9 % 10 Ml Syringe IV-PUSH 26 17:54 10 ml  
  
PRN PRN Administration  
  
Flush  
  
Sodium Chloride 0 ml 25 06:00 25 15:51  
  
Sodium Chloride 0.9 % 10 Ml Syringe IV-PUSH 26 05:59 10 ml  
  
QSHIFT AYDEE Administration  
  
  
  
  
A&P - Hospitalist  
Assessment/Plan  
(1) Acute and chronic respiratory failure with hypoxia:  
(2) COPD exacerbation:  
(3) COVID-19 virus infection:  
(4) Diabetes mellitus:  
  
Plan  
Ms. Gaming a 59-year-old female with a PMH of COPD, alpha-1 antitrypsin 
deficiency,   
chronic hypoxic respiratory failure on 4 L nasal cannula at home, tobacco 
dependence,   
GERD, anxiety the presented to the emergency room for complaints of shortness of
  
breath. Patient reports she was diagnosed with COVIDon Tuesday, started feeling   
symptoms on Monday, with a dry barky cough and feeling generally crappy. Reports
a   
productive cough, thick sputum, sometimes at screen symptoms is white. She also   
complains of a headache, chest tightness. She denies fever, chills, nausea or   
vomiting, diarrhea. States that when she gets up and walks around her pulse ox 
drops   
into the 70s and everything gets tingly and she feels like she will pass out. 
She   
reports starting Paxlovid on Tuesday when she was diagnosed with COVID, has 1 
day   
left. She states that she is a pack-a-day smoker, has not smoked since Tuesday.   
Denies alcohol or illicitdrug use. She does report wearing 4 L nasal cannula at 
home,   
reports it is ordered as at all times but on good days she can walk around the 
house   
without it. Chest x-ray shows no acute cardiopulmonary pathology. EKG is normal 
sinus   
rhythm. CBC is unremarkable. CMP with a serum bicarb of 34.5, glucose 202, 
otherwise   
unremarkable. UA is negative for infection. Patient was medicated with DuoNeb's,
  
oxycodone, Zofran and methylprednisolone. She will be admitted as inpatient to 
the   
[facility] telemetry floor. Patient is already out of window for remdesivir. 
Patient   
currently on bronchodilator, steroid. Azithromycin added.  
  
Acute on chronic respiratory failure with hypoxia?likely due to COVID-19  
Acute exacerbation of COPD  
Covid 19- symptoms began Monday, positive test Tuesday-started paxlovid-she 
reports 1   
day left on Paxlovid pack  
? Continue oxygen as needed, patient wears 4 L nasal cannula at home  
? Keep SpO2 greater than 90%  
? DuoNebs 4 times daily, albuterol as needed  
? Mucinex twice daily, Robitussin, tessalon as needed  
? Steroid switched to dexamethasone which will help with her COVID infection as 
well   
as her headache with Compazine.  
? Acetaminophen, Toradol as needed  
? Continue inhaler-Breo ellipta  
- Supportive therapy  
  
Tobacco abuse- tobacco cessation, encouraged abstinence  
  
Chronic conditions  
T2DM?fingersticks ACHS, SSI AC, A1c in a.m.  
Anxiety?lorazepam as needed  
GERD?famotidine  
  
DVT PPx?SCDs, enoxaparin  
CODE STATUS?full code  
  
  
  
Documented By: Jordon Vásquez MD 25  
Signed By: <Electronically signed by Jordon Vásquez MD>  
25  
   
  
[facility]  
Work Phone: [phone] note  
  
  
  
                                        Author              Jordon Vásquez  
[facility]  
   
                                        Note Date/Time      2025 5:  
04pm  
   
                                                    [facility]  
ENTER  
[address] [place] [address]  
  
Hospitalist Progress Note  
Signed  
  
Patient: Bev Gaming MR#: [id number]  
[id number]  
: 1965 Acct:[id number]  
  
Age/Sex: 59 / F Adm Date:   
5  
Loc: 4P Room: [address] [place] [address] Type: ADM IN  
Attending Dr: Jordon Vásquez MD  
  
Copies to: ~  
  
  
Date of Service:  
2025  
  
  
Subjective  
Subjective  
Narrative:  
Patient states he uses 4 L by nasal cannula at home. She was on 6 L of nasal 
cannula   
which was weaned down to 4 L. Mentioned that her headache has improved. Patient 
was   
lying in the bed. Denies any other complaints.  
  
Exam  
Physical Exam  
Vital Signs:  
  
  
  
  
Temp Pulse Resp BP Pulse Ox O2 Del Method O2 Flow Rate  
  
98 F 93 20 181/89 H 94 L Nasal Cannula 5  
  
25 15:03 25 16:38 25 16:38 25 15:03 25 15:03 
25   
16:00 25 16:00  
  
  
  
Narrative:  
General: Awake alert, no acute distress  
HEENT: head atraumatic, normocephalic, moist mucous membranes  
Neck: supple no masses, no lymphadenopathy  
CVS: regular rate and rhythm, no murmurs or gallops  
Respiratory: Diminished breath sound bilaterally with polyphonic wheezing, 
symmetric   
expansion  
GI: soft, nondistended, nontender, positive bowel sounds with no organomegaly  
Extremity: moves all extremities, no restrictions of movements, no calf 
tenderness  
Neuro: AOx3, CN II-VII intact. Moves all extremities in all planes of motion.  
Skin: intact no rashes or lesions  
  
Objective  
Lab Results  
  
25 04:41  
  
25 04:41  
  
  
Meds  
Allergies and Active Meds  
Allergies  
  
cephalexin (Keflex) Allergy (Unknown, Verified 10/21/24 14:03)  
Unknown Reaction  
metoclopramide (From Reglan) Allergy (Unknown, Verified 10/21/24 14:03)  
Agitated  
quetiapine (Seroquel) Allergy (Unknown, Verified 10/21/24 14:03)  
Unknown Reaction  
risperidone (From Risperdal) Allergy (Unknown, Verified 10/21/24 14:03)  
Confusion  
sulfacetamide (Sulfacet-R) Allergy (Unknown, Verified 10/21/24 14:03)  
Hives  
venlafaxine (Effexor) Allergy (Unknown, Verified 10/21/24 14:03)  
Unknown Reaction  
Penicillins Adverse Reaction (Verified 10/21/24 14:03)  
Hives  
topiramate (From Topamax) Adverse Reaction (Verified 10/21/24 14:03)  
Hallucinating  
  
  
Active Meds:  
Active Medications  
  
  
  
Generic Name Dose Route Start Last Admin  
  
Trade Name Freq PRN Reason Stop Dose Admin  
  
Acetaminophen 1,000 mg 25 03:16 25 14:02  
  
Acetaminophen 500 Mg Tablet PO 26 03:15 1,000 mg  
  
Q6HR PRN Administration  
  
Pain Scale 1 - 3 or fever  
  
Albuterol 2.5 mg 25 03:16  
  
Albuterol Neb 2.5 Mg/3 Ml Vial.Neb INHALATION 26 03:15  
  
Q3H PRN  
  
Cough/Wheeze  
  
Albuterol/Ipratropium 3 ml 25 08:00 25 16:37  
  
Ipratropium/Albuterol 0.5-3 Mg 3 Ml Ampul.Neb INHALATION 26 07:59 3 ml  
  
QID.RESP AYDEE Administration  
  
Amlodipine Besylate 5 mg 25 12:00 25 08:47  
  
Amlodipine 5 Mg Tablet PO 26 11:59 5 mg  
  
DAILY AYDEE Administration  
  
Azithromycin 500 mg 25 12:00 25 08:46  
  
Azithromycin 250 Mg Tablet  mg  
  
DAILY AYDEE Administration  
  
Benzonatate 200 mg 25 03:45 25 15:48  
  
Benzonatate 100 Mg Capsule PO 26 03:44 200 mg  
  
TID PRN Administration  
  
Cough  
  
Budesonide/Formoterol Fumarate 2 puff 25 09:00 25 08:53  
  
Budesonide/Formoterol 160-4.5 Mcg 60 Puff/6 Gm Hfa.Aer.Ad INHALATION 26 
08:59 2   
puff  
  
BID AYDEE Administration  
  
Dexamethasone 2 mg/ 6 mg 25 18:00 25 08:46  
  
Dexamethasone 4 mg PO 26 17:59 6 mg  
  
DAILY AYDEE Administration  
  
Dextrose 0 gm 25 03:16  
  
Dextrose 50% In Water 25 Gm/50 Ml Syringe IV-PUSH 26 03:15  
  
PRN PRN  
  
Hypoglycemia  
  
Enoxaparin Sodium 40 mg 25 10:00 25 10:51  
  
Enoxaparin 40 Mg/0.4 Ml Syringe SUBCUT 26 09:59 40 mg  
  
DAILY@10 AYDEE Administration  
  
Famotidine 40 mg 25 22:00 25 21:47  
  
Famotidine 20 Mg Tablet PO 26 21:59 40 mg  
  
QHS AYDEE Administration  
  
Glucose 0 gm 25 03:16  
  
Dextrose 40% Gel 15 Gm Tube PO 26 03:15  
  
PRN PRN  
  
Hypoglycemia  
  
Guaifenesin 20 ml 25 03:16 25 14:57  
  
Guaifenesin Syrup 10 Ml Udc PO 26 03:15 20 ml  
  
Q6H PRN Administration  
  
Cough  
  
Guaifenesin 1,200 mg 25 09:00 25 08:47  
  
Guaifenesin 600 Mg Tab.Er.12h PO 26 08:59 1,200 mg  
  
BID AYDEE Administration  
  
Insulin Aspart 0 units 25 08:00 25 12:21  
  
Insulin Aspart 300 Units/3 Ml SUBCUT 26 07:59 4 units  
  
TID.WM. AYDEE Administration  
  
Protocol  
  
Ketorolac Tromethamine 15 mg 25 05:18 25 14:57  
  
Ketorolac Tromethamine 15 Mg/Ml Vial IV-PUSH 01/10/25 05:17 15 mg  
  
Q6H PRN Administration  
  
Pain Scale 4 - 7  
  
Lorazepam 0.5 mg 25 03:21 25 14:02  
  
Lorazepam 0.5 Mg Tablet PO 25 03:20 0.5 mg  
  
TID PRN Administration  
  
Anxiety  
  
Melatonin 5 mg 25 03:16 25 21:47  
  
Melatonin 5 Mg Tablet PO 26 03:15 5 mg  
  
QHS PRN Administration  
  
Insomnia  
  
Ondansetron HCl 4 mg 25 03:16 25 06:56  
  
Ondansetron 4 Mg/2 Ml Vial IV-PUSH 26 03:15 4 mg  
  
Q6H PRN Administration  
  
Nausea And Vomiting  
  
Sodium Chloride 0 ml 25 17:55 25 23:10  
  
Sodium Chloride 0.9 % 10 Ml Syringe IV-PUSH 26 17:54 10 ml  
  
PRN PRN Administration  
  
Flush  
  
Sodium Chloride 0 ml 25 06:00 25 14:07  
  
Sodium Chloride 0.9 % 10 Ml Syringe IV-PUSH 26 05:59 Not Given  
  
QSHIFT [facility]  
  
  
  
  
A&P - Hospitalist  
Assessment/Plan  
(1) Acute and chronic respiratory failure with hypoxia:  
(2) COPD exacerbation:  
(3) COVID-19 virus infection:  
(4) Diabetes mellitus:  
  
Plan  
Ms. Gaming a 59-year-old female with a PMH of COPD, alpha-1 antitrypsin 
deficiency,   
chronic hypoxic respiratory failure on 4 L nasal cannula at home, tobacco 
dependence,   
GERD, anxiety the presented to the emergency room for complaints of shortness of
  
breath. Patient reports she was diagnosed with COVIDon Tuesday, started feeling   
symptoms on Monday, with a dry barky cough and feeling generally crappy. Reports
a   
productive cough, thick sputum, sometimes at screen symptoms is white. She also   
complains of a headache, chest tightness. She denies fever, chills, nausea or   
vomiting, diarrhea. States that when she gets up and walks around her pulse ox 
drops   
into the 70s and everything gets tingly and she feels like she will pass out. 
She   
reports starting Paxlovid on Tuesday when she was diagnosed with COVID, has 1 
day   
left. She states that she is a pack-a-day smoker, has not smoked since Tuesday.   
Denies alcohol or illicitdrug use. She does report wearing 4 L nasal cannula at 
home,   
reports it is ordered as at all times but on good days she can walk around the 
house   
without it. Chest x-ray shows no acute cardiopulmonary pathology. EKG is normal 
sinus   
rhythm. CBC is unremarkable. CMP with a serum bicarb of 34.5, glucose 202, 
otherwise   
unremarkable. UA is negative for infection. Patient was medicated with DuoNeb's,
  
oxycodone, Zofran and methylprednisolone. She will be admitted as inpatient to 
the   
[facility] telemetry floor. Patient is already out of window for remdesivir. 
Patient   
currently on bronchodilator, steroid. Azithromycin added.  
  
Acute on chronic respiratory failure with hypoxia?likely due to COVID-19  
Acute exacerbation of COPD  
Covid 19- symptoms began Monday, positive test Tuesday-started paxlovid-she 
reports 1   
day left on Paxlovid pack  
? Continue oxygen as needed, patient wears 4 L nasal cannula at home  
? Keep SpO2 greater than 90%  
? DuoNebs 4 times daily, albuterol as needed  
? Mucinex twice daily, Robitussin, tessalon as needed  
? Steroid switched to dexamethasone which will help with her COVID infection as 
well   
as her headache with Compazine.  
? Acetaminophen, Toradol as needed  
? Continue inhaler-Breo ellipta  
- Supportive therapy  
  
Tobacco abuse- tobacco cessation, encouraged abstinence  
  
Chronic conditions  
T2DM?fingersticks ACHS, SSI AC, A1c in a.m.  
Anxiety?lorazepam as needed  
GERD?famotidine  
  
DVT PPx?SCDs, enoxaparin  
CODE STATUS?full code  
  
  
  
Documented By: Jordon Vásquez MD 25  
Signed By: <Electronically signed by Jordon Vásquez MD>  
25 1704  
   
  
[facility]  
Work Phone: [phone] for visit Narrative* Diagnostic Procedure Only 
  (Routine) - Closed  
  
                          Specialty    Diagnoses / Procedures Referred By Contac  
t Referred To Contact  
   
                                                    Radiology / RADIO CT   
SCAN [facility]                             
  
  
Diagnoses  
  
  
Chronic pansinusitis  
  
  
CT SINUS STEREO WO MONTEZ  
  
  
  
Procedures  
  
  
CT MAXLFCL AREA C-MATRL  
  
  
CT WO SINUS STEREO 400   
no precert required   
[id number]                                
  
  
Bernardino Manley,   
APRN.[facility]  
  
  
[address]   
[place] [address]  
  
  
Phone: [phone]  
  
  
Fax: [phone]                         
  
  
Radio Ct Scan [facility]   
[place]  
  
  
[address] [place] [address]  
  
  
Phone: [phone]  
  
  
  
                Referral ID Status  Reason  Start Date Expiration Date Visits Re  
quested Visits   
Authorized  
   
                [id number] Closed          2020 1       1  
  
  
  
[facility] for visit Narrative* Diagnostic Procedure Only (Routine) 
  - Closed  
  
                          Specialty    Diagnoses / Procedures Referred By Contac  
 Referred To Contact  
   
                                                    Radiology / RADIO CT   
[facility]                             
  
  
Diagnoses  
  
  
Other peripheral vertigo,   
bilateral  
  
  
Vertigo, peripheral,   
bilateral [H81.393]  
  
  
  
Procedures  
  
  
CT MAXILLOFACIAL W/O   
CONTRAST MATERIAL  
  
  
CT WO SINUS STEREO 400                    
  
  
Diana Gandara MD  
  
  
[address]  
  
  
[place] [address]  
  
  
Phone: [phone]  
  
  
Fax: [phone]                         
  
  
Radio Ct Scan [facility]  
  
  
[address] [place] [address]  
  
  
Phone: [phone]  
  
  
  
                Referral ID Status  Reason  Start Date Expiration Date Visits Re  
quested Visits   
Authorized  
   
                [id number] Closed          2024 1       1  
  
  
  
[facility]  
  
Summary Purpose  
  
  
                                                      
  
  
  
Family History  
  
  
                          Relationship Condition    Age at Onset Recorded Date/T  
malorie  
   
                          Not Specified Hypertension Unknown        
   
                                       Asthma       Unknown        
   
                          brother      Hypertension Unknown        
   
                          father       Malignant neoplasm of lung Unknown        
  
  
  
                          Relationship Condition    Age at Onset Recorded Date/T  
malorie  
   
                          Not Specified Hypertension Unknown        
   
                                       Asthma       Unknown        
   
                                       Migraine headache Unknown        
   
                          brother      Hypertension Unknown        
   
                          father       Malignant neoplasm Unknown        
   
                                            Unknown        
   
                                       Family history of other condition Unknown  
        
   
                          natural son  Asthma       Unknown        
  
  
  
                          Relationship Condition    Age at Onset Recorded Date/T  
malorie  
   
                          mother       Hypertension Unknown        
   
                                       Asthma       Unknown        
   
                                       Migraine headache Unknown        
   
                          brother      Hypertension Unknown        
   
                          father       Malignant neoplasm Unknown        
   
                                            Unknown        
   
                                       Family history of other condition Unknown  
        
   
                          son          Asthma       Unknown        
  
  
  
Advance Directives  
  
  
                                Advance Directive Response        Recorded Date/  
Time  
   
                                Advance Directives No              2018 4:07pm  
  
  
  
                                Date Activated  Date Inactivated Comments  
   
                                2024 11:45 AM                   
  
  
  
                                Advance Directive Response        Recorded Date/  
Time  
   
                                Advance Directives No              2018 3:07pm  
  
  
  
Procedure Findings  
  
  
                                                    Note  
   
                                                    HNO ID: [id number] Author: MARIANA Willis) Natasha Service: Anesthesiology Author   
Type:   
Nurse Anesthetist Type: Anesthesia Procedure Notes Filed: 2/15/2021 2:44 PM Note
   
Text: ANESTHESIOLOGY PROCEDURE NOTE Airway General Information Procedure Start   
Time/Medication Administration: 2/15/2021 2:10 PM Patient location during 
procedure:   
OR Timeout Performed Pre-procedure: timeout performed Consent Obtained: Yes 
Patient   
identity confirmed: arm band, care team member and patient Staffing 
Anesthesiologist:   
Irene Arana CRNA: Kendall Kaur (Crna) Performed by: CRNA Indications and 
Patient   
Condition Preoxygenated: yes Patient position: sniffing Manual In-Line 
Stabilization:   
No Difficult Mask: No Indications for airway management: anesthesia anesthesia   
circuit Method: asleep Cricoid Pressure: No Final Airway Details Final airway 
type:   
endotracheal airway Final Endotracheal Airway: ETT Cuffed: yes Successful 
intubation   
technique: direct laryngoscopy Endotracheal tube insertion site: oral Blade: Ma 
(more   
content not included)...  
  
  
  
                                                    Note  
   
                                                    HNO ID: [id number] Author: MOISES Diaz MD (Res) Service: Otolaryngology Author   
Type:   
Resident Type: Brief Op Note Filed: 2/15/2021 3:37 PM Note Text: BRIEF OPERATIVE
 /   
PROCEDURE NOTE LOG ID: [id number] SURGERY/PROCEDURE DATE: 2/15/2021 
INCISION/PROCEDURE   
START TIME: 2:42 PM INCISION CLOSE/PROCEDURE END TIME: 3:30 PM   
SURGEON(S)/PROCEDURALIST(S) AND ASSISTANT(S): Surgeon(s) and Role: * Diana Gandara   
- Primary * Lele Diaz MD (Res) - Resident - Assisting No Additional Staff   
SURGERY/PROCEDURE(S): 1) Endoscopic left maxillary antrostomy 2) Endoscopic left
   
anterior ethmoidectomy 3) Endoscopic left eduardo bullosa resection 4) Bilateral   
inferior turbinate reduction 5) Stereotactic CT navigation, extra-dural 
ANESTHESIA:   
General FINDINGS: see operative report ESTIMATED BLOOD LOSS: 10 mls SPECIMENS: 
left   
sinonasal contents COMPLICATIONS: None PRE-OP/PRE-PROCEDURE DIAGNOSIS: left 
chronic   
sinusitis, nasal obstruction POST-OP/POST-PROCEDURE DIAGNOSIS: Same as Preop   
SIGNATURE: MD ZANA Art (more content not included)...  
  
  
  
                                                    Note  
   
                                                    HNO ID: [id number] Author: MARIANA Kaur (Crna) Service: Anesthesiology Author   
Type:   
Nurse Anesthetist Type: Anesthesia Procedure Notes Filed: 2/15/2021 3:49 PM Note
   
Text: ANESTHESIOLOGY PROCEDURE NOTE PIV General Information Procedure Start   
Time/Medication Administration: 2/15/2021 2:20 PM Patient Location: OR Staffing   
Anesthesiologist: Irene Arana CRNA: Kendall Kaur (Crna) Performed by: CRNA   
Preparation Sterility Preparation: surgical cap used, mask used, skin prep agent
   
completely dried prior to procedure Site Prep: Chloraprep Procedure Details   
Indication: need for IV access Needle Size/Type: 20 gauge angiocath Orientation:
 Left   
Location: Other (wrist) Imaging Guidance Used: No SIGNATURE: Kendall Kaur APRN.CRNA PATIENT NAME: Bev Gaming DATE: February 15, 2021 MRN: [id number] 
TIME:   
3:48 PM CSN: [id number]  
  
  
  
Chief Complaint and Reason for Visit  
  
  
                                        Chief Complaint     covid+, wheezy-sent   
by   
  
  
  
                                        Chief Complaint     covid+, wheezy-sent   
by dr  
Trouble Breathing (C19+)  
   
                                        Reason for Visit    COPD exacerbation  
COVID-19  
Bipolar disorder  
Chronic respiratory failure with hypoxia, on home O2 therapy  
Diabetes mellitus  
Tobacco abuse disorder  
  
  
  
                                        Chief Complaint     covid+, wheezy-sent   
by dr  
Trouble Breathing (C19+)  
chest pain, cant breathe  
   
                                        Reason for Visit    COPD exacerbation  
COVID-19  
Bipolar disorder  
Chronic respiratory failure with hypoxia, on home O2 therapy  
Diabetes mellitus  
Tobacco abuse disorder  
Acute exacerbation of chronic obstructive airways disease  
  
  
  
                                        Chief Complaint     Dr. Malone called fo  
r an appt for pt  
   
                                        Reason for Visit    Asthma with COPD  
COPD (chronic obstructive pulmonary disease)  
Chronic respiratory failure with hypoxia, on home O2 therapy  
  
  
  
                                        Chief Complaint     oxygen low, cough, w  
heezing  
  
  
  
                                        Chief Complaint     oxygen low, cough, w  
heezing  
6 mo f/u Asthma w COPD  
   
                                        Reason for Visit    Asthma with COPD  
COPD (chronic obstructive pulmonary disease)  
Chronic respiratory failure with hypoxia, on home O2 therapy  
  
  
  
                                        Chief Complaint     Admit Date  
   
                                        6 mo f/u Asthma w COPD  2:00pm  
   
                                        sob                 2025 1:  
10am  
  
  
  
                                        Reason for Visit    Admit Date  
   
                                        Asthma with COPD    2024 2  
:00pm  
   
                                        COPD (chronic obstructive pulmonary dise  
ase) 2024 2:00pm  
   
                                                    Chronic respiratory failure   
with hypoxia, on home O2   
therapy                                 2024 2:00pm  
   
                                        Acute and chronic respiratory failure wi  
th hypoxia 2025 1:10am  
   
                                        COPD exacerbation   2025 1:  
10am  
   
                                        COVID-19 virus infection  1:10am  
   
                                                    Chronic respiratory failure   
with hypoxia, on home O2   
therapy                                 2025 1:10am  
   
                                        Diabetes mellitus   2025 1:  
10am  
   
                                        Tobacco abuse disorder 2025  
 1:10am  
  
  
  
                                        Chief Complaint     Admit Date  
   
                                        sob                 2025 1:  
10am  
   
                                        Dr. Malone called for hospital f/u appt   
2025 9:41am  
  
  
  
                                        Reason for Visit    Admit Date  
   
                                        Chronic respiratory failure with hypoxia  
, on home O2 therapy 2025   
1:10am  
   
                                        Diabetes mellitus   2025 1:  
10am  
   
                                        Tobacco abuse disorder 2025  
 1:10am  
   
                                        Acute and chronic respiratory failure wi  
th hypoxia 2025 1:10am  
   
                                        COPD exacerbation   2025 1:  
10am  
   
                                        COVID-19 virus infection  1:10am  
   
                                        Asthma with COPD    2025 9:4  
1am  
   
                                        COPD (chronic obstructive pulmonary dise  
ase) 2025 9:41am  
   
                                        Chronic respiratory failure with hypoxia  
, on home O2 therapy 2025   
9:41am  
  
  
  
Reason for Referral  
  
  
                          Specialty    Diagnoses / Procedures Referred By Contac  
t Referred To Contact  
   
                                                              
  
  
Procedures  
  
  
HEARING TEST/AUDIOGRAM  
  
  
COMPRE AUDIOMETRY THRESHOLD   
EVAL SP RECOGNIJ                          
  
  
[facility]  
  
  
 [address] [address]  
  
  
Phone: [phone]  
  
  
Fax: [phone]                         
  
  
[facility]  
  
  
[address] [place], [place] [address] [address]  
  
  
  
                          Referral ID  Status       Reason       Start   
Date                                    Expiration   
Date                                    Visits   
Requested                               Visits   
Authorized  
   
                                        [id number]            Pending   
Review                                    
  
  
Auto-Generat  
ed Referral     6/3/2024        2025        1               1  
  
  
  
                          Specialty    Diagnoses / Procedures Referred By Contac  
t Referred To Contact  
   
                                        Ent - Otolaryngology   
  
  
Diagnoses  
  
  
Vertigo, peripheral,   
bilateral  
  
  
  
Procedures  
  
  
CONSULT TO ENT  
  
  
OFFICE/OUTPATIENT [facility] 60 MINUTES                       
  
  
Diana Gandara MD  
  
  
[address] [place] [address]  
  
  
Phone: [phone]  
  
  
Fax: [phone]                         
  
  
  
  
  
                          Referral ID  Status       Reason       Start   
Date                                    Expiration   
Date                                    Visits   
Requested                               Visits   
Authorized  
   
                                [id number]        Authorized        
  
  
PCP Requested   
Referral        2024        1               1  
  
  
  
                          Specialty    Diagnoses / Procedures Referred By Contac  
t Referred To Contact  
   
                                                              
  
  
Diagnoses  
  
  
Vascular headache  
  
  
  
Procedures  
  
  
CONSULT TO HEADACHE CLINIC  
  
  
OFFICE/OUTPATIENT [facility] 60 MINUTES                            
  
  
Diana Gandara MD  
  
  
[address] [place] [id number]  
  
  
Phone: [phone]  
  
  
Fax: [phone]                         
  
  
  
  
  
                          Referral ID  Status       Reason       Start   
Date                                    Expiration   
Date                                    Visits   
Requested                               Visits   
Authorized  
   
                                [id number]        Authorized        
  
  
PCP Requested   
Referral        2024        1               1  
  
  
  
                          Specialty    Diagnoses / Procedures Referred By Contac  
t Referred To Contact  
   
                                        Allergy               
  
  
Diagnoses  
  
  
Allergic rhinitis,   
unspecified seasonality,   
unspecified trigger  
  
  
  
Procedures  
  
  
CONSULT TO ALLERGY/IMMUNOLOGY  
  
  
OFFICE/OUTPATIENT [facility] 60 MINUTES                            
  
  
Diana Gandara MD  
  
  
[address] [place] [address]  
  
  
Phone: [phone]  
  
  
Fax: [phone]                         
  
  
  
  
  
                          Referral ID  Status       Reason       Start   
Date                                    Expiration   
Date                                    Visits   
Requested                               Visits   
Authorized  
   
                                [id number]        Authorized        
  
  
PCP Requested   
Referral        2024        1               1  
  
  
  
Additional Source Comments  
  
  
  
                                                    INFORMATION SOURCE (unrecogn  
ized section and content)  
   
                                          
  
  
  
                                        DATE CREATED        AUTHOR  
   
                                2018                      [facility]  
  
  
  
                                DATE CREATED    AUTHOR          AUTHOR'S ORGANIZ  
ATION  
   
                                2021                      [facility]  
  
  
  
                                DATE CREATED    AUTHOR          AUTHOR'S ORGANIZ  
ATION  
   
                                2022                      [facility]  
dical Specialist  
  
  
  
                                DATE CREATED    AUTHOR          AUTHOR'S ORGANIZ  
ATION  
   
                                2023                      [facility]  
pital  
  
  
  
                                DATE CREATED    AUTHOR          AUTHOR'S ORGANIZ  
ATION  
   
                                2023                      [facility]  
  
  
  
                                DATE CREATED    AUTHOR          AUTHOR'S ORGANIZ  
ATION  
   
                                2024                      [facility]  
  
  
  
                                DATE CREATED    AUTHOR          AUTHOR'S ORGANIZ  
ATION  
   
                                2025                      [facility]  
  
  
  
                                DATE CREATED    AUTHOR          AUTHOR'S ORGANIZ  
ATION  
   
                                2025                      [facility]  
dical Specialists [facility]  
  
  
  
  
  
                                                    REASON FOR VISIT (unrecogniz  
ed section and content)  
   
                                          
  
  
  
                                        Reason              Comments  
   
                                        New Patient         Recurrent sinus infe  
ctions / has seen Dr Gandara in past  
  
  
  
                                        Reason              Comments  
   
                                        Follow Up             
  
  
  
                                        Reason              Comments  
   
                                        Med Change Request    
  
  
  
                                        Reason              Comments  
   
                                        Ear Problem         Evaluation for PE be  
fore sinus surgery  
  
  
  
                          Specialty    Diagnoses / Procedures Referred By Contac  
t Referred To Contact  
   
                                        Ent - Otolaryngology   
  
  
Diagnoses  
  
  
Vertigo, peripheral,   
bilateral  
  
  
  
Procedures  
  
  
CONSULT TO ENT  
  
  
OFFICE/OUTPATIENT [facility] 60 MINUTES                       
  
  
Diana Gandara MD  
  
  
[address]  
  
  
[place] [address]  
  
  
Phone: [phone]  
  
  
Fax: [phone]                         
  
  
  
  
  
                    Referral ID Status    Reason    Start Date Expiration Date V  
isits   
Requested                               Visits   
Authorized  
   
                                [id number]        Closed            
  
  
PCP Requested   
Referral        2024        1               1  
  
  
  
                                        Reason              Comments  
   
                                        Hearing Loss          
  
  
  
                          Specialty    Diagnoses / Procedures Referred By Contac  
t Referred To Contact  
   
                                        Ent - Otolaryngology   
  
  
Diagnoses  
  
  
Vertigo, peripheral,   
bilateral  
  
  
  
Procedures  
  
  
CONSULT TO ENT  
  
  
OFFICE/OUTPATIENT [facility] 60 MINUTES                       
  
  
Diana Gandara MD  
  
  
[address]  
  
  
[place] [address]  
  
  
Phone: [phone]  
  
  
Fax: [phone]                         
  
  
  
  
  
                                        Reason              Comments  
   
                                        Anesthesia Consult    
  
  
  
                                        Reason              Comments  
   
                                        Post Op Follow Up     
  
  
  
                                        Reason              Comments  
   
                                        Surgical Followup     
  
  
  
                                        Reason              Comments  
   
                                        Diabetes              
  
  
  
                                        Reason              Comments  
   
                                        Cough               Patient presents tonuno jolly for cough, headache, and wheezing, SOB, and nasal   
drainage. She had nose surgery last week and can't test for Covid. Yesterday   
she was coughing until she vomited. She got custody of 3 grandkids last week   
and the baby is starting to cough as well. She denies fever and chills.  
  
  
  
                                Reason          Onset Date      Comments  
   
                                Med Refill      2024        
  
  
  
                                        Reason              Comments  
   
                                        Post Hosp Follow up Patient was admitted  
 to [facility] 25 and Discharged 24   
DX:   
Acute/Chronic respiratory failure with hypoxia, COPD   
exacerbation, COVID-19  
  
  
  
                                        Reason              Comments  
   
                                        Fall                  
  
  
  
                                        Reason              Comments  
   
                                        6 month follow up   Pt is being seen tonuno jolly for her 6 month   
follow up and managment of her chronic   
conditions. Pt has no recent lab results   
avaiable and may be due for rountine at   
this time. Pt is overdue for MMG, CCS and   
pap smear.  
   
                                        Medicare Annual Wellness Visit Subsequen  
t   
   
                                        Cough               Pt states she had CO  
VID about 1 month and   
will have coughing fits. She is coughing so   
much to where she is vomiting. She is   
spitting up clear, yellow or green thick   
mucus.  
  
  
  
                                        Reason              Comments  
   
                                        Med Refill            
  
  
  
                                Reason          Onset Date      Comments  
   
                                Appointment Confirmation 2025        
  
  
  
  
  
                                                    Patient Care team informatio  
n (unrecognized section and content)  
   
                                          
  
  
  
                                                    Team Status: Active   
   
                          Member       Role         Status       Dates  
   
                          Sherman Malone , DO Primary Care Provider Active        
   
  
  
  
                                                    Team Status: Inactive   
   
                          Member       Role         Status       Dates  
   
                          Sherman Malone , DO Primary Care Provider Active        
   
   
                          Abbi Carlson , [facility] Emergency Provider Active   
        
  
  
  
                                                    Team Status: Inactive   
   
                          Member       Role         Status       Dates  
   
                          Sherman Malone ,  Primary Care Provider Active        
   
   
                          Darrian Lawrence , DO Emergency Provider Active         
   
                          Tashi Rodriges MD Admit Provider Active         
   
                          Ambrosio Davis MD Attending Provider Active    
       
   
                          Breezy Davidson MD Other Provider Active       
    
  
  
  
                                                    Team Status: Active   
   
                          Member       Role         Status       Dates  
   
                          Sherman Malone DO Primary Care Provider Active        
   
   
                          Nickolas Palomares , DO Emergency Provider Active         
   
                          Neeraj Terry MD Admit Provider, Attending Provider  
 Active         
  
  
  
                                                    Team Status: Inactive   
   
                          Member       Role         Status       Dates  
   
                          Sherman Malone ,  Primary Care Provider Active        
   
   
                          Nickolas Palomares , DO Emergency Provider Active         
   
                          Neeraj Terry MD Admit Provider, Attending Provider  
 Active         
  
  
  
                      Team Member Relationship Specialty  Start Date End Date  
   
                                                      
  
  
Sherman Malone DO  
  
  
NPI: [id number]  
  
  
[address]  
  
  
[address]  
  
  
[place] [address]  
  
  
[phone] (Work)  
  
  
[phone] (Fax) PCP - General   Internal Medicine 14           
  
  
  
                      Team Member Relationship Specialty  Start Date End Date  
   
                                                      
  
  
Sherman Malone DO  
  
  
NPI: [id number]  
  
  
[address]  
  
  
[address]  
  
  
[place] [address]  
  
  
[phone] (Work)  
  
  
[phone] (Fax) PCP - General   Internal Medicine 14           
  
  
  
                      Team Member Relationship Specialty  Start Date End Date  
   
                                                      
  
  
Sherman Malone DO  
  
  
NPI: [id number]  
  
  
[address]  
  
  
[address]  
  
  
[place] [address]  
  
  
[phone] (Work)  
  
  
[phone] (Fax) PCP - General   Internal Medicine 14           
  
  
  
                                                    Team Status: Inactive   
   
                          Member       Role         Status       Dates  
   
                          Sherman Malone DO Primary Care Provider Active        
 Start: 2024  
End: 2024  
   
                          SA[facility]RA Cisneros [facility] Attending Provider Active     
    Start: 2024  
End: 2024  
   
                          DME                       Active       Start:   
End: 2024  
  
  
  
                      Team Member Relationship Specialty  Start Date End Date  
   
                                                      
  
  
Sherman Malone DO  
  
  
NPI: [id number]  
  
  
[address]  
  
  
[address]  
  
  
[place] [address]  
  
  
[phone] (Work)  
  
  
[phone] (Fax) PCP - General   Internal Medicine 14           
  
  
  
                      Team Member Relationship Specialty  Start Date End Date  
   
                                                      
  
  
Sherman Malone DO  
  
  
NPI: [id number]  
  
  
[address]  
  
  
[address]  
  
  
[place] [address]  
  
  
[phone] (Work)  
  
  
[phone] (Fax) PCP - General   Internal Medicine 14           
  
  
  
                      Team Member Relationship Specialty  Start Date End Date  
   
                                                      
  
  
Sherman Malone DO  
  
  
NPI: [id number]  
  
  
[address]  
  
  
[address]  
  
  
[place] [address]  
  
  
[phone] (Work)  
  
  
[phone] (Fax) PCP - General   Internal Medicine 14           
  
  
  
                      Team Member Relationship Specialty  Start Date End Date  
   
                                                      
  
  
Sherman Malone DO  
  
  
NPI: [id number]  
  
  
[address]  
  
  
[address]  
  
  
[place] [address]  
  
  
[phone] (Work)  
  
  
[phone] (Fax) PCP - General   Internal Medicine 14           
  
  
  
                      Team Member Relationship Specialty  Start Date End Date  
   
                                                      
  
  
Sherman Malone DO  
  
  
NPI: [id number]  
  
  
[address]  
  
  
[address]  
  
  
[place] [address]  
  
  
[phone] (Work)  
  
  
[phone] (Fax) PCP - General   Internal Medicine 14           
  
  
  
                      Team Member Relationship Specialty  Start Date End Date  
   
                                                      
  
  
Sherman Malone DO  
  
  
NPI: [id number]  
  
  
[address]  
  
  
[address]  
  
  
[place] [address]  
  
  
[phone] (Work)  
  
  
[phone] (Fax) PCP - General   Internal Medicine 14           
  
  
  
                      Team Member Relationship Specialty  Start Date End Date  
   
                                                      
  
  
Sherman Malone DO  
  
  
NPI: [id number]  
  
  
[address]  
  
  
[address]  
  
  
[place] [address]  
  
  
[phone] (Work)  
  
  
[phone] (Fax) PCP - General   Internal Medicine 14           
  
  
  
                      Team Member Relationship Specialty  Start Date End Date  
   
                                                      
  
  
Sherman Malone DO  
  
  
NPI: [id number]  
  
  
[address]  
  
  
[address]  
  
  
[place] [address]  
  
  
[phone] (Work)  
  
  
[phone] (Fax) PCP - General   Internal Medicine 14           
  
  
  
                                                    Team Status: Inactive   
   
                          Member       Role         Status       Dates  
   
                          Sherman Malone DO Primary Care Provider Active        
 Start: 2024  
End: 2024  
   
                          Diogo Dent DO Emergency Provider Active       St  
art: 2024  
End: 2024  
  
  
  
                                                    Team Status: Inactive   
   
                          Member       Role         Status       Dates  
   
                          Sherman Malone DO Primary Care Provider Active        
 Start: 2024  
End: 2024  
   
                          SA[facility]RA Cisneros [facility] Attending Provider Active     
    Start: 2024  
End: 2024  
  
  
  
                      Team Member Relationship Specialty  Start Date End Date  
   
                                                      
  
  
Sherman Malone DO  
  
  
NPI: [id number]  
  
  
[address]  
  
  
[place] [address]  
  
  
[phone] (Work)  
  
  
[phone] (Fax) PCP - [facility]                 21            
   
                                                      
  
  
Sherman Malone DO  
  
  
NPI: [id number]  
  
  
[address]  
  
  
[place] [address]  
  
  
[phone] (Work)  
  
  
[phone] (Fax) PCP - General   Internal Medicine 23            
   
                                                      
  
  
Sherman Malone DO  
  
  
NPI: [id number]  
  
  
[address]  
  
  
[place] [address]  
  
  
[phone] (Work)  
  
  
[phone] (Fax) PCP - [facility]                 24            
  
  
  
                      Team Member Relationship Specialty  Start Date End Date  
   
                                                      
  
  
Sherman Malone DO  
  
  
NPI: [id number]  
  
  
[address]  
  
  
[place] [address]  
  
  
[phone] (Work)  
  
  
[phone] (Fax) PCP - [facility]                 21            
   
                                                      
  
  
Sherman Malone DO  
  
  
NPI: [id number]  
  
  
[address]  
  
  
[place] [address]  
  
  
[phone] (Work)  
  
  
[phone] (Fax) PCP - General   Internal Medicine 23            
   
                                                      
  
  
Sherman Malone DO  
  
  
NPI: [id number]  
  
  
[address]  
  
  
[place] [address]  
  
  
[phone] (Work)  
  
  
[phone] (Fax) PCP - [facility]                 24            
  
  
  
                      Team Member Relationship Specialty  Start Date End Date  
   
                                                      
  
  
Sherman Malone DO  
  
  
NPI: [id number]  
  
  
[address]  
  
  
[place] [address]  
  
  
[phone] (Work)  
  
  
[phone] (Fax) PCP - [facility]                 21            
   
                                                      
  
  
Sherman Malone DO  
  
  
NPI: [id number]  
  
  
[address]  
  
  
[place] [address]  
  
  
[phone] (Work)  
  
  
[phone] (Fax) PCP - General   Internal Medicine 23            
   
                                                      
  
  
Sherman Malone DO  
  
  
NPI: [id number]  
  
  
[address]  
  
  
[place] [address]  
  
  
[phone] (Work)  
  
  
[phone] (Fax) PCP - [facility]                 24            
  
  
  
                      Team Member Relationship Specialty  Start Date End Date  
   
                                                      
  
  
Sherman Malone DO  
  
  
NPI: [id number]  
  
  
[address]  
  
  
[place] [address]  
  
  
[phone] (Work)  
  
  
[phone] (Fax) PCP - [facility] [employer]                 21            
   
                                                      
  
  
Sherman Malone DO  
  
  
NPI: [id number]  
  
  
[address]  
  
  
[place] [address]  
  
  
[phone] (Work)  
  
  
[phone] (Fax) PCP - General   Internal Medicine 23            
   
                                                      
  
  
Sherman Malone DO  
  
  
NPI: [id number]  
  
  
[address]  
  
  
[place] [address]  
  
  
[phone] (Work)  
  
  
[phone] (Fax) PCP - [facility]                 24            
  
  
  
                      Team Member Relationship Specialty  Start Date End Date  
   
                                                      
  
  
Sherman Malone DO  
  
  
NPI: [id number]  
  
  
[address]  
  
  
[place] [address]  
  
  
[phone] (Work)  
  
  
[phone] (Fax) PCP - [facility] [employer]                 21            
   
                                                      
  
  
Sherman Malone DO  
  
  
NPI: [id number]  
  
  
[address]  
  
  
[place] [address]  
  
  
[phone] (Work)  
  
  
[phone] (Fax) PCP - General   Internal Medicine 23            
   
                                                      
  
  
Sherman Malone DO  
  
  
NPI: [id number]  
  
  
[address]  
  
  
[place] [address]  
  
  
[phone] (Work)  
  
  
[phone] (Fax) PCP - [facility]                 24            
  
  
  
                      Team Member Relationship Specialty  Start Date End Date  
   
                                                      
  
  
Sherman Malone DO  
  
  
NPI: [id number]  
  
  
[address]  
  
  
[place] [address]  
  
  
[phone] (Work)  
  
  
[phone] (Fax) PCP - [facility] [employer]                 21            
   
                                                      
  
  
Sherman Malone DO  
  
  
NPI: [id number]  
  
  
[address]  
  
  
[place] [address]  
  
  
[phone] (Work)  
  
  
[phone] (Fax) PCP - General   Internal Medicine 23            
   
                                                      
  
  
Sherman Malone DO  
  
  
NPI: [id number]  
  
  
[address]  
  
  
[place] [address]  
  
  
[phone] (Work)  
  
  
[phone] (Fax) PCP - [facility]                 24            
  
  
  
                                                    Team Status: Active   
   
                          Member       Role         Status       Dates  
   
                          Sherman Malone DO Primary Care Provider Active        
 Start: 2025  
  
   
                          Quentin Cuenca Jr, MD Emergency Provider Active        
 Start: 2025  
  
   
                                        Charlene Montoya DO Admit Provider, Atte  
nding   
Provider                  Active                    Start: 2025  
  
  
  
  
                                                    Team Status: Inactive   
   
                          Member       Role         Status       Dates  
   
                          Sherman Malone DO Primary Care Provider Active        
 Start: 2025  
End: 2025  
   
                          Quentin Cuenca Jr, MD Emergency Provider Active        
 Start: 2025  
End: 2025  
   
                          Charlene Montoya DO Admit Provider Active       Start:  
 2025  
End: 2025  
   
                          Jordon Vásquez MD Attending Provider Active       Start  
: 2025  
End: 2025  
  
  
  
                      Team Member Relationship Specialty  Start Date End Date  
   
                                                      
  
  
Sherman Malone DO  
  
  
NPI: [id number]  
  
  
[address]  
  
  
[place] [address]  
  
  
[phone] (Work)  
  
  
[phone] (Fax) PCP - [facility] [employer]                 21            
   
                                                      
  
  
Sherman Malone DO  
  
  
NPI: [id number]  
  
  
[address]  
  
  
[place] [address]  
  
  
[phone] (Work)  
  
  
[phone] (Fax) PCP - General   Internal Medicine 23            
   
                                                      
  
  
Sherman Malone DO  
  
  
NPI: [id number]  
  
  
[address]  
  
  
[place] [address]  
  
  
[phone] (Work)  
  
  
[phone] (Fax) PCP - [facility]                 24            
  
  
  
                      Team Member Relationship Specialty  Start Date End Date  
   
                                                      
  
  
Sherman Malone DO  
  
  
NPI: [id number]  
  
  
[address]  
  
  
[place] [address]  
  
  
[phone] (Work)  
  
  
[phone] (Fax) PCP - [facility] [employer]                 21            
   
                                                      
  
  
Sherman Malone DO  
  
  
NPI: [id number]  
  
  
[address]  
  
  
[place] [address]  
  
  
[phone] (Work)  
  
  
[phone] (Fax) PCP - General   Internal Medicine 23            
   
                                                      
  
  
Sherman Malone DO  
  
  
NPI: [id number]  
  
  
[address]  
  
  
[place] [address]  
  
  
[phone] (Work)  
  
  
[phone] (Fax) PCP - [facility]                 24            
  
  
  
                      Team Member Relationship Specialty  Start Date End Date  
   
                                                      
  
  
Shemran Malone DO  
  
  
NPI: [id number]  
  
  
[address]  
  
  
[place] [address]  
  
  
[phone] (Work)  
  
  
[phone] (Fax) PCP - [facility] [employer]                 21            
   
                                                      
  
  
Sherman Malone DO  
  
  
NPI: [id number]  
  
  
[address]  
  
  
[place] [address]  
  
  
[phone] (Work)  
  
  
[phone] (Fax) PCP - General   Internal Medicine 23            
   
                                                      
  
  
Sherman Malone DO  
  
  
NPI: [id number]  
  
  
[address]  
  
  
[place] [address]  
  
  
[phone] (Work)  
  
  
[phone] (Fax) PCP - [facility]                 24            
  
  
  
                      Team Member Relationship Specialty  Start Date End Date  
   
                                                      
  
  
Sherman Malone DO  
  
  
NPI: [id number]  
  
  
[address]  
  
  
[place] [address]  
  
  
[phone] (Work)  
  
  
[phone] (Fax) PCP - [facility] [employer]                 21            
   
                                                      
  
  
Sherman Malone DO  
  
  
NPI: [id number]  
  
  
[address]  
  
  
[place] [address]  
  
  
[phone] (Work)  
  
  
[phone] (Fax) PCP - General   Internal Medicine 23            
   
                                                      
  
  
Sherman Malone DO  
  
  
NPI: [id number]  
  
  
[address]  
  
  
[place] [address]  
  
  
[phone] (Work)  
  
  
[phone] (Fax) PCP - [facility]                 24            
  
  
  
                      Team Member Relationship Specialty  Start Date End Date  
   
                                                      
  
  
Sherman Malone DO  
  
  
NPI: [id number]  
  
  
[address]  
  
  
[place] [address]  
  
  
[phone] (Work)  
  
  
[phone] (Fax) PCP - [facility] [employer]                 21            
   
                                                      
  
  
Sherman Malone DO  
  
  
NPI: [id number]  
  
  
[address]  
  
  
[place] [address]  
  
  
[phone] (Work)  
  
  
[phone] (Fax) PCP - General   Internal Medicine 23            
   
                                                      
  
  
Sherman Malone DO  
  
  
NPI: [id number]  
  
  
[address]  
  
  
[place] [address]  
  
  
[phone] (Work)  
  
  
[phone] (Fax) PCP - [facility]                 24            
  
  
  
                      Team Member Relationship Specialty  Start Date End Date  
   
                                                      
  
  
Sherman Malone DO  
  
  
NPI: [id number]  
  
  
[address]  
  
  
[place] [address]  
  
  
[phone] (Work)  
  
  
[phone] (Fax) PCP - [facility] [employer]                 21            
   
                                                      
  
  
Sherman Malone DO  
  
  
NPI: [id number]  
  
  
[address]  
  
  
[place] [address]  
  
  
[phone] (Work)  
  
  
[phone] (Fax) PCP - General   Internal Medicine 23            
   
                                                      
  
  
Sherman Malone DO  
  
  
NPI: [id number]  
  
  
[address]  
  
  
[place] [address]  
  
  
[phone] (Work)  
  
  
[phone] (Fax) PCP - [facility]                 24            
  
  
  
                      Team Member Relationship Specialty  Start Date End Date  
   
                                                      
  
  
Sherman Malone DO  
  
  
NPI: [id number]  
  
  
[address]  
  
  
[place] [address]  
  
  
[phone] (Work)  
  
  
[phone] (Fax) PCP - [facility] [employer]                 21            
   
                                                      
  
  
Sherman Malone DO  
  
  
NPI: [id number]  
  
  
[address]  
  
  
[place] [address]  
  
  
[phone] (Work)  
  
  
[phone] (Fax) PCP - General   Internal Medicine 23            
   
                                                      
  
  
Sherman Malone DO  
  
  
NPI: [id number]  
  
  
[address]  
  
  
[place] [address]  
  
  
[phone] (Work)  
  
  
[phone] (Fax) PCP - [facility]                 24            
  
  
  
                                                    Team Status: Inactive   
   
                          Member       Role         Status       Dates  
   
                          Sherman Malone DO Primary Care Provider Active        
 Start: 2025  
End: 2025  
   
                          SA[facility]isner[facility] Attending Provider Active     
    Start: 2025  
End: 2025  
   
                          [facility]              Active       Start: Ma  
rch 2025  
End: 2025  
  
  
  
  
  
                                                    Goals (unrecognized section   
and content)  
   
                                                    Goals may be documented in a  
n alternate section  
  
  
  
  
                                                    Source Comments (unrecognize  
d section and content)  
   
                                                    In the event this informatio  
n is protected by the Federal Confidentiality of   
Alcohol   
and Drug Abuse Patient Records regulations: This information has been disclosed 
to   
you from records protected by Federal confidentiality rules (42 CFR Part 2). The
   
Federal rules prohibit you from making any further disclosure of this 
information   
unless further disclosure is expressly permitted by the written consent of the 
person   
to whom it pertains or as otherwise permitted by 42 CFR Part 2. A general   
authorization for the release of medical or other information is NOT sufficient 
for   
this purpose. The Federal rules restrict any use of the information to 
criminally   
investigate or prosecute any alcohol or drug abuse patient.[facility] HospitalIn 
the   
event this information is protected by the Federal Confidentiality of Alcohol 
and   
Drug Abuse Patient Records regulations: This information has been disclosed to 
you   
from records protected by Federal confidentiality rules (42 CFR Part 2). The 
Federal   
rules prohibit you from making any further disclosure of this information unless
   
further disclosure is expressly permitted by the written consent of the person 
to   
whom it pertains or as otherwise permitted by 42 CFR Part 2. A general 
authorization   
for the release of medical or other information is NOT sufficient for this 
purpose.   
The Federal rules restrict any use of the information to criminally investigate 
or   
prosecute any alcohol or drug abuse patient.[facility] HospitalIn the event this   
information is protected by the Federal Confidentiality of Alcohol and Drug 
Abuse   
Patient Records regulations: This information has been disclosed to you from 
records   
protected by Federal confidentiality rules (42 CFR Part 2). The Federal rules   
prohibit you from making any further disclosure of this information unless 
further   
disclosure is expressly permitted by the written consent of the person to whom 
it   
pertains or as otherwise permitted by 42 CFR Part 2. A general authorization for
 the   
release of medical or other information is NOT sufficient for this purpose. The   
Federal rules restrict any use of the information to criminally investigate or   
prosecute any alcohol or drug abuse patient.[facility] HospitalIn the event this   
information is protected by the Federal Confidentiality of Alcohol and Drug 
Abuse   
Patient Records regulations: This information has been disclosed to you from 
records   
protected by Federal confidentiality rules (42 CFR Part 2). The Federal rules   
prohibit you from making any further disclosure of this information unless 
further   
disclosure is expressly permitted by the written consent of the person to whom 
it   
pertains or as otherwise permitted by 42 CFR Part 2. A general authorization for
 the   
release of medical or other information is NOT sufficient for this purpose. The   
Federal rules restrict any use of the information to criminally investigate or   
prosecute any alcohol or drug abuse patient.[facility] HospitalIn the event this   
information is protected by the Federal Confidentiality of Alcohol and Drug 
Abuse   
Patient Records regulations: This information has been disclosed to you from 
records   
protected by Federal confidentiality rules (42 CFR Part 2). The Federal rules   
prohibit you from making any further disclosure of this information unless 
further   
disclosure is expressly permitted by the written consent of the person to whom 
it   
pertains or as otherwise permitted by 42 CFR Part 2. A general authorization for
 the   
release of medical or other information is NOT sufficient for this purpose. The   
Federal rules restrict any use of the information to criminally investigate or   
prosecute any alcohol or drug abuse patient.[facility] HospitalIn the event this   
information is protected by the Federal Confidentiality of Alcohol and Drug 
Abuse   
Patient Records regulations: This information has been disclosed to you from 
records   
protected by Federal confidentiality rules (42 CFR Part 2). The Federal rules   
prohibit you from making any further disclosure of this information unless 
further   
disclosure is expressly permitted by the written consent of the person to whom 
it   
pertains or as otherwise permitted by 42 CFR Part 2. A general authorization for
 the   
release of medical or other information is NOT sufficient for this purpose. The   
Federal rules restrict any use of the information to criminally investigate or   
prosecute any alcohol or drug abuse patient.[facility] HospitalIn the event this   
information is protected by the Federal Confidentiality of Alcohol and Drug 
Abuse   
Patient Records regulations: This information has been disclosed to you from 
records   
protected by Federal confidentiality rules (42 CFR Part 2). The Federal rules   
prohibit you from making any further disclosure of this information unless 
further   
disclosure is expressly permitted by the written consent of the person to whom 
it   
pertains or as otherwise permitted by 42 CFR Part 2. A general authorization for
 the   
release of medical or other information is NOT sufficient for this purpose. The   
Federal rules restrict any use of the information to criminally investigate or   
prosecute any alcohol or drug abuse patient.[facility] HospitalIn the event this   
information is protected by the Federal Confidentiality of Alcohol and Drug 
Abuse   
Patient Records regulations: This information has been disclosed to you from 
records   
protected by Federal confidentiality rules (42 CFR Part 2). The Federal rules   
prohibit you from making any further disclosure of this information unless 
further   
disclosure is expressly permitted by the written consent of the person to whom 
it   
pertains or as otherwise permitted by 42 CFR Part 2. A general authorization for
 the   
release of medical or other information is NOT sufficient for this purpose. The   
Federal rules restrict any use of the information to criminally investigate or   
prosecute any alcohol or drug abuse patient.[facility] HospitalIn the event this   
information is protected by the Federal Confidentiality of Alcohol and Drug 
Abuse   
Patient Records regulations: This information has been disclosed to you from 
records   
protected by Federal confidentiality rules (42 CFR Part 2). The Federal rules   
prohibit you from making any further disclosure of this information unless 
further   
disclosure is expressly permitted by the written consent of the person to whom 
it   
pertains or as otherwise permitted by 42 CFR Part 2. A general authorization for
 the   
release of medical or other information is NOT sufficient for this purpose. The   
Federal rules restrict any use of the information to criminally investigate or   
prosecute any alcohol or drug abuse patient.[facility] HospitalIn the event this   
information is protected by the Federal Confidentiality of Alcohol and Drug 
Abuse   
Patient Records regulations: This information has been disclosed to you from 
records   
protected by Federal confidentiality rules (42 CFR Part 2). The Federal rules   
prohibit you from making any further disclosure of this information unless 
further   
disclosure is expressly permitted by the written consent of the person to whom 
it   
pertains or as otherwise permitted by 42 CFR Part 2. A general authorization for
 the   
release of medical or other information is NOT sufficient for this purpose. The   
Federal rules restrict any use of the information to criminally investigate or   
prosecute any alcohol or drug abuse patient.[facility] HospitalIn the event this   
information is protected by the Federal Confidentiality of Alcohol and Drug 
Abuse   
Patient Records regulations: This information has been disclosed to you from 
records   
protected by Federal confidentiality rules (42 CFR Part 2). The Federal rules   
prohibit you from making any further disclosure of this information unless 
further   
disclosure is expressly permitted by the written consent of the person to whom 
it   
pertains or as otherwise permitted by 42 CFR Part 2. A general authorization for
 the   
release of medical or other information is NOT sufficient for this purpose. The   
Federal rules restrict any use of the information to criminally investigate or   
prosecute any alcohol or drug abuse patient.[facility]  
  
FOR RECORDS PERTAINING TO PATIENTS WHO ARE OR HAVE BEEN ENROLLED IN A CHEMICAL 
DEPENDENCY/SUBSTANCEABUSE PROGRAM, SOME INFORMATION MAY BE OMITTED. This 
clinical summary was aggregated from multiple sources. Caution should be 
exercised in using it in the provision of clinical care. This summary normalizes
 information from multiple sources, and as a consequence, information in this 
document may materially change the coding, format and clinical context of 
patient data. In addition, data may be omitted in some cases. CLINICAL DECISIONS
 SHOULD BE BASED ON THE PRIMARY CLINICAL RECORDS. [facility] [employer] [facility]. provides
 no warranty or guarantee of the accuracy or completeness of information in this
 document.

## 2025-03-22 NOTE — PC.NURSE
pain reported from right hip to lower leg. pt was ambulating upon arrival, no bruising to areas observed. pt points out a  dented area  in back of right thigh and believes this happened from fall. Skin intact to areas of complaint.

## 2025-03-22 NOTE — ECG_ITS
[facility] 
                                        
                                       Test Date:    2025 
Pat Name:     JAN GAMING          Department:    
Patient ID:   [id number]               Room:         - 
Gender:       Female                   Technician:    
:          [id number]               Requested By: [id number] 
Order Number: [id number]              Reading MD:   AMBERLY MAR M.D. 
                                 Measurements 
Intervals                              Axis           
Rate:         88                       P:            74 
MS:           158                      QRS:          83 
QRSD:         78                       T:            52 
QT:           352                                     
QTc:          397                                     
                           Interpretive Statements 
1100 Sinus rhythm 
9110 **  normal ECG  ** 
Compared to ECG 2023 19:27:11 
No significant changes 
Electronically Signed On 3- 7:46:35 EDT by AMBERLY MAR M.D.

## 2025-03-22 NOTE — ED.GENADUL1
HPI
HPI - General Adult
General
Chief complaint: Extremity Injury, Lower
Stated complaint: FELL, LOWER RIGHT EXTREMITY PAIN
Time Seen by Provider: 03/22/25 18:45
Source: patient
Mode of arrival: walk-in
History of Present Illness
HPI narrative: 
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 still smoking. States walking outdoors about 10 days ago and fell. Was able to get up. No significant injury. Over past 2 weeks has been experiencing cramping sensation across her chest on and off. Yesterday standing on 
ledge at top of stairs. [place] like something punched her in her chest. She woke up on the floor at the bottom of 10 stairs. Does not recall falling down the stairs. Did strike her head and injured her right hip and femur. Main complaint is pain of 
the hip and femur. No weakness or numbness. Denies neck pain, chest or abdominal pain. 
Related Data
Home Medications

?Medication ?Instructions ?Recorded ?Confirmed
budesonide 1 mg/2 mL suspension 1 mg inhalation Q12H PRN shortness 06/13/23 03/22/25
for nebulization of breath  
budesonide-formoterol  1 inh inhalation Q12H 06/13/23 03/22/25
mcg-4.5 mcg/actuation aerosol   
inhaler (Symbicort)   
famotidine 40 mg tablet 40 mg PO BEDTIME 06/13/23 03/22/25
galcanezumab-gnlm 120 mg/mL 120 mg subcut .month 06/13/23 03/22/25
subcutaneous pen injector   
(Emgality Pen)   
semaglutide 2 mg/dose (8 mg/3 mL) 2 mg subcut .friday 06/13/23 03/22/25
subcutaneous pen injector (Ozempic)   
rosuvastatin 10 mg tablet 10 mg PO DAILY 12/26/23 03/22/25
lorazepam 0.5 mg tablet mg PO Q6H PRN agitation 03/22/25 

Previous Rx's

?Medication ?Instructions ?Recorded
promethazine-DM 6.25 mg-15 mg/5 mL 5 ml PO Q6H PRN cough #118 mL 06/14/23
oral syrup  
methylprednisolone 4 mg tablets in See Rx Instructions .Route 12/26/23
a dose pack (Medrol (Jose)) .COMPLEX #21 ea 
nirmatrelvir 300 mg (150 mg See Rx Instructions PO .COMPLEX 12/31/24
x2)-ritonavir 100 mg tablet,dose #30 ea 
pack (Paxlovid)  


Allergies

Allergy/AdvReac Type Severity Reaction Status Date / Time
Penicillins Allergy Severe rash Verified 06/13/23 17:50
Sulfa (Sulfonamide Allergy Severe Rash Verified 06/13/23 17:50
Antibiotics)     
metoclopramide (From Reglan) AdvReac Severe Irritable Verified 12/26/23 12:35
topiramate (From Topamax) AdvReac Severe thoughts Verified 06/13/23 17:50
   of dieing  
bupropion (From Wellbutrin) AdvReac Intermediate Agitated Verified 12/26/23 12:29



Opioid HPI
Opioid Management
Most Recent Opioid Data: 
      Last Pain Scale 5 06/14/23 13:21 06/14/23


Review of Systems
ROS  
 Status of ROS 10 or more systems reviewed and unremarkable except as noted in history and below   

[facility]
Medical History (Updated 03/22/25 @ 22:48 by New Topete MD)

Migraine
 ?G43.909 - Migraine, unspecified, not intractable, without status migrainosus (ICD-10)
Fibromyalgia
 ?M79.7 - Fibromyalgia (ICD-10)
Rheumatoid arthritis
 ?M06.9 - Rheumatoid arthritis, unspecified (ICD-10)
History of diabetes mellitus
 ?Z86.39 - Personal history of other endocrine, nutritional and metabolic disease (ICD-10)
History of oxygen administration
 ?Z99.81 - Dependence on supplemental oxygen (ICD-10)
History of COPD
 ?Z87.09 - Personal history of other diseases of the respiratory system (ICD-10)


Surgical History (Updated 06/13/23 @ 17:58 by Juan Garcia)

History of appendectomy
 ?Z90.49 - Acquired absence of other specified parts of digestive tract (ICD-10)
History of cholecystectomy
 ?Z90.49 - Acquired absence of other specified parts of digestive tract (ICD-10)
History of hysterectomy
 ?Z90.710 - Acquired absence of both cervix and uterus (ICD-10)
History of cardiac catheterization
 ?Z98.890 - Other specified postprocedural states (ICD-10)


Social History (Reviewed 12/26/23 @ 13:31 by ERIN Marcum)
Smoking status:  Current every day smoker 
Highest level of school completed/degree received:  high school graduate 
Little interest or pleasure in doing things:  not at all 
Feeling down, depressed, or hopeless:  not at all 



Exam
Constitutional
Vital Signs, click to edit/add: 

Last Vital Signs

Temp  97.8 F   03/22/25 18:48
Pulse  88   03/22/25 21:12
Resp  16   03/22/25 21:12
BP  130/84   03/22/25 20:45
Pulse Ox  95   03/22/25 21:12
O2 Del Method  Nasal Cannula  03/22/25 19:25
O2 Flow Rate  2   03/22/25 21:12



Common normals: no apparent distress, average body habitus, oriented x3, no limitations, healthy appearing, alert and well nourished
[facility]
Other: 
mild tenderness left occipital scalp. No obvious hematoma
Eye
Common normals: EOMs intact bilaterally
Neck & C-Spine
Common normals: full ROM
Chest
Common normals: inspection of chest normal
Respiratory
Common normals: normal respiratory effort, no retractions, no use of accessory muscles and clear to auscultation bilaterally
Cardio
Common normals: regular rate, regular rhythm, S1 normal heart sound and S2 normal heart sound
GI
Common normals: Normal to inspection, nondistended, normoactive bowel sounds present, soft to palpation and non-tender
Extremity
Other: 
tenderness right hip and femur
Neuro
Common normals: oriented x3, CN's II-XII intact bilaterally, moves all extremities and no focal motor deficits
Psych
Appearance: grossly normal

Course
Vital Signs
Vital signs: 

Vital Signs

Temperature  97.8 F   03/22/25 18:48
Pulse Rate  118 H  03/22/25 18:48
Respiratory Rate  18   03/22/25 18:48
Blood Pressure  152/86 H  03/22/25 18:48
Pulse Oximetry  93 L  03/22/25 18:48
Oxygen Delivery Method  Room Air  03/22/25 18:48



Temperature  97.8 F   03/22/25 18:48
Pulse Rate  88   03/22/25 21:12
Respiratory Rate  16   03/22/25 21:12
Blood Pressure  130/84   03/22/25 20:45
Pulse Oximetry  95   03/22/25 21:12
Oxygen Delivery Method  Nasal Cannula  03/22/25 19:25
Oxygen Delivery Flow Rate  2   03/22/25 21:12




Medical Decision Making
MDM Narrative
Medical decision making narrative: 
patient presents because of pain of her right hip and femur after fall yesterday. She describes cramping sensation band across her lower chest for the past few weeks that would only last few seconds. Yesterday she was standing at the top of her 
ledge and experienced acute chest pain like she was punched in the chest. She woke up at the bottom of 10 stairs. Does not remember how she got down the stairs. Did have mild headache but main complaint was pain of the right hip and hematoma 
contusion right femur. No weakness or numbness. She does have 02 dependent COPD. CT brain and C-spine neg. xrays of hip and femur neg. d-dimer neg. EKG WNL.  serial troponins neg. Discussed option of admission to continue workup of her syncopal 
episode and she declined and preferred to follow up with her PCP
Lab Data
Labs: 

Lab Results

  03/22/25 03/22/25 Range/Units
  20:00 22:07 
WBC  13.0 H   (4.0-11.0)  10^3/uL
RBC  5.21   (4.20-5.40)  10^6/uL
Hgb  16.8 H   (12.0-16.0)  g/dL
Hct  48.2 H   (36.0-48.0)  %
MCV  92.5   (81.0-99.0)  fL
MCH  32.2   (26.7-34.0)  pg
MCHC  34.9   (29.9-35.2)  g/dL
RDW  12.1   (11.0-15.0)  %
Plt Count  228   (150-450)  10^3/uL
MPV  9.7   (9.5-13.5)  fL
Neut % (Auto)  60.4   (43.0-75.0)  %
Lymph % (Auto)  30.3   (20.5-60.0)  %
Mono % (Auto)  6.8   (1.7-12.0)  %
Eos % (Auto)  1.2   (0.9-7.0)  %
Baso % (Auto)  0.8   (0.2-2.0)  %
Neut # (Auto)  7.8 H   (1.4-6.5)  10^3/uL
Lymph # (Auto)  3.9 H   (1.2-3.8)  10^3/uL
Mono # (Auto)  0.9 H   (0.3-0.8)  10^3/uL
Eos # (Auto)  0.2   (0.0-0.7)  10^3/uL
Baso # (Auto)  0.1   (0.0-0.1)  10^3/uL
Abs Immat Gran (auto)  0.07 H   (0.00-0.03)  10^3/uL
Imm/Tot Granulo (auto)  0.5   (0.0-0.5)  %
D-Dimer  0.59   (<=0.59)  mg/L FEU
Sodium  137   (136-145)  mmol/L
Potassium  4.2   (3.5-5.1)  mmol/L
Chloride  101   ()  mmol/L
Carbon Dioxide  31.8   (21.0-32.0)  mmol/L
Anion Gap  8.4   
BUN  14.0   (7.0-18.0)  mg/dL
Creatinine  0.89   (0.55-1.02)  mg/dL
Est GFR ( Amer)  >60   (>=60 mL/min/1.73m^2)  
Est GFR (Non-Af Amer)  >60   (>=60 mL/min/1.73m^2)  
BUN/Creatinine Ratio  15.7   
Glucose  195 H   ()  mg/dL
Calcium  9.8   (8.5-10.1)  mg/dL
Myoglobin  22   (9-82)  ng/mL
Troponin I High Sens  <4.0 L  <4.0 L  (4.0-51.3)  pg/mL




Discharge Plan
Discharge
Chief Complaint: Extremity Injury, Lower

Clinical Impression:
 Contusion of hip, right, Syncope, Contusion of lateral condyle of right femur, Chest pain


Patient Disposition: Home, Self-Care

Prescriptions / Home Meds:
No Action
  rosuvastatin 10 mg tablet 
   10 mg PO DAILY 
  methylprednisolone [Medrol (Jose)] 4 mg tablets,dose pack 
   See Rx Instructions  .ROUTE .COMPLEX Qty: 21 0RF
   Rx Instructions:
   Taper as directed
  lorazepam 0.5 mg tablet 
     PO Q6H PRN (Reason: agitation) 
  budesonide 1 mg/2 mL suspension for nebulization 
   1 mg inhalation Q12H PRN (Reason: shortness of breath) 
  budesonide-formoterol [Symbicort] 160-4.5 mcg/actuation HFA aerosol inhaler 
   1 inh INHALATION Q12H 
  Ozempic 2 mg/dose (8 mg/3 mL) pen injector 
   2 mg SUBCUT .friday 
  famotidine 40 mg tablet 
   40 mg PO BEDTIME 
  Emgality Pen 120 mg/mL pen injector 
   120 mg SUBCUT .month 
  promethazine-DM 6.25-15 mg/5 mL syrup 
   5 ml PO Q6H PRN (Reason: cough) Qty: 118 0RF
  Paxlovid 300 mg (150 mg x 2)-100 mg tablets,dose pack 
   See Rx Instructions  .ROUTE .COMPLEX Qty: 30 0RF
   Rx Instructions:
   take  mg tablets of nirmatrelvir with  mg tablet of ritonavir twice daily for 5 days

Print Language: English

Instructions:  Chest Pain (ED), Syncope (ED), Hip Contusion (ED)

Additional Instructions:
follow up with your doctor for recheck in next 2-3 days.

Referrals:
NELIA MALONE [Primary Care Provider] - 1 week